# Patient Record
Sex: MALE | URBAN - METROPOLITAN AREA
[De-identification: names, ages, dates, MRNs, and addresses within clinical notes are randomized per-mention and may not be internally consistent; named-entity substitution may affect disease eponyms.]

---

## 2022-02-28 ENCOUNTER — EMERGENCY (EMERGENCY)
Facility: HOSPITAL | Age: 42
LOS: 1 days | Discharge: HOME | End: 2022-02-28
Attending: EMERGENCY MEDICINE
Payer: SELF-PAY

## 2022-02-28 VITALS
SYSTOLIC BLOOD PRESSURE: 142 MMHG | HEIGHT: 63 IN | WEIGHT: 160.06 LBS | OXYGEN SATURATION: 95 % | TEMPERATURE: 98 F | HEART RATE: 94 BPM | DIASTOLIC BLOOD PRESSURE: 93 MMHG | RESPIRATION RATE: 18 BRPM

## 2022-02-28 LAB
ANION GAP SERPL CALC-SCNC: 18 MMOL/L — HIGH (ref 5–17)
APAP SERPL-MCNC: <3 UG/ML — LOW (ref 10–26)
APPEARANCE UR: CLEAR — SIGNIFICANT CHANGE UP
BACTERIA # UR AUTO: ABNORMAL
BASOPHILS # BLD AUTO: 0.07 K/UL — SIGNIFICANT CHANGE UP (ref 0–0.2)
BASOPHILS NFR BLD AUTO: 1.6 % — SIGNIFICANT CHANGE UP (ref 0–2)
BILIRUB UR-MCNC: NEGATIVE — SIGNIFICANT CHANGE UP
BUN SERPL-MCNC: 7.1 MG/DL — LOW (ref 8–20)
CALCIUM SERPL-MCNC: 9 MG/DL — SIGNIFICANT CHANGE UP (ref 8.6–10.2)
CHLORIDE SERPL-SCNC: 101 MMOL/L — SIGNIFICANT CHANGE UP (ref 98–107)
CO2 SERPL-SCNC: 26 MMOL/L — SIGNIFICANT CHANGE UP (ref 22–29)
COLOR SPEC: YELLOW — SIGNIFICANT CHANGE UP
CREAT SERPL-MCNC: 0.86 MG/DL — SIGNIFICANT CHANGE UP (ref 0.5–1.3)
DIFF PNL FLD: ABNORMAL
EGFR: 111 ML/MIN/1.73M2 — SIGNIFICANT CHANGE UP
EOSINOPHIL # BLD AUTO: 0.03 K/UL — SIGNIFICANT CHANGE UP (ref 0–0.5)
EOSINOPHIL NFR BLD AUTO: 0.7 % — SIGNIFICANT CHANGE UP (ref 0–6)
EPI CELLS # UR: SIGNIFICANT CHANGE UP
ETHANOL SERPL-MCNC: 371 MG/DL — HIGH (ref 0–9)
FLUAV AG NPH QL: SIGNIFICANT CHANGE UP
FLUBV AG NPH QL: SIGNIFICANT CHANGE UP
GLUCOSE SERPL-MCNC: 94 MG/DL — SIGNIFICANT CHANGE UP (ref 70–99)
GLUCOSE UR QL: NEGATIVE MG/DL — SIGNIFICANT CHANGE UP
HCT VFR BLD CALC: 44.4 % — SIGNIFICANT CHANGE UP (ref 39–50)
HGB BLD-MCNC: 15.3 G/DL — SIGNIFICANT CHANGE UP (ref 13–17)
IMM GRANULOCYTES NFR BLD AUTO: 0.2 % — SIGNIFICANT CHANGE UP (ref 0–1.5)
KETONES UR-MCNC: NEGATIVE — SIGNIFICANT CHANGE UP
LEUKOCYTE ESTERASE UR-ACNC: NEGATIVE — SIGNIFICANT CHANGE UP
LYMPHOCYTES # BLD AUTO: 1.82 K/UL — SIGNIFICANT CHANGE UP (ref 1–3.3)
LYMPHOCYTES # BLD AUTO: 42.4 % — SIGNIFICANT CHANGE UP (ref 13–44)
MCHC RBC-ENTMCNC: 32.3 PG — SIGNIFICANT CHANGE UP (ref 27–34)
MCHC RBC-ENTMCNC: 34.5 GM/DL — SIGNIFICANT CHANGE UP (ref 32–36)
MCV RBC AUTO: 93.7 FL — SIGNIFICANT CHANGE UP (ref 80–100)
MONOCYTES # BLD AUTO: 0.47 K/UL — SIGNIFICANT CHANGE UP (ref 0–0.9)
MONOCYTES NFR BLD AUTO: 11 % — SIGNIFICANT CHANGE UP (ref 2–14)
NEUTROPHILS # BLD AUTO: 1.89 K/UL — SIGNIFICANT CHANGE UP (ref 1.8–7.4)
NEUTROPHILS NFR BLD AUTO: 44.1 % — SIGNIFICANT CHANGE UP (ref 43–77)
NITRITE UR-MCNC: NEGATIVE — SIGNIFICANT CHANGE UP
PH UR: 6 — SIGNIFICANT CHANGE UP (ref 5–8)
PLATELET # BLD AUTO: 221 K/UL — SIGNIFICANT CHANGE UP (ref 150–400)
POTASSIUM SERPL-MCNC: 4 MMOL/L — SIGNIFICANT CHANGE UP (ref 3.5–5.3)
POTASSIUM SERPL-SCNC: 4 MMOL/L — SIGNIFICANT CHANGE UP (ref 3.5–5.3)
PROT UR-MCNC: 15
RBC # BLD: 4.74 M/UL — SIGNIFICANT CHANGE UP (ref 4.2–5.8)
RBC # FLD: 12.7 % — SIGNIFICANT CHANGE UP (ref 10.3–14.5)
RBC CASTS # UR COMP ASSIST: SIGNIFICANT CHANGE UP /HPF (ref 0–4)
RSV RNA NPH QL NAA+NON-PROBE: SIGNIFICANT CHANGE UP
SALICYLATES SERPL-MCNC: <0.6 MG/DL — LOW (ref 10–20)
SARS-COV-2 RNA SPEC QL NAA+PROBE: DETECTED
SODIUM SERPL-SCNC: 145 MMOL/L — SIGNIFICANT CHANGE UP (ref 135–145)
SP GR SPEC: 1.01 — SIGNIFICANT CHANGE UP (ref 1.01–1.02)
UROBILINOGEN FLD QL: 1 MG/DL
WBC # BLD: 4.29 K/UL — SIGNIFICANT CHANGE UP (ref 3.8–10.5)
WBC # FLD AUTO: 4.29 K/UL — SIGNIFICANT CHANGE UP (ref 3.8–10.5)
WBC UR QL: SIGNIFICANT CHANGE UP /HPF (ref 0–5)

## 2022-02-28 PROCEDURE — 80048 BASIC METABOLIC PNL TOTAL CA: CPT

## 2022-02-28 PROCEDURE — 81001 URINALYSIS AUTO W/SCOPE: CPT

## 2022-02-28 PROCEDURE — 93010 ELECTROCARDIOGRAM REPORT: CPT

## 2022-02-28 PROCEDURE — 93005 ELECTROCARDIOGRAM TRACING: CPT

## 2022-02-28 PROCEDURE — 36415 COLL VENOUS BLD VENIPUNCTURE: CPT

## 2022-02-28 PROCEDURE — 80307 DRUG TEST PRSMV CHEM ANLYZR: CPT

## 2022-02-28 PROCEDURE — 87086 URINE CULTURE/COLONY COUNT: CPT

## 2022-02-28 PROCEDURE — 85025 COMPLETE CBC W/AUTO DIFF WBC: CPT

## 2022-02-28 PROCEDURE — 99285 EMERGENCY DEPT VISIT HI MDM: CPT

## 2022-02-28 PROCEDURE — 87637 SARSCOV2&INF A&B&RSV AMP PRB: CPT

## 2022-02-28 PROCEDURE — 99284 EMERGENCY DEPT VISIT MOD MDM: CPT

## 2022-02-28 NOTE — ED PROVIDER NOTE - ATTENDING CONTRIBUTION TO CARE
I personally saw the patient with the resident, and completed the key components of the history and physical exam. I then discussed the management plan with the resident.   gen in nad resp clear cardiac no murmur abd soft neuro itnact

## 2022-02-28 NOTE — ED ADULT TRIAGE NOTE - CHIEF COMPLAINT QUOTE
patient brought in by friend states that he has Kidney issue, urinating blood, and requesting DETOX from alcohol last drink PTA

## 2022-02-28 NOTE — ED PROVIDER NOTE - PATIENT PORTAL LINK FT
You can access the FollowMyHealth Patient Portal offered by [facility] by registering at the following website: [email or web address]. By joining [employer]’s FollowMyHealth portal, you will also be able to view your health information using other applications (apps) compatible with our system.

## 2022-02-28 NOTE — ED PROVIDER NOTE - CLINICAL SUMMARY MEDICAL DECISION MAKING FREE TEXT BOX
41 yo male presents requesting detox. Will obtain appropriate labs. SBIRT consult. Monitor for signs of withdrawal.

## 2022-02-28 NOTE — ED PROVIDER NOTE - OBJECTIVE STATEMENT
43 yo male history of daily alcohol abuse presents requesting detox. Patient states that he drinks roughly one pint of vodka per evening. Patient reports that he last drank 5 hours PTA. He does not feel as if he is withdrawing. Patient states that he has an issue with drinking and would like to go to rehabilitation to help stem his drinking problem. He denies recent falls/head trauma. Denies other recent illness.

## 2022-02-28 NOTE — ED PROVIDER NOTE - PHYSICAL EXAMINATION
Gen: NAD  Head: NC/AT  Neck: trachea midline  Resp:  No distress, lungs cta b/l  Cardiac: Heart rrr  Ext: no deformities  Neuro:  A&O appears non focal  Skin:  Warm and dry as visualized

## 2022-02-28 NOTE — ED PROVIDER NOTE - PROGRESS NOTE DETAILS
Aye: Patient left, pulled out IV. Eloped. Aye: Patient covid positive. States that he no longer wants to go to rehab. Plan to dc the patient.

## 2022-02-28 NOTE — ED ADULT NURSE NOTE - OBJECTIVE STATEMENT
Pt A&O x 4 reports wanting rehab for ETOH abuse. Pt reports drinking a pint of vodka daily. Reports drinking alcohol in the morning today. Denies recent drug use but reports hx of cocaine abuse. IV access obtained; specimens sent to lab. Will continue to monitor.

## 2022-03-01 VITALS
DIASTOLIC BLOOD PRESSURE: 78 MMHG | RESPIRATION RATE: 16 BRPM | SYSTOLIC BLOOD PRESSURE: 124 MMHG | TEMPERATURE: 98 F | HEART RATE: 80 BPM | OXYGEN SATURATION: 97 %

## 2022-03-01 LAB
CULTURE RESULTS: SIGNIFICANT CHANGE UP
SPECIMEN SOURCE: SIGNIFICANT CHANGE UP

## 2022-03-17 ENCOUNTER — EMERGENCY (EMERGENCY)
Facility: HOSPITAL | Age: 42
LOS: 1 days | Discharge: HOME | End: 2022-03-17
Attending: EMERGENCY MEDICINE
Payer: SELF-PAY

## 2022-03-17 VITALS
OXYGEN SATURATION: 96 % | SYSTOLIC BLOOD PRESSURE: 165 MMHG | DIASTOLIC BLOOD PRESSURE: 89 MMHG | RESPIRATION RATE: 20 BRPM | TEMPERATURE: 98 F | HEART RATE: 89 BPM

## 2022-03-17 PROCEDURE — 99284 EMERGENCY DEPT VISIT MOD MDM: CPT

## 2022-03-17 NOTE — ED PROVIDER NOTE - NSDCPRINTRESULTS_ED_ALL_ED
20-Sep-2021 21:20
Patient requests all Lab, Cardiology, and Radiology Results on their Discharge Instructions

## 2022-03-17 NOTE — ED PROVIDER NOTE - PATIENT PORTAL LINK FT
You can access the FollowMyHealth Patient Portal offered by [facility] by registering at the following website: [email or web address]. By joining [employer]’s FollowMyHealth portal, you will also be able to view your health information using other applications (apps) compatible with our system.

## 2022-03-17 NOTE — ED PROVIDER NOTE - PHYSICAL EXAMINATION
General:   intoxicated, disheveled  Head:     NC/AT, EOMI,   Neck:     trachea midline  Lungs:     CTA b/l, no w/r/r  CVS:     S1S2, RRR, no m/g/r  Abd:     +BS, s/nt/nd, no organomegaly  Ext:    2+ radial and pedal pulses, no c/c/e  Neuro: AAOx2, no sensory/motor deficits
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
## 2022-03-18 VITALS
OXYGEN SATURATION: 98 % | DIASTOLIC BLOOD PRESSURE: 76 MMHG | TEMPERATURE: 98 F | HEART RATE: 78 BPM | RESPIRATION RATE: 18 BRPM | SYSTOLIC BLOOD PRESSURE: 124 MMHG

## 2022-05-23 ENCOUNTER — TRANSCRIPTION ENCOUNTER (OUTPATIENT)
Age: 42
End: 2022-05-23

## 2022-05-23 ENCOUNTER — INPATIENT (INPATIENT)
Facility: HOSPITAL | Age: 42
LOS: 129 days | Discharge: NURSING, REHAB OR LONG-TERM CARE | DRG: 3 | End: 2022-09-30
Attending: INTERNAL MEDICINE | Admitting: SURGERY
Payer: COMMERCIAL

## 2022-05-23 VITALS
RESPIRATION RATE: 16 BRPM | TEMPERATURE: 98 F | SYSTOLIC BLOOD PRESSURE: 142 MMHG | HEART RATE: 86 BPM | DIASTOLIC BLOOD PRESSURE: 93 MMHG | OXYGEN SATURATION: 86 %

## 2022-05-23 PROCEDURE — 43753 TX GASTRO INTUB W/ASP: CPT

## 2022-05-23 PROCEDURE — 31500 INSERT EMERGENCY AIRWAY: CPT | Mod: 59

## 2022-05-23 PROCEDURE — 99285 EMERGENCY DEPT VISIT HI MDM: CPT | Mod: 25

## 2022-05-23 RX ORDER — TETANUS TOXOID, REDUCED DIPHTHERIA TOXOID AND ACELLULAR PERTUSSIS VACCINE, ADSORBED 5; 2.5; 8; 8; 2.5 [IU]/.5ML; [IU]/.5ML; UG/.5ML; UG/.5ML; UG/.5ML
0.5 SUSPENSION INTRAMUSCULAR ONCE
Refills: 0 | Status: COMPLETED | OUTPATIENT
Start: 2022-05-23 | End: 2022-05-23

## 2022-05-23 RX ORDER — CEFAZOLIN SODIUM 1 G
2000 VIAL (EA) INJECTION ONCE
Refills: 0 | Status: COMPLETED | OUTPATIENT
Start: 2022-05-23 | End: 2022-05-23
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
## 2022-05-23 NOTE — ED PROVIDER NOTE - CARE PLAN
1 Principal Discharge DX:	Change in mental status   Principal Discharge DX:	Subdural hemorrhage-deep coma  Secondary Diagnosis:	Fracture, facial bones
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
## 2022-05-23 NOTE — ED PROVIDER NOTE - ENMT, MLM
trachea midline, + hematoma to occipital region + laceration to forehead trachea midline, + hematoma to back of head, + laceration to forehead

## 2022-05-24 ENCOUNTER — APPOINTMENT (OUTPATIENT)
Dept: NEUROSURGERY | Facility: HOSPITAL | Age: 42
End: 2022-05-24

## 2022-05-24 DIAGNOSIS — R41.82 ALTERED MENTAL STATUS, UNSPECIFIED: ICD-10-CM

## 2022-05-24 DIAGNOSIS — S06.5X9A TRAUMATIC SUBDURAL HEMORRHAGE WITH LOSS OF CONSCIOUSNESS OF UNSPECIFIED DURATION, INITIAL ENCOUNTER: ICD-10-CM

## 2022-05-24 PROBLEM — Z00.00 ENCOUNTER FOR PREVENTIVE HEALTH EXAMINATION: Noted: 2022-05-24

## 2022-05-24 LAB
ABO RH CONFIRMATION: SIGNIFICANT CHANGE UP
ALBUMIN SERPL ELPH-MCNC: 3.3 G/DL — SIGNIFICANT CHANGE UP (ref 3.3–5.2)
ALBUMIN SERPL ELPH-MCNC: 3.5 G/DL — SIGNIFICANT CHANGE UP (ref 3.3–5.2)
ALBUMIN SERPL ELPH-MCNC: 4.7 G/DL — SIGNIFICANT CHANGE UP (ref 3.3–5.2)
ALP SERPL-CCNC: 54 U/L — SIGNIFICANT CHANGE UP (ref 40–120)
ALP SERPL-CCNC: 57 U/L — SIGNIFICANT CHANGE UP (ref 40–120)
ALP SERPL-CCNC: 89 U/L — SIGNIFICANT CHANGE UP (ref 40–120)
ALT FLD-CCNC: 171 U/L — HIGH
ALT FLD-CCNC: 211 U/L — HIGH
ALT FLD-CCNC: 346 U/L — HIGH
AMPHET UR-MCNC: NEGATIVE — SIGNIFICANT CHANGE UP
ANION GAP SERPL CALC-SCNC: 16 MMOL/L — SIGNIFICANT CHANGE UP (ref 5–17)
ANION GAP SERPL CALC-SCNC: 20 MMOL/L — HIGH (ref 5–17)
ANION GAP SERPL CALC-SCNC: 22 MMOL/L — HIGH (ref 5–17)
ANION GAP SERPL CALC-SCNC: 23 MMOL/L — HIGH (ref 5–17)
APTT BLD: 23.5 SEC — LOW (ref 27.5–35.5)
APTT BLD: 25.6 SEC — LOW (ref 27.5–35.5)
APTT BLD: 25.9 SEC — LOW (ref 27.5–35.5)
APTT BLD: 26.1 SEC — LOW (ref 27.5–35.5)
AST SERPL-CCNC: 363 U/L — HIGH
AST SERPL-CCNC: 528 U/L — HIGH
AST SERPL-CCNC: 676 U/L — HIGH
BARBITURATES UR SCN-MCNC: NEGATIVE — SIGNIFICANT CHANGE UP
BASOPHILS # BLD AUTO: 0 K/UL — SIGNIFICANT CHANGE UP (ref 0–0.2)
BASOPHILS # BLD AUTO: 0.02 K/UL — SIGNIFICANT CHANGE UP (ref 0–0.2)
BASOPHILS # BLD AUTO: 0.09 K/UL — SIGNIFICANT CHANGE UP (ref 0–0.2)
BASOPHILS NFR BLD AUTO: 0 % — SIGNIFICANT CHANGE UP (ref 0–2)
BASOPHILS NFR BLD AUTO: 0.2 % — SIGNIFICANT CHANGE UP (ref 0–2)
BASOPHILS NFR BLD AUTO: 0.5 % — SIGNIFICANT CHANGE UP (ref 0–2)
BENZODIAZ UR-MCNC: NEGATIVE — SIGNIFICANT CHANGE UP
BILIRUB DIRECT SERPL-MCNC: 0.2 MG/DL — SIGNIFICANT CHANGE UP (ref 0–0.3)
BILIRUB DIRECT SERPL-MCNC: 0.2 MG/DL — SIGNIFICANT CHANGE UP (ref 0–0.3)
BILIRUB INDIRECT FLD-MCNC: 0.4 MG/DL — SIGNIFICANT CHANGE UP (ref 0.2–1)
BILIRUB INDIRECT FLD-MCNC: 0.4 MG/DL — SIGNIFICANT CHANGE UP (ref 0.2–1)
BILIRUB SERPL-MCNC: 0.6 MG/DL — SIGNIFICANT CHANGE UP (ref 0.4–2)
BLD GP AB SCN SERPL QL: SIGNIFICANT CHANGE UP
BUN SERPL-MCNC: 10.1 MG/DL — SIGNIFICANT CHANGE UP (ref 8–20)
BUN SERPL-MCNC: 10.9 MG/DL — SIGNIFICANT CHANGE UP (ref 8–20)
BUN SERPL-MCNC: 11.3 MG/DL — SIGNIFICANT CHANGE UP (ref 8–20)
BUN SERPL-MCNC: 11.6 MG/DL — SIGNIFICANT CHANGE UP (ref 8–20)
CALCIUM SERPL-MCNC: 6.4 MG/DL — CRITICAL LOW (ref 8.6–10.2)
CALCIUM SERPL-MCNC: 7.3 MG/DL — LOW (ref 8.6–10.2)
CALCIUM SERPL-MCNC: 7.5 MG/DL — LOW (ref 8.6–10.2)
CALCIUM SERPL-MCNC: 8.4 MG/DL — LOW (ref 8.6–10.2)
CHLORIDE SERPL-SCNC: 102 MMOL/L — SIGNIFICANT CHANGE UP (ref 98–107)
CHLORIDE SERPL-SCNC: 103 MMOL/L — SIGNIFICANT CHANGE UP (ref 98–107)
CHLORIDE SERPL-SCNC: 106 MMOL/L — SIGNIFICANT CHANGE UP (ref 98–107)
CHLORIDE SERPL-SCNC: 94 MMOL/L — LOW (ref 98–107)
CK MB CFR SERPL CALC: 14.4 NG/ML — HIGH (ref 0–6.7)
CK SERPL-CCNC: 818 U/L — HIGH (ref 30–200)
CO2 SERPL-SCNC: 15 MMOL/L — LOW (ref 22–29)
CO2 SERPL-SCNC: 16 MMOL/L — LOW (ref 22–29)
CO2 SERPL-SCNC: 19 MMOL/L — LOW (ref 22–29)
CO2 SERPL-SCNC: 22 MMOL/L — SIGNIFICANT CHANGE UP (ref 22–29)
COCAINE METAB.OTHER UR-MCNC: POSITIVE
CREAT SERPL-MCNC: 1.09 MG/DL — SIGNIFICANT CHANGE UP (ref 0.5–1.3)
CREAT SERPL-MCNC: 1.11 MG/DL — SIGNIFICANT CHANGE UP (ref 0.5–1.3)
CREAT SERPL-MCNC: 1.21 MG/DL — SIGNIFICANT CHANGE UP (ref 0.5–1.3)
CREAT SERPL-MCNC: 1.23 MG/DL — SIGNIFICANT CHANGE UP (ref 0.5–1.3)
EGFR: 84 ML/MIN/1.73M2 — SIGNIFICANT CHANGE UP
EGFR: 85 ML/MIN/1.73M2 — SIGNIFICANT CHANGE UP
EGFR: 95 ML/MIN/1.73M2 — SIGNIFICANT CHANGE UP
EGFR: 97 ML/MIN/1.73M2 — SIGNIFICANT CHANGE UP
EOSINOPHIL # BLD AUTO: 0 K/UL — SIGNIFICANT CHANGE UP (ref 0–0.5)
EOSINOPHIL # BLD AUTO: 0 K/UL — SIGNIFICANT CHANGE UP (ref 0–0.5)
EOSINOPHIL # BLD AUTO: 0.05 K/UL — SIGNIFICANT CHANGE UP (ref 0–0.5)
EOSINOPHIL NFR BLD AUTO: 0 % — SIGNIFICANT CHANGE UP (ref 0–6)
EOSINOPHIL NFR BLD AUTO: 0 % — SIGNIFICANT CHANGE UP (ref 0–6)
EOSINOPHIL NFR BLD AUTO: 0.3 % — SIGNIFICANT CHANGE UP (ref 0–6)
ETHANOL SERPL-MCNC: 356 MG/DL — HIGH (ref 0–9)
FIBRINOGEN PPP-MCNC: 321 MG/DL — LOW (ref 330–520)
GAS PNL BLDA: SIGNIFICANT CHANGE UP
GAS PNL BLDA: SIGNIFICANT CHANGE UP
GLUCOSE SERPL-MCNC: 113 MG/DL — HIGH (ref 70–99)
GLUCOSE SERPL-MCNC: 122 MG/DL — HIGH (ref 70–99)
GLUCOSE SERPL-MCNC: 128 MG/DL — HIGH (ref 70–99)
GLUCOSE SERPL-MCNC: 160 MG/DL — HIGH (ref 70–99)
HAV IGM SER-ACNC: SIGNIFICANT CHANGE UP
HBV CORE IGM SER-ACNC: SIGNIFICANT CHANGE UP
HBV SURFACE AG SER-ACNC: SIGNIFICANT CHANGE UP
HCT VFR BLD CALC: 22.4 % — LOW (ref 39–50)
HCT VFR BLD CALC: 23.8 % — LOW (ref 39–50)
HCT VFR BLD CALC: 24.8 % — LOW (ref 39–50)
HCT VFR BLD CALC: 28.7 % — LOW (ref 39–50)
HCT VFR BLD CALC: 42.4 % — SIGNIFICANT CHANGE UP (ref 39–50)
HCV AB S/CO SERPL IA: 0.13 S/CO — SIGNIFICANT CHANGE UP (ref 0–0.99)
HCV AB SERPL-IMP: SIGNIFICANT CHANGE UP
HGB BLD-MCNC: 14.6 G/DL — SIGNIFICANT CHANGE UP (ref 13–17)
HGB BLD-MCNC: 7.6 G/DL — LOW (ref 13–17)
HGB BLD-MCNC: 8 G/DL — LOW (ref 13–17)
HGB BLD-MCNC: 8.1 G/DL — LOW (ref 13–17)
HGB BLD-MCNC: 9.8 G/DL — LOW (ref 13–17)
HIV 1 & 2 AB SERPL IA.RAPID: SIGNIFICANT CHANGE UP
IMM GRANULOCYTES NFR BLD AUTO: 0.3 % — SIGNIFICANT CHANGE UP (ref 0–1.5)
IMM GRANULOCYTES NFR BLD AUTO: 0.7 % — SIGNIFICANT CHANGE UP (ref 0–1.5)
INR BLD: 1.01 RATIO — SIGNIFICANT CHANGE UP (ref 0.88–1.16)
INR BLD: 1.07 RATIO — SIGNIFICANT CHANGE UP (ref 0.88–1.16)
INR BLD: 1.1 RATIO — SIGNIFICANT CHANGE UP (ref 0.88–1.16)
INR BLD: 1.17 RATIO — HIGH (ref 0.88–1.16)
LACTATE SERPL-SCNC: 2.6 MMOL/L — HIGH (ref 0.5–2)
LYMPHOCYTES # BLD AUTO: 0.95 K/UL — LOW (ref 1–3.3)
LYMPHOCYTES # BLD AUTO: 1.14 K/UL — SIGNIFICANT CHANGE UP (ref 1–3.3)
LYMPHOCYTES # BLD AUTO: 10.7 % — LOW (ref 13–44)
LYMPHOCYTES # BLD AUTO: 13.6 % — SIGNIFICANT CHANGE UP (ref 13–44)
LYMPHOCYTES # BLD AUTO: 2.67 K/UL — SIGNIFICANT CHANGE UP (ref 1–3.3)
LYMPHOCYTES # BLD AUTO: 7 % — LOW (ref 13–44)
MAGNESIUM SERPL-MCNC: 1.5 MG/DL — LOW (ref 1.6–2.6)
MAGNESIUM SERPL-MCNC: 2.9 MG/DL — HIGH (ref 1.6–2.6)
MANUAL SMEAR VERIFICATION: SIGNIFICANT CHANGE UP
MCHC RBC-ENTMCNC: 30.5 PG — SIGNIFICANT CHANGE UP (ref 27–34)
MCHC RBC-ENTMCNC: 31.9 PG — SIGNIFICANT CHANGE UP (ref 27–34)
MCHC RBC-ENTMCNC: 32.1 PG — SIGNIFICANT CHANGE UP (ref 27–34)
MCHC RBC-ENTMCNC: 32.5 PG — SIGNIFICANT CHANGE UP (ref 27–34)
MCHC RBC-ENTMCNC: 32.6 PG — SIGNIFICANT CHANGE UP (ref 27–34)
MCHC RBC-ENTMCNC: 32.7 GM/DL — SIGNIFICANT CHANGE UP (ref 32–36)
MCHC RBC-ENTMCNC: 33.6 GM/DL — SIGNIFICANT CHANGE UP (ref 32–36)
MCHC RBC-ENTMCNC: 33.9 GM/DL — SIGNIFICANT CHANGE UP (ref 32–36)
MCHC RBC-ENTMCNC: 34.1 GM/DL — SIGNIFICANT CHANGE UP (ref 32–36)
MCHC RBC-ENTMCNC: 34.4 GM/DL — SIGNIFICANT CHANGE UP (ref 32–36)
MCV RBC AUTO: 92.8 FL — SIGNIFICANT CHANGE UP (ref 80–100)
MCV RBC AUTO: 93.2 FL — SIGNIFICANT CHANGE UP (ref 80–100)
MCV RBC AUTO: 94.1 FL — SIGNIFICANT CHANGE UP (ref 80–100)
MCV RBC AUTO: 96.1 FL — SIGNIFICANT CHANGE UP (ref 80–100)
MCV RBC AUTO: 96.7 FL — SIGNIFICANT CHANGE UP (ref 80–100)
METHADONE UR-MCNC: NEGATIVE — SIGNIFICANT CHANGE UP
MONOCYTES # BLD AUTO: 0.29 K/UL — SIGNIFICANT CHANGE UP (ref 0–0.9)
MONOCYTES # BLD AUTO: 0.4 K/UL — SIGNIFICANT CHANGE UP (ref 0–0.9)
MONOCYTES # BLD AUTO: 0.55 K/UL — SIGNIFICANT CHANGE UP (ref 0–0.9)
MONOCYTES NFR BLD AUTO: 1.8 % — LOW (ref 2–14)
MONOCYTES NFR BLD AUTO: 2 % — SIGNIFICANT CHANGE UP (ref 2–14)
MONOCYTES NFR BLD AUTO: 6.2 % — SIGNIFICANT CHANGE UP (ref 2–14)
MRSA PCR RESULT.: SIGNIFICANT CHANGE UP
NEUTROPHILS # BLD AUTO: 14.83 K/UL — HIGH (ref 1.8–7.4)
NEUTROPHILS # BLD AUTO: 16.36 K/UL — HIGH (ref 1.8–7.4)
NEUTROPHILS # BLD AUTO: 7.36 K/UL — SIGNIFICANT CHANGE UP (ref 1.8–7.4)
NEUTROPHILS NFR BLD AUTO: 82.6 % — HIGH (ref 43–77)
NEUTROPHILS NFR BLD AUTO: 82.9 % — HIGH (ref 43–77)
NEUTROPHILS NFR BLD AUTO: 90.3 % — HIGH (ref 43–77)
NEUTS BAND # BLD: 0.9 % — SIGNIFICANT CHANGE UP (ref 0–8)
OPIATES UR-MCNC: NEGATIVE — SIGNIFICANT CHANGE UP
PCP SPEC-MCNC: SIGNIFICANT CHANGE UP
PCP UR-MCNC: NEGATIVE — SIGNIFICANT CHANGE UP
PHOSPHATE SERPL-MCNC: 4.5 MG/DL — SIGNIFICANT CHANGE UP (ref 2.4–4.7)
PHOSPHATE SERPL-MCNC: 5.3 MG/DL — HIGH (ref 2.4–4.7)
PLAT MORPH BLD: NORMAL — SIGNIFICANT CHANGE UP
PLATELET # BLD AUTO: 111 K/UL — LOW (ref 150–400)
PLATELET # BLD AUTO: 145 K/UL — LOW (ref 150–400)
PLATELET # BLD AUTO: 65 K/UL — LOW (ref 150–400)
PLATELET # BLD AUTO: 72 K/UL — LOW (ref 150–400)
PLATELET # BLD AUTO: 85 K/UL — LOW (ref 150–400)
POTASSIUM SERPL-MCNC: 3.9 MMOL/L — SIGNIFICANT CHANGE UP (ref 3.5–5.3)
POTASSIUM SERPL-MCNC: 4.6 MMOL/L — SIGNIFICANT CHANGE UP (ref 3.5–5.3)
POTASSIUM SERPL-MCNC: 4.8 MMOL/L — SIGNIFICANT CHANGE UP (ref 3.5–5.3)
POTASSIUM SERPL-MCNC: 4.8 MMOL/L — SIGNIFICANT CHANGE UP (ref 3.5–5.3)
POTASSIUM SERPL-SCNC: 3.9 MMOL/L — SIGNIFICANT CHANGE UP (ref 3.5–5.3)
POTASSIUM SERPL-SCNC: 4.6 MMOL/L — SIGNIFICANT CHANGE UP (ref 3.5–5.3)
POTASSIUM SERPL-SCNC: 4.8 MMOL/L — SIGNIFICANT CHANGE UP (ref 3.5–5.3)
POTASSIUM SERPL-SCNC: 4.8 MMOL/L — SIGNIFICANT CHANGE UP (ref 3.5–5.3)
PROT SERPL-MCNC: 5.5 G/DL — LOW (ref 6.6–8.7)
PROT SERPL-MCNC: 5.6 G/DL — LOW (ref 6.6–8.7)
PROT SERPL-MCNC: 7.9 G/DL — SIGNIFICANT CHANGE UP (ref 6.6–8.7)
PROTHROM AB SERPL-ACNC: 11.7 SEC — SIGNIFICANT CHANGE UP (ref 10.5–13.4)
PROTHROM AB SERPL-ACNC: 12.4 SEC — SIGNIFICANT CHANGE UP (ref 10.5–13.4)
PROTHROM AB SERPL-ACNC: 12.8 SEC — SIGNIFICANT CHANGE UP (ref 10.5–13.4)
PROTHROM AB SERPL-ACNC: 13.6 SEC — HIGH (ref 10.5–13.4)
RBC # BLD: 2.33 M/UL — LOW (ref 4.2–5.8)
RBC # BLD: 2.46 M/UL — LOW (ref 4.2–5.8)
RBC # BLD: 2.66 M/UL — LOW (ref 4.2–5.8)
RBC # BLD: 3.05 M/UL — LOW (ref 4.2–5.8)
RBC # BLD: 4.57 M/UL — SIGNIFICANT CHANGE UP (ref 4.2–5.8)
RBC # FLD: 11 % — SIGNIFICANT CHANGE UP (ref 10.3–14.5)
RBC # FLD: 11.3 % — SIGNIFICANT CHANGE UP (ref 10.3–14.5)
RBC # FLD: 11.3 % — SIGNIFICANT CHANGE UP (ref 10.3–14.5)
RBC # FLD: 11.4 % — SIGNIFICANT CHANGE UP (ref 10.3–14.5)
RBC # FLD: 13.2 % — SIGNIFICANT CHANGE UP (ref 10.3–14.5)
RBC BLD AUTO: NORMAL — SIGNIFICANT CHANGE UP
S AUREUS DNA NOSE QL NAA+PROBE: DETECTED
SARS-COV-2 RNA SPEC QL NAA+PROBE: SIGNIFICANT CHANGE UP
SMUDGE CELLS # BLD: PRESENT — SIGNIFICANT CHANGE UP
SODIUM SERPL-SCNC: 138 MMOL/L — SIGNIFICANT CHANGE UP (ref 135–145)
SODIUM SERPL-SCNC: 139 MMOL/L — SIGNIFICANT CHANGE UP (ref 135–145)
SODIUM SERPL-SCNC: 140 MMOL/L — SIGNIFICANT CHANGE UP (ref 135–145)
SODIUM SERPL-SCNC: 140 MMOL/L — SIGNIFICANT CHANGE UP (ref 135–145)
THC UR QL: NEGATIVE — SIGNIFICANT CHANGE UP
WBC # BLD: 16.26 K/UL — HIGH (ref 3.8–10.5)
WBC # BLD: 19.7 K/UL — HIGH (ref 3.8–10.5)
WBC # BLD: 6.99 K/UL — SIGNIFICANT CHANGE UP (ref 3.8–10.5)
WBC # BLD: 7.63 K/UL — SIGNIFICANT CHANGE UP (ref 3.8–10.5)
WBC # BLD: 8.91 K/UL — SIGNIFICANT CHANGE UP (ref 3.8–10.5)
WBC # FLD AUTO: 16.26 K/UL — HIGH (ref 3.8–10.5)
WBC # FLD AUTO: 19.7 K/UL — HIGH (ref 3.8–10.5)
WBC # FLD AUTO: 6.99 K/UL — SIGNIFICANT CHANGE UP (ref 3.8–10.5)
WBC # FLD AUTO: 7.63 K/UL — SIGNIFICANT CHANGE UP (ref 3.8–10.5)
WBC # FLD AUTO: 8.91 K/UL — SIGNIFICANT CHANGE UP (ref 3.8–10.5)

## 2022-05-24 PROCEDURE — 70450 CT HEAD/BRAIN W/O DYE: CPT | Mod: 26,77

## 2022-05-24 PROCEDURE — 70486 CT MAXILLOFACIAL W/O DYE: CPT | Mod: 26

## 2022-05-24 PROCEDURE — 71260 CT THORAX DX C+: CPT | Mod: 26,77

## 2022-05-24 PROCEDURE — 36556 INSERT NON-TUNNEL CV CATH: CPT | Mod: 1L,LT

## 2022-05-24 PROCEDURE — 72125 CT NECK SPINE W/O DYE: CPT | Mod: 26

## 2022-05-24 PROCEDURE — 99223 1ST HOSP IP/OBS HIGH 75: CPT | Mod: 57

## 2022-05-24 PROCEDURE — 73552 X-RAY EXAM OF FEMUR 2/>: CPT | Mod: 26,RT

## 2022-05-24 PROCEDURE — 70450 CT HEAD/BRAIN W/O DYE: CPT | Mod: 26,76

## 2022-05-24 PROCEDURE — 99291 CRITICAL CARE FIRST HOUR: CPT

## 2022-05-24 PROCEDURE — 61312 CRNEC/CRNOT STTL XDRL/SDRL: CPT

## 2022-05-24 PROCEDURE — 74177 CT ABD & PELVIS W/CONTRAST: CPT | Mod: 26,77

## 2022-05-24 PROCEDURE — 72125 CT NECK SPINE W/O DYE: CPT | Mod: 26,77

## 2022-05-24 PROCEDURE — 74177 CT ABD & PELVIS W/CONTRAST: CPT | Mod: 26

## 2022-05-24 PROCEDURE — 36556 INSERT NON-TUNNEL CV CATH: CPT

## 2022-05-24 PROCEDURE — 71045 X-RAY EXAM CHEST 1 VIEW: CPT | Mod: 26

## 2022-05-24 PROCEDURE — 71260 CT THORAX DX C+: CPT | Mod: 26

## 2022-05-24 RX ORDER — CEFAZOLIN SODIUM 1 G
2000 VIAL (EA) INJECTION EVERY 8 HOURS
Refills: 0 | Status: DISCONTINUED | OUTPATIENT
Start: 2022-05-24 | End: 2022-05-27

## 2022-05-24 RX ORDER — FENTANYL CITRATE 50 UG/ML
50 INJECTION INTRAVENOUS ONCE
Refills: 0 | Status: DISCONTINUED | OUTPATIENT
Start: 2022-05-24 | End: 2022-05-24

## 2022-05-24 RX ORDER — MAGNESIUM SULFATE 500 MG/ML
2 VIAL (ML) INJECTION
Refills: 0 | Status: COMPLETED | OUTPATIENT
Start: 2022-05-24 | End: 2022-05-24

## 2022-05-24 RX ORDER — CHLORHEXIDINE GLUCONATE 213 G/1000ML
1 SOLUTION TOPICAL DAILY
Refills: 0 | Status: DISCONTINUED | OUTPATIENT
Start: 2022-05-24 | End: 2022-06-06

## 2022-05-24 RX ORDER — MANNITOL
50 POWDER (GRAM) MISCELLANEOUS ONCE
Refills: 0 | Status: DISCONTINUED | OUTPATIENT
Start: 2022-05-24 | End: 2022-05-24

## 2022-05-24 RX ORDER — CHLORHEXIDINE GLUCONATE 213 G/1000ML
15 SOLUTION TOPICAL EVERY 12 HOURS
Refills: 0 | Status: DISCONTINUED | OUTPATIENT
Start: 2022-05-24 | End: 2022-06-06

## 2022-05-24 RX ORDER — ACETAMINOPHEN 500 MG
975 TABLET ORAL EVERY 6 HOURS
Refills: 0 | Status: DISCONTINUED | OUTPATIENT
Start: 2022-05-24 | End: 2022-05-26

## 2022-05-24 RX ORDER — LEVETIRACETAM 250 MG/1
1000 TABLET, FILM COATED ORAL EVERY 12 HOURS
Refills: 0 | Status: DISCONTINUED | OUTPATIENT
Start: 2022-05-24 | End: 2022-05-24

## 2022-05-24 RX ORDER — FENTANYL CITRATE 50 UG/ML
0.5 INJECTION INTRAVENOUS
Qty: 2500 | Refills: 0 | Status: DISCONTINUED | OUTPATIENT
Start: 2022-05-24 | End: 2022-05-31

## 2022-05-24 RX ORDER — SODIUM CHLORIDE 9 MG/ML
10 INJECTION INTRAMUSCULAR; INTRAVENOUS; SUBCUTANEOUS
Refills: 0 | Status: DISCONTINUED | OUTPATIENT
Start: 2022-05-24 | End: 2022-06-29

## 2022-05-24 RX ORDER — CHLORHEXIDINE GLUCONATE 213 G/1000ML
1 SOLUTION TOPICAL
Refills: 0 | Status: DISCONTINUED | OUTPATIENT
Start: 2022-05-24 | End: 2022-05-24

## 2022-05-24 RX ORDER — PROPOFOL 10 MG/ML
10 INJECTION, EMULSION INTRAVENOUS
Qty: 1000 | Refills: 0 | Status: DISCONTINUED | OUTPATIENT
Start: 2022-05-24 | End: 2022-05-25

## 2022-05-24 RX ORDER — SODIUM CHLORIDE 9 MG/ML
1000 INJECTION INTRAMUSCULAR; INTRAVENOUS; SUBCUTANEOUS ONCE
Refills: 0 | Status: COMPLETED | OUTPATIENT
Start: 2022-05-24 | End: 2022-05-24

## 2022-05-24 RX ORDER — CALCIUM GLUCONATE 100 MG/ML
2 VIAL (ML) INTRAVENOUS
Refills: 0 | Status: COMPLETED | OUTPATIENT
Start: 2022-05-24 | End: 2022-05-24

## 2022-05-24 RX ORDER — MANNITOL
30 POWDER (GRAM) MISCELLANEOUS ONCE
Refills: 0 | Status: DISCONTINUED | OUTPATIENT
Start: 2022-05-24 | End: 2022-05-24

## 2022-05-24 RX ORDER — LEVETIRACETAM 250 MG/1
1500 TABLET, FILM COATED ORAL EVERY 12 HOURS
Refills: 0 | Status: DISCONTINUED | OUTPATIENT
Start: 2022-05-24 | End: 2022-05-28

## 2022-05-24 RX ORDER — SODIUM CHLORIDE 9 MG/ML
1000 INJECTION INTRAMUSCULAR; INTRAVENOUS; SUBCUTANEOUS
Refills: 0 | Status: DISCONTINUED | OUTPATIENT
Start: 2022-05-24 | End: 2022-05-25

## 2022-05-24 RX ORDER — ACETAMINOPHEN 500 MG
1000 TABLET ORAL ONCE
Refills: 0 | Status: COMPLETED | OUTPATIENT
Start: 2022-05-24 | End: 2022-05-24

## 2022-05-24 RX ORDER — ROCURONIUM BROMIDE 10 MG/ML
50 VIAL (ML) INTRAVENOUS ONCE
Refills: 0 | Status: COMPLETED | OUTPATIENT
Start: 2022-05-24 | End: 2022-05-24

## 2022-05-24 RX ORDER — LEVETIRACETAM 250 MG/1
500 TABLET, FILM COATED ORAL EVERY 12 HOURS
Refills: 0 | Status: DISCONTINUED | OUTPATIENT
Start: 2022-05-24 | End: 2022-05-24

## 2022-05-24 RX ORDER — CALCIUM GLUCONATE 100 MG/ML
2 VIAL (ML) INTRAVENOUS ONCE
Refills: 0 | Status: COMPLETED | OUTPATIENT
Start: 2022-05-24 | End: 2022-05-24

## 2022-05-24 RX ADMIN — LEVETIRACETAM 400 MILLIGRAM(S): 250 TABLET, FILM COATED ORAL at 13:32

## 2022-05-24 RX ADMIN — SODIUM CHLORIDE 120 MILLILITER(S): 9 INJECTION INTRAMUSCULAR; INTRAVENOUS; SUBCUTANEOUS at 05:07

## 2022-05-24 RX ADMIN — PROPOFOL 4.2 MICROGRAM(S)/KG/MIN: 10 INJECTION, EMULSION INTRAVENOUS at 05:04

## 2022-05-24 RX ADMIN — FENTANYL CITRATE 50 MICROGRAM(S): 50 INJECTION INTRAVENOUS at 00:00

## 2022-05-24 RX ADMIN — Medication 100 MILLIGRAM(S): at 05:08

## 2022-05-24 RX ADMIN — PROPOFOL 4.2 MICROGRAM(S)/KG/MIN: 10 INJECTION, EMULSION INTRAVENOUS at 08:42

## 2022-05-24 RX ADMIN — Medication 200 GRAM(S): at 05:49

## 2022-05-24 RX ADMIN — CHLORHEXIDINE GLUCONATE 15 MILLILITER(S): 213 SOLUTION TOPICAL at 16:09

## 2022-05-24 RX ADMIN — Medication 100 MILLIGRAM(S): at 01:05

## 2022-05-24 RX ADMIN — Medication 200 GRAM(S): at 05:01

## 2022-05-24 RX ADMIN — SODIUM CHLORIDE 1000 MILLILITER(S): 9 INJECTION INTRAMUSCULAR; INTRAVENOUS; SUBCUTANEOUS at 13:22

## 2022-05-24 RX ADMIN — SODIUM CHLORIDE 120 MILLILITER(S): 9 INJECTION INTRAMUSCULAR; INTRAVENOUS; SUBCUTANEOUS at 08:49

## 2022-05-24 RX ADMIN — Medication 25 GRAM(S): at 10:25

## 2022-05-24 RX ADMIN — LEVETIRACETAM 400 MILLIGRAM(S): 250 TABLET, FILM COATED ORAL at 01:06

## 2022-05-24 RX ADMIN — SODIUM CHLORIDE 1000 MILLILITER(S): 9 INJECTION INTRAMUSCULAR; INTRAVENOUS; SUBCUTANEOUS at 05:04

## 2022-05-24 RX ADMIN — Medication 50 MILLIGRAM(S): at 00:10

## 2022-05-24 RX ADMIN — SODIUM CHLORIDE 1000 MILLILITER(S): 9 INJECTION INTRAMUSCULAR; INTRAVENOUS; SUBCUTANEOUS at 05:49

## 2022-05-24 RX ADMIN — Medication 975 MILLIGRAM(S): at 13:02

## 2022-05-24 RX ADMIN — PROPOFOL 4.2 MICROGRAM(S)/KG/MIN: 10 INJECTION, EMULSION INTRAVENOUS at 01:54

## 2022-05-24 RX ADMIN — CHLORHEXIDINE GLUCONATE 1 APPLICATION(S): 213 SOLUTION TOPICAL at 08:54

## 2022-05-24 RX ADMIN — Medication 100 MILLIGRAM(S): at 13:00

## 2022-05-24 RX ADMIN — FENTANYL CITRATE 50 MICROGRAM(S): 50 INJECTION INTRAVENOUS at 00:17

## 2022-05-24 RX ADMIN — Medication 1000 MILLIGRAM(S): at 10:30

## 2022-05-24 RX ADMIN — Medication 975 MILLIGRAM(S): at 13:01

## 2022-05-24 RX ADMIN — Medication 100 MILLIGRAM(S): at 22:37

## 2022-05-24 RX ADMIN — Medication 2 MILLIGRAM(S): at 13:22

## 2022-05-24 RX ADMIN — FENTANYL CITRATE 3.5 MICROGRAM(S)/KG/HR: 50 INJECTION INTRAVENOUS at 05:01

## 2022-05-24 RX ADMIN — SODIUM CHLORIDE 1000 MILLILITER(S): 9 INJECTION INTRAMUSCULAR; INTRAVENOUS; SUBCUTANEOUS at 08:51

## 2022-05-24 RX ADMIN — PROPOFOL 4.2 MICROGRAM(S)/KG/MIN: 10 INJECTION, EMULSION INTRAVENOUS at 16:19

## 2022-05-24 RX ADMIN — CHLORHEXIDINE GLUCONATE 1 APPLICATION(S): 213 SOLUTION TOPICAL at 08:50

## 2022-05-24 RX ADMIN — CHLORHEXIDINE GLUCONATE 15 MILLILITER(S): 213 SOLUTION TOPICAL at 05:08

## 2022-05-24 RX ADMIN — Medication 25 GRAM(S): at 08:47

## 2022-05-24 RX ADMIN — Medication 400 MILLIGRAM(S): at 10:24

## 2022-05-24 NOTE — CHART NOTE - NSCHARTNOTEFT_GEN_A_CORE
Patient seen and examined on ICU rounds. repeat head CT pending. sedated currently. Critically ill in ICU.
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
## 2022-05-24 NOTE — PATIENT PROFILE ADULT - FALL HARM RISK - HARM RISK INTERVENTIONS
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
## 2022-05-24 NOTE — PROGRESS NOTE ADULT - NS ATTEND AMEND GEN_ALL_CORE FT
Patient seen and examined. critically ill, did move on SAT. appreciate NSG recs. cont full vent support, sedation to be held once daily for SATs. need to find family for identification. Will need thorough tertiary. cont sylvester, start trickle feeds.
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
## 2022-05-24 NOTE — PATIENT PROFILE ADULT - PRO INTERPRETER NEED 2
Patient Information     Patient Name MRAna Knight [id number] Female 1990      Telephone Encounter by Chirstina Martel at 2017 11:50 AM     Author:  Christina Martel Service:  (none) Author Type:  (none)     Filed:  2017 11:50 AM Encounter Date:  2017 Status:  Signed     :  Christina Martel            HPV series has not been started.  Christina Martel LPN............................... 2017 11:50 AM           English
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
## 2022-05-24 NOTE — ED PROCEDURE NOTE - NS ED PROCEDURE ASSISTED BY
Please follow these instructions for your upcoming shoulder surgery.   Last warfarin dose: January 30th Tuesday, Jan 31st, NO warfarin  Wednesday, Feb 1st, NO warfarin  Thursday, Feb 2nd, NO warfarin, begin enoxaparin injections into the abdomen every 12 hours (AM and PM)  Friday, Feb 3rd, NO warfarin, enoxaparin injection into the abdomen every 12 hours (AM and PM)  Saturday, Feb 4th, NO warfarin, enoxaparin injection into the abdomen every 12 hours (AM and PM)  Sunday, Feb 5th, NO warfarin, enoxaparin injection into the abdomen every 12 hours (AM and PM)  Monday, Feb 6th, NO warfarin, enoxaparin injection into the abdomen AM only (no enoxaparin 24 hours prior to surgery)  Tuesday, Feb 7th, DAY OF PROCEDURE, NO enoxaparin. Restart warfarin 17.5mg (3.5 tabs) in the evening, unless instructed otherwise by the physician   Wednesday, Feb 8th, Restart enoxaparin injections into the abdomen every 12 hours (AM and PM), unless instructed otherwise by the physician, and 15mg (3 tabs) warfarin in the evening.   Thursday, Feb 9th, Enoxaparin injection into the abdomen every 12 hours (AM and PM) and 15mg (3 tabs) warfarin in the evening.  Friday, Feb 10th, Enoxaparin injection in the AM. Please check your INR in the lab. You will need to be on the Lovenox through the weekend. The INR for today is to help with dosing your warfarin to make sure you can stop the Lovenox on Monday.   If you have any questions please call the Anticoagulation Clinic at [phone].  To schedule your appointment please call [phone].      
Supervision was available
Supervision was available

## 2022-05-24 NOTE — CHART NOTE - NSCHARTNOTEFT_GEN_A_CORE
CT Chest abd and pelvis with IV Contrast is medically necessary and Dr Alanis and Dr Thomas feel benefits outweigh risk in absence of ability of pt to consent or having known emergency contacts.

## 2022-05-24 NOTE — PATIENT PROFILE ADULT - VISION (WITH CORRECTIVE LENSES IF THE PATIENT USUALLY WEARS THEM):
ZANE- patient unresponsive/Normal vision: sees adequately in most situations; can see medication labels, newsprint
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
## 2022-05-24 NOTE — H&P ADULT - NSHPPHYSICALEXAM_GEN_ALL_CORE
PHYSICAL EXAM:   General: unresponsive   Neuro: GCS 3, unresponsive to stimuli  HEENT: Scalp hematoma, 4cm chin laceration, pupils 3 mm equal and slugish  Neck: trachea midline, ET tube in place  Cardio: RRR, tachycardic on monitor  Resp: bilateral breath sound on vent  Thorax: No chest wall deformities or crepitus  GI/Abd: Soft, non distended, no evidence of traumatic injuries  Vascular: All 4 extremities warm.  Skin: chin laceration  Pelvis: stable  Musculoskeletal: No spinal stepoffs, no deformities on extremitites
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
## 2022-05-24 NOTE — PROGRESS NOTE ADULT - SUBJECTIVE AND OBJECTIVE BOX
INTERVAL HPI/OVERNIGHT EVENTS/SUBJECTIVE: Pt had good evacuation of SDH with some new hemorrhage noted on post op CT.  Highly variable Art line. Witnessed potential facial twitiching by NSX. Unable to obtain ROS Due to AMS.    ICU Vital Signs Last 24 Hrs  T(C): 38.4 (24 May 2022 18:00), Max: 38.7 (24 May 2022 13:00)  T(F): 101.1 (24 May 2022 18:00), Max: 101.7 (24 May 2022 13:00)  HR: 117 (24 May 2022 18:00) (86 - 129)  BP: 123/80 (24 May 2022 18:00) (97/60 - 169/93)  BP(mean): 92 (24 May 2022 18:00) (71 - 93)  ABP: 134/69 (24 May 2022 18:00) (98/49 - 147/61)  ABP(mean): 85 (24 May 2022 18:00) (59 - 85)  RR: 19 (24 May 2022 18:00) (12 - 20)  SpO2: 97% (24 May 2022 18:00) (86% - 100%)      I&O's Detail    23 May 2022 07:01  -  24 May 2022 07:00  --------------------------------------------------------  IN:    FentaNYL: 10.5 mL    IV PiggyBack: 200 mL    IV PiggyBack: 50 mL    IV PiggyBack: 100 mL    Propofol: 42 mL    sodium chloride 0.9%: 240 mL    Sodium Chloride 0.9% Bolus: 2000 mL  Total IN: 2642.5 mL    OUT:    Drain (mL): 30 mL    Indwelling Catheter - Urethral (mL): 425 mL  Total OUT: 455 mL    Total NET: 2187.5 mL      24 May 2022 07:01  -  24 May 2022 18:23  --------------------------------------------------------  IN:    FentaNYL: 38.5 mL    IV PiggyBack: 200 mL    IV PiggyBack: 50 mL    IV PiggyBack: 100 mL    Platelets - Single Donor: 235 mL    PRBCs (Packed Red Blood Cells): 309 mL    Propofol: 115.5 mL    sodium chloride 0.9%: 1320 mL    Sodium Chloride 0.9% Bolus: 2000 mL  Total IN: 4368 mL    OUT:    Drain (mL): 100 mL    Indwelling Catheter - Urethral (mL): 1135 mL  Total OUT: 1235 mL    Total NET: 3133 mL          Mode: AC/ CMV (Assist Control/ Continuous Mandatory Ventilation)  RR (machine): 12  TV (machine): 400  FiO2: 40  PEEP: 5  MAP: 8  PIP: 15    ABG - ( 24 May 2022 04:46 )  pH, Arterial: 7.310 pH, Blood: x     /  pCO2: 39    /  pO2: 365   / HCO3: 20    / Base Excess: -6.7  /  SaO2: 100.0               MEDICATIONS  (STANDING):  acetaminophen     Tablet .. 975 milliGRAM(s) Oral every 6 hours  ceFAZolin   IVPB 2000 milliGRAM(s) IV Intermittent every 8 hours  chlorhexidine 0.12% Liquid 15 milliLiter(s) Oral Mucosa every 12 hours  chlorhexidine 2% Cloths 1 Application(s) Topical daily  chlorhexidine 4% Liquid 1 Application(s) Topical <User Schedule>  diphtheria/tetanus/pertussis (acellular) Vaccine (ADAcel) 0.5 milliLiter(s) IntraMuscular once  fentaNYL   Infusion 0.5 MICROgram(s)/kG/Hr (3.5 mL/Hr) IV Continuous <Continuous>  levETIRAcetam  IVPB 1500 milliGRAM(s) IV Intermittent every 12 hours  propofol Infusion 10 MICROgram(s)/kG/Min (4.2 mL/Hr) IV Continuous <Continuous>  sodium chloride 0.9%. 1000 milliLiter(s) (120 mL/Hr) IV Continuous <Continuous>    MEDICATIONS  (PRN):  sodium chloride 0.9% lock flush 10 milliLiter(s) IV Push every 1 hour PRN Pre/post blood products, medications, blood draw, and to maintain line patency      NUTRITION/IVF:     CENTRAL LINE:  LOCATION:   DATE INSERTED:  CVP:  SCVO2:    SPANN:   DATE INSERTED:    A-LINE:    LOCATION:   DATE INSERTED:   SVV:  CO/CI:     CHEST TUBE:  LOCATION:  DATE INSERTED: OUTPUT/24 HRS:  SUCTION/WATER SEAL:     NG/OG TUBE:  DATE INSERTED:  OUTPUT/24 HRS:    MISC:     PHYSICAL EXAM:     Gen:NAD, Well appearing, No cyanosis, Pallor.    Eyes: PERRL ~ 3mm, EOMI,     Neurological: A&Ox3, GCS 15, No focal defficit.     ENMT: Clear canals, clear throat.      Neck: Supple. NT AT, FROM no pain.  No JVD. No meningeal signs    Pulmonary: NAD, CTA, = BL .      Cardiovascular: RRR, S1, S2, No Murmurs, rubs or gallops noted.    Gastrointestinal:ND, Soft, NT.    Genitourinary:     Back:     Extremities: NT, AT, no edema, erythema or palpable cord noted.  FROM, = 2+ pulses throughout.    Skin:     Musculoskeletal:          LABS:  CBC Full  -  ( 24 May 2022 17:22 )  WBC Count : 6.99 K/uL  RBC Count : 2.66 M/uL  Hemoglobin : 8.1 g/dL  Hematocrit : 24.8 %  Platelet Count - Automated : 85 K/uL  Mean Cell Volume : 93.2 fl  Mean Cell Hemoglobin : 30.5 pg  Mean Cell Hemoglobin Concentration : 32.7 gm/dL  Auto Neutrophil # : x  Auto Lymphocyte # : x  Auto Monocyte # : x  Auto Eosinophil # : x  Auto Basophil # : x  Auto Neutrophil % : x  Auto Lymphocyte % : x  Auto Monocyte % : x  Auto Eosinophil % : x  Auto Basophil % : x    05-24    140  |  106  |  10.9  ----------------------------<  122<H>  4.8   |  19.0<L>  |  1.09    Ca    7.3<L>      24 May 2022 17:22  Phos  5.3     05-24  Mg     2.9     05-24    TPro  5.5<L>  /  Alb  3.3  /  TBili  0.6  /  DBili  0.2  /  AST  363<H>  /  ALT  171<H>  /  AlkPhos  54  05-24    PT/INR - ( 24 May 2022 17:22 )   PT: 12.4 sec;   INR: 1.07 ratio         PTT - ( 24 May 2022 17:22 )  PTT:25.9 sec    RECENT CULTURES:      LIVER FUNCTIONS - ( 24 May 2022 12:30 )  Alb: 3.3 g/dL / Pro: 5.5 g/dL / ALK PHOS: 54 U/L / ALT: 171 U/L / AST: 363 U/L / GGT: x           CARDIAC MARKERS ( 24 May 2022 05:34 )  x     / x     / 818 U/L / x     / 14.4 ng/mL      CAPILLARY BLOOD GLUCOSE      RADIOLOGY & ADDITIONAL STUDIES:    ASSESSMENT/PLAN:  25yMale presenting with:    Neurological:    ENMT:    Neck:    Pulmonary:    Cardiovascular:    Gastrointestinal:    Genitourinary:    Heme:    ID:    Skin:    Lines/ Tubes:    Dispo:            CRITICAL CARE TIME SPENT:   INTERVAL HPI/OVERNIGHT EVENTS/SUBJECTIVE: Pt had good evacuation of SDH with some new hemorrhage noted on post op CT.  Highly variable Art line. Witnessed potential facial twitiching by NSX. Unable to obtain ROS Due to AMS.    ICU Vital Signs Last 24 Hrs  T(C): 38.4 (24 May 2022 18:00), Max: 38.7 (24 May 2022 13:00)  T(F): 101.1 (24 May 2022 18:00), Max: 101.7 (24 May 2022 13:00)  HR: 117 (24 May 2022 18:00) (86 - 129)  BP: 123/80 (24 May 2022 18:00) (97/60 - 169/93)  BP(mean): 92 (24 May 2022 18:00) (71 - 93)  ABP: 134/69 (24 May 2022 18:00) (98/49 - 147/61)  ABP(mean): 85 (24 May 2022 18:00) (59 - 85)  RR: 19 (24 May 2022 18:00) (12 - 20)  SpO2: 97% (24 May 2022 18:00) (86% - 100%)      I&O's Detail    23 May 2022 07:01  -  24 May 2022 07:00  --------------------------------------------------------  IN:    FentaNYL: 10.5 mL    IV PiggyBack: 200 mL    IV PiggyBack: 50 mL    IV PiggyBack: 100 mL    Propofol: 42 mL    sodium chloride 0.9%: 240 mL    Sodium Chloride 0.9% Bolus: 2000 mL  Total IN: 2642.5 mL    OUT:    Drain (mL): 30 mL    Indwelling Catheter - Urethral (mL): 425 mL  Total OUT: 455 mL    Total NET: 2187.5 mL      24 May 2022 07:01  -  24 May 2022 18:23  --------------------------------------------------------  IN:    FentaNYL: 38.5 mL    IV PiggyBack: 200 mL    IV PiggyBack: 50 mL    IV PiggyBack: 100 mL    Platelets - Single Donor: 235 mL    PRBCs (Packed Red Blood Cells): 309 mL    Propofol: 115.5 mL    sodium chloride 0.9%: 1320 mL    Sodium Chloride 0.9% Bolus: 2000 mL  Total IN: 4368 mL    OUT:    Drain (mL): 100 mL    Indwelling Catheter - Urethral (mL): 1135 mL  Total OUT: 1235 mL    Total NET: 3133 mL          Mode: AC/ CMV (Assist Control/ Continuous Mandatory Ventilation)  RR (machine): 12  TV (machine): 400  FiO2: 40  PEEP: 5  MAP: 8  PIP: 15    ABG - ( 24 May 2022 04:46 )  pH, Arterial: 7.310 pH, Blood: x     /  pCO2: 39    /  pO2: 365   / HCO3: 20    / Base Excess: -6.7  /  SaO2: 100.0               MEDICATIONS  (STANDING):  acetaminophen     Tablet .. 975 milliGRAM(s) Oral every 6 hours  ceFAZolin   IVPB 2000 milliGRAM(s) IV Intermittent every 8 hours  chlorhexidine 0.12% Liquid 15 milliLiter(s) Oral Mucosa every 12 hours  chlorhexidine 2% Cloths 1 Application(s) Topical daily  chlorhexidine 4% Liquid 1 Application(s) Topical <User Schedule>  diphtheria/tetanus/pertussis (acellular) Vaccine (ADAcel) 0.5 milliLiter(s) IntraMuscular once  fentaNYL   Infusion 0.5 MICROgram(s)/kG/Hr (3.5 mL/Hr) IV Continuous <Continuous>  levETIRAcetam  IVPB 1500 milliGRAM(s) IV Intermittent every 12 hours  propofol Infusion 10 MICROgram(s)/kG/Min (4.2 mL/Hr) IV Continuous <Continuous>  sodium chloride 0.9%. 1000 milliLiter(s) (120 mL/Hr) IV Continuous <Continuous>    MEDICATIONS  (PRN):  sodium chloride 0.9% lock flush 10 milliLiter(s) IV Push every 1 hour PRN Pre/post blood products, medications, blood draw, and to maintain line patency        PHYSICAL EXAM:     Gen: Sedated. On vent. No cyanosis, Pallor.    Eyes: PERRL ~ 3mm    Neurological: Grimaces to deep stimuli.  + Corneal reflex BL, + Gag.  Soft Flap.     Neck: Supple. Collar in place.  ROM Not evaluated. No JVD.     Pulmonary: NAD, CTA, = BL .      Cardiovascular: RRR, S1, S2, No Murmurs, rubs or gallops noted.    Gastrointestinal: ND, Soft, NT.    Extremities: NT, AT, no edema, erythema or palpable cord noted.  FROM, = 2+ pulses throughout.    LABS:  CBC Full  -  ( 24 May 2022 17:22 )  WBC Count : 6.99 K/uL  RBC Count : 2.66 M/uL  Hemoglobin : 8.1 g/dL  Hematocrit : 24.8 %  Platelet Count - Automated : 85 K/uL  Mean Cell Volume : 93.2 fl  Mean Cell Hemoglobin : 30.5 pg  Mean Cell Hemoglobin Concentration : 32.7 gm/dL  Auto Neutrophil # : x  Auto Lymphocyte # : x  Auto Monocyte # : x  Auto Eosinophil # : x  Auto Basophil # : x  Auto Neutrophil % : x  Auto Lymphocyte % : x  Auto Monocyte % : x  Auto Eosinophil % : x  Auto Basophil % : x    05-24    140  |  106  |  10.9  ----------------------------<  122<H>  4.8   |  19.0<L>  |  1.09    Ca    7.3<L>      24 May 2022 17:22  Phos  5.3     05-24  Mg     2.9     05-24    TPro  5.5<L>  /  Alb  3.3  /  TBili  0.6  /  DBili  0.2  /  AST  363<H>  /  ALT  171<H>  /  AlkPhos  54  05-24    PT/INR - ( 24 May 2022 17:22 )   PT: 12.4 sec;   INR: 1.07 ratio         PTT - ( 24 May 2022 17:22 )  PTT:25.9 sec    RECENT CULTURES:      LIVER FUNCTIONS - ( 24 May 2022 12:30 )  Alb: 3.3 g/dL / Pro: 5.5 g/dL / ALK PHOS: 54 U/L / ALT: 171 U/L / AST: 363 U/L / GGT: x           CARDIAC MARKERS ( 24 May 2022 05:34 )  x     / x     / 818 U/L / x     / 14.4 ng/mL      CAPILLARY BLOOD GLUCOSE      RADIOLOGY & ADDITIONAL STUDIES:    ASSESSMENT/PLAN:  25yMale presenting with: BL SDH, Intracranial contusions, Trace cervical epidural hematoma C5-C6, R Zygomaticomaxillary frx, Left pulm contusion, Anemia. Acute hypoxic resp failure    Neurological: Q 1 Neuro checks.  Given Drop in Hgb, MRI of Brain and C-spine delayed for tonight.  Potential seizure activity I increased Keppra, Gave Ativan, ordered EEG, contacted NSX. I will repeat Head and C-spine CT in meantime.  Flap checks    ENT: Plastics to give final recs.  No emergent procedures    Neck: Keep Colar.  MRI C-spine when stable.     Pulmonary: Keep sedated and full vent AC.  No SBT Today.    Cardiovascular: Significant Hgb drop to 8 from 15.  FAST repeat is negative. I have given 1 PRBC with no response in Hgb. I will repeat CAP CT Now. Repeat labs later    Gastrointestinal: TF To be started after imaging if stable.    Genitourinary: Good urine out put    Heme: Maintain Platelets > 100 for ICH.  I have ordered 2 Platelets.     ID: Ancef as per NSX    Lines/ Tubes: Pt requires all lines and tubes    Dispo: Critically ill. I am supporting multiple body systems and manipulating to prevent further organ failure, disabilty and death.            CRITICAL CARE TIME SPENT: Addititional 68 minutes critical care.

## 2022-05-25 LAB
ANION GAP SERPL CALC-SCNC: 10 MMOL/L — SIGNIFICANT CHANGE UP (ref 5–17)
BASOPHILS # BLD AUTO: 0 K/UL — SIGNIFICANT CHANGE UP (ref 0–0.2)
BASOPHILS # BLD AUTO: 0 K/UL — SIGNIFICANT CHANGE UP (ref 0–0.2)
BASOPHILS # BLD AUTO: 0.02 K/UL — SIGNIFICANT CHANGE UP (ref 0–0.2)
BASOPHILS NFR BLD AUTO: 0 % — SIGNIFICANT CHANGE UP (ref 0–2)
BASOPHILS NFR BLD AUTO: 0 % — SIGNIFICANT CHANGE UP (ref 0–2)
BASOPHILS NFR BLD AUTO: 0.3 % — SIGNIFICANT CHANGE UP (ref 0–2)
BUN SERPL-MCNC: 10.5 MG/DL — SIGNIFICANT CHANGE UP (ref 8–20)
CALCIUM SERPL-MCNC: 7.9 MG/DL — LOW (ref 8.6–10.2)
CHLORIDE SERPL-SCNC: 106 MMOL/L — SIGNIFICANT CHANGE UP (ref 98–107)
CK MB CFR SERPL CALC: 4 NG/ML — SIGNIFICANT CHANGE UP (ref 0–6.7)
CK MB CFR SERPL CALC: 6.5 NG/ML — SIGNIFICANT CHANGE UP (ref 0–6.7)
CK SERPL-CCNC: 1152 U/L — HIGH (ref 30–200)
CK SERPL-CCNC: 958 U/L — HIGH (ref 30–200)
CO2 SERPL-SCNC: 24 MMOL/L — SIGNIFICANT CHANGE UP (ref 22–29)
CREAT SERPL-MCNC: 0.84 MG/DL — SIGNIFICANT CHANGE UP (ref 0.5–1.3)
EGFR: 124 ML/MIN/1.73M2 — SIGNIFICANT CHANGE UP
EOSINOPHIL # BLD AUTO: 0 K/UL — SIGNIFICANT CHANGE UP (ref 0–0.5)
EOSINOPHIL NFR BLD AUTO: 0 % — SIGNIFICANT CHANGE UP (ref 0–6)
GAS PNL BLDA: SIGNIFICANT CHANGE UP
GAS PNL BLDA: SIGNIFICANT CHANGE UP
GIANT PLATELETS BLD QL SMEAR: PRESENT — SIGNIFICANT CHANGE UP
GLUCOSE BLDC GLUCOMTR-MCNC: 166 MG/DL — HIGH (ref 70–99)
GLUCOSE BLDC GLUCOMTR-MCNC: 177 MG/DL — HIGH (ref 70–99)
GLUCOSE SERPL-MCNC: 155 MG/DL — HIGH (ref 70–99)
HCT VFR BLD CALC: 19.1 % — CRITICAL LOW (ref 39–50)
HCT VFR BLD CALC: 21.2 % — LOW (ref 39–50)
HCT VFR BLD CALC: 22.6 % — LOW (ref 39–50)
HGB BLD-MCNC: 6.4 G/DL — CRITICAL LOW (ref 13–17)
HGB BLD-MCNC: 7.2 G/DL — LOW (ref 13–17)
HGB BLD-MCNC: 7.6 G/DL — LOW (ref 13–17)
IMM GRANULOCYTES NFR BLD AUTO: 0.5 % — SIGNIFICANT CHANGE UP (ref 0–1.5)
LYMPHOCYTES # BLD AUTO: 0.36 K/UL — LOW (ref 1–3.3)
LYMPHOCYTES # BLD AUTO: 0.39 K/UL — LOW (ref 1–3.3)
LYMPHOCYTES # BLD AUTO: 0.61 K/UL — LOW (ref 1–3.3)
LYMPHOCYTES # BLD AUTO: 5.3 % — LOW (ref 13–44)
LYMPHOCYTES # BLD AUTO: 6.1 % — LOW (ref 13–44)
LYMPHOCYTES # BLD AUTO: 8.3 % — LOW (ref 13–44)
MAGNESIUM SERPL-MCNC: 2.4 MG/DL — SIGNIFICANT CHANGE UP (ref 1.6–2.6)
MANUAL SMEAR VERIFICATION: SIGNIFICANT CHANGE UP
MANUAL SMEAR VERIFICATION: SIGNIFICANT CHANGE UP
MCHC RBC-ENTMCNC: 31.1 PG — SIGNIFICANT CHANGE UP (ref 27–34)
MCHC RBC-ENTMCNC: 31.1 PG — SIGNIFICANT CHANGE UP (ref 27–34)
MCHC RBC-ENTMCNC: 31.4 PG — SIGNIFICANT CHANGE UP (ref 27–34)
MCHC RBC-ENTMCNC: 33.5 GM/DL — SIGNIFICANT CHANGE UP (ref 32–36)
MCHC RBC-ENTMCNC: 33.6 GM/DL — SIGNIFICANT CHANGE UP (ref 32–36)
MCHC RBC-ENTMCNC: 34 GM/DL — SIGNIFICANT CHANGE UP (ref 32–36)
MCV RBC AUTO: 92.6 FL — SIGNIFICANT CHANGE UP (ref 80–100)
MCV RBC AUTO: 92.6 FL — SIGNIFICANT CHANGE UP (ref 80–100)
MCV RBC AUTO: 92.7 FL — SIGNIFICANT CHANGE UP (ref 80–100)
MONOCYTES # BLD AUTO: 0.11 K/UL — SIGNIFICANT CHANGE UP (ref 0–0.9)
MONOCYTES # BLD AUTO: 0.18 K/UL — SIGNIFICANT CHANGE UP (ref 0–0.9)
MONOCYTES # BLD AUTO: 0.39 K/UL — SIGNIFICANT CHANGE UP (ref 0–0.9)
MONOCYTES NFR BLD AUTO: 1.7 % — LOW (ref 2–14)
MONOCYTES NFR BLD AUTO: 2.7 % — SIGNIFICANT CHANGE UP (ref 2–14)
MONOCYTES NFR BLD AUTO: 5.3 % — SIGNIFICANT CHANGE UP (ref 2–14)
NEUTROPHILS # BLD AUTO: 5.97 K/UL — SIGNIFICANT CHANGE UP (ref 1.8–7.4)
NEUTROPHILS # BLD AUTO: 6.24 K/UL — SIGNIFICANT CHANGE UP (ref 1.8–7.4)
NEUTROPHILS # BLD AUTO: 6.32 K/UL — SIGNIFICANT CHANGE UP (ref 1.8–7.4)
NEUTROPHILS NFR BLD AUTO: 85.6 % — HIGH (ref 43–77)
NEUTROPHILS NFR BLD AUTO: 91.1 % — HIGH (ref 43–77)
NEUTROPHILS NFR BLD AUTO: 91.3 % — HIGH (ref 43–77)
NEUTS BAND # BLD: 0.9 % — SIGNIFICANT CHANGE UP (ref 0–8)
PHOSPHATE SERPL-MCNC: 2.8 MG/DL — SIGNIFICANT CHANGE UP (ref 2.4–4.7)
PLAT MORPH BLD: NORMAL — SIGNIFICANT CHANGE UP
PLAT MORPH BLD: NORMAL — SIGNIFICANT CHANGE UP
PLATELET # BLD AUTO: 111 K/UL — LOW (ref 150–400)
PLATELET # BLD AUTO: 75 K/UL — LOW (ref 150–400)
PLATELET # BLD AUTO: 97 K/UL — LOW (ref 150–400)
POLYCHROMASIA BLD QL SMEAR: SLIGHT — SIGNIFICANT CHANGE UP
POTASSIUM SERPL-MCNC: 4.2 MMOL/L — SIGNIFICANT CHANGE UP (ref 3.5–5.3)
POTASSIUM SERPL-SCNC: 4.2 MMOL/L — SIGNIFICANT CHANGE UP (ref 3.5–5.3)
RBC # BLD: 2.06 M/UL — LOW (ref 4.2–5.8)
RBC # BLD: 2.29 M/UL — LOW (ref 4.2–5.8)
RBC # BLD: 2.44 M/UL — LOW (ref 4.2–5.8)
RBC # FLD: 13.5 % — SIGNIFICANT CHANGE UP (ref 10.3–14.5)
RBC # FLD: 13.7 % — SIGNIFICANT CHANGE UP (ref 10.3–14.5)
RBC # FLD: 13.8 % — SIGNIFICANT CHANGE UP (ref 10.3–14.5)
RBC BLD AUTO: NORMAL — SIGNIFICANT CHANGE UP
RBC BLD AUTO: NORMAL — SIGNIFICANT CHANGE UP
SODIUM SERPL-SCNC: 140 MMOL/L — SIGNIFICANT CHANGE UP (ref 135–145)
VARIANT LYMPHS # BLD: 0.9 % — SIGNIFICANT CHANGE UP (ref 0–6)
WBC # BLD: 6.47 K/UL — SIGNIFICANT CHANGE UP (ref 3.8–10.5)
WBC # BLD: 6.85 K/UL — SIGNIFICANT CHANGE UP (ref 3.8–10.5)
WBC # BLD: 7.38 K/UL — SIGNIFICANT CHANGE UP (ref 3.8–10.5)
WBC # FLD AUTO: 6.47 K/UL — SIGNIFICANT CHANGE UP (ref 3.8–10.5)
WBC # FLD AUTO: 6.85 K/UL — SIGNIFICANT CHANGE UP (ref 3.8–10.5)
WBC # FLD AUTO: 7.38 K/UL — SIGNIFICANT CHANGE UP (ref 3.8–10.5)

## 2022-05-25 PROCEDURE — 95720 EEG PHY/QHP EA INCR W/VEEG: CPT

## 2022-05-25 PROCEDURE — 99233 SBSQ HOSP IP/OBS HIGH 50: CPT | Mod: 57

## 2022-05-25 PROCEDURE — 99291 CRITICAL CARE FIRST HOUR: CPT

## 2022-05-25 RX ORDER — PROPOFOL 10 MG/ML
10 INJECTION, EMULSION INTRAVENOUS
Qty: 1000 | Refills: 0 | Status: DISCONTINUED | OUTPATIENT
Start: 2022-05-25 | End: 2022-05-28

## 2022-05-25 RX ORDER — SODIUM CHLORIDE 9 MG/ML
1000 INJECTION INTRAMUSCULAR; INTRAVENOUS; SUBCUTANEOUS
Refills: 0 | Status: DISCONTINUED | OUTPATIENT
Start: 2022-05-25 | End: 2022-05-26

## 2022-05-25 RX ADMIN — Medication 975 MILLIGRAM(S): at 06:01

## 2022-05-25 RX ADMIN — SODIUM CHLORIDE 200 MILLILITER(S): 9 INJECTION INTRAMUSCULAR; INTRAVENOUS; SUBCUTANEOUS at 15:13

## 2022-05-25 RX ADMIN — FENTANYL CITRATE 3.5 MICROGRAM(S)/KG/HR: 50 INJECTION INTRAVENOUS at 22:52

## 2022-05-25 RX ADMIN — LEVETIRACETAM 400 MILLIGRAM(S): 250 TABLET, FILM COATED ORAL at 06:00

## 2022-05-25 RX ADMIN — PROPOFOL 4.2 MICROGRAM(S)/KG/MIN: 10 INJECTION, EMULSION INTRAVENOUS at 15:57

## 2022-05-25 RX ADMIN — Medication 975 MILLIGRAM(S): at 00:45

## 2022-05-25 RX ADMIN — Medication 975 MILLIGRAM(S): at 12:00

## 2022-05-25 RX ADMIN — CHLORHEXIDINE GLUCONATE 15 MILLILITER(S): 213 SOLUTION TOPICAL at 17:22

## 2022-05-25 RX ADMIN — PROPOFOL 4.2 MICROGRAM(S)/KG/MIN: 10 INJECTION, EMULSION INTRAVENOUS at 17:21

## 2022-05-25 RX ADMIN — Medication 100 MILLIGRAM(S): at 21:36

## 2022-05-25 RX ADMIN — LEVETIRACETAM 400 MILLIGRAM(S): 250 TABLET, FILM COATED ORAL at 17:22

## 2022-05-25 RX ADMIN — Medication 100 MILLIGRAM(S): at 14:31

## 2022-05-25 RX ADMIN — PROPOFOL 4.2 MICROGRAM(S)/KG/MIN: 10 INJECTION, EMULSION INTRAVENOUS at 06:00

## 2022-05-25 RX ADMIN — CHLORHEXIDINE GLUCONATE 15 MILLILITER(S): 213 SOLUTION TOPICAL at 06:01

## 2022-05-25 RX ADMIN — Medication 975 MILLIGRAM(S): at 01:30

## 2022-05-25 RX ADMIN — CHLORHEXIDINE GLUCONATE 1 APPLICATION(S): 213 SOLUTION TOPICAL at 11:43

## 2022-05-25 RX ADMIN — Medication 975 MILLIGRAM(S): at 18:00

## 2022-05-25 RX ADMIN — Medication 100 MILLIGRAM(S): at 06:00

## 2022-05-25 RX ADMIN — Medication 975 MILLIGRAM(S): at 07:00

## 2022-05-25 RX ADMIN — Medication 975 MILLIGRAM(S): at 11:43

## 2022-05-25 RX ADMIN — Medication 975 MILLIGRAM(S): at 17:22

## 2022-05-25 NOTE — PROGRESS NOTE ADULT - SUBJECTIVE AND OBJECTIVE BOX
INTERVAL HPI/OVERNIGHT EVENTS/SUBJECTIVE:  POD 1 Craniectomy for evacuation of SDH.  Pt had drift in H/H as well as Plt count yesterday, received 1U PRBC and 2U Plt with good response.  Potential seizure witnessed by Neursx PA with facial twitching.  EEG ordered and Propofol increased with cessation of twitching.  Keppra dose increased.  Brain and CSpine MRI ordered.  Overnight, pt went for repeat imaging of Head, CAP for drift in H/H.  No obvious acute bleeding.  Noted to have questionable new perinephric stranding and mesenteric edema.  Sedation increased after CT. Otherwise pt remains hemodynamically stable and Neurologically stable with GCS 6T      ICU Vital Signs Last 24 Hrs  T(C): 37.5 (25 May 2022 05:00), Max: 38.7 (24 May 2022 13:00)  T(F): 99.5 (25 May 2022 05:00), Max: 101.7 (24 May 2022 13:00)  HR: 91 (25 May 2022 05:00) (87 - 129)  BP: 119/74 (24 May 2022 20:00) (106/67 - 123/80)  BP(mean): 89 (24 May 2022 20:00) (79 - 93)  ABP: 135/57 (25 May 2022 05:00) (104/57 - 196/97)  ABP(mean): 76 (25 May 2022 05:00) (65 - 126)  RR: 14 (25 May 2022 05:00) (13 - 20)  SpO2: 100% (25 May 2022 05:00) (97% - 100%)        I&O's Detail    I&O's Detail    23 May 2022 07:01  -  24 May 2022 07:00  --------------------------------------------------------  IN:    FentaNYL: 10.5 mL    IV PiggyBack: 200 mL    IV PiggyBack: 50 mL    IV PiggyBack: 100 mL    Propofol: 42 mL    sodium chloride 0.9%: 240 mL    Sodium Chloride 0.9% Bolus: 2000 mL  Total IN: 2642.5 mL    OUT:    Drain (mL): 30 mL    Indwelling Catheter - Urethral (mL): 425 mL  Total OUT: 455 mL    Total NET: 2187.5 mL      24 May 2022 07:01  -  25 May 2022 06:07  --------------------------------------------------------  IN:    FentaNYL: 101.5 mL    IV PiggyBack: 200 mL    IV PiggyBack: 200 mL    IV PiggyBack: 150 mL    Platelets - Single Donor: 476 mL    PRBCs (Packed Red Blood Cells): 309 mL    Propofol: 268.8 mL    sodium chloride 0.9%: 2640 mL    Sodium Chloride 0.9% Bolus: 2000 mL  Total IN: 6345.3 mL    OUT:    Drain (mL): 100 mL    Indwelling Catheter - Urethral (mL): 2050 mL    Nasogastric/Oral tube (mL): 700 mL  Total OUT: 2850 mL    Total NET: 3495.3 mL              Mode: AC/ CMV (Assist Control/ Continuous Mandatory Ventilation)  Mode: AC/ CMV (Assist Control/ Continuous Mandatory Ventilation)  RR (machine): 12  TV (machine): 400  FiO2: 40  PEEP: 5  MAP: 8  PIP: 15      ABG - ( 25 May 2022 04:24 )  pH, Arterial: 7.410 pH, Blood: x     /  pCO2: 43    /  pO2: 186   / HCO3: 27    / Base Excess: 2.7   /  SaO2: 100.0               MEDICATIONS  (STANDING):  acetaminophen     Tablet .. 975 milliGRAM(s) Oral every 6 hours  ceFAZolin   IVPB 2000 milliGRAM(s) IV Intermittent every 8 hours  chlorhexidine 0.12% Liquid 15 milliLiter(s) Oral Mucosa every 12 hours  chlorhexidine 2% Cloths 1 Application(s) Topical daily  diphtheria/tetanus/pertussis (acellular) Vaccine (ADAcel) 0.5 milliLiter(s) IntraMuscular once  fentaNYL   Infusion 0.5 MICROgram(s)/kG/Hr (3.5 mL/Hr) IV Continuous <Continuous>  levETIRAcetam  IVPB 1500 milliGRAM(s) IV Intermittent every 12 hours  propofol Infusion 10 MICROgram(s)/kG/Min (4.2 mL/Hr) IV Continuous <Continuous>  sodium chloride 0.9%. 1000 milliLiter(s) (120 mL/Hr) IV Continuous <Continuous>    MEDICATIONS  (PRN):  sodium chloride 0.9% lock flush 10 milliLiter(s) IV Push every 1 hour PRN Pre/post blood products, medications, blood draw, and to maintain line patency          PHYSICAL EXAM:     Gen: Sedated. On vent. No cyanosis, Pallor.    Eyes: PERRL ~ 3mm    Neurological: Grimaces to deep stimuli.  + Corneal reflex BL, + Gag.  Soft Flap.   GCS 6T    Neck: Supple. Collar in place.  ROM Not evaluated. No JVD.     Pulmonary: NAD, CTA, = BL .      Cardiovascular: NSR    Gastrointestinal: ND, Soft, NT.    Extremities: NT, AT, no edema, erythema or palpable cord noted.  FROM, = 2+ pulses throughout.    LABS:                        7.2    7.38  )-----------( 97       ( 25 May 2022 05:52 )             21.2           05-24    140  |  106  |  10.9  ----------------------------<  122<H>  4.8   |  19.0<L>  |  1.09    Ca    7.3<L>      24 May 2022 17:22  Phos  5.3     05-24  Mg     2.9     05-24    TPro  5.5<L>  /  Alb  3.3  /  TBili  0.6  /  DBili  0.2  /  AST  363<H>  /  ALT  171<H>  /  AlkPhos  54  05-24        PT/INR - ( 24 May 2022 17:22 )   PT: 12.4 sec;   INR: 1.07 ratio         PTT - ( 24 May 2022 17:22 )  PTT:25.9 sec    RECENT CULTURES:            CAPILLARY BLOOD GLUCOSE      RADIOLOGY & ADDITIONAL STUDIES:    ASSESSMENT/PLAN:  25yMale presenting with: BL SDH, Intracranial contusions, Trace cervical epidural hematoma C5-C6, R Zygomaticomaxillary frx, Left pulm contusion, Anemia. Acute hypoxic resp failure    Neurological: Q 1 Neuro checks.  Repeat Head CT stable.  MRI of Brain and C-spine still pending.  Potential seizure activity I increased Keppra, Gave Ativan, ordered EEG, contacted NSX. Flap checks    ENT: Plastics to give final recs.  No emergent procedures    Neck: Keep Collar.  MRI C-spine when stable.     Pulmonary: Keep sedated and full vent AC.  No SBT Today.    Cardiovascular: Hemodynamically normal, maintain euvolemia    Gastrointestinal: TF To be started after imaging if stable, NGT noted to be post pyloric    Genitourinary: Good urine out put, monitor for DI, monitor CPK    Heme: Maintain Platelets > 100 for ICH, maintained after 2U plt    ID: Ancef as per NSX for drain    Lines/ Tubes: Rt radial isai, Lt SC TLC, subgaleal drain, ETT, NGT    Dispo: Critically ill. I am supporting multiple body systems and manipulating to prevent further organ failure, disability and death.            CRITICAL CARE TIME SPENT:

## 2022-05-25 NOTE — DIETITIAN INITIAL EVALUATION ADULT - [id number]_GEN_A_CORE
05-24-22 @ 07:01  -  05-25-22 @ 07:00  --------------------------------------------------------  OUT:    Nasogastric/Oral tube (mL): 700 mL  Total OUT: 700 mL    Total NET: -530 mL      05-25-22 @ 07:01  -  05-25-22 @ 09:02  --------------------------------------------------------  OUT:  Total OUT: 0 mL    Total NET: 60 mL

## 2022-05-25 NOTE — PROGRESS NOTE ADULT - SUBJECTIVE AND OBJECTIVE BOX
INTERVAL HPI/ACUTE EVENTS:  25M found down with R SDH, s/p R hemicraniectomy POD#1. Patient seen and examined w/ neurosurgical team. Patient on less sedation, moving slightly more spontaneously. Still not following commands. Flap full but soft.     Vital Signs Last 24 Hrs  T(C): 37.6 (25 May 2022 18:00), Max: 38.5 (24 May 2022 19:00)  T(F): 99.7 (25 May 2022 18:00), Max: 101.3 (24 May 2022 19:00)  HR: 85 (25 May 2022 18:00) (77 - 122)  BP: 119/74 (24 May 2022 20:00) (119/74 - 120/78)  BP(mean): 89 (24 May 2022 20:00) (89 - 90)  RR: 12 (25 May 2022 18:00) (7 - 20)  SpO2: 100% (25 May 2022 18:00) (99% - 100%)    PHYSICAL EXAM:  GENERAL: Sedated, intubated   HEAD:  S/p R crani. Flap full. Dressing clean, dry, intact. Dried blood noted, not fully saturated.  DRAINS: Subgaleal/epidural MARCEL x1. bloody drainage noted in bulb   GEORGES COMA SCORE: E-1 V-1T M-4T = 6T       E: 4= opens eyes spontaneously 3= to voice 2= to noxious 1= no opening       V: 5= oriented 4= confused 3= inappropriate words 2= incomprehensible sounds 1= nonverbal 1T= intubated       M: 6= follows commands 5= localizes 4= withdraws 3= flexor posturing 2= extensor posturing 1= no movement  MENTAL STATUS: AAO x 0; no eye opening to noxious stimuli; Nonverbal; not following commands  MOTOR/SENSATION: LUE minimal movement to noxious, RUE triple flex. b/l LE WD, RLE bending at knee to noxious.  SKIN: Warm, dry      LABS:             7.2    7.38  )-----------( 97       ( 25 May 2022 05:52 )             21.2     05-25    140  |  106  |  10.5  ----------------------------<  155<H>  4.2   |  24.0  |  0.84    Ca    7.9<L>      25 May 2022 05:52  Phos  2.8     05-25  Mg     2.4     05-25    TPro  5.5<L>  /  Alb  3.3  /  TBili  0.6  /  DBili  0.2  /  AST  363<H>  /  ALT  171<H>  /  AlkPhos  54  05-24    PT/INR - ( 24 May 2022 17:22 )   PT: 12.4 sec;   INR: 1.07 ratio      PTT - ( 24 May 2022 17:22 )  PTT:25.9 sec    05-24 @ 07:01  -  05-25 @ 07:00  --------------------------------------------------------  IN: 6537 mL / OUT: 2850 mL / NET: 3687 mL    05-25 @ 07:01  -  05-25 @ 18:59  --------------------------------------------------------  IN: 2891.2 mL / OUT: 945 mL / NET: 1946.2 mL        RADIOLOGY/OTHER:  CT Head No Cont (05.24.22 @ 08:51)   IMPRESSION:  Status post right-sided hemicraniectomy and evacuation of a right-sided   subdural hemorrhage. Multiple hemorrhagic contusions, slightly larger in   size. Improved right to left midline shift. New left-sided subdural   hemorrhage. New/worsening parafalcine subdural hemorrhage tracking along   the right tentorial leaflet. New subdural hemorrhage in the posterior   fossa as well as along the clivus. Trace subarachnoid hemorrhage.    CT Head No Cont (05.24.22 @ 20:59)   IMPRESSION:  CT head: Increased right scalp soft tissue swelling. Stable intracranial   hemorrhages and subdural hemorrhages. Stable subarachnoid hemorrhage.  CT C-spine: Small amount of blood products circumferentially around the   thecal sac at the C1 and C2 levels. No high-grade spinal canal stenosis   or obvious cord compression is visualized by CT technique. MRI may be   obtained for further evaluation.     INTERVAL HPI/ACUTE EVENTS:  25M found down with R SDH, s/p R hemicraniectomy POD#1. Patient seen and examined w/ neurosurgical team. Patient on less sedation, moving slightly more spontaneously. Still not following commands. Flap full but soft.     Vital Signs Last 24 Hrs  T(C): 37.6 (25 May 2022 18:00), Max: 38.5 (24 May 2022 19:00)  T(F): 99.7 (25 May 2022 18:00), Max: 101.3 (24 May 2022 19:00)  HR: 85 (25 May 2022 18:00) (77 - 122)  BP: 119/74 (24 May 2022 20:00) (119/74 - 120/78)  BP(mean): 89 (24 May 2022 20:00) (89 - 90)  RR: 12 (25 May 2022 18:00) (7 - 20)  SpO2: 100% (25 May 2022 18:00) (99% - 100%)    PHYSICAL EXAM:  GENERAL: Sedated, intubated   HEAD:  S/p R crani. Flap full. Dressing clean, dry, intact. Dried blood noted, not fully saturated.  DRAINS: Subgaleal/epidural MARCEL x1. bloody drainage noted in bulb   GEORGES COMA SCORE: E-1 V-1T M-4T = 6T       E: 4= opens eyes spontaneously 3= to voice 2= to noxious 1= no opening       V: 5= oriented 4= confused 3= inappropriate words 2= incomprehensible sounds 1= nonverbal 1T= intubated       M: 6= follows commands 5= localizes 4= withdraws 3= flexor posturing 2= extensor posturing 1= no movement  MENTAL STATUS: AAO x 0; no eye opening to noxious stimuli; Nonverbal; not following commands  MOTOR/SENSATION: LUE minimal movement to noxious, RUE EXTENDING. b/l LE WD, RLE bending at knee to noxious.  SKIN: Warm, dry      LABS:             7.2    7.38  )-----------( 97       ( 25 May 2022 05:52 )             21.2     05-25    140  |  106  |  10.5  ----------------------------<  155<H>  4.2   |  24.0  |  0.84    Ca    7.9<L>      25 May 2022 05:52  Phos  2.8     05-25  Mg     2.4     05-25    TPro  5.5<L>  /  Alb  3.3  /  TBili  0.6  /  DBili  0.2  /  AST  363<H>  /  ALT  171<H>  /  AlkPhos  54  05-24    PT/INR - ( 24 May 2022 17:22 )   PT: 12.4 sec;   INR: 1.07 ratio      PTT - ( 24 May 2022 17:22 )  PTT:25.9 sec    05-24 @ 07:01  -  05-25 @ 07:00  --------------------------------------------------------  IN: 6537 mL / OUT: 2850 mL / NET: 3687 mL    05-25 @ 07:01  -  05-25 @ 18:59  --------------------------------------------------------  IN: 2891.2 mL / OUT: 945 mL / NET: 1946.2 mL        RADIOLOGY/OTHER:  CT Head No Cont (05.24.22 @ 08:51)   IMPRESSION:  Status post right-sided hemicraniectomy and evacuation of a right-sided   subdural hemorrhage. Multiple hemorrhagic contusions, slightly larger in   size. Improved right to left midline shift. New left-sided subdural   hemorrhage. New/worsening parafalcine subdural hemorrhage tracking along   the right tentorial leaflet. New subdural hemorrhage in the posterior   fossa as well as along the clivus. Trace subarachnoid hemorrhage.    CT Head No Cont (05.24.22 @ 20:59)   IMPRESSION:  CT head: Increased right scalp soft tissue swelling. Stable intracranial   hemorrhages and subdural hemorrhages. Stable subarachnoid hemorrhage.  CT C-spine: Small amount of blood products circumferentially around the   thecal sac at the C1 and C2 levels. No high-grade spinal canal stenosis   or obvious cord compression is visualized by CT technique. MRI may be   obtained for further evaluation.

## 2022-05-25 NOTE — PROGRESS NOTE ADULT - NS ATTEND AMEND GEN_ALL_CORE FT
Patient seen and examined. ? seizure, EEG ordered, on propofol. No identification. On TF. Full vent support. Appreciate NSG recs. Critically ill in ICU.

## 2022-05-25 NOTE — PROGRESS NOTE ADULT - ASSESSMENT
Attending statement:  I have personally seen this patient, and formed a face to face diagnostic evaluation on this patient on this date.  I have reviewed the PA, NP and or Medical/PA student and/or Resident documentation and agree with the history, physical exam and plan of care except if noted otherwise.      Patient seen and examined maintained in the ICU intubated sedated MRI currently pending.  With regard to intubated state status postcraniotomy continue conservative care with regard to the cervical spine.

## 2022-05-25 NOTE — PROGRESS NOTE ADULT - SUBJECTIVE AND OBJECTIVE BOX
Pt Name: FRANKIE ZAVALA    MRN: [id number]      Patient is a being followed for C5-C6 central disc protrusion. s/p craniectomy  for SDH. Patient seen at bedside. he is intubated and sedated, unresponsive at this time. Pending MRI c-spine.       PAST MEDICAL & SURGICAL HISTORY: unknown        Allergies: Allergy Status Unknown      Medications: acetaminophen     Tablet .. 975 milliGRAM(s) Oral every 6 hours  ceFAZolin   IVPB 2000 milliGRAM(s) IV Intermittent every 8 hours  chlorhexidine 0.12% Liquid 15 milliLiter(s) Oral Mucosa every 12 hours  chlorhexidine 2% Cloths 1 Application(s) Topical daily  diphtheria/tetanus/pertussis (acellular) Vaccine (ADAcel) 0.5 milliLiter(s) IntraMuscular once  fentaNYL   Infusion 0.5 MICROgram(s)/kG/Hr IV Continuous <Continuous>  levETIRAcetam  IVPB 1500 milliGRAM(s) IV Intermittent every 12 hours  propofol Infusion 10 MICROgram(s)/kG/Min IV Continuous <Continuous>  sodium chloride 0.9% lock flush 10 milliLiter(s) IV Push every 1 hour PRN  sodium chloride 0.9%. 1000 milliLiter(s) IV Continuous <Continuous>                            7.2    7.38  )-----------( 97       ( 25 May 2022 05:52 )             21.2         140  |  106  |  10.5  ----------------------------<  155<H>  4.2   |  24.0  |  0.84    Ca    7.9<L>      25 May 2022 05:52  Phos  2.8       Mg     2.4         TPro  5.5<L>  /  Alb  3.3  /  TBili  0.6  /  DBili  0.2  /  AST  363<H>  /  ALT  171<H>  /  AlkPhos  54  0524      PHYSICAL EXAM:    Vital Signs Last 24 Hrs  T(C): 37.2 (25 May 2022 09:00), Max: 38.7 (24 May 2022 13:00)  T(F): 99 (25 May 2022 09:00), Max: 101.7 (24 May 2022 13:00)  HR: 77 (25 May 2022 09:00) (77 - 129)  BP: 119/74 (24 May 2022 20:00) (106/67 - 123/80)  BP(mean): 89 (24 May 2022 20:00) (79 - 93)  RR: 12 (25 May 2022 09:00) (12 - 20)  SpO2: 100% (25 May 2022 09:00) (97% - 100%)  Daily Height in cm: 170.18 (24 May 2022 13:00)    Daily Weight in k.6 (25 May 2022 02:00)    Appearance: Alert, responsive, in no acute distress.    Neurological: patient intubated and sedated, unresponsive    Vascular: 2+ distal pulses. Cap refill < 2 sec. No signs of venous   insufficiency   or stasis. No extremity ulcerations. No cyanosis.    PLAN:   patient is unresponsive at this time. will follow-up with MRI c-spine when completed and continue to follow the patient.  Pt Name: FRANKIE ZAVALA    MRN: [id number]      Patient is a being followed for C5-C6 central disc protrusion. s/p craniectomy  for SDH. Patient seen at bedside. he is intubated and sedated, unresponsive at this time. Pending MRI c-spine.       PAST MEDICAL & SURGICAL HISTORY: unknown        Allergies: Allergy Status Unknown      Medications: acetaminophen     Tablet .. 975 milliGRAM(s) Oral every 6 hours  ceFAZolin   IVPB 2000 milliGRAM(s) IV Intermittent every 8 hours  chlorhexidine 0.12% Liquid 15 milliLiter(s) Oral Mucosa every 12 hours  chlorhexidine 2% Cloths 1 Application(s) Topical daily  diphtheria/tetanus/pertussis (acellular) Vaccine (ADAcel) 0.5 milliLiter(s) IntraMuscular once  fentaNYL   Infusion 0.5 MICROgram(s)/kG/Hr IV Continuous <Continuous>  levETIRAcetam  IVPB 1500 milliGRAM(s) IV Intermittent every 12 hours  propofol Infusion 10 MICROgram(s)/kG/Min IV Continuous <Continuous>  sodium chloride 0.9% lock flush 10 milliLiter(s) IV Push every 1 hour PRN  sodium chloride 0.9%. 1000 milliLiter(s) IV Continuous <Continuous>                            7.2    7.38  )-----------( 97       ( 25 May 2022 05:52 )             21.2         140  |  106  |  10.5  ----------------------------<  155<H>  4.2   |  24.0  |  0.84    Ca    7.9<L>      25 May 2022 05:52  Phos  2.8       Mg     2.4         TPro  5.5<L>  /  Alb  3.3  /  TBili  0.6  /  DBili  0.2  /  AST  363<H>  /  ALT  171<H>  /  AlkPhos  54  0524      PHYSICAL EXAM:    Vital Signs Last 24 Hrs  T(C): 37.2 (25 May 2022 09:00), Max: 38.7 (24 May 2022 13:00)  T(F): 99 (25 May 2022 09:00), Max: 101.7 (24 May 2022 13:00)  HR: 77 (25 May 2022 09:00) (77 - 129)  BP: 119/74 (24 May 2022 20:00) (106/67 - 123/80)  BP(mean): 89 (24 May 2022 20:00) (79 - 93)  RR: 12 (25 May 2022 09:00) (12 - 20)  SpO2: 100% (25 May 2022 09:00) (97% - 100%)  Daily Height in cm: 170.18 (24 May 2022 13:00)    Daily Weight in k.6 (25 May 2022 02:00)        Neurological: patient intubated and sedated, unresponsive    Vascular: 2+ distal pulses. Cap refill < 2 sec. No signs of venous   insufficiency   or stasis. No extremity ulcerations. No cyanosis.     PLAN:   patient is unresponsive at this time. will follow-up with MRI c-spine when completed and continue to follow the patient.

## 2022-05-25 NOTE — PROGRESS NOTE ADULT - ASSESSMENT
25M found down with large R SDH; s/p R hemicraniectomy POD#1      PLAN  - Q1 neuro checks  - Pain control PRN; avoid oversedation.   - Normotensive   - Subgaleal/Epidural MARCEL x 1; record output  - Skull precautions  - Monitor flap pressure  - NSICU consulted for facial twitching  - EEG to be started prior to MR d/t inability to obtain MRI  - Recommended MRI Brain prior to EEG to r/o TEOFILO  - Discussed plan w/ primary team  - Medical management/supportive care per SICU  - D/w Dr. Millan

## 2022-05-25 NOTE — DIETITIAN INITIAL EVALUATION ADULT - OTHER INFO
25 year old male presenting with b/l SDH, intracranial contusions, trace cervical epidural hematoma C5-C6, R zygomaticomaxillary frx, Left pulm contusion, anemia and acute hypoxic respiratory failure.     Pt intubated/sedated at this time, unable to obtain nutrition hx. Tube feeds currently running at 40 ml/hr with a goal rate ordered for 50 ml/hr (x20 hrs) which will provide 1000 ml, 1500 kcal, 94g protein, 759 ml free water, and 100% of RDIs for vitamins/minerals. Also receiving LPS 30 ml BID to provide an additional 200 kcal and 30g protein daily. Per flow sheet, pt received 283.5 ml propofol over the last 24 hrs (provides ~312 kcal); currently infusing at 14.7 ml/hr. RD to follow up.

## 2022-05-25 NOTE — DIETITIAN INITIAL EVALUATION ADULT - PERTINENT MEDS FT
MEDICATIONS  (STANDING):  acetaminophen     Tablet .. 975 milliGRAM(s) Oral every 6 hours  ceFAZolin   IVPB 2000 milliGRAM(s) IV Intermittent every 8 hours  chlorhexidine 0.12% Liquid 15 milliLiter(s) Oral Mucosa every 12 hours  chlorhexidine 2% Cloths 1 Application(s) Topical daily  diphtheria/tetanus/pertussis (acellular) Vaccine (ADAcel) 0.5 milliLiter(s) IntraMuscular once  fentaNYL   Infusion 0.5 MICROgram(s)/kG/Hr (3.5 mL/Hr) IV Continuous <Continuous>  levETIRAcetam  IVPB 1500 milliGRAM(s) IV Intermittent every 12 hours  propofol Infusion 10 MICROgram(s)/kG/Min (4.2 mL/Hr) IV Continuous <Continuous>  sodium chloride 0.9%. 1000 milliLiter(s) (120 mL/Hr) IV Continuous <Continuous>    MEDICATIONS  (PRN):  sodium chloride 0.9% lock flush 10 milliLiter(s) IV Push every 1 hour PRN Pre/post blood products, medications, blood draw, and to maintain line patency

## 2022-05-25 NOTE — DIETITIAN INITIAL EVALUATION ADULT - ADD RECOMMEND
Rx: MVI and vitamin C 500mg daily. Monitor tube feed tolerance. Obtain daily weights to monitor trends.

## 2022-05-25 NOTE — DIETITIAN INITIAL EVALUATION ADULT - PERTINENT LABORATORY DATA
05-25    140  |  106  |  10.5  ----------------------------<  155<H>  4.2   |  24.0  |  0.84    Ca    7.9<L>      25 May 2022 05:52  Phos  2.8     05-25  Mg     2.4     05-25    TPro  5.5<L>  /  Alb  3.3  /  TBili  0.6  /  DBili  0.2  /  AST  363<H>  /  ALT  171<H>  /  AlkPhos  54  05-24

## 2022-05-25 NOTE — PROGRESS NOTE ADULT - NS ATTEND AMEND GEN_ALL_CORE FT
NSGY Attg:    see above    patient seen and examined    agree with exam as above    flap full, soft    agree with above

## 2022-05-26 LAB
ANION GAP SERPL CALC-SCNC: 7 MMOL/L — SIGNIFICANT CHANGE UP (ref 5–17)
ANION GAP SERPL CALC-SCNC: 8 MMOL/L — SIGNIFICANT CHANGE UP (ref 5–17)
BASOPHILS # BLD AUTO: 0 K/UL — SIGNIFICANT CHANGE UP (ref 0–0.2)
BASOPHILS # BLD AUTO: 0.02 K/UL — SIGNIFICANT CHANGE UP (ref 0–0.2)
BASOPHILS NFR BLD AUTO: 0 % — SIGNIFICANT CHANGE UP (ref 0–2)
BASOPHILS NFR BLD AUTO: 0.3 % — SIGNIFICANT CHANGE UP (ref 0–2)
BUN SERPL-MCNC: 8.3 MG/DL — SIGNIFICANT CHANGE UP (ref 8–20)
BUN SERPL-MCNC: 8.6 MG/DL — SIGNIFICANT CHANGE UP (ref 8–20)
CALCIUM SERPL-MCNC: 7.8 MG/DL — LOW (ref 8.6–10.2)
CALCIUM SERPL-MCNC: 7.8 MG/DL — LOW (ref 8.6–10.2)
CHLORIDE SERPL-SCNC: 106 MMOL/L — SIGNIFICANT CHANGE UP (ref 98–107)
CHLORIDE SERPL-SCNC: 108 MMOL/L — HIGH (ref 98–107)
CK MB CFR SERPL CALC: 2.3 NG/ML — SIGNIFICANT CHANGE UP (ref 0–6.7)
CK SERPL-CCNC: 732 U/L — HIGH (ref 30–200)
CO2 SERPL-SCNC: 26 MMOL/L — SIGNIFICANT CHANGE UP (ref 22–29)
CO2 SERPL-SCNC: 28 MMOL/L — SIGNIFICANT CHANGE UP (ref 22–29)
CREAT SERPL-MCNC: 0.61 MG/DL — SIGNIFICANT CHANGE UP (ref 0.5–1.3)
CREAT SERPL-MCNC: 0.63 MG/DL — SIGNIFICANT CHANGE UP (ref 0.5–1.3)
EGFR: 135 ML/MIN/1.73M2 — SIGNIFICANT CHANGE UP
EGFR: 137 ML/MIN/1.73M2 — SIGNIFICANT CHANGE UP
EOSINOPHIL # BLD AUTO: 0.12 K/UL — SIGNIFICANT CHANGE UP (ref 0–0.5)
EOSINOPHIL # BLD AUTO: 0.16 K/UL — SIGNIFICANT CHANGE UP (ref 0–0.5)
EOSINOPHIL NFR BLD AUTO: 1.8 % — SIGNIFICANT CHANGE UP (ref 0–6)
EOSINOPHIL NFR BLD AUTO: 2.6 % — SIGNIFICANT CHANGE UP (ref 0–6)
GAS PNL BLDA: SIGNIFICANT CHANGE UP
GLUCOSE BLDC GLUCOMTR-MCNC: 120 MG/DL — HIGH (ref 70–99)
GLUCOSE BLDC GLUCOMTR-MCNC: 163 MG/DL — HIGH (ref 70–99)
GLUCOSE BLDC GLUCOMTR-MCNC: 167 MG/DL — HIGH (ref 70–99)
GLUCOSE BLDC GLUCOMTR-MCNC: 190 MG/DL — HIGH (ref 70–99)
GLUCOSE SERPL-MCNC: 127 MG/DL — HIGH (ref 70–99)
GLUCOSE SERPL-MCNC: 171 MG/DL — HIGH (ref 70–99)
HCT VFR BLD CALC: 20.2 % — CRITICAL LOW (ref 39–50)
HCT VFR BLD CALC: 21.5 % — LOW (ref 39–50)
HCT VFR BLD CALC: 21.5 % — LOW (ref 39–50)
HGB BLD-MCNC: 6.9 G/DL — CRITICAL LOW (ref 13–17)
HGB BLD-MCNC: 7.2 G/DL — LOW (ref 13–17)
HGB BLD-MCNC: 7.2 G/DL — LOW (ref 13–17)
IMM GRANULOCYTES NFR BLD AUTO: 0.5 % — SIGNIFICANT CHANGE UP (ref 0–1.5)
IMM GRANULOCYTES NFR BLD AUTO: 0.6 % — SIGNIFICANT CHANGE UP (ref 0–1.5)
INR BLD: 1.01 RATIO — SIGNIFICANT CHANGE UP (ref 0.88–1.16)
LYMPHOCYTES # BLD AUTO: 0.5 K/UL — LOW (ref 1–3.3)
LYMPHOCYTES # BLD AUTO: 0.87 K/UL — LOW (ref 1–3.3)
LYMPHOCYTES # BLD AUTO: 14.1 % — SIGNIFICANT CHANGE UP (ref 13–44)
LYMPHOCYTES # BLD AUTO: 7.7 % — LOW (ref 13–44)
MAGNESIUM SERPL-MCNC: 2.1 MG/DL — SIGNIFICANT CHANGE UP (ref 1.6–2.6)
MCHC RBC-ENTMCNC: 30.8 PG — SIGNIFICANT CHANGE UP (ref 27–34)
MCHC RBC-ENTMCNC: 31.2 PG — SIGNIFICANT CHANGE UP (ref 27–34)
MCHC RBC-ENTMCNC: 33.5 GM/DL — SIGNIFICANT CHANGE UP (ref 32–36)
MCHC RBC-ENTMCNC: 34.2 GM/DL — SIGNIFICANT CHANGE UP (ref 32–36)
MCV RBC AUTO: 91.4 FL — SIGNIFICANT CHANGE UP (ref 80–100)
MCV RBC AUTO: 91.9 FL — SIGNIFICANT CHANGE UP (ref 80–100)
MONOCYTES # BLD AUTO: 0.33 K/UL — SIGNIFICANT CHANGE UP (ref 0–0.9)
MONOCYTES # BLD AUTO: 0.39 K/UL — SIGNIFICANT CHANGE UP (ref 0–0.9)
MONOCYTES NFR BLD AUTO: 5.1 % — SIGNIFICANT CHANGE UP (ref 2–14)
MONOCYTES NFR BLD AUTO: 6.3 % — SIGNIFICANT CHANGE UP (ref 2–14)
NEUTROPHILS # BLD AUTO: 4.7 K/UL — SIGNIFICANT CHANGE UP (ref 1.8–7.4)
NEUTROPHILS # BLD AUTO: 5.5 K/UL — SIGNIFICANT CHANGE UP (ref 1.8–7.4)
NEUTROPHILS NFR BLD AUTO: 76.2 % — SIGNIFICANT CHANGE UP (ref 43–77)
NEUTROPHILS NFR BLD AUTO: 84.8 % — HIGH (ref 43–77)
PHOSPHATE SERPL-MCNC: 1.6 MG/DL — LOW (ref 2.4–4.7)
PHOSPHATE SERPL-MCNC: 2.8 MG/DL — SIGNIFICANT CHANGE UP (ref 2.4–4.7)
PLATELET # BLD AUTO: 78 K/UL — LOW (ref 150–400)
PLATELET # BLD AUTO: 91 K/UL — LOW (ref 150–400)
POTASSIUM SERPL-MCNC: 3.6 MMOL/L — SIGNIFICANT CHANGE UP (ref 3.5–5.3)
POTASSIUM SERPL-MCNC: 4.1 MMOL/L — SIGNIFICANT CHANGE UP (ref 3.5–5.3)
POTASSIUM SERPL-SCNC: 3.6 MMOL/L — SIGNIFICANT CHANGE UP (ref 3.5–5.3)
POTASSIUM SERPL-SCNC: 4.1 MMOL/L — SIGNIFICANT CHANGE UP (ref 3.5–5.3)
PROTHROM AB SERPL-ACNC: 11.7 SEC — SIGNIFICANT CHANGE UP (ref 10.5–13.4)
RBC # BLD: 2.21 M/UL — LOW (ref 4.2–5.8)
RBC # BLD: 2.34 M/UL — LOW (ref 4.2–5.8)
RBC # FLD: 14.2 % — SIGNIFICANT CHANGE UP (ref 10.3–14.5)
RBC # FLD: 14.4 % — SIGNIFICANT CHANGE UP (ref 10.3–14.5)
SODIUM SERPL-SCNC: 141 MMOL/L — SIGNIFICANT CHANGE UP (ref 135–145)
SODIUM SERPL-SCNC: 142 MMOL/L — SIGNIFICANT CHANGE UP (ref 135–145)
WBC # BLD: 6.17 K/UL — SIGNIFICANT CHANGE UP (ref 3.8–10.5)
WBC # BLD: 6.49 K/UL — SIGNIFICANT CHANGE UP (ref 3.8–10.5)
WBC # FLD AUTO: 6.17 K/UL — SIGNIFICANT CHANGE UP (ref 3.8–10.5)
WBC # FLD AUTO: 6.49 K/UL — SIGNIFICANT CHANGE UP (ref 3.8–10.5)

## 2022-05-26 PROCEDURE — 99291 CRITICAL CARE FIRST HOUR: CPT

## 2022-05-26 PROCEDURE — 99233 SBSQ HOSP IP/OBS HIGH 50: CPT

## 2022-05-26 PROCEDURE — 70551 MRI BRAIN STEM W/O DYE: CPT | Mod: 26

## 2022-05-26 PROCEDURE — 72141 MRI NECK SPINE W/O DYE: CPT | Mod: 26

## 2022-05-26 RX ORDER — SODIUM CHLORIDE 9 MG/ML
1000 INJECTION, SOLUTION INTRAVENOUS
Refills: 0 | Status: DISCONTINUED | OUTPATIENT
Start: 2022-05-26 | End: 2022-05-27

## 2022-05-26 RX ORDER — INSULIN LISPRO 100/ML
VIAL (ML) SUBCUTANEOUS EVERY 6 HOURS
Refills: 0 | Status: DISCONTINUED | OUTPATIENT
Start: 2022-05-26 | End: 2022-06-14

## 2022-05-26 RX ORDER — GLUCAGON INJECTION, SOLUTION 0.5 MG/.1ML
1 INJECTION, SOLUTION SUBCUTANEOUS ONCE
Refills: 0 | Status: DISCONTINUED | OUTPATIENT
Start: 2022-05-26 | End: 2022-05-27

## 2022-05-26 RX ORDER — DEXTROSE 50 % IN WATER 50 %
25 SYRINGE (ML) INTRAVENOUS ONCE
Refills: 0 | Status: DISCONTINUED | OUTPATIENT
Start: 2022-05-26 | End: 2022-05-27

## 2022-05-26 RX ORDER — ACETAMINOPHEN 500 MG
975 TABLET ORAL EVERY 6 HOURS
Refills: 0 | Status: DISCONTINUED | OUTPATIENT
Start: 2022-05-26 | End: 2022-05-29

## 2022-05-26 RX ORDER — DEXTROSE 50 % IN WATER 50 %
15 SYRINGE (ML) INTRAVENOUS ONCE
Refills: 0 | Status: DISCONTINUED | OUTPATIENT
Start: 2022-05-26 | End: 2022-05-27

## 2022-05-26 RX ORDER — DEXTROSE 50 % IN WATER 50 %
12.5 SYRINGE (ML) INTRAVENOUS ONCE
Refills: 0 | Status: DISCONTINUED | OUTPATIENT
Start: 2022-05-26 | End: 2022-05-27

## 2022-05-26 RX ADMIN — Medication 1: at 06:12

## 2022-05-26 RX ADMIN — Medication 100 MILLIGRAM(S): at 06:12

## 2022-05-26 RX ADMIN — PROPOFOL 4.2 MICROGRAM(S)/KG/MIN: 10 INJECTION, EMULSION INTRAVENOUS at 23:38

## 2022-05-26 RX ADMIN — Medication 975 MILLIGRAM(S): at 06:35

## 2022-05-26 RX ADMIN — Medication 100 MILLIGRAM(S): at 21:32

## 2022-05-26 RX ADMIN — Medication 975 MILLIGRAM(S): at 12:38

## 2022-05-26 RX ADMIN — Medication 975 MILLIGRAM(S): at 11:38

## 2022-05-26 RX ADMIN — Medication 975 MILLIGRAM(S): at 00:20

## 2022-05-26 RX ADMIN — Medication 85 MILLIMOLE(S): at 09:41

## 2022-05-26 RX ADMIN — Medication 975 MILLIGRAM(S): at 06:11

## 2022-05-26 RX ADMIN — SODIUM CHLORIDE 100 MILLILITER(S): 9 INJECTION INTRAMUSCULAR; INTRAVENOUS; SUBCUTANEOUS at 06:11

## 2022-05-26 RX ADMIN — Medication 975 MILLIGRAM(S): at 00:45

## 2022-05-26 RX ADMIN — Medication 1: at 23:38

## 2022-05-26 RX ADMIN — Medication 1: at 11:36

## 2022-05-26 RX ADMIN — LEVETIRACETAM 400 MILLIGRAM(S): 250 TABLET, FILM COATED ORAL at 18:14

## 2022-05-26 RX ADMIN — CHLORHEXIDINE GLUCONATE 1 APPLICATION(S): 213 SOLUTION TOPICAL at 11:38

## 2022-05-26 RX ADMIN — LEVETIRACETAM 400 MILLIGRAM(S): 250 TABLET, FILM COATED ORAL at 06:12

## 2022-05-26 RX ADMIN — CHLORHEXIDINE GLUCONATE 15 MILLILITER(S): 213 SOLUTION TOPICAL at 18:44

## 2022-05-26 RX ADMIN — CHLORHEXIDINE GLUCONATE 15 MILLILITER(S): 213 SOLUTION TOPICAL at 06:13

## 2022-05-26 RX ADMIN — Medication 100 MILLIGRAM(S): at 13:52

## 2022-05-26 NOTE — PROGRESS NOTE ADULT - SUBJECTIVE AND OBJECTIVE BOX
24h Events:  Tolerated PSV for a few hours during the day yesterday but was unable to maintain minute ventilation and was returned to . EEG in place to monitor for seizures      ICU Vital Signs Last 24 Hrs  T(C): 37.7 (26 May 2022 00:00), Max: 38 (25 May 2022 22:00)  T(F): 99.9 (26 May 2022 00:00), Max: 100.4 (25 May 2022 22:00)  HR: 94 (26 May 2022 00:37) (77 - 98)  BP: --  BP(mean): --  ABP: 141/65 (26 May 2022 00:00) (131/56 - 173/83)  ABP(mean): 84 (26 May 2022 00:00) (74 - 106)  RR: 12 (26 May 2022 00:00) (7 - 14)  SpO2: 99% (26 May 2022 00:37) (99% - 100%)      ABG - ( 25 May 2022 21:09 )  pH, Arterial: 7.430 pH, Blood: x     /  pCO2: 44    /  pO2: 166   / HCO3: 29    / Base Excess: 4.9   /  SaO2: 99.9                MEDICATIONS  (STANDING):  acetaminophen     Tablet .. 975 milliGRAM(s) Oral every 6 hours  ceFAZolin   IVPB 2000 milliGRAM(s) IV Intermittent every 8 hours  chlorhexidine 0.12% Liquid 15 milliLiter(s) Oral Mucosa every 12 hours  chlorhexidine 2% Cloths 1 Application(s) Topical daily  diphtheria/tetanus/pertussis (acellular) Vaccine (ADAcel) 0.5 milliLiter(s) IntraMuscular once  fentaNYL   Infusion 0.5 MICROgram(s)/kG/Hr (3.5 mL/Hr) IV Continuous <Continuous>  levETIRAcetam  IVPB 1500 milliGRAM(s) IV Intermittent every 12 hours  propofol Infusion 10 MICROgram(s)/kG/Min (4.2 mL/Hr) IV Continuous <Continuous>  sodium chloride 0.9%. 1000 milliLiter(s) (100 mL/Hr) IV Continuous <Continuous>    MEDICATIONS  (PRN):  sodium chloride 0.9% lock flush 10 milliLiter(s) IV Push every 1 hour PRN Pre/post blood products, medications, blood draw, and to maintain line patency          Physical Exam:    Gen: Resting comfortably in bed, intubated    HEENT: Pupils 2mm b/l     Neurological: Not opening eyes, withdraws to noxious stimuli in LE, flap soft, MARCEL with SS output    Neck: Trachea midline, no evidence of JVD, FROM without pain, neck symmetric    Pulmonary: CTAB with decreased breath sounds at the bases, mechanically ventilated on AC    Cardiovascular: S1S2    Gastrointestinal: Soft, non-distended    : Howard in place draining yellow urine    Extremities: Without c/c/e    Musculoskeletal:No deformity or areas of tenderness    LABS:  Pending      ASSESSMENT/PLAN:  25y Male with severe TBI, non-op facial fractures, possible CSpine epidural hematoma, pulmonary contusions. h/o EtOH and cocaine use    Neuro: Continue frequent neuro checks. Continue fentanyl drip. EEG x24 then remove if no seizure activity noted. Keppra to continue for 7d unless seizure activity is seen, then the course would be extended. MRI of head to r/o TEOFILO and CSpine to eval for epidural hematoma. Drain management per surgical team. Monitor for evidence of withdrawal    CV: Hemodynamic monitoring via a-line. Maintain euvolemia and normotension.     Pulm: PSV as tolerated, not a candidate for extubation due to mental status    GI/Nutrition: Tube feedings at goal    /Renal: Howard for strict I&O, f/u am labs    ID: Ancef for the duration of the drain at the request of the surgical team    Endo: Maintain euglycemia, ISS    Skin: Repositioning for DTI prevention while in bed    Heme/DVT Prophylaxis: SCDs, chemical DVT ppx held for now, will discuss when to start with attending    Lines/Tubes: Continue invasive lines    Dispo: Continue in SICU

## 2022-05-26 NOTE — PROGRESS NOTE ADULT - SUBJECTIVE AND OBJECTIVE BOX
Pt Name: [facility]  MRN: [id number]      Patient is a being followed for C5-C6 central disc protrusion. s/p craniectomy 5/25 for SDH. Patient seen at bedside. currently under going EEG. he is intubated and sedated, minimally responsive. Spontaneously moving right upper extremity. Pending MRI c-spine.       PAST MEDICAL & SURGICAL HISTORY: unknown      Allergies: Allergy Status Unknown             Vital Signs Last 24 Hrs  T(C): 37.5 (26 May 2022 09:00), Max: 38 (25 May 2022 22:00)  T(F): 99.5 (26 May 2022 09:00), Max: 100.4 (25 May 2022 22:00)  HR: 94 (26 May 2022 09:00) (77 - 98)  BP: 131/91 (26 May 2022 06:00) (131/91 - 131/91)  BP(mean): 103 (26 May 2022 06:00) (103 - 103)  RR: 19 (26 May 2022 09:00) (7 - 19)  SpO2: 99% (26 May 2022 09:00) (95% - 100%)                          7.2    6.49  )-----------( 78       ( 26 May 2022 03:34 )             21.5     05-26    141  |  108<H>  |  8.6  ----------------------------<  171<H>  4.1   |  26.0  |  0.63    Ca    7.8<L>      26 May 2022 03:34  Phos  1.6     05-26  Mg     2.1     05-26    TPro  5.5<L>  /  Alb  3.3  /  TBili  0.6  /  DBili  0.2  /  AST  363<H>  /  ALT  171<H>  /  AlkPhos  54  05-24      PHYSICAL EXAM:  ICU Vital Signs Last 24 Hrs  T(C): 37.5 (26 May 2022 09:00), Max: 38 (25 May 2022 22:00)  T(F): 99.5 (26 May 2022 09:00), Max: 100.4 (25 May 2022 22:00)  HR: 94 (26 May 2022 09:00) (77 - 98)  BP: 131/91 (26 May 2022 06:00) (131/91 - 131/91)  BP(mean): 103 (26 May 2022 06:00) (103 - 103)  ABP: 183/86 (26 May 2022 09:00) (101/91 - 183/86)  ABP(mean): 111 (26 May 2022 09:00) (77 - 158)  RR: 19 (26 May 2022 09:00) (7 - 19)  SpO2: 99% (26 May 2022 09:00) (95% - 100%)    Neurological: patient intubated and sedated, unresponsive  Vascular: 2+ distal pulses. Cap refill < 2 sec. No signs of venous   insufficiency   or stasis. No extremity ulcerations. No cyanosis.     PLAN:   will follow-up with MRI c-spine when completed and continue to follow the patient.   plan as per Dr. WHITEHEAD  continue care as per ICU team

## 2022-05-26 NOTE — PROGRESS NOTE ADULT - SUBJECTIVE AND OBJECTIVE BOX
NEUROSURGERY PROGRESS NOTE:    Pt s/p  POD#        pt seen and states is at baseline, denied any new or worsening sensorimotor changes    pt c/o + -headache  /10 on the pain scale   + - Nausea /+ - Vomiting  admits denies weakness  admits denies numbness/ tingling  admist denies visual changes  admist denies C/T/LS  Spine pain  + - void  + - BM  + OOB  + bed rest   + - diet  + sylvester cath to gravity   + - venodynes b/l when in bed   + - MARCEL HMV EVD drain  + - a-line/ TLC  + - tube feeds    MEDICATIONS  (STANDING):  acetaminophen     Tablet .. 975 milliGRAM(s) Oral every 6 hours  ceFAZolin   IVPB 2000 milliGRAM(s) IV Intermittent every 8 hours  chlorhexidine 0.12% Liquid 15 milliLiter(s) Oral Mucosa every 12 hours  chlorhexidine 2% Cloths 1 Application(s) Topical daily  dextrose 5%. 1000 milliLiter(s) (50 mL/Hr) IV Continuous <Continuous>  dextrose 5%. 1000 milliLiter(s) (100 mL/Hr) IV Continuous <Continuous>  dextrose 50% Injectable 25 Gram(s) IV Push once  dextrose 50% Injectable 12.5 Gram(s) IV Push once  dextrose 50% Injectable 25 Gram(s) IV Push once  diphtheria/tetanus/pertussis (acellular) Vaccine (ADAcel) 0.5 milliLiter(s) IntraMuscular once  fentaNYL   Infusion 0.5 MICROgram(s)/kG/Hr (3.5 mL/Hr) IV Continuous <Continuous>  glucagon  Injectable 1 milliGRAM(s) IntraMuscular once  insulin lispro (ADMELOG) corrective regimen sliding scale   SubCutaneous every 6 hours  levETIRAcetam  IVPB 1500 milliGRAM(s) IV Intermittent every 12 hours  propofol Infusion 10 MICROgram(s)/kG/Min (4.2 mL/Hr) IV Continuous <Continuous>    MEDICATIONS  (PRN):  dextrose Oral Gel 15 Gram(s) Oral once PRN Blood Glucose LESS THAN 70 milliGRAM(s)/deciliter  sodium chloride 0.9% lock flush 10 milliLiter(s) IV Push every 1 hour PRN Pre/post blood products, medications, blood draw, and to maintain line patency    Allergies    Allergy Status Unknown    Intolerances      Vital Signs Last 24 Hrs  T(C): 37.4 (26 May 2022 13:00), Max: 38 (25 May 2022 22:00)  T(F): 99.3 (26 May 2022 13:00), Max: 100.4 (25 May 2022 22:00)  HR: 74 (26 May 2022 13:00) (74 - 98)   BP: 122/76 (26 May 2022 12:00) (122/76 - 131/91)  BP(mean): 88 (26 May 2022 12:00) (88 - 103)  RR: 11 (26 May 2022 13:00) (9 - 19)  SpO2: 97% (26 May 2022 13:00) (95% - 100%)      GCS: 7T  Eye response (E)1  Verbal response (V)1T  Motor response (M)5      PHYSICAL EXAM:  GENERAL: Sedated, intubated   HEAD:  S/p R crani. Flap full/soft. Dressing clean, dry, intact w staples, no active oozing/ bleeding appreciated. right craniectomy flap is soft.  DRAINS: Subgaleal/epidural MARCEL x1. bloody drainage noted in bulb ~10cc since last measured output measurement,   drain removed in aseptic/sterile fashion, prepped w chlorhexidine and air-dried, drain removed in it's entirety w/o complications and drain exit side secured w drain/mercy stitch/ no drainage/ leaking appreciated; covered w a sterile digressing & RN updated. pt tolerated procedure well.    MENTAL STATUS: AAO x 0; no eye opening to noxious stimuli; Nonverbal; not following commands  MOTOR/SENSATION: localizing to sternal rub w L>R UEs and withdrawing b/l LEs to peripheral noxious   SKIN: Warm, dry otherwise         LABS:                        7.2    6.49  )-----------( 78       ( 26 May 2022 03:34 )             21.5     05-26    141  |  108<H>  |  8.6  ----------------------------<  171<H>  4.1   |  26.0  |  0.63    Ca    7.8<L>      26 May 2022 03:34  Phos  1.6     05-26  Mg     2.1     05-26      PT/INR - ( 24 May 2022 17:22 )   PT: 12.4 sec;   INR: 1.07 ratio    PTT - ( 24 May 2022 17:22 )  PTT:25.9 sec        RADIOLOGY & ADDITIONAL TESTS:  no new NSx images available for review       < from: CT Head No Cont (05.24.22 @ 20:59) >  CT head: Increased right scalp soft tissue swelling. Stable intracranial hemorrhages and subdural hemorrhages. Stable subarachnoid hemorrhage.  CT C-spine: Small amount of blood products circumferentially around the thecal sac at the C1 and C2 levels. No high-grade spinal canal stenosis or obvious cord compression is visualized by CT technique. MRI may be obtained for further evaluation.  < end of copied text >      I spent a total time of 25 mins with the patient at bedside of which more than 50% of time was spent on counseling/coordination of care

## 2022-05-26 NOTE — PROGRESS NOTE ADULT - ASSESSMENT
25M found down with large R SDH; s/p R hemicraniectomy POD#2    PLAN  - cont w Q1 neuro checks  - Pain control PRN; avoid oversedation.   - Normotensive   - Subgaleal/Epidural MARCEL removed/ secured exit site w a nylon stitch/dressing   - Skull precautions on the right   - Monitor flap pressure/ if becomes tense or exam declines - pls obtain stat CT brain   - NSICU consulted for facial twitching - EEG completed:   1. Severe nonspecific diffuse or multifocal cerebral dysfunction.   2. No epileptiform pattern or seizure seen.  - Recommended MRI Brain to r/o TEOFILO  - ortho spine following form C-Spine perspective - MRI CS pending   - Medical management/supportive care per SICU/ fever management and work-up  - plan above as per Dr. Millan

## 2022-05-26 NOTE — PROGRESS NOTE ADULT - SUBJECTIVE AND OBJECTIVE BOX
Pt Name: FRANKIE ZAVALA    MRN: [id number]      Patient is a being followed for C5-C6 central disc protrusion. s/p craniectomy  for SDH. Patient seen at bedside. he is intubated and sedated, unresponsive at this time. Pending MRI c-spine.       PAST MEDICAL & SURGICAL HISTORY: unknown        Allergies: Allergy Status Unknown      Medications: acetaminophen     Tablet .. 975 milliGRAM(s) Oral every 6 hours  ceFAZolin   IVPB 2000 milliGRAM(s) IV Intermittent every 8 hours  chlorhexidine 0.12% Liquid 15 milliLiter(s) Oral Mucosa every 12 hours  chlorhexidine 2% Cloths 1 Application(s) Topical daily  diphtheria/tetanus/pertussis (acellular) Vaccine (ADAcel) 0.5 milliLiter(s) IntraMuscular once  fentaNYL   Infusion 0.5 MICROgram(s)/kG/Hr IV Continuous <Continuous>  levETIRAcetam  IVPB 1500 milliGRAM(s) IV Intermittent every 12 hours  propofol Infusion 10 MICROgram(s)/kG/Min IV Continuous <Continuous>  sodium chloride 0.9% lock flush 10 milliLiter(s) IV Push every 1 hour PRN  sodium chloride 0.9%. 1000 milliLiter(s) IV Continuous <Continuous>                            7.2    7.38  )-----------( 97       ( 25 May 2022 05:52 )             21.2         140  |  106  |  10.5  ----------------------------<  155<H>  4.2   |  24.0  |  0.84    Ca    7.9<L>      25 May 2022 05:52  Phos  2.8       Mg     2.4         TPro  5.5<L>  /  Alb  3.3  /  TBili  0.6  /  DBili  0.2  /  AST  363<H>  /  ALT  171<H>  /  AlkPhos  54  0524      PHYSICAL EXAM:    Vital Signs Last 24 Hrs  T(C): 37.2 (25 May 2022 09:00), Max: 38.7 (24 May 2022 13:00)  T(F): 99 (25 May 2022 09:00), Max: 101.7 (24 May 2022 13:00)  HR: 77 (25 May 2022 09:00) (77 - 129)  BP: 119/74 (24 May 2022 20:00) (106/67 - 123/80)  BP(mean): 89 (24 May 2022 20:00) (79 - 93)  RR: 12 (25 May 2022 09:00) (12 - 20)  SpO2: 100% (25 May 2022 09:00) (97% - 100%)  Daily Height in cm: 170.18 (24 May 2022 13:00)    Daily Weight in k.6 (25 May 2022 02:00)        Neurological: patient intubated and sedated, unresponsive      Vascular: 2+ distal pulses. Cap refill < 2 sec. No signs of venous   insufficiency   or stasis. No extremity ulcerations. No cyanosis.     PLAN:   patient is unresponsive at this time. will follow-up with MRI c-spine when completed and continue to follow the patient.   Cont.  current ICU care would not recommend MRI and/or C-spine surgical planning at this point in time.

## 2022-05-26 NOTE — PROGRESS NOTE ADULT - ASSESSMENT
Ortho PA note addendum:     Dr Lyons reviewed C spine MRI - No significant stenosis noted   Ortho Spine will sign off  Please reconsult if needed

## 2022-05-26 NOTE — EEG REPORT - NS EEG TEXT BOX
[facility]   COMPREHENSIVE EPILEPSY CENTER   REPORT OF LONG-TERM VIDEO EEG     [facility]: [address], [place] [address], Ph#: [phone]  [facility]: [id number], [place] [address], Ph#: [phone]  [facility]: [address] [place] [address], Ph#: [phone]    Patient Name: SYRACUSE CRITICAL  Age and : 25y ([birth year])  MRN #: [id number]  Location: [place] [address]  Referring Physician: Pj Alanis    Start Time/Date: 12:17 on 22  End Time/Date: 08:00 on 22  Duration: 18h 48m    _____________________________________________________________  STUDY INFORMATION    EEG Recording Technique:  The patient underwent continuous Video-EEG monitoring, using Telemetry System hardware on the XLTek Digital System. EEG and video data were stored on a computer hard drive with important events saved in digital archive files. The material was reviewed by a physician (electroencephalographer / epileptologist) on a daily basis. Nigle and seizure detection algorithms were utilized and reviewed. An EEG Technician attended to the patient, and was available throughout daytime work hours.  The epilepsy center neurologist was available in person or on call 24-hours per day.    EEG Placement and Labeling of Electrodes:  The EEG was performed utilizing 20 channel referential EEG connections (coronal over temporal over parasagittal montage) using all standard 10-20 electrode placements with EKG, with additional electrodes placed in the inferior temporal region using the modified 10-10 montage electrode placements for elective admissions, or if deemed necessary. Recording was at a sampling rate of 256 samples per second per channel. Time synchronized digital video recording was done simultaneously with EEG recording. A low light infrared camera was used for low light recording.     _____________________________________________________________  HISTORY    Patient is a 25y old  Male who presents with a chief complaint of Trauma/ Subdural/ Intubated (26 May 2022 09:52)      PERTINENT MEDICATION:  levETIRAcetam  IVPB 1500 milliGRAM(s) IV Intermittent every 12 hours  propofol Infusion 10 MICROgram(s)/kG/Min (4.2 mL/Hr) IV Continuous <Continuous>    _____________________________________________________________  STUDY INTERPRETATION    Findings: The background was continuous and minimally reactive. No posterior dominant rhythm seen.    Background Slowing:  Diffuse theta and polymorphic delta slowing.    Focal Slowing:   None were present.    Sleep Background:  Stage II sleep transients were not recorded.  Absence of state change.    Other Non-Epileptiform Findings:  None were present.    Interictal Epileptiform Activity:   None were present.    Events:  No event or seizure recorded.    Activation Procedures:   Hyperventilation was not performed.    Photic stimulation was not performed.     Artifacts:  Intermittent myogenic and movement artifacts were noted.    ECG:  The heart rate on single channel ECG was predominantly between 80-90 BPM.    _____________________________________________________________  EEG SUMMARY/CLASSIFICATION    Abnormal EEG in a sedated patient.  - Moderate to severe generalized slowing.    _____________________________________________________________  EEG IMPRESSION/CLINICAL CORRELATE    Abnormal EEG study.  1. Severe nonspecific diffuse or multifocal cerebral dysfunction.   2. No epileptiform pattern or seizure seen.    _____________________________________________________________    Shoshana Youssef MD  Director, Epilepsy/EMU - [facility]

## 2022-05-26 NOTE — PROGRESS NOTE ADULT - NS ATTEND AMEND GEN_ALL_CORE FT
NSGY Attg:    see above    patient seen and examined    exam somewhat improved  localizes bilaterally  no eye-opening  flap full, soft    MRI brain and c spine reviewed    agree with plan as above

## 2022-05-26 NOTE — PROGRESS NOTE ADULT - ASSESSMENT
Attending statement:  I have personally seen this patient, and formed a face to face diagnostic evaluation on this patient on this date.  I have reviewed the PA, NP and or Medical/PA student and/or Resident documentation and agree with the history, physical exam and plan of care except if noted otherwise.

## 2022-05-26 NOTE — PROGRESS NOTE ADULT - NS ATTEND AMEND GEN_ALL_CORE FT
Patient seen and examined on rounds. EEG going, pt not moving LUE on SAT. Needs MRI B and C spine. CPAP as tolerated, SAT Q4. Critical ill in ICU.

## 2022-05-27 ENCOUNTER — TRANSCRIPTION ENCOUNTER (OUTPATIENT)
Age: 42
End: 2022-05-27

## 2022-05-27 LAB
A1C WITH ESTIMATED AVERAGE GLUCOSE RESULT: 5.6 % — SIGNIFICANT CHANGE UP (ref 4–5.6)
ALBUMIN SERPL ELPH-MCNC: 2.8 G/DL — LOW (ref 3.3–5.2)
ALP SERPL-CCNC: 57 U/L — SIGNIFICANT CHANGE UP (ref 40–120)
ALT FLD-CCNC: 41 U/L — HIGH
ANION GAP SERPL CALC-SCNC: 6 MMOL/L — SIGNIFICANT CHANGE UP (ref 5–17)
ANION GAP SERPL CALC-SCNC: 8 MMOL/L — SIGNIFICANT CHANGE UP (ref 5–17)
AST SERPL-CCNC: 59 U/L — HIGH
BASOPHILS # BLD AUTO: 0.02 K/UL — SIGNIFICANT CHANGE UP (ref 0–0.2)
BASOPHILS NFR BLD AUTO: 0.4 % — SIGNIFICANT CHANGE UP (ref 0–2)
BILIRUB SERPL-MCNC: 0.4 MG/DL — SIGNIFICANT CHANGE UP (ref 0.4–2)
BLD GP AB SCN SERPL QL: SIGNIFICANT CHANGE UP
BUN SERPL-MCNC: 8.2 MG/DL — SIGNIFICANT CHANGE UP (ref 8–20)
BUN SERPL-MCNC: 9.1 MG/DL — SIGNIFICANT CHANGE UP (ref 8–20)
CALCIUM SERPL-MCNC: 7.6 MG/DL — LOW (ref 8.6–10.2)
CALCIUM SERPL-MCNC: 7.8 MG/DL — LOW (ref 8.6–10.2)
CHLORIDE SERPL-SCNC: 107 MMOL/L — SIGNIFICANT CHANGE UP (ref 98–107)
CHLORIDE SERPL-SCNC: 108 MMOL/L — HIGH (ref 98–107)
CO2 SERPL-SCNC: 29 MMOL/L — SIGNIFICANT CHANGE UP (ref 22–29)
CO2 SERPL-SCNC: 30 MMOL/L — HIGH (ref 22–29)
CREAT SERPL-MCNC: 0.62 MG/DL — SIGNIFICANT CHANGE UP (ref 0.5–1.3)
CREAT SERPL-MCNC: 0.65 MG/DL — SIGNIFICANT CHANGE UP (ref 0.5–1.3)
DIR ANTIGLOB POLYSPECIFIC INTERPRETATION: SIGNIFICANT CHANGE UP
EGFR: 134 ML/MIN/1.73M2 — SIGNIFICANT CHANGE UP
EGFR: 136 ML/MIN/1.73M2 — SIGNIFICANT CHANGE UP
EOSINOPHIL # BLD AUTO: 0.2 K/UL — SIGNIFICANT CHANGE UP (ref 0–0.5)
EOSINOPHIL NFR BLD AUTO: 3.5 % — SIGNIFICANT CHANGE UP (ref 0–6)
ESTIMATED AVERAGE GLUCOSE: 114 MG/DL — SIGNIFICANT CHANGE UP (ref 68–114)
FIBRINOGEN PPP-MCNC: 882 MG/DL — CRITICAL HIGH (ref 330–520)
GAS PNL BLDA: SIGNIFICANT CHANGE UP
GLUCOSE BLDC GLUCOMTR-MCNC: 146 MG/DL — HIGH (ref 70–99)
GLUCOSE BLDC GLUCOMTR-MCNC: 169 MG/DL — HIGH (ref 70–99)
GLUCOSE BLDC GLUCOMTR-MCNC: 170 MG/DL — HIGH (ref 70–99)
GLUCOSE BLDC GLUCOMTR-MCNC: 171 MG/DL — HIGH (ref 70–99)
GLUCOSE SERPL-MCNC: 132 MG/DL — HIGH (ref 70–99)
GLUCOSE SERPL-MCNC: 152 MG/DL — HIGH (ref 70–99)
HAPTOGLOB SERPL-MCNC: 150 MG/DL — SIGNIFICANT CHANGE UP (ref 34–200)
HCT VFR BLD CALC: 23.7 % — LOW (ref 39–50)
HGB BLD-MCNC: 7.8 G/DL — LOW (ref 13–17)
IMM GRANULOCYTES NFR BLD AUTO: 0.4 % — SIGNIFICANT CHANGE UP (ref 0–1.5)
LDH SERPL L TO P-CCNC: 252 U/L — HIGH (ref 98–192)
LYMPHOCYTES # BLD AUTO: 0.67 K/UL — LOW (ref 1–3.3)
LYMPHOCYTES # BLD AUTO: 11.8 % — LOW (ref 13–44)
MAGNESIUM SERPL-MCNC: 2 MG/DL — SIGNIFICANT CHANGE UP (ref 1.8–2.6)
MAGNESIUM SERPL-MCNC: 2.5 MG/DL — SIGNIFICANT CHANGE UP (ref 1.6–2.6)
MCHC RBC-ENTMCNC: 30.4 PG — SIGNIFICANT CHANGE UP (ref 27–34)
MCHC RBC-ENTMCNC: 32.9 GM/DL — SIGNIFICANT CHANGE UP (ref 32–36)
MCV RBC AUTO: 92.2 FL — SIGNIFICANT CHANGE UP (ref 80–100)
MONOCYTES # BLD AUTO: 0.38 K/UL — SIGNIFICANT CHANGE UP (ref 0–0.9)
MONOCYTES NFR BLD AUTO: 6.7 % — SIGNIFICANT CHANGE UP (ref 2–14)
NEUTROPHILS # BLD AUTO: 4.39 K/UL — SIGNIFICANT CHANGE UP (ref 1.8–7.4)
NEUTROPHILS NFR BLD AUTO: 77.2 % — HIGH (ref 43–77)
PHOSPHATE SERPL-MCNC: 2.6 MG/DL — SIGNIFICANT CHANGE UP (ref 2.4–4.7)
PHOSPHATE SERPL-MCNC: 3.6 MG/DL — SIGNIFICANT CHANGE UP (ref 2.4–4.7)
PLATELET # BLD AUTO: 106 K/UL — LOW (ref 150–400)
POTASSIUM SERPL-MCNC: 3.3 MMOL/L — LOW (ref 3.5–5.3)
POTASSIUM SERPL-MCNC: 3.9 MMOL/L — SIGNIFICANT CHANGE UP (ref 3.5–5.3)
POTASSIUM SERPL-SCNC: 3.3 MMOL/L — LOW (ref 3.5–5.3)
POTASSIUM SERPL-SCNC: 3.9 MMOL/L — SIGNIFICANT CHANGE UP (ref 3.5–5.3)
PROT SERPL-MCNC: 5.5 G/DL — LOW (ref 6.6–8.7)
RBC # BLD: 2.57 M/UL — LOW (ref 4.2–5.8)
RBC # FLD: 14.2 % — SIGNIFICANT CHANGE UP (ref 10.3–14.5)
SODIUM SERPL-SCNC: 143 MMOL/L — SIGNIFICANT CHANGE UP (ref 135–145)
SODIUM SERPL-SCNC: 145 MMOL/L — SIGNIFICANT CHANGE UP (ref 135–145)
WBC # BLD: 5.68 K/UL — SIGNIFICANT CHANGE UP (ref 3.8–10.5)
WBC # FLD AUTO: 5.68 K/UL — SIGNIFICANT CHANGE UP (ref 3.8–10.5)

## 2022-05-27 PROCEDURE — 71045 X-RAY EXAM CHEST 1 VIEW: CPT | Mod: 26

## 2022-05-27 PROCEDURE — 99223 1ST HOSP IP/OBS HIGH 75: CPT

## 2022-05-27 PROCEDURE — 99291 CRITICAL CARE FIRST HOUR: CPT

## 2022-05-27 PROCEDURE — 70450 CT HEAD/BRAIN W/O DYE: CPT | Mod: 26

## 2022-05-27 RX ORDER — ENOXAPARIN SODIUM 100 MG/ML
40 INJECTION SUBCUTANEOUS EVERY 24 HOURS
Refills: 0 | Status: DISCONTINUED | OUTPATIENT
Start: 2022-05-27 | End: 2022-06-07

## 2022-05-27 RX ORDER — MAGNESIUM SULFATE 500 MG/ML
2 VIAL (ML) INJECTION ONCE
Refills: 0 | Status: COMPLETED | OUTPATIENT
Start: 2022-05-27 | End: 2022-05-27

## 2022-05-27 RX ORDER — POTASSIUM PHOSPHATE, MONOBASIC POTASSIUM PHOSPHATE, DIBASIC 236; 224 MG/ML; MG/ML
15 INJECTION, SOLUTION INTRAVENOUS ONCE
Refills: 0 | Status: COMPLETED | OUTPATIENT
Start: 2022-05-27 | End: 2022-05-27

## 2022-05-27 RX ORDER — FOLIC ACID 0.8 MG
1 TABLET ORAL DAILY
Refills: 0 | Status: DISCONTINUED | OUTPATIENT
Start: 2022-05-27 | End: 2022-05-29

## 2022-05-27 RX ORDER — THIAMINE MONONITRATE (VIT B1) 100 MG
500 TABLET ORAL EVERY 8 HOURS
Refills: 0 | Status: COMPLETED | OUTPATIENT
Start: 2022-05-27 | End: 2022-05-30

## 2022-05-27 RX ORDER — FENTANYL CITRATE 50 UG/ML
100 INJECTION INTRAVENOUS ONCE
Refills: 0 | Status: DISCONTINUED | OUTPATIENT
Start: 2022-05-27 | End: 2022-05-27

## 2022-05-27 RX ORDER — POTASSIUM CHLORIDE 20 MEQ
40 PACKET (EA) ORAL ONCE
Refills: 0 | Status: COMPLETED | OUTPATIENT
Start: 2022-05-27 | End: 2022-05-27

## 2022-05-27 RX ADMIN — Medication 975 MILLIGRAM(S): at 21:09

## 2022-05-27 RX ADMIN — Medication 105 MILLIGRAM(S): at 22:12

## 2022-05-27 RX ADMIN — Medication 1: at 05:36

## 2022-05-27 RX ADMIN — CHLORHEXIDINE GLUCONATE 1 APPLICATION(S): 213 SOLUTION TOPICAL at 11:19

## 2022-05-27 RX ADMIN — Medication 975 MILLIGRAM(S): at 11:18

## 2022-05-27 RX ADMIN — Medication 975 MILLIGRAM(S): at 19:56

## 2022-05-27 RX ADMIN — LEVETIRACETAM 400 MILLIGRAM(S): 250 TABLET, FILM COATED ORAL at 17:34

## 2022-05-27 RX ADMIN — POTASSIUM PHOSPHATE, MONOBASIC POTASSIUM PHOSPHATE, DIBASIC 62.5 MILLIMOLE(S): 236; 224 INJECTION, SOLUTION INTRAVENOUS at 01:33

## 2022-05-27 RX ADMIN — Medication 975 MILLIGRAM(S): at 00:32

## 2022-05-27 RX ADMIN — Medication 100 MILLIGRAM(S): at 05:28

## 2022-05-27 RX ADMIN — FENTANYL CITRATE 100 MICROGRAM(S): 50 INJECTION INTRAVENOUS at 01:20

## 2022-05-27 RX ADMIN — LEVETIRACETAM 400 MILLIGRAM(S): 250 TABLET, FILM COATED ORAL at 05:27

## 2022-05-27 RX ADMIN — Medication 1 MILLIGRAM(S): at 12:36

## 2022-05-27 RX ADMIN — ENOXAPARIN SODIUM 40 MILLIGRAM(S): 100 INJECTION SUBCUTANEOUS at 12:37

## 2022-05-27 RX ADMIN — Medication 1 TABLET(S): at 12:36

## 2022-05-27 RX ADMIN — Medication 1: at 11:16

## 2022-05-27 RX ADMIN — Medication 105 MILLIGRAM(S): at 12:38

## 2022-05-27 RX ADMIN — FENTANYL CITRATE 100 MICROGRAM(S): 50 INJECTION INTRAVENOUS at 00:54

## 2022-05-27 RX ADMIN — FENTANYL CITRATE 3.5 MICROGRAM(S)/KG/HR: 50 INJECTION INTRAVENOUS at 12:35

## 2022-05-27 RX ADMIN — CHLORHEXIDINE GLUCONATE 15 MILLILITER(S): 213 SOLUTION TOPICAL at 17:35

## 2022-05-27 RX ADMIN — Medication 1: at 23:29

## 2022-05-27 RX ADMIN — Medication 975 MILLIGRAM(S): at 12:00

## 2022-05-27 RX ADMIN — Medication 40 MILLIEQUIVALENT(S): at 01:33

## 2022-05-27 RX ADMIN — Medication 25 GRAM(S): at 01:34

## 2022-05-27 RX ADMIN — CHLORHEXIDINE GLUCONATE 15 MILLILITER(S): 213 SOLUTION TOPICAL at 05:28

## 2022-05-27 RX ADMIN — PROPOFOL 4.2 MICROGRAM(S)/KG/MIN: 10 INJECTION, EMULSION INTRAVENOUS at 12:36

## 2022-05-27 NOTE — DISCHARGE NOTE PROVIDER - DETAILS OF MALNUTRITION DIAGNOSIS/DIAGNOSES
This patient has been assessed with a concern for Malnutrition and was treated during this hospitalization for the following Nutrition diagnosis/diagnoses:     -  09/23/2022: Severe protein-calorie malnutrition   -  08/12/2022: Severe protein-calorie malnutrition

## 2022-05-27 NOTE — DISCHARGE NOTE PROVIDER - NSDCFUADDINST_GEN_ALL_CORE_FT
ok to shower while avoiding direct water to the incision, ok to have water run over the incision, use mild soap/ baby shampoo to wash while showering/ pat dry afterwards  cover incision as needed w a clean/cotton hat or cap to avoid irritation/ rubbing/friction.   no hot tubs /jacuzzi until cleared by Dr Millan/ office NP after outpatient visit.  call office if have wound discharge/swelling/redness/ worsening pain or fever vs return to the hospital ED

## 2022-05-27 NOTE — PROGRESS NOTE ADULT - SUBJECTIVE AND OBJECTIVE BOX
INTERVAL HPI/OVERNIGHT EVENTS/SUBJECTIVE: Family arrived at bedside and updated on status with .  MRI Done showing TEOFILO on brain but no cervical spine injury noted. Collar removed. Able to localize with left side of body.     ICU Vital Signs Last 24 Hrs  T(C): 37.8 (27 May 2022 01:30), Max: 38 (26 May 2022 23:00)  T(F): 100 (27 May 2022 01:30), Max: 100.4 (26 May 2022 23:00)  HR: 80 (27 May 2022 01:30) (69 - 94)  BP: 116/67 (27 May 2022 01:30) (116/67 - 141/85)  BP(mean): 82 (27 May 2022 01:30) (82 - 104)  ABP: 142/66 (26 May 2022 14:00) (101/91 - 183/86)  ABP(mean): 82 (26 May 2022 14:00) (77 - 158)  RR: 12 (27 May 2022 01:30) (9 - 19)  SpO2: 99% (27 May 2022 01:30) (95% - 100%)      I&O's Detail    25 May 2022 07:01  -  26 May 2022 07:00  --------------------------------------------------------  IN:    Enteral Tube Flush: 240 mL    FentaNYL: 140 mL    IV PiggyBack: 150 mL    IV PiggyBack: 100 mL    Pivot 1.5: 1150 mL    Propofol: 46.2 mL    Propofol: 67.2 mL    sodium chloride 0.9%: 2040 mL    sodium chloride 0.9%: 1300 mL  Total IN: 5233.4 mL    OUT:    Drain (mL): 25 mL    Indwelling Catheter - Urethral (mL): 1905 mL  Total OUT: 1930 mL    Total NET: 3303.4 mL      26 May 2022 07:01  -  27 May 2022 01:54  --------------------------------------------------------  IN:    FentaNYL: 119 mL    IV PiggyBack: 100 mL    IV PiggyBack: 499.8 mL    IV PiggyBack: 150 mL    Pivot 1.5: 800 mL    PRBCs (Packed Red Blood Cells): 315 mL    Propofol: 237.3 mL  Total IN: 2221.1 mL    OUT:    Indwelling Catheter - Urethral (mL): 430 mL    Intermittent Catheterization - Urethral (mL): 1002 mL    sodium chloride 0.9%: 0 mL  Total OUT: 1432 mL    Total NET: 789.1 mL          Mode: AC/ CMV (Assist Control/ Continuous Mandatory Ventilation)  RR (machine): 12  TV (machine): 400  FiO2: 40  PEEP: 5  MAP: 8  PIP: 22    ABG - ( 26 May 2022 04:27 )  pH, Arterial: 7.410 pH, Blood: x     /  pCO2: 45    /  pO2: 169   / HCO3: 28    / Base Excess: 3.9   /  SaO2: 100.0               MEDICATIONS  (STANDING):  ceFAZolin   IVPB 2000 milliGRAM(s) IV Intermittent every 8 hours  chlorhexidine 0.12% Liquid 15 milliLiter(s) Oral Mucosa every 12 hours  chlorhexidine 2% Cloths 1 Application(s) Topical daily  dextrose 5%. 1000 milliLiter(s) (50 mL/Hr) IV Continuous <Continuous>  dextrose 5%. 1000 milliLiter(s) (100 mL/Hr) IV Continuous <Continuous>  dextrose 50% Injectable 25 Gram(s) IV Push once  dextrose 50% Injectable 12.5 Gram(s) IV Push once  dextrose 50% Injectable 25 Gram(s) IV Push once  diphtheria/tetanus/pertussis (acellular) Vaccine (ADAcel) 0.5 milliLiter(s) IntraMuscular once  fentaNYL   Infusion 0.5 MICROgram(s)/kG/Hr (3.5 mL/Hr) IV Continuous <Continuous>  glucagon  Injectable 1 milliGRAM(s) IntraMuscular once  insulin lispro (ADMELOG) corrective regimen sliding scale   SubCutaneous every 6 hours  levETIRAcetam  IVPB 1500 milliGRAM(s) IV Intermittent every 12 hours  propofol Infusion 10 MICROgram(s)/kG/Min (4.2 mL/Hr) IV Continuous <Continuous>    MEDICATIONS  (PRN):  acetaminophen     Tablet .. 975 milliGRAM(s) Oral every 6 hours PRN Temp greater or equal to 38C (100.4F)  dextrose Oral Gel 15 Gram(s) Oral once PRN Blood Glucose LESS THAN 70 milliGRAM(s)/deciliter  sodium chloride 0.9% lock flush 10 milliLiter(s) IV Push every 1 hour PRN Pre/post blood products, medications, blood draw, and to maintain line patency      NUTRITION/IVF:     CENTRAL LINE:  LOCATION:   DATE INSERTED:  CVP:  SCVO2:    SPANN:   DATE INSERTED:    A-LINE:    LOCATION:   DATE INSERTED:   SVV:  CO/CI:     CHEST TUBE:  LOCATION:  DATE INSERTED: OUTPUT/24 HRS:  SUCTION/WATER SEAL:     NG/OG TUBE:  DATE INSERTED:  OUTPUT/24 HRS:    MISC:     PHYSICAL EXAM:     Gen:NAD, Well appearing, No cyanosis, Pallor.    Eyes: PERRL ~ 3mm, EOMI,     Neurological: A&Ox3, GCS 15, No focal defficit.     ENMT: Clear canals, clear throat.      Neck: Supple. NT AT, FROM no pain.  No JVD. No meningeal signs    Pulmonary: NAD, CTA, = BL .      Cardiovascular: RRR, S1, S2, No Murmurs, rubs or gallops noted.    Gastrointestinal:ND, Soft, NT.    Extremities: NT, AT, no edema, erythema or palpable cord noted.  FROM, = 2+ pulses throughout.    Skin:     Musculoskeletal:          LABS:  CBC Full  -  ( 26 May 2022 23:39 )  WBC Count : 6.17 K/uL  RBC Count : 2.21 M/uL  Hemoglobin : 6.9 g/dL  Hematocrit : 20.2 %  Platelet Count - Automated : 91 K/uL  Mean Cell Volume : 91.4 fl  Mean Cell Hemoglobin : 31.2 pg  Mean Cell Hemoglobin Concentration : 34.2 gm/dL  Auto Neutrophil # : 4.70 K/uL  Auto Lymphocyte # : 0.87 K/uL  Auto Monocyte # : 0.39 K/uL  Auto Eosinophil # : 0.16 K/uL  Auto Basophil # : 0.02 K/uL  Auto Neutrophil % : 76.2 %  Auto Lymphocyte % : 14.1 %  Auto Monocyte % : 6.3 %  Auto Eosinophil % : 2.6 %  Auto Basophil % : 0.3 %    05-26    143  |  107  |  8.2  ----------------------------<  152<H>  3.3<L>   |  30.0<H>  |  0.65    Ca    7.8<L>      26 May 2022 23:39  Phos  2.6     05-26  Mg     2.0     05-26      PT/INR - ( 26 May 2022 23:39 )   PT: 11.7 sec;   INR: 1.01 ratio             RECENT CULTURES:        CARDIAC MARKERS ( 26 May 2022 03:34 )  x     / x     / 732 U/L / x     / 2.3 ng/mL  CARDIAC MARKERS ( 25 May 2022 16:26 )  x     / x     / 958 U/L / x     / 4.0 ng/mL  CARDIAC MARKERS ( 25 May 2022 05:52 )  x     / x     / 1152 U/L / x     / 6.5 ng/mL      CAPILLARY BLOOD GLUCOSE      RADIOLOGY & ADDITIONAL STUDIES:    ASSESSMENT/PLAN:  25yMale presenting with:    Neurological:    Neck:    Pulmonary:    Cardiovascular:     Gastrointestinal:    Genitourinary:    Heme:    ID:    Skin:    Lines/ Tubes:    Dispo:    CRITICAL CARE TIME SPENT:   INTERVAL HPI/OVERNIGHT EVENTS/SUBJECTIVE: Family arrived at bedside and updated on status with .  MRI Done showing TEOFILO on brain but no cervical spine injury noted. Collar removed. Able to localize with left side of body. Unable to obtain ROS due to AMS.     ICU Vital Signs Last 24 Hrs  T(C): 37.8 (27 May 2022 01:30), Max: 38 (26 May 2022 23:00)  T(F): 100 (27 May 2022 01:30), Max: 100.4 (26 May 2022 23:00)  HR: 80 (27 May 2022 01:30) (69 - 94)  BP: 116/67 (27 May 2022 01:30) (116/67 - 141/85)  BP(mean): 82 (27 May 2022 01:30) (82 - 104)  ABP: 142/66 (26 May 2022 14:00) (101/91 - 183/86)  ABP(mean): 82 (26 May 2022 14:00) (77 - 158)  RR: 12 (27 May 2022 01:30) (9 - 19)  SpO2: 99% (27 May 2022 01:30) (95% - 100%)      I&O's Detail    25 May 2022 07:01  -  26 May 2022 07:00  --------------------------------------------------------  IN:    Enteral Tube Flush: 240 mL    FentaNYL: 140 mL    IV PiggyBack: 150 mL    IV PiggyBack: 100 mL    Pivot 1.5: 1150 mL    Propofol: 46.2 mL    Propofol: 67.2 mL    sodium chloride 0.9%: 2040 mL    sodium chloride 0.9%: 1300 mL  Total IN: 5233.4 mL    OUT:    Drain (mL): 25 mL    Indwelling Catheter - Urethral (mL): 1905 mL  Total OUT: 1930 mL    Total NET: 3303.4 mL      26 May 2022 07:01  -  27 May 2022 01:54  --------------------------------------------------------  IN:    FentaNYL: 119 mL    IV PiggyBack: 100 mL    IV PiggyBack: 499.8 mL    IV PiggyBack: 150 mL    Pivot 1.5: 800 mL    PRBCs (Packed Red Blood Cells): 315 mL    Propofol: 237.3 mL  Total IN: 2221.1 mL    OUT:    Indwelling Catheter - Urethral (mL): 430 mL    Intermittent Catheterization - Urethral (mL): 1002 mL    sodium chloride 0.9%: 0 mL  Total OUT: 1432 mL    Total NET: 789.1 mL          Mode: AC/ CMV (Assist Control/ Continuous Mandatory Ventilation)  RR (machine): 12  TV (machine): 400  FiO2: 40  PEEP: 5  MAP: 8  PIP: 22    ABG - ( 26 May 2022 04:27 )  pH, Arterial: 7.410 pH, Blood: x     /  pCO2: 45    /  pO2: 169   / HCO3: 28    / Base Excess: 3.9   /  SaO2: 100.0               MEDICATIONS  (STANDING):  ceFAZolin   IVPB 2000 milliGRAM(s) IV Intermittent every 8 hours  chlorhexidine 0.12% Liquid 15 milliLiter(s) Oral Mucosa every 12 hours  chlorhexidine 2% Cloths 1 Application(s) Topical daily  dextrose 5%. 1000 milliLiter(s) (50 mL/Hr) IV Continuous <Continuous>  dextrose 5%. 1000 milliLiter(s) (100 mL/Hr) IV Continuous <Continuous>  dextrose 50% Injectable 25 Gram(s) IV Push once  dextrose 50% Injectable 12.5 Gram(s) IV Push once  dextrose 50% Injectable 25 Gram(s) IV Push once  diphtheria/tetanus/pertussis (acellular) Vaccine (ADAcel) 0.5 milliLiter(s) IntraMuscular once  fentaNYL   Infusion 0.5 MICROgram(s)/kG/Hr (3.5 mL/Hr) IV Continuous <Continuous>  glucagon  Injectable 1 milliGRAM(s) IntraMuscular once  insulin lispro (ADMELOG) corrective regimen sliding scale   SubCutaneous every 6 hours  levETIRAcetam  IVPB 1500 milliGRAM(s) IV Intermittent every 12 hours  propofol Infusion 10 MICROgram(s)/kG/Min (4.2 mL/Hr) IV Continuous <Continuous>    MEDICATIONS  (PRN):  acetaminophen     Tablet .. 975 milliGRAM(s) Oral every 6 hours PRN Temp greater or equal to 38C (100.4F)  dextrose Oral Gel 15 Gram(s) Oral once PRN Blood Glucose LESS THAN 70 milliGRAM(s)/deciliter  sodium chloride 0.9% lock flush 10 milliLiter(s) IV Push every 1 hour PRN Pre/post blood products, medications, blood draw, and to maintain line patency      NUTRITION/IVF:     CENTRAL LINE:  LOCATION:   DATE INSERTED:  CVP:  SCVO2:    SPANN:   DATE INSERTED:    A-LINE:    LOCATION:   DATE INSERTED:   SVV:  CO/CI:     CHEST TUBE:  LOCATION:  DATE INSERTED: OUTPUT/24 HRS:  SUCTION/WATER SEAL:     NG/OG TUBE:  DATE INSERTED:  OUTPUT/24 HRS:    MISC:     PHYSICAL EXAM:     Gen: NAD, Well appearing, No cyanosis, Pallor.    Eyes: PERRL ~ 3mm, EOMI,     Neurological:  GCS 1/1T/5.  Localizes Right > left.      ENMT: Clear canals, clear throat.      Neck: Supple. NT AT, FROM no pain.  No JVD. No meningeal signs    Pulmonary: NAD, CTA, = BL .      Cardiovascular: RRR, S1, S2, No Murmurs, rubs or gallops noted.    Gastrointestinal:ND, Soft, NT.    Extremities: NT, AT, no edema, erythema or palpable cord noted.  FROM, = 2+ pulses throughout.          LABS:  CBC Full  -  ( 26 May 2022 23:39 )  WBC Count : 6.17 K/uL  RBC Count : 2.21 M/uL  Hemoglobin : 6.9 g/dL  Hematocrit : 20.2 %  Platelet Count - Automated : 91 K/uL  Mean Cell Volume : 91.4 fl  Mean Cell Hemoglobin : 31.2 pg  Mean Cell Hemoglobin Concentration : 34.2 gm/dL  Auto Neutrophil # : 4.70 K/uL  Auto Lymphocyte # : 0.87 K/uL  Auto Monocyte # : 0.39 K/uL  Auto Eosinophil # : 0.16 K/uL  Auto Basophil # : 0.02 K/uL  Auto Neutrophil % : 76.2 %  Auto Lymphocyte % : 14.1 %  Auto Monocyte % : 6.3 %  Auto Eosinophil % : 2.6 %  Auto Basophil % : 0.3 %    05-26    143  |  107  |  8.2  ----------------------------<  152<H>  3.3<L>   |  30.0<H>  |  0.65    Ca    7.8<L>      26 May 2022 23:39  Phos  2.6     05-26  Mg     2.0     05-26      PT/INR - ( 26 May 2022 23:39 )   PT: 11.7 sec;   INR: 1.01 ratio             RECENT CULTURES:        CARDIAC MARKERS ( 26 May 2022 03:34 )  x     / x     / 732 U/L / x     / 2.3 ng/mL  CARDIAC MARKERS ( 25 May 2022 16:26 )  x     / x     / 958 U/L / x     / 4.0 ng/mL  CARDIAC MARKERS ( 25 May 2022 05:52 )  x     / x     / 1152 U/L / x     / 6.5 ng/mL      CAPILLARY BLOOD GLUCOSE      RADIOLOGY & ADDITIONAL STUDIES:    ASSESSMENT/PLAN:  25yMale presenting with: Right Traumatic Subdural hemorrhage SP Craniectomy with BL Intraparenchymal hematomas, Left SDH, R zygomaticomaxillary complex fracture, Left pulmonary contusion, ETOH Abuse, Cocaine abuse, TEOFILO.     Neurological: Q 1 neuro checks.  Falp checks.  STAT Head CT if declines AMS or flap more tense.    Neck: Collar cleared by yesterday's team.    Pulmonary: CPAP trials today.  Highly likely will require Trach.    Cardiovascular:  AM Team may start Proporanolol today.     Gastrointestinal: CW TF At goal.     Genitourinary: Failed multiple straight cath.  Will replace Spann.    Heme: SCD only.  AM team to discuss Lovenox start.  I have sent off labs for hemolytic MULLINS.     ID: Ancef while drain in place.    Lines/ Tubes: Obtain PIV to remove TLC.  No indication for Art line.     Dispo: Critical care as above.     CRITICAL CARE TIME SPENT: 49 minutes.

## 2022-05-27 NOTE — DISCHARGE NOTE PROVIDER - HOSPITAL COURSE
Patient is a 42 year old male initially admitted on 5/24/22 (BIBEMS after found unresponsive on street) with a GCS of 3, found to have b/l intraparenchymal bleeds, b/l SDH. Has had a complex prolonged hospital course including but not limited to right decompressive craniectomy on 5/24. Trach / PEG., cranioplasty on 6/29 with cognitive decline after initial improvement prompting imaging which noted subacute collection with air underneath the cranioplasty sight. MRI with increase in fluid collection underneath the flap leading to a repeat right craniectomy with wound exploration & evacuation of fluid collection on 7/17 with OR cultures isolating MSSA. Antimicrobial regimen adjusted (ID), (suspected) central SIADH now improved (renal signed off). Has had interval trach decannulation and dietary advancement to pureed. Now s/p completion of appropriate antimicrobial course with f/u imaging (9/3) revealing no abnormal enhancement (6 mm midline shift to left). After being optimized and cleared for surgery had a fever (9/9/22). Infectious w/u negative. Now has been afebrile. Patient then underwent a cranioplasty with replacement bone flap on 9/19. Post-op course complicated by left facial droop, left sided weakness and right hand twitching. Patient was placed on VEEG and loaded with keppra. Epilepsy was consulted and patient is now medically stable for downgrade to the hospitalist team. Given changes in neurologic status, patient was placed back on a tube feed diet. VEEG was negative for seizure activity and MRI brain was ordered was negative for acute changes. Patient to continue PT/OT/ST. Discharge disposition to [facility].

## 2022-05-27 NOTE — PROGRESS NOTE ADULT - NS ATTEND AMEND GEN_ALL_CORE FT
patient seen and examined on rounds. no epidural on CT neck, TEOFILO noted. Will respond to pain RUE, B LE, no real response LUE. Wean vent as tolerated. Appreciate NSG recs. cont TF. Critical but stable in ICU

## 2022-05-27 NOTE — DISCHARGE NOTE PROVIDER - NSDCCPCAREPLAN_GEN_ALL_CORE_FT
PRINCIPAL DISCHARGE DIAGNOSIS  Diagnosis: SDH (subdural hematoma)  Assessment and Plan of Treatment: -CT head with bilateral IPH and bilateral SDH s/p decompressive craniotomy on 5/24  -s/p cranioplasty on 6/29, repeat craniotomy on 7/17 and final cranioplasty with replacement bone flap on 9/19   - patient is now unable to follow commands with left hemiparesis; placed back on a TF diet   - completed VEEG without seizure activity  MRI brain repeated status post cranioplasty, encephalomalacia; Small subdural hygroma in the right parafalcine convexity; mild white matter ischemia with old lacunar infarcts in the bilateral basal ganglia    - Repeat CT head on 09/27 negative   - Continue Memantine 5 mg BID   - Continue Melatonin at bedtime  - Keppra dose was adjusted   - Seizure/Aspiration/Fall Precautions      SECONDARY DISCHARGE DIAGNOSES  Diagnosis: SIADH (syndrome of inappropriate ADH production)  Assessment and Plan of Treatment: Central SIADH  NaCL 1gm po Q12 hours    Diagnosis: 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

## 2022-05-27 NOTE — CHART NOTE - NSCHARTNOTEFT_GEN_A_CORE
L SC TLC removed. 2 sutures removed from the level of the skin. catheter removed w/ tip intact. pressure held over insertion site. hemostasis achieved. pressure dressing applied.
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
## 2022-05-27 NOTE — DISCHARGE NOTE PROVIDER - NSDCFUADDAPPT_GEN_ALL_CORE_FT
Please follow up with Dr Lyons regarding cervical spine as outpatient  Please follow up with Dr Lyons regarding cervical spine as outpatient     pls f/u w Dr Millan in 4 weeks if possible for post-op assessment or once out of rehab
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
## 2022-05-27 NOTE — CHART NOTE - NSCHARTNOTEFT_GEN_A_CORE
Noted to have change in motor exam on rexamination-- extension like movements to deep central noxious  b/l LE continue to briskly withdraw    to obtain urgent repeat CTH  d/w SICU PA  d/w RN

## 2022-05-27 NOTE — DISCHARGE NOTE PROVIDER - CARE PROVIDERS DIRECT ADDRESSES
,[email or web address]

## 2022-05-27 NOTE — DISCHARGE NOTE PROVIDER - PROVIDER TOKENS
PROVIDER:[TOKEN:[[id number]]] PROVIDER:[TOKEN:[[id number]]],PROVIDER:[TOKEN:[[id number]],FOLLOWUP:[2 weeks]]

## 2022-05-27 NOTE — DISCHARGE NOTE PROVIDER - CARE PROVIDER_API CALL
Miki Lyons (DO)  Orthopaedic Surgery  [address] [place] [address], [address]  [place] [address]  Phone: [phone]  Fax: [phone]  Follow Up Time:    Miki Lyons ()  Orthopaedic Surgery  [address], [address]  [place] [address]  Phone: [phone]  Fax: [phone]  Follow Up Time:     Zachery Millan)  Neurosurgery  [address], [address]  [place] [address]  Phone: [phone]  Fax: [phone]  Follow Up Time: 2 weeks
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
## 2022-05-28 LAB
ANION GAP SERPL CALC-SCNC: 9 MMOL/L — SIGNIFICANT CHANGE UP (ref 5–17)
BASOPHILS # BLD AUTO: 0.02 K/UL — SIGNIFICANT CHANGE UP (ref 0–0.2)
BASOPHILS NFR BLD AUTO: 0.3 % — SIGNIFICANT CHANGE UP (ref 0–2)
BUN SERPL-MCNC: 11.8 MG/DL — SIGNIFICANT CHANGE UP (ref 8–20)
CALCIUM SERPL-MCNC: 8.1 MG/DL — LOW (ref 8.6–10.2)
CHLORIDE SERPL-SCNC: 104 MMOL/L — SIGNIFICANT CHANGE UP (ref 98–107)
CO2 SERPL-SCNC: 27 MMOL/L — SIGNIFICANT CHANGE UP (ref 22–29)
CREAT SERPL-MCNC: 0.65 MG/DL — SIGNIFICANT CHANGE UP (ref 0.5–1.3)
EGFR: 134 ML/MIN/1.73M2 — SIGNIFICANT CHANGE UP
EOSINOPHIL # BLD AUTO: 0.14 K/UL — SIGNIFICANT CHANGE UP (ref 0–0.5)
EOSINOPHIL NFR BLD AUTO: 2.2 % — SIGNIFICANT CHANGE UP (ref 0–6)
GAS PNL BLDA: SIGNIFICANT CHANGE UP
GLUCOSE BLDC GLUCOMTR-MCNC: 121 MG/DL — HIGH (ref 70–99)
GLUCOSE BLDC GLUCOMTR-MCNC: 129 MG/DL — HIGH (ref 70–99)
GLUCOSE BLDC GLUCOMTR-MCNC: 171 MG/DL — HIGH (ref 70–99)
GLUCOSE SERPL-MCNC: 158 MG/DL — HIGH (ref 70–99)
HCT VFR BLD CALC: 28 % — LOW (ref 39–50)
HGB BLD-MCNC: 9.1 G/DL — LOW (ref 13–17)
IMM GRANULOCYTES NFR BLD AUTO: 0.5 % — SIGNIFICANT CHANGE UP (ref 0–1.5)
LYMPHOCYTES # BLD AUTO: 0.71 K/UL — LOW (ref 1–3.3)
LYMPHOCYTES # BLD AUTO: 11.2 % — LOW (ref 13–44)
MAGNESIUM SERPL-MCNC: 2.6 MG/DL — SIGNIFICANT CHANGE UP (ref 1.6–2.6)
MCHC RBC-ENTMCNC: 30.1 PG — SIGNIFICANT CHANGE UP (ref 27–34)
MCHC RBC-ENTMCNC: 32.5 GM/DL — SIGNIFICANT CHANGE UP (ref 32–36)
MCV RBC AUTO: 92.7 FL — SIGNIFICANT CHANGE UP (ref 80–100)
MONOCYTES # BLD AUTO: 0.69 K/UL — SIGNIFICANT CHANGE UP (ref 0–0.9)
MONOCYTES NFR BLD AUTO: 10.9 % — SIGNIFICANT CHANGE UP (ref 2–14)
NEUTROPHILS # BLD AUTO: 4.74 K/UL — SIGNIFICANT CHANGE UP (ref 1.8–7.4)
NEUTROPHILS NFR BLD AUTO: 74.9 % — SIGNIFICANT CHANGE UP (ref 43–77)
PHOSPHATE SERPL-MCNC: 2.3 MG/DL — LOW (ref 2.4–4.7)
PLATELET # BLD AUTO: 153 K/UL — SIGNIFICANT CHANGE UP (ref 150–400)
POTASSIUM SERPL-MCNC: 3.6 MMOL/L — SIGNIFICANT CHANGE UP (ref 3.5–5.3)
POTASSIUM SERPL-SCNC: 3.6 MMOL/L — SIGNIFICANT CHANGE UP (ref 3.5–5.3)
RBC # BLD: 3.02 M/UL — LOW (ref 4.2–5.8)
RBC # FLD: 13.9 % — SIGNIFICANT CHANGE UP (ref 10.3–14.5)
SODIUM SERPL-SCNC: 140 MMOL/L — SIGNIFICANT CHANGE UP (ref 135–145)
TRIGL SERPL-MCNC: 143 MG/DL — SIGNIFICANT CHANGE UP
WBC # BLD: 6.33 K/UL — SIGNIFICANT CHANGE UP (ref 3.8–10.5)
WBC # FLD AUTO: 6.33 K/UL — SIGNIFICANT CHANGE UP (ref 3.8–10.5)

## 2022-05-28 PROCEDURE — 99291 CRITICAL CARE FIRST HOUR: CPT

## 2022-05-28 PROCEDURE — 71045 X-RAY EXAM CHEST 1 VIEW: CPT | Mod: 26,76

## 2022-05-28 RX ORDER — MAGNESIUM SULFATE 500 MG/ML
2 VIAL (ML) INJECTION ONCE
Refills: 0 | Status: COMPLETED | OUTPATIENT
Start: 2022-05-28 | End: 2022-05-28

## 2022-05-28 RX ORDER — SODIUM,POTASSIUM PHOSPHATES 278-250MG
1 POWDER IN PACKET (EA) ORAL ONCE
Refills: 0 | Status: COMPLETED | OUTPATIENT
Start: 2022-05-28 | End: 2022-05-28

## 2022-05-28 RX ORDER — PROPOFOL 10 MG/ML
20 INJECTION, EMULSION INTRAVENOUS
Qty: 1000 | Refills: 0 | Status: DISCONTINUED | OUTPATIENT
Start: 2022-05-28 | End: 2022-05-31

## 2022-05-28 RX ORDER — HYDROMORPHONE HYDROCHLORIDE 2 MG/ML
0.5 INJECTION INTRAMUSCULAR; INTRAVENOUS; SUBCUTANEOUS ONCE
Refills: 0 | Status: DISCONTINUED | OUTPATIENT
Start: 2022-05-28 | End: 2022-05-28

## 2022-05-28 RX ORDER — SENNA PLUS 8.6 MG/1
10 TABLET ORAL AT BEDTIME
Refills: 0 | Status: DISCONTINUED | OUTPATIENT
Start: 2022-05-28 | End: 2022-06-01

## 2022-05-28 RX ORDER — HYDROMORPHONE HYDROCHLORIDE 2 MG/ML
0.5 INJECTION INTRAMUSCULAR; INTRAVENOUS; SUBCUTANEOUS
Refills: 0 | Status: DISCONTINUED | OUTPATIENT
Start: 2022-05-28 | End: 2022-06-04

## 2022-05-28 RX ORDER — IBUPROFEN 200 MG
600 TABLET ORAL ONCE
Refills: 0 | Status: COMPLETED | OUTPATIENT
Start: 2022-05-28 | End: 2022-05-28

## 2022-05-28 RX ORDER — MEPERIDINE HYDROCHLORIDE 50 MG/ML
25 INJECTION INTRAMUSCULAR; INTRAVENOUS; SUBCUTANEOUS ONCE
Refills: 0 | Status: DISCONTINUED | OUTPATIENT
Start: 2022-05-28 | End: 2022-05-28

## 2022-05-28 RX ORDER — LEVETIRACETAM 250 MG/1
1000 TABLET, FILM COATED ORAL EVERY 12 HOURS
Refills: 0 | Status: DISCONTINUED | OUTPATIENT
Start: 2022-05-28 | End: 2022-06-03

## 2022-05-28 RX ORDER — HYDRALAZINE HCL 50 MG
10 TABLET ORAL ONCE
Refills: 0 | Status: COMPLETED | OUTPATIENT
Start: 2022-05-28 | End: 2022-05-28

## 2022-05-28 RX ADMIN — HYDROMORPHONE HYDROCHLORIDE 0.5 MILLIGRAM(S): 2 INJECTION INTRAMUSCULAR; INTRAVENOUS; SUBCUTANEOUS at 10:20

## 2022-05-28 RX ADMIN — HYDROMORPHONE HYDROCHLORIDE 0.5 MILLIGRAM(S): 2 INJECTION INTRAMUSCULAR; INTRAVENOUS; SUBCUTANEOUS at 13:04

## 2022-05-28 RX ADMIN — Medication 2 MILLIGRAM(S): at 14:20

## 2022-05-28 RX ADMIN — CHLORHEXIDINE GLUCONATE 15 MILLILITER(S): 213 SOLUTION TOPICAL at 17:16

## 2022-05-28 RX ADMIN — Medication 105 MILLIGRAM(S): at 13:45

## 2022-05-28 RX ADMIN — Medication 10 MILLIGRAM(S): at 16:25

## 2022-05-28 RX ADMIN — HYDROMORPHONE HYDROCHLORIDE 0.5 MILLIGRAM(S): 2 INJECTION INTRAMUSCULAR; INTRAVENOUS; SUBCUTANEOUS at 10:35

## 2022-05-28 RX ADMIN — LEVETIRACETAM 400 MILLIGRAM(S): 250 TABLET, FILM COATED ORAL at 05:05

## 2022-05-28 RX ADMIN — Medication 1 PACKET(S): at 05:05

## 2022-05-28 RX ADMIN — Medication 1 MILLIGRAM(S): at 12:18

## 2022-05-28 RX ADMIN — Medication 1 TABLET(S): at 12:19

## 2022-05-28 RX ADMIN — Medication 975 MILLIGRAM(S): at 10:40

## 2022-05-28 RX ADMIN — SENNA PLUS 10 MILLILITER(S): 8.6 TABLET ORAL at 21:01

## 2022-05-28 RX ADMIN — CHLORHEXIDINE GLUCONATE 15 MILLILITER(S): 213 SOLUTION TOPICAL at 05:05

## 2022-05-28 RX ADMIN — Medication 600 MILLIGRAM(S): at 02:56

## 2022-05-28 RX ADMIN — PROPOFOL 4.2 MICROGRAM(S)/KG/MIN: 10 INJECTION, EMULSION INTRAVENOUS at 16:59

## 2022-05-28 RX ADMIN — Medication 2 MILLIGRAM(S): at 15:14

## 2022-05-28 RX ADMIN — Medication 975 MILLIGRAM(S): at 16:28

## 2022-05-28 RX ADMIN — Medication 975 MILLIGRAM(S): at 16:58

## 2022-05-28 RX ADMIN — Medication 25 GRAM(S): at 00:36

## 2022-05-28 RX ADMIN — PROPOFOL 4.2 MICROGRAM(S)/KG/MIN: 10 INJECTION, EMULSION INTRAVENOUS at 03:09

## 2022-05-28 RX ADMIN — Medication 1: at 11:39

## 2022-05-28 RX ADMIN — Medication 105 MILLIGRAM(S): at 05:17

## 2022-05-28 RX ADMIN — Medication 1: at 05:05

## 2022-05-28 RX ADMIN — LEVETIRACETAM 400 MILLIGRAM(S): 250 TABLET, FILM COATED ORAL at 17:16

## 2022-05-28 RX ADMIN — Medication 105 MILLIGRAM(S): at 21:01

## 2022-05-28 RX ADMIN — MEPERIDINE HYDROCHLORIDE 25 MILLIGRAM(S): 50 INJECTION INTRAMUSCULAR; INTRAVENOUS; SUBCUTANEOUS at 23:51

## 2022-05-28 RX ADMIN — Medication 975 MILLIGRAM(S): at 23:03

## 2022-05-28 RX ADMIN — CHLORHEXIDINE GLUCONATE 1 APPLICATION(S): 213 SOLUTION TOPICAL at 13:45

## 2022-05-28 RX ADMIN — ENOXAPARIN SODIUM 40 MILLIGRAM(S): 100 INJECTION SUBCUTANEOUS at 12:19

## 2022-05-28 RX ADMIN — Medication 25 GRAM(S): at 20:48

## 2022-05-28 RX ADMIN — Medication 600 MILLIGRAM(S): at 02:06

## 2022-05-28 RX ADMIN — HYDROMORPHONE HYDROCHLORIDE 0.5 MILLIGRAM(S): 2 INJECTION INTRAMUSCULAR; INTRAVENOUS; SUBCUTANEOUS at 13:16

## 2022-05-28 RX ADMIN — Medication 975 MILLIGRAM(S): at 10:10
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
## 2022-05-28 NOTE — PROGRESS NOTE ADULT - ASSESSMENT
ASSESSMENT/PLAN:  25yMale presenting with: Right Traumatic Subdural hemorrhage SP Craniectomy with BL Intraparenchymal hematomas, Left SDH, R zygomaticomaxillary complex fracture, Left pulmonary contusion, ETOH Abuse, Cocaine abuse, TEOFILO.     Neurological: Severe TBI- continue to avoid secondary brain injury, medically manage for paraoxysmal sympathetic hyperactivity. Continue keppra.     Pulmonary: CPAP as tolerates. Continue hypoxia. Patient will likely need tracheostomy     Cardiovascular:  Continue propranolol, increase as patient tolerates.     Gastrointestinal: CW TF At goal. Bowel regimen. Will need peg.     Genitourinary: Failed multiple straight cath.  Howard back in. Would hold off on TOV again until more awake.     Heme: SCD, lovenox.  Hemolytic work up.     ID: No issues.     Lines/ Tubes: PIV    Dispo: SICU
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
## 2022-05-28 NOTE — PROGRESS NOTE ADULT - SUBJECTIVE AND OBJECTIVE BOX
24 HOUR EVENTS: Overnight a few episodes of hypertension and febrile. Increased propranolol, given magnesium for shivering. Yesterday decreased neurologic exam, ct head results as below. Tolerating CPAP without issues. No hypoxia or hypotension. Tolerating tube feeds. UOP adequate. H/h stable.     SUBJECTIVE/ROS:  [ ] A ten-point review of systems was otherwise negative except as noted.  [x ] Due to altered mental status/intubation, subjective information were not able to be obtained from the patient. History was obtained, to the extent possible, from review of the chart and collateral sources of information.      NEURO  RASS: -2    GCS:  7T   CAM ICU:  Exam: GCS 1/1T/5.  Localizes and withdraws Right > left.   Meds: acetaminophen     Tablet .. 975 milliGRAM(s) Oral every 6 hours PRN Temp greater or equal to 38C (100.4F)  fentaNYL   Infusion 0.5 MICROgram(s)/kG/Hr IV Continuous <Continuous>  levETIRAcetam  IVPB 1500 milliGRAM(s) IV Intermittent every 12 hours  propofol Infusion 10 MICROgram(s)/kG/Min IV Continuous <Continuous>    [x] Adequacy of sedation and pain control has been assessed and adjusted      RESPIRATORY  RR: 13 (05-28-22 @ 02:00) (10 - 16)  SpO2: 98% (05-28-22 @ 02:00) (93% - 100%)  Wt(kg): --  Exam: unlabored, clear to auscultation bilaterally  Mechanical Ventilation: Mode: AC/ CMV (Assist Control/ Continuous Mandatory Ventilation), RR (machine): 12, RR (patient): 15, TV (machine): 400, FiO2: 40, PEEP: 5, MAP: 9, PIP: 28  ABG - ( 27 May 2022 03:34 )  pH: 7.450 /  pCO2: 44    /  pO2: 141   / HCO3: 31    / Base Excess: 6.6   /  SaO2: 100.0   Lactate: x                [ ] Extubation Readiness Assessed  Meds:       CARDIOVASCULAR  HR: 92 (05-28-22 @ 02:00) (69 - 117)  BP: 155/104 (05-28-22 @ 02:00) (115/66 - 166/106)  BP(mean): 120 (05-28-22 @ 02:00) (81 - 141)  ABP: --  ABP(mean): --  Wt(kg): --  CVP(cm H2O): --      Exam: nsr  Cardiac Rhythm: nsr  Perfusion     [x ]Adequate   [ ]Inadequate  Mentation   [x ]Normal       [ ]Reduced  Extremities  [x ]Warm         [ ]Cool  Volume Status [ ]Hypervolemic [x ]Euvolemic [ ]Hypovolemic  Meds: propranolol 20 milliGRAM(s) Oral every 6 hours        GI/NUTRITION  Exam: soft, nttp, nd  Diet: NPO   Meds:     GENITOURINARY  I&O's Detail    05-26 @ 07:01 - 05-27 @ 07:00  --------------------------------------------------------  IN:    FentaNYL: 147 mL    IV PiggyBack: 312.5 mL    IV PiggyBack: 200 mL    IV PiggyBack: 100 mL    IV PiggyBack: 499.8 mL    IV PiggyBack: 100 mL    Pivot 1.5: 1050 mL    PRBCs (Packed Red Blood Cells): 315 mL    Propofol: 296.1 mL  Total IN: 3020.4 mL    OUT:    Indwelling Catheter - Urethral (mL): 430 mL    Intermittent Catheterization - Urethral (mL): 1275 mL    sodium chloride 0.9%: 0 mL  Total OUT: 1705 mL    Total NET: 1315.4 mL      05-27 @ 07:01 - 05-28 @ 02:51  --------------------------------------------------------  IN:    Enteral Tube Flush: 380 mL    FentaNYL: 133 mL    IV PiggyBack: 200 mL    IV PiggyBack: 50 mL    IV PiggyBack: 100 mL    Pivot 1.5: 750 mL    Propofol: 133.1 mL  Total IN: 1746.1 mL    OUT:    Indwelling Catheter - Urethral (mL): 2050 mL  Total OUT: 2050 mL    Total NET: -303.9 mL          05-27    145  |  108<H>  |  9.1  ----------------------------<  132<H>  3.9   |  29.0  |  0.62    Ca    7.6<L>      27 May 2022 05:18  Phos  3.6     05-27  Mg     2.5     05-27    TPro  5.5<L>  /  Alb  2.8<L>  /  TBili  0.4  /  DBili  x   /  AST  59<H>  /  ALT  41<H>  /  AlkPhos  57  05-27    [ x] Howard catheter, indication: retention   Meds: folic acid 1 milliGRAM(s) Oral daily  multivitamin 1 Tablet(s) Oral daily  sodium chloride 0.9% lock flush 10 milliLiter(s) IV Push every 1 hour PRN Pre/post blood products, medications, blood draw, and to maintain line patency  thiamine IVPB 500 milliGRAM(s) IV Intermittent every 8 hours        HEMATOLOGIC  Meds: enoxaparin Injectable 40 milliGRAM(s) SubCutaneous every 24 hours    [x] VTE Prophylaxis                        7.8    5.68  )-----------( 106      ( 27 May 2022 05:18 )             23.7     PT/INR - ( 26 May 2022 23:39 )   PT: 11.7 sec;   INR: 1.01 ratio           Transfusion     [ ] PRBC   [ ] Platelets   [ ] FFP   [ ] Cryoprecipitate      INFECTIOUS DISEASES  T(C): 38.6 (05-28-22 @ 02:00), Max: 38.7 (05-27-22 @ 20:00)  Wt(kg): --  WBC Count: 5.68 K/uL (05-27 @ 05:18)    Recent Cultures:    Meds: diphtheria/tetanus/pertussis (acellular) Vaccine (ADAcel) 0.5 milliLiter(s) IntraMuscular once        ENDOCRINE  Capillary Blood Glucose    Meds: insulin lispro (ADMELOG) corrective regimen sliding scale   SubCutaneous every 6 hours      Extremities: NT, AT, no edema, erythema or palpable cord noted.  FROM, = 2+ pulses throughout    ACCESS DEVICES:  [ ] Peripheral IV  [ ] Central Venous Line	[ ] R	[ ] L	[ ] IJ	[ ] Fem	[ ] SC	Placed:   [ ] Arterial Line		[ ] R	[ ] L	[ ] Fem	[ ] Rad	[ ] Ax	Placed:   [ ] PICC:					[ ] Mediport  [ ] Urinary Catheter, Date Placed:   [ ] Necessity of urinary, arterial, and venous catheters discussed    OTHER MEDICATIONS:  chlorhexidine 0.12% Liquid 15 milliLiter(s) Oral Mucosa every 12 hours  chlorhexidine 2% Cloths 1 Application(s) Topical daily      CODE STATUS:     IMAGING:

## 2022-05-29 LAB
ANION GAP SERPL CALC-SCNC: 11 MMOL/L — SIGNIFICANT CHANGE UP (ref 5–17)
BASOPHILS # BLD AUTO: 0.02 K/UL — SIGNIFICANT CHANGE UP (ref 0–0.2)
BASOPHILS NFR BLD AUTO: 0.3 % — SIGNIFICANT CHANGE UP (ref 0–2)
BUN SERPL-MCNC: 19.2 MG/DL — SIGNIFICANT CHANGE UP (ref 8–20)
CALCIUM SERPL-MCNC: 8.1 MG/DL — LOW (ref 8.6–10.2)
CHLORIDE SERPL-SCNC: 98 MMOL/L — SIGNIFICANT CHANGE UP (ref 98–107)
CO2 SERPL-SCNC: 27 MMOL/L — SIGNIFICANT CHANGE UP (ref 22–29)
CREAT SERPL-MCNC: 0.59 MG/DL — SIGNIFICANT CHANGE UP (ref 0.5–1.3)
EGFR: 138 ML/MIN/1.73M2 — SIGNIFICANT CHANGE UP
EOSINOPHIL # BLD AUTO: 0.31 K/UL — SIGNIFICANT CHANGE UP (ref 0–0.5)
EOSINOPHIL NFR BLD AUTO: 5.1 % — SIGNIFICANT CHANGE UP (ref 0–6)
GAS PNL BLDA: SIGNIFICANT CHANGE UP
GLUCOSE BLDC GLUCOMTR-MCNC: 107 MG/DL — HIGH (ref 70–99)
GLUCOSE BLDC GLUCOMTR-MCNC: 120 MG/DL — HIGH (ref 70–99)
GLUCOSE BLDC GLUCOMTR-MCNC: 178 MG/DL — HIGH (ref 70–99)
GLUCOSE SERPL-MCNC: 158 MG/DL — HIGH (ref 70–99)
HCT VFR BLD CALC: 27 % — LOW (ref 39–50)
HGB BLD-MCNC: 9.1 G/DL — LOW (ref 13–17)
IMM GRANULOCYTES NFR BLD AUTO: 0.8 % — SIGNIFICANT CHANGE UP (ref 0–1.5)
LYMPHOCYTES # BLD AUTO: 0.82 K/UL — LOW (ref 1–3.3)
LYMPHOCYTES # BLD AUTO: 13.5 % — SIGNIFICANT CHANGE UP (ref 13–44)
MAGNESIUM SERPL-MCNC: 2.4 MG/DL — SIGNIFICANT CHANGE UP (ref 1.8–2.6)
MCHC RBC-ENTMCNC: 31.2 PG — SIGNIFICANT CHANGE UP (ref 27–34)
MCHC RBC-ENTMCNC: 33.7 GM/DL — SIGNIFICANT CHANGE UP (ref 32–36)
MCV RBC AUTO: 92.5 FL — SIGNIFICANT CHANGE UP (ref 80–100)
MONOCYTES # BLD AUTO: 1.02 K/UL — HIGH (ref 0–0.9)
MONOCYTES NFR BLD AUTO: 16.7 % — HIGH (ref 2–14)
NEUTROPHILS # BLD AUTO: 3.87 K/UL — SIGNIFICANT CHANGE UP (ref 1.8–7.4)
NEUTROPHILS NFR BLD AUTO: 63.6 % — SIGNIFICANT CHANGE UP (ref 43–77)
PHOSPHATE SERPL-MCNC: 3.4 MG/DL — SIGNIFICANT CHANGE UP (ref 2.4–4.7)
PLATELET # BLD AUTO: 206 K/UL — SIGNIFICANT CHANGE UP (ref 150–400)
POTASSIUM SERPL-MCNC: 3.3 MMOL/L — LOW (ref 3.5–5.3)
POTASSIUM SERPL-SCNC: 3.3 MMOL/L — LOW (ref 3.5–5.3)
RBC # BLD: 2.92 M/UL — LOW (ref 4.2–5.8)
RBC # FLD: 13.2 % — SIGNIFICANT CHANGE UP (ref 10.3–14.5)
SODIUM SERPL-SCNC: 135 MMOL/L — SIGNIFICANT CHANGE UP (ref 135–145)
WBC # BLD: 6.09 K/UL — SIGNIFICANT CHANGE UP (ref 3.8–10.5)
WBC # FLD AUTO: 6.09 K/UL — SIGNIFICANT CHANGE UP (ref 3.8–10.5)

## 2022-05-29 PROCEDURE — 70450 CT HEAD/BRAIN W/O DYE: CPT | Mod: 26

## 2022-05-29 PROCEDURE — 99291 CRITICAL CARE FIRST HOUR: CPT

## 2022-05-29 RX ORDER — LANOLIN ALCOHOL/MO/W.PET/CERES
3 CREAM (GRAM) TOPICAL AT BEDTIME
Refills: 0 | Status: DISCONTINUED | OUTPATIENT
Start: 2022-05-29 | End: 2022-06-01

## 2022-05-29 RX ORDER — POLYETHYLENE GLYCOL 3350 17 G/17G
17 POWDER, FOR SOLUTION ORAL DAILY
Refills: 0 | Status: DISCONTINUED | OUTPATIENT
Start: 2022-05-29 | End: 2022-05-29

## 2022-05-29 RX ORDER — LANOLIN ALCOHOL/MO/W.PET/CERES
3 CREAM (GRAM) TOPICAL AT BEDTIME
Refills: 0 | Status: DISCONTINUED | OUTPATIENT
Start: 2022-05-29 | End: 2022-05-29

## 2022-05-29 RX ORDER — ACETAMINOPHEN 500 MG
975 TABLET ORAL EVERY 6 HOURS
Refills: 0 | Status: DISCONTINUED | OUTPATIENT
Start: 2022-05-29 | End: 2022-06-01

## 2022-05-29 RX ORDER — FOLIC ACID 0.8 MG
1 TABLET ORAL DAILY
Refills: 0 | Status: DISCONTINUED | OUTPATIENT
Start: 2022-05-29 | End: 2022-06-01

## 2022-05-29 RX ORDER — CALCIUM GLUCONATE 100 MG/ML
2 VIAL (ML) INTRAVENOUS ONCE
Refills: 0 | Status: COMPLETED | OUTPATIENT
Start: 2022-05-29 | End: 2022-05-29

## 2022-05-29 RX ORDER — POLYETHYLENE GLYCOL 3350 17 G/17G
17 POWDER, FOR SOLUTION ORAL DAILY
Refills: 0 | Status: DISCONTINUED | OUTPATIENT
Start: 2022-05-29 | End: 2022-06-01

## 2022-05-29 RX ORDER — POTASSIUM CHLORIDE 20 MEQ
40 PACKET (EA) ORAL EVERY 4 HOURS
Refills: 0 | Status: COMPLETED | OUTPATIENT
Start: 2022-05-29 | End: 2022-05-29

## 2022-05-29 RX ADMIN — Medication 975 MILLIGRAM(S): at 05:20

## 2022-05-29 RX ADMIN — CHLORHEXIDINE GLUCONATE 1 APPLICATION(S): 213 SOLUTION TOPICAL at 11:15

## 2022-05-29 RX ADMIN — Medication 40 MILLIEQUIVALENT(S): at 11:14

## 2022-05-29 RX ADMIN — Medication 1 MILLIGRAM(S): at 11:14

## 2022-05-29 RX ADMIN — Medication 975 MILLIGRAM(S): at 00:09

## 2022-05-29 RX ADMIN — Medication 975 MILLIGRAM(S): at 12:18

## 2022-05-29 RX ADMIN — Medication 200 GRAM(S): at 07:22

## 2022-05-29 RX ADMIN — HYDROMORPHONE HYDROCHLORIDE 0.5 MILLIGRAM(S): 2 INJECTION INTRAMUSCULAR; INTRAVENOUS; SUBCUTANEOUS at 18:15

## 2022-05-29 RX ADMIN — Medication 4 MILLIGRAM(S): at 20:17

## 2022-05-29 RX ADMIN — MEPERIDINE HYDROCHLORIDE 25 MILLIGRAM(S): 50 INJECTION INTRAMUSCULAR; INTRAVENOUS; SUBCUTANEOUS at 00:09

## 2022-05-29 RX ADMIN — FENTANYL CITRATE 3.5 MICROGRAM(S)/KG/HR: 50 INJECTION INTRAVENOUS at 08:11

## 2022-05-29 RX ADMIN — LEVETIRACETAM 400 MILLIGRAM(S): 250 TABLET, FILM COATED ORAL at 17:06

## 2022-05-29 RX ADMIN — CHLORHEXIDINE GLUCONATE 15 MILLILITER(S): 213 SOLUTION TOPICAL at 05:19

## 2022-05-29 RX ADMIN — Medication 105 MILLIGRAM(S): at 06:11

## 2022-05-29 RX ADMIN — POLYETHYLENE GLYCOL 3350 17 GRAM(S): 17 POWDER, FOR SOLUTION ORAL at 13:49

## 2022-05-29 RX ADMIN — Medication 105 MILLIGRAM(S): at 21:29

## 2022-05-29 RX ADMIN — LEVETIRACETAM 400 MILLIGRAM(S): 250 TABLET, FILM COATED ORAL at 05:19

## 2022-05-29 RX ADMIN — Medication 25 MILLIGRAM(S): at 13:49

## 2022-05-29 RX ADMIN — Medication 40 MILLIEQUIVALENT(S): at 06:36

## 2022-05-29 RX ADMIN — Medication 975 MILLIGRAM(S): at 11:48

## 2022-05-29 RX ADMIN — HYDROMORPHONE HYDROCHLORIDE 0.5 MILLIGRAM(S): 2 INJECTION INTRAMUSCULAR; INTRAVENOUS; SUBCUTANEOUS at 08:26

## 2022-05-29 RX ADMIN — Medication 25 MILLIGRAM(S): at 21:37

## 2022-05-29 RX ADMIN — FENTANYL CITRATE 3.5 MICROGRAM(S)/KG/HR: 50 INJECTION INTRAVENOUS at 02:42

## 2022-05-29 RX ADMIN — PROPOFOL 8.4 MICROGRAM(S)/KG/MIN: 10 INJECTION, EMULSION INTRAVENOUS at 08:11

## 2022-05-29 RX ADMIN — Medication 1: at 05:23

## 2022-05-29 RX ADMIN — Medication 1: at 11:15

## 2022-05-29 RX ADMIN — Medication 3 MILLIGRAM(S): at 21:27

## 2022-05-29 RX ADMIN — CHLORHEXIDINE GLUCONATE 15 MILLILITER(S): 213 SOLUTION TOPICAL at 17:05

## 2022-05-29 RX ADMIN — Medication 975 MILLIGRAM(S): at 22:40

## 2022-05-29 RX ADMIN — ENOXAPARIN SODIUM 40 MILLIGRAM(S): 100 INJECTION SUBCUTANEOUS at 12:20

## 2022-05-29 RX ADMIN — PROPOFOL 8.4 MICROGRAM(S)/KG/MIN: 10 INJECTION, EMULSION INTRAVENOUS at 22:08

## 2022-05-29 RX ADMIN — Medication 105 MILLIGRAM(S): at 13:50

## 2022-05-29 RX ADMIN — SENNA PLUS 10 MILLILITER(S): 8.6 TABLET ORAL at 21:28

## 2022-05-29 RX ADMIN — HYDROMORPHONE HYDROCHLORIDE 0.5 MILLIGRAM(S): 2 INJECTION INTRAMUSCULAR; INTRAVENOUS; SUBCUTANEOUS at 08:11

## 2022-05-29 RX ADMIN — Medication 975 MILLIGRAM(S): at 22:08

## 2022-05-29 RX ADMIN — HYDROMORPHONE HYDROCHLORIDE 0.5 MILLIGRAM(S): 2 INJECTION INTRAMUSCULAR; INTRAVENOUS; SUBCUTANEOUS at 18:36

## 2022-05-29 RX ADMIN — Medication 1 TABLET(S): at 11:14

## 2022-05-29 RX ADMIN — Medication 975 MILLIGRAM(S): at 06:14
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
## 2022-05-30 LAB
ANION GAP SERPL CALC-SCNC: 10 MMOL/L — SIGNIFICANT CHANGE UP (ref 5–17)
ANION GAP SERPL CALC-SCNC: 11 MMOL/L — SIGNIFICANT CHANGE UP (ref 5–17)
ANION GAP SERPL CALC-SCNC: 12 MMOL/L — SIGNIFICANT CHANGE UP (ref 5–17)
ANISOCYTOSIS BLD QL: SLIGHT — SIGNIFICANT CHANGE UP
BASOPHILS # BLD AUTO: 0 K/UL — SIGNIFICANT CHANGE UP (ref 0–0.2)
BASOPHILS NFR BLD AUTO: 0 % — SIGNIFICANT CHANGE UP (ref 0–2)
BLD GP AB SCN SERPL QL: SIGNIFICANT CHANGE UP
BUN SERPL-MCNC: 20.5 MG/DL — HIGH (ref 8–20)
BUN SERPL-MCNC: 20.8 MG/DL — HIGH (ref 8–20)
BUN SERPL-MCNC: 21.1 MG/DL — HIGH (ref 8–20)
CALCIUM SERPL-MCNC: 8.1 MG/DL — LOW (ref 8.6–10.2)
CALCIUM SERPL-MCNC: 8.3 MG/DL — LOW (ref 8.6–10.2)
CALCIUM SERPL-MCNC: 8.6 MG/DL — SIGNIFICANT CHANGE UP (ref 8.6–10.2)
CHLORIDE SERPL-SCNC: 95 MMOL/L — LOW (ref 98–107)
CHLORIDE SERPL-SCNC: 96 MMOL/L — LOW (ref 98–107)
CHLORIDE SERPL-SCNC: 98 MMOL/L — SIGNIFICANT CHANGE UP (ref 98–107)
CO2 SERPL-SCNC: 24 MMOL/L — SIGNIFICANT CHANGE UP (ref 22–29)
CO2 SERPL-SCNC: 24 MMOL/L — SIGNIFICANT CHANGE UP (ref 22–29)
CO2 SERPL-SCNC: 25 MMOL/L — SIGNIFICANT CHANGE UP (ref 22–29)
CREAT SERPL-MCNC: 0.48 MG/DL — LOW (ref 0.5–1.3)
CREAT SERPL-MCNC: 0.53 MG/DL — SIGNIFICANT CHANGE UP (ref 0.5–1.3)
CREAT SERPL-MCNC: 0.54 MG/DL — SIGNIFICANT CHANGE UP (ref 0.5–1.3)
EGFR: 142 ML/MIN/1.73M2 — SIGNIFICANT CHANGE UP
EGFR: 143 ML/MIN/1.73M2 — SIGNIFICANT CHANGE UP
EGFR: 147 ML/MIN/1.73M2 — SIGNIFICANT CHANGE UP
EOSINOPHIL # BLD AUTO: 0.24 K/UL — SIGNIFICANT CHANGE UP (ref 0–0.5)
EOSINOPHIL NFR BLD AUTO: 3.5 % — SIGNIFICANT CHANGE UP (ref 0–6)
GAS PNL BLDA: SIGNIFICANT CHANGE UP
GIANT PLATELETS BLD QL SMEAR: PRESENT — SIGNIFICANT CHANGE UP
GLUCOSE BLDC GLUCOMTR-MCNC: 145 MG/DL — HIGH (ref 70–99)
GLUCOSE BLDC GLUCOMTR-MCNC: 150 MG/DL — HIGH (ref 70–99)
GLUCOSE BLDC GLUCOMTR-MCNC: 151 MG/DL — HIGH (ref 70–99)
GLUCOSE BLDC GLUCOMTR-MCNC: 87 MG/DL — SIGNIFICANT CHANGE UP (ref 70–99)
GLUCOSE FLD-MCNC: <2 MG/DL — SIGNIFICANT CHANGE UP
GLUCOSE SERPL-MCNC: 146 MG/DL — HIGH (ref 70–99)
GLUCOSE SERPL-MCNC: 150 MG/DL — HIGH (ref 70–99)
GLUCOSE SERPL-MCNC: 158 MG/DL — HIGH (ref 70–99)
HCT VFR BLD CALC: 25.5 % — LOW (ref 39–50)
HGB BLD-MCNC: 8.6 G/DL — LOW (ref 13–17)
LACTATE SERPL-SCNC: 0.6 MMOL/L — SIGNIFICANT CHANGE UP (ref 0.5–2)
LYMPHOCYTES # BLD AUTO: 0.84 K/UL — LOW (ref 1–3.3)
LYMPHOCYTES # BLD AUTO: 12.2 % — LOW (ref 13–44)
MACROCYTES BLD QL: SLIGHT — SIGNIFICANT CHANGE UP
MAGNESIUM SERPL-MCNC: 2.1 MG/DL — SIGNIFICANT CHANGE UP (ref 1.8–2.6)
MANUAL SMEAR VERIFICATION: SIGNIFICANT CHANGE UP
MCHC RBC-ENTMCNC: 30.4 PG — SIGNIFICANT CHANGE UP (ref 27–34)
MCHC RBC-ENTMCNC: 33.7 GM/DL — SIGNIFICANT CHANGE UP (ref 32–36)
MCV RBC AUTO: 90.1 FL — SIGNIFICANT CHANGE UP (ref 80–100)
MICROCYTES BLD QL: SLIGHT — SIGNIFICANT CHANGE UP
MONOCYTES # BLD AUTO: 1.38 K/UL — HIGH (ref 0–0.9)
MONOCYTES NFR BLD AUTO: 20 % — HIGH (ref 2–14)
NEUTROPHILS # BLD AUTO: 4.44 K/UL — SIGNIFICANT CHANGE UP (ref 1.8–7.4)
NEUTROPHILS NFR BLD AUTO: 64.3 % — SIGNIFICANT CHANGE UP (ref 43–77)
PHOSPHATE SERPL-MCNC: 3.8 MG/DL — SIGNIFICANT CHANGE UP (ref 2.4–4.7)
PLAT MORPH BLD: NORMAL — SIGNIFICANT CHANGE UP
PLATELET # BLD AUTO: 235 K/UL — SIGNIFICANT CHANGE UP (ref 150–400)
POLYCHROMASIA BLD QL SMEAR: SLIGHT — SIGNIFICANT CHANGE UP
POTASSIUM SERPL-MCNC: 4.2 MMOL/L — SIGNIFICANT CHANGE UP (ref 3.5–5.3)
POTASSIUM SERPL-MCNC: 4.2 MMOL/L — SIGNIFICANT CHANGE UP (ref 3.5–5.3)
POTASSIUM SERPL-MCNC: 4.5 MMOL/L — SIGNIFICANT CHANGE UP (ref 3.5–5.3)
POTASSIUM SERPL-SCNC: 4.2 MMOL/L — SIGNIFICANT CHANGE UP (ref 3.5–5.3)
POTASSIUM SERPL-SCNC: 4.2 MMOL/L — SIGNIFICANT CHANGE UP (ref 3.5–5.3)
POTASSIUM SERPL-SCNC: 4.5 MMOL/L — SIGNIFICANT CHANGE UP (ref 3.5–5.3)
PROCALCITONIN SERPL-MCNC: 0.41 NG/ML — HIGH (ref 0.02–0.1)
RBC # BLD: 2.83 M/UL — LOW (ref 4.2–5.8)
RBC # FLD: 13 % — SIGNIFICANT CHANGE UP (ref 10.3–14.5)
RBC BLD AUTO: ABNORMAL
SODIUM SERPL-SCNC: 130 MMOL/L — LOW (ref 135–145)
SODIUM SERPL-SCNC: 131 MMOL/L — LOW (ref 135–145)
SODIUM SERPL-SCNC: 134 MMOL/L — LOW (ref 135–145)
WBC # BLD: 6.91 K/UL — SIGNIFICANT CHANGE UP (ref 3.8–10.5)
WBC # FLD AUTO: 6.91 K/UL — SIGNIFICANT CHANGE UP (ref 3.8–10.5)

## 2022-05-30 PROCEDURE — 74018 RADEX ABDOMEN 1 VIEW: CPT | Mod: 26

## 2022-05-30 PROCEDURE — 99291 CRITICAL CARE FIRST HOUR: CPT

## 2022-05-30 PROCEDURE — 70450 CT HEAD/BRAIN W/O DYE: CPT | Mod: 26

## 2022-05-30 RX ORDER — PANTOPRAZOLE SODIUM 20 MG/1
40 TABLET, DELAYED RELEASE ORAL EVERY 12 HOURS
Refills: 0 | Status: DISCONTINUED | OUTPATIENT
Start: 2022-05-30 | End: 2022-06-01

## 2022-05-30 RX ORDER — ACETAMINOPHEN 500 MG
1000 TABLET ORAL ONCE
Refills: 0 | Status: COMPLETED | OUTPATIENT
Start: 2022-05-30 | End: 2022-05-30

## 2022-05-30 RX ORDER — LABETALOL HCL 100 MG
5 TABLET ORAL ONCE
Refills: 0 | Status: COMPLETED | OUTPATIENT
Start: 2022-05-30 | End: 2022-05-30

## 2022-05-30 RX ORDER — PIPERACILLIN AND TAZOBACTAM 4; .5 G/20ML; G/20ML
3.38 INJECTION, POWDER, LYOPHILIZED, FOR SOLUTION INTRAVENOUS ONCE
Refills: 0 | Status: COMPLETED | OUTPATIENT
Start: 2022-05-30 | End: 2022-05-30

## 2022-05-30 RX ORDER — SODIUM CHLORIDE 5 G/100ML
1000 INJECTION, SOLUTION INTRAVENOUS
Refills: 0 | Status: DISCONTINUED | OUTPATIENT
Start: 2022-05-30 | End: 2022-06-01

## 2022-05-30 RX ORDER — PIPERACILLIN AND TAZOBACTAM 4; .5 G/20ML; G/20ML
3.38 INJECTION, POWDER, LYOPHILIZED, FOR SOLUTION INTRAVENOUS EVERY 8 HOURS
Refills: 0 | Status: COMPLETED | OUTPATIENT
Start: 2022-05-30 | End: 2022-06-05

## 2022-05-30 RX ORDER — SODIUM CHLORIDE 0.65 %
1 AEROSOL, SPRAY (ML) NASAL
Refills: 0 | Status: DISCONTINUED | OUTPATIENT
Start: 2022-05-30 | End: 2022-06-21

## 2022-05-30 RX ORDER — MAGNESIUM SULFATE 500 MG/ML
4 VIAL (ML) INJECTION ONCE
Refills: 0 | Status: COMPLETED | OUTPATIENT
Start: 2022-05-30 | End: 2022-05-30

## 2022-05-30 RX ADMIN — CHLORHEXIDINE GLUCONATE 15 MILLILITER(S): 213 SOLUTION TOPICAL at 17:21

## 2022-05-30 RX ADMIN — Medication 1 SPRAY(S): at 11:31

## 2022-05-30 RX ADMIN — LEVETIRACETAM 400 MILLIGRAM(S): 250 TABLET, FILM COATED ORAL at 17:21

## 2022-05-30 RX ADMIN — Medication 25 GRAM(S): at 17:57

## 2022-05-30 RX ADMIN — ENOXAPARIN SODIUM 40 MILLIGRAM(S): 100 INJECTION SUBCUTANEOUS at 12:13

## 2022-05-30 RX ADMIN — Medication 1 TABLET(S): at 11:31

## 2022-05-30 RX ADMIN — Medication 400 MILLIGRAM(S): at 15:53

## 2022-05-30 RX ADMIN — LEVETIRACETAM 400 MILLIGRAM(S): 250 TABLET, FILM COATED ORAL at 05:43

## 2022-05-30 RX ADMIN — Medication 5 MILLIGRAM(S): at 10:22

## 2022-05-30 RX ADMIN — SENNA PLUS 10 MILLILITER(S): 8.6 TABLET ORAL at 21:22

## 2022-05-30 RX ADMIN — Medication 975 MILLIGRAM(S): at 20:42

## 2022-05-30 RX ADMIN — Medication 25 MILLIGRAM(S): at 21:16

## 2022-05-30 RX ADMIN — Medication 1 SPRAY(S): at 17:22

## 2022-05-30 RX ADMIN — PANTOPRAZOLE SODIUM 40 MILLIGRAM(S): 20 TABLET, DELAYED RELEASE ORAL at 18:11

## 2022-05-30 RX ADMIN — Medication 105 MILLIGRAM(S): at 06:49

## 2022-05-30 RX ADMIN — Medication 25 MILLIGRAM(S): at 05:40

## 2022-05-30 RX ADMIN — PIPERACILLIN AND TAZOBACTAM 25 GRAM(S): 4; .5 INJECTION, POWDER, LYOPHILIZED, FOR SOLUTION INTRAVENOUS at 22:12

## 2022-05-30 RX ADMIN — Medication 25 MILLIGRAM(S): at 14:05

## 2022-05-30 RX ADMIN — POLYETHYLENE GLYCOL 3350 17 GRAM(S): 17 POWDER, FOR SOLUTION ORAL at 11:31

## 2022-05-30 RX ADMIN — HYDROMORPHONE HYDROCHLORIDE 0.5 MILLIGRAM(S): 2 INJECTION INTRAMUSCULAR; INTRAVENOUS; SUBCUTANEOUS at 01:04

## 2022-05-30 RX ADMIN — PIPERACILLIN AND TAZOBACTAM 200 GRAM(S): 4; .5 INJECTION, POWDER, LYOPHILIZED, FOR SOLUTION INTRAVENOUS at 15:53

## 2022-05-30 RX ADMIN — SODIUM CHLORIDE 50 MILLILITER(S): 5 INJECTION, SOLUTION INTRAVENOUS at 18:07

## 2022-05-30 RX ADMIN — HYDROMORPHONE HYDROCHLORIDE 0.5 MILLIGRAM(S): 2 INJECTION INTRAMUSCULAR; INTRAVENOUS; SUBCUTANEOUS at 01:30

## 2022-05-30 RX ADMIN — Medication 975 MILLIGRAM(S): at 06:27

## 2022-05-30 RX ADMIN — CHLORHEXIDINE GLUCONATE 1 APPLICATION(S): 213 SOLUTION TOPICAL at 12:23

## 2022-05-30 RX ADMIN — CHLORHEXIDINE GLUCONATE 15 MILLILITER(S): 213 SOLUTION TOPICAL at 05:39

## 2022-05-30 RX ADMIN — Medication 1 MILLIGRAM(S): at 11:31

## 2022-05-30 RX ADMIN — Medication 1: at 23:04

## 2022-05-30 RX ADMIN — Medication 975 MILLIGRAM(S): at 07:00

## 2022-05-30 RX ADMIN — Medication 975 MILLIGRAM(S): at 21:04

## 2022-05-30 RX ADMIN — Medication 10 MILLIGRAM(S): at 17:57

## 2022-05-30 NOTE — PROGRESS NOTE ADULT - SUBJECTIVE AND OBJECTIVE BOX
INTERVAL HPI/OVERNIGHT EVENTS:    Repeat Head Stable.  Pt passed SBT off Prop and Fentanyl, moving Bilateral LE freely, withdraws RUE>LUE.  Pt’s sedation restarted for HTN with resolve.  Librium added for h/o withdrawal.  Episode of anterior epistaxis, self-resolved.  Pt with Pt remains tremulous with hypertension, concerning for ETOH withdrawal based on history of heavy ETOH misuse and prior withdrawal.  Ativan IVP given with improved agitation and BP.  Propofol titrated up and pt with improved sedation.  Librium taper in progress.      MEDICATIONS  (STANDING):  chlordiazePOXIDE 25 milliGRAM(s) Oral every 8 hours  chlordiazePOXIDE 25 milliGRAM(s) Oral every 12 hours  chlorhexidine 0.12% Liquid 15 milliLiter(s) Oral Mucosa every 12 hours  chlorhexidine 2% Cloths 1 Application(s) Topical daily  diphtheria/tetanus/pertussis (acellular) Vaccine (ADAcel) 0.5 milliLiter(s) IntraMuscular once  enoxaparin Injectable 40 milliGRAM(s) SubCutaneous every 24 hours  fentaNYL   Infusion 0.5 MICROgram(s)/kG/Hr (3.5 mL/Hr) IV Continuous <Continuous>  folic acid 1 milliGRAM(s) Oral daily  insulin lispro (ADMELOG) corrective regimen sliding scale   SubCutaneous every 6 hours  levETIRAcetam  IVPB 1000 milliGRAM(s) IV Intermittent every 12 hours  melatonin 3 milliGRAM(s) Oral at bedtime  multivitamin 1 Tablet(s) Oral daily  polyethylene glycol 3350 17 Gram(s) Oral daily  propofol Infusion 20 MICROgram(s)/kG/Min (8.4 mL/Hr) IV Continuous <Continuous>  propranolol 20 milliGRAM(s) Oral every 6 hours  senna Syrup 10 milliLiter(s) Oral at bedtime  thiamine IVPB 500 milliGRAM(s) IV Intermittent every 8 hours    MEDICATIONS  (PRN):  acetaminophen     Tablet .. 975 milliGRAM(s) Oral every 6 hours PRN Temp greater or equal to 38C (100.4F)  HYDROmorphone  Injectable 0.5 milliGRAM(s) IV Push every 3 hours PRN breakthrough aggitation  sodium chloride 0.9% lock flush 10 milliLiter(s) IV Push every 1 hour PRN Pre/post blood products, medications, blood draw, and to maintain line patency      Drug Dosing Weight  Height (cm): 170.2 (24 May 2022 13:00)  Weight (kg): 70 (24 May 2022 13:00)  BMI (kg/m2): 24.2 (24 May 2022 13:00)  BSA (m2): 1.81 (24 May 2022 13:00)      PAST MEDICAL & SURGICAL HISTORY:      ICU Vital Signs Last 24 Hrs  T(C): 37 (30 May 2022 01:00), Max: 38.1 (29 May 2022 05:00)  T(F): 98.6 (30 May 2022 01:00), Max: 100.6 (29 May 2022 05:00)  HR: 86 (30 May 2022 01:00) (63 - 102)  BP: 146/103 (30 May 2022 01:00) (118/81 - 194/110)  BP(mean): 114 (30 May 2022 01:00) (90 - 142)  ABP: --  ABP(mean): --  RR: 11 (30 May 2022 01:00) (10 - 16)  SpO2: 100% (30 May 2022 01:00) (99% - 100%)      ABG - ( 29 May 2022 04:14 )  pH, Arterial: 7.480 pH, Blood: x     /  pCO2: 39    /  pO2: 138   / HCO3: 29    / Base Excess: 5.5   /  SaO2: 99.7                I&O's Detail    28 May 2022 07:01  -  29 May 2022 07:00  --------------------------------------------------------  IN:    Enteral Tube Flush: 170 mL    FentaNYL: 147 mL    IV PiggyBack: 100 mL    IV PiggyBack: 50 mL    IV PiggyBack: 200 mL    IV PiggyBack: 200 mL    Pivot 1.5: 1200 mL    Propofol: 8.4 mL    Propofol: 92.4 mL  Total IN: 2167.8 mL    OUT:    Indwelling Catheter - Urethral (mL): 1900 mL  Total OUT: 1900 mL    Total NET: 267.8 mL      29 May 2022 07:01  -  30 May 2022 01:21  --------------------------------------------------------  IN:    Enteral Tube Flush: 460 mL    FentaNYL: 126 mL    IV PiggyBack: 100 mL    IV PiggyBack: 200 mL    Pivot 1.5: 900 mL    Propofol: 241.5 mL  Total IN: 2027.5 mL    OUT:    Indwelling Catheter - Urethral (mL): 1465 mL  Total OUT: 1465 mL    Total NET: 562.5 mL          Mode: AC/ CMV (Assist Control/ Continuous Mandatory Ventilation)  RR (machine): 12  TV (machine): 400  FiO2: 30  PEEP: 5  MAP: 9  PIP: 27      Gen: NAD, Ill appearing on vent. No cyanosis, Pallor.    Eyes: PERRL ~ 3mm, EOMI    Neurological:  GCS 1/1T/5.  Pt is tremulous.  Attempts to localize with LUE. RUE withdraws to painful stimuli.  Bilateral LE with spontaneous movement and withdraws to painful stimuli.  Right temporal Flap soft but full appearing.    ENMT: Clear     Neck: Supple. NT AT, FROM no pain.  No JVD. No meningeal signs    Pulmonary: NAD, CTA, = BL .      Cardiovascular: RRR, S1, S2, No Murmurs, rubs or gallops noted.    Gastrointestinal: ND, Soft, NT.    Extremities: NT, AT, no edema, erythema or palpable cord noted.  FROM, = 2+ pulses throughout.      LABS:  CBC Full  -  ( 29 May 2022 03:50 )  WBC Count : 6.09 K/uL  RBC Count : 2.92 M/uL  Hemoglobin : 9.1 g/dL  Hematocrit : 27.0 %  Platelet Count - Automated : 206 K/uL  Mean Cell Volume : 92.5 fl  Mean Cell Hemoglobin : 31.2 pg  Mean Cell Hemoglobin Concentration : 33.7 gm/dL  Auto Neutrophil # : 3.87 K/uL  Auto Lymphocyte # : 0.82 K/uL  Auto Monocyte # : 1.02 K/uL  Auto Eosinophil # : 0.31 K/uL  Auto Basophil # : 0.02 K/uL  Auto Neutrophil % : 63.6 %  Auto Lymphocyte % : 13.5 %  Auto Monocyte % : 16.7 %  Auto Eosinophil % : 5.1 %  Auto Basophil % : 0.3 %    05-29    135  |  98  |  19.2  ----------------------------<  158<H>  3.3<L>   |  27.0  |  0.59    Ca    8.1<L>      29 May 2022 03:50  Phos  3.4     05-29  Mg     2.4     05-29
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
## 2022-05-31 ENCOUNTER — TRANSCRIPTION ENCOUNTER (OUTPATIENT)
Age: 42
End: 2022-05-31

## 2022-05-31 LAB
ALBUMIN SERPL ELPH-MCNC: 2.8 G/DL — LOW (ref 3.3–5.2)
ALP SERPL-CCNC: 61 U/L — SIGNIFICANT CHANGE UP (ref 40–120)
ALT FLD-CCNC: 27 U/L — SIGNIFICANT CHANGE UP
ANION GAP SERPL CALC-SCNC: 12 MMOL/L — SIGNIFICANT CHANGE UP (ref 5–17)
ANION GAP SERPL CALC-SCNC: 13 MMOL/L — SIGNIFICANT CHANGE UP (ref 5–17)
AST SERPL-CCNC: 20 U/L — SIGNIFICANT CHANGE UP
BASOPHILS # BLD AUTO: 0.02 K/UL — SIGNIFICANT CHANGE UP (ref 0–0.2)
BASOPHILS NFR BLD AUTO: 0.3 % — SIGNIFICANT CHANGE UP (ref 0–2)
BILIRUB SERPL-MCNC: 0.6 MG/DL — SIGNIFICANT CHANGE UP (ref 0.4–2)
BUN SERPL-MCNC: 18.4 MG/DL — SIGNIFICANT CHANGE UP (ref 8–20)
BUN SERPL-MCNC: 19.4 MG/DL — SIGNIFICANT CHANGE UP (ref 8–20)
CALCIUM SERPL-MCNC: 8.1 MG/DL — LOW (ref 8.6–10.2)
CALCIUM SERPL-MCNC: 8.8 MG/DL — SIGNIFICANT CHANGE UP (ref 8.6–10.2)
CHLORIDE SERPL-SCNC: 97 MMOL/L — LOW (ref 98–107)
CHLORIDE SERPL-SCNC: 98 MMOL/L — SIGNIFICANT CHANGE UP (ref 98–107)
CO2 SERPL-SCNC: 22 MMOL/L — SIGNIFICANT CHANGE UP (ref 22–29)
CO2 SERPL-SCNC: 24 MMOL/L — SIGNIFICANT CHANGE UP (ref 22–29)
CREAT SERPL-MCNC: 0.54 MG/DL — SIGNIFICANT CHANGE UP (ref 0.5–1.3)
CREAT SERPL-MCNC: 0.63 MG/DL — SIGNIFICANT CHANGE UP (ref 0.5–1.3)
EGFR: 135 ML/MIN/1.73M2 — SIGNIFICANT CHANGE UP
EGFR: 142 ML/MIN/1.73M2 — SIGNIFICANT CHANGE UP
EOSINOPHIL # BLD AUTO: 0.1 K/UL — SIGNIFICANT CHANGE UP (ref 0–0.5)
EOSINOPHIL NFR BLD AUTO: 1.3 % — SIGNIFICANT CHANGE UP (ref 0–6)
GAS PNL BLDA: SIGNIFICANT CHANGE UP
GLUCOSE BLDC GLUCOMTR-MCNC: 103 MG/DL — HIGH (ref 70–99)
GLUCOSE BLDC GLUCOMTR-MCNC: 107 MG/DL — HIGH (ref 70–99)
GLUCOSE BLDC GLUCOMTR-MCNC: 109 MG/DL — HIGH (ref 70–99)
GLUCOSE BLDC GLUCOMTR-MCNC: 134 MG/DL — HIGH (ref 70–99)
GLUCOSE SERPL-MCNC: 133 MG/DL — HIGH (ref 70–99)
GLUCOSE SERPL-MCNC: 134 MG/DL — HIGH (ref 70–99)
HCT VFR BLD CALC: 22.8 % — LOW (ref 39–50)
HCT VFR BLD CALC: 25.1 % — LOW (ref 39–50)
HGB BLD-MCNC: 7.5 G/DL — LOW (ref 13–17)
HGB BLD-MCNC: 8.5 G/DL — LOW (ref 13–17)
IMM GRANULOCYTES NFR BLD AUTO: 0.6 % — SIGNIFICANT CHANGE UP (ref 0–1.5)
LYMPHOCYTES # BLD AUTO: 1.02 K/UL — SIGNIFICANT CHANGE UP (ref 1–3.3)
LYMPHOCYTES # BLD AUTO: 13.1 % — SIGNIFICANT CHANGE UP (ref 13–44)
MAGNESIUM SERPL-MCNC: 2.5 MG/DL — SIGNIFICANT CHANGE UP (ref 1.8–2.6)
MCHC RBC-ENTMCNC: 29.4 PG — SIGNIFICANT CHANGE UP (ref 27–34)
MCHC RBC-ENTMCNC: 32.9 GM/DL — SIGNIFICANT CHANGE UP (ref 32–36)
MCV RBC AUTO: 89.4 FL — SIGNIFICANT CHANGE UP (ref 80–100)
MONOCYTES # BLD AUTO: 2.35 K/UL — HIGH (ref 0–0.9)
MONOCYTES NFR BLD AUTO: 30.1 % — HIGH (ref 2–14)
NEUTROPHILS # BLD AUTO: 4.26 K/UL — SIGNIFICANT CHANGE UP (ref 1.8–7.4)
NEUTROPHILS NFR BLD AUTO: 54.6 % — SIGNIFICANT CHANGE UP (ref 43–77)
OSMOLALITY SERPL: 288 MOSMOL/KG — SIGNIFICANT CHANGE UP (ref 275–300)
PHOSPHATE SERPL-MCNC: 3.7 MG/DL — SIGNIFICANT CHANGE UP (ref 2.4–4.7)
PLATELET # BLD AUTO: 293 K/UL — SIGNIFICANT CHANGE UP (ref 150–400)
POTASSIUM SERPL-MCNC: 4.3 MMOL/L — SIGNIFICANT CHANGE UP (ref 3.5–5.3)
POTASSIUM SERPL-MCNC: 4.4 MMOL/L — SIGNIFICANT CHANGE UP (ref 3.5–5.3)
POTASSIUM SERPL-SCNC: 4.3 MMOL/L — SIGNIFICANT CHANGE UP (ref 3.5–5.3)
POTASSIUM SERPL-SCNC: 4.4 MMOL/L — SIGNIFICANT CHANGE UP (ref 3.5–5.3)
PROT SERPL-MCNC: 6.3 G/DL — LOW (ref 6.6–8.7)
RBC # BLD: 2.55 M/UL — LOW (ref 4.2–5.8)
RBC # FLD: 13.2 % — SIGNIFICANT CHANGE UP (ref 10.3–14.5)
SARS-COV-2 RNA SPEC QL NAA+PROBE: SIGNIFICANT CHANGE UP
SODIUM SERPL-SCNC: 133 MMOL/L — LOW (ref 135–145)
SODIUM SERPL-SCNC: 133 MMOL/L — LOW (ref 135–145)
WBC # BLD: 7.8 K/UL — SIGNIFICANT CHANGE UP (ref 3.8–10.5)
WBC # FLD AUTO: 7.8 K/UL — SIGNIFICANT CHANGE UP (ref 3.8–10.5)

## 2022-05-31 PROCEDURE — 99233 SBSQ HOSP IP/OBS HIGH 50: CPT

## 2022-05-31 PROCEDURE — 99291 CRITICAL CARE FIRST HOUR: CPT

## 2022-05-31 RX ORDER — MEPERIDINE HYDROCHLORIDE 50 MG/ML
50 INJECTION INTRAMUSCULAR; INTRAVENOUS; SUBCUTANEOUS ONCE
Refills: 0 | Status: DISCONTINUED | OUTPATIENT
Start: 2022-05-31 | End: 2022-05-31

## 2022-05-31 RX ORDER — PROPOFOL 10 MG/ML
10 INJECTION, EMULSION INTRAVENOUS
Qty: 1000 | Refills: 0 | Status: DISCONTINUED | OUTPATIENT
Start: 2022-05-31 | End: 2022-06-02

## 2022-05-31 RX ORDER — SODIUM CHLORIDE 9 MG/ML
1 INJECTION INTRAMUSCULAR; INTRAVENOUS; SUBCUTANEOUS EVERY 8 HOURS
Refills: 0 | Status: DISCONTINUED | OUTPATIENT
Start: 2022-05-31 | End: 2022-06-01

## 2022-05-31 RX ORDER — THIAMINE MONONITRATE (VIT B1) 100 MG
100 TABLET ORAL DAILY
Refills: 0 | Status: DISCONTINUED | OUTPATIENT
Start: 2022-05-31 | End: 2022-06-01

## 2022-05-31 RX ORDER — HYDRALAZINE HCL 50 MG
10 TABLET ORAL EVERY 6 HOURS
Refills: 0 | Status: DISCONTINUED | OUTPATIENT
Start: 2022-05-31 | End: 2022-05-31

## 2022-05-31 RX ORDER — DOXAZOSIN MESYLATE 4 MG
2 TABLET ORAL AT BEDTIME
Refills: 0 | Status: DISCONTINUED | OUTPATIENT
Start: 2022-05-31 | End: 2022-06-01

## 2022-05-31 RX ORDER — HYDRALAZINE HCL 50 MG
20 TABLET ORAL EVERY 6 HOURS
Refills: 0 | Status: DISCONTINUED | OUTPATIENT
Start: 2022-05-31 | End: 2022-06-22

## 2022-05-31 RX ORDER — HYDRALAZINE HCL 50 MG
10 TABLET ORAL ONCE
Refills: 0 | Status: COMPLETED | OUTPATIENT
Start: 2022-05-31 | End: 2022-05-31

## 2022-05-31 RX ORDER — AMANTADINE HCL 100 MG
100 CAPSULE ORAL
Refills: 0 | Status: DISCONTINUED | OUTPATIENT
Start: 2022-05-31 | End: 2022-06-01

## 2022-05-31 RX ORDER — LANOLIN ALCOHOL/MO/W.PET/CERES
5 CREAM (GRAM) TOPICAL AT BEDTIME
Refills: 0 | Status: DISCONTINUED | OUTPATIENT
Start: 2022-05-31 | End: 2022-06-01

## 2022-05-31 RX ORDER — AMANTADINE HCL 100 MG
100 CAPSULE ORAL
Refills: 0 | Status: DISCONTINUED | OUTPATIENT
Start: 2022-05-31 | End: 2022-05-31

## 2022-05-31 RX ADMIN — MEPERIDINE HYDROCHLORIDE 50 MILLIGRAM(S): 50 INJECTION INTRAMUSCULAR; INTRAVENOUS; SUBCUTANEOUS at 23:04

## 2022-05-31 RX ADMIN — CHLORHEXIDINE GLUCONATE 15 MILLILITER(S): 213 SOLUTION TOPICAL at 18:02

## 2022-05-31 RX ADMIN — Medication 100 MILLIGRAM(S): at 13:49

## 2022-05-31 RX ADMIN — ENOXAPARIN SODIUM 40 MILLIGRAM(S): 100 INJECTION SUBCUTANEOUS at 11:39

## 2022-05-31 RX ADMIN — LEVETIRACETAM 400 MILLIGRAM(S): 250 TABLET, FILM COATED ORAL at 18:02

## 2022-05-31 RX ADMIN — PANTOPRAZOLE SODIUM 40 MILLIGRAM(S): 20 TABLET, DELAYED RELEASE ORAL at 18:02

## 2022-05-31 RX ADMIN — Medication 50 MILLIGRAM(S): at 23:09

## 2022-05-31 RX ADMIN — CHLORHEXIDINE GLUCONATE 1 APPLICATION(S): 213 SOLUTION TOPICAL at 11:40

## 2022-05-31 RX ADMIN — Medication 1 DROP(S): at 14:30

## 2022-05-31 RX ADMIN — Medication 3 MILLIGRAM(S): at 23:09

## 2022-05-31 RX ADMIN — SODIUM CHLORIDE 1 GRAM(S): 9 INJECTION INTRAMUSCULAR; INTRAVENOUS; SUBCUTANEOUS at 16:14

## 2022-05-31 RX ADMIN — Medication 25 MILLIGRAM(S): at 05:28

## 2022-05-31 RX ADMIN — PIPERACILLIN AND TAZOBACTAM 25 GRAM(S): 4; .5 INJECTION, POWDER, LYOPHILIZED, FOR SOLUTION INTRAVENOUS at 13:50

## 2022-05-31 RX ADMIN — POLYETHYLENE GLYCOL 3350 17 GRAM(S): 17 POWDER, FOR SOLUTION ORAL at 11:39

## 2022-05-31 RX ADMIN — PROPOFOL 8.4 MICROGRAM(S)/KG/MIN: 10 INJECTION, EMULSION INTRAVENOUS at 21:39

## 2022-05-31 RX ADMIN — Medication 975 MILLIGRAM(S): at 05:28

## 2022-05-31 RX ADMIN — Medication 20 MILLIGRAM(S): at 23:01

## 2022-05-31 RX ADMIN — PANTOPRAZOLE SODIUM 40 MILLIGRAM(S): 20 TABLET, DELAYED RELEASE ORAL at 05:56

## 2022-05-31 RX ADMIN — Medication 1 MILLIGRAM(S): at 11:39

## 2022-05-31 RX ADMIN — Medication 50 MILLIGRAM(S): at 13:49

## 2022-05-31 RX ADMIN — PIPERACILLIN AND TAZOBACTAM 25 GRAM(S): 4; .5 INJECTION, POWDER, LYOPHILIZED, FOR SOLUTION INTRAVENOUS at 05:28

## 2022-05-31 RX ADMIN — Medication 2 MILLIGRAM(S): at 23:09

## 2022-05-31 RX ADMIN — Medication 1 DROP(S): at 18:23

## 2022-05-31 RX ADMIN — Medication 1 SPRAY(S): at 18:02

## 2022-05-31 RX ADMIN — PIPERACILLIN AND TAZOBACTAM 25 GRAM(S): 4; .5 INJECTION, POWDER, LYOPHILIZED, FOR SOLUTION INTRAVENOUS at 23:08

## 2022-05-31 RX ADMIN — Medication 1 TABLET(S): at 11:39

## 2022-05-31 RX ADMIN — Medication 0: at 05:55

## 2022-05-31 RX ADMIN — SENNA PLUS 10 MILLILITER(S): 8.6 TABLET ORAL at 23:13

## 2022-05-31 RX ADMIN — Medication 10 MILLIGRAM(S): at 11:39

## 2022-05-31 RX ADMIN — CHLORHEXIDINE GLUCONATE 15 MILLILITER(S): 213 SOLUTION TOPICAL at 05:28

## 2022-05-31 RX ADMIN — SODIUM CHLORIDE 1 GRAM(S): 9 INJECTION INTRAMUSCULAR; INTRAVENOUS; SUBCUTANEOUS at 23:14

## 2022-05-31 RX ADMIN — LEVETIRACETAM 400 MILLIGRAM(S): 250 TABLET, FILM COATED ORAL at 05:29

## 2022-05-31 RX ADMIN — Medication 10 MILLIGRAM(S): at 20:52

## 2022-05-31 RX ADMIN — Medication 1 DROP(S): at 23:38

## 2022-05-31 RX ADMIN — Medication 1 SPRAY(S): at 05:30

## 2022-05-31 RX ADMIN — Medication 10 MILLIGRAM(S): at 21:39
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
## 2022-05-31 NOTE — PROGRESS NOTE ADULT - NS ATTEND AMEND GEN_ALL_CORE FT
NSGY Attg:    see above    patient seen and examined    agree with exam as above  flap full, soft  wound C/D/I    agree with plan as above
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
## 2022-06-01 ENCOUNTER — TRANSCRIPTION ENCOUNTER (OUTPATIENT)
Age: 42
End: 2022-06-01

## 2022-06-01 LAB
-  STREPTOCOCCUS SP. (NOT GRP A, B OR S PNEUMONIAE): SIGNIFICANT CHANGE UP
ANION GAP SERPL CALC-SCNC: 13 MMOL/L — SIGNIFICANT CHANGE UP (ref 5–17)
APTT BLD: 28.5 SEC — SIGNIFICANT CHANGE UP (ref 27.5–35.5)
BASOPHILS # BLD AUTO: 0 K/UL — SIGNIFICANT CHANGE UP (ref 0–0.2)
BASOPHILS NFR BLD AUTO: 0 % — SIGNIFICANT CHANGE UP (ref 0–2)
BLD GP AB SCN SERPL QL: SIGNIFICANT CHANGE UP
BUN SERPL-MCNC: 27.2 MG/DL — HIGH (ref 8–20)
CALCIUM SERPL-MCNC: 8.7 MG/DL — SIGNIFICANT CHANGE UP (ref 8.6–10.2)
CHLORIDE SERPL-SCNC: 99 MMOL/L — SIGNIFICANT CHANGE UP (ref 98–107)
CO2 SERPL-SCNC: 22 MMOL/L — SIGNIFICANT CHANGE UP (ref 22–29)
CREAT SERPL-MCNC: 0.61 MG/DL — SIGNIFICANT CHANGE UP (ref 0.5–1.3)
EGFR: 137 ML/MIN/1.73M2 — SIGNIFICANT CHANGE UP
EOSINOPHIL # BLD AUTO: 0.14 K/UL — SIGNIFICANT CHANGE UP (ref 0–0.5)
EOSINOPHIL NFR BLD AUTO: 1.7 % — SIGNIFICANT CHANGE UP (ref 0–6)
GAS PNL BLDA: SIGNIFICANT CHANGE UP
GIANT PLATELETS BLD QL SMEAR: PRESENT — SIGNIFICANT CHANGE UP
GLUCOSE BLDC GLUCOMTR-MCNC: 124 MG/DL — HIGH (ref 70–99)
GLUCOSE BLDC GLUCOMTR-MCNC: 139 MG/DL — HIGH (ref 70–99)
GLUCOSE BLDC GLUCOMTR-MCNC: 142 MG/DL — HIGH (ref 70–99)
GLUCOSE SERPL-MCNC: 146 MG/DL — HIGH (ref 70–99)
GRAM STN FLD: SIGNIFICANT CHANGE UP
HCT VFR BLD CALC: 22.8 % — LOW (ref 39–50)
HGB BLD-MCNC: 7.5 G/DL — LOW (ref 13–17)
INR BLD: 1.16 RATIO — SIGNIFICANT CHANGE UP (ref 0.88–1.16)
LYMPHOCYTES # BLD AUTO: 0.64 K/UL — LOW (ref 1–3.3)
LYMPHOCYTES # BLD AUTO: 7.8 % — LOW (ref 13–44)
MAGNESIUM SERPL-MCNC: 2.2 MG/DL — SIGNIFICANT CHANGE UP (ref 1.6–2.6)
MANUAL SMEAR VERIFICATION: SIGNIFICANT CHANGE UP
MCHC RBC-ENTMCNC: 29.9 PG — SIGNIFICANT CHANGE UP (ref 27–34)
MCHC RBC-ENTMCNC: 32.9 GM/DL — SIGNIFICANT CHANGE UP (ref 32–36)
MCV RBC AUTO: 90.8 FL — SIGNIFICANT CHANGE UP (ref 80–100)
METHOD TYPE: SIGNIFICANT CHANGE UP
MONOCYTES # BLD AUTO: 0.79 K/UL — SIGNIFICANT CHANGE UP (ref 0–0.9)
MONOCYTES NFR BLD AUTO: 9.6 % — SIGNIFICANT CHANGE UP (ref 2–14)
NEUTROPHILS # BLD AUTO: 6.57 K/UL — SIGNIFICANT CHANGE UP (ref 1.8–7.4)
NEUTROPHILS NFR BLD AUTO: 80 % — HIGH (ref 43–77)
ORGANISM # SPEC MICROSCOPIC CNT: SIGNIFICANT CHANGE UP
PHOSPHATE SERPL-MCNC: 4.7 MG/DL — SIGNIFICANT CHANGE UP (ref 2.4–4.7)
PLAT MORPH BLD: ABNORMAL
PLATELET # BLD AUTO: 332 K/UL — SIGNIFICANT CHANGE UP (ref 150–400)
POLYCHROMASIA BLD QL SMEAR: SLIGHT — SIGNIFICANT CHANGE UP
POTASSIUM SERPL-MCNC: 4.1 MMOL/L — SIGNIFICANT CHANGE UP (ref 3.5–5.3)
POTASSIUM SERPL-SCNC: 4.1 MMOL/L — SIGNIFICANT CHANGE UP (ref 3.5–5.3)
PROTHROM AB SERPL-ACNC: 13.5 SEC — HIGH (ref 10.5–13.4)
RBC # BLD: 2.51 M/UL — LOW (ref 4.2–5.8)
RBC # FLD: 13.2 % — SIGNIFICANT CHANGE UP (ref 10.3–14.5)
RBC BLD AUTO: ABNORMAL
SODIUM SERPL-SCNC: 134 MMOL/L — LOW (ref 135–145)
SPECIMEN SOURCE: SIGNIFICANT CHANGE UP
VARIANT LYMPHS # BLD: 0.9 % — SIGNIFICANT CHANGE UP (ref 0–6)
WBC # BLD: 8.21 K/UL — SIGNIFICANT CHANGE UP (ref 3.8–10.5)
WBC # FLD AUTO: 8.21 K/UL — SIGNIFICANT CHANGE UP (ref 3.8–10.5)

## 2022-06-01 PROCEDURE — 31600 PLANNED TRACHEOSTOMY: CPT

## 2022-06-01 PROCEDURE — 99232 SBSQ HOSP IP/OBS MODERATE 35: CPT

## 2022-06-01 PROCEDURE — 71045 X-RAY EXAM CHEST 1 VIEW: CPT | Mod: 26

## 2022-06-01 PROCEDURE — 99233 SBSQ HOSP IP/OBS HIGH 50: CPT

## 2022-06-01 PROCEDURE — 43246 EGD PLACE GASTROSTOMY TUBE: CPT

## 2022-06-01 RX ORDER — LANOLIN ALCOHOL/MO/W.PET/CERES
5 CREAM (GRAM) TOPICAL AT BEDTIME
Refills: 0 | Status: DISCONTINUED | OUTPATIENT
Start: 2022-06-01 | End: 2022-06-11

## 2022-06-01 RX ORDER — AMANTADINE HCL 100 MG
100 CAPSULE ORAL
Refills: 0 | Status: DISCONTINUED | OUTPATIENT
Start: 2022-06-01 | End: 2022-06-03

## 2022-06-01 RX ORDER — ACETAMINOPHEN 500 MG
975 TABLET ORAL EVERY 6 HOURS
Refills: 0 | Status: DISCONTINUED | OUTPATIENT
Start: 2022-06-01 | End: 2022-06-03

## 2022-06-01 RX ORDER — SODIUM CHLORIDE 9 MG/ML
1 INJECTION INTRAMUSCULAR; INTRAVENOUS; SUBCUTANEOUS EVERY 8 HOURS
Refills: 0 | Status: DISCONTINUED | OUTPATIENT
Start: 2022-06-01 | End: 2022-06-02

## 2022-06-01 RX ORDER — POLYETHYLENE GLYCOL 3350 17 G/17G
17 POWDER, FOR SOLUTION ORAL DAILY
Refills: 0 | Status: DISCONTINUED | OUTPATIENT
Start: 2022-06-01 | End: 2022-06-21

## 2022-06-01 RX ORDER — LANOLIN ALCOHOL/MO/W.PET/CERES
3 CREAM (GRAM) TOPICAL AT BEDTIME
Refills: 0 | Status: DISCONTINUED | OUTPATIENT
Start: 2022-06-01 | End: 2022-06-03

## 2022-06-01 RX ORDER — SENNA PLUS 8.6 MG/1
10 TABLET ORAL AT BEDTIME
Refills: 0 | Status: DISCONTINUED | OUTPATIENT
Start: 2022-06-01 | End: 2022-06-21

## 2022-06-01 RX ORDER — DOXAZOSIN MESYLATE 4 MG
2 TABLET ORAL AT BEDTIME
Refills: 0 | Status: DISCONTINUED | OUTPATIENT
Start: 2022-06-01 | End: 2022-06-29

## 2022-06-01 RX ORDER — THIAMINE MONONITRATE (VIT B1) 100 MG
100 TABLET ORAL DAILY
Refills: 0 | Status: DISCONTINUED | OUTPATIENT
Start: 2022-06-01 | End: 2022-06-29

## 2022-06-01 RX ORDER — FOLIC ACID 0.8 MG
1 TABLET ORAL DAILY
Refills: 0 | Status: DISCONTINUED | OUTPATIENT
Start: 2022-06-01 | End: 2022-06-29

## 2022-06-01 RX ORDER — VECURONIUM BROMIDE 20 MG/1
10 INJECTION, POWDER, FOR SOLUTION INTRAVENOUS ONCE
Refills: 0 | Status: COMPLETED | OUTPATIENT
Start: 2022-06-01 | End: 2022-06-01

## 2022-06-01 RX ORDER — FENTANYL CITRATE 50 UG/ML
100 INJECTION INTRAVENOUS ONCE
Refills: 0 | Status: DISCONTINUED | OUTPATIENT
Start: 2022-06-01 | End: 2022-06-01

## 2022-06-01 RX ADMIN — Medication 10 MILLIGRAM(S): at 11:50

## 2022-06-01 RX ADMIN — Medication 1 MILLIGRAM(S): at 11:48

## 2022-06-01 RX ADMIN — PANTOPRAZOLE SODIUM 40 MILLIGRAM(S): 20 TABLET, DELAYED RELEASE ORAL at 17:42

## 2022-06-01 RX ADMIN — SODIUM CHLORIDE 1 GRAM(S): 9 INJECTION INTRAMUSCULAR; INTRAVENOUS; SUBCUTANEOUS at 06:07

## 2022-06-01 RX ADMIN — Medication 50 MILLIGRAM(S): at 06:06

## 2022-06-01 RX ADMIN — POLYETHYLENE GLYCOL 3350 17 GRAM(S): 17 POWDER, FOR SOLUTION ORAL at 11:47

## 2022-06-01 RX ADMIN — Medication 1 DROP(S): at 17:47

## 2022-06-01 RX ADMIN — Medication 100 MILLIGRAM(S): at 11:47

## 2022-06-01 RX ADMIN — SODIUM CHLORIDE 1 GRAM(S): 9 INJECTION INTRAMUSCULAR; INTRAVENOUS; SUBCUTANEOUS at 13:10

## 2022-06-01 RX ADMIN — VECURONIUM BROMIDE 10 MILLIGRAM(S): 20 INJECTION, POWDER, FOR SOLUTION INTRAVENOUS at 14:58

## 2022-06-01 RX ADMIN — CHLORHEXIDINE GLUCONATE 15 MILLILITER(S): 213 SOLUTION TOPICAL at 06:06

## 2022-06-01 RX ADMIN — PIPERACILLIN AND TAZOBACTAM 25 GRAM(S): 4; .5 INJECTION, POWDER, LYOPHILIZED, FOR SOLUTION INTRAVENOUS at 13:11

## 2022-06-01 RX ADMIN — PROPOFOL 4.2 MICROGRAM(S)/KG/MIN: 10 INJECTION, EMULSION INTRAVENOUS at 04:07

## 2022-06-01 RX ADMIN — Medication 1 SPRAY(S): at 17:42

## 2022-06-01 RX ADMIN — FENTANYL CITRATE 100 MICROGRAM(S): 50 INJECTION INTRAVENOUS at 14:59

## 2022-06-01 RX ADMIN — CHLORHEXIDINE GLUCONATE 15 MILLILITER(S): 213 SOLUTION TOPICAL at 17:42

## 2022-06-01 RX ADMIN — LEVETIRACETAM 400 MILLIGRAM(S): 250 TABLET, FILM COATED ORAL at 06:01

## 2022-06-01 RX ADMIN — Medication 5 MILLIGRAM(S): at 00:26

## 2022-06-01 RX ADMIN — Medication 1 TABLET(S): at 11:48

## 2022-06-01 RX ADMIN — ENOXAPARIN SODIUM 40 MILLIGRAM(S): 100 INJECTION SUBCUTANEOUS at 11:48

## 2022-06-01 RX ADMIN — SODIUM CHLORIDE 50 MILLILITER(S): 5 INJECTION, SOLUTION INTRAVENOUS at 02:10

## 2022-06-01 RX ADMIN — Medication 100 MILLIGRAM(S): at 06:05

## 2022-06-01 RX ADMIN — PANTOPRAZOLE SODIUM 40 MILLIGRAM(S): 20 TABLET, DELAYED RELEASE ORAL at 06:06

## 2022-06-01 RX ADMIN — Medication 1 SPRAY(S): at 06:22

## 2022-06-01 RX ADMIN — Medication 1 DROP(S): at 11:49

## 2022-06-01 RX ADMIN — LEVETIRACETAM 400 MILLIGRAM(S): 250 TABLET, FILM COATED ORAL at 17:47

## 2022-06-01 RX ADMIN — MEPERIDINE HYDROCHLORIDE 50 MILLIGRAM(S): 50 INJECTION INTRAMUSCULAR; INTRAVENOUS; SUBCUTANEOUS at 00:24

## 2022-06-01 RX ADMIN — CHLORHEXIDINE GLUCONATE 1 APPLICATION(S): 213 SOLUTION TOPICAL at 11:48

## 2022-06-01 RX ADMIN — PIPERACILLIN AND TAZOBACTAM 25 GRAM(S): 4; .5 INJECTION, POWDER, LYOPHILIZED, FOR SOLUTION INTRAVENOUS at 06:22

## 2022-06-01 RX ADMIN — Medication 50 MILLIGRAM(S): at 13:09

## 2022-06-01 RX ADMIN — Medication 1 DROP(S): at 06:06

## 2022-06-01 RX ADMIN — FENTANYL CITRATE 100 MICROGRAM(S): 50 INJECTION INTRAVENOUS at 14:58

## 2022-06-01 NOTE — PROGRESS NOTE ADULT - NS ATTEND AMEND GEN_ALL_CORE FT
NSGY Attg:    see above    patient seen and examined    agree with exam as above  flap soft  wound C/D/I    agree with plan as above

## 2022-06-01 NOTE — CHART NOTE - NSCHARTNOTEFT_GEN_A_CORE
Pre-Bronchoscopy Tuberculosis Risk Screening Tool  To reduce the risk for airborne transmission of Mycobacterium tuberculosis, this assessment form must be completed prior to bronchoscopy procedures being performed outside of a negative pressure environment.    Procedure Date: 06-01-22 @ 21:27  Health Care Provider Name:  Fermin Lara PA-C  Reason for the Bronchoscopy: Visualization for perc-trach    Planned Location for the Procedure:  [ ] Emergency Department     [x ] Intensive Care Unit     [ ] Operating Room     Other: ___________________    Risk Assessment  I. I have personally evaluated this patient for Mycobacterium tuberculosis and I determined the following risk:  [ x ] Low risk for TB     [ ] Significant risk for TB    II. Additional Findings: None    III. Based on the Determined Risk for TB the following Action(s) are Suggested:  If there is a significant risk for tuberculosis infection, the following recommendations should be followed:            a. Perform the procedure in a negative pressure room, with N95 respirator.            b. If not feasible to move the patient or defer the procedure:                    i. Use a single-bedded room low traffic area to perform the bronchoscopy procedure.                   ii. Place a portable high-efficiency particulate air (HEPA) filter in the space prior to starting the procedure and keep closed.                       Refer to the INF.1132 titled “Tuberculosis Control Strategy Plan” for additional information.                  iii. All Health Care Providers within the procedure room shall wear an N95 respirator.            c. Documentation of the tuberculosis risk assessment should be included within the patient’s medical record.      Bronchoscopy Procedure Note:    Indication / History /Xray/CT Findings : respiratory failure after tbi, guidance for perc trach    Pre-op Dx:  respiratory failure    Operators: PAULINO Ballard MD    Findings:  Bronchoscope inserted through ETT. ETT noted to be in good position. Airway evaluation revealed Sharp Og. ETT tube pulled back under bronchscopic guidance and all steps of percutananeous tracheostomy directly visalized. Bronchoscope then advanced through tracheostomy again revealing sharp og.         Post Procedure CXR pending Pre-Bronchoscopy Tuberculosis Risk Screening Tool  To reduce the risk for airborne transmission of Mycobacterium tuberculosis, this assessment form must be completed prior to bronchoscopy procedures being performed outside of a negative pressure environment.    Procedure Date: 06-01-22 @ 15:30  Health Care Provider Name:  Fermin Lara PA-C  Reason for the Bronchoscopy: Visualization for perc-trach    Planned Location for the Procedure:  [ ] Emergency Department     [x ] Intensive Care Unit     [ ] Operating Room     Other: ___________________    Risk Assessment  I. I have personally evaluated this patient for Mycobacterium tuberculosis and I determined the following risk:  [ x ] Low risk for TB     [ ] Significant risk for TB    II. Additional Findings: None    III. Based on the Determined Risk for TB the following Action(s) are Suggested:  If there is a significant risk for tuberculosis infection, the following recommendations should be followed:            a. Perform the procedure in a negative pressure room, with N95 respirator.            b. If not feasible to move the patient or defer the procedure:                    i. Use a single-bedded room low traffic area to perform the bronchoscopy procedure.                   ii. Place a portable high-efficiency particulate air (HEPA) filter in the space prior to starting the procedure and keep closed.                       Refer to the INF.1132 titled “Tuberculosis Control Strategy Plan” for additional information.                  iii. All Health Care Providers within the procedure room shall wear an N95 respirator.            c. Documentation of the tuberculosis risk assessment should be included within the patient’s medical record.      Bronchoscopy Procedure Note:    Indication / History /Xray/CT Findings : respiratory failure after tbi, guidance for perc trach    Pre-op Dx:  respiratory failure    Operators: PAULINO Ballard MD    Findings:  Bronchoscope inserted through ETT. ETT noted to be in good position. Airway evaluation revealed Sharp Og. ETT tube pulled back under bronchscopic guidance and all steps of percutananeous tracheostomy directly visalized. Bronchoscope then advanced through tracheostomy again revealing sharp og.         Post Procedure CXR pending Complex Repair And Tissue Cultured Epidermal Autograft Text: The defect edges were debeveled with a #15 scalpel blade.  The primary defect was closed partially with a complex linear closure.  Given the location of the defect, shape of the defect and the proximity to free margins an tissue cultured epidermal autograft was deemed most appropriate to repair the remaining defect.  The graft was trimmed to fit the size of the remaining defect.  The graft was then placed in the primary defect, oriented appropriately, and sutured into place.
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
## 2022-06-01 NOTE — PROGRESS NOTE ADULT - NS ATTEND AMEND GEN_ALL_CORE FT
I have seen and examined the patient during SICU multidisciplinary rounds from 1767-2294 hrs.   Events noted.    Neuro: GCS E1V1M4 intermittent sedation for hypertension. on librium, amantadine  CV: Hd normal, perfusion is adequate  Pulm: Intubated, secretions no purulent, gas exchange adequate on PSV  GI: TEN  : Urine flow adequate, electrolytes OK, mild hyponatremia resolving    ID: Start on zosyn for sinusitis to compete 5 days  Heme: H/H stable on dvt chemoprophylaxes  Endo: Glycemia at target    Plan:  Severe TBI s/p right craniectomy.  Overall status and care discussed with mother, explained benefits of tracheostomy and PEG, consent obtained.  Plan for Tracheostomy  Peg today  Cont librium for etoh withdrawal.  TEN after PEG  DVT chemoprophylaxes  Trach collars trials after Tracheostomy  Zosyn 5 days for presumed sinusitis  total 21 days of keppra

## 2022-06-02 LAB
ANION GAP SERPL CALC-SCNC: 12 MMOL/L — SIGNIFICANT CHANGE UP (ref 5–17)
BASOPHILS # BLD AUTO: 0.03 K/UL — SIGNIFICANT CHANGE UP (ref 0–0.2)
BASOPHILS NFR BLD AUTO: 0.3 % — SIGNIFICANT CHANGE UP (ref 0–2)
BUN SERPL-MCNC: 19.6 MG/DL — SIGNIFICANT CHANGE UP (ref 8–20)
CALCIUM SERPL-MCNC: 8.8 MG/DL — SIGNIFICANT CHANGE UP (ref 8.6–10.2)
CHLORIDE SERPL-SCNC: 97 MMOL/L — LOW (ref 98–107)
CO2 SERPL-SCNC: 22 MMOL/L — SIGNIFICANT CHANGE UP (ref 22–29)
CREAT SERPL-MCNC: 0.43 MG/DL — LOW (ref 0.5–1.3)
CULTURE RESULTS: SIGNIFICANT CHANGE UP
EGFR: 152 ML/MIN/1.73M2 — SIGNIFICANT CHANGE UP
EOSINOPHIL # BLD AUTO: 0.13 K/UL — SIGNIFICANT CHANGE UP (ref 0–0.5)
EOSINOPHIL NFR BLD AUTO: 1.2 % — SIGNIFICANT CHANGE UP (ref 0–6)
GAS PNL BLDA: SIGNIFICANT CHANGE UP
GLUCOSE BLDC GLUCOMTR-MCNC: 127 MG/DL — HIGH (ref 70–99)
GLUCOSE BLDC GLUCOMTR-MCNC: 142 MG/DL — HIGH (ref 70–99)
GLUCOSE BLDC GLUCOMTR-MCNC: 95 MG/DL — SIGNIFICANT CHANGE UP (ref 70–99)
GLUCOSE SERPL-MCNC: 108 MG/DL — HIGH (ref 70–99)
HCT VFR BLD CALC: 24.6 % — LOW (ref 39–50)
HGB BLD-MCNC: 8.2 G/DL — LOW (ref 13–17)
IMM GRANULOCYTES NFR BLD AUTO: 0.4 % — SIGNIFICANT CHANGE UP (ref 0–1.5)
LYMPHOCYTES # BLD AUTO: 0.79 K/UL — LOW (ref 1–3.3)
LYMPHOCYTES # BLD AUTO: 7.6 % — LOW (ref 13–44)
MAGNESIUM SERPL-MCNC: 2 MG/DL — SIGNIFICANT CHANGE UP (ref 1.6–2.6)
MCHC RBC-ENTMCNC: 30 PG — SIGNIFICANT CHANGE UP (ref 27–34)
MCHC RBC-ENTMCNC: 33.3 GM/DL — SIGNIFICANT CHANGE UP (ref 32–36)
MCV RBC AUTO: 90.1 FL — SIGNIFICANT CHANGE UP (ref 80–100)
MONOCYTES # BLD AUTO: 1.32 K/UL — HIGH (ref 0–0.9)
MONOCYTES NFR BLD AUTO: 12.6 % — SIGNIFICANT CHANGE UP (ref 2–14)
NEUTROPHILS # BLD AUTO: 8.15 K/UL — HIGH (ref 1.8–7.4)
NEUTROPHILS NFR BLD AUTO: 77.9 % — HIGH (ref 43–77)
ORGANISM # SPEC MICROSCOPIC CNT: SIGNIFICANT CHANGE UP
PHOSPHATE SERPL-MCNC: 3.9 MG/DL — SIGNIFICANT CHANGE UP (ref 2.4–4.7)
PLATELET # BLD AUTO: 435 K/UL — HIGH (ref 150–400)
POTASSIUM SERPL-MCNC: 4.1 MMOL/L — SIGNIFICANT CHANGE UP (ref 3.5–5.3)
POTASSIUM SERPL-SCNC: 4.1 MMOL/L — SIGNIFICANT CHANGE UP (ref 3.5–5.3)
RBC # BLD: 2.73 M/UL — LOW (ref 4.2–5.8)
RBC # FLD: 13.1 % — SIGNIFICANT CHANGE UP (ref 10.3–14.5)
SODIUM SERPL-SCNC: 131 MMOL/L — LOW (ref 135–145)
WBC # BLD: 10.46 K/UL — SIGNIFICANT CHANGE UP (ref 3.8–10.5)
WBC # FLD AUTO: 10.46 K/UL — SIGNIFICANT CHANGE UP (ref 3.8–10.5)

## 2022-06-02 PROCEDURE — 99024 POSTOP FOLLOW-UP VISIT: CPT

## 2022-06-02 PROCEDURE — 99233 SBSQ HOSP IP/OBS HIGH 50: CPT | Mod: GC

## 2022-06-02 RX ORDER — SODIUM CHLORIDE 9 MG/ML
2 INJECTION INTRAMUSCULAR; INTRAVENOUS; SUBCUTANEOUS EVERY 8 HOURS
Refills: 0 | Status: COMPLETED | OUTPATIENT
Start: 2022-06-02 | End: 2022-06-08

## 2022-06-02 RX ADMIN — Medication 1 SPRAY(S): at 18:50

## 2022-06-02 RX ADMIN — Medication 25 MILLIGRAM(S): at 18:21

## 2022-06-02 RX ADMIN — Medication 1 DROP(S): at 18:52

## 2022-06-02 RX ADMIN — Medication 100 MILLIGRAM(S): at 12:58

## 2022-06-02 RX ADMIN — CHLORHEXIDINE GLUCONATE 15 MILLILITER(S): 213 SOLUTION TOPICAL at 05:24

## 2022-06-02 RX ADMIN — Medication 1 TABLET(S): at 12:58

## 2022-06-02 RX ADMIN — SODIUM CHLORIDE 2 GRAM(S): 9 INJECTION INTRAMUSCULAR; INTRAVENOUS; SUBCUTANEOUS at 22:59

## 2022-06-02 RX ADMIN — Medication 10 MILLIGRAM(S): at 12:58

## 2022-06-02 RX ADMIN — Medication 1 DROP(S): at 00:49

## 2022-06-02 RX ADMIN — Medication 975 MILLIGRAM(S): at 14:43

## 2022-06-02 RX ADMIN — PIPERACILLIN AND TAZOBACTAM 25 GRAM(S): 4; .5 INJECTION, POWDER, LYOPHILIZED, FOR SOLUTION INTRAVENOUS at 14:54

## 2022-06-02 RX ADMIN — Medication 2 MILLIGRAM(S): at 22:57

## 2022-06-02 RX ADMIN — Medication 975 MILLIGRAM(S): at 13:28

## 2022-06-02 RX ADMIN — SENNA PLUS 10 MILLILITER(S): 8.6 TABLET ORAL at 22:57

## 2022-06-02 RX ADMIN — HYDROMORPHONE HYDROCHLORIDE 0.5 MILLIGRAM(S): 2 INJECTION INTRAMUSCULAR; INTRAVENOUS; SUBCUTANEOUS at 18:24

## 2022-06-02 RX ADMIN — Medication 100 MILLIGRAM(S): at 05:24

## 2022-06-02 RX ADMIN — CHLORHEXIDINE GLUCONATE 15 MILLILITER(S): 213 SOLUTION TOPICAL at 18:24

## 2022-06-02 RX ADMIN — SODIUM CHLORIDE 2 GRAM(S): 9 INJECTION INTRAMUSCULAR; INTRAVENOUS; SUBCUTANEOUS at 15:48

## 2022-06-02 RX ADMIN — Medication 100 MILLIGRAM(S): at 12:57

## 2022-06-02 RX ADMIN — Medication 50 MILLIGRAM(S): at 05:23

## 2022-06-02 RX ADMIN — CHLORHEXIDINE GLUCONATE 1 APPLICATION(S): 213 SOLUTION TOPICAL at 12:59

## 2022-06-02 RX ADMIN — HYDROMORPHONE HYDROCHLORIDE 0.5 MILLIGRAM(S): 2 INJECTION INTRAMUSCULAR; INTRAVENOUS; SUBCUTANEOUS at 18:53

## 2022-06-02 RX ADMIN — Medication 1 DROP(S): at 12:58

## 2022-06-02 RX ADMIN — Medication 1 SPRAY(S): at 05:25

## 2022-06-02 RX ADMIN — Medication 1 MILLIGRAM(S): at 12:58

## 2022-06-02 RX ADMIN — Medication 1 DROP(S): at 05:25

## 2022-06-02 RX ADMIN — PIPERACILLIN AND TAZOBACTAM 25 GRAM(S): 4; .5 INJECTION, POWDER, LYOPHILIZED, FOR SOLUTION INTRAVENOUS at 00:48

## 2022-06-02 RX ADMIN — LEVETIRACETAM 400 MILLIGRAM(S): 250 TABLET, FILM COATED ORAL at 18:22

## 2022-06-02 RX ADMIN — Medication 5 MILLIGRAM(S): at 22:55

## 2022-06-02 RX ADMIN — PIPERACILLIN AND TAZOBACTAM 25 GRAM(S): 4; .5 INJECTION, POWDER, LYOPHILIZED, FOR SOLUTION INTRAVENOUS at 05:24

## 2022-06-02 RX ADMIN — ENOXAPARIN SODIUM 40 MILLIGRAM(S): 100 INJECTION SUBCUTANEOUS at 12:57

## 2022-06-02 RX ADMIN — SODIUM CHLORIDE 2 GRAM(S): 9 INJECTION INTRAMUSCULAR; INTRAVENOUS; SUBCUTANEOUS at 05:24

## 2022-06-02 RX ADMIN — Medication 20 MILLIGRAM(S): at 13:28

## 2022-06-02 RX ADMIN — PIPERACILLIN AND TAZOBACTAM 25 GRAM(S): 4; .5 INJECTION, POWDER, LYOPHILIZED, FOR SOLUTION INTRAVENOUS at 22:56

## 2022-06-02 RX ADMIN — LEVETIRACETAM 400 MILLIGRAM(S): 250 TABLET, FILM COATED ORAL at 05:23
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
## 2022-06-02 NOTE — PROGRESS NOTE ADULT - NS ATTEND AMEND GEN_ALL_CORE FT
I have seen and examined the patient during SICU multidisciplinary rounds from 3205-9375 hrs.   Events noted.    Neuro: E1V1M4  CV: HD normal  Pulm: Gas exchange adequate after trach /peg on PSV, cough duet to trach tubing.  GI: TEN, Peg site C/D/i  : Urine flow adequate, electrolytes ok   ID: Zosyn presumed sinusitis, thick upper and nasal secretions, non purulent trach secretion, strep constellatus on 1/2 bottles.  Heme: h/H stable on dvt chemoprophylaxes  Endo: Glycemia at target    Plan:  sTBI, resp failure presumed sinusitis + blood cultures, awaiting repeat blood cultures to decide end sate  Wean PSV with foal of trach colar.  NO need for sedations at this time, PRN analgesia  TEN  DVT chemoprophylaxes  D/C sylvester
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
## 2022-06-02 NOTE — PROGRESS NOTE ADULT - SUBJECTIVE AND OBJECTIVE BOX
24 HOUR EVENTS: Patient s/p tracheostomy and peg tube placement. Procedure uncomplicated.  Patient's neurologic exam unchanged, remains on propofol. Remains with secretions. Sating well, tolerating PSV.  Intermittent episodes of hypothermia, without leukocytosis. Tolerating tube feeds.     SUBJECTIVE/ROS:  [ ] A ten-point review of systems was otherwise negative except as noted.  [x ] Due to altered mental status/intubation, subjective information were not able to be obtained from the patient. History was obtained, to the extent possible, from review of the chart and collateral sources of information.      NEURO  RASS: 0- -2    GCS:  1/T/4    CAM ICU:  Exam: PERRL 3mm reactive, does not open eyes, withdrawing from pain all 4 extremities, RUE>LUE  Meds: acetaminophen     Tablet .. 975 milliGRAM(s) Oral every 6 hours PRN Temp greater or equal to 38C (100.4F)  amantadine Syrup 100 milliGRAM(s) Oral <User Schedule>  chlordiazePOXIDE 50 milliGRAM(s) Oral every 8 hours  HYDROmorphone  Injectable 0.5 milliGRAM(s) IV Push every 3 hours PRN breakthrough aggitation  levETIRAcetam  IVPB 1000 milliGRAM(s) IV Intermittent every 12 hours  melatonin 3 milliGRAM(s) Oral at bedtime  melatonin Liquid 5 milliGRAM(s) Oral at bedtime  propofol Infusion 10 MICROgram(s)/kG/Min IV Continuous <Continuous>    [x] Adequacy of sedation and pain control has been assessed and adjusted      RESPIRATORY  RR: 17 (06-02-22 @ 00:00) (12 - 36)  SpO2: 100% (06-01-22 @ 23:50) (68% - 100%)  Wt(kg): --  Exam: coarse bs b/l, thin secretions, trach in place   Mechanical Ventilation: Mode: AC/ CMV (Assist Control/ Continuous Mandatory Ventilation), RR (machine): 12, RR (patient): 28, TV (machine): 400, FiO2: 40, PEEP: 5, MAP: 9, PIP: 19  ABG - ( 01 Jun 2022 05:24 )  pH: 7.480 /  pCO2: 33    /  pO2: 184   / HCO3: 25    / Base Excess: 1.1   /  SaO2: 100.0   Lactate: x                [ ] Extubation Readiness Assessed  Meds:       CARDIOVASCULAR  HR: 89 (06-02-22 @ 00:00) (67 - 94)  BP: 155/101 (06-02-22 @ 00:00) (102/65 - 176/112)  BP(mean): 119 (06-02-22 @ 00:00) (73 - 130)  ABP: --  ABP(mean): --  Wt(kg): --  CVP(cm H2O): --      Exam: nsr   Cardiac Rhythm: nsr  Perfusion     [x ]Adequate   [ ]Inadequate  Mentation   [x ]Normal       [ ]Reduced  Extremities  [x ]Warm         [ ]Cool  Volume Status [ ]Hypervolemic [x ]Euvolemic [ ]Hypovolemic  Meds: doxazosin 2 milliGRAM(s) Oral at bedtime  hydrALAZINE Injectable 20 milliGRAM(s) IV Push every 6 hours PRN Diastolic > 100  propranolol 40 milliGRAM(s) Oral every 6 hours        GI/NUTRITION  Exam: soft, nttp, nd, peg tube in place without erythema or discharge   Diet: TF at goal   Meds: bisacodyl Suppository 10 milliGRAM(s) Rectal daily  polyethylene glycol 3350 17 Gram(s) Oral daily  senna Syrup 10 milliLiter(s) Oral at bedtime      GENITOURINARY  I&O's Detail    05-31 @ 07:01  -  06-01 @ 07:00  --------------------------------------------------------  IN:    IV PiggyBack: 50 mL    IV PiggyBack: 175 mL    IV PiggyBack: 100 mL    Pivot 1.5: 600 mL    Propofol: 100.8 mL    sodium chloride 2%: 1000 mL  Total IN: 2025.8 mL    OUT:    Indwelling Catheter - Urethral (mL): 1685 mL    Nasogastric/Oral tube (mL): 150 mL  Total OUT: 1835 mL    Total NET: 190.8 mL      06-01 @ 07:01  -  06-02 @ 00:56  --------------------------------------------------------  IN:    Enteral Tube Flush: 60 mL    IV PiggyBack: 50 mL    IV PiggyBack: 200 mL    Pivot 1.5: 150 mL    Propofol: 115.5 mL  Total IN: 575.5 mL    OUT:    Indwelling Catheter - Urethral (mL): 1340 mL    Nasogastric/Oral tube (mL): 120 mL  Total OUT: 1460 mL    Total NET: -884.5 mL          06-01    134<L>  |  99  |  27.2<H>  ----------------------------<  146<H>  4.1   |  22.0  |  0.61    Ca    8.7      01 Jun 2022 04:14  Phos  4.7     06-01  Mg     2.2     06-01    TPro  6.3<L>  /  Alb  2.8<L>  /  TBili  0.6  /  DBili  x   /  AST  20  /  ALT  27  /  AlkPhos  61  05-31    [x ] Howard catheter, indication: critically ill , strict i/o's   Meds: folic acid 1 milliGRAM(s) Oral daily  multivitamin 1 Tablet(s) Oral daily  sodium chloride 1 Gram(s) Oral every 8 hours  sodium chloride 0.9% lock flush 10 milliLiter(s) IV Push every 1 hour PRN Pre/post blood products, medications, blood draw, and to maintain line patency  thiamine 100 milliGRAM(s) Oral daily    Ext: 2+ peripheral pulses, compartments all soft    Skin: incision site without discharge     HEMATOLOGIC  Meds: enoxaparin Injectable 40 milliGRAM(s) SubCutaneous every 24 hours    [x] VTE Prophylaxis                        7.5    8.21  )-----------( 332      ( 01 Jun 2022 04:14 )             22.8     PT/INR - ( 01 Jun 2022 04:14 )   PT: 13.5 sec;   INR: 1.16 ratio         PTT - ( 01 Jun 2022 04:14 )  PTT:28.5 sec  Transfusion     [ ] PRBC   [ ] Platelets   [ ] FFP   [ ] Cryoprecipitate      INFECTIOUS DISEASES  T(C): 37.4 (06-02-22 @ 00:00), Max: 37.8 (06-01-22 @ 22:00)  Wt(kg): --  WBC Count: 8.21 K/uL (06-01 @ 04:14)    Recent Cultures:  Specimen Source: .Blood Blood-Peripheral, 05-30 @ 13:13; Results   No growth at 48 hours; Gram Stain:   Growth in aerobic bottle: Gram Positive Cocci in Pairs and Chains  .  ***Blood Panel PCR results on this specimen are available  approximately 3 hours after the Gram stain result.***  Gram stain, PCR, and/or culture results may not always  correspond due to difference in methodologies.  ************************************************************  This PCR assay was performed using [employer].  The following targets are tested for: Enterococcus,  vancomycin resistant enterococci, Listeria monocytogenes,  coagulase negative staphylococci, S. aureus,  methicillin resistant S. aureus, Streptococcus agalactiae  (Group B), S. pneumoniae, S. pyogenes (Group A),  Acinetobacter baumannii, Enterobacter cloacae, E. coli,  Klebsiella oxytoca, K. pneumoniae, Proteus sp.,  Serratia marcescens, Haemophilus influenzae,  Neisseria meningitidis, Pseudomonas aeruginosa, Candida  albicans, C. glabrata, C krusei, C parapsilosis,  C. tropicalis and the KPC resistance gene.  .  Gram Stain and BCID performed by:  [facility]  [address] [place] [id number]  .  TYPE: (C=Critical, N=Notification, A=Abnormal) C  TESTS:  _ GS  DATE/TIME CALLED: _ 06/01/2022 08:32:24  CALLED TO: John Wang RN  READ BACK (2 Patient Identifiers)(Y/N): _ Y  READ BACK VALUES (Y/N): _ Y  CALLED BY: _ lionin; Organism: Blood Culture PCR    Meds: piperacillin/tazobactam IVPB.. 3.375 Gram(s) IV Intermittent every 8 hours        ENDOCRINE  Capillary Blood Glucose    Meds: insulin lispro (ADMELOG) corrective regimen sliding scale   SubCutaneous every 6 hours        ACCESS DEVICES:  [ ] Peripheral IV  [ ] Central Venous Line	[ ] R	[ ] L	[ ] IJ	[ ] Fem	[ ] SC	Placed:   [ ] Arterial Line		[ ] R	[ ] L	[ ] Fem	[ ] Rad	[ ] Ax	Placed:   [ ] PICC:					[ ] Mediport  [ ] Urinary Catheter, Date Placed:   [ ] Necessity of urinary, arterial, and venous catheters discussed    OTHER MEDICATIONS:  artificial tears (preservative free) Ophthalmic Solution 1 Drop(s) Both EYES every 6 hours  chlorhexidine 0.12% Liquid 15 milliLiter(s) Oral Mucosa every 12 hours  chlorhexidine 2% Cloths 1 Application(s) Topical daily  sodium chloride 0.65% Nasal 1 Spray(s) Both Nostrils two times a day      CODE STATUS:     IMAGING:

## 2022-06-02 NOTE — PROGRESS NOTE ADULT - SUBJECTIVE AND OBJECTIVE BOX
Patient seen this morning. Eyes are closed, no blink to threat when held open. Not moving his extremities except posturing to painful stimulus.    REVIEW OF SYSTEMS  Constitutional - No fever,  + fatigue  Neurological - + memory loss, + loss of strength    FUNCTIONAL PROGRESS  Total A, plan for COMA STIM    VITALS  T(C): 37.4 (06-02-22 @ 09:00), Max: 37.8 (06-01-22 @ 22:00)  HR: 92 (06-02-22 @ 09:25) (67 - 94)  BP: 128/83 (06-02-22 @ 09:00) (109/71 - 176/112)  RR: 17 (06-02-22 @ 09:00) (12 - 46)  SpO2: 100% (06-02-22 @ 09:25) (68% - 100%)  Wt(kg): --    MEDICATIONS   acetaminophen     Tablet .. 975 milliGRAM(s) every 6 hours PRN  amantadine Syrup 100 milliGRAM(s) <User Schedule>  artificial tears (preservative free) Ophthalmic Solution 1 Drop(s) every 6 hours  bisacodyl Suppository 10 milliGRAM(s) daily  chlordiazePOXIDE 50 milliGRAM(s) every 8 hours  chlorhexidine 0.12% Liquid 15 milliLiter(s) every 12 hours  chlorhexidine 2% Cloths 1 Application(s) daily  doxazosin 2 milliGRAM(s) at bedtime  enoxaparin Injectable 40 milliGRAM(s) every 24 hours  folic acid 1 milliGRAM(s) daily  hydrALAZINE Injectable 20 milliGRAM(s) every 6 hours PRN  HYDROmorphone  Injectable 0.5 milliGRAM(s) every 3 hours PRN  insulin lispro (ADMELOG) corrective regimen sliding scale   every 6 hours  levETIRAcetam  IVPB 1000 milliGRAM(s) every 12 hours  melatonin 3 milliGRAM(s) at bedtime  melatonin Liquid 5 milliGRAM(s) at bedtime  multivitamin 1 Tablet(s) daily  piperacillin/tazobactam IVPB.. 3.375 Gram(s) every 8 hours  polyethylene glycol 3350 17 Gram(s) daily  propofol Infusion 10 MICROgram(s)/kG/Min <Continuous>  propranolol 40 milliGRAM(s) every 6 hours  senna Syrup 10 milliLiter(s) at bedtime  sodium chloride 2 Gram(s) every 8 hours  sodium chloride 0.65% Nasal 1 Spray(s) two times a day  sodium chloride 0.9% lock flush 10 milliLiter(s) every 1 hour PRN  thiamine 100 milliGRAM(s) daily      RECENT LABS/IMAGING                          8.2    10.46 )-----------( 435      ( 02 Jun 2022 03:50 )             24.6     06-02    131<L>  |  97<L>  |  19.6  ----------------------------<  108<H>  4.1   |  22.0  |  0.43<L>    Ca    8.8      02 Jun 2022 03:50  Phos  3.9     06-02  Mg     2.0     06-02      PT/INR - ( 01 Jun 2022 04:14 )   PT: 13.5 sec;   INR: 1.16 ratio         PTT - ( 01 Jun 2022 04:14 )  PTT:28.5 sec      CT BRAIN 5/24 - 1. Early entrapment of the posterior horn left lateral ventricle,  secondary to multicompartment intracranial hemorrhage. This is manifested by high right frontal and medial left temporal parenchymal hematomas, large right holohemispheric subdural hematoma, right parafalcine hemorrhage extending to the right tentorium, and right frontal  subarachnoid hemorrhage. Additional petechial hemorrhage suspected at the gray-white matter junction bilaterally.  2. 1.9 cm right to left subfalcine herniation and additional right uncal herniation with effacement of the ambient cistern.      CT CERVICAL SPINE 5/24 - 1. No acute fracture. 2. Hyperdensity in the anterior epidural space at C5-6 has the appearance   of a central disc protrusion with caudal subligamentous extension, trace epidural hemorrhage is technically difficult to exclude.      CT FACE 5/24 - 1. Extensive, comminuted right zygomaticomaxillary complex fracture,  detailed above. Injury to the right inferior rectus muscle suspected. At the time of this interpretation, this patient is intraoperative with  neurosurgery, message left for the covering PA with the OR   regarding these results.    CT CAP 5/24 - No evidence of active contrast extravasation, hemoperitoneum or retroperitoneal hemorrhage. Bibasilar atelectasis, left greater than right. Additional findings as above.     CXR 5/24 - Tubes remain. Lungs remain clear.    CT head 5/24 - Increased right scalp soft tissue swelling. Stable intracranial  hemorrhages and subdural hemorrhages. Stable subarachnoid hemorrhage.     CT C-spine 5/24 - Small amount of blood products circumferentially around the thecal sac at the C1 and C2 levels. No high-grade spinal canal stenosis or obvious cord compression is visualized by CT technique. MRI may be  obtained for further evaluation.    MRI BRAIN 5/26 - Right frontal craniectomy. Residual bilateral subdural hematomas and interhemispheric subdural hemorrhage. Right frontal, right frontal temporal and left temporal parenchymal hemorrhagic contusions with an foci of diffusion restriction suggestive of shear injury and diffuse axonal injury.     Cervical spine MRI 5/26 - No acute fractures or dislocations    EEG 5/26 - Abnormal EEG study.  1. Severe nonspecific diffuse or multifocal cerebral dysfunction.   2. No epileptiform pattern or seizure seen.    HEAD CT 5/30 - Unchanged hemorrhagic contusions within the RIGHT frontal and LEFT temporal lobes with edema and herniation of RIGHT frontal lobe through the craniectomy defect. Small BILATERAL subdural hematomas are also stable. With the layering over the tentorium.    Mild LEFT nasal septal deviation with osteophyte. The facial and skull base bones and calvarium demonstrate comminuted fractures of the RIGHT lateral orbit, RIGHT zygomatic arch and RIGHT maxillary sinus and RIGHT pterygoid plate unchanged.  ----------------------------------------------------------------------------------------  PHYSICAL EXAM  Constitutional - NAD, Appears Comfortable  HEENT - Right skull craniectomy defect with staples and edema  Chest - +Vent  Extremities - Diffuse swelling  Neurologic Exam -                 Coma Recovery Scale - Revised  AUDITORY FUNCTION SCALE  0 - None  VISUAL FUNCTION SCALE  0 - None  MOTOR FUNCTION SCALE  1 - Abnormal Posturing  OROMOTOR/VERBAL FUNCTION SCALE  0 - None  COMMUNICATION SCALE  0 - None  AROUSAL SCALE  0 - Unarousable  TOTAL SCORE = 1  Psychiatric - Calm   ----------------------------------------------------------------------------------------  ASSESSMENT/PLAN  25yMale with functional deficits after was found down an unknown multiple trauma    TEOFILO, Bilateral IPH, Bilateral SDH s/p craniectomy - Keppra, Propranolol, Amantadine 100mg BID (6/1), Melatonin 5mg (5/31)  Right zygomaticomaxillary complex fracture - Monitoring   EtOH Abuse - Librium, Folate, MVI   ID - Zosyn   HypoNa+ - NaCl  Sedation - Dilaudid, Fentanyl, Thiamine  Pain - Tylenol  Respiratory Failure s/p Trach - on vent  Dysphagia - PEG/TF  DVT PPX - SCDs, Lovenox  Rehab - Recommend starting COMA STIM for disorder of consciousness.     Will continue to follow. Rehab recommendations are dependent on how functional progress changes as well as how patient continues to participate and tolerate therapeutic interventions, which may change. Recommend ongoing mobilization by staff to maintain cardiopulmonary function and prevention of secondary complications related to debility. Discussed with rehab team.              upright (90 degrees)

## 2022-06-02 NOTE — PHYSICAL THERAPY INITIAL EVALUATION ADULT - GENERAL OBSERVATIONS, REHAB EVAL
Pt received on 3EST, pt ok for PT by NICK Tamez. pt's primary language is Ivorian, treating PT spoke Ivorian. pt observed semi-gentile in bed with +trach collar/vent, multiple IV lines, telemonitor with /BP cuff, PEG tube, cooling blanket, bilateral LE's PRAFOs, VCBs, nonverbal/non responsive to stimuli and in NAD. Pt received on 3EST, pt ok for PT/COMA STIM by NICK Tamez. pt's primary language is Sami, treating PT spoke Sami. pt observed semi-gentile in bed with +trach collar/vent, multiple IV lines, telemonitor with /BP cuff, PEG tube, cooling blanket, bilateral LE's PRAFOs, VCBs, nonverbal/non responsive to stimuli and in NAD.

## 2022-06-02 NOTE — PHYSICAL THERAPY INITIAL EVALUATION ADULT - PERTINENT HX OF CURRENT PROBLEM, REHAB EVAL
pt was BIBA as a code trauma A s/p found unresponsive with head injury. pt was found to have large right SDH, right frontal SAH and multiple facial fxs including right zygomatic arch, right maxillary sinus and right orbital wall. hospital course complicated 2/2 requiring mechanical intubation with eventual trach creation and surgical intervention for SDH evacuation. pt remains in ICU level of care.

## 2022-06-02 NOTE — PROGRESS NOTE ADULT - SUBJECTIVE AND OBJECTIVE BOX
HPI: Patient is a 25y old M brought by EMS, found down in the street unresponsive.     Primary Survey:    A - airway compromised, patient intubated in ED, RSI  B - bilateral breath sound after intubation  C - initial BP: 169/93 (05-24-22 @ 00:00)*** , HR: 107 (05-24-22 @ 00:44)*** , palpable pulses in all extremities  D - GCS 3 on arrival    Exposure obtained, no evident injuries    CXR: No evident PTX or Hemothorax, ET tube in place.  (24 May 2022 01:09)      INTERVAL HPI/OVERNIGHT EVENTS:  25y Male seen lying comfortably in bed, s/p trach/PEG placement yesterday. No change in neurologic status.     Vital Signs Last 24 Hrs  T(C): 35.7 (02 Jun 2022 07:26), Max: 37.8 (01 Jun 2022 22:00)  T(F): 96.3 (02 Jun 2022 07:26), Max: 100 (01 Jun 2022 22:00)  HR: 71 (02 Jun 2022 07:00) (67 - 94)  BP: 111/66 (02 Jun 2022 07:00) (109/71 - 176/112)  BP(mean): 81 (02 Jun 2022 07:00) (81 - 140)  RR: 13 (02 Jun 2022 07:00) (12 - 36)  SpO2: 100% (02 Jun 2022 07:00) (68% - 100%)    PHYSICAL EXAM:  GENERAL: NAD, well-groomed  HEAD: s/p R craniectomy, flap full but soft  WOUND: clean dry intact, open to air  GEORGES COMA SCORE: E1 V1T M4 =6T       E: 4= opens eyes spontaneously 3= to voice 2= to noxious 1= no opening       V: 5= oriented 4= confused 3= inappropriate words 2= incomprehensible sounds 1= nonverbal 1T= intubated       M: 6= follows commands 5= localizes 4= withdraws 3= flexor posturing 2= extensor posturing 1= no movement  MENTAL STATUS: Intubated, sedated, no EO, nonverbal, not following commands.   CRANIAL NERVES: PERRL. Face symmetric.   MOTOR: WD b/l upper and lower extremities to noxious stimuli    LABS:                        8.2    10.46 )-----------( 435      ( 02 Jun 2022 03:50 )             24.6     06-02    131<L>  |  97<L>  |  19.6  ----------------------------<  108<H>  4.1   |  22.0  |  0.43<L>    Ca    8.8      02 Jun 2022 03:50  Phos  3.9     06-02  Mg     2.0     06-02      PT/INR - ( 01 Jun 2022 04:14 )   PT: 13.5 sec;   INR: 1.16 ratio         PTT - ( 01 Jun 2022 04:14 )  PTT:28.5 sec      06-01 @ 07:01  -  06-02 @ 07:00  --------------------------------------------------------  IN: 864 mL / OUT: 2145 mL / NET: -1281 mL      RADIOLOGY & ADDITIONAL TESTS:  < from: CT Head No Cont (05.30.22 @ 11:44) >  IMPRESSION:   Unchanged hemorrhagic contusions within the RIGHT frontal   and LEFT temporal lobes with edema and herniation of RIGHT frontal lobe   through the craniectomy defect. Small BILATERAL subdural hematomas are   also stable. With the layering over the tentorium.    Mild LEFT nasal   septal deviation with osteophyte. The facial and skull base bones and   calvarium demonstrate comminuted fractures of the RIGHT lateral orbit,   RIGHT zygomatic arch and RIGHT maxillary sinus and RIGHT pterygoid plate   unchanged.    < end of copied text >    < from: CT Head No Cont (05.29.22 @ 04:25) >  IMPRESSION: Slightly decreased hemorrhage in the interpeduncular compared   with 5/27/2022. No other significant change in subdural and   intraparenchymal hemorrhages.      < end of copied text >    < from: CT Head No Cont (05.27.22 @ 21:47) >    IMPRESSION:  Status post right hemicraniectomy.  Subdural hemorrhages and hemorrhagic   contusions are stable.     There is grossly stable soft tissue swelling and fluid in theright   frontal-parietal-temporal scalp soft tissues.  The previously seen   subgaleal drain has been removed.  There is new linear extracranial   hemorrhage in the right scalp soft tissues, along the prior drainage   catheter tract.    < end of copied text >    < from: MR Head No Cont (05.26.22 @ 16:00) >    IMPRESSION:    MRI BRAIN: Right frontal craniectomy. Residual bilateral subdural   hematomas and interhemispheric subdural hemorrhage. Right frontal, right   frontal temporal and left temporal parenchymal hemorrhagic contusions   with an foci of diffusion restriction suggestive of shear injury and   diffuse axonal injury.    Cervical spine MRI: No acute fractures or dislocations.      < end of copied text >    < from: CT Head No Cont (05.24.22 @ 20:59) >  IMPRESSION:    CT head: Increased right scalp soft tissue swelling. Stable intracranial   hemorrhages and subdural hemorrhages. Stable subarachnoid hemorrhage.    CT C-spine: Small amount of blood products circumferentially around the   thecal sac at the C1 and C2 levels. No high-grade spinal canal stenosis   or obvious cord compression is visualized by CT technique. MRI may be   obtained for further evaluation.    < end of copied text >    CAPRINI SCORE [CLOT]:  Patient has an estimated Caprini score of greater than 5.  However, the patient's unique clinical situation will be addressed in an individual manner to determine appropriate anticoagulation treatment, if any. HPI: Patient is a 25y old M brought by EMS, found down in the street unresponsive.     Primary Survey:    A - airway compromised, patient intubated in ED, RSI  B - bilateral breath sound after intubation  C - initial BP: 169/93 (05-24-22 @ 00:00)*** , HR: 107 (05-24-22 @ 00:44)*** , palpable pulses in all extremities  D - GCS 3 on arrival    Exposure obtained, no evident injuries    CXR: No evident PTX or Hemothorax, ET tube in place.  (24 May 2022 01:09)      INTERVAL HPI/OVERNIGHT EVENTS:  25y Male seen lying comfortably in bed, s/p trach/PEG placement yesterday. No change in neurologic status.     Vital Signs Last 24 Hrs  T(C): 35.7 (02 Jun 2022 07:26), Max: 37.8 (01 Jun 2022 22:00)  T(F): 96.3 (02 Jun 2022 07:26), Max: 100 (01 Jun 2022 22:00)  HR: 71 (02 Jun 2022 07:00) (67 - 94)  BP: 111/66 (02 Jun 2022 07:00) (109/71 - 176/112)  BP(mean): 81 (02 Jun 2022 07:00) (81 - 140)  RR: 13 (02 Jun 2022 07:00) (12 - 36)  SpO2: 100% (02 Jun 2022 07:00) (68% - 100%)    PHYSICAL EXAM:  GENERAL: NAD, well-groomed  HEAD: s/p R craniectomy, flap full but soft  WOUND: clean dry intact, open to air  GEORGES COMA SCORE: E1 V1T M4 =6T       E: 4= opens eyes spontaneously 3= to voice 2= to noxious 1= no opening       V: 5= oriented 4= confused 3= inappropriate words 2= incomprehensible sounds 1= nonverbal 1T= intubated       M: 6= follows commands 5= localizes 4= withdraws 3= flexor posturing 2= extensor posturing 1= no movement  MENTAL STATUS: Intubated, sedated, no EO, nonverbal, not following commands.   CRANIAL NERVES: PERRL. Face symmetric. +cough, gag  MOTOR:    LABS:                        8.2    10.46 )-----------( 435      ( 02 Jun 2022 03:50 )             24.6     06-02    131<L>  |  97<L>  |  19.6  ----------------------------<  108<H>  4.1   |  22.0  |  0.43<L>    Ca    8.8      02 Jun 2022 03:50  Phos  3.9     06-02  Mg     2.0     06-02      PT/INR - ( 01 Jun 2022 04:14 )   PT: 13.5 sec;   INR: 1.16 ratio         PTT - ( 01 Jun 2022 04:14 )  PTT:28.5 sec      06-01 @ 07:01  -  06-02 @ 07:00  --------------------------------------------------------  IN: 864 mL / OUT: 2145 mL / NET: -1281 mL      RADIOLOGY & ADDITIONAL TESTS:  < from: CT Head No Cont (05.30.22 @ 11:44) >  IMPRESSION:   Unchanged hemorrhagic contusions within the RIGHT frontal   and LEFT temporal lobes with edema and herniation of RIGHT frontal lobe   through the craniectomy defect. Small BILATERAL subdural hematomas are   also stable. With the layering over the tentorium.    Mild LEFT nasal   septal deviation with osteophyte. The facial and skull base bones and   calvarium demonstrate comminuted fractures of the RIGHT lateral orbit,   RIGHT zygomatic arch and RIGHT maxillary sinus and RIGHT pterygoid plate   unchanged.    < end of copied text >    < from: CT Head No Cont (05.29.22 @ 04:25) >  IMPRESSION: Slightly decreased hemorrhage in the interpeduncular compared   with 5/27/2022. No other significant change in subdural and   intraparenchymal hemorrhages.      < end of copied text >    < from: CT Head No Cont (05.27.22 @ 21:47) >    IMPRESSION:  Status post right hemicraniectomy.  Subdural hemorrhages and hemorrhagic   contusions are stable.     There is grossly stable soft tissue swelling and fluid in theright   frontal-parietal-temporal scalp soft tissues.  The previously seen   subgaleal drain has been removed.  There is new linear extracranial   hemorrhage in the right scalp soft tissues, along the prior drainage   catheter tract.    < end of copied text >    < from: MR Head No Cont (05.26.22 @ 16:00) >    IMPRESSION:    MRI BRAIN: Right frontal craniectomy. Residual bilateral subdural   hematomas and interhemispheric subdural hemorrhage. Right frontal, right   frontal temporal and left temporal parenchymal hemorrhagic contusions   with an foci of diffusion restriction suggestive of shear injury and   diffuse axonal injury.    Cervical spine MRI: No acute fractures or dislocations.      < end of copied text >    < from: CT Head No Cont (05.24.22 @ 20:59) >  IMPRESSION:    CT head: Increased right scalp soft tissue swelling. Stable intracranial   hemorrhages and subdural hemorrhages. Stable subarachnoid hemorrhage.    CT C-spine: Small amount of blood products circumferentially around the   thecal sac at the C1 and C2 levels. No high-grade spinal canal stenosis   or obvious cord compression is visualized by CT technique. MRI may be   obtained for further evaluation.    < end of copied text >    CAPRINI SCORE [CLOT]:  Patient has an estimated Caprini score of greater than 5.  However, the patient's unique clinical situation will be addressed in an individual manner to determine appropriate anticoagulation treatment, if any. HPI: Patient is a 25y old M brought by EMS, found down in the street unresponsive.     Primary Survey:    A - airway compromised, patient intubated in ED, RSI  B - bilateral breath sound after intubation  C - initial BP: 169/93 (05-24-22 @ 00:00)*** , HR: 107 (05-24-22 @ 00:44)*** , palpable pulses in all extremities  D - GCS 3 on arrival    Exposure obtained, no evident injuries    CXR: No evident PTX or Hemothorax, ET tube in place.  (24 May 2022 01:09)      INTERVAL HPI/OVERNIGHT EVENTS:  25y Male seen lying in bed, shivering & febrile, s/p trach/PEG placement yesterday.    Vital Signs Last 24 Hrs  T(C): 35.7 (02 Jun 2022 07:26), Max: 37.8 (01 Jun 2022 22:00)  T(F): 96.3 (02 Jun 2022 07:26), Max: 100 (01 Jun 2022 22:00)  HR: 71 (02 Jun 2022 07:00) (67 - 94)  BP: 111/66 (02 Jun 2022 07:00) (109/71 - 176/112)  BP(mean): 81 (02 Jun 2022 07:00) (81 - 140)  RR: 13 (02 Jun 2022 07:00) (12 - 36)  SpO2: 100% (02 Jun 2022 07:00) (68% - 100%)    PHYSICAL EXAM:  GENERAL: NAD, well-groomed  HEAD: s/p R craniectomy, flap full but soft  WOUND: clean dry intact, open to air without dehiscence or active drainage  GEORGES COMA SCORE: E1 V1T M4 =6T       E: 4= opens eyes spontaneously 3= to voice 2= to noxious 1= no opening       V: 5= oriented 4= confused 3= inappropriate words 2= incomprehensible sounds 1= nonverbal 1T= intubated       M: 6= follows commands 5= localizes 4= withdraws 3= flexor posturing 2= extensor posturing 1= no movement  MENTAL STATUS: Intubated, off sedation, no EO, nonverbal, not following commands.   CRANIAL NERVES: PERRL. No blink to threat, Face symmetric. +cough, gag  MOTOR: RUE WD noxious stimuli > LUE weak WD. b/l LE WD noxious     LABS:                        8.2    10.46 )-----------( 435      ( 02 Jun 2022 03:50 )             24.6     06-02    131<L>  |  97<L>  |  19.6  ----------------------------<  108<H>  4.1   |  22.0  |  0.43<L>    Ca    8.8      02 Jun 2022 03:50  Phos  3.9     06-02  Mg     2.0     06-02      PT/INR - ( 01 Jun 2022 04:14 )   PT: 13.5 sec;   INR: 1.16 ratio         PTT - ( 01 Jun 2022 04:14 )  PTT:28.5 sec      06-01 @ 07:01  -  06-02 @ 07:00  --------------------------------------------------------  IN: 864 mL / OUT: 2145 mL / NET: -1281 mL      RADIOLOGY & ADDITIONAL TESTS:  < from: CT Head No Cont (05.30.22 @ 11:44) >  IMPRESSION:   Unchanged hemorrhagic contusions within the RIGHT frontal   and LEFT temporal lobes with edema and herniation of RIGHT frontal lobe   through the craniectomy defect. Small BILATERAL subdural hematomas are   also stable. With the layering over the tentorium.    Mild LEFT nasal   septal deviation with osteophyte. The facial and skull base bones and   calvarium demonstrate comminuted fractures of the RIGHT lateral orbit,   RIGHT zygomatic arch and RIGHT maxillary sinus and RIGHT pterygoid plate   unchanged.    < end of copied text >    < from: CT Head No Cont (05.29.22 @ 04:25) >  IMPRESSION: Slightly decreased hemorrhage in the interpeduncular compared   with 5/27/2022. No other significant change in subdural and   intraparenchymal hemorrhages.      < end of copied text >    < from: CT Head No Cont (05.27.22 @ 21:47) >    IMPRESSION:  Status post right hemicraniectomy.  Subdural hemorrhages and hemorrhagic   contusions are stable.     There is grossly stable soft tissue swelling and fluid in theright   frontal-parietal-temporal scalp soft tissues.  The previously seen   subgaleal drain has been removed.  There is new linear extracranial   hemorrhage in the right scalp soft tissues, along the prior drainage   catheter tract.    < end of copied text >    < from: MR Head No Cont (05.26.22 @ 16:00) >    IMPRESSION:    MRI BRAIN: Right frontal craniectomy. Residual bilateral subdural   hematomas and interhemispheric subdural hemorrhage. Right frontal, right   frontal temporal and left temporal parenchymal hemorrhagic contusions   with an foci of diffusion restriction suggestive of shear injury and   diffuse axonal injury.    Cervical spine MRI: No acute fractures or dislocations.      < end of copied text >    < from: CT Head No Cont (05.24.22 @ 20:59) >  IMPRESSION:    CT head: Increased right scalp soft tissue swelling. Stable intracranial   hemorrhages and subdural hemorrhages. Stable subarachnoid hemorrhage.    CT C-spine: Small amount of blood products circumferentially around the   thecal sac at the C1 and C2 levels. No high-grade spinal canal stenosis   or obvious cord compression is visualized by CT technique. MRI may be   obtained for further evaluation.    < end of copied text >    CAPRINI SCORE [CLOT]:  Patient has an estimated Caprini score of greater than 5.  However, the patient's unique clinical situation will be addressed in an individual manner to determine appropriate anticoagulation treatment, if any.

## 2022-06-02 NOTE — PROGRESS NOTE ADULT - NS ATTEND AMEND GEN_ALL_CORE FT
NSGY Attg:    see above    patient seen and examined    agree with above  no eye opening  trace non-specific movement to deep noxious  flap full, soft    agree with plan as above

## 2022-06-03 LAB
ANION GAP SERPL CALC-SCNC: 11 MMOL/L — SIGNIFICANT CHANGE UP (ref 5–17)
BASOPHILS # BLD AUTO: 0.02 K/UL — SIGNIFICANT CHANGE UP (ref 0–0.2)
BASOPHILS NFR BLD AUTO: 0.2 % — SIGNIFICANT CHANGE UP (ref 0–2)
BUN SERPL-MCNC: 19 MG/DL — SIGNIFICANT CHANGE UP (ref 8–20)
CALCIUM SERPL-MCNC: 8.1 MG/DL — LOW (ref 8.6–10.2)
CHLORIDE SERPL-SCNC: 97 MMOL/L — LOW (ref 98–107)
CO2 SERPL-SCNC: 25 MMOL/L — SIGNIFICANT CHANGE UP (ref 22–29)
CREAT SERPL-MCNC: 0.64 MG/DL — SIGNIFICANT CHANGE UP (ref 0.5–1.3)
EGFR: 135 ML/MIN/1.73M2 — SIGNIFICANT CHANGE UP
EOSINOPHIL # BLD AUTO: 0.09 K/UL — SIGNIFICANT CHANGE UP (ref 0–0.5)
EOSINOPHIL NFR BLD AUTO: 1 % — SIGNIFICANT CHANGE UP (ref 0–6)
GLUCOSE BLDC GLUCOMTR-MCNC: 126 MG/DL — HIGH (ref 70–99)
GLUCOSE BLDC GLUCOMTR-MCNC: 159 MG/DL — HIGH (ref 70–99)
GLUCOSE BLDC GLUCOMTR-MCNC: 173 MG/DL — HIGH (ref 70–99)
GLUCOSE SERPL-MCNC: 153 MG/DL — HIGH (ref 70–99)
HCT VFR BLD CALC: 22 % — LOW (ref 39–50)
HGB BLD-MCNC: 7.5 G/DL — LOW (ref 13–17)
IMM GRANULOCYTES NFR BLD AUTO: 0.5 % — SIGNIFICANT CHANGE UP (ref 0–1.5)
LYMPHOCYTES # BLD AUTO: 0.8 K/UL — LOW (ref 1–3.3)
LYMPHOCYTES # BLD AUTO: 9.2 % — LOW (ref 13–44)
MAGNESIUM SERPL-MCNC: 2.1 MG/DL — SIGNIFICANT CHANGE UP (ref 1.6–2.6)
MCHC RBC-ENTMCNC: 30.6 PG — SIGNIFICANT CHANGE UP (ref 27–34)
MCHC RBC-ENTMCNC: 34.1 GM/DL — SIGNIFICANT CHANGE UP (ref 32–36)
MCV RBC AUTO: 89.8 FL — SIGNIFICANT CHANGE UP (ref 80–100)
MONOCYTES # BLD AUTO: 1.14 K/UL — HIGH (ref 0–0.9)
MONOCYTES NFR BLD AUTO: 13.2 % — SIGNIFICANT CHANGE UP (ref 2–14)
NEUTROPHILS # BLD AUTO: 6.57 K/UL — SIGNIFICANT CHANGE UP (ref 1.8–7.4)
NEUTROPHILS NFR BLD AUTO: 75.9 % — SIGNIFICANT CHANGE UP (ref 43–77)
PHOSPHATE SERPL-MCNC: 2.7 MG/DL — SIGNIFICANT CHANGE UP (ref 2.4–4.7)
PLATELET # BLD AUTO: 488 K/UL — HIGH (ref 150–400)
POTASSIUM SERPL-MCNC: 3.7 MMOL/L — SIGNIFICANT CHANGE UP (ref 3.5–5.3)
POTASSIUM SERPL-SCNC: 3.7 MMOL/L — SIGNIFICANT CHANGE UP (ref 3.5–5.3)
RBC # BLD: 2.45 M/UL — LOW (ref 4.2–5.8)
RBC # FLD: 12.8 % — SIGNIFICANT CHANGE UP (ref 10.3–14.5)
SODIUM SERPL-SCNC: 133 MMOL/L — LOW (ref 135–145)
WBC # BLD: 8.66 K/UL — SIGNIFICANT CHANGE UP (ref 3.8–10.5)
WBC # FLD AUTO: 8.66 K/UL — SIGNIFICANT CHANGE UP (ref 3.8–10.5)

## 2022-06-03 PROCEDURE — 99233 SBSQ HOSP IP/OBS HIGH 50: CPT

## 2022-06-03 PROCEDURE — 99232 SBSQ HOSP IP/OBS MODERATE 35: CPT

## 2022-06-03 RX ORDER — LEVETIRACETAM 250 MG/1
1000 TABLET, FILM COATED ORAL
Refills: 0 | Status: COMPLETED | OUTPATIENT
Start: 2022-06-03 | End: 2022-06-14

## 2022-06-03 RX ORDER — AMANTADINE HCL 100 MG
150 CAPSULE ORAL
Refills: 0 | Status: DISCONTINUED | OUTPATIENT
Start: 2022-06-04 | End: 2022-06-06

## 2022-06-03 RX ORDER — ACETAMINOPHEN 500 MG
975 TABLET ORAL EVERY 6 HOURS
Refills: 0 | Status: DISCONTINUED | OUTPATIENT
Start: 2022-06-03 | End: 2022-06-29

## 2022-06-03 RX ADMIN — CHLORHEXIDINE GLUCONATE 15 MILLILITER(S): 213 SOLUTION TOPICAL at 05:39

## 2022-06-03 RX ADMIN — Medication 100 MILLIGRAM(S): at 05:38

## 2022-06-03 RX ADMIN — Medication 85 MILLIMOLE(S): at 05:53

## 2022-06-03 RX ADMIN — Medication 1 MILLIGRAM(S): at 12:12

## 2022-06-03 RX ADMIN — SODIUM CHLORIDE 2 GRAM(S): 9 INJECTION INTRAMUSCULAR; INTRAVENOUS; SUBCUTANEOUS at 13:52

## 2022-06-03 RX ADMIN — CHLORHEXIDINE GLUCONATE 15 MILLILITER(S): 213 SOLUTION TOPICAL at 18:46

## 2022-06-03 RX ADMIN — LEVETIRACETAM 400 MILLIGRAM(S): 250 TABLET, FILM COATED ORAL at 05:39

## 2022-06-03 RX ADMIN — Medication 975 MILLIGRAM(S): at 17:00

## 2022-06-03 RX ADMIN — Medication 1: at 06:53

## 2022-06-03 RX ADMIN — Medication 25 MILLIGRAM(S): at 12:17

## 2022-06-03 RX ADMIN — Medication 5 MILLIGRAM(S): at 22:30

## 2022-06-03 RX ADMIN — Medication 100 MILLIGRAM(S): at 12:12

## 2022-06-03 RX ADMIN — Medication 1 DROP(S): at 18:47

## 2022-06-03 RX ADMIN — ENOXAPARIN SODIUM 40 MILLIGRAM(S): 100 INJECTION SUBCUTANEOUS at 12:12

## 2022-06-03 RX ADMIN — Medication 10 MILLIGRAM(S): at 12:13

## 2022-06-03 RX ADMIN — SODIUM CHLORIDE 2 GRAM(S): 9 INJECTION INTRAMUSCULAR; INTRAVENOUS; SUBCUTANEOUS at 22:25

## 2022-06-03 RX ADMIN — CHLORHEXIDINE GLUCONATE 1 APPLICATION(S): 213 SOLUTION TOPICAL at 12:16

## 2022-06-03 RX ADMIN — Medication 975 MILLIGRAM(S): at 22:24

## 2022-06-03 RX ADMIN — POLYETHYLENE GLYCOL 3350 17 GRAM(S): 17 POWDER, FOR SOLUTION ORAL at 12:12

## 2022-06-03 RX ADMIN — PIPERACILLIN AND TAZOBACTAM 25 GRAM(S): 4; .5 INJECTION, POWDER, LYOPHILIZED, FOR SOLUTION INTRAVENOUS at 22:30

## 2022-06-03 RX ADMIN — LEVETIRACETAM 1000 MILLIGRAM(S): 250 TABLET, FILM COATED ORAL at 18:46

## 2022-06-03 RX ADMIN — Medication 1 DROP(S): at 00:09

## 2022-06-03 RX ADMIN — PIPERACILLIN AND TAZOBACTAM 25 GRAM(S): 4; .5 INJECTION, POWDER, LYOPHILIZED, FOR SOLUTION INTRAVENOUS at 05:40

## 2022-06-03 RX ADMIN — Medication 2 MILLIGRAM(S): at 22:25

## 2022-06-03 RX ADMIN — Medication 1 DROP(S): at 23:10

## 2022-06-03 RX ADMIN — Medication 25 MILLIGRAM(S): at 05:39

## 2022-06-03 RX ADMIN — Medication 1 DROP(S): at 12:13

## 2022-06-03 RX ADMIN — Medication 1 SPRAY(S): at 18:47

## 2022-06-03 RX ADMIN — PIPERACILLIN AND TAZOBACTAM 25 GRAM(S): 4; .5 INJECTION, POWDER, LYOPHILIZED, FOR SOLUTION INTRAVENOUS at 13:54

## 2022-06-03 RX ADMIN — Medication 1 TABLET(S): at 12:13

## 2022-06-03 RX ADMIN — Medication 1: at 13:52

## 2022-06-03 RX ADMIN — Medication 975 MILLIGRAM(S): at 16:15

## 2022-06-03 RX ADMIN — SODIUM CHLORIDE 2 GRAM(S): 9 INJECTION INTRAMUSCULAR; INTRAVENOUS; SUBCUTANEOUS at 05:38

## 2022-06-03 NOTE — PROGRESS NOTE ADULT - ASSESSMENT
26 yo Male, found unresponsive, mechanism of injury unknown found to have Right SDH SP craniectomy with L SDH, and BL Parenchymal hematomas, + TEOFILO, multiple facial fractures, ETOH abuse, now w/ possible ETOH withdrawal    Neurological:  Severe TBI - continue supportive care and Keppra BID for now, however, awaiting neurosurg recs on duration. EEG with no epileptiform pattern.   Librium taper for hx of ETOH withdrawal.   Amantadine as per PM&R recs.      Pulmonary:  Tolerating PSV, plan for trach collar trials.     Cardiovascular: Propranolol improving paroxsymal sympathetic hyperactivity, hydralazine added for hypertension.      Gastrointestinal:  full enteral nutrition, continue tube feeds via peg @ goal 50ml/hr. Continue bowel regimen.     Genitourinary: Hyponatremia -increased salt tabs yesterday. UOP adequate.     Heme: SCDs, lovenox    ID: zosyn for gram + bacteremia x1 bottle, unclear source patient may require further work up for source of bacteremia. Pending repeat.      Skin: frequent turning and repositioning while in bed    Dispo:  SICU

## 2022-06-03 NOTE — SPEECH LANGUAGE PATHOLOGY EVALUATION - COMMENTS
As per MD note: "26 yo Male, found unresponsive, mechanism of injury unknown found to have Right SDH SP craniectomy with L SDH, and BL Parenchymal hematomas, + TEOFILO, multiple facial fractures, ETOH abuse, now w/ possible ETOH withdrawal" No nonverbal communication observed Pt nonverbal at this time Will be assessed as appropriate: pt with severe receptive/expressive language deficits at this time Auditory function based on the Coma Recovery Scale judged to be poor

## 2022-06-03 NOTE — PROGRESS NOTE ADULT - NS ATTEND AMEND GEN_ALL_CORE FT
NSGY Attg:    see above    patient seen and examined  wound C/D/I -- no erythema, no drainage  flap full, soft    agree with exam and plan as above

## 2022-06-03 NOTE — PROGRESS NOTE ADULT - SUBJECTIVE AND OBJECTIVE BOX
24 HOUR EVENTS: Remains off propofol, neuro exam unchanged. No episodes of hypertension, tachypnea or fevers. Tolerating tube feeds via peg and sating well.  Patient did not tolerate trach collar yesterday, will perform trial again. TOV, retained >500, straight cath x1.     SUBJECTIVE/ROS:  [ ] A ten-point review of systems was otherwise negative except as noted.  [x ] Due to altered mental status/intubation, subjective information were not able to be obtained from the patient. History was obtained, to the extent possible, from review of the chart and collateral sources of information.      NEURO  RASS:  -1   GCS: 1/T/4     CAM ICU:  Exam: PERRL 3mm reactive, does not open eyes, withdrawing from pain all 4 extremities, RUE>LUE  Meds: acetaminophen     Tablet .. 975 milliGRAM(s) Oral every 6 hours PRN Temp greater or equal to 38C (100.4F)  amantadine Syrup 100 milliGRAM(s) Oral <User Schedule>  chlordiazePOXIDE 25 milliGRAM(s) Oral every 12 hours  chlordiazePOXIDE 25 milliGRAM(s) Oral daily  HYDROmorphone  Injectable 0.5 milliGRAM(s) IV Push every 3 hours PRN breakthrough aggitation  levETIRAcetam  IVPB 1000 milliGRAM(s) IV Intermittent every 12 hours  melatonin Liquid 5 milliGRAM(s) Oral at bedtime    [x] Adequacy of sedation and pain control has been assessed and adjusted      RESPIRATORY  RR: 19 (06-02-22 @ 23:00) (13 - 46)  SpO2: 100% (06-03-22 @ 00:29) (87% - 100%)  Wt(kg): --  Exam: coarse bs b/l, thin secretions, trach in place   Mechanical Ventilation: Mode: AC/ CMV (Assist Control/ Continuous Mandatory Ventilation), RR (machine): 12, RR (patient): 20, TV (machine): 400, FiO2: 40, PEEP: 5, MAP: 8, PIP: 20  ABG - ( 02 Jun 2022 05:07 )  pH: 7.430 /  pCO2: 37    /  pO2: 173   / HCO3: 25    / Base Excess: 0.3   /  SaO2: 100.0   Lactate: x                [ ] Extubation Readiness Assessed  Meds:       CARDIOVASCULAR  HR: 95 (06-03-22 @ 00:29) (71 - 105)  BP: 124/72 (06-02-22 @ 23:00) (100/56 - 179/149)  BP(mean): 87 (06-02-22 @ 23:00) (69 - 159)  ABP: --  ABP(mean): --  Wt(kg): --  CVP(cm H2O): --      Exam: nsr  Cardiac Rhythm: nsr  Perfusion     [x ]Adequate   [ ]Inadequate  Mentation   [x ]Normal       [ ]Reduced  Extremities  [x ]Warm         [ ]Cool  Volume Status [ ]Hypervolemic [x ]Euvolemic [ ]Hypovolemic  Meds: doxazosin 2 milliGRAM(s) Oral at bedtime  hydrALAZINE Injectable 20 milliGRAM(s) IV Push every 6 hours PRN Diastolic > 100  propranolol 40 milliGRAM(s) Oral every 6 hours        GI/NUTRITION  Exam: soft, nttp, nd, peg tube in place without erythema or discharge   Diet: TF at goal   Meds: bisacodyl Suppository 10 milliGRAM(s) Rectal daily  polyethylene glycol 3350 17 Gram(s) Oral daily  senna Syrup 10 milliLiter(s) Oral at bedtime      GENITOURINARY  I&O's Detail    06-01 @ 07:01  -  06-02 @ 07:00  --------------------------------------------------------  IN:    Enteral Tube Flush: 60 mL    IV PiggyBack: 275 mL    IV PiggyBack: 50 mL    Pivot 1.5: 310 mL    Propofol: 189 mL  Total IN: 884 mL    OUT:    Indwelling Catheter - Urethral (mL): 2025 mL    Nasogastric/Oral tube (mL): 120 mL  Total OUT: 2145 mL    Total NET: -1261 mL      06-02 @ 07:01  -  06-03 @ 01:58  --------------------------------------------------------  IN:    Enteral Tube Flush: 80 mL    IV PiggyBack: 25 mL    Pivot 1.5: 250 mL  Total IN: 355 mL    OUT:    Indwelling Catheter - Urethral (mL): 675 mL    Intermittent Catheterization - Urethral (mL): 800 mL    Propofol: 0 mL  Total OUT: 1475 mL    Total NET: -1120 mL          06-02    131<L>  |  97<L>  |  19.6  ----------------------------<  108<H>  4.1   |  22.0  |  0.43<L>    Ca    8.8      02 Jun 2022 03:50  Phos  3.9     06-02  Mg     2.0     06-02      [ ] Howard catheter, indication: N/A  Meds: folic acid 1 milliGRAM(s) Oral daily  multivitamin 1 Tablet(s) Oral daily  sodium chloride 2 Gram(s) Oral every 8 hours  sodium chloride 0.9% lock flush 10 milliLiter(s) IV Push every 1 hour PRN Pre/post blood products, medications, blood draw, and to maintain line patency  thiamine 100 milliGRAM(s) Oral daily      Ext: 2+ peripheral pulses, compartments all soft    Skin: incision site without discharge       HEMATOLOGIC  Meds: enoxaparin Injectable 40 milliGRAM(s) SubCutaneous every 24 hours    [x] VTE Prophylaxis                        8.2    10.46 )-----------( 435      ( 02 Jun 2022 03:50 )             24.6     PT/INR - ( 01 Jun 2022 04:14 )   PT: 13.5 sec;   INR: 1.16 ratio         PTT - ( 01 Jun 2022 04:14 )  PTT:28.5 sec  Transfusion     [ ] PRBC   [ ] Platelets   [ ] FFP   [ ] Cryoprecipitate      INFECTIOUS DISEASES  T(C): 38.1 (06-02-22 @ 23:00), Max: 38.8 (06-02-22 @ 20:00)  Wt(kg): --  WBC Count: 10.46 K/uL (06-02 @ 03:50)    Recent Cultures:  Specimen Source: .Blood Blood-Peripheral, 05-30 @ 13:13; Results   Growth in aerobic bottle: Streptococcus constellatus  Alpha hemolytic strep  (not Strep. pneumoniae or Enterococcus)  Single set isolate, possible contaminant. Contact  Microbiology if susceptibility testing clinically  indicated.; Gram Stain:   Growth in aerobic bottle: Gram Positive Cocci in Pairs and Chains  .  ***Blood Panel PCR results on this specimen are available  approximately 3 hours after the Gram stain result.***  Gram stain, PCR, and/or culture results may not always  correspond due to difference in methodologies.  ************************************************************  This PCR assay was performed using [facility].  The following targets are tested for: Enterococcus,  vancomycin resistant enterococci, Listeria monocytogenes,  coagulase negative staphylococci, S. aureus,  methicillin resistant S. aureus, Streptococcus agalactiae  (Group B), S. pneumoniae, S. pyogenes (Group A),  Acinetobacter baumannii, Enterobacter cloacae, E. coli,  Klebsiella oxytoca, K. pneumoniae, Proteus sp.,  Serratia marcescens, Haemophilus influenzae,  Neisseria meningitidis, Pseudomonas aeruginosa, Candida  albicans, C. glabrata, C krusei, C parapsilosis,  C. tropicalis and the KPC resistance gene.  .  Gram Stain and BCID performed by:  [facility]  [address] [place] [address]  .  TYPE: (C=Critical, N=Notification, A=Abnormal) C  TESTS:  _ GS  DATE/TIME CALLED: _ 06/01/2022 08:32:24  CALLED TO: John Wang RN  READ BACK (2 Patient Identifiers)(Y/N): _ Y  READ BACK VALUES (Y/N): _ Y  CALLED BY: _ lionin; Organism: Blood Culture PCR    Meds: piperacillin/tazobactam IVPB.. 3.375 Gram(s) IV Intermittent every 8 hours        ENDOCRINE  Capillary Blood Glucose    Meds: insulin lispro (ADMELOG) corrective regimen sliding scale   SubCutaneous every 6 hours        ACCESS DEVICES:  [ ] Peripheral IV  [ ] Central Venous Line	[ ] R	[ ] L	[ ] IJ	[ ] Fem	[ ] SC	Placed:   [ ] Arterial Line		[ ] R	[ ] L	[ ] Fem	[ ] Rad	[ ] Ax	Placed:   [ ] PICC:					[ ] Mediport  [ ] Urinary Catheter, Date Placed:   [ ] Necessity of urinary, arterial, and venous catheters discussed    OTHER MEDICATIONS:  artificial tears (preservative free) Ophthalmic Solution 1 Drop(s) Both EYES every 6 hours  chlorhexidine 0.12% Liquid 15 milliLiter(s) Oral Mucosa every 12 hours  chlorhexidine 2% Cloths 1 Application(s) Topical daily  sodium chloride 0.65% Nasal 1 Spray(s) Both Nostrils two times a day      CODE STATUS:     IMAGING:
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
## 2022-06-04 LAB
ANION GAP SERPL CALC-SCNC: 10 MMOL/L — SIGNIFICANT CHANGE UP (ref 5–17)
BASOPHILS # BLD AUTO: 0.02 K/UL — SIGNIFICANT CHANGE UP (ref 0–0.2)
BASOPHILS NFR BLD AUTO: 0.2 % — SIGNIFICANT CHANGE UP (ref 0–2)
BUN SERPL-MCNC: 16.4 MG/DL — SIGNIFICANT CHANGE UP (ref 8–20)
CALCIUM SERPL-MCNC: 8.4 MG/DL — LOW (ref 8.6–10.2)
CHLORIDE SERPL-SCNC: 100 MMOL/L — SIGNIFICANT CHANGE UP (ref 98–107)
CO2 SERPL-SCNC: 25 MMOL/L — SIGNIFICANT CHANGE UP (ref 22–29)
CREAT SERPL-MCNC: 0.58 MG/DL — SIGNIFICANT CHANGE UP (ref 0.5–1.3)
CULTURE RESULTS: SIGNIFICANT CHANGE UP
EGFR: 139 ML/MIN/1.73M2 — SIGNIFICANT CHANGE UP
EOSINOPHIL # BLD AUTO: 0.2 K/UL — SIGNIFICANT CHANGE UP (ref 0–0.5)
EOSINOPHIL NFR BLD AUTO: 1.9 % — SIGNIFICANT CHANGE UP (ref 0–6)
GLUCOSE BLDC GLUCOMTR-MCNC: 116 MG/DL — HIGH (ref 70–99)
GLUCOSE BLDC GLUCOMTR-MCNC: 150 MG/DL — HIGH (ref 70–99)
GLUCOSE BLDC GLUCOMTR-MCNC: 158 MG/DL — HIGH (ref 70–99)
GLUCOSE SERPL-MCNC: 153 MG/DL — HIGH (ref 70–99)
HCT VFR BLD CALC: 19.8 % — CRITICAL LOW (ref 39–50)
HGB BLD-MCNC: 6.6 G/DL — CRITICAL LOW (ref 13–17)
IMM GRANULOCYTES NFR BLD AUTO: 0.6 % — SIGNIFICANT CHANGE UP (ref 0–1.5)
LYMPHOCYTES # BLD AUTO: 1.05 K/UL — SIGNIFICANT CHANGE UP (ref 1–3.3)
LYMPHOCYTES # BLD AUTO: 9.8 % — LOW (ref 13–44)
MAGNESIUM SERPL-MCNC: 2 MG/DL — SIGNIFICANT CHANGE UP (ref 1.6–2.6)
MCHC RBC-ENTMCNC: 30.3 PG — SIGNIFICANT CHANGE UP (ref 27–34)
MCHC RBC-ENTMCNC: 33.3 GM/DL — SIGNIFICANT CHANGE UP (ref 32–36)
MCV RBC AUTO: 90.8 FL — SIGNIFICANT CHANGE UP (ref 80–100)
MONOCYTES # BLD AUTO: 1.16 K/UL — HIGH (ref 0–0.9)
MONOCYTES NFR BLD AUTO: 10.9 % — SIGNIFICANT CHANGE UP (ref 2–14)
NEUTROPHILS # BLD AUTO: 8.2 K/UL — HIGH (ref 1.8–7.4)
NEUTROPHILS NFR BLD AUTO: 76.6 % — SIGNIFICANT CHANGE UP (ref 43–77)
PHOSPHATE SERPL-MCNC: 3.2 MG/DL — SIGNIFICANT CHANGE UP (ref 2.4–4.7)
PLATELET # BLD AUTO: 464 K/UL — HIGH (ref 150–400)
POTASSIUM SERPL-MCNC: 3.9 MMOL/L — SIGNIFICANT CHANGE UP (ref 3.5–5.3)
POTASSIUM SERPL-SCNC: 3.9 MMOL/L — SIGNIFICANT CHANGE UP (ref 3.5–5.3)
RBC # BLD: 2.18 M/UL — LOW (ref 4.2–5.8)
RBC # FLD: 13.2 % — SIGNIFICANT CHANGE UP (ref 10.3–14.5)
SODIUM SERPL-SCNC: 135 MMOL/L — SIGNIFICANT CHANGE UP (ref 135–145)
SPECIMEN SOURCE: SIGNIFICANT CHANGE UP
WBC # BLD: 10.69 K/UL — HIGH (ref 3.8–10.5)
WBC # FLD AUTO: 10.69 K/UL — HIGH (ref 3.8–10.5)

## 2022-06-04 RX ORDER — HYDROMORPHONE HYDROCHLORIDE 2 MG/ML
0.5 INJECTION INTRAMUSCULAR; INTRAVENOUS; SUBCUTANEOUS ONCE
Refills: 0 | Status: DISCONTINUED | OUTPATIENT
Start: 2022-06-04 | End: 2022-06-04

## 2022-06-04 RX ADMIN — Medication 975 MILLIGRAM(S): at 09:23

## 2022-06-04 RX ADMIN — SODIUM CHLORIDE 2 GRAM(S): 9 INJECTION INTRAMUSCULAR; INTRAVENOUS; SUBCUTANEOUS at 05:09

## 2022-06-04 RX ADMIN — Medication 2 MILLIGRAM(S): at 22:30

## 2022-06-04 RX ADMIN — Medication 150 MILLIGRAM(S): at 05:07

## 2022-06-04 RX ADMIN — SODIUM CHLORIDE 2 GRAM(S): 9 INJECTION INTRAMUSCULAR; INTRAVENOUS; SUBCUTANEOUS at 13:13

## 2022-06-04 RX ADMIN — Medication 150 MILLIGRAM(S): at 13:11

## 2022-06-04 RX ADMIN — CHLORHEXIDINE GLUCONATE 15 MILLILITER(S): 213 SOLUTION TOPICAL at 17:46

## 2022-06-04 RX ADMIN — CHLORHEXIDINE GLUCONATE 1 APPLICATION(S): 213 SOLUTION TOPICAL at 13:13

## 2022-06-04 RX ADMIN — HYDROMORPHONE HYDROCHLORIDE 0.5 MILLIGRAM(S): 2 INJECTION INTRAMUSCULAR; INTRAVENOUS; SUBCUTANEOUS at 18:03

## 2022-06-04 RX ADMIN — Medication 1 SPRAY(S): at 05:10

## 2022-06-04 RX ADMIN — Medication 1 DROP(S): at 17:45

## 2022-06-04 RX ADMIN — HYDROMORPHONE HYDROCHLORIDE 0.5 MILLIGRAM(S): 2 INJECTION INTRAMUSCULAR; INTRAVENOUS; SUBCUTANEOUS at 05:09

## 2022-06-04 RX ADMIN — PIPERACILLIN AND TAZOBACTAM 25 GRAM(S): 4; .5 INJECTION, POWDER, LYOPHILIZED, FOR SOLUTION INTRAVENOUS at 22:30

## 2022-06-04 RX ADMIN — Medication 1 MILLIGRAM(S): at 13:11

## 2022-06-04 RX ADMIN — Medication 5 MILLIGRAM(S): at 23:00

## 2022-06-04 RX ADMIN — LEVETIRACETAM 1000 MILLIGRAM(S): 250 TABLET, FILM COATED ORAL at 17:46

## 2022-06-04 RX ADMIN — Medication 1 DROP(S): at 05:09

## 2022-06-04 RX ADMIN — HYDROMORPHONE HYDROCHLORIDE 0.5 MILLIGRAM(S): 2 INJECTION INTRAMUSCULAR; INTRAVENOUS; SUBCUTANEOUS at 17:48

## 2022-06-04 RX ADMIN — Medication 1 SPRAY(S): at 18:26

## 2022-06-04 RX ADMIN — Medication 100 MILLIGRAM(S): at 13:12

## 2022-06-04 RX ADMIN — LEVETIRACETAM 1000 MILLIGRAM(S): 250 TABLET, FILM COATED ORAL at 05:08

## 2022-06-04 RX ADMIN — Medication 1: at 05:41

## 2022-06-04 RX ADMIN — ENOXAPARIN SODIUM 40 MILLIGRAM(S): 100 INJECTION SUBCUTANEOUS at 13:12

## 2022-06-04 RX ADMIN — Medication 1 DROP(S): at 13:12

## 2022-06-04 RX ADMIN — PIPERACILLIN AND TAZOBACTAM 25 GRAM(S): 4; .5 INJECTION, POWDER, LYOPHILIZED, FOR SOLUTION INTRAVENOUS at 13:36

## 2022-06-04 RX ADMIN — HYDROMORPHONE HYDROCHLORIDE 0.5 MILLIGRAM(S): 2 INJECTION INTRAMUSCULAR; INTRAVENOUS; SUBCUTANEOUS at 16:46

## 2022-06-04 RX ADMIN — CHLORHEXIDINE GLUCONATE 15 MILLILITER(S): 213 SOLUTION TOPICAL at 05:08

## 2022-06-04 RX ADMIN — HYDROMORPHONE HYDROCHLORIDE 0.5 MILLIGRAM(S): 2 INJECTION INTRAMUSCULAR; INTRAVENOUS; SUBCUTANEOUS at 17:01

## 2022-06-04 RX ADMIN — SODIUM CHLORIDE 2 GRAM(S): 9 INJECTION INTRAMUSCULAR; INTRAVENOUS; SUBCUTANEOUS at 23:01

## 2022-06-04 RX ADMIN — Medication 975 MILLIGRAM(S): at 10:00

## 2022-06-04 RX ADMIN — Medication 1 TABLET(S): at 13:12

## 2022-06-04 RX ADMIN — PIPERACILLIN AND TAZOBACTAM 25 GRAM(S): 4; .5 INJECTION, POWDER, LYOPHILIZED, FOR SOLUTION INTRAVENOUS at 05:08

## 2022-06-04 NOTE — PROGRESS NOTE ADULT - ASSESSMENT
26 y/o male with severe TBI s/p craniectomy POD#10   - transfuse PRBC  - will continue to follow   - needs helmet OOB
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
## 2022-06-04 NOTE — PROGRESS NOTE ADULT - SUBJECTIVE AND OBJECTIVE BOX
24 HOUR EVENTS: Patient tolerating trach collar all day yesterday, still requiring frequent suctioning. Tolerating tube feeds. +BM. Incontinent, also requiring intermittent catheterizations to drain urine. Hyponatremia improving. Episode of low grade fevers overnight. Most recent repeat blood cultures negative.     SUBJECTIVE/ROS:  [ ] A ten-point review of systems was otherwise negative except as noted.  [x ] Due to altered mental status/intubation, subjective information were not able to be obtained from the patient. History was obtained, to the extent possible, from review of the chart and collateral sources of information.      NEURO  RASS:  -2   GCS:  1/T/4   CAM ICU:  Exam:  PERRL 3mm reactive, does not open eyes, withdrawing from pain all 4 extremities, RUE>LUE, skin flap full  Meds: acetaminophen    Suspension .. 975 milliGRAM(s) Oral every 6 hours PRN Temp greater or equal to 38C (100.4F)  amantadine Syrup 150 milliGRAM(s) Oral <User Schedule>  HYDROmorphone  Injectable 0.5 milliGRAM(s) IV Push every 3 hours PRN breakthrough aggitation  levETIRAcetam  Solution 1000 milliGRAM(s) Oral two times a day  melatonin Liquid 5 milliGRAM(s) Oral at bedtime    [x] Adequacy of sedation and pain control has been assessed and adjusted      RESPIRATORY  RR: 20 (06-04-22 @ 01:00) (17 - 30)  SpO2: 100% (06-04-22 @ 01:00) (99% - 100%)  Wt(kg): --  Exam: unlabored, clear to auscultation bilaterally, trach collar in place without erythema or discharge, thin secretions   Mechanical Ventilation: Mode: standby  ABG - ( 02 Jun 2022 05:07 )  pH: 7.430 /  pCO2: 37    /  pO2: 173   / HCO3: 25    / Base Excess: 0.3   /  SaO2: 100.0   Lactate: x                [ ] Extubation Readiness Assessed  Meds:       CARDIOVASCULAR  HR: 91 (06-04-22 @ 01:00) (82 - 123)  BP: 118/75 (06-04-22 @ 01:00) (101/56 - 176/92)  BP(mean): 90 (06-04-22 @ 01:00) (69 - 122)  ABP: --  ABP(mean): --  Wt(kg): --  CVP(cm H2O): --      Exam: nsr  Cardiac Rhythm: nsr  Perfusion     [x ]Adequate   [ ]Inadequate  Mentation   [x ]Normal       [ ]Reduced  Extremities  [x ]Warm         [ ]Cool  Volume Status [ ]Hypervolemic [x ]Euvolemic [ ]Hypovolemic  Meds: doxazosin 2 milliGRAM(s) Oral at bedtime  hydrALAZINE Injectable 20 milliGRAM(s) IV Push every 6 hours PRN Diastolic > 100  propranolol 40 milliGRAM(s) Oral every 12 hours        GI/NUTRITION  Exam: soft, nttp, nd, peg tube in place without erythema or discharge   Diet: TF at goal   Meds: bisacodyl Suppository 10 milliGRAM(s) Rectal daily  polyethylene glycol 3350 17 Gram(s) Oral daily  senna Syrup 10 milliLiter(s) Oral at bedtime      GENITOURINARY  I&O's Detail    06-02 @ 07:01  -  06-03 @ 07:00  --------------------------------------------------------  IN:    Enteral Tube Flush: 80 mL    IV PiggyBack: 25 mL    Pivot 1.5: 850 mL  Total IN: 955 mL    OUT:    Indwelling Catheter - Urethral (mL): 675 mL    Intermittent Catheterization - Urethral (mL): 800 mL    Propofol: 0 mL  Total OUT: 1475 mL    Total NET: -520 mL      06-03 @ 07:01  -  06-04 @ 01:09  --------------------------------------------------------  IN:    Enteral Tube Flush: 310 mL    IV PiggyBack: 175 mL    Pivot 1.5: 900 mL  Total IN: 1385 mL    OUT:    Intermittent Catheterization - Urethral (mL): 800 mL  Total OUT: 800 mL    Total NET: 585 mL          06-03    133<L>  |  97<L>  |  19.0  ----------------------------<  153<H>  3.7   |  25.0  |  0.64    Ca    8.1<L>      03 Jun 2022 03:55  Phos  2.7     06-03  Mg     2.1     06-03      [ ] Howard catheter, indication: N/A  Meds: folic acid 1 milliGRAM(s) Oral daily  multivitamin 1 Tablet(s) Oral daily  sodium chloride 2 Gram(s) Oral every 8 hours  sodium chloride 0.9% lock flush 10 milliLiter(s) IV Push every 1 hour PRN Pre/post blood products, medications, blood draw, and to maintain line patency  thiamine 100 milliGRAM(s) Oral daily      Ext: 2+ peripheral pulses    Skin: incision site without discharge       HEMATOLOGIC  Meds: enoxaparin Injectable 40 milliGRAM(s) SubCutaneous every 24 hours    [x] VTE Prophylaxis                        7.5    8.66  )-----------( 488      ( 03 Jun 2022 03:55 )             22.0       Transfusion     [ ] PRBC   [ ] Platelets   [ ] FFP   [ ] Cryoprecipitate      INFECTIOUS DISEASES  T(C): 36.5 (06-04-22 @ 01:00), Max: 38.9 (06-03-22 @ 23:00)  Wt(kg): --  WBC Count: 8.66 K/uL (06-03 @ 03:55)    Recent Cultures:  Specimen Source: .Blood Blood, 06-01 @ 10:54; Results   No growth at 48 hours; Gram Stain: --; Organism: --  Specimen Source: .Blood Blood-Peripheral, 05-30 @ 13:13; Results   Growth in aerobic bottle: Streptococcus constellatus  Alpha hemolytic strep  (not Strep. pneumoniae or Enterococcus)  Single set isolate, possible contaminant. Contact  Microbiology if susceptibility testing clinically  indicated.; Gram Stain:   Growth in aerobic bottle: Gram Positive Cocci in Pairs and Chains  .  ***Blood Panel PCR results on this specimen are available  approximately 3 hours after the Gram stain result.***  Gram stain, PCR, and/or culture results may not always  correspond due to difference in methodologies.  ************************************************************  This PCR assay was performed using [employer].  The following targets are tested for: Enterococcus,  vancomycin resistant enterococci, Listeria monocytogenes,  coagulase negative staphylococci, S. aureus,  methicillin resistant S. aureus, Streptococcus agalactiae  (Group B), S. pneumoniae, S. pyogenes (Group A),  Acinetobacter baumannii, Enterobacter cloacae, E. coli,  Klebsiella oxytoca, K. pneumoniae, Proteus sp.,  Serratia marcescens, Haemophilus influenzae,  Neisseria meningitidis, Pseudomonas aeruginosa, Candida  albicans, C. glabrata, C krusei, C parapsilosis,  C. tropicalis and the KPC resistance gene.  .  Gram Stain and BCID performed by:  [facility]  [address] [place] [address]  .  TYPE: (C=Critical, N=Notification, A=Abnormal) C  TESTS:  _ GS  DATE/TIME CALLED: _ 06/01/2022 08:32:24  CALLED TO: _ AMELIE Wang RN  READ BACK (2 Patient Identifiers)(Y/N): _ Y  READ BACK VALUES (Y/N): _ Y  CALLED BY: _ andrein; Organism: Blood Culture PCR    Meds: piperacillin/tazobactam IVPB.. 3.375 Gram(s) IV Intermittent every 8 hours        ENDOCRINE  Capillary Blood Glucose    Meds: insulin lispro (ADMELOG) corrective regimen sliding scale   SubCutaneous every 6 hours        ACCESS DEVICES:  [ ] Peripheral IV  [ ] Central Venous Line	[ ] R	[ ] L	[ ] IJ	[ ] Fem	[ ] SC	Placed:   [ ] Arterial Line		[ ] R	[ ] L	[ ] Fem	[ ] Rad	[ ] Ax	Placed:   [ ] PICC:					[ ] Mediport  [ ] Urinary Catheter, Date Placed:   [ ] Necessity of urinary, arterial, and venous catheters discussed    OTHER MEDICATIONS:  artificial tears (preservative free) Ophthalmic Solution 1 Drop(s) Both EYES every 6 hours  chlorhexidine 0.12% Liquid 15 milliLiter(s) Oral Mucosa every 12 hours  chlorhexidine 2% Cloths 1 Application(s) Topical daily  sodium chloride 0.65% Nasal 1 Spray(s) Both Nostrils two times a day      CODE STATUS:     IMAGING:
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
## 2022-06-05 LAB
ANION GAP SERPL CALC-SCNC: 8 MMOL/L — SIGNIFICANT CHANGE UP (ref 5–17)
BASOPHILS # BLD AUTO: 0.05 K/UL — SIGNIFICANT CHANGE UP (ref 0–0.2)
BASOPHILS NFR BLD AUTO: 0.5 % — SIGNIFICANT CHANGE UP (ref 0–2)
BUN SERPL-MCNC: 17.3 MG/DL — SIGNIFICANT CHANGE UP (ref 8–20)
CALCIUM SERPL-MCNC: 8.6 MG/DL — SIGNIFICANT CHANGE UP (ref 8.6–10.2)
CHLORIDE SERPL-SCNC: 99 MMOL/L — SIGNIFICANT CHANGE UP (ref 98–107)
CO2 SERPL-SCNC: 28 MMOL/L — SIGNIFICANT CHANGE UP (ref 22–29)
CREAT SERPL-MCNC: 0.63 MG/DL — SIGNIFICANT CHANGE UP (ref 0.5–1.3)
EGFR: 135 ML/MIN/1.73M2 — SIGNIFICANT CHANGE UP
EOSINOPHIL # BLD AUTO: 0.22 K/UL — SIGNIFICANT CHANGE UP (ref 0–0.5)
EOSINOPHIL NFR BLD AUTO: 2.1 % — SIGNIFICANT CHANGE UP (ref 0–6)
GLUCOSE BLDC GLUCOMTR-MCNC: 134 MG/DL — HIGH (ref 70–99)
GLUCOSE BLDC GLUCOMTR-MCNC: 138 MG/DL — HIGH (ref 70–99)
GLUCOSE BLDC GLUCOMTR-MCNC: 143 MG/DL — HIGH (ref 70–99)
GLUCOSE BLDC GLUCOMTR-MCNC: 147 MG/DL — HIGH (ref 70–99)
GLUCOSE BLDC GLUCOMTR-MCNC: 185 MG/DL — HIGH (ref 70–99)
GLUCOSE SERPL-MCNC: 147 MG/DL — HIGH (ref 70–99)
HCT VFR BLD CALC: 24.5 % — LOW (ref 39–50)
HGB BLD-MCNC: 8.3 G/DL — LOW (ref 13–17)
IMM GRANULOCYTES NFR BLD AUTO: 0.7 % — SIGNIFICANT CHANGE UP (ref 0–1.5)
IRON SATN MFR SERPL: 11 % — LOW (ref 16–55)
IRON SATN MFR SERPL: 27 UG/DL — LOW (ref 59–158)
LYMPHOCYTES # BLD AUTO: 0.98 K/UL — LOW (ref 1–3.3)
LYMPHOCYTES # BLD AUTO: 9.5 % — LOW (ref 13–44)
MAGNESIUM SERPL-MCNC: 1.9 MG/DL — SIGNIFICANT CHANGE UP (ref 1.6–2.6)
MCHC RBC-ENTMCNC: 30.6 PG — SIGNIFICANT CHANGE UP (ref 27–34)
MCHC RBC-ENTMCNC: 33.9 GM/DL — SIGNIFICANT CHANGE UP (ref 32–36)
MCV RBC AUTO: 90.4 FL — SIGNIFICANT CHANGE UP (ref 80–100)
MONOCYTES # BLD AUTO: 1.1 K/UL — HIGH (ref 0–0.9)
MONOCYTES NFR BLD AUTO: 10.6 % — SIGNIFICANT CHANGE UP (ref 2–14)
NEUTROPHILS # BLD AUTO: 7.91 K/UL — HIGH (ref 1.8–7.4)
NEUTROPHILS NFR BLD AUTO: 76.6 % — SIGNIFICANT CHANGE UP (ref 43–77)
PHOSPHATE SERPL-MCNC: 3.2 MG/DL — SIGNIFICANT CHANGE UP (ref 2.4–4.7)
PLATELET # BLD AUTO: 564 K/UL — HIGH (ref 150–400)
POTASSIUM SERPL-MCNC: 4.2 MMOL/L — SIGNIFICANT CHANGE UP (ref 3.5–5.3)
POTASSIUM SERPL-SCNC: 4.2 MMOL/L — SIGNIFICANT CHANGE UP (ref 3.5–5.3)
RBC # BLD: 2.71 M/UL — LOW (ref 4.2–5.8)
RBC # FLD: 13.3 % — SIGNIFICANT CHANGE UP (ref 10.3–14.5)
SODIUM SERPL-SCNC: 135 MMOL/L — SIGNIFICANT CHANGE UP (ref 135–145)
TIBC SERPL-MCNC: 246 UG/DL — SIGNIFICANT CHANGE UP (ref 220–430)
TRANSFERRIN SERPL-MCNC: 172 MG/DL — LOW (ref 180–329)
WBC # BLD: 10.33 K/UL — SIGNIFICANT CHANGE UP (ref 3.8–10.5)
WBC # FLD AUTO: 10.33 K/UL — SIGNIFICANT CHANGE UP (ref 3.8–10.5)

## 2022-06-05 PROCEDURE — 99231 SBSQ HOSP IP/OBS SF/LOW 25: CPT

## 2022-06-05 RX ORDER — MAGNESIUM SULFATE 500 MG/ML
1 VIAL (ML) INJECTION ONCE
Refills: 0 | Status: COMPLETED | OUTPATIENT
Start: 2022-06-05 | End: 2022-06-05

## 2022-06-05 RX ORDER — FERROUS SULFATE 325(65) MG
300 TABLET ORAL DAILY
Refills: 0 | Status: DISCONTINUED | OUTPATIENT
Start: 2022-06-05 | End: 2022-06-29

## 2022-06-05 RX ADMIN — PIPERACILLIN AND TAZOBACTAM 25 GRAM(S): 4; .5 INJECTION, POWDER, LYOPHILIZED, FOR SOLUTION INTRAVENOUS at 15:50

## 2022-06-05 RX ADMIN — Medication 5 MILLIGRAM(S): at 22:32

## 2022-06-05 RX ADMIN — LEVETIRACETAM 1000 MILLIGRAM(S): 250 TABLET, FILM COATED ORAL at 17:28

## 2022-06-05 RX ADMIN — Medication 100 MILLIGRAM(S): at 11:38

## 2022-06-05 RX ADMIN — LEVETIRACETAM 1000 MILLIGRAM(S): 250 TABLET, FILM COATED ORAL at 06:02

## 2022-06-05 RX ADMIN — SODIUM CHLORIDE 2 GRAM(S): 9 INJECTION INTRAMUSCULAR; INTRAVENOUS; SUBCUTANEOUS at 06:01

## 2022-06-05 RX ADMIN — Medication 300 MILLIGRAM(S): at 11:38

## 2022-06-05 RX ADMIN — ENOXAPARIN SODIUM 40 MILLIGRAM(S): 100 INJECTION SUBCUTANEOUS at 11:39

## 2022-06-05 RX ADMIN — SENNA PLUS 10 MILLILITER(S): 8.6 TABLET ORAL at 22:32

## 2022-06-05 RX ADMIN — SODIUM CHLORIDE 2 GRAM(S): 9 INJECTION INTRAMUSCULAR; INTRAVENOUS; SUBCUTANEOUS at 15:49

## 2022-06-05 RX ADMIN — Medication 1 DROP(S): at 06:04

## 2022-06-05 RX ADMIN — POLYETHYLENE GLYCOL 3350 17 GRAM(S): 17 POWDER, FOR SOLUTION ORAL at 11:34

## 2022-06-05 RX ADMIN — CHLORHEXIDINE GLUCONATE 15 MILLILITER(S): 213 SOLUTION TOPICAL at 06:01

## 2022-06-05 RX ADMIN — SODIUM CHLORIDE 2 GRAM(S): 9 INJECTION INTRAMUSCULAR; INTRAVENOUS; SUBCUTANEOUS at 22:31

## 2022-06-05 RX ADMIN — Medication 1: at 23:35

## 2022-06-05 RX ADMIN — Medication 1 SPRAY(S): at 17:31

## 2022-06-05 RX ADMIN — Medication 1 DROP(S): at 23:35

## 2022-06-05 RX ADMIN — Medication 975 MILLIGRAM(S): at 23:41

## 2022-06-05 RX ADMIN — Medication 1 DROP(S): at 11:34

## 2022-06-05 RX ADMIN — Medication 975 MILLIGRAM(S): at 09:26

## 2022-06-05 RX ADMIN — CHLORHEXIDINE GLUCONATE 15 MILLILITER(S): 213 SOLUTION TOPICAL at 17:28

## 2022-06-05 RX ADMIN — Medication 1 DROP(S): at 17:29

## 2022-06-05 RX ADMIN — Medication 975 MILLIGRAM(S): at 10:26

## 2022-06-05 RX ADMIN — Medication 100 GRAM(S): at 05:59

## 2022-06-05 RX ADMIN — Medication 1 SPRAY(S): at 06:03

## 2022-06-05 RX ADMIN — Medication 10 MILLIGRAM(S): at 11:38

## 2022-06-05 RX ADMIN — Medication 1 TABLET(S): at 11:35

## 2022-06-05 RX ADMIN — PIPERACILLIN AND TAZOBACTAM 25 GRAM(S): 4; .5 INJECTION, POWDER, LYOPHILIZED, FOR SOLUTION INTRAVENOUS at 06:01

## 2022-06-05 RX ADMIN — Medication 975 MILLIGRAM(S): at 22:30

## 2022-06-05 RX ADMIN — Medication 2 MILLIGRAM(S): at 22:31

## 2022-06-05 RX ADMIN — Medication 150 MILLIGRAM(S): at 11:34

## 2022-06-05 RX ADMIN — Medication 1 MILLIGRAM(S): at 11:35

## 2022-06-05 RX ADMIN — Medication 1 DROP(S): at 00:30

## 2022-06-05 RX ADMIN — Medication 150 MILLIGRAM(S): at 06:01

## 2022-06-05 RX ADMIN — CHLORHEXIDINE GLUCONATE 1 APPLICATION(S): 213 SOLUTION TOPICAL at 11:36

## 2022-06-05 RX ADMIN — PIPERACILLIN AND TAZOBACTAM 25 GRAM(S): 4; .5 INJECTION, POWDER, LYOPHILIZED, FOR SOLUTION INTRAVENOUS at 22:32
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
## 2022-06-05 NOTE — PROGRESS NOTE ADULT - SUBJECTIVE AND OBJECTIVE BOX
24 HOUR EVENTS: Patient tolerating trach collar all day yesterday, Requires intermittent suctioning. Voiding without straight cath. Intermittent fevers.     SUBJECTIVE/ROS:  [ ] A ten-point review of systems was otherwise negative except as noted.  [x ] Due to altered mental status/intubation, subjective information were not able to be obtained from the patient. History was obtained, to the extent possible, from review of the chart and collateral sources of information.    Vital Signs Last 24 Hrs  T(C): 37.9 (05 Jun 2022 01:00), Max: 38.5 (04 Jun 2022 08:00)  T(F): 100.2 (05 Jun 2022 01:00), Max: 101.3 (04 Jun 2022 08:00)  HR: 87 (05 Jun 2022 01:00) (78 - 113)  BP: 114/74 (05 Jun 2022 01:00) (111/72 - 158/141)  BP(mean): 85 (05 Jun 2022 01:00) (82 - 148)  RR: 21 (05 Jun 2022 01:00) (14 - 30)  SpO2: 98% (05 Jun 2022 01:00) (58% - 100%)    Physical Exam:   Constitutional: NAD  	Neuro  	* Mental Status:  GCS 6T: E1, V1T, M4. Does not open eyes, withdraws from pain  	* Cranial Nerves: GRACIELA, dysconjugate gaze, face symmetric   	* Motor: trace withdrawal in all extremities   	* Sensory: Sensation intact to noxious   	* Reflexes: Not assessed  	* Gait: Not assessed    	Cardiovascular:  S1, S2 Regular rate and rhythm.  	Respiratory: + trach to t-piece. Clear to auscultation.  	Gastrointestinal: Soft, nontender, nondistended. PEG 4 at the skin   	Genitourinary: Male  	Musculoskeletal: No muscle wasting noted, (See neuorlogic assessment for full muscle strength assessment) No pretibial edema appreciated, no appreciable calf tenderness.  	Skin:  no skin breakdown  	Musculoskeletal: See detailed muscle strength examination, listed under neurologic examination.  Hematologic / Lymph / Immunologic: No bleeding from IV sites or wounds, No lymphadenopathy, No HIves or allergic type skin lesions                          6.6    10.69 )-----------( 464      ( 04 Jun 2022 04:34 )             19.8   06-04    135  |  100  |  16.4  ----------------------------<  153<H>  3.9   |  25.0  |  0.58    Ca    8.4<L>      04 Jun 2022 04:34  Phos  3.2     06-04  Mg     2.0     06-04    I&O's Summary    03 Jun 2022 07:01  -  04 Jun 2022 07:00  --------------------------------------------------------  IN: 2204 mL / OUT: 1400 mL / NET: 804 mL    04 Jun 2022 07:01  -  05 Jun 2022 01:57  --------------------------------------------------------  IN: 1245 mL / OUT: 650 mL / NET: 595 mL
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
## 2022-06-06 LAB
ANION GAP SERPL CALC-SCNC: 14 MMOL/L — SIGNIFICANT CHANGE UP (ref 5–17)
BUN SERPL-MCNC: 18.3 MG/DL — SIGNIFICANT CHANGE UP (ref 8–20)
CALCIUM SERPL-MCNC: 8.8 MG/DL — SIGNIFICANT CHANGE UP (ref 8.6–10.2)
CHLORIDE SERPL-SCNC: 99 MMOL/L — SIGNIFICANT CHANGE UP (ref 98–107)
CO2 SERPL-SCNC: 23 MMOL/L — SIGNIFICANT CHANGE UP (ref 22–29)
CREAT SERPL-MCNC: 0.7 MG/DL — SIGNIFICANT CHANGE UP (ref 0.5–1.3)
CULTURE RESULTS: SIGNIFICANT CHANGE UP
EGFR: 131 ML/MIN/1.73M2 — SIGNIFICANT CHANGE UP
GLUCOSE BLDC GLUCOMTR-MCNC: 143 MG/DL — HIGH (ref 70–99)
GLUCOSE BLDC GLUCOMTR-MCNC: 161 MG/DL — HIGH (ref 70–99)
GLUCOSE BLDC GLUCOMTR-MCNC: 182 MG/DL — HIGH (ref 70–99)
GLUCOSE SERPL-MCNC: 146 MG/DL — HIGH (ref 70–99)
HCT VFR BLD CALC: 30.2 % — LOW (ref 39–50)
HGB BLD-MCNC: 9.8 G/DL — LOW (ref 13–17)
MAGNESIUM SERPL-MCNC: 2.1 MG/DL — SIGNIFICANT CHANGE UP (ref 1.8–2.6)
MCHC RBC-ENTMCNC: 30.1 PG — SIGNIFICANT CHANGE UP (ref 27–34)
MCHC RBC-ENTMCNC: 32.5 GM/DL — SIGNIFICANT CHANGE UP (ref 32–36)
MCV RBC AUTO: 92.6 FL — SIGNIFICANT CHANGE UP (ref 80–100)
PHOSPHATE SERPL-MCNC: 3.7 MG/DL — SIGNIFICANT CHANGE UP (ref 2.4–4.7)
PLATELET # BLD AUTO: 614 K/UL — HIGH (ref 150–400)
POTASSIUM SERPL-MCNC: 4.4 MMOL/L — SIGNIFICANT CHANGE UP (ref 3.5–5.3)
POTASSIUM SERPL-SCNC: 4.4 MMOL/L — SIGNIFICANT CHANGE UP (ref 3.5–5.3)
RBC # BLD: 3.26 M/UL — LOW (ref 4.2–5.8)
RBC # FLD: 13.3 % — SIGNIFICANT CHANGE UP (ref 10.3–14.5)
SARS-COV-2 RNA SPEC QL NAA+PROBE: SIGNIFICANT CHANGE UP
SODIUM SERPL-SCNC: 136 MMOL/L — SIGNIFICANT CHANGE UP (ref 135–145)
SPECIMEN SOURCE: SIGNIFICANT CHANGE UP
WBC # BLD: 10.42 K/UL — SIGNIFICANT CHANGE UP (ref 3.8–10.5)
WBC # FLD AUTO: 10.42 K/UL — SIGNIFICANT CHANGE UP (ref 3.8–10.5)

## 2022-06-06 PROCEDURE — 99254 IP/OBS CNSLTJ NEW/EST MOD 60: CPT

## 2022-06-06 PROCEDURE — 99233 SBSQ HOSP IP/OBS HIGH 50: CPT | Mod: GC

## 2022-06-06 PROCEDURE — 99233 SBSQ HOSP IP/OBS HIGH 50: CPT

## 2022-06-06 RX ORDER — AMANTADINE HCL 100 MG
200 CAPSULE ORAL
Refills: 0 | Status: DISCONTINUED | OUTPATIENT
Start: 2022-06-06 | End: 2022-06-28

## 2022-06-06 RX ADMIN — SENNA PLUS 10 MILLILITER(S): 8.6 TABLET ORAL at 22:59

## 2022-06-06 RX ADMIN — ENOXAPARIN SODIUM 40 MILLIGRAM(S): 100 INJECTION SUBCUTANEOUS at 13:40

## 2022-06-06 RX ADMIN — Medication 200 MILLIGRAM(S): at 13:47

## 2022-06-06 RX ADMIN — CHLORHEXIDINE GLUCONATE 15 MILLILITER(S): 213 SOLUTION TOPICAL at 05:05

## 2022-06-06 RX ADMIN — LEVETIRACETAM 1000 MILLIGRAM(S): 250 TABLET, FILM COATED ORAL at 18:23

## 2022-06-06 RX ADMIN — SODIUM CHLORIDE 2 GRAM(S): 9 INJECTION INTRAMUSCULAR; INTRAVENOUS; SUBCUTANEOUS at 14:00

## 2022-06-06 RX ADMIN — Medication 2 MILLIGRAM(S): at 22:58

## 2022-06-06 RX ADMIN — Medication 975 MILLIGRAM(S): at 12:24

## 2022-06-06 RX ADMIN — Medication 1 TABLET(S): at 13:41

## 2022-06-06 RX ADMIN — Medication 1 DROP(S): at 05:03

## 2022-06-06 RX ADMIN — Medication 1 MILLIGRAM(S): at 13:40

## 2022-06-06 RX ADMIN — Medication 300 MILLIGRAM(S): at 13:40

## 2022-06-06 RX ADMIN — Medication 975 MILLIGRAM(S): at 11:24

## 2022-06-06 RX ADMIN — Medication 1 SPRAY(S): at 13:39

## 2022-06-06 RX ADMIN — Medication 1: at 05:06

## 2022-06-06 RX ADMIN — Medication 1 DROP(S): at 13:46

## 2022-06-06 RX ADMIN — SODIUM CHLORIDE 2 GRAM(S): 9 INJECTION INTRAMUSCULAR; INTRAVENOUS; SUBCUTANEOUS at 23:00

## 2022-06-06 RX ADMIN — Medication 100 MILLIGRAM(S): at 13:40

## 2022-06-06 RX ADMIN — Medication 975 MILLIGRAM(S): at 05:04

## 2022-06-06 RX ADMIN — Medication 5 MILLIGRAM(S): at 22:59

## 2022-06-06 RX ADMIN — SODIUM CHLORIDE 2 GRAM(S): 9 INJECTION INTRAMUSCULAR; INTRAVENOUS; SUBCUTANEOUS at 05:05

## 2022-06-06 RX ADMIN — Medication 1: at 13:52

## 2022-06-06 RX ADMIN — Medication 975 MILLIGRAM(S): at 06:08

## 2022-06-06 RX ADMIN — CHLORHEXIDINE GLUCONATE 1 APPLICATION(S): 213 SOLUTION TOPICAL at 13:38

## 2022-06-06 RX ADMIN — Medication 150 MILLIGRAM(S): at 05:01

## 2022-06-06 RX ADMIN — Medication 1 DROP(S): at 18:23

## 2022-06-06 RX ADMIN — LEVETIRACETAM 1000 MILLIGRAM(S): 250 TABLET, FILM COATED ORAL at 05:04

## 2022-06-06 NOTE — CHART NOTE - NSCHARTNOTEFT_GEN_A_CORE
SICU TRANSFER NOTE  -----------------------------  ICU Admission Date: 5/24  Transfer Date: 06-06-22 @ 18:00    Admission Diagnosis: Severe TBI - SDH, b/l IPH, TEOFILO, zygomaticomaxillary complex fx, pulmonary contusions     Active Problems/injuries: Severe TBI s/p trach and peg     Procedures: 5/24: supratentorial craniectomy for evacuation of SDH   6/1: Trach and Peg     Consultants:  [ ] Cardiology  [ ] Endocrine  [ ] Infectious Disease  [ ] Medicine  [ ]Neurosurgery  [ ] Ortho       [ ] Weight Bearing Status:  [ ] Palliative       [ ] Advanced Directives:    [ ] Physical Medicine and Rehab       [ ] Disposition :   [ ] Plastics  [ ] Pulmonary    Medications  acetaminophen    Suspension .. 975 milliGRAM(s) Oral every 6 hours PRN  amantadine Syrup 200 milliGRAM(s) Oral <User Schedule>  artificial tears (preservative free) Ophthalmic Solution 1 Drop(s) Both EYES every 6 hours  bisacodyl Suppository 10 milliGRAM(s) Rectal daily  doxazosin 2 milliGRAM(s) Oral at bedtime  enoxaparin Injectable 40 milliGRAM(s) SubCutaneous every 24 hours  ferrous    sulfate Liquid 300 milliGRAM(s) Enteral Tube daily  folic acid 1 milliGRAM(s) Oral daily  hydrALAZINE Injectable 20 milliGRAM(s) IV Push every 6 hours PRN  insulin lispro (ADMELOG) corrective regimen sliding scale   SubCutaneous every 6 hours  levETIRAcetam  Solution 1000 milliGRAM(s) Oral two times a day  melatonin Liquid 5 milliGRAM(s) Oral at bedtime  multivitamin 1 Tablet(s) Oral daily  polyethylene glycol 3350 17 Gram(s) Oral daily  propranolol 10 milliGRAM(s) Oral every 8 hours  senna Syrup 10 milliLiter(s) Oral at bedtime  sodium chloride 2 Gram(s) Oral every 8 hours  sodium chloride 0.65% Nasal 1 Spray(s) Both Nostrils two times a day  sodium chloride 0.9% lock flush 10 milliLiter(s) IV Push every 1 hour PRN  thiamine 100 milliGRAM(s) Oral daily      [ x] I attest I have reviewed and reconciled all medications prior to transfer    IV Fluids  sodium chloride:   2 Gram(s), Oral via PEG tube, every 8 hours, Stop After 7 Days  Provider's Contact #: [phone]  sodium chloride:   1 Gram(s), Oral via PEG tube, every 8 hours, Stop After 7 Days  Provider's Contact #: [phone]  sodium chloride:   1 Gram(s), Oral via OG tube, every 8 hours, Stop After 7 Days  Provider's Contact #: [phone]  sodium chloride 0.9%.: Solution, 1000 milliLiter(s) infuse at 100 mL/Hr  sodium chloride 0.9% Bolus:   1000 milliLiter(s), IV Bolus, once, infuse over 1 Hr, Stop After 1 Doses  Provider's Contact #: [phone]  sodium chloride 0.9% Bolus:   1000 milliLiter(s), IV Bolus, once, infuse over 60 Minute(s), Stop After 1 Doses  Provider's Contact #: [phone]  sodium chloride 0.9% Bolus:   1000 milliLiter(s), IV Bolus, once, infuse over 60 Minute(s), Stop After 1 Doses  Provider's Contact #: [phone]  sodium chloride 0.9% Bolus:   1000 milliLiter(s), IV Bolus, once, infuse over 60 Minute(s), Stop After 1 Doses  Provider's Contact #: [phone]  sodium chloride 0.9%.: Solution, 1000 milliLiter(s) infuse at 200 mL/Hr  Provider's Contact #: [phone]        I have discussed this case with [facility] trauma team upon transfer and all questions regarding ICU course were answered.  The following items are to be followed up:  1. TBI, continue all medications as per PM&R  2. Wean propranolol off tomorrow 6/7  3. Tolerating trach collar, sating well. DISCONTINUE sutures and down size 6/10  4. Tolerating peg tube feeds  5. Lovenox for dvt ppx
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
## 2022-06-06 NOTE — PROGRESS NOTE ADULT - SUBJECTIVE AND OBJECTIVE BOX
I fit the pt with a Large size hard Danmar helmet for OOB use. Padding was added to the brow, crown , and occipital areas to improve the fit. Helmet was labeled and placed on window sill for later use.    [facility]

## 2022-06-06 NOTE — PROGRESS NOTE ADULT - SUBJECTIVE AND OBJECTIVE BOX
INTERVAL HPI/OVERNIGHT EVENTS:    No new events    MEDICATIONS  (STANDING):  amantadine Syrup 200 milliGRAM(s) Oral <User Schedule>  artificial tears (preservative free) Ophthalmic Solution 1 Drop(s) Both EYES every 6 hours  bisacodyl Suppository 10 milliGRAM(s) Rectal daily  doxazosin 2 milliGRAM(s) Oral at bedtime  enoxaparin Injectable 40 milliGRAM(s) SubCutaneous every 24 hours  ferrous    sulfate Liquid 300 milliGRAM(s) Enteral Tube daily  folic acid 1 milliGRAM(s) Oral daily  insulin lispro (ADMELOG) corrective regimen sliding scale   SubCutaneous every 6 hours  levETIRAcetam  Solution 1000 milliGRAM(s) Oral two times a day  melatonin Liquid 5 milliGRAM(s) Oral at bedtime  multivitamin 1 Tablet(s) Oral daily  polyethylene glycol 3350 17 Gram(s) Oral daily  propranolol 10 milliGRAM(s) Oral every 8 hours  senna Syrup 10 milliLiter(s) Oral at bedtime  sodium chloride 2 Gram(s) Oral every 8 hours  sodium chloride 0.65% Nasal 1 Spray(s) Both Nostrils two times a day  thiamine 100 milliGRAM(s) Oral daily    MEDICATIONS  (PRN):  acetaminophen    Suspension .. 975 milliGRAM(s) Oral every 6 hours PRN Temp greater or equal to 38C (100.4F)  hydrALAZINE Injectable 20 milliGRAM(s) IV Push every 6 hours PRN Diastolic > 100  sodium chloride 0.9% lock flush 10 milliLiter(s) IV Push every 1 hour PRN Pre/post blood products, medications, blood draw, and to maintain line patency      Drug Dosing Weight  Height (cm): 170.2 (24 May 2022 13:00)  Weight (kg): 70 (24 May 2022 13:00)  BMI (kg/m2): 24.2 (24 May 2022 13:00)  BSA (m2): 1.81 (24 May 2022 13:00)      PAST MEDICAL & SURGICAL HISTORY:      ICU Vital Signs Last 24 Hrs  T(C): 37.4 (06 Jun 2022 16:01), Max: 38.3 (05 Jun 2022 21:00)  T(F): 99.3 (06 Jun 2022 16:01), Max: 100.9 (05 Jun 2022 21:00)  HR: 92 (06 Jun 2022 12:00) (73 - 98)  BP: 134/92 (06 Jun 2022 12:00) (106/78 - 148/109)  BP(mean): 87 (06 Jun 2022 08:15) (81 - 122)  ABP: --  ABP(mean): --  RR: 23 (06 Jun 2022 12:00) (19 - 27)  SpO2: 99% (06 Jun 2022 12:00) (98% - 100%)          I&O's Detail    05 Jun 2022 07:01  -  06 Jun 2022 07:00  --------------------------------------------------------  IN:    Enteral Tube Flush: 410 mL    IV PiggyBack: 150 mL    Pivot 1.5: 1150 mL  Total IN: 1710 mL    OUT:    Incontinent per Condom Catheter (mL): 1650 mL  Total OUT: 1650 mL    Total NET: 60 mL      06 Jun 2022 07:01  -  06 Jun 2022 17:40  --------------------------------------------------------  IN:    Enteral Tube Flush: 430 mL    Pivot 1.5: 450 mL  Total IN: 880 mL    OUT:    Incontinent per Condom Catheter (mL): 0 mL    Indwelling Catheter - Urethral (mL): 500 mL  Total OUT: 500 mL    Total NET: 380 mL    Physical Exam:    Neurological:  GCS 6T, E1, V1T, M4, withdraws weakly from pain, does not open eyes, disconjugate gaze  Neck: trach collar to T piece to allow for suction of copious thin secretions  ABD:  soft, non tender, non distended, PEG site clean and dry  EXT:  Warm, no edema    LABS:  CBC Full  -  ( 06 Jun 2022 06:48 )  WBC Count : 10.42 K/uL  RBC Count : 3.26 M/uL  Hemoglobin : 9.8 g/dL  Hematocrit : 30.2 %  Platelet Count - Automated : 614 K/uL  Mean Cell Volume : 92.6 fl  Mean Cell Hemoglobin : 30.1 pg  Mean Cell Hemoglobin Concentration : 32.5 gm/dL  Auto Neutrophil # : x  Auto Lymphocyte # : x  Auto Monocyte # : x  Auto Eosinophil # : x  Auto Basophil # : x  Auto Neutrophil % : x  Auto Lymphocyte % : x  Auto Monocyte % : x  Auto Eosinophil % : x  Auto Basophil % : x    06-06    136  |  99  |  18.3  ----------------------------<  146<H>  4.4   |  23.0  |  0.70    Ca    8.8      06 Jun 2022 06:48  Phos  3.7     06-06  Mg     2.1     06-06

## 2022-06-06 NOTE — PROGRESS NOTE ADULT - SUBJECTIVE AND OBJECTIVE BOX
24h Events:    Patient downgraded to step down overnight. Patient continues to tolerate trach collar with frequent suctioning requried. Tolerating tube feeds. +BM, requires intermittent catheterizations to drain urine. Hyponatremia resolved. Low-grade fevers overnight. Most recent repeat blood cultures negative.       ICU Vital Signs Last 24 Hrs  T(C): 38.2 (05 Jun 2022 22:00), Max: 38.3 (05 Jun 2022 04:00)  T(F): 100.8 (05 Jun 2022 22:00), Max: 100.9 (05 Jun 2022 04:00)  HR: 89 (05 Jun 2022 22:00) (78 - 101)  BP: 145/107 (05 Jun 2022 22:00) (103/58 - 148/109)  BP(mean): 118 (05 Jun 2022 22:00) (72 - 122)  ABP: --  ABP(mean): --  RR: 24 (05 Jun 2022 22:00) (15 - 34)  SpO2: 100% (05 Jun 2022 22:00) (93% - 100%)      I&O's Detail    04 Jun 2022 07:01  -  05 Jun 2022 07:00  --------------------------------------------------------  IN:    Enteral Tube Flush: 340 mL    IV PiggyBack: 275 mL    IV PiggyBack: 100 mL    Pivot 1.5: 1100 mL  Total IN: 1815 mL    OUT:    Incontinent per Condom Catheter (mL): 775 mL  Total OUT: 775 mL    Total NET: 1040 mL      05 Jun 2022 07:01  -  06 Jun 2022 00:17  --------------------------------------------------------  IN:    Enteral Tube Flush: 310 mL    IV PiggyBack: 150 mL    Pivot 1.5: 750 mL  Total IN: 1210 mL    OUT:    Incontinent per Condom Catheter (mL): 1650 mL  Total OUT: 1650 mL    Total NET: -440 mL              MEDICATIONS  (STANDING):  amantadine Syrup 150 milliGRAM(s) Oral <User Schedule>  artificial tears (preservative free) Ophthalmic Solution 1 Drop(s) Both EYES every 6 hours  bisacodyl Suppository 10 milliGRAM(s) Rectal daily  chlorhexidine 0.12% Liquid 15 milliLiter(s) Oral Mucosa every 12 hours  chlorhexidine 2% Cloths 1 Application(s) Topical daily  doxazosin 2 milliGRAM(s) Oral at bedtime  enoxaparin Injectable 40 milliGRAM(s) SubCutaneous every 24 hours  ferrous    sulfate Liquid 300 milliGRAM(s) Enteral Tube daily  folic acid 1 milliGRAM(s) Oral daily  insulin lispro (ADMELOG) corrective regimen sliding scale   SubCutaneous every 6 hours  levETIRAcetam  Solution 1000 milliGRAM(s) Oral two times a day  melatonin Liquid 5 milliGRAM(s) Oral at bedtime  multivitamin 1 Tablet(s) Oral daily  polyethylene glycol 3350 17 Gram(s) Oral daily  propranolol 20 milliGRAM(s) Oral every 8 hours  senna Syrup 10 milliLiter(s) Oral at bedtime  sodium chloride 2 Gram(s) Oral every 8 hours  sodium chloride 0.65% Nasal 1 Spray(s) Both Nostrils two times a day  thiamine 100 milliGRAM(s) Oral daily    MEDICATIONS  (PRN):  acetaminophen    Suspension .. 975 milliGRAM(s) Oral every 6 hours PRN Temp greater or equal to 38C (100.4F)  hydrALAZINE Injectable 20 milliGRAM(s) IV Push every 6 hours PRN Diastolic > 100  sodium chloride 0.9% lock flush 10 milliLiter(s) IV Push every 1 hour PRN Pre/post blood products, medications, blood draw, and to maintain line patency        Physical Exam:    Neurological: propofol & fentanyl gtt for sedation / analgesia    Pulmonary: mechanical ventilation;     Cardiovascular: S1, S2, regular rate & rhythm    Gastrointestinal: soft, non-tender, non-distended, no rebound / guarding    : sylvester in place draining yellow urine    Skin: warm, dry, no diaphoresis, no pallor, cyanosis, or jaundice    LABS:  CBC Full  -  ( 05 Jun 2022 04:31 )  WBC Count : 10.33 K/uL  RBC Count : 2.71 M/uL  Hemoglobin : 8.3 g/dL  Hematocrit : 24.5 %  Platelet Count - Automated : 564 K/uL  Mean Cell Volume : 90.4 fl  Mean Cell Hemoglobin : 30.6 pg  Mean Cell Hemoglobin Concentration : 33.9 gm/dL  Auto Neutrophil # : 7.91 K/uL  Auto Lymphocyte # : 0.98 K/uL  Auto Monocyte # : 1.10 K/uL  Auto Eosinophil # : 0.22 K/uL  Auto Basophil # : 0.05 K/uL  Auto Neutrophil % : 76.6 %  Auto Lymphocyte % : 9.5 %  Auto Monocyte % : 10.6 %  Auto Eosinophil % : 2.1 %  Auto Basophil % : 0.5 %    06-05    135  |  99  |  17.3  ----------------------------<  147<H>  4.2   |  28.0  |  0.63    Ca    8.6      05 Jun 2022 04:31  Phos  3.2     06-05  Mg     1.9     06-05          RECENT CULTURES:  06-01 .Blood Blood XXXX XXXX   No growth at 48 hours    05-30 .Blood Blood-Peripheral Blood Culture PCR   Growth in aerobic bottle: Gram Positive Cocci in Pairs and Chains  .  ***Blood Panel PCR results on this specimen are available  approximately 3 hours after the Gram stain result.***  Gram stain, PCR, and/or culture results may not always  correspond due to difference in methodologies.  ************************************************************  This PCR assay was performed using [employer].  The following targets are tested for: Enterococcus,  vancomycin resistant enterococci, Listeria monocytogenes,  coagulase negative staphylococci, S. aureus,  methicillin resistant S. aureus, Streptococcus agalactiae  (Group B), S. pneumoniae, S. pyogenes (Group A),  Acinetobacter baumannii, Enterobacter cloacae, E. coli,  Klebsiella oxytoca, K. pneumoniae, Proteus sp.,  Serratia marcescens, Haemophilus influenzae,  Neisseria meningitidis, Pseudomonas aeruginosa, Candida  albicans, C. glabrata, C krusei, C parapsilosis,  C. tropicalis and the KPC resistance gene.  .  Gram Stain and BCID performed by:  [facility]  [address] [place] [address]  .  TYPE: (C=Critical, N=Notification, A=Abnormal) C  TESTS:  _ GS  DATE/TIME CALLED: _ 06/01/2022 08:32:24  CALLED TO: John Wang RN  READ BACK (2 Patient Identifiers)(Y/N): _ Y  READ BACK VALUES (Y/N): _ Y  CALLED BY: _ dcashin   Growth in aerobic bottle: Streptococcus constellatus  Alpha hemolytic strep  (not Strep. pneumoniae or Enterococcus)  Single set isolate, possible contaminant. Contact  Microbiology if susceptibility testing clinically  indicated.                   24h Events:    Patient downgraded to step down overnight. Patient continues to tolerate trach collar with frequent suctioning requried. Tolerating tube feeds. +BM, requires intermittent catheterizations to drain urine. Hyponatremia resolved. Low-grade fevers overnight. Most recent repeat blood cultures negative.       ICU Vital Signs Last 24 Hrs  T(C): 38.2 (05 Jun 2022 22:00), Max: 38.3 (05 Jun 2022 04:00)  T(F): 100.8 (05 Jun 2022 22:00), Max: 100.9 (05 Jun 2022 04:00)  HR: 89 (05 Jun 2022 22:00) (78 - 101)  BP: 145/107 (05 Jun 2022 22:00) (103/58 - 148/109)  BP(mean): 118 (05 Jun 2022 22:00) (72 - 122)  ABP: --  ABP(mean): --  RR: 24 (05 Jun 2022 22:00) (15 - 34)  SpO2: 100% (05 Jun 2022 22:00) (93% - 100%)      I&O's Detail    04 Jun 2022 07:01  -  05 Jun 2022 07:00  --------------------------------------------------------  IN:    Enteral Tube Flush: 340 mL    IV PiggyBack: 275 mL    IV PiggyBack: 100 mL    Pivot 1.5: 1100 mL  Total IN: 1815 mL    OUT:    Incontinent per Condom Catheter (mL): 775 mL  Total OUT: 775 mL    Total NET: 1040 mL      05 Jun 2022 07:01  -  06 Jun 2022 00:17  --------------------------------------------------------  IN:    Enteral Tube Flush: 310 mL    IV PiggyBack: 150 mL    Pivot 1.5: 750 mL  Total IN: 1210 mL    OUT:    Incontinent per Condom Catheter (mL): 1650 mL  Total OUT: 1650 mL    Total NET: -440 mL              MEDICATIONS  (STANDING):  amantadine Syrup 150 milliGRAM(s) Oral <User Schedule>  artificial tears (preservative free) Ophthalmic Solution 1 Drop(s) Both EYES every 6 hours  bisacodyl Suppository 10 milliGRAM(s) Rectal daily  chlorhexidine 0.12% Liquid 15 milliLiter(s) Oral Mucosa every 12 hours  chlorhexidine 2% Cloths 1 Application(s) Topical daily  doxazosin 2 milliGRAM(s) Oral at bedtime  enoxaparin Injectable 40 milliGRAM(s) SubCutaneous every 24 hours  ferrous    sulfate Liquid 300 milliGRAM(s) Enteral Tube daily  folic acid 1 milliGRAM(s) Oral daily  insulin lispro (ADMELOG) corrective regimen sliding scale   SubCutaneous every 6 hours  levETIRAcetam  Solution 1000 milliGRAM(s) Oral two times a day  melatonin Liquid 5 milliGRAM(s) Oral at bedtime  multivitamin 1 Tablet(s) Oral daily  polyethylene glycol 3350 17 Gram(s) Oral daily  propranolol 20 milliGRAM(s) Oral every 8 hours  senna Syrup 10 milliLiter(s) Oral at bedtime  sodium chloride 2 Gram(s) Oral every 8 hours  sodium chloride 0.65% Nasal 1 Spray(s) Both Nostrils two times a day  thiamine 100 milliGRAM(s) Oral daily    MEDICATIONS  (PRN):  acetaminophen    Suspension .. 975 milliGRAM(s) Oral every 6 hours PRN Temp greater or equal to 38C (100.4F)  hydrALAZINE Injectable 20 milliGRAM(s) IV Push every 6 hours PRN Diastolic > 100  sodium chloride 0.9% lock flush 10 milliLiter(s) IV Push every 1 hour PRN Pre/post blood products, medications, blood draw, and to maintain line patency        Physical Exam:    Neurological: GCS 6T: E1, V1T, M4. Does not open eyes, withdraws from pain. Eyes: PERRL, dysconjugate gaze, face symmetric     Pulmonary: + trach to t-piece. Clear to auscultation.     Cardiovascular: S1, S2, regular rate & rhythm    Gastrointestinal: Soft, nontender, nondistended. PEG 4 at the skin    : sylvester in place draining yellow urine    Skin: warm, dry, no diaphoresis, no pallor, cyanosis, or jaundice. No skin breakdown    LABS:  CBC Full  -  ( 05 Jun 2022 04:31 )  WBC Count : 10.33 K/uL  RBC Count : 2.71 M/uL  Hemoglobin : 8.3 g/dL  Hematocrit : 24.5 %  Platelet Count - Automated : 564 K/uL  Mean Cell Volume : 90.4 fl  Mean Cell Hemoglobin : 30.6 pg  Mean Cell Hemoglobin Concentration : 33.9 gm/dL  Auto Neutrophil # : 7.91 K/uL  Auto Lymphocyte # : 0.98 K/uL  Auto Monocyte # : 1.10 K/uL  Auto Eosinophil # : 0.22 K/uL  Auto Basophil # : 0.05 K/uL  Auto Neutrophil % : 76.6 %  Auto Lymphocyte % : 9.5 %  Auto Monocyte % : 10.6 %  Auto Eosinophil % : 2.1 %  Auto Basophil % : 0.5 %    06-05    135  |  99  |  17.3  ----------------------------<  147<H>  4.2   |  28.0  |  0.63    Ca    8.6      05 Jun 2022 04:31  Phos  3.2     06-05  Mg     1.9     06-05          RECENT CULTURES:  06-01 .Blood Blood XXXX XXXX   No growth at 48 hours    05-30 .Blood Blood-Peripheral Blood Culture PCR   Growth in aerobic bottle: Gram Positive Cocci in Pairs and Chains  .  ***Blood Panel PCR results on this specimen are available  approximately 3 hours after the Gram stain result.***  Gram stain, PCR, and/or culture results may not always  correspond due to difference in methodologies.  ************************************************************  This PCR assay was performed using [employer].  The following targets are tested for: Enterococcus,  vancomycin resistant enterococci, Listeria monocytogenes,  coagulase negative staphylococci, S. aureus,  methicillin resistant S. aureus, Streptococcus agalactiae  (Group B), S. pneumoniae, S. pyogenes (Group A),  Acinetobacter baumannii, Enterobacter cloacae, E. coli,  Klebsiella oxytoca, K. pneumoniae, Proteus sp.,  Serratia marcescens, Haemophilus influenzae,  Neisseria meningitidis, Pseudomonas aeruginosa, Candida  albicans, C. glabrata, C krusei, C parapsilosis,  C. tropicalis and the KPC resistance gene.  .  Gram Stain and BCID performed by:  [facility]  [address] [place] [id number]  .  TYPE: (C=Critical, N=Notification, A=Abnormal) C  TESTS:  _ GS  DATE/TIME CALLED: _ 06/01/2022 08:32:24  CALLED TO: _ AMELIE Wang RN  READ BACK (2 Patient Identifiers)(Y/N): _ Y  READ BACK VALUES (Y/N): _ Y  CALLED BY: _ dcashin   Growth in aerobic bottle: Streptococcus constellatus  Alpha hemolytic strep  (not Strep. pneumoniae or Enterococcus)  Single set isolate, possible contaminant. Contact  Microbiology if susceptibility testing clinically  indicated.

## 2022-06-06 NOTE — PROGRESS NOTE ADULT - ASSESSMENT
26 yo Male, found unresponsive, mechanism of injury unknown found to have Right SDH SP craniectomy with L SDH, and BL Parenchymal hematomas, + TEOFILO, multiple facial fractures, ETOH abuse, now w/ possible ETOH withdrawal.    Neurological:  Severe TBI - continue supportive care and Keppra BID until 6/11. and amantadine per PM&R recs. Continue Librium taper for hx of ETOH withdrawal. No signs of withdrawals.     Pulmonary:  Tolerating trach collar. Still requiring pulmonary toileting.    Cardiovascular: Propranolol for paroxsymal sympathetic hyperactivity, hydralazine prn for hypertension.     Gastrointestinal:  full enteral nutrition, continue tube feeds via peg @ goal 50ml/hr.     Genitourinary: Hyponatremia improved.  UOP adequate. Bladder scan q6hrs if does not void. Straight cath if >500 as per nursing policy.    Heme: SCDs, lovenox    ID: [id number] d/c 6/5. Most recent blood cultures negative.      Skin: frequent turning and repositioning while in bed. Avoid any skin breakdown.     Dispo:  Downgraded to step down.

## 2022-06-06 NOTE — CHART NOTE - NSCHARTNOTEFT_GEN_A_CORE
Source: Patient [ ]  Family [ ]   other [x ]    Current Diet: Diet, NPO with Tube Feed:   Tube Feeding Modality: Nasogastric  Pivot 1.5 Micky (PIVOT1.5RTH)  Total Volume for 24 Hours (mL): 1200  Continuous  Starting Tube Feed Rate {mL per Hour}: 20  Increase Tube Feed Rate by (mL): 10     Every 4 hours  Until Goal Tube Feed Rate (mL per Hour): 50  Tube Feed Duration (in Hours): 24  Tube Feed Start Time: 11:00  No Carb Prosource TF     Qty per Day:  2 (05-31-22 @ 11:14)    Enteral /Parenteral Nutrition: Tube feeds at goal rate of 50 ml/hr (x20 hrs) provides 1000 ml, 1500 kcal, 94g protein, 759 ml free water, and 100% of RDIs for vitamins/minerals. Also receiving LPS 30 ml BID to provide an additional 200 kcal and 30g protein daily.    Current Weight:   (6/5) 160.7 lbs  (6/5) 174.6 lbs  (6/2) 184.5 lbs  (5/28) 194.2 lbs  (5/27) 188.4 lbs  (5/25) 160 lbs  (5/24) 153.4 lbs  ? accuracy of weights, noted with 1+ generalized edema, continue to trend and maintain strict Is&Os     Pertinent Medications: MEDICATIONS  (STANDING):  amantadine Syrup 150 milliGRAM(s) Oral <User Schedule>  artificial tears (preservative free) Ophthalmic Solution 1 Drop(s) Both EYES every 6 hours  bisacodyl Suppository 10 milliGRAM(s) Rectal daily  chlorhexidine 0.12% Liquid 15 milliLiter(s) Oral Mucosa every 12 hours  chlorhexidine 2% Cloths 1 Application(s) Topical daily  doxazosin 2 milliGRAM(s) Oral at bedtime  enoxaparin Injectable 40 milliGRAM(s) SubCutaneous every 24 hours  ferrous    sulfate Liquid 300 milliGRAM(s) Enteral Tube daily  folic acid 1 milliGRAM(s) Oral daily  insulin lispro (ADMELOG) corrective regimen sliding scale   SubCutaneous every 6 hours  levETIRAcetam  Solution 1000 milliGRAM(s) Oral two times a day  melatonin Liquid 5 milliGRAM(s) Oral at bedtime  multivitamin 1 Tablet(s) Oral daily  polyethylene glycol 3350 17 Gram(s) Oral daily  propranolol 10 milliGRAM(s) Oral every 8 hours  senna Syrup 10 milliLiter(s) Oral at bedtime  sodium chloride 2 Gram(s) Oral every 8 hours  sodium chloride 0.65% Nasal 1 Spray(s) Both Nostrils two times a day  thiamine 100 milliGRAM(s) Oral daily    MEDICATIONS  (PRN):  acetaminophen    Suspension .. 975 milliGRAM(s) Oral every 6 hours PRN Temp greater or equal to 38C (100.4F)  hydrALAZINE Injectable 20 milliGRAM(s) IV Push every 6 hours PRN Diastolic > 100  sodium chloride 0.9% lock flush 10 milliLiter(s) IV Push every 1 hour PRN Pre/post blood products, medications, blood draw, and to maintain line patency    Pertinent Labs: CBC Full  -  ( 06 Jun 2022 06:48 )  WBC Count : 10.42 K/uL  RBC Count : 3.26 M/uL  Hemoglobin : 9.8 g/dL  Hematocrit : 30.2 %  Platelet Count - Automated : 614 K/uL  Mean Cell Volume : 92.6 fl  Mean Cell Hemoglobin : 30.1 pg  Mean Cell Hemoglobin Concentration : 32.5 gm/dL  Auto Neutrophil # : x  Auto Lymphocyte # : x  Auto Monocyte # : x  Auto Eosinophil # : x  Auto Basophil # : x  Auto Neutrophil % : x  Auto Lymphocyte % : x  Auto Monocyte % : x  Auto Eosinophil % : x  Auto Basophil % : x    06-06 Na136 mmol/L Glu 146 mg/dL<H> K+ 4.4 mmol/L Cr  0.70 mg/dL BUN 18.3 mg/dL Phos 3.7 mg/dL Alb n/a   PAB n/a       Skin: Surgical incision to Right temporal region per documentation  Jalen: 12    Nutrition focused physical exam not conducted at this time- found signs of malnutrition [ ]absent [ ]present    Subcutaneous fat loss: [ ] Orbital fat pads region, [ ]Buccal fat region, [ ]Triceps region,  [ ]Ribs region    Muscle wasting: [ ]Temples region, [ ]Clavicle region, [ ]Shoulder region, [ ]Scapula region, [ ]Interosseous region,  [ ]thigh region, [ ]Calf region    Estimated Needs:   [ x] no change since previous assessment  [ ] recalculated:     Current Nutrition Diagnosis: Pt remains at nutrition risk secondary to increased nutrient needs related to increased physiological demand for nutrient as evidenced by Right SDH s/p craniectomy with Left SDH, b/l parenchymal hematomas, +TEOFILO, multiple facial fractures, ETOH abuse, now w/ possible ETOH withdrawal. Pt downgraded from ICU, tolerating trach collar. Tube feeds continue, currently running at goal rate of 50 ml/hr. Last BM 6/6. Palliative consulted for [facility] [place]. RD to follow up.       Recommendations:   1) As medically feasible/tolerable, initiate Pivot 1.5 @ 20 ml/hr and advance 10 ml/hr q4 hrs until goal rate of 60 ml/hr (x20 hrs) to provide 1200 ml, 1800 kcal, 113g protein, 911 ml free water, and >100% of RDIs for vitamins/minerals. Additional free water per MD discretion.   2) Continue MVI, thiamine, and folic acid supplementation.   3) Bowel regimen PRN.  4) Obtain daily weights to monitor trends.   5) Continue bowel regimen PRN.     Monitoring and Evaluation:   [ ] PO intake [ x] Tolerance to diet prescription [X] Weights  [X] Follow up per protocol [X] Labs. Source: Patient [ ]  Family [ ]   other [x ]    Current Diet: Diet, NPO with Tube Feed:   Tube Feeding Modality: Nasogastric  Pivot 1.5 Micky (PIVOT1.5RTH)  Total Volume for 24 Hours (mL): 1200  Continuous  Starting Tube Feed Rate {mL per Hour}: 20  Increase Tube Feed Rate by (mL): 10     Every 4 hours  Until Goal Tube Feed Rate (mL per Hour): 50  Tube Feed Duration (in Hours): 24  Tube Feed Start Time: 11:00  No Carb Prosource TF     Qty per Day:  2 (05-31-22 @ 11:14)    Enteral /Parenteral Nutrition: Tube feeds at goal rate of 50 ml/hr (x20 hrs) provides 1000 ml, 1500 kcal, 94g protein, 759 ml free water, and 100% of RDIs for vitamins/minerals. Also receiving LPS 30 ml BID to provide an additional 200 kcal and 30g protein daily.    Current Weight:   (6/5) 160.7 lbs  (6/5) 174.6 lbs  (6/2) 184.5 lbs  (5/28) 194.2 lbs  (5/27) 188.4 lbs  (5/25) 160 lbs  (5/24) 153.4 lbs  ? accuracy of weights, noted with 1+ generalized edema, continue to trend and maintain strict Is&Os     Pertinent Medications: MEDICATIONS  (STANDING):  amantadine Syrup 150 milliGRAM(s) Oral <User Schedule>  artificial tears (preservative free) Ophthalmic Solution 1 Drop(s) Both EYES every 6 hours  bisacodyl Suppository 10 milliGRAM(s) Rectal daily  chlorhexidine 0.12% Liquid 15 milliLiter(s) Oral Mucosa every 12 hours  chlorhexidine 2% Cloths 1 Application(s) Topical daily  doxazosin 2 milliGRAM(s) Oral at bedtime  enoxaparin Injectable 40 milliGRAM(s) SubCutaneous every 24 hours  ferrous    sulfate Liquid 300 milliGRAM(s) Enteral Tube daily  folic acid 1 milliGRAM(s) Oral daily  insulin lispro (ADMELOG) corrective regimen sliding scale   SubCutaneous every 6 hours  levETIRAcetam  Solution 1000 milliGRAM(s) Oral two times a day  melatonin Liquid 5 milliGRAM(s) Oral at bedtime  multivitamin 1 Tablet(s) Oral daily  polyethylene glycol 3350 17 Gram(s) Oral daily  propranolol 10 milliGRAM(s) Oral every 8 hours  senna Syrup 10 milliLiter(s) Oral at bedtime  sodium chloride 2 Gram(s) Oral every 8 hours  sodium chloride 0.65% Nasal 1 Spray(s) Both Nostrils two times a day  thiamine 100 milliGRAM(s) Oral daily    MEDICATIONS  (PRN):  acetaminophen    Suspension .. 975 milliGRAM(s) Oral every 6 hours PRN Temp greater or equal to 38C (100.4F)  hydrALAZINE Injectable 20 milliGRAM(s) IV Push every 6 hours PRN Diastolic > 100  sodium chloride 0.9% lock flush 10 milliLiter(s) IV Push every 1 hour PRN Pre/post blood products, medications, blood draw, and to maintain line patency    Pertinent Labs: CBC Full  -  ( 06 Jun 2022 06:48 )  WBC Count : 10.42 K/uL  RBC Count : 3.26 M/uL  Hemoglobin : 9.8 g/dL  Hematocrit : 30.2 %  Platelet Count - Automated : 614 K/uL  Mean Cell Volume : 92.6 fl  Mean Cell Hemoglobin : 30.1 pg  Mean Cell Hemoglobin Concentration : 32.5 gm/dL  Auto Neutrophil # : x  Auto Lymphocyte # : x  Auto Monocyte # : x  Auto Eosinophil # : x  Auto Basophil # : x  Auto Neutrophil % : x  Auto Lymphocyte % : x  Auto Monocyte % : x  Auto Eosinophil % : x  Auto Basophil % : x    06-06 Na136 mmol/L Glu 146 mg/dL<H> K+ 4.4 mmol/L Cr  0.70 mg/dL BUN 18.3 mg/dL Phos 3.7 mg/dL Alb n/a   PAB n/a       Skin: Surgical incision to Right temporal region per documentation  Jalen: 12    Nutrition focused physical exam not conducted at this time- found signs of malnutrition [ ]absent [ ]present    Subcutaneous fat loss: [ ] Orbital fat pads region, [ ]Buccal fat region, [ ]Triceps region,  [ ]Ribs region    Muscle wasting: [ ]Temples region, [ ]Clavicle region, [ ]Shoulder region, [ ]Scapula region, [ ]Interosseous region,  [ ]thigh region, [ ]Calf region    Estimated Needs:   [ x] no change since previous assessment  [ ] recalculated:     Current Nutrition Diagnosis: Pt remains at nutrition risk secondary to increased nutrient needs related to increased physiological demand for nutrient as evidenced by Right SDH s/p craniectomy with Left SDH, b/l parenchymal hematomas, +TEOFILO, multiple facial fractures, ETOH abuse, now w/ possible ETOH withdrawal. Pt downgraded from ICU, tolerating trach collar. Tube feeds continue, currently running at goal rate of 50 ml/hr. Last BM 6/6. Palliative consulted for [facility]. RD to follow up.       Recommendations:   1) Continue tube feeds as ordered; additional free water per MD discretion.   2) Continue MVI, thiamine, and folic acid supplementation.   3) Bowel regimen PRN.  4) Obtain daily weights to monitor trends.   5) Continue bowel regimen PRN.     Monitoring and Evaluation:   [ ] PO intake [ x] Tolerance to diet prescription [X] Weights  [X] Follow up per protocol [X] Labs.
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
## 2022-06-07 LAB
GLUCOSE BLDC GLUCOMTR-MCNC: 146 MG/DL — HIGH (ref 70–99)
GLUCOSE BLDC GLUCOMTR-MCNC: 155 MG/DL — HIGH (ref 70–99)
GLUCOSE BLDC GLUCOMTR-MCNC: 169 MG/DL — HIGH (ref 70–99)
GLUCOSE BLDC GLUCOMTR-MCNC: 175 MG/DL — HIGH (ref 70–99)
GLUCOSE BLDC GLUCOMTR-MCNC: 204 MG/DL — HIGH (ref 70–99)

## 2022-06-07 PROCEDURE — 99497 ADVNCD CARE PLAN 30 MIN: CPT | Mod: 25

## 2022-06-07 PROCEDURE — 71045 X-RAY EXAM CHEST 1 VIEW: CPT | Mod: 26

## 2022-06-07 PROCEDURE — 99233 SBSQ HOSP IP/OBS HIGH 50: CPT

## 2022-06-07 RX ORDER — ENOXAPARIN SODIUM 100 MG/ML
30 INJECTION SUBCUTANEOUS EVERY 12 HOURS
Refills: 0 | Status: DISCONTINUED | OUTPATIENT
Start: 2022-06-07 | End: 2022-06-28

## 2022-06-07 RX ORDER — SCOPALAMINE 1 MG/3D
1 PATCH, EXTENDED RELEASE TRANSDERMAL
Refills: 0 | Status: DISCONTINUED | OUTPATIENT
Start: 2022-06-07 | End: 2022-06-08

## 2022-06-07 RX ADMIN — SODIUM CHLORIDE 2 GRAM(S): 9 INJECTION INTRAMUSCULAR; INTRAVENOUS; SUBCUTANEOUS at 22:41

## 2022-06-07 RX ADMIN — Medication 1 DROP(S): at 17:04

## 2022-06-07 RX ADMIN — SCOPALAMINE 1 PATCH: 1 PATCH, EXTENDED RELEASE TRANSDERMAL at 19:26

## 2022-06-07 RX ADMIN — Medication 1 DROP(S): at 13:33

## 2022-06-07 RX ADMIN — Medication 1: at 05:34

## 2022-06-07 RX ADMIN — Medication 975 MILLIGRAM(S): at 15:55

## 2022-06-07 RX ADMIN — LEVETIRACETAM 1000 MILLIGRAM(S): 250 TABLET, FILM COATED ORAL at 05:33

## 2022-06-07 RX ADMIN — LEVETIRACETAM 1000 MILLIGRAM(S): 250 TABLET, FILM COATED ORAL at 17:02

## 2022-06-07 RX ADMIN — SODIUM CHLORIDE 2 GRAM(S): 9 INJECTION INTRAMUSCULAR; INTRAVENOUS; SUBCUTANEOUS at 05:33

## 2022-06-07 RX ADMIN — Medication 975 MILLIGRAM(S): at 09:44

## 2022-06-07 RX ADMIN — Medication 975 MILLIGRAM(S): at 10:44

## 2022-06-07 RX ADMIN — SENNA PLUS 10 MILLILITER(S): 8.6 TABLET ORAL at 22:41

## 2022-06-07 RX ADMIN — POLYETHYLENE GLYCOL 3350 17 GRAM(S): 17 POWDER, FOR SOLUTION ORAL at 13:26

## 2022-06-07 RX ADMIN — Medication 1: at 01:13

## 2022-06-07 RX ADMIN — Medication 1 MILLIGRAM(S): at 13:19

## 2022-06-07 RX ADMIN — Medication 5 MILLIGRAM(S): at 22:40

## 2022-06-07 RX ADMIN — Medication 1 TABLET(S): at 13:19

## 2022-06-07 RX ADMIN — ENOXAPARIN SODIUM 30 MILLIGRAM(S): 100 INJECTION SUBCUTANEOUS at 17:04

## 2022-06-07 RX ADMIN — SODIUM CHLORIDE 2 GRAM(S): 9 INJECTION INTRAMUSCULAR; INTRAVENOUS; SUBCUTANEOUS at 13:35

## 2022-06-07 RX ADMIN — Medication 200 MILLIGRAM(S): at 13:21

## 2022-06-07 RX ADMIN — SCOPALAMINE 1 PATCH: 1 PATCH, EXTENDED RELEASE TRANSDERMAL at 13:20

## 2022-06-07 RX ADMIN — Medication 1 SPRAY(S): at 17:05

## 2022-06-07 RX ADMIN — Medication 2 MILLIGRAM(S): at 22:41

## 2022-06-07 RX ADMIN — Medication 200 MILLIGRAM(S): at 05:30

## 2022-06-07 RX ADMIN — Medication 300 MILLIGRAM(S): at 13:19

## 2022-06-07 RX ADMIN — Medication 1 DROP(S): at 05:31

## 2022-06-07 RX ADMIN — Medication 100 MILLIGRAM(S): at 13:19

## 2022-06-07 RX ADMIN — Medication 975 MILLIGRAM(S): at 16:55

## 2022-06-07 RX ADMIN — Medication 1: at 18:28

## 2022-06-07 RX ADMIN — Medication 1 DROP(S): at 01:14

## 2022-06-07 NOTE — PROGRESS NOTE ADULT - ASSESSMENT
26yo M with prior hx of ETOH and cocaine abuse admitted after found down and unresponsive, found to have Rt SDH with mass effect s/p craniectomy with evac, diffuse axonal injury, chronic respiratory failure s/p trach/peg and overall poor neurological prognosis for meaningful recovery.     PLAN    Bilateral SDH, ICH/IPH,   Diffuse Axonal Injury  Facial Fx  - s/p craniectomy with evac by NSX  - overall poor prognosis for meaningful neurological recovery  - close neuro monitoring, pain control, IV keppra for seizure prophylaxis   - TBI team following, input appreciated     Chronic Respiratory Failure  - s/p trach on 6/1, on trach collar, O2 supplement PRN    Dysphagia, S/P PEG  - c/w TF as tolerated    Acute Pain  - comfortable on APAP 650mh PRN    Hx of ETOH and Cocaine Abuse  - Utox positive for cocaine and elevated levels of ETOH  - monitor closely for withdrawal although less likely as pt admitted ~2 weeks ago    Palliative Care Encounter  - full code  - surrogates are both parents however mother has been point of contact per staff as father had to return back to [place]  - Dispo planning for likely LTC, defer to SW/[facility]  - Palliative team will reach out to mother to offer support given devastating brain injury and poor neurological status

## 2022-06-07 NOTE — PROGRESS NOTE ADULT - ASSESSMENT
Imp s/p Rt craniectomy     Plan wound checks until staples out & planning for cranioplasty vs rehab with helmet

## 2022-06-07 NOTE — PROGRESS NOTE ADULT - SUBJECTIVE AND OBJECTIVE BOX
SUBJECTIVE/24 hour events:  Patient is a 25yMale found down, unknown mechanism, poly trauma downgraded from the sicu with no acute events. Patient peg and trached ( on trach collar and doing well) June 10th the sutures come out of trach site and will likely be downsized, is on a propanolol wean that stops today, post-op from Supratentorial craniectomy for evacuation of subdural hematoma. Patient fitted for a helmet by the orthotist for when out of bed      Vital Signs Last 24 Hrs  T(C): 37.7 (06 Jun 2022 20:00), Max: 38 (06 Jun 2022 05:00)  T(F): 99.8 (06 Jun 2022 20:00), Max: 100.4 (06 Jun 2022 05:00)  HR: 82 (06 Jun 2022 20:00) (73 - 98)  BP: 150/92 (06 Jun 2022 20:00) (106/78 - 150/92)  BP(mean): 112 (06 Jun 2022 16:00) (81 - 112)  RR: 20 (06 Jun 2022 20:00) (19 - 24)  SpO2: 98% (07 Jun 2022 01:00) (96% - 100%)  Drug Dosing Weight  Height (cm): 170.2 (24 May 2022 13:00)  Weight (kg): 70 (24 May 2022 13:00)  BMI (kg/m2): 24.2 (24 May 2022 13:00)  BSA (m2): 1.81 (24 May 2022 13:00)  I&O's Detail    05 Jun 2022 07:01  -  06 Jun 2022 07:00  --------------------------------------------------------  IN:    Enteral Tube Flush: 410 mL    IV PiggyBack: 150 mL    Pivot 1.5: 1150 mL  Total IN: 1710 mL    OUT:    Incontinent per Condom Catheter (mL): 1650 mL  Total OUT: 1650 mL    Total NET: 60 mL      06 Jun 2022 07:01  -  07 Jun 2022 00:41  --------------------------------------------------------  IN:    Enteral Tube Flush: 500 mL    Pivot 1.5: 600 mL  Total IN: 1100 mL    OUT:    Incontinent per Condom Catheter (mL): 1400 mL  Total OUT: 1400 mL    Total NET: -300 mL        Allergies    Allergy Status Unknown    Intolerances                              9.8    10.42 )-----------( 614      ( 06 Jun 2022 06:48 )             30.2   06-06    136  |  99  |  18.3  ----------------------------<  146<H>  4.4   |  23.0  |  0.70    Ca    8.8      06 Jun 2022 06:48  Phos  3.7     06-06  Mg     2.1     06-06        ROS:    PHYSICAL EXAM:  Constitutional: in no distress  Respiratory: on trach collar tolerating well with humidifed air, sutures well seated  Gastrointestinal: abdomen softly distended, peg tube well seated  Genitourinary: condom cath in place  Extremities: atraumatic, hematoma/ ecchymosis to lateral right thigh resolving   Neurological: GCS 3, staples to surgical craniectomy site well seated  Skin: warm, dry and no rashes         MEDICATIONS  (STANDING):  amantadine Syrup 200 milliGRAM(s) Oral <User Schedule>  artificial tears (preservative free) Ophthalmic Solution 1 Drop(s) Both EYES every 6 hours  bisacodyl Suppository 10 milliGRAM(s) Rectal daily  doxazosin 2 milliGRAM(s) Oral at bedtime  enoxaparin Injectable 40 milliGRAM(s) SubCutaneous every 24 hours  ferrous    sulfate Liquid 300 milliGRAM(s) Enteral Tube daily  folic acid 1 milliGRAM(s) Oral daily  insulin lispro (ADMELOG) corrective regimen sliding scale   SubCutaneous every 6 hours  levETIRAcetam  Solution 1000 milliGRAM(s) Oral two times a day  melatonin Liquid 5 milliGRAM(s) Oral at bedtime  multivitamin 1 Tablet(s) Oral daily  polyethylene glycol 3350 17 Gram(s) Oral daily  propranolol 10 milliGRAM(s) Oral every 8 hours  senna Syrup 10 milliLiter(s) Oral at bedtime  sodium chloride 2 Gram(s) Oral every 8 hours  sodium chloride 0.65% Nasal 1 Spray(s) Both Nostrils two times a day  thiamine 100 milliGRAM(s) Oral daily    MEDICATIONS  (PRN):  acetaminophen    Suspension .. 975 milliGRAM(s) Oral every 6 hours PRN Temp greater or equal to 38C (100.4F)  hydrALAZINE Injectable 20 milliGRAM(s) IV Push every 6 hours PRN Diastolic > 100  sodium chloride 0.9% lock flush 10 milliLiter(s) IV Push every 1 hour PRN Pre/post blood products, medications, blood draw, and to maintain line patency      RADIOLOGY STUDIES:    CULTURES:

## 2022-06-07 NOTE — PROGRESS NOTE ADULT - NSPROGADDITIONALINFOA_GEN_ALL_CORE
NSGY Attg:    see above    patient seen and examined by PA staff    agree with exam and plan as above
NSGY Attg:    see above    patient seen and examined    agree with exam and plan as above

## 2022-06-07 NOTE — PROGRESS NOTE ADULT - SUBJECTIVE AND OBJECTIVE BOX
HPI:  HPI: Patient is a 25y old M brought by EMS, found down in the street unresponsive.     25M s/p Rt hemicraniectomy for TBI/ multicompartment injury May 24, EEG negative for seizure    MEDICATIONS  (STANDING):  amantadine Syrup 200 milliGRAM(s) Oral <User Schedule>  artificial tears (preservative free) Ophthalmic Solution 1 Drop(s) Both EYES every 6 hours  bisacodyl Suppository 10 milliGRAM(s) Rectal daily  doxazosin 2 milliGRAM(s) Oral at bedtime  enoxaparin Injectable 30 milliGRAM(s) SubCutaneous every 12 hours  ferrous    sulfate Liquid 300 milliGRAM(s) Enteral Tube daily  folic acid 1 milliGRAM(s) Oral daily  insulin lispro (ADMELOG) corrective regimen sliding scale   SubCutaneous every 6 hours  levETIRAcetam  Solution 1000 milliGRAM(s) Oral two times a day  melatonin Liquid 5 milliGRAM(s) Oral at bedtime  multivitamin 1 Tablet(s) Oral daily  polyethylene glycol 3350 17 Gram(s) Oral daily  propranolol 10 milliGRAM(s) Oral every 12 hours  scopolamine 1 mG/72 Hr(s) Patch 1 Patch Transdermal every 72 hours  senna Syrup 10 milliLiter(s) Oral at bedtime  sodium chloride 2 Gram(s) Oral every 8 hours  sodium chloride 0.65% Nasal 1 Spray(s) Both Nostrils two times a day  thiamine 100 milliGRAM(s) Oral daily    MEDICATIONS  (PRN):  acetaminophen    Suspension .. 975 milliGRAM(s) Oral every 6 hours PRN Temp greater or equal to 38C (100.4F)  hydrALAZINE Injectable 20 milliGRAM(s) IV Push every 6 hours PRN Diastolic > 100  sodium chloride 0.9% lock flush 10 milliLiter(s) IV Push every 1 hour PRN Pre/post blood products, medications, blood draw, and to maintain line patency      COVID-19 PCR: NotDetec (06 Jun 2022 04:01)  COVID-19 PCR: NotDetec (31 May 2022 04:05)  COVID-19 PCR: NotDetec (24 May 2022 02:07)                          9.8    10.42 )-----------( 614      ( 06 Jun 2022 06:48 )             30.2     06-06    136  |  99  |  18.3  ----------------------------<  146<H>  4.4   |  23.0  |  0.70    Ca    8.8      06 Jun 2022 06:48  Phos  3.7     06-06  Mg     2.1     06-06      Vital Signs Last 24 Hrs  T(C): 38 (07 Jun 2022 09:40), Max: 38.4 (07 Jun 2022 07:24)  T(F): 100.4 (07 Jun 2022 09:40), Max: 101.1 (07 Jun 2022 07:24)  HR: 91 (07 Jun 2022 07:24) (82 - 101)  BP: 124/81 (07 Jun 2022 07:24) (124/81 - 150/92)  BP(mean): 112 (06 Jun 2022 16:00) (112 - 112)  RR: 20 (07 Jun 2022 07:24) (18 - 22)  SpO2: 98% (07 Jun 2022 07:24) (96% - 98%)    PE  E1V1 (tracheostomy) M4  flap soft  incision cleaned, majority of staples removed, no discharge

## 2022-06-07 NOTE — PROGRESS NOTE ADULT - SUBJECTIVE AND OBJECTIVE BOX
[facility] PALLIATIVE MEDICINE     CC: FOLLOW UP VISIT + GOC    INTERVAL HPI/OVERNIGHT EVENTS:  Source if other than patient:  []Family   [x]Team     No acute events overnight.      PRESENT SYMPTOMS:     Dyspnea: trach collar  Nausea/Vomiting:  No  Anxiety:   No  Depression: unable  Fatigue: Yes    Loss of appetite: S/P PEG  Constipation:  No    Pain: No            Character-            Duration-            Effect-            Factors-            Frequency-            Location-            Severity-    Pain AD Score:  [email or web address] (press ctrl + left click to view)    Review of Systems: Unable to obtain due to poor mentation/encephalopathy     MEDICATIONS  (STANDING):  amantadine Syrup 200 milliGRAM(s) Oral <User Schedule>  artificial tears (preservative free) Ophthalmic Solution 1 Drop(s) Both EYES every 6 hours  bisacodyl Suppository 10 milliGRAM(s) Rectal daily  doxazosin 2 milliGRAM(s) Oral at bedtime  enoxaparin Injectable 40 milliGRAM(s) SubCutaneous every 24 hours  ferrous    sulfate Liquid 300 milliGRAM(s) Enteral Tube daily  folic acid 1 milliGRAM(s) Oral daily  insulin lispro (ADMELOG) corrective regimen sliding scale   SubCutaneous every 6 hours  levETIRAcetam  Solution 1000 milliGRAM(s) Oral two times a day  melatonin Liquid 5 milliGRAM(s) Oral at bedtime  multivitamin 1 Tablet(s) Oral daily  polyethylene glycol 3350 17 Gram(s) Oral daily  propranolol 10 milliGRAM(s) Oral every 8 hours  scopolamine 1 mG/72 Hr(s) Patch 1 Patch Transdermal every 72 hours  senna Syrup 10 milliLiter(s) Oral at bedtime  sodium chloride 2 Gram(s) Oral every 8 hours  sodium chloride 0.65% Nasal 1 Spray(s) Both Nostrils two times a day  thiamine 100 milliGRAM(s) Oral daily    MEDICATIONS  (PRN):  acetaminophen    Suspension .. 975 milliGRAM(s) Oral every 6 hours PRN Temp greater or equal to 38C (100.4F)  hydrALAZINE Injectable 20 milliGRAM(s) IV Push every 6 hours PRN Diastolic > 100  sodium chloride 0.9% lock flush 10 milliLiter(s) IV Push every 1 hour PRN Pre/post blood products, medications, blood draw, and to maintain line patency      PHYSICAL EXAM:    Vital Signs Last 24 Hrs  T(C): 38 (07 Jun 2022 09:40), Max: 38.4 (07 Jun 2022 07:24)  T(F): 100.4 (07 Jun 2022 09:40), Max: 101.1 (07 Jun 2022 07:24)  HR: 91 (07 Jun 2022 07:24) (82 - 101)  BP: 124/81 (07 Jun 2022 07:24) (124/81 - 150/92)  BP(mean): 112 (06 Jun 2022 16:00) (112 - 112)  RR: 20 (07 Jun 2022 07:24) (18 - 23)  SpO2: 98% (07 Jun 2022 07:24) (96% - 99%)       Karnofsky: 10 %    General: resting comfortably and no acute distress.     HEENT: mucous membrane moist  +trach collar with NC    Lungs: comfortable nonlabored      CV: S1/S2. Regular rate and rhythm    GI: +BS abdomen soft, nondistended, nontender    :  sylvester    MSK:  no cyanosis. +generalized edema      Neuro: nonfocal. nonverbal.  lethargic.    Skin: warm and dry.        LABS:                          9.8    10.42 )-----------( 614      ( 06 Jun 2022 06:48 )             30.2     06-06    136  |  99  |  18.3  ----------------------------<  146<H>  4.4   |  23.0  |  0.70    Ca    8.8      06 Jun 2022 06:48  Phos  3.7     06-06  Mg     2.1     06-06    I&O's Summary    06 Jun 2022 07:01  -  07 Jun 2022 07:00  --------------------------------------------------------  IN: 1100 mL / OUT: 2200 mL / NET: -1100 mL        RADIOLOGY & ADDITIONAL STUDIES: Reviewed     ADVANCE DIRECTIVES/TREATMENT PREFERENCES: FULL CODE  DNR YES NO  Completed on:                     MOLST  YES NO   Completed on:  Living Will  YES NO   Completed on:

## 2022-06-07 NOTE — PROGRESS NOTE ADULT - SUBJECTIVE AND OBJECTIVE BOX
43 y/o M BIBEMS s/p found on the side of a street unresponsive.  Platics following for right maxillary sinus fx. Fractures extend to the nasomaxillary junction, periphery of the right lacrimal duct, and inferior orbital wall. Inferior rectus muscle enlargement and right zmc fx.     s/p Rt hemicraniectomy for TBI/ multicompartment injury May 24, EEG negative for seizure                    9.8    10.42 )-----------( 614      ( 06 Jun 2022 06:48 )             30.2     06-06    136  |  99  |  18.3  ----------------------------<  146<H>  4.4   |  23.0  |  0.70    Ca    8.8      06 Jun 2022 06:48  Phos  3.7     06-06  Mg     2.1     06-06      Vital Signs Last 24 Hrs  T(C): 38 (07 Jun 2022 09:40), Max: 38.4 (07 Jun 2022 07:24)  T(F): 100.4 (07 Jun 2022 09:40), Max: 101.1 (07 Jun 2022 07:24)  HR: 91 (07 Jun 2022 07:24) (82 - 101)  BP: 124/81 (07 Jun 2022 07:24) (124/81 - 150/92)  BP(mean): 112 (06 Jun 2022 16:00) (112 - 112)  RR: 20 (07 Jun 2022 07:24) (18 - 22)  SpO2: 98% (07 Jun 2022 07:24) (96% - 98%)    Physical exam  General: in no acute distress  HEENT: + trach, pupils sluggish b/l, cannot assess EOM or facial sensation, palpable depression of right ZMC, no palpable step offs of the right orbital rim, + staples right side of scalp, + edema right side of scalp and face  Skin: warm, dry

## 2022-06-07 NOTE — PROGRESS NOTE ADULT - ASSESSMENT
Patient is a 24 y/o M found down unknown mechanism, poly trauma, post-op from craniectomy, trached and peg, on trach collar  1. TBI, continue all medications as per PM&R  2. Wean propranolol off tomorrow 6/7  3. Tolerating trach collar, sating well. DISCONTINUE sutures and down size 6/10  4. Tolerating peg tube feeds  5. Lovenox for dvt ppx.  6. dispo pending placement for long term care

## 2022-06-07 NOTE — PROGRESS NOTE ADULT - ASSESSMENT
43 y/o M w/ subdural hematoma w/ trace c-spine epidural hemorrhage and right maxillary, ZMC/infraorbit fxs    - HOB elevated  - avoid as much pressure to the right side of face  - we will cont to follow and treat fxs conservatively at this time 2/2 current comorbidities  - please contact for any acute changes [phone]

## 2022-06-08 LAB
ANION GAP SERPL CALC-SCNC: 12 MMOL/L — SIGNIFICANT CHANGE UP (ref 5–17)
BASOPHILS # BLD AUTO: 0.1 K/UL — SIGNIFICANT CHANGE UP (ref 0–0.2)
BASOPHILS NFR BLD AUTO: 0.9 % — SIGNIFICANT CHANGE UP (ref 0–2)
BUN SERPL-MCNC: 21 MG/DL — HIGH (ref 8–20)
CALCIUM SERPL-MCNC: 8.7 MG/DL — SIGNIFICANT CHANGE UP (ref 8.6–10.2)
CHLORIDE SERPL-SCNC: 102 MMOL/L — SIGNIFICANT CHANGE UP (ref 98–107)
CO2 SERPL-SCNC: 25 MMOL/L — SIGNIFICANT CHANGE UP (ref 22–29)
CREAT SERPL-MCNC: 0.71 MG/DL — SIGNIFICANT CHANGE UP (ref 0.5–1.3)
EGFR: 117 ML/MIN/1.73M2 — SIGNIFICANT CHANGE UP
EOSINOPHIL # BLD AUTO: 0.23 K/UL — SIGNIFICANT CHANGE UP (ref 0–0.5)
EOSINOPHIL NFR BLD AUTO: 2.2 % — SIGNIFICANT CHANGE UP (ref 0–6)
GLUCOSE BLDC GLUCOMTR-MCNC: 148 MG/DL — HIGH (ref 70–99)
GLUCOSE BLDC GLUCOMTR-MCNC: 156 MG/DL — HIGH (ref 70–99)
GLUCOSE BLDC GLUCOMTR-MCNC: 164 MG/DL — HIGH (ref 70–99)
GLUCOSE BLDC GLUCOMTR-MCNC: 215 MG/DL — HIGH (ref 70–99)
GLUCOSE SERPL-MCNC: 168 MG/DL — HIGH (ref 70–99)
HCT VFR BLD CALC: 29.7 % — LOW (ref 39–50)
HGB BLD-MCNC: 9.5 G/DL — LOW (ref 13–17)
IMM GRANULOCYTES NFR BLD AUTO: 0.9 % — SIGNIFICANT CHANGE UP (ref 0–1.5)
LYMPHOCYTES # BLD AUTO: 0.97 K/UL — LOW (ref 1–3.3)
LYMPHOCYTES # BLD AUTO: 9.2 % — LOW (ref 13–44)
MAGNESIUM SERPL-MCNC: 2.1 MG/DL — SIGNIFICANT CHANGE UP (ref 1.6–2.6)
MCHC RBC-ENTMCNC: 30 PG — SIGNIFICANT CHANGE UP (ref 27–34)
MCHC RBC-ENTMCNC: 32 GM/DL — SIGNIFICANT CHANGE UP (ref 32–36)
MCV RBC AUTO: 93.7 FL — SIGNIFICANT CHANGE UP (ref 80–100)
MONOCYTES # BLD AUTO: 1.08 K/UL — HIGH (ref 0–0.9)
MONOCYTES NFR BLD AUTO: 10.2 % — SIGNIFICANT CHANGE UP (ref 2–14)
NEUTROPHILS # BLD AUTO: 8.11 K/UL — HIGH (ref 1.8–7.4)
NEUTROPHILS NFR BLD AUTO: 76.6 % — SIGNIFICANT CHANGE UP (ref 43–77)
PHOSPHATE SERPL-MCNC: 3.4 MG/DL — SIGNIFICANT CHANGE UP (ref 2.4–4.7)
PLATELET # BLD AUTO: 664 K/UL — HIGH (ref 150–400)
POTASSIUM SERPL-MCNC: 4.3 MMOL/L — SIGNIFICANT CHANGE UP (ref 3.5–5.3)
POTASSIUM SERPL-SCNC: 4.3 MMOL/L — SIGNIFICANT CHANGE UP (ref 3.5–5.3)
RBC # BLD: 3.17 M/UL — LOW (ref 4.2–5.8)
RBC # FLD: 13.3 % — SIGNIFICANT CHANGE UP (ref 10.3–14.5)
SODIUM SERPL-SCNC: 138 MMOL/L — SIGNIFICANT CHANGE UP (ref 135–145)
WBC # BLD: 10.58 K/UL — HIGH (ref 3.8–10.5)
WBC # FLD AUTO: 10.58 K/UL — HIGH (ref 3.8–10.5)

## 2022-06-08 PROCEDURE — 99233 SBSQ HOSP IP/OBS HIGH 50: CPT

## 2022-06-08 PROCEDURE — 99231 SBSQ HOSP IP/OBS SF/LOW 25: CPT

## 2022-06-08 PROCEDURE — 99497 ADVNCD CARE PLAN 30 MIN: CPT | Mod: 25

## 2022-06-08 RX ORDER — MODAFINIL 200 MG/1
100 TABLET ORAL
Refills: 0 | Status: DISCONTINUED | OUTPATIENT
Start: 2022-06-08 | End: 2022-06-10

## 2022-06-08 RX ADMIN — Medication 1 SPRAY(S): at 05:24

## 2022-06-08 RX ADMIN — Medication 2: at 06:09

## 2022-06-08 RX ADMIN — Medication 200 MILLIGRAM(S): at 05:23

## 2022-06-08 RX ADMIN — Medication 5 MILLIGRAM(S): at 21:20

## 2022-06-08 RX ADMIN — POLYETHYLENE GLYCOL 3350 17 GRAM(S): 17 POWDER, FOR SOLUTION ORAL at 11:55

## 2022-06-08 RX ADMIN — LEVETIRACETAM 1000 MILLIGRAM(S): 250 TABLET, FILM COATED ORAL at 05:21

## 2022-06-08 RX ADMIN — SODIUM CHLORIDE 2 GRAM(S): 9 INJECTION INTRAMUSCULAR; INTRAVENOUS; SUBCUTANEOUS at 14:04

## 2022-06-08 RX ADMIN — Medication 975 MILLIGRAM(S): at 06:45

## 2022-06-08 RX ADMIN — Medication 1: at 00:10

## 2022-06-08 RX ADMIN — Medication 1 DROP(S): at 17:30

## 2022-06-08 RX ADMIN — Medication 1 DROP(S): at 00:12

## 2022-06-08 RX ADMIN — ENOXAPARIN SODIUM 30 MILLIGRAM(S): 100 INJECTION SUBCUTANEOUS at 17:31

## 2022-06-08 RX ADMIN — Medication 200 MILLIGRAM(S): at 11:55

## 2022-06-08 RX ADMIN — Medication 975 MILLIGRAM(S): at 04:43

## 2022-06-08 RX ADMIN — SODIUM CHLORIDE 2 GRAM(S): 9 INJECTION INTRAMUSCULAR; INTRAVENOUS; SUBCUTANEOUS at 21:21

## 2022-06-08 RX ADMIN — ENOXAPARIN SODIUM 30 MILLIGRAM(S): 100 INJECTION SUBCUTANEOUS at 05:23

## 2022-06-08 RX ADMIN — SCOPALAMINE 1 PATCH: 1 PATCH, EXTENDED RELEASE TRANSDERMAL at 07:00

## 2022-06-08 RX ADMIN — Medication 1 DROP(S): at 11:56

## 2022-06-08 RX ADMIN — SENNA PLUS 10 MILLILITER(S): 8.6 TABLET ORAL at 21:21

## 2022-06-08 RX ADMIN — Medication 1: at 17:45

## 2022-06-08 RX ADMIN — Medication 1 SPRAY(S): at 17:25

## 2022-06-08 RX ADMIN — LEVETIRACETAM 1000 MILLIGRAM(S): 250 TABLET, FILM COATED ORAL at 17:32

## 2022-06-08 RX ADMIN — Medication 100 MILLIGRAM(S): at 11:55

## 2022-06-08 RX ADMIN — Medication 300 MILLIGRAM(S): at 11:55

## 2022-06-08 RX ADMIN — Medication 1 TABLET(S): at 11:55

## 2022-06-08 RX ADMIN — Medication 2 MILLIGRAM(S): at 21:21

## 2022-06-08 RX ADMIN — Medication 1 DROP(S): at 05:37

## 2022-06-08 RX ADMIN — SODIUM CHLORIDE 2 GRAM(S): 9 INJECTION INTRAMUSCULAR; INTRAVENOUS; SUBCUTANEOUS at 05:24

## 2022-06-08 RX ADMIN — Medication 1 MILLIGRAM(S): at 11:56

## 2022-06-08 RX ADMIN — SCOPALAMINE 1 PATCH: 1 PATCH, EXTENDED RELEASE TRANSDERMAL at 20:54

## 2022-06-08 NOTE — CHART NOTE - NSCHARTNOTEFT_GEN_A_CORE
Palliative [facility] social work note.    SW and palliative care physician DR Serrano joined meeting with primary physician and case mangaer/unit SW with family. patients mother and sister in law present and son on video chat. Overall prognosis, continues risk factors for decompensation addressed since family questioning likelihood of cardiac arrest in future. Patients clinical information being reviewed as Abby care with [facility] but patient needs directives in place. Meaningful recovery and quality of life addressed. Family expectations reviewed since brother reports wanting him to stay in hospital until he coordinates with attorneys who are trying to expedite legal matters with asylum for patient. Education provided to family that legal matters are lengthy and patient can not remain indefinitely as well  may not maurilio in favor of family. Options for discharge as patient is undocumented is home requiring 24 hr care and multiple medical complexities to be managed or [facility]. Brother reluctant towards DNR since he states they want patient to live. Discussions encouraged further family discussion on decisions for patient care. Mother and sister in law report they are leaving tomorrow to return to [facility].

## 2022-06-08 NOTE — PROGRESS NOTE ADULT - ASSESSMENT
Patient is a 24 y/o M found down unknown mechanism, poly trauma, post-op from craniectomy, trached and peg, on trach collar  1. TBI, continue all medications as per PM&R  2. Wean propranolol completle  3. Tolerating trach collar, sating well. DISCONTINUE sutures and down size 6/10  4. Tolerating peg tube feeds  5. Lovenox for dvt ppx.  7. per plastic keep pressure of right side of face and hob elevated  8. dispo pending placement for long term care

## 2022-06-08 NOTE — PROGRESS NOTE ADULT - SUBJECTIVE AND OBJECTIVE BOX
Patient continues to have eyes closed.   More notable extensor posturing.     REVIEW OF SYSTEMS  Constitutional - +fever,  +fatigue  Neurological - +loss of strength    FUNCTIONAL PROGRESS  6/8 SLP  SLP Treatment: Linguistic  SLP Treatment: Linguistic Treatment Detail: Based upon [place] Coma Recovery Scale, pt scored a 2 out of 23.  Individual scores as follows: Auditory Function Scale: 0 (none), Visual Function Scale: 0 (none), Motor Function Scale: 2 (Flexion Withdrawal), Oromotor/Verbal Function Scale: 0 (none), Communication Scale: 0 (none), Arousal Scale: 0 (unarousable)     6/7 PT  Neuromuscular Re-education  Neuromuscular Re-education Detail: Based upon [place] Coma Recovery Scale, pt scored a 2 out of 23.  Individual scores as follows: Auditory Function Scale: 0 (none), Visual Function Scale: 0 (none), Motor Function Scale: 2 (Flexion Withdrawal), Oromotor/Verbal Function Scale: 0 (none), Communication Scale: 0 (none), Arousal Scale: 0 (unarousable)     VITALS  T(C): 37.4 (06-08-22 @ 08:29), Max: 38.3 (06-07-22 @ 15:30)  HR: 80 (06-08-22 @ 08:29) (80 - 106)  BP: 109/67 (06-08-22 @ 08:29) (109/67 - 141/94)  RR: 16 (06-08-22 @ 08:29) (16 - 18)  SpO2: 98% (06-08-22 @ 11:28) (94% - 99%)  Wt(kg): --    MEDICATIONS   acetaminophen    Suspension .. 975 milliGRAM(s) every 6 hours PRN  amantadine Syrup 200 milliGRAM(s) <User Schedule>  artificial tears (preservative free) Ophthalmic Solution 1 Drop(s) every 6 hours  bisacodyl Suppository 10 milliGRAM(s) daily  doxazosin 2 milliGRAM(s) at bedtime  enoxaparin Injectable 30 milliGRAM(s) every 12 hours  ferrous    sulfate Liquid 300 milliGRAM(s) daily  folic acid 1 milliGRAM(s) daily  hydrALAZINE Injectable 20 milliGRAM(s) every 6 hours PRN  insulin lispro (ADMELOG) corrective regimen sliding scale   every 6 hours  levETIRAcetam  Solution 1000 milliGRAM(s) two times a day  melatonin Liquid 5 milliGRAM(s) at bedtime  multivitamin 1 Tablet(s) daily  polyethylene glycol 3350 17 Gram(s) daily  propranolol 10 milliGRAM(s) every 24 hours  senna Syrup 10 milliLiter(s) at bedtime  sodium chloride 2 Gram(s) every 8 hours  sodium chloride 0.65% Nasal 1 Spray(s) two times a day  sodium chloride 0.9% lock flush 10 milliLiter(s) every 1 hour PRN  thiamine 100 milliGRAM(s) daily      RECENT LABS/IMAGING                          9.5    10.58 )-----------( 664      ( 08 Jun 2022 05:25 )             29.7     06-08    138  |  102  |  21.0<H>  ----------------------------<  168<H>  4.3   |  25.0  |  0.71    Ca    8.7      08 Jun 2022 05:25  Phos  3.4     06-08  Mg     2.1     06-08                      CT BRAIN 5/24 - 1. Early entrapment of the posterior horn left lateral ventricle,  secondary to multicompartment intracranial hemorrhage. This is manifested by high right frontal and medial left temporal parenchymal hematomas, large right holohemispheric subdural hematoma, right parafalcine hemorrhage extending to the right tentorium, and right frontal  subarachnoid hemorrhage. Additional petechial hemorrhage suspected at the gray-white matter junction bilaterally.  2. 1.9 cm right to left subfalcine herniation and additional right uncal herniation with effacement of the ambient cistern.      CT CERVICAL SPINE 5/24 - 1. No acute fracture. 2. Hyperdensity in the anterior epidural space at C5-6 has the appearance   of a central disc protrusion with caudal subligamentous extension, trace epidural hemorrhage is technically difficult to exclude.      CT FACE 5/24 - 1. Extensive, comminuted right zygomaticomaxillary complex fracture,  detailed above. Injury to the right inferior rectus muscle suspected. At the time of this interpretation, this patient is intraoperative with  neurosurgery, message left for the covering PA with the OR   regarding these results.    CT CAP 5/24 - No evidence of active contrast extravasation, hemoperitoneum or retroperitoneal hemorrhage. Bibasilar atelectasis, left greater than right. Additional findings as above.     CXR 5/24 - Tubes remain. Lungs remain clear.    CT head 5/24 - Increased right scalp soft tissue swelling. Stable intracranial  hemorrhages and subdural hemorrhages. Stable subarachnoid hemorrhage.     CT C-spine 5/24 - Small amount of blood products circumferentially around the thecal sac at the C1 and C2 levels. No high-grade spinal canal stenosis or obvious cord compression is visualized by CT technique. MRI may be  obtained for further evaluation.    MRI BRAIN 5/26 - Right frontal craniectomy. Residual bilateral subdural hematomas and interhemispheric subdural hemorrhage. Right frontal, right frontal temporal and left temporal parenchymal hemorrhagic contusions with an foci of diffusion restriction suggestive of shear injury and diffuse axonal injury.     Cervical spine MRI 5/26 - No acute fractures or dislocations    EEG 5/26 - Abnormal EEG study.  1. Severe nonspecific diffuse or multifocal cerebral dysfunction.   2. No epileptiform pattern or seizure seen.    HEAD CT 5/30 - Unchanged hemorrhagic contusions within the RIGHT frontal and LEFT temporal lobes with edema and herniation of RIGHT frontal lobe through the craniectomy defect. Small BILATERAL subdural hematomas are also stable. With the layering over the tentorium.    Mild LEFT nasal septal deviation with osteophyte. The facial and skull base bones and calvarium demonstrate comminuted fractures of the RIGHT lateral orbit, RIGHT zygomatic arch and RIGHT maxillary sinus and RIGHT pterygoid plate unchanged.    Xray Kidney Ureter Bladder 5/30: Nonobstructive bowel gas pattern/constipation    CXR 6/7 - Patchy right lower lobe infiltrate, new  ----------------------------------------------------------------------------------------  PHYSICAL EXAM  Constitutional - NAD, Appears Comfortable  HEENT - Right skull craniectomy defect with staples and edema  Extremities - Diffuse swelling  Neurologic Exam -                 Coma Recovery Scale - Revised  AUDITORY FUNCTION SCALE  0 - None  VISUAL FUNCTION SCALE  0 - None  MOTOR FUNCTION SCALE  1 - Abnormal posturing  OROMOTOR/VERBAL FUNCTION SCALE  1 - Oral Reflexive Movement  COMMUNICATION SCALE  0 - None  AROUSAL SCALE  0 - Unarousable  TOTAL SCORE = 2  Psychiatric - Calm     ----------------------------------------------------------------------------------------  ASSESSMENT/PLAN  25yMale with functional deficits after was found down an unknown multiple trauma    TEOFILO, Bilateral IPH, Bilateral SDH s/p craniectomy - Keppra,  TAPER Propranolol (LAST 6/9)  COMA - Amantadine 200mg BID (increased from 100mg BID 6/6), Melatonin 5mg (5/31), Provigil 100mg Q6AM (6/9)  HypoNa+ - NaCl  EtOH Abuse - MVI   HTN - Hydralazine  HypoNa+ - NaCl  Pain - Tylenol  Respiratory Failure s/p Trach - off vent currently  Dysphagia - PEG/TF  DVT PPX - SCDs, Lovenox, BLE Dopplers Pending   Rehab - Continue COMA STIM. Given ongoing consistency of exam, patient is in a COMA state of DOC. Expected threshold for prognosis is if it lasts >4 weeks, good recovery is unlikely.     Will continue to follow. Rehab recommendations are dependent on how functional progress changes as well as how patient continues to participate and tolerate therapeutic interventions, which may change. Recommend ongoing mobilization by staff to maintain cardiopulmonary function and prevention of secondary complications related to debility. Discussed with rehab team.      Patient continues to have eyes closed.   More notable extensor posturing.     REVIEW OF SYSTEMS  Constitutional - +fever,  +fatigue  Neurological - +loss of strength    FUNCTIONAL PROGRESS  6/8 SLP  SLP Treatment: Linguistic  SLP Treatment: Linguistic Treatment Detail: Based upon JFK Coma Recovery Scale, pt scored a 2 out of 23.  Individual scores as follows: Auditory Function Scale: 0 (none), Visual Function Scale: 0 (none), Motor Function Scale: 2 (Flexion Withdrawal), Oromotor/Verbal Function Scale: 0 (none), Communication Scale: 0 (none), Arousal Scale: 0 (unarousable)     6/7 PT  Neuromuscular Re-education  Neuromuscular Re-education Detail: Based upon JFK Coma Recovery Scale, pt scored a 2 out of 23.  Individual scores as follows: Auditory Function Scale: 0 (none), Visual Function Scale: 0 (none), Motor Function Scale: 2 (Flexion Withdrawal), Oromotor/Verbal Function Scale: 0 (none), Communication Scale: 0 (none), Arousal Scale: 0 (unarousable)     VITALS  T(C): 37.4 (06-08-22 @ 08:29), Max: 38.3 (06-07-22 @ 15:30)  HR: 80 (06-08-22 @ 08:29) (80 - 106)  BP: 109/67 (06-08-22 @ 08:29) (109/67 - 141/94)  RR: 16 (06-08-22 @ 08:29) (16 - 18)  SpO2: 98% (06-08-22 @ 11:28) (94% - 99%)  Wt(kg): --    MEDICATIONS   acetaminophen    Suspension .. 975 milliGRAM(s) every 6 hours PRN  amantadine Syrup 200 milliGRAM(s) <User Schedule>  artificial tears (preservative free) Ophthalmic Solution 1 Drop(s) every 6 hours  bisacodyl Suppository 10 milliGRAM(s) daily  doxazosin 2 milliGRAM(s) at bedtime  enoxaparin Injectable 30 milliGRAM(s) every 12 hours  ferrous    sulfate Liquid 300 milliGRAM(s) daily  folic acid 1 milliGRAM(s) daily  hydrALAZINE Injectable 20 milliGRAM(s) every 6 hours PRN  insulin lispro (ADMELOG) corrective regimen sliding scale   every 6 hours  levETIRAcetam  Solution 1000 milliGRAM(s) two times a day  melatonin Liquid 5 milliGRAM(s) at bedtime  multivitamin 1 Tablet(s) daily  polyethylene glycol 3350 17 Gram(s) daily  propranolol 10 milliGRAM(s) every 24 hours  senna Syrup 10 milliLiter(s) at bedtime  sodium chloride 2 Gram(s) every 8 hours  sodium chloride 0.65% Nasal 1 Spray(s) two times a day  sodium chloride 0.9% lock flush 10 milliLiter(s) every 1 hour PRN  thiamine 100 milliGRAM(s) daily      RECENT LABS/IMAGING                          9.5    10.58 )-----------( 664      ( 08 Jun 2022 05:25 )             29.7     06-08    138  |  102  |  21.0<H>  ----------------------------<  168<H>  4.3   |  25.0  |  0.71    Ca    8.7      08 Jun 2022 05:25  Phos  3.4     06-08  Mg     2.1     06-08                      CT BRAIN 5/24 - 1. Early entrapment of the posterior horn left lateral ventricle,  secondary to multicompartment intracranial hemorrhage. This is manifested by high right frontal and medial left temporal parenchymal hematomas, large right holohemispheric subdural hematoma, right parafalcine hemorrhage extending to the right tentorium, and right frontal  subarachnoid hemorrhage. Additional petechial hemorrhage suspected at the gray-white matter junction bilaterally.  2. 1.9 cm right to left subfalcine herniation and additional right uncal herniation with effacement of the ambient cistern.      CT CERVICAL SPINE 5/24 - 1. No acute fracture. 2. Hyperdensity in the anterior epidural space at C5-6 has the appearance   of a central disc protrusion with caudal subligamentous extension, trace epidural hemorrhage is technically difficult to exclude.      CT FACE 5/24 - 1. Extensive, comminuted right zygomaticomaxillary complex fracture,  detailed above. Injury to the right inferior rectus muscle suspected. At the time of this interpretation, this patient is intraoperative with  neurosurgery, message left for the covering PA with the OR   regarding these results.    CT CAP 5/24 - No evidence of active contrast extravasation, hemoperitoneum or retroperitoneal hemorrhage. Bibasilar atelectasis, left greater than right. Additional findings as above.     CXR 5/24 - Tubes remain. Lungs remain clear.    CT head 5/24 - Increased right scalp soft tissue swelling. Stable intracranial  hemorrhages and subdural hemorrhages. Stable subarachnoid hemorrhage.     CT C-spine 5/24 - Small amount of blood products circumferentially around the thecal sac at the C1 and C2 levels. No high-grade spinal canal stenosis or obvious cord compression is visualized by CT technique. MRI may be  obtained for further evaluation.    MRI BRAIN 5/26 - Right frontal craniectomy. Residual bilateral subdural hematomas and interhemispheric subdural hemorrhage. Right frontal, right frontal temporal and left temporal parenchymal hemorrhagic contusions with an foci of diffusion restriction suggestive of shear injury and diffuse axonal injury.     Cervical spine MRI 5/26 - No acute fractures or dislocations    EEG 5/26 - Abnormal EEG study.  1. Severe nonspecific diffuse or multifocal cerebral dysfunction.   2. No epileptiform pattern or seizure seen.    HEAD CT 5/30 - Unchanged hemorrhagic contusions within the RIGHT frontal and LEFT temporal lobes with edema and herniation of RIGHT frontal lobe through the craniectomy defect. Small BILATERAL subdural hematomas are also stable. With the layering over the tentorium.    Mild LEFT nasal septal deviation with osteophyte. The facial and skull base bones and calvarium demonstrate comminuted fractures of the RIGHT lateral orbit, RIGHT zygomatic arch and RIGHT maxillary sinus and RIGHT pterygoid plate unchanged.    Xray Kidney Ureter Bladder 5/30: Nonobstructive bowel gas pattern/constipation    CXR 6/7 - Patchy right lower lobe infiltrate, new  ----------------------------------------------------------------------------------------  PHYSICAL EXAM  Constitutional - NAD, Appears Comfortable  HEENT - Right skull craniectomy defect with staples and edema  Extremities - Diffuse swelling  Neurologic Exam -                 Coma Recovery Scale - Revised  AUDITORY FUNCTION SCALE  0 - None  VISUAL FUNCTION SCALE  0 - None  MOTOR FUNCTION SCALE  1 - Abnormal posturing  OROMOTOR/VERBAL FUNCTION SCALE  1 - Oral Reflexive Movement  COMMUNICATION SCALE  0 - None  AROUSAL SCALE  0 - Unarousable  TOTAL SCORE = 2  Psychiatric - Calm     ----------------------------------------------------------------------------------------  ASSESSMENT/PLAN  25yMale with functional deficits after was found down an unknown multiple trauma    TEOFILO, Bilateral IPH, Bilateral SDH s/p craniectomy - Keppra,  TAPER Propranolol (LAST 6/9)  COMA - Amantadine 200mg BID (increased from 100mg BID 6/6), Melatonin 5mg (5/31), Provigil 100mg Q6AM (6/9)  HypoNa+ - NaCl  EtOH Abuse - MVI   HTN - Hydralazine  HypoNa+ - NaCl  Pain - Tylenol  Respiratory Failure s/p Trach - off vent currently  Dysphagia - PEG/TF  DVT PPX - SCDs, Lovenox, BLE Dopplers Pending   Rehab - Continue COMA STIM. Given ongoing consistency of exam, patient is in a COMA state of DOC. Expected threshold for prognosis is if it lasts >4 weeks, good recovery is unlikely.     At this time, recommend TANIKA, patient DOES NOT meet acute inpatient rehabilitation criteria. Patient needs a more prolonged stay to achieve transition to community living and would not be able to tolerate a comprehensive/intense rehab program of 3hours/day.     Will continue to follow. Rehab recommendations are dependent on how functional progress changes as well as how patient continues to participate and tolerate therapeutic interventions, which may change. Recommend ongoing mobilization by staff to maintain cardiopulmonary function and prevention of secondary complications related to debility. Discussed with rehab team.

## 2022-06-08 NOTE — PROGRESS NOTE ADULT - NS ATTEND AMEND GEN_ALL_CORE FT
I have seen and examined the patient during rounds form [id number] hrs  events noted    Low grade temps.  secretion on purulent    A/P TBI  Downside trach to 6 uncuffed fenestrated trach.  duplex lower extr  DVt chemoprophylaxes   Appreciate PM&R  TEN

## 2022-06-08 NOTE — PROGRESS NOTE ADULT - SUBJECTIVE AND OBJECTIVE BOX
[facility] PALLIATIVE MEDICINE     CC: FOLLOW UP VISIT and Family meeting    INTERVAL HPI/OVERNIGHT EVENTS:  Source if other than patient:  []Family   [x]Team     No acute events overnight.      PRESENT SYMPTOMS:     Dyspnea: trach collar  Nausea/Vomiting:  No  Anxiety:   No  Depression: unable  Fatigue: Yes    Loss of appetite: S/P PEG  Constipation:  No    Pain: No            Character-            Duration-            Effect-            Factors-            Frequency-            Location-            Severity-    Pain AD Score:  [email or web address] (press ctrl + left click to view)    Review of Systems: Unable to obtain due to poor mentation/encephalopathy     MEDICATIONS  (STANDING):  amantadine Syrup 200 milliGRAM(s) Oral <User Schedule>  artificial tears (preservative free) Ophthalmic Solution 1 Drop(s) Both EYES every 6 hours  bisacodyl Suppository 10 milliGRAM(s) Rectal daily  doxazosin 2 milliGRAM(s) Oral at bedtime  enoxaparin Injectable 30 milliGRAM(s) SubCutaneous every 12 hours  ferrous    sulfate Liquid 300 milliGRAM(s) Enteral Tube daily  folic acid 1 milliGRAM(s) Oral daily  insulin lispro (ADMELOG) corrective regimen sliding scale   SubCutaneous every 6 hours  levETIRAcetam  Solution 1000 milliGRAM(s) Oral two times a day  melatonin Liquid 5 milliGRAM(s) Oral at bedtime  multivitamin 1 Tablet(s) Oral daily  polyethylene glycol 3350 17 Gram(s) Oral daily  propranolol 10 milliGRAM(s) Oral every 24 hours  senna Syrup 10 milliLiter(s) Oral at bedtime  sodium chloride 2 Gram(s) Oral every 8 hours  sodium chloride 0.65% Nasal 1 Spray(s) Both Nostrils two times a day  thiamine 100 milliGRAM(s) Oral daily    MEDICATIONS  (PRN):  acetaminophen    Suspension .. 975 milliGRAM(s) Oral every 6 hours PRN Temp greater or equal to 38C (100.4F)  hydrALAZINE Injectable 20 milliGRAM(s) IV Push every 6 hours PRN Diastolic > 100  sodium chloride 0.9% lock flush 10 milliLiter(s) IV Push every 1 hour PRN Pre/post blood products, medications, blood draw, and to maintain line patency    PHYSICAL EXAM:    Vital Signs Last 24 Hrs  T(C): 37.4 (08 Jun 2022 08:29), Max: 38.3 (07 Jun 2022 15:30)  T(F): 99.3 (08 Jun 2022 08:29), Max: 101 (07 Jun 2022 15:30)  HR: 80 (08 Jun 2022 08:29) (80 - 106)  BP: 109/67 (08 Jun 2022 08:29) (109/67 - 141/94)  BP(mean): --  RR: 16 (08 Jun 2022 08:29) (16 - 18)  SpO2: 98% (08 Jun 2022 11:28) (94% - 99%)    Karnofsky: 10 %    General: resting comfortably and no acute distress.     HEENT: mucous membrane moist  +trach collar     Lungs: comfortable nonlabored      CV: S1/S2. Regular rate and rhythm    GI: +BS abdomen soft, nondistended, nontender    :  sylvester    MSK:  no cyanosis. +generalized edema  +bedbound    Neuro: nonfocal. nonverbal.  lethargic    Skin: warm and dry.        LABS:                          9.5    10.58 )-----------( 664      ( 08 Jun 2022 05:25 )             29.7     06-08    138  |  102  |  21.0<H>  ----------------------------<  168<H>  4.3   |  25.0  |  0.71    Ca    8.7      08 Jun 2022 05:25  Phos  3.4     06-08  Mg     2.1     06-08          RADIOLOGY & ADDITIONAL STUDIES: Reviewed     ADVANCE DIRECTIVES/TREATMENT PREFERENCES: FULL CODE  DNR YES NO  Completed on:                     MOLST  YES NO   Completed on:  Living Will  YES NO   Completed on:

## 2022-06-08 NOTE — PROGRESS NOTE ADULT - ASSESSMENT
24yo M with prior hx of ETOH and cocaine abuse admitted after found down and unresponsive, found to have Rt SDH with mass effect s/p craniectomy with evac, diffuse axonal injury, chronic respiratory failure s/p trach/peg and overall poor neurological prognosis for meaningful recovery.     PLAN    Bilateral SDH, ICH/IPH,   Diffuse Axonal Injury  Facial Fx  - s/p craniectomy with evac by NSX  - overall poor prognosis for meaningful neurological recovery  - pain control, IV keppra for seizure prophylaxis, amantadine     Chronic Respiratory Failure  - s/p trach on 6/1, on trach collar, O2 supplement PRN    Dysphagia, S/P PEG  - c/w TF as tolerated    Acute Pain  - comfortable on APAP 650mh PRN    Hx of ETOH and Cocaine Abuse  - Utox positive for cocaine and elevated levels of ETOH    Palliative Care Encounter  - full code  - surrogates are both parents however mother has been point of contact per staff as father had to return back to [place]    Palliative team (Dr. Serrano and CEDRICK Martinez) were present for family meeting this morning with Surgery team and CM/SW regarding plan of care moving forward. Pt's mother, SHELTON Cardona at bedside and Son Patel on phone. Surgery residents including Dr. Paige who assisted with Sinhala interpretation. Family made aware that pt is medically optimized and dispo planning ongoing  to determine a safe discharge plan. Per SW/, pt is undocumented and with only emergency medicaid. Pt unable to go home as family unable to provide 24/7 care nor LTC due to insurance status. CM broached [facility] as a potential alternative options but that pt would need to have directives in place. Family acknowledged understanding and requesting time to discuss amongst themselves to determine best option. Emotional support provided and questions answered.     Ongoing dispo planning per SW/CM. [facility] application was sent and being reviewed. If pt is accepted and family agrees to complete MOLST, any provider can assist in this process. Pt remains comfortable and hemodynamically stable.    No further recommendations from a palliative standpoint. Will sign off. Please reconsult PRN. Dispo planning per CCC. Ongoing medical mgnt per primary team.
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
## 2022-06-09 LAB
ANION GAP SERPL CALC-SCNC: 12 MMOL/L — SIGNIFICANT CHANGE UP (ref 5–17)
BASOPHILS # BLD AUTO: 0.12 K/UL — SIGNIFICANT CHANGE UP (ref 0–0.2)
BASOPHILS NFR BLD AUTO: 1.2 % — SIGNIFICANT CHANGE UP (ref 0–2)
BUN SERPL-MCNC: 26.5 MG/DL — HIGH (ref 8–20)
CALCIUM SERPL-MCNC: 9 MG/DL — SIGNIFICANT CHANGE UP (ref 8.6–10.2)
CHLORIDE SERPL-SCNC: 104 MMOL/L — SIGNIFICANT CHANGE UP (ref 98–107)
CO2 SERPL-SCNC: 27 MMOL/L — SIGNIFICANT CHANGE UP (ref 22–29)
CREAT SERPL-MCNC: 0.71 MG/DL — SIGNIFICANT CHANGE UP (ref 0.5–1.3)
EGFR: 117 ML/MIN/1.73M2 — SIGNIFICANT CHANGE UP
EOSINOPHIL # BLD AUTO: 0.36 K/UL — SIGNIFICANT CHANGE UP (ref 0–0.5)
EOSINOPHIL NFR BLD AUTO: 3.5 % — SIGNIFICANT CHANGE UP (ref 0–6)
GLUCOSE BLDC GLUCOMTR-MCNC: 141 MG/DL — HIGH (ref 70–99)
GLUCOSE BLDC GLUCOMTR-MCNC: 144 MG/DL — HIGH (ref 70–99)
GLUCOSE BLDC GLUCOMTR-MCNC: 148 MG/DL — HIGH (ref 70–99)
GLUCOSE BLDC GLUCOMTR-MCNC: 164 MG/DL — HIGH (ref 70–99)
GLUCOSE SERPL-MCNC: 146 MG/DL — HIGH (ref 70–99)
HCT VFR BLD CALC: 29.6 % — LOW (ref 39–50)
HGB BLD-MCNC: 9.4 G/DL — LOW (ref 13–17)
IMM GRANULOCYTES NFR BLD AUTO: 1 % — SIGNIFICANT CHANGE UP (ref 0–1.5)
LYMPHOCYTES # BLD AUTO: 1.16 K/UL — SIGNIFICANT CHANGE UP (ref 1–3.3)
LYMPHOCYTES # BLD AUTO: 11.4 % — LOW (ref 13–44)
MAGNESIUM SERPL-MCNC: 2.3 MG/DL — SIGNIFICANT CHANGE UP (ref 1.6–2.6)
MCHC RBC-ENTMCNC: 30.1 PG — SIGNIFICANT CHANGE UP (ref 27–34)
MCHC RBC-ENTMCNC: 31.8 GM/DL — LOW (ref 32–36)
MCV RBC AUTO: 94.9 FL — SIGNIFICANT CHANGE UP (ref 80–100)
MONOCYTES # BLD AUTO: 1.19 K/UL — HIGH (ref 0–0.9)
MONOCYTES NFR BLD AUTO: 11.7 % — SIGNIFICANT CHANGE UP (ref 2–14)
NEUTROPHILS # BLD AUTO: 7.25 K/UL — SIGNIFICANT CHANGE UP (ref 1.8–7.4)
NEUTROPHILS NFR BLD AUTO: 71.2 % — SIGNIFICANT CHANGE UP (ref 43–77)
PHOSPHATE SERPL-MCNC: 4.4 MG/DL — SIGNIFICANT CHANGE UP (ref 2.4–4.7)
PLATELET # BLD AUTO: 598 K/UL — HIGH (ref 150–400)
POTASSIUM SERPL-MCNC: 4.2 MMOL/L — SIGNIFICANT CHANGE UP (ref 3.5–5.3)
POTASSIUM SERPL-SCNC: 4.2 MMOL/L — SIGNIFICANT CHANGE UP (ref 3.5–5.3)
RBC # BLD: 3.12 M/UL — LOW (ref 4.2–5.8)
RBC # FLD: 13.6 % — SIGNIFICANT CHANGE UP (ref 10.3–14.5)
SODIUM SERPL-SCNC: 142 MMOL/L — SIGNIFICANT CHANGE UP (ref 135–145)
WBC # BLD: 10.18 K/UL — SIGNIFICANT CHANGE UP (ref 3.8–10.5)
WBC # FLD AUTO: 10.18 K/UL — SIGNIFICANT CHANGE UP (ref 3.8–10.5)

## 2022-06-09 PROCEDURE — 93970 EXTREMITY STUDY: CPT | Mod: 26

## 2022-06-09 PROCEDURE — 99233 SBSQ HOSP IP/OBS HIGH 50: CPT | Mod: GC

## 2022-06-09 RX ADMIN — Medication 1: at 11:41

## 2022-06-09 RX ADMIN — Medication 200 MILLIGRAM(S): at 05:15

## 2022-06-09 RX ADMIN — ENOXAPARIN SODIUM 30 MILLIGRAM(S): 100 INJECTION SUBCUTANEOUS at 05:15

## 2022-06-09 RX ADMIN — Medication 200 MILLIGRAM(S): at 11:41

## 2022-06-09 RX ADMIN — MODAFINIL 100 MILLIGRAM(S): 200 TABLET ORAL at 05:14

## 2022-06-09 RX ADMIN — Medication 2 MILLIGRAM(S): at 21:38

## 2022-06-09 RX ADMIN — Medication 1 DROP(S): at 17:06

## 2022-06-09 RX ADMIN — SENNA PLUS 10 MILLILITER(S): 8.6 TABLET ORAL at 21:38

## 2022-06-09 RX ADMIN — Medication 1 DROP(S): at 01:02

## 2022-06-09 RX ADMIN — Medication 1 DROP(S): at 05:15

## 2022-06-09 RX ADMIN — SCOPALAMINE 1 PATCH: 1 PATCH, EXTENDED RELEASE TRANSDERMAL at 10:52

## 2022-06-09 RX ADMIN — Medication 100 MILLIGRAM(S): at 11:41

## 2022-06-09 RX ADMIN — SCOPALAMINE 1 PATCH: 1 PATCH, EXTENDED RELEASE TRANSDERMAL at 07:17

## 2022-06-09 RX ADMIN — LEVETIRACETAM 1000 MILLIGRAM(S): 250 TABLET, FILM COATED ORAL at 17:14

## 2022-06-09 RX ADMIN — Medication 1 MILLIGRAM(S): at 11:41

## 2022-06-09 RX ADMIN — LEVETIRACETAM 1000 MILLIGRAM(S): 250 TABLET, FILM COATED ORAL at 05:16

## 2022-06-09 RX ADMIN — Medication 1 DROP(S): at 11:56

## 2022-06-09 RX ADMIN — Medication 1 TABLET(S): at 11:41

## 2022-06-09 RX ADMIN — Medication 5 MILLIGRAM(S): at 21:39

## 2022-06-09 RX ADMIN — Medication 1 SPRAY(S): at 05:15

## 2022-06-09 RX ADMIN — Medication 1 SPRAY(S): at 17:14

## 2022-06-09 RX ADMIN — ENOXAPARIN SODIUM 30 MILLIGRAM(S): 100 INJECTION SUBCUTANEOUS at 17:14

## 2022-06-09 RX ADMIN — Medication 300 MILLIGRAM(S): at 11:40
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
## 2022-06-10 LAB
ANION GAP SERPL CALC-SCNC: 14 MMOL/L — SIGNIFICANT CHANGE UP (ref 5–17)
BASOPHILS # BLD AUTO: 0.12 K/UL — SIGNIFICANT CHANGE UP (ref 0–0.2)
BASOPHILS NFR BLD AUTO: 1.3 % — SIGNIFICANT CHANGE UP (ref 0–2)
BUN SERPL-MCNC: 30.5 MG/DL — HIGH (ref 8–20)
CALCIUM SERPL-MCNC: 9 MG/DL — SIGNIFICANT CHANGE UP (ref 8.6–10.2)
CHLORIDE SERPL-SCNC: 102 MMOL/L — SIGNIFICANT CHANGE UP (ref 98–107)
CO2 SERPL-SCNC: 26 MMOL/L — SIGNIFICANT CHANGE UP (ref 22–29)
CREAT SERPL-MCNC: 0.82 MG/DL — SIGNIFICANT CHANGE UP (ref 0.5–1.3)
EGFR: 112 ML/MIN/1.73M2 — SIGNIFICANT CHANGE UP
EOSINOPHIL # BLD AUTO: 0.5 K/UL — SIGNIFICANT CHANGE UP (ref 0–0.5)
EOSINOPHIL NFR BLD AUTO: 5.3 % — SIGNIFICANT CHANGE UP (ref 0–6)
GLUCOSE BLDC GLUCOMTR-MCNC: 129 MG/DL — HIGH (ref 70–99)
GLUCOSE BLDC GLUCOMTR-MCNC: 141 MG/DL — HIGH (ref 70–99)
GLUCOSE BLDC GLUCOMTR-MCNC: 143 MG/DL — HIGH (ref 70–99)
GLUCOSE BLDC GLUCOMTR-MCNC: 144 MG/DL — HIGH (ref 70–99)
GLUCOSE BLDC GLUCOMTR-MCNC: 152 MG/DL — HIGH (ref 70–99)
GLUCOSE BLDC GLUCOMTR-MCNC: 162 MG/DL — HIGH (ref 70–99)
GLUCOSE BLDC GLUCOMTR-MCNC: 186 MG/DL — HIGH (ref 70–99)
GLUCOSE SERPL-MCNC: 135 MG/DL — HIGH (ref 70–99)
HCT VFR BLD CALC: 30.5 % — LOW (ref 39–50)
HGB BLD-MCNC: 9.6 G/DL — LOW (ref 13–17)
IMM GRANULOCYTES NFR BLD AUTO: 0.6 % — SIGNIFICANT CHANGE UP (ref 0–1.5)
LYMPHOCYTES # BLD AUTO: 1.19 K/UL — SIGNIFICANT CHANGE UP (ref 1–3.3)
LYMPHOCYTES # BLD AUTO: 12.5 % — LOW (ref 13–44)
MAGNESIUM SERPL-MCNC: 2.3 MG/DL — SIGNIFICANT CHANGE UP (ref 1.8–2.6)
MCHC RBC-ENTMCNC: 29.9 PG — SIGNIFICANT CHANGE UP (ref 27–34)
MCHC RBC-ENTMCNC: 31.5 GM/DL — LOW (ref 32–36)
MCV RBC AUTO: 95 FL — SIGNIFICANT CHANGE UP (ref 80–100)
MONOCYTES # BLD AUTO: 0.93 K/UL — HIGH (ref 0–0.9)
MONOCYTES NFR BLD AUTO: 9.8 % — SIGNIFICANT CHANGE UP (ref 2–14)
NEUTROPHILS # BLD AUTO: 6.72 K/UL — SIGNIFICANT CHANGE UP (ref 1.8–7.4)
NEUTROPHILS NFR BLD AUTO: 70.5 % — SIGNIFICANT CHANGE UP (ref 43–77)
PLATELET # BLD AUTO: 549 K/UL — HIGH (ref 150–400)
POTASSIUM SERPL-MCNC: 4.3 MMOL/L — SIGNIFICANT CHANGE UP (ref 3.5–5.3)
POTASSIUM SERPL-SCNC: 4.3 MMOL/L — SIGNIFICANT CHANGE UP (ref 3.5–5.3)
RBC # BLD: 3.21 M/UL — LOW (ref 4.2–5.8)
RBC # FLD: 13.7 % — SIGNIFICANT CHANGE UP (ref 10.3–14.5)
SODIUM SERPL-SCNC: 142 MMOL/L — SIGNIFICANT CHANGE UP (ref 135–145)
WBC # BLD: 9.52 K/UL — SIGNIFICANT CHANGE UP (ref 3.8–10.5)
WBC # FLD AUTO: 9.52 K/UL — SIGNIFICANT CHANGE UP (ref 3.8–10.5)

## 2022-06-10 PROCEDURE — 99233 SBSQ HOSP IP/OBS HIGH 50: CPT | Mod: GC

## 2022-06-10 RX ORDER — MODAFINIL 200 MG/1
150 TABLET ORAL
Refills: 0 | Status: DISCONTINUED | OUTPATIENT
Start: 2022-06-11 | End: 2022-06-17

## 2022-06-10 RX ADMIN — Medication 1 TABLET(S): at 14:07

## 2022-06-10 RX ADMIN — Medication 100 MILLIGRAM(S): at 14:07

## 2022-06-10 RX ADMIN — Medication 5 MILLIGRAM(S): at 22:26

## 2022-06-10 RX ADMIN — Medication 975 MILLIGRAM(S): at 22:26

## 2022-06-10 RX ADMIN — ENOXAPARIN SODIUM 30 MILLIGRAM(S): 100 INJECTION SUBCUTANEOUS at 19:11

## 2022-06-10 RX ADMIN — Medication 975 MILLIGRAM(S): at 23:00

## 2022-06-10 RX ADMIN — SCOPALAMINE 1 PATCH: 1 PATCH, EXTENDED RELEASE TRANSDERMAL at 14:29

## 2022-06-10 RX ADMIN — POLYETHYLENE GLYCOL 3350 17 GRAM(S): 17 POWDER, FOR SOLUTION ORAL at 14:20

## 2022-06-10 RX ADMIN — Medication 1: at 14:24

## 2022-06-10 RX ADMIN — SCOPALAMINE 1 PATCH: 1 PATCH, EXTENDED RELEASE TRANSDERMAL at 05:41

## 2022-06-10 RX ADMIN — LEVETIRACETAM 1000 MILLIGRAM(S): 250 TABLET, FILM COATED ORAL at 05:25

## 2022-06-10 RX ADMIN — Medication 1 SPRAY(S): at 18:59

## 2022-06-10 RX ADMIN — ENOXAPARIN SODIUM 30 MILLIGRAM(S): 100 INJECTION SUBCUTANEOUS at 05:25

## 2022-06-10 RX ADMIN — Medication 1 DROP(S): at 13:21

## 2022-06-10 RX ADMIN — Medication 1 MILLIGRAM(S): at 14:07

## 2022-06-10 RX ADMIN — SENNA PLUS 10 MILLILITER(S): 8.6 TABLET ORAL at 22:26

## 2022-06-10 RX ADMIN — Medication 1: at 19:05

## 2022-06-10 RX ADMIN — Medication 200 MILLIGRAM(S): at 14:13

## 2022-06-10 RX ADMIN — Medication 10 MILLIGRAM(S): at 14:14

## 2022-06-10 RX ADMIN — LEVETIRACETAM 1000 MILLIGRAM(S): 250 TABLET, FILM COATED ORAL at 19:04

## 2022-06-10 RX ADMIN — Medication 1 SPRAY(S): at 05:30

## 2022-06-10 RX ADMIN — Medication 1 DROP(S): at 23:57

## 2022-06-10 RX ADMIN — Medication 1 DROP(S): at 18:58

## 2022-06-10 RX ADMIN — Medication 2 MILLIGRAM(S): at 22:27

## 2022-06-10 RX ADMIN — Medication 300 MILLIGRAM(S): at 14:08

## 2022-06-10 RX ADMIN — Medication 200 MILLIGRAM(S): at 05:24

## 2022-06-10 RX ADMIN — MODAFINIL 100 MILLIGRAM(S): 200 TABLET ORAL at 05:24

## 2022-06-10 RX ADMIN — Medication 1 DROP(S): at 01:10

## 2022-06-10 RX ADMIN — Medication 1 DROP(S): at 05:25

## 2022-06-10 NOTE — PROGRESS NOTE ADULT - SUBJECTIVE AND OBJECTIVE BOX
Trauma/ACS    SUBJECTIVE: Pt seen and examined on rounds with team. No acute events overnight.  Trach downsized yesterday, he is resting comfortably.      OBJECTIVE  PHYSICAL EXAM:  Constitutional: in no distress  Respiratory: on trach collar tolerating well with humidifed air.  Gastrointestinal: abdomen softly distended, peg tube well seated  Genitourinary: urine collecting device  in place  Extremities: atraumatic, hematoma/ ecchymosis to lateral right thigh resolving   Neurological: GCS 3, staples to surgical craniectomy site well seated  Skin: warm, dry and no rashes         VITALS  T(C): 37.2 (06-09-22 @ 16:31), Max: 37.8 (06-09-22 @ 07:30)  HR: 113 (06-09-22 @ 16:31) (112 - 116)  BP: 121/80 (06-09-22 @ 16:31) (119/77 - 121/82)  RR: 18 (06-09-22 @ 16:31) (18 - 22)  SpO2: 98% (06-09-22 @ 16:31) (97% - 100%)  CAPILLARY BLOOD GLUCOSE      POCT Blood Glucose.: 141 mg/dL (10 Shashi 2022 00:37)  POCT Blood Glucose.: 141 mg/dL (09 Jun 2022 17:11)  POCT Blood Glucose.: 164 mg/dL (09 Jun 2022 11:38)  POCT Blood Glucose.: 148 mg/dL (09 Jun 2022 05:13)      Is/Os    06-08 @ 07:01  -  06-09 @ 07:00  --------------------------------------------------------  IN:  Total IN: 0 mL    OUT:    Incontinent per Collection Bag (mL): 400 mL  Total OUT: 400 mL    Total NET: -400 mL      06-09 @ 07:01  -  06-10 @ 02:07  --------------------------------------------------------  IN:  Total IN: 0 mL    OUT:    Intermittent Catheterization - Urethral (mL): 850 mL  Total OUT: 850 mL    Total NET: -850 mL          MEDICATIONS (STANDING): amantadine Syrup 200 milliGRAM(s) Oral <User Schedule>  bisacodyl Suppository 10 milliGRAM(s) Rectal daily  doxazosin 2 milliGRAM(s) Oral at bedtime  enoxaparin Injectable 30 milliGRAM(s) SubCutaneous every 12 hours  ferrous    sulfate Liquid 300 milliGRAM(s) Enteral Tube daily  folic acid 1 milliGRAM(s) Oral daily  insulin lispro (ADMELOG) corrective regimen sliding scale   SubCutaneous every 6 hours  levETIRAcetam  Solution 1000 milliGRAM(s) Oral two times a day  melatonin Liquid 5 milliGRAM(s) Oral at bedtime  modafinil 100 milliGRAM(s) Oral <User Schedule>  multivitamin 1 Tablet(s) Oral daily  polyethylene glycol 3350 17 Gram(s) Oral daily  senna Syrup 10 milliLiter(s) Oral at bedtime  thiamine 100 milliGRAM(s) Oral daily    MEDICATIONS (PRN):acetaminophen    Suspension .. 975 milliGRAM(s) Oral every 6 hours PRN Temp greater or equal to 38C (100.4F)  hydrALAZINE Injectable 20 milliGRAM(s) IV Push every 6 hours PRN Diastolic > 100  sodium chloride 0.9% lock flush 10 milliLiter(s) IV Push every 1 hour PRN Pre/post blood products, medications, blood draw, and to maintain line patency      LABS  CBC (06-09 @ 05:51)                              9.4<L>                         10.18   )----------------(  598<H>     71.2  % Neutrophils, 11.4<L>% Lymphocytes, ANC: 7.25                                29.6<L>    BMP (06-09 @ 05:51)             142     |  104     |  26.5<H>		Ca++ --      Ca 9.0                ---------------------------------( 146<H>		Mg 2.3                4.2     |  27.0    |  0.71  			Ph 4.4
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
## 2022-06-11 LAB
GLUCOSE BLDC GLUCOMTR-MCNC: 137 MG/DL — HIGH (ref 70–99)
GLUCOSE BLDC GLUCOMTR-MCNC: 156 MG/DL — HIGH (ref 70–99)
GLUCOSE BLDC GLUCOMTR-MCNC: 160 MG/DL — HIGH (ref 70–99)
GLUCOSE BLDC GLUCOMTR-MCNC: 162 MG/DL — HIGH (ref 70–99)

## 2022-06-11 PROCEDURE — 99024 POSTOP FOLLOW-UP VISIT: CPT | Mod: GC

## 2022-06-11 RX ORDER — LANOLIN ALCOHOL/MO/W.PET/CERES
5 CREAM (GRAM) TOPICAL AT BEDTIME
Refills: 0 | Status: DISCONTINUED | OUTPATIENT
Start: 2022-06-11 | End: 2022-06-29

## 2022-06-11 RX ADMIN — Medication 1: at 05:28

## 2022-06-11 RX ADMIN — Medication 1: at 12:13

## 2022-06-11 RX ADMIN — Medication 100 MILLIGRAM(S): at 14:00

## 2022-06-11 RX ADMIN — Medication 2 MILLIGRAM(S): at 21:50

## 2022-06-11 RX ADMIN — Medication 1 MILLIGRAM(S): at 14:00

## 2022-06-11 RX ADMIN — SENNA PLUS 10 MILLILITER(S): 8.6 TABLET ORAL at 21:49

## 2022-06-11 RX ADMIN — Medication 1: at 18:06

## 2022-06-11 RX ADMIN — Medication 1 DROP(S): at 18:19

## 2022-06-11 RX ADMIN — MODAFINIL 150 MILLIGRAM(S): 200 TABLET ORAL at 05:28

## 2022-06-11 RX ADMIN — Medication 10 MILLIGRAM(S): at 14:00

## 2022-06-11 RX ADMIN — Medication 200 MILLIGRAM(S): at 13:58

## 2022-06-11 RX ADMIN — ENOXAPARIN SODIUM 30 MILLIGRAM(S): 100 INJECTION SUBCUTANEOUS at 05:28

## 2022-06-11 RX ADMIN — LEVETIRACETAM 1000 MILLIGRAM(S): 250 TABLET, FILM COATED ORAL at 05:29

## 2022-06-11 RX ADMIN — POLYETHYLENE GLYCOL 3350 17 GRAM(S): 17 POWDER, FOR SOLUTION ORAL at 14:12

## 2022-06-11 RX ADMIN — Medication 1 DROP(S): at 12:47

## 2022-06-11 RX ADMIN — ENOXAPARIN SODIUM 30 MILLIGRAM(S): 100 INJECTION SUBCUTANEOUS at 18:19

## 2022-06-11 RX ADMIN — Medication 975 MILLIGRAM(S): at 05:31

## 2022-06-11 RX ADMIN — LEVETIRACETAM 1000 MILLIGRAM(S): 250 TABLET, FILM COATED ORAL at 18:05

## 2022-06-11 RX ADMIN — Medication 300 MILLIGRAM(S): at 13:59

## 2022-06-11 RX ADMIN — Medication 5 MILLIGRAM(S): at 21:50

## 2022-06-11 RX ADMIN — Medication 1 SPRAY(S): at 05:30

## 2022-06-11 RX ADMIN — Medication 1 TABLET(S): at 18:20

## 2022-06-11 RX ADMIN — Medication 975 MILLIGRAM(S): at 06:35

## 2022-06-11 RX ADMIN — Medication 1 DROP(S): at 05:29

## 2022-06-11 RX ADMIN — Medication 1 SPRAY(S): at 18:20

## 2022-06-11 RX ADMIN — Medication 200 MILLIGRAM(S): at 05:29
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
## 2022-06-11 NOTE — PROGRESS NOTE ADULT - SUBJECTIVE AND OBJECTIVE BOX
Trauma/ACS    SUBJECTIVE: Pt seen and examined on rounds with team. No acute events overnight.        OBJECTIVE    PHYSICAL EXAM:  Constitutional: in no distress  Respiratory: on trach collar tolerating well with humidifed air.  Gastrointestinal: abdomen softly distended, peg tube well seated  Genitourinary: urine collecting device  in place  Extremities: atraumatic, hematoma/ ecchymosis to lateral right thigh resolving   Neurological: GCS 3, now in minimally conscious state.  staples to surgical craniectomy site well seated, most removed however some remain.  Skin: warm, dry and no rashes    VITALS  T(C): 38.3 (06-10-22 @ 21:00), Max: 38.3 (06-10-22 @ 21:00)  HR: 101 (06-10-22 @ 21:00) (91 - 111)  BP: 148/94 (06-10-22 @ 21:00) (122/82 - 148/94)  RR: 16 (06-10-22 @ 21:00) (16 - 18)  SpO2: 98% (06-10-22 @ 21:00) (94% - 98%)  CAPILLARY BLOOD GLUCOSE      POCT Blood Glucose.: 143 mg/dL (10 Shashi 2022 23:55)  POCT Blood Glucose.: 144 mg/dL (10 Shashi 2022 22:24)  POCT Blood Glucose.: 162 mg/dL (10 Shashi 2022 19:02)  POCT Blood Glucose.: 152 mg/dL (10 Shashi 2022 17:53)  POCT Blood Glucose.: 186 mg/dL (10 Shashi 2022 14:17)  POCT Blood Glucose.: 129 mg/dL (10 Shashi 2022 05:36)  POCT Blood Glucose.: 141 mg/dL (10 Shashi 2022 00:37)      Is/Os    06-09 @ 07:01  -  06-10 @ 07:00  --------------------------------------------------------  IN:  Total IN: 0 mL    OUT:    Intermittent Catheterization - Urethral (mL): 850 mL  Total OUT: 850 mL    Total NET: -850 mL          MEDICATIONS (STANDING): amantadine Syrup 200 milliGRAM(s) Oral <User Schedule>  bisacodyl Suppository 10 milliGRAM(s) Rectal daily  doxazosin 2 milliGRAM(s) Oral at bedtime  enoxaparin Injectable 30 milliGRAM(s) SubCutaneous every 12 hours  ferrous    sulfate Liquid 300 milliGRAM(s) Enteral Tube daily  folic acid 1 milliGRAM(s) Oral daily  insulin lispro (ADMELOG) corrective regimen sliding scale   SubCutaneous every 6 hours  levETIRAcetam  Solution 1000 milliGRAM(s) Oral two times a day  melatonin Liquid 5 milliGRAM(s) Oral at bedtime  modafinil 150 milliGRAM(s) Oral <User Schedule>  multivitamin 1 Tablet(s) Oral daily  polyethylene glycol 3350 17 Gram(s) Oral daily  senna Syrup 10 milliLiter(s) Oral at bedtime  thiamine 100 milliGRAM(s) Oral daily    MEDICATIONS (PRN):acetaminophen    Suspension .. 975 milliGRAM(s) Oral every 6 hours PRN Temp greater or equal to 38C (100.4F)  hydrALAZINE Injectable 20 milliGRAM(s) IV Push every 6 hours PRN Diastolic > 100  sodium chloride 0.9% lock flush 10 milliLiter(s) IV Push every 1 hour PRN Pre/post blood products, medications, blood draw, and to maintain line patency      LABS  CBC (06-10 @ 06:02)                              9.6<L>                         9.52    )----------------(  549<H>     70.5  % Neutrophils, 12.5<L>% Lymphocytes, ANC: 6.72                                30.5<L>    BMP (06-10 @ 06:02)             142     |  102     |  30.5<H>		Ca++ --      Ca 9.0                ---------------------------------( 135<H>		Mg 2.3                4.3     |  26.0    |  0.82  			Ph --

## 2022-06-12 LAB
GLUCOSE BLDC GLUCOMTR-MCNC: 118 MG/DL — HIGH (ref 70–99)
GLUCOSE BLDC GLUCOMTR-MCNC: 122 MG/DL — HIGH (ref 70–99)
GLUCOSE BLDC GLUCOMTR-MCNC: 133 MG/DL — HIGH (ref 70–99)
GLUCOSE BLDC GLUCOMTR-MCNC: 143 MG/DL — HIGH (ref 70–99)
GLUCOSE BLDC GLUCOMTR-MCNC: 149 MG/DL — HIGH (ref 70–99)
SARS-COV-2 RNA SPEC QL NAA+PROBE: SIGNIFICANT CHANGE UP

## 2022-06-12 PROCEDURE — 99231 SBSQ HOSP IP/OBS SF/LOW 25: CPT | Mod: GC

## 2022-06-12 RX ADMIN — POLYETHYLENE GLYCOL 3350 17 GRAM(S): 17 POWDER, FOR SOLUTION ORAL at 11:37

## 2022-06-12 RX ADMIN — Medication 1 MILLIGRAM(S): at 11:38

## 2022-06-12 RX ADMIN — Medication 200 MILLIGRAM(S): at 05:32

## 2022-06-12 RX ADMIN — Medication 1 TABLET(S): at 11:38

## 2022-06-12 RX ADMIN — ENOXAPARIN SODIUM 30 MILLIGRAM(S): 100 INJECTION SUBCUTANEOUS at 05:32

## 2022-06-12 RX ADMIN — Medication 200 MILLIGRAM(S): at 11:37

## 2022-06-12 RX ADMIN — Medication 10 MILLIGRAM(S): at 11:38

## 2022-06-12 RX ADMIN — Medication 1 DROP(S): at 12:54

## 2022-06-12 RX ADMIN — Medication 300 MILLIGRAM(S): at 11:37

## 2022-06-12 RX ADMIN — LEVETIRACETAM 1000 MILLIGRAM(S): 250 TABLET, FILM COATED ORAL at 05:32

## 2022-06-12 RX ADMIN — Medication 1 SPRAY(S): at 17:10

## 2022-06-12 RX ADMIN — Medication 100 MILLIGRAM(S): at 11:38

## 2022-06-12 RX ADMIN — Medication 1 SPRAY(S): at 05:31

## 2022-06-12 RX ADMIN — ENOXAPARIN SODIUM 30 MILLIGRAM(S): 100 INJECTION SUBCUTANEOUS at 17:11

## 2022-06-12 RX ADMIN — LEVETIRACETAM 1000 MILLIGRAM(S): 250 TABLET, FILM COATED ORAL at 17:12

## 2022-06-12 RX ADMIN — Medication 1 DROP(S): at 00:37

## 2022-06-12 RX ADMIN — Medication 1 DROP(S): at 17:10

## 2022-06-12 RX ADMIN — MODAFINIL 150 MILLIGRAM(S): 200 TABLET ORAL at 05:31

## 2022-06-12 RX ADMIN — Medication 1 DROP(S): at 05:32

## 2022-06-12 NOTE — PROGRESS NOTE ADULT - SUBJECTIVE AND OBJECTIVE BOX
Trauma/ACS    SUBJECTIVE: Pt seen and examined on rounds. No acute events overnight.        OBJECTIVE    PHYSICAL EXAM:  Constitutional: in no distress  Respiratory: on trach collar tolerating well with humidifed air.  Gastrointestinal: abdomen softly distended, peg tube well seated  Genitourinary: urine collecting device  in place  Extremities: atraumatic, hematoma/ ecchymosis to lateral right thigh resolving   Neurological: GCS 3, now in minimally conscious state.  staples to surgical craniectomy site well seated, most removed however some remain.  Skin: warm, dry and no rashes      VITALS  T(C): 37.1 (06-12-22 @ 00:00), Max: 38.2 (06-11-22 @ 05:00)  HR: 87 (06-12-22 @ 00:00) (87 - 113)  BP: 109/70 (06-12-22 @ 00:00) (109/70 - 121/70)  RR: 16 (06-12-22 @ 00:00) (16 - 18)  SpO2: 95% (06-12-22 @ 00:00) (95% - 99%)  CAPILLARY BLOOD GLUCOSE      POCT Blood Glucose.: 118 mg/dL (12 Jun 2022 00:35)  POCT Blood Glucose.: 156 mg/dL (11 Jun 2022 17:50)  POCT Blood Glucose.: 162 mg/dL (11 Jun 2022 12:08)  POCT Blood Glucose.: 137 mg/dL (11 Jun 2022 09:37)  POCT Blood Glucose.: 160 mg/dL (11 Jun 2022 05:26)      Is/Os    06-10 @ 07:01  -  06-11 @ 07:00  --------------------------------------------------------  IN:    Enteral Tube Flush: 400 mL    Pivot 1.5: 600 mL  Total IN: 1000 mL    OUT:    Incontinent per Collection Bag (mL): 2050 mL  Total OUT: 2050 mL    Total NET: -1050 mL      06-11 @ 07:01  -  06-12 @ 01:27  --------------------------------------------------------  IN:  Total IN: 0 mL    OUT:    Incontinent per Collection Bag (mL): 900 mL  Total OUT: 900 mL    Total NET: -900 mL          MEDICATIONS (STANDING): amantadine Syrup 200 milliGRAM(s) Oral <User Schedule>  bisacodyl Suppository 10 milliGRAM(s) Rectal daily  doxazosin 2 milliGRAM(s) Oral at bedtime  enoxaparin Injectable 30 milliGRAM(s) SubCutaneous every 12 hours  ferrous    sulfate Liquid 300 milliGRAM(s) Enteral Tube daily  folic acid 1 milliGRAM(s) Oral daily  insulin lispro (ADMELOG) corrective regimen sliding scale   SubCutaneous every 6 hours  levETIRAcetam  Solution 1000 milliGRAM(s) Oral two times a day  melatonin 5 milliGRAM(s) Oral at bedtime  modafinil 150 milliGRAM(s) Oral <User Schedule>  multivitamin 1 Tablet(s) Oral daily  polyethylene glycol 3350 17 Gram(s) Oral daily  senna Syrup 10 milliLiter(s) Oral at bedtime  thiamine 100 milliGRAM(s) Oral daily    MEDICATIONS (PRN):acetaminophen    Suspension .. 975 milliGRAM(s) Oral every 6 hours PRN Temp greater or equal to 38C (100.4F)  hydrALAZINE Injectable 20 milliGRAM(s) IV Push every 6 hours PRN Diastolic > 100  sodium chloride 0.9% lock flush 10 milliLiter(s) IV Push every 1 hour PRN Pre/post blood products, medications, blood draw, and to maintain line patency
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
## 2022-06-13 LAB
GLUCOSE BLDC GLUCOMTR-MCNC: 135 MG/DL — HIGH (ref 70–99)
GLUCOSE BLDC GLUCOMTR-MCNC: 138 MG/DL — HIGH (ref 70–99)
GLUCOSE BLDC GLUCOMTR-MCNC: 143 MG/DL — HIGH (ref 70–99)

## 2022-06-13 PROCEDURE — 99232 SBSQ HOSP IP/OBS MODERATE 35: CPT | Mod: GC

## 2022-06-13 PROCEDURE — 99233 SBSQ HOSP IP/OBS HIGH 50: CPT

## 2022-06-13 RX ADMIN — ENOXAPARIN SODIUM 30 MILLIGRAM(S): 100 INJECTION SUBCUTANEOUS at 17:29

## 2022-06-13 RX ADMIN — Medication 2 MILLIGRAM(S): at 23:01

## 2022-06-13 RX ADMIN — Medication 100 MILLIGRAM(S): at 11:22

## 2022-06-13 RX ADMIN — Medication 5 MILLIGRAM(S): at 23:01

## 2022-06-13 RX ADMIN — Medication 5 MILLIGRAM(S): at 00:01

## 2022-06-13 RX ADMIN — POLYETHYLENE GLYCOL 3350 17 GRAM(S): 17 POWDER, FOR SOLUTION ORAL at 11:23

## 2022-06-13 RX ADMIN — SENNA PLUS 10 MILLILITER(S): 8.6 TABLET ORAL at 23:00

## 2022-06-13 RX ADMIN — Medication 1 DROP(S): at 00:01

## 2022-06-13 RX ADMIN — Medication 2 MILLIGRAM(S): at 00:03

## 2022-06-13 RX ADMIN — Medication 200 MILLIGRAM(S): at 11:22

## 2022-06-13 RX ADMIN — ENOXAPARIN SODIUM 30 MILLIGRAM(S): 100 INJECTION SUBCUTANEOUS at 06:44

## 2022-06-13 RX ADMIN — Medication 10 MILLIGRAM(S): at 11:26

## 2022-06-13 RX ADMIN — Medication 1 DROP(S): at 06:44

## 2022-06-13 RX ADMIN — Medication 1 SPRAY(S): at 17:28

## 2022-06-13 RX ADMIN — Medication 1 MILLIGRAM(S): at 11:23

## 2022-06-13 RX ADMIN — LEVETIRACETAM 1000 MILLIGRAM(S): 250 TABLET, FILM COATED ORAL at 17:28

## 2022-06-13 RX ADMIN — LEVETIRACETAM 1000 MILLIGRAM(S): 250 TABLET, FILM COATED ORAL at 06:45

## 2022-06-13 RX ADMIN — Medication 1 SPRAY(S): at 06:45

## 2022-06-13 RX ADMIN — Medication 1 TABLET(S): at 11:23

## 2022-06-13 RX ADMIN — Medication 200 MILLIGRAM(S): at 06:44

## 2022-06-13 RX ADMIN — MODAFINIL 150 MILLIGRAM(S): 200 TABLET ORAL at 06:44

## 2022-06-13 RX ADMIN — Medication 1 DROP(S): at 11:21

## 2022-06-13 RX ADMIN — SENNA PLUS 10 MILLILITER(S): 8.6 TABLET ORAL at 00:02

## 2022-06-13 RX ADMIN — Medication 1 DROP(S): at 17:28

## 2022-06-13 RX ADMIN — Medication 300 MILLIGRAM(S): at 11:22

## 2022-06-13 NOTE — PROGRESS NOTE ADULT - SUBJECTIVE AND OBJECTIVE BOX
Trauma/ACS    SUBJECTIVE: Pt seen and examined on rounds.  Pt. becoming more active, pulling at peg tube site, scalp staples, requiring level 1 restraints.  Unaware of what he is doing.  No other acute events overnight.        OBJECTIVE  PHYSICAL EXAM:  Constitutional: in no distress  Respiratory: on trach collar tolerating well with humidifed air.  Gastrointestinal: abdomen softly distended, peg tube well seated  Genitourinary: urine collecting device  in place, condom cath  Extremities: atraumatic, moving b/l UE's   Neurological: GCS 3, now in minimally conscious state.  staples to surgical craniectomy site well seated, most removed however some remain.  Skin: warm, dry and no rashes    VITALS  T(C): 37.2 (06-12-22 @ 20:15), Max: 37.6 (06-12-22 @ 04:00)  HR: 94 (06-12-22 @ 20:15) (89 - 99)  BP: 125/84 (06-12-22 @ 20:15) (110/73 - 125/84)  RR: 17 (06-12-22 @ 20:15) (17 - 18)  SpO2: 97% (06-12-22 @ 20:15) (93% - 99%)  CAPILLARY BLOOD GLUCOSE      POCT Blood Glucose.: 133 mg/dL (12 Jun 2022 23:53)  POCT Blood Glucose.: 149 mg/dL (12 Jun 2022 17:08)  POCT Blood Glucose.: 122 mg/dL (12 Jun 2022 11:34)  POCT Blood Glucose.: 143 mg/dL (12 Jun 2022 05:24)      Is/Os    06-11 @ 07:01  -  06-12 @ 07:00  --------------------------------------------------------  IN:    Enteral Tube Flush: 400 mL    Pivot 1.5: 600 mL  Total IN: 1000 mL    OUT:    Incontinent per Collection Bag (mL): 925 mL  Total OUT: 925 mL    Total NET: 75 mL      06-12 @ 07:01  -  06-13 @ 01:42  --------------------------------------------------------  IN:  Total IN: 0 mL    OUT:    Incontinent per Collection Bag (mL): 350 mL  Total OUT: 350 mL    Total NET: -350 mL          MEDICATIONS (STANDING): amantadine Syrup 200 milliGRAM(s) Oral <User Schedule>  bisacodyl Suppository 10 milliGRAM(s) Rectal daily  doxazosin 2 milliGRAM(s) Oral at bedtime  enoxaparin Injectable 30 milliGRAM(s) SubCutaneous every 12 hours  ferrous    sulfate Liquid 300 milliGRAM(s) Enteral Tube daily  folic acid 1 milliGRAM(s) Oral daily  insulin lispro (ADMELOG) corrective regimen sliding scale   SubCutaneous every 6 hours  levETIRAcetam  Solution 1000 milliGRAM(s) Oral two times a day  melatonin 5 milliGRAM(s) Oral at bedtime  modafinil 150 milliGRAM(s) Oral <User Schedule>  multivitamin 1 Tablet(s) Oral daily  polyethylene glycol 3350 17 Gram(s) Oral daily  senna Syrup 10 milliLiter(s) Oral at bedtime  thiamine 100 milliGRAM(s) Oral daily    MEDICATIONS (PRN):acetaminophen    Suspension .. 975 milliGRAM(s) Oral every 6 hours PRN Temp greater or equal to 38C (100.4F)  hydrALAZINE Injectable 20 milliGRAM(s) IV Push every 6 hours PRN Diastolic > 100  sodium chloride 0.9% lock flush 10 milliLiter(s) IV Push every 1 hour PRN Pre/post blood products, medications, blood draw, and to maintain line patency      LABS
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
## 2022-06-13 NOTE — CHART NOTE - NSCHARTNOTEFT_GEN_A_CORE
Remaining staples removed from crani site. Site prepped w/ chloraprep, dried scabs noted. Patient tolerated procedure well

## 2022-06-14 PROBLEM — Z00.00 ENCOUNTER FOR PREVENTIVE HEALTH EXAMINATION: Status: ACTIVE | Noted: 2022-06-14

## 2022-06-14 LAB
GLUCOSE BLDC GLUCOMTR-MCNC: 115 MG/DL — HIGH (ref 70–99)
GLUCOSE BLDC GLUCOMTR-MCNC: 123 MG/DL — HIGH (ref 70–99)
GLUCOSE BLDC GLUCOMTR-MCNC: 130 MG/DL — HIGH (ref 70–99)
GLUCOSE BLDC GLUCOMTR-MCNC: 130 MG/DL — HIGH (ref 70–99)
GLUCOSE BLDC GLUCOMTR-MCNC: 141 MG/DL — HIGH (ref 70–99)

## 2022-06-14 PROCEDURE — 99232 SBSQ HOSP IP/OBS MODERATE 35: CPT | Mod: GC

## 2022-06-14 PROCEDURE — 99233 SBSQ HOSP IP/OBS HIGH 50: CPT

## 2022-06-14 RX ADMIN — Medication 2 MILLIGRAM(S): at 23:25

## 2022-06-14 RX ADMIN — ENOXAPARIN SODIUM 30 MILLIGRAM(S): 100 INJECTION SUBCUTANEOUS at 07:30

## 2022-06-14 RX ADMIN — Medication 1 SPRAY(S): at 07:31

## 2022-06-14 RX ADMIN — Medication 200 MILLIGRAM(S): at 11:59

## 2022-06-14 RX ADMIN — Medication 1 DROP(S): at 07:30

## 2022-06-14 RX ADMIN — SENNA PLUS 10 MILLILITER(S): 8.6 TABLET ORAL at 23:24

## 2022-06-14 RX ADMIN — Medication 1 MILLIGRAM(S): at 11:59

## 2022-06-14 RX ADMIN — Medication 975 MILLIGRAM(S): at 11:15

## 2022-06-14 RX ADMIN — Medication 5 MILLIGRAM(S): at 23:26

## 2022-06-14 RX ADMIN — Medication 1 SPRAY(S): at 18:44

## 2022-06-14 RX ADMIN — Medication 10 MILLIGRAM(S): at 11:59

## 2022-06-14 RX ADMIN — ENOXAPARIN SODIUM 30 MILLIGRAM(S): 100 INJECTION SUBCUTANEOUS at 18:44

## 2022-06-14 RX ADMIN — Medication 1 DROP(S): at 23:25

## 2022-06-14 RX ADMIN — Medication 1 DROP(S): at 18:45

## 2022-06-14 RX ADMIN — MODAFINIL 150 MILLIGRAM(S): 200 TABLET ORAL at 10:19

## 2022-06-14 RX ADMIN — Medication 300 MILLIGRAM(S): at 11:59

## 2022-06-14 RX ADMIN — Medication 100 MILLIGRAM(S): at 12:00

## 2022-06-14 RX ADMIN — Medication 975 MILLIGRAM(S): at 10:15

## 2022-06-14 RX ADMIN — Medication 1 TABLET(S): at 12:05

## 2022-06-14 RX ADMIN — LEVETIRACETAM 1000 MILLIGRAM(S): 250 TABLET, FILM COATED ORAL at 07:32

## 2022-06-14 RX ADMIN — POLYETHYLENE GLYCOL 3350 17 GRAM(S): 17 POWDER, FOR SOLUTION ORAL at 11:59

## 2022-06-14 RX ADMIN — Medication 200 MILLIGRAM(S): at 07:33
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
## 2022-06-14 NOTE — PROGRESS NOTE ADULT - SUBJECTIVE AND OBJECTIVE BOX
INTERVAL HPI/OVERNIGHT EVENTS:    Patient evaluated at bedside. No acute distress. No acute events overnight.  removed staples    MEDICATIONS  (STANDING):  amantadine Syrup 200 milliGRAM(s) Oral <User Schedule>  artificial tears (preservative free) Ophthalmic Solution 1 Drop(s) Both EYES every 6 hours  bisacodyl Suppository 10 milliGRAM(s) Rectal daily  doxazosin 2 milliGRAM(s) Oral at bedtime  enoxaparin Injectable 30 milliGRAM(s) SubCutaneous every 12 hours  ferrous    sulfate Liquid 300 milliGRAM(s) Enteral Tube daily  folic acid 1 milliGRAM(s) Oral daily  insulin lispro (ADMELOG) corrective regimen sliding scale   SubCutaneous every 6 hours  levETIRAcetam  Solution 1000 milliGRAM(s) Oral two times a day  melatonin 5 milliGRAM(s) Oral at bedtime  modafinil 150 milliGRAM(s) Oral <User Schedule>  multivitamin 1 Tablet(s) Oral daily  polyethylene glycol 3350 17 Gram(s) Oral daily  senna Syrup 10 milliLiter(s) Oral at bedtime  sodium chloride 0.65% Nasal 1 Spray(s) Both Nostrils two times a day  thiamine 100 milliGRAM(s) Oral daily    MEDICATIONS  (PRN):  acetaminophen    Suspension .. 975 milliGRAM(s) Oral every 6 hours PRN Temp greater or equal to 38C (100.4F)  hydrALAZINE Injectable 20 milliGRAM(s) IV Push every 6 hours PRN Diastolic > 100  sodium chloride 0.9% lock flush 10 milliLiter(s) IV Push every 1 hour PRN Pre/post blood products, medications, blood draw, and to maintain line patency      Vital Signs Last 24 Hrs  T(C): 37.3 (14 Jun 2022 00:00), Max: 37.4 (13 Jun 2022 08:00)  T(F): 99.1 (14 Jun 2022 00:00), Max: 99.4 (13 Jun 2022 11:18)  HR: 102 (14 Jun 2022 00:00) (82 - 104)  BP: 134/76 (14 Jun 2022 00:00) (102/68 - 134/76)  BP(mean): --  RR: 18 (14 Jun 2022 00:00) (17 - 18)  SpO2: 96% (14 Jun 2022 00:00) (96% - 100%)    OBJECTIVE  PHYSICAL EXAM:  Constitutional: in no distress  Respiratory: on trach collar tolerating well with humidifed air.  Gastrointestinal: abdomen softly distended, peg tube well seated  Genitourinary: urine collecting device  in place, condom cath  Extremities: atraumatic, moving b/l UE's   Neurological: GCS 3, now in minimally conscious state.  staples to surgical craniectomy site well seated, most removed however some remain.  Skin: warm, dry and no rashes      I&O's Detail    12 Jun 2022 07:01  -  13 Jun 2022 07:00  --------------------------------------------------------  IN:    Enteral Tube Flush: 350 mL    Pivot 1.5: 600 mL  Total IN: 950 mL    OUT:    Incontinent per Collection Bag (mL): 775 mL  Total OUT: 775 mL    Total NET: 175 mL      13 Jun 2022 07:01  -  14 Jun 2022 01:29  --------------------------------------------------------  IN:  Total IN: 0 mL    OUT:    Incontinent per Collection Bag (mL): 700 mL  Total OUT: 700 mL    Total NET: -700 mL          LABS:                RADIOLOGY & ADDITIONAL STUDIES:
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
## 2022-06-15 LAB
GLUCOSE BLDC GLUCOMTR-MCNC: 127 MG/DL — HIGH (ref 70–99)
GLUCOSE BLDC GLUCOMTR-MCNC: 135 MG/DL — HIGH (ref 70–99)
GLUCOSE BLDC GLUCOMTR-MCNC: 140 MG/DL — HIGH (ref 70–99)
GLUCOSE BLDC GLUCOMTR-MCNC: 147 MG/DL — HIGH (ref 70–99)

## 2022-06-15 PROCEDURE — 99231 SBSQ HOSP IP/OBS SF/LOW 25: CPT

## 2022-06-15 PROCEDURE — 99233 SBSQ HOSP IP/OBS HIGH 50: CPT

## 2022-06-15 RX ORDER — DEXTROSE 50 % IN WATER 50 %
15 SYRINGE (ML) INTRAVENOUS ONCE
Refills: 0 | Status: DISCONTINUED | OUTPATIENT
Start: 2022-06-15 | End: 2022-06-22

## 2022-06-15 RX ORDER — INSULIN LISPRO 100/ML
VIAL (ML) SUBCUTANEOUS EVERY 6 HOURS
Refills: 0 | Status: DISCONTINUED | OUTPATIENT
Start: 2022-06-15 | End: 2022-06-22

## 2022-06-15 RX ORDER — DEXTROSE 50 % IN WATER 50 %
12.5 SYRINGE (ML) INTRAVENOUS ONCE
Refills: 0 | Status: DISCONTINUED | OUTPATIENT
Start: 2022-06-15 | End: 2022-06-22

## 2022-06-15 RX ORDER — DEXTROSE 50 % IN WATER 50 %
25 SYRINGE (ML) INTRAVENOUS ONCE
Refills: 0 | Status: DISCONTINUED | OUTPATIENT
Start: 2022-06-15 | End: 2022-06-22

## 2022-06-15 RX ORDER — SODIUM CHLORIDE 9 MG/ML
1000 INJECTION, SOLUTION INTRAVENOUS
Refills: 0 | Status: DISCONTINUED | OUTPATIENT
Start: 2022-06-15 | End: 2022-06-22

## 2022-06-15 RX ORDER — GLUCAGON INJECTION, SOLUTION 0.5 MG/.1ML
1 INJECTION, SOLUTION SUBCUTANEOUS ONCE
Refills: 0 | Status: DISCONTINUED | OUTPATIENT
Start: 2022-06-15 | End: 2022-06-22

## 2022-06-15 RX ADMIN — Medication 5 MILLIGRAM(S): at 23:19

## 2022-06-15 RX ADMIN — Medication 975 MILLIGRAM(S): at 01:47

## 2022-06-15 RX ADMIN — Medication 1 DROP(S): at 11:45

## 2022-06-15 RX ADMIN — Medication 100 MILLIGRAM(S): at 11:46

## 2022-06-15 RX ADMIN — Medication 1 TABLET(S): at 11:46

## 2022-06-15 RX ADMIN — POLYETHYLENE GLYCOL 3350 17 GRAM(S): 17 POWDER, FOR SOLUTION ORAL at 11:47

## 2022-06-15 RX ADMIN — SENNA PLUS 10 MILLILITER(S): 8.6 TABLET ORAL at 23:19

## 2022-06-15 RX ADMIN — Medication 2 MILLIGRAM(S): at 23:20

## 2022-06-15 RX ADMIN — Medication 300 MILLIGRAM(S): at 11:46

## 2022-06-15 RX ADMIN — Medication 1 DROP(S): at 05:28

## 2022-06-15 RX ADMIN — Medication 1 SPRAY(S): at 17:24

## 2022-06-15 RX ADMIN — Medication 1 MILLIGRAM(S): at 11:46

## 2022-06-15 RX ADMIN — Medication 200 MILLIGRAM(S): at 05:30

## 2022-06-15 RX ADMIN — ENOXAPARIN SODIUM 30 MILLIGRAM(S): 100 INJECTION SUBCUTANEOUS at 05:30

## 2022-06-15 RX ADMIN — Medication 1 DROP(S): at 17:24

## 2022-06-15 RX ADMIN — Medication 975 MILLIGRAM(S): at 00:51

## 2022-06-15 RX ADMIN — Medication 1 DROP(S): at 23:19

## 2022-06-15 RX ADMIN — MODAFINIL 150 MILLIGRAM(S): 200 TABLET ORAL at 05:31

## 2022-06-15 RX ADMIN — ENOXAPARIN SODIUM 30 MILLIGRAM(S): 100 INJECTION SUBCUTANEOUS at 17:24

## 2022-06-15 RX ADMIN — Medication 1 SPRAY(S): at 05:28

## 2022-06-15 RX ADMIN — Medication 200 MILLIGRAM(S): at 11:46

## 2022-06-15 NOTE — PROGRESS NOTE ADULT - SUBJECTIVE AND OBJECTIVE BOX
SUBJECTIVE/24 hour events:  Patient with no acute events overnight, episodes of intermittent fevers likely neurogenic.  Patient  becoming more active, pulling at peg tube site, mittens.  Unaware of what he is doing.  PM&R following, coma stim continued and now patient advanced to Minimally Conscious State.    Vital Signs Last 24 Hrs  T(C): 38.1 (14 Jun 2022 22:38), Max: 38.5 (14 Jun 2022 10:12)  T(F): 100.5 (14 Jun 2022 22:38), Max: 101.3 (14 Jun 2022 10:12)  HR: 96 (14 Jun 2022 22:38) (71 - 105)  BP: 122/85 (14 Jun 2022 22:38) (113/75 - 132/85)  BP(mean): --  RR: 17 (14 Jun 2022 22:38) (17 - 18)  SpO2: 96% (14 Jun 2022 22:38) (95% - 99%)  Drug Dosing Weight  Height (cm): 170.2 (24 May 2022 13:00)  Weight (kg): 70 (24 May 2022 13:00)  BMI (kg/m2): 24.2 (24 May 2022 13:00)  BSA (m2): 1.81 (24 May 2022 13:00)  I&O's Detail    13 Jun 2022 07:01  -  14 Jun 2022 07:00  --------------------------------------------------------  IN:  Total IN: 0 mL    OUT:    Incontinent per Collection Bag (mL): 700 mL  Total OUT: 700 mL    Total NET: -700 mL      14 Jun 2022 07:01  -  15 Jun 2022 01:17  --------------------------------------------------------  IN:    Enteral Tube Flush: 250 mL    Pivot 1.5: 450 mL  Total IN: 700 mL    OUT:    Incontinent per Collection Bag (mL): 350 mL  Total OUT: 350 mL    Total NET: 350 mL        Allergies    Allergy Status Unknown    Intolerances                ROS:    Constitutional: in no distress  Respiratory: on trach collar tolerating well with humidifed air.  Gastrointestinal: abdomen softly distended, peg tube well seated  Genitourinary: urine collecting device  in place, condom cath  Extremities: atraumatic, moving b/l UE's   Neurological: GCS 3, now in minimally conscious state.  staples to surgical craniectomy removed and surgical scar healing,   Skin: warm, dry and no rashes        MEDICATIONS  (STANDING):  amantadine Syrup 200 milliGRAM(s) Oral <User Schedule>  artificial tears (preservative free) Ophthalmic Solution 1 Drop(s) Both EYES every 6 hours  bisacodyl Suppository 10 milliGRAM(s) Rectal daily  doxazosin 2 milliGRAM(s) Oral at bedtime  enoxaparin Injectable 30 milliGRAM(s) SubCutaneous every 12 hours  ferrous    sulfate Liquid 300 milliGRAM(s) Enteral Tube daily  folic acid 1 milliGRAM(s) Oral daily  melatonin 5 milliGRAM(s) Oral at bedtime  modafinil 150 milliGRAM(s) Oral <User Schedule>  multivitamin 1 Tablet(s) Oral daily  polyethylene glycol 3350 17 Gram(s) Oral daily  senna Syrup 10 milliLiter(s) Oral at bedtime  sodium chloride 0.65% Nasal 1 Spray(s) Both Nostrils two times a day  thiamine 100 milliGRAM(s) Oral daily    MEDICATIONS  (PRN):  acetaminophen    Suspension .. 975 milliGRAM(s) Oral every 6 hours PRN Temp greater or equal to 38C (100.4F)  hydrALAZINE Injectable 20 milliGRAM(s) IV Push every 6 hours PRN Diastolic > 100  sodium chloride 0.9% lock flush 10 milliLiter(s) IV Push every 1 hour PRN Pre/post blood products, medications, blood draw, and to maintain line patency      RADIOLOGY STUDIES:    CULTURES:
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
## 2022-06-15 NOTE — PROGRESS NOTE ADULT - SUBJECTIVE AND OBJECTIVE BOX
Neuroscience-NSX    Pt seen and exam  On T-piece    InPt Meds:  acetaminophen    Suspension .. 975 milliGRAM(s) Oral every 6 hours PRN  amantadine Syrup 200 milliGRAM(s) Oral <User Schedule>  artificial tears (preservative free) Ophthalmic Solution 1 Drop(s) Both EYES every 6 hours  bisacodyl Suppository 10 milliGRAM(s) Rectal daily  doxazosin 2 milliGRAM(s) Oral at bedtime  enoxaparin Injectable 30 milliGRAM(s) SubCutaneous every 12 hours  ferrous    sulfate Liquid 300 milliGRAM(s) Enteral Tube daily  folic acid 1 milliGRAM(s) Oral daily  glucagon  Injectable 1 milliGRAM(s) IntraMuscular once  hydrALAZINE Injectable 20 milliGRAM(s) IV Push every 6 hours PRN  insulin lispro (ADMELOG) corrective regimen sliding scale   SubCutaneous every 6 hours  melatonin 5 milliGRAM(s) Oral at bedtime  modafinil 150 milliGRAM(s) Oral <User Schedule>  multivitamin 1 Tablet(s) Oral daily  polyethylene glycol 3350 17 Gram(s) Oral daily  senna Syrup 10 milliLiter(s) Oral at bedtime  sodium chloride 0.65% Nasal 1 Spray(s) Both Nostrils two times a day  sodium chloride 0.9% lock flush 10 milliLiter(s) IV Push every 1 hour PRN  thiamine 100 milliGRAM(s) Oral daily    VS:  T(C): 37.2 (06-15-22 @ 12:04), Max: 38.1 (06-14-22 @ 22:38)  HR: 90 (06-15-22 @ 12:04) (71 - 99)  BP: 114/79 (06-15-22 @ 12:04) (113/75 - 127/81)  RR: 18 (06-15-22 @ 12:04) (16 - 18)  SpO2: 100% (06-15-22 @ 12:04) (96% - 100%)    Diet: TF's    Exam:  + Open Crani (No skull), sunken  Awake  Tracks   Doesn't follow commands     A/P: 42M EtOH Abuse / Cocaine Abuse P/W TBI 2nd to Large SDH / SAH/ IPH S/p Rt Decomp Crani 5/24 C/w Hypoxic Resp Failure S/p Trach / Peg and Strep Bacteremia / PNA.    -Plan for Cranioplasty 6/21, will need to be Pre-op, Med Clr, labs sent prior  -Plan for CTH 1-2 days prior for cranioplasty planning       Diss w/ attending

## 2022-06-15 NOTE — PROGRESS NOTE ADULT - NS ATTEND AMEND GEN_ALL_CORE FT
I have seen and examined the patient during rounds form 2414-2002 hrs  events noted    tolerated capping of trach  w/o desaturation during rounds.  TBI  PSV trials, goal decannulation in the near future  TEN  DVT chemoprophylaxes  Dispo planning, appreciate MP&R efforts

## 2022-06-15 NOTE — CHART NOTE - NSCHARTNOTEFT_GEN_A_CORE
placed patient on speaking valve with trach collar at 30%, suctioned patient before, and HR & Sat stable. will continue to monitor RN & MD aware
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
## 2022-06-16 LAB
GLUCOSE BLDC GLUCOMTR-MCNC: 114 MG/DL — HIGH (ref 70–99)
GLUCOSE BLDC GLUCOMTR-MCNC: 124 MG/DL — HIGH (ref 70–99)
GLUCOSE BLDC GLUCOMTR-MCNC: 134 MG/DL — HIGH (ref 70–99)
GLUCOSE BLDC GLUCOMTR-MCNC: 153 MG/DL — HIGH (ref 70–99)

## 2022-06-16 PROCEDURE — 99233 SBSQ HOSP IP/OBS HIGH 50: CPT

## 2022-06-16 RX ADMIN — ENOXAPARIN SODIUM 30 MILLIGRAM(S): 100 INJECTION SUBCUTANEOUS at 17:17

## 2022-06-16 RX ADMIN — Medication 200 MILLIGRAM(S): at 06:06

## 2022-06-16 RX ADMIN — SENNA PLUS 10 MILLILITER(S): 8.6 TABLET ORAL at 22:10

## 2022-06-16 RX ADMIN — Medication 1 DROP(S): at 17:19

## 2022-06-16 RX ADMIN — Medication 100 MILLIGRAM(S): at 12:40

## 2022-06-16 RX ADMIN — Medication 1 DROP(S): at 23:16

## 2022-06-16 RX ADMIN — Medication 5 MILLIGRAM(S): at 22:11

## 2022-06-16 RX ADMIN — Medication 1 DROP(S): at 12:53

## 2022-06-16 RX ADMIN — Medication 1 MILLIGRAM(S): at 12:40

## 2022-06-16 RX ADMIN — Medication 1 SPRAY(S): at 17:16

## 2022-06-16 RX ADMIN — Medication 200 MILLIGRAM(S): at 12:41

## 2022-06-16 RX ADMIN — ENOXAPARIN SODIUM 30 MILLIGRAM(S): 100 INJECTION SUBCUTANEOUS at 06:06

## 2022-06-16 RX ADMIN — Medication 2 MILLIGRAM(S): at 22:11

## 2022-06-16 RX ADMIN — Medication 2: at 06:05

## 2022-06-16 RX ADMIN — Medication 1 TABLET(S): at 12:41

## 2022-06-16 RX ADMIN — Medication 1 DROP(S): at 06:06

## 2022-06-16 RX ADMIN — Medication 1 SPRAY(S): at 06:05

## 2022-06-16 RX ADMIN — Medication 300 MILLIGRAM(S): at 12:41

## 2022-06-16 RX ADMIN — MODAFINIL 150 MILLIGRAM(S): 200 TABLET ORAL at 06:06
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
## 2022-06-16 NOTE — PROGRESS NOTE ADULT - SUBJECTIVE AND OBJECTIVE BOX
SUBJECTIVE/24 hour events:    Patient with no acute events overnight, episodes of intermittent fevers likely neurogenic.  Patient  becoming more active, pulling at peg tube site, mittens. Speaking valve placed yesterday with no issues, was able to say siena after as  PM&R following, coma stim continued and now patient advanced to Minimally Conscious State.        Vital Signs Last 24 Hrs  T(C): 37.9 (15 Shashi 2022 22:29), Max: 37.9 (15 Shashi 2022 20:24)  T(F): 100.2 (15 Shashi 2022 22:29), Max: 100.2 (15 Shashi 2022 20:24)  HR: 89 (15 Shashi 2022 22:29) (71 - 96)  BP: 129/89 (15 Shashi 2022 22:29) (113/76 - 129/89)  BP(mean): --  RR: 18 (15 Shashi 2022 22:29) (16 - 18)  SpO2: 96% (15 Shashi 2022 22:29) (96% - 100%)  Drug Dosing Weight  Height (cm): 170.2 (24 May 2022 13:00)  Weight (kg): 70 (24 May 2022 13:00)  BMI (kg/m2): 24.2 (24 May 2022 13:00)  BSA (m2): 1.81 (24 May 2022 13:00)  I&O's Detail    14 Jun 2022 07:01  -  15 Shashi 2022 07:00  --------------------------------------------------------  IN:    Enteral Tube Flush: 750 mL    Pivot 1.5: 1050 mL  Total IN: 1800 mL    OUT:    Incontinent per Collection Bag (mL): 600 mL  Total OUT: 600 mL    Total NET: 1200 mL      15 Shashi 2022 07:01  -  16 Jun 2022 01:05  --------------------------------------------------------  IN:  Total IN: 0 mL    OUT:    Blood Loss (mL): 1 mL  Total OUT: 1 mL    Total NET: -1 mL        Allergies    Allergy Status Unknown    Intolerances                ROS:    PHYSICAL EXAM:  Constitutional: appears comfortable  Respiratory: in no respiratory distress, no dyspnea, speaking placed 6/15  Gastrointestinal: abdomen softly distended, peg tube well seated  Genitourinary: voiding  Extremities: now starting to  move  bilateral upper and lower extremities   Neurological: now in minimally conscious state  Skin: warm, dry and no rashes           MEDICATIONS  (STANDING):  amantadine Syrup 200 milliGRAM(s) Oral <User Schedule>  artificial tears (preservative free) Ophthalmic Solution 1 Drop(s) Both EYES every 6 hours  bisacodyl Suppository 10 milliGRAM(s) Rectal daily  dextrose 5%. 1000 milliLiter(s) (50 mL/Hr) IV Continuous <Continuous>  dextrose 5%. 1000 milliLiter(s) (100 mL/Hr) IV Continuous <Continuous>  dextrose 50% Injectable 25 Gram(s) IV Push once  dextrose 50% Injectable 12.5 Gram(s) IV Push once  dextrose 50% Injectable 25 Gram(s) IV Push once  doxazosin 2 milliGRAM(s) Oral at bedtime  enoxaparin Injectable 30 milliGRAM(s) SubCutaneous every 12 hours  ferrous    sulfate Liquid 300 milliGRAM(s) Enteral Tube daily  folic acid 1 milliGRAM(s) Oral daily  glucagon  Injectable 1 milliGRAM(s) IntraMuscular once  insulin lispro (ADMELOG) corrective regimen sliding scale   SubCutaneous every 6 hours  melatonin 5 milliGRAM(s) Oral at bedtime  modafinil 150 milliGRAM(s) Oral <User Schedule>  multivitamin 1 Tablet(s) Oral daily  polyethylene glycol 3350 17 Gram(s) Oral daily  senna Syrup 10 milliLiter(s) Oral at bedtime  sodium chloride 0.65% Nasal 1 Spray(s) Both Nostrils two times a day  thiamine 100 milliGRAM(s) Oral daily    MEDICATIONS  (PRN):  acetaminophen    Suspension .. 975 milliGRAM(s) Oral every 6 hours PRN Temp greater or equal to 38C (100.4F)  dextrose Oral Gel 15 Gram(s) Oral once PRN Blood Glucose LESS THAN 70 milliGRAM(s)/deciliter  hydrALAZINE Injectable 20 milliGRAM(s) IV Push every 6 hours PRN Diastolic > 100  sodium chloride 0.9% lock flush 10 milliLiter(s) IV Push every 1 hour PRN Pre/post blood products, medications, blood draw, and to maintain line patency      RADIOLOGY STUDIES:    CULTURES:

## 2022-06-16 NOTE — PROGRESS NOTE ADULT - SUBJECTIVE AND OBJECTIVE BOX
Patient attempting to phonate.   Appears comfortable.   High temps, no fevers in 24hrs.     REVIEW OF SYSTEMS  Constitutional - No fever,  +fatigue  Neurological - +loss of strength    FUNCTIONAL PROGRESS  6/14 PT  Neuromuscular Re-education  Neuromuscular Re-education Detail: Based upon K Coma Recovery Scale, pt scored a 7 out of 23.  Individual scores as follows: Auditory Function Scale: 1 (startle), Visual Function Scale: 2 (fixation), Motor Function Scale: 2 (Flexion Withdrawal), Oromotor/Verbal Function Scale: 1 (oral reflexive movement), Communication Scale: 0 (none), Arousal Scale: 1 (eyes open with stimulation)     6/13 SLP  SLP Treatment: Linguistic  SLP Treatment: Linguistic Treatment Detail: Coma stim performed using K revised coma scale. Results as follows: Auditory (1), Visual (2) pt able to track in all 4 quadrants,  Motor (2) flexion withdrawal at all 4 extremities; pt noted with bilateral UE mvmt to adjust self, +b/l hand mitts in use as pt pulling at trach,  Oromotor (0); pt noted to lick lips once depressor removed from mouth with each trial,no bite observed Communication (0), Arousal (2) eyes open without stimulation, Total score = 7/23.       VITALS  T(C): 37.2 (06-16-22 @ 07:36), Max: 37.9 (06-15-22 @ 20:24)  HR: 97 (06-16-22 @ 07:36) (87 - 97)  BP: 126/88 (06-16-22 @ 07:36) (105/72 - 148/95)  RR: 18 (06-16-22 @ 07:36) (18 - 18)  SpO2: 100% (06-16-22 @ 07:36) (93% - 100%)  Wt(kg): --    MEDICATIONS   acetaminophen    Suspension .. 975 milliGRAM(s) every 6 hours PRN  amantadine Syrup 200 milliGRAM(s) <User Schedule>  artificial tears (preservative free) Ophthalmic Solution 1 Drop(s) every 6 hours  bisacodyl Suppository 10 milliGRAM(s) daily  dextrose 5%. 1000 milliLiter(s) <Continuous>  dextrose 5%. 1000 milliLiter(s) <Continuous>  dextrose 50% Injectable 25 Gram(s) once  dextrose 50% Injectable 12.5 Gram(s) once  dextrose 50% Injectable 25 Gram(s) once  dextrose Oral Gel 15 Gram(s) once PRN  doxazosin 2 milliGRAM(s) at bedtime  enoxaparin Injectable 30 milliGRAM(s) every 12 hours  ferrous    sulfate Liquid 300 milliGRAM(s) daily  folic acid 1 milliGRAM(s) daily  glucagon  Injectable 1 milliGRAM(s) once  hydrALAZINE Injectable 20 milliGRAM(s) every 6 hours PRN  insulin lispro (ADMELOG) corrective regimen sliding scale   every 6 hours  melatonin 5 milliGRAM(s) at bedtime  modafinil 150 milliGRAM(s) <User Schedule>  multivitamin 1 Tablet(s) daily  polyethylene glycol 3350 17 Gram(s) daily  senna Syrup 10 milliLiter(s) at bedtime  sodium chloride 0.65% Nasal 1 Spray(s) two times a day  sodium chloride 0.9% lock flush 10 milliLiter(s) every 1 hour PRN  thiamine 100 milliGRAM(s) daily      RECENT LABS/IMAGING                        ----------------------------------------------------------------------------------------  PHYSICAL EXAM  Constitutional - NAD, Appears Comfortable  HEENT - Right skull craniectomy defect with staples and edema  Extremities - Diffuse swelling  Neurologic Exam -                 Coma Recovery Scale - Revised - BEST EXAM 6/13  AUDITORY FUNCTION SCALE  2 - Localization to Sound  VISUAL FUNCTION SCALE  3 - Visual Pursuit *  MOTOR FUNCTION SCALE  4 - Object Manipulation *  OROMOTOR/VERBAL FUNCTION SCALE  2 - Vocalization/Oral Movement  COMMUNICATION SCALE  1 - Non-Functional: Intentional *  AROUSAL SCALE  1 - Eye Opening with Stimulation  TOTAL SCORE = 13  Psychiatric - Calm     ----------------------------------------------------------------------------------------  ASSESSMENT/PLAN  25yMale with functional deficits after was found down an unknown multiple trauma    TEOFILO, Bilateral IPH, Bilateral SDH s/p craniectomy - Keppra  Minimally Conscious State (COMA [id number], VS x1day, MCS Day 17) - Amantadine 200mg BID (increased from 100mg BID 6/6), Melatonin 5mg (5/31), Provigil 150 Q6AM (increased from 100mg 6/11)  HypoNa+ - NaCl  EtOH Abuse - MVI, Folate, Thiamine  HTN - Hydralazine  HypoNa+ - NaCl  Pain - Tylenol  Respiratory Failure s/p Trach - Trach Collar  Oropharyngeal Dysphagia s/p - NPO  DVT PPX - SCDs, Lovenox  Rehab - Continue COMA STIM. Patient has now advanced to a Minimally Conscious State.     Due to patient having limited resources upon discharge, expected hospital course will be prolonged and goals are to  include timing of cranioplasty, decannulation and attempt for mobilization back to family.     Will continue to follow. Rehab recommendations are dependent on how functional progress changes as well as how patient continues to participate and tolerate therapeutic interventions, which may change. Recommend ongoing mobilization by staff to maintain cardiopulmonary function and prevention of secondary complications related to debility. Discussed with rehab team.

## 2022-06-17 LAB
GLUCOSE BLDC GLUCOMTR-MCNC: 121 MG/DL — HIGH (ref 70–99)
GLUCOSE BLDC GLUCOMTR-MCNC: 128 MG/DL — HIGH (ref 70–99)
GLUCOSE BLDC GLUCOMTR-MCNC: 138 MG/DL — HIGH (ref 70–99)
GLUCOSE BLDC GLUCOMTR-MCNC: 153 MG/DL — HIGH (ref 70–99)

## 2022-06-17 PROCEDURE — 99231 SBSQ HOSP IP/OBS SF/LOW 25: CPT

## 2022-06-17 PROCEDURE — 99233 SBSQ HOSP IP/OBS HIGH 50: CPT

## 2022-06-17 RX ORDER — MODAFINIL 200 MG/1
150 TABLET ORAL
Refills: 0 | Status: DISCONTINUED | OUTPATIENT
Start: 2022-06-17 | End: 2022-06-24

## 2022-06-17 RX ADMIN — Medication 1 SPRAY(S): at 17:14

## 2022-06-17 RX ADMIN — SENNA PLUS 10 MILLILITER(S): 8.6 TABLET ORAL at 22:59

## 2022-06-17 RX ADMIN — Medication 1 TABLET(S): at 13:29

## 2022-06-17 RX ADMIN — Medication 1 DROP(S): at 13:43

## 2022-06-17 RX ADMIN — Medication 2: at 05:04

## 2022-06-17 RX ADMIN — ENOXAPARIN SODIUM 30 MILLIGRAM(S): 100 INJECTION SUBCUTANEOUS at 17:14

## 2022-06-17 RX ADMIN — Medication 1 MILLIGRAM(S): at 13:29

## 2022-06-17 RX ADMIN — Medication 2 MILLIGRAM(S): at 22:59

## 2022-06-17 RX ADMIN — MODAFINIL 150 MILLIGRAM(S): 200 TABLET ORAL at 06:44

## 2022-06-17 RX ADMIN — Medication 100 MILLIGRAM(S): at 13:29

## 2022-06-17 RX ADMIN — Medication 1 DROP(S): at 17:17

## 2022-06-17 RX ADMIN — Medication 5 MILLIGRAM(S): at 22:59

## 2022-06-17 RX ADMIN — Medication 200 MILLIGRAM(S): at 13:29

## 2022-06-17 RX ADMIN — Medication 300 MILLIGRAM(S): at 13:29

## 2022-06-17 RX ADMIN — ENOXAPARIN SODIUM 30 MILLIGRAM(S): 100 INJECTION SUBCUTANEOUS at 05:06

## 2022-06-17 RX ADMIN — Medication 200 MILLIGRAM(S): at 05:04

## 2022-06-17 RX ADMIN — Medication 1 DROP(S): at 05:03

## 2022-06-17 RX ADMIN — Medication 1 SPRAY(S): at 05:03

## 2022-06-17 RX ADMIN — Medication 1 DROP(S): at 23:44

## 2022-06-17 NOTE — PROGRESS NOTE ADULT - ASSESSMENT
ASSESSMENT  25M unknown PMH found down in street unresponsive, found to have multifocal IPH s/p R hemicraniectomy on 5/24, ccb hypoxic respiartory failure requiring trach, PEG placement, PNA, Strep bacteremia, pending cranioplasty tentatively next week.      PLAN  - case d/w team  - no acute neurosurgical intervention indicated at this time, tentative plan for cranioplasty 6/21  - patient will need pre-operative CTH 6/19  - further medical care per primary team  - Lovenox, SCD b/l for DVT ppx, lovenox to be held night before OR as well as pre-op labs  - will continue to follow ASSESSMENT  25M unknown PMH found down in street unresponsive, found to have multifocal IPH s/p R hemicraniectomy on 5/24, ccb hypoxic respiartory failure requiring trach, PEG placement, PNA, Strep bacteremia, pending cranioplasty tentatively next week.      PLAN  - case d/w team  - no acute neurosurgical intervention indicated at this time, tentative plan for cranioplasty 6/21  - patient will need pre-operative CTH 6/19  - further medical care per primary team  - Lovenox, SCD b/l for DVT ppx, lovenox to be held night before OR as well as pre-op labs & medical clearance  - will continue to follow

## 2022-06-17 NOTE — PROGRESS NOTE ADULT - SUBJECTIVE AND OBJECTIVE BOX
Patient keeps eyes closed.   Unclear if behavioral.   Seems to be inconsistent.   Scoring a 12 with PT on CRS-R.    REVIEW OF SYSTEMS  Constitutional - No fever,  +fatigue  Neurological - +loss of strength    FUNCTIONAL PROGRESS  6/16 PT  Neuromuscular Re-education Detail: Based upon [place] Coma Recovery Scale, pt scored a 12 out of 23.  Individual scores as follows: Auditory Function Scale: 2 (localization), Visual Function Scale: 3 (visual pursuit), Motor Function Scale: 4 (Objct manipulation with Left UE), Oromotor/Verbal Function Scale: 2 (vocalization/oral movement), Communication Scale: 0 (none), Arousal Scale: 1 (eyes open with stimulation)     VITALS  T(C): 36.9 (06-17-22 @ 07:45), Max: 37 (06-16-22 @ 16:00)  HR: 89 (06-17-22 @ 07:45) (75 - 94)  BP: 109/76 (06-17-22 @ 07:45) (109/76 - 124/80)  RR: 18 (06-17-22 @ 07:45) (17 - 18)  SpO2: 99% (06-17-22 @ 05:09) (97% - 100%)  Wt(kg): --    MEDICATIONS   acetaminophen    Suspension .. 975 milliGRAM(s) every 6 hours PRN  amantadine Syrup 200 milliGRAM(s) <User Schedule>  artificial tears (preservative free) Ophthalmic Solution 1 Drop(s) every 6 hours  bisacodyl Suppository 10 milliGRAM(s) daily  dextrose 5%. 1000 milliLiter(s) <Continuous>  dextrose 5%. 1000 milliLiter(s) <Continuous>  dextrose 50% Injectable 25 Gram(s) once  dextrose 50% Injectable 12.5 Gram(s) once  dextrose 50% Injectable 25 Gram(s) once  dextrose Oral Gel 15 Gram(s) once PRN  doxazosin 2 milliGRAM(s) at bedtime  enoxaparin Injectable 30 milliGRAM(s) every 12 hours  ferrous    sulfate Liquid 300 milliGRAM(s) daily  folic acid 1 milliGRAM(s) daily  glucagon  Injectable 1 milliGRAM(s) once  hydrALAZINE Injectable 20 milliGRAM(s) every 6 hours PRN  insulin lispro (ADMELOG) corrective regimen sliding scale   every 6 hours  melatonin 5 milliGRAM(s) at bedtime  modafinil 150 milliGRAM(s) <User Schedule>  multivitamin 1 Tablet(s) daily  polyethylene glycol 3350 17 Gram(s) daily  senna Syrup 10 milliLiter(s) at bedtime  sodium chloride 0.65% Nasal 1 Spray(s) two times a day  sodium chloride 0.9% lock flush 10 milliLiter(s) every 1 hour PRN  thiamine 100 milliGRAM(s) daily      RECENT LABS/IMAGING              9.6    9.52  )-----------( 549      ( 10 Shashi 2022 06:02 )             30.5     06-10    142  |  102  |  30.5<H>  ----------------------------<  135<H>  4.3   |  26.0  |  0.82    Ca    9.0      10 Shashi 2022 06:02  Phos  4.4     06-09  Mg     2.3     06-10      CT BRAIN 5/24 - 1. Early entrapment of the posterior horn left lateral ventricle,  secondary to multicompartment intracranial hemorrhage. This is manifested by high right frontal and medial left temporal parenchymal hematomas, large right holohemispheric subdural hematoma, right parafalcine hemorrhage extending to the right tentorium, and right frontal  subarachnoid hemorrhage. Additional petechial hemorrhage suspected at the gray-white matter junction bilaterally.  2. 1.9 cm right to left subfalcine herniation and additional right uncal herniation with effacement of the ambient cistern.      CT CERVICAL SPINE 5/24 - 1. No acute fracture. 2. Hyperdensity in the anterior epidural space at C5-6 has the appearance   of a central disc protrusion with caudal subligamentous extension, trace epidural hemorrhage is technically difficult to exclude.      CT FACE 5/24 - 1. Extensive, comminuted right zygomaticomaxillary complex fracture,  detailed above. Injury to the right inferior rectus muscle suspected. At the time of this interpretation, this patient is intraoperative with  neurosurgery, message left for the covering PA with the OR   regarding these results.    CT CAP 5/24 - No evidence of active contrast extravasation, hemoperitoneum or retroperitoneal hemorrhage. Bibasilar atelectasis, left greater than right. Additional findings as above.     CXR 5/24 - Tubes remain. Lungs remain clear.    CT head 5/24 - Increased right scalp soft tissue swelling. Stable intracranial  hemorrhages and subdural hemorrhages. Stable subarachnoid hemorrhage.     CT C-spine 5/24 - Small amount of blood products circumferentially around the thecal sac at the C1 and C2 levels. No high-grade spinal canal stenosis or obvious cord compression is visualized by CT technique. MRI may be  obtained for further evaluation.    MRI BRAIN 5/26 - Right frontal craniectomy. Residual bilateral subdural hematomas and interhemispheric subdural hemorrhage. Right frontal, right frontal temporal and left temporal parenchymal hemorrhagic contusions with an foci of diffusion restriction suggestive of shear injury and diffuse axonal injury.     Cervical spine MRI 5/26 - No acute fractures or dislocations    EEG 5/26 - Abnormal EEG study.  1. Severe nonspecific diffuse or multifocal cerebral dysfunction.   2. No epileptiform pattern or seizure seen.    HEAD CT 5/30 - Unchanged hemorrhagic contusions within the RIGHT frontal and LEFT temporal lobes with edema and herniation of RIGHT frontal lobe through the craniectomy defect. Small BILATERAL subdural hematomas are also stable. With the layering over the tentorium.    Mild LEFT nasal septal deviation with osteophyte. The facial and skull base bones and calvarium demonstrate comminuted fractures of the RIGHT lateral orbit, RIGHT zygomatic arch and RIGHT maxillary sinus and RIGHT pterygoid plate unchanged.    Xray Kidney Ureter Bladder 5/30: Nonobstructive bowel gas pattern/constipation    CXR 6/7 - Patchy right lower lobe infiltrate, new    US Duplex Venous Lower Ext Complete, Bilateral 6/9: No evidence of deep venous thrombosis in either lower extremity.                            ----------------------------------------------------------------------------------------  PHYSICAL EXAM  Constitutional - NAD, Appears Comfortable  HEENT - Right skull craniectomy defect with staples and edema  Extremities - Diffuse swelling  Neurologic Exam -                 Coma Recovery Scale - Revised - BEST EXAM 6/13  AUDITORY FUNCTION SCALE  2 - Localization to Sound  VISUAL FUNCTION SCALE  3 - Visual Pursuit *  MOTOR FUNCTION SCALE  4 - Object Manipulation *  OROMOTOR/VERBAL FUNCTION SCALE  2 - Vocalization/Oral Movement  COMMUNICATION SCALE  1 - Non-Functional: Intentional *  AROUSAL SCALE  1 - Eye Opening with Stimulation  TOTAL SCORE = 13  Psychiatric - Calm     ----------------------------------------------------------------------------------------  ASSESSMENT/PLAN  25yMale with functional deficits after was found down an unknown multiple trauma    TEOFILO, Bilateral IPH, Bilateral SDH s/p craniectomy - Keppra, PENDING CRANIOPLASTY (?6/21)  Minimally Conscious State (COMA [id number], VS x1day, MCS Day 17) - Amantadine 200mg BID (increased from 100mg BID 6/6), Melatonin 5mg (5/31), Provigil 150 Q6AM (increased from 100mg 6/11)  HypoNa+ - NaCl  EtOH Abuse - MVI, Folate, Thiamine  HTN - Hydralazine  HypoNa+ - NaCl  Pain - Tylenol  Respiratory Failure s/p Trach - Trach Collar, DO not decannulate until s/p cranioplasty  Oropharyngeal Dysphagia s/p - NPO  DVT PPX - SCDs, Lovenox  Rehab - Continue COMA STIM. Patient has now advanced to a Minimally Conscious State ans is expected to continue to improve. Overall outcomes are dependent on ongoing participation and day to day progress.     Due to patient having limited resources upon discharge, expected hospital course will be prolonged and goals are to  include timing of cranioplasty, decannulation and attempt for mobilization back to family.     Will continue to follow. Rehab recommendations are dependent on how functional progress changes as well as how patient continues to participate and tolerate therapeutic interventions, which may change. Recommend ongoing mobilization by staff to maintain cardiopulmonary function and prevention of secondary complications related to debility. Discussed with rehab team.

## 2022-06-17 NOTE — PROGRESS NOTE ADULT - NS ATTEND AMEND GEN_ALL_CORE FT
I have seen and examined the patient during rounds form [id number] hrs  events noted    Intermittent PSM no new issues.  No trauma contraindication for cranioplasty next week.  TEN  PSMV as able, will not decannulate until after cranioplasty.  Appreciate PM&R, neurosurgery input.  DVT chemoprophylaxys  Dispo planning

## 2022-06-17 NOTE — PROGRESS NOTE ADULT - SUBJECTIVE AND OBJECTIVE BOX
INTERVAL HPI/OVERNIGHT EVENTS: sleeping when seen, no events as per bedside RN, remains actively pulling at things despite redirection.  min-mod amount of clear trach secretions    STATUS POST:  Craniectomy 5/24  trach and PEG 6/1  trach exchange 6/9      MEDICATIONS  (STANDING):  amantadine Syrup 200 milliGRAM(s) Oral <User Schedule>  artificial tears (preservative free) Ophthalmic Solution 1 Drop(s) Both EYES every 6 hours  bisacodyl Suppository 10 milliGRAM(s) Rectal daily  dextrose 5%. 1000 milliLiter(s) (50 mL/Hr) IV Continuous <Continuous>  dextrose 5%. 1000 milliLiter(s) (100 mL/Hr) IV Continuous <Continuous>  dextrose 50% Injectable 25 Gram(s) IV Push once  dextrose 50% Injectable 12.5 Gram(s) IV Push once  dextrose 50% Injectable 25 Gram(s) IV Push once  doxazosin 2 milliGRAM(s) Oral at bedtime  enoxaparin Injectable 30 milliGRAM(s) SubCutaneous every 12 hours  ferrous    sulfate Liquid 300 milliGRAM(s) Enteral Tube daily  folic acid 1 milliGRAM(s) Oral daily  glucagon  Injectable 1 milliGRAM(s) IntraMuscular once  insulin lispro (ADMELOG) corrective regimen sliding scale   SubCutaneous every 6 hours  melatonin 5 milliGRAM(s) Oral at bedtime  modafinil 150 milliGRAM(s) Oral <User Schedule>  multivitamin 1 Tablet(s) Oral daily  polyethylene glycol 3350 17 Gram(s) Oral daily  senna Syrup 10 milliLiter(s) Oral at bedtime  sodium chloride 0.65% Nasal 1 Spray(s) Both Nostrils two times a day  thiamine 100 milliGRAM(s) Oral daily    MEDICATIONS  (PRN):  acetaminophen    Suspension .. 975 milliGRAM(s) Oral every 6 hours PRN Temp greater or equal to 38C (100.4F)  dextrose Oral Gel 15 Gram(s) Oral once PRN Blood Glucose LESS THAN 70 milliGRAM(s)/deciliter  hydrALAZINE Injectable 20 milliGRAM(s) IV Push every 6 hours PRN Diastolic > 100  sodium chloride 0.9% lock flush 10 milliLiter(s) IV Push every 1 hour PRN Pre/post blood products, medications, blood draw, and to maintain line patency      Vital Signs Last 24 Hrs  T(C): 36.9 (16 Jun 2022 22:08), Max: 37.6 (16 Jun 2022 04:33)  T(F): 98.5 (16 Jun 2022 22:08), Max: 99.6 (16 Jun 2022 04:33)  HR: 82 (16 Jun 2022 22:08) (75 - 97)  BP: 124/80 (16 Jun 2022 22:08) (120/76 - 148/95)  BP(mean): --  RR: 17 (16 Jun 2022 22:08) (17 - 18)  SpO2: 97% (16 Jun 2022 22:08) (93% - 100%)    PHYSICAL EXAM:      Constitutional: appears comfortable  Respiratory: in no respiratory distress, no dyspnea, speaking placed 6/15  Gastrointestinal: abdomen softly distended, peg tube well seated  Genitourinary: voiding  Extremities: now starting to  move  bilateral upper and lower extremities   Neurological: now in minimally conscious state  Skin: warm, dry and no rashes         I&O's Detail    15 Shashi 2022 07:01  -  16 Jun 2022 07:00  --------------------------------------------------------  IN:    Enteral Tube Flush: 400 mL    Pivot 1.5: 600 mL  Total IN: 1000 mL    OUT:    Blood Loss (mL): 1 mL    Incontinent per Collection Bag (mL): 450 mL  Total OUT: 451 mL    Total NET: 549 mL          LABS:                RADIOLOGY & ADDITIONAL STUDIES:

## 2022-06-17 NOTE — PROGRESS NOTE ADULT - SUBJECTIVE AND OBJECTIVE BOX
HPI: Patient is a 25y old M brought by EMS, found down in the street unresponsive.     Primary Survey:    A - airway compromised, patient intubated in ED, RSI  B - bilateral breath sound after intubation  C - initial BP: 169/93 (05-24-22 @ 00:00)*** , HR: 107 (05-24-22 @ 00:44)*** , palpable pulses in all extremities  D - GCS 3 on arrival    Exposure obtained, no evident injuries    CXR: No evident PTX or Hemothorax, ET tube in place.  (24 May 2022 01:09)      INTERVAL HPI/OVERNIGHT EVENTS:  42y Male seen lying comfortably in bed. No acute overnight events.      Vital Signs Last 24 Hrs  T(C): 36.9 (17 Jun 2022 07:45), Max: 37 (16 Jun 2022 16:00)  T(F): 98.5 (17 Jun 2022 07:45), Max: 98.6 (16 Jun 2022 16:00)  HR: 89 (17 Jun 2022 07:45) (75 - 94)  BP: 109/76 (17 Jun 2022 07:45) (109/76 - 124/80)  BP(mean): --  RR: 18 (17 Jun 2022 07:45) (17 - 18)  SpO2: 99% (17 Jun 2022 05:09) (97% - 100%)      PHYSICAL EXAM:  GENERAL: NAD, well-groomed  HEAD:  s/p R craniectomy, flap soft mildly sunken, staples d/c & healing appropriately   WOUND: Open to air, clean dry intact  GEORGES COMA SCORE: E4 V1T M5 =10T       E: 4= opens eyes spontaneously 3= to voice 2= to noxious 1= no opening       V: 5= oriented 4= confused 3= inappropriate words 2= incomprehensible sounds 1= nonverbal 1T= intubated       M: 6= follows commands 5= localizes 4= withdraws 3= flexor posturing 2= extensor posturing 1= no movement  MENTAL STATUS: Awake. Opens eyes spontaneously. R gaze preference, not following commands, nonverbal s/p trach.  CRANIAL NERVES: PERRL. Face symmetric w/ normal eye closure and smile.  MOTOR:   SENSATION:       RADIOLOGY & ADDITIONAL TESTS:  < from: CT Head No Cont (05.30.22 @ 11:44) >    IMPRESSION:   Unchanged hemorrhagic contusions within the RIGHT frontal   and LEFT temporal lobes with edema and herniation of RIGHT frontal lobe   through the craniectomy defect. Small BILATERAL subdural hematomas are   also stable. With the layering over the tentorium.    Mild LEFT nasal   septal deviation with osteophyte. The facial and skull base bones and   calvarium demonstrate comminuted fractures of the RIGHT lateral orbit,   RIGHT zygomatic arch and RIGHT maxillary sinus and RIGHT pterygoid plate   unchanged.    < end of copied text >      < from: CT Head No Cont (05.29.22 @ 04:25) >  IMPRESSION: Slightly decreased hemorrhage in the interpeduncular compared   with 5/27/2022. No other significant change in subdural and   intraparenchymal hemorrhages.    < end of copied text >    < from: CT Head No Cont (05.27.22 @ 21:47) >  IMPRESSION:  Status post right hemicraniectomy.  Subdural hemorrhages and hemorrhagic   contusions are stable.     There is grossly stable soft tissue swelling and fluid in theright   frontal-parietal-temporal scalp soft tissues.  The previously seen   subgaleal drain has been removed.  There is new linear extracranial   hemorrhage in the right scalp soft tissues, along the prior drainage   catheter tract.    < end of copied text >      CAPRINI SCORE [CLOT]:  Patient has an estimated Caprini score of greater than 5.  However, the patient's unique clinical situation will be addressed in an individual manner to determine appropriate anticoagulation treatment, if any. HPI: Patient is a 25y old M brought by EMS, found down in the street unresponsive.     Primary Survey:    A - airway compromised, patient intubated in ED, RSI  B - bilateral breath sound after intubation  C - initial BP: 169/93 (05-24-22 @ 00:00)*** , HR: 107 (05-24-22 @ 00:44)*** , palpable pulses in all extremities  D - GCS 3 on arrival    Exposure obtained, no evident injuries    CXR: No evident PTX or Hemothorax, ET tube in place.  (24 May 2022 01:09)      INTERVAL HPI/OVERNIGHT EVENTS:  42y Male seen lying comfortably in bed. No acute overnight events.      Vital Signs Last 24 Hrs  T(C): 36.9 (17 Jun 2022 07:45), Max: 37 (16 Jun 2022 16:00)  T(F): 98.5 (17 Jun 2022 07:45), Max: 98.6 (16 Jun 2022 16:00)  HR: 89 (17 Jun 2022 07:45) (75 - 94)  BP: 109/76 (17 Jun 2022 07:45) (109/76 - 124/80)  BP(mean): --  RR: 18 (17 Jun 2022 07:45) (17 - 18)  SpO2: 99% (17 Jun 2022 05:09) (97% - 100%)      PHYSICAL EXAM:  GENERAL: NAD, well-groomed  HEAD:  s/p R craniectomy, flap soft mildly sunken, staples d/c & healing appropriately   WOUND: Open to air, clean dry intact  GEORGES COMA SCORE: E4 V1T M5 =10T       E: 4= opens eyes spontaneously 3= to voice 2= to noxious 1= no opening       V: 5= oriented 4= confused 3= inappropriate words 2= incomprehensible sounds 1= nonverbal 1T= intubated       M: 6= follows commands 5= localizes 4= withdraws 3= flexor posturing 2= extensor posturing 1= no movement  MENTAL STATUS: Awake. Opens eyes spontaneously. Not following commands, nonverbal s/p trach.  CRANIAL NERVES: PERRL. Face symmetric w/ normal eye closure and smile.  MOTOR: moves extremities spontaneously        RADIOLOGY & ADDITIONAL TESTS:  < from: CT Head No Cont (05.30.22 @ 11:44) >    IMPRESSION:   Unchanged hemorrhagic contusions within the RIGHT frontal   and LEFT temporal lobes with edema and herniation of RIGHT frontal lobe   through the craniectomy defect. Small BILATERAL subdural hematomas are   also stable. With the layering over the tentorium.    Mild LEFT nasal   septal deviation with osteophyte. The facial and skull base bones and   calvarium demonstrate comminuted fractures of the RIGHT lateral orbit,   RIGHT zygomatic arch and RIGHT maxillary sinus and RIGHT pterygoid plate   unchanged.    < end of copied text >      < from: CT Head No Cont (05.29.22 @ 04:25) >  IMPRESSION: Slightly decreased hemorrhage in the interpeduncular compared   with 5/27/2022. No other significant change in subdural and   intraparenchymal hemorrhages.    < end of copied text >    < from: CT Head No Cont (05.27.22 @ 21:47) >  IMPRESSION:  Status post right hemicraniectomy.  Subdural hemorrhages and hemorrhagic   contusions are stable.     There is grossly stable soft tissue swelling and fluid in theright   frontal-parietal-temporal scalp soft tissues.  The previously seen   subgaleal drain has been removed.  There is new linear extracranial   hemorrhage in the right scalp soft tissues, along the prior drainage   catheter tract.    < end of copied text >      CAPRINI SCORE [CLOT]:  Patient has an estimated Caprini score of greater than 5.  However, the patient's unique clinical situation will be addressed in an individual manner to determine appropriate anticoagulation treatment, if any.
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
## 2022-06-18 LAB
GLUCOSE BLDC GLUCOMTR-MCNC: 121 MG/DL — HIGH (ref 70–99)
GLUCOSE BLDC GLUCOMTR-MCNC: 124 MG/DL — HIGH (ref 70–99)
GLUCOSE BLDC GLUCOMTR-MCNC: 127 MG/DL — HIGH (ref 70–99)
SARS-COV-2 RNA SPEC QL NAA+PROBE: SIGNIFICANT CHANGE UP

## 2022-06-18 PROCEDURE — 99024 POSTOP FOLLOW-UP VISIT: CPT | Mod: GC

## 2022-06-18 RX ADMIN — Medication 1 DROP(S): at 18:51

## 2022-06-18 RX ADMIN — Medication 1 DROP(S): at 20:57

## 2022-06-18 RX ADMIN — Medication 10 MILLIGRAM(S): at 12:56

## 2022-06-18 RX ADMIN — POLYETHYLENE GLYCOL 3350 17 GRAM(S): 17 POWDER, FOR SOLUTION ORAL at 12:56

## 2022-06-18 RX ADMIN — Medication 1 MILLIGRAM(S): at 12:56

## 2022-06-18 RX ADMIN — Medication 200 MILLIGRAM(S): at 12:57

## 2022-06-18 RX ADMIN — ENOXAPARIN SODIUM 30 MILLIGRAM(S): 100 INJECTION SUBCUTANEOUS at 18:46

## 2022-06-18 RX ADMIN — SENNA PLUS 10 MILLILITER(S): 8.6 TABLET ORAL at 20:58

## 2022-06-18 RX ADMIN — Medication 1 SPRAY(S): at 18:48

## 2022-06-18 RX ADMIN — ENOXAPARIN SODIUM 30 MILLIGRAM(S): 100 INJECTION SUBCUTANEOUS at 05:58

## 2022-06-18 RX ADMIN — Medication 2 MILLIGRAM(S): at 20:57

## 2022-06-18 RX ADMIN — Medication 100 MILLIGRAM(S): at 12:56

## 2022-06-18 RX ADMIN — Medication 1 DROP(S): at 13:15

## 2022-06-18 RX ADMIN — Medication 5 MILLIGRAM(S): at 20:57

## 2022-06-18 RX ADMIN — Medication 1 SPRAY(S): at 06:07

## 2022-06-18 RX ADMIN — Medication 300 MILLIGRAM(S): at 12:57

## 2022-06-18 RX ADMIN — Medication 1 TABLET(S): at 12:57

## 2022-06-18 RX ADMIN — Medication 1 DROP(S): at 05:58

## 2022-06-18 RX ADMIN — Medication 200 MILLIGRAM(S): at 05:58

## 2022-06-18 RX ADMIN — MODAFINIL 150 MILLIGRAM(S): 200 TABLET ORAL at 05:58
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
## 2022-06-19 LAB
GLUCOSE BLDC GLUCOMTR-MCNC: 117 MG/DL — HIGH (ref 70–99)
GLUCOSE BLDC GLUCOMTR-MCNC: 119 MG/DL — HIGH (ref 70–99)
GLUCOSE BLDC GLUCOMTR-MCNC: 140 MG/DL — HIGH (ref 70–99)

## 2022-06-19 PROCEDURE — 93010 ELECTROCARDIOGRAM REPORT: CPT

## 2022-06-19 PROCEDURE — 99024 POSTOP FOLLOW-UP VISIT: CPT | Mod: GC

## 2022-06-19 PROCEDURE — 99222 1ST HOSP IP/OBS MODERATE 55: CPT

## 2022-06-19 RX ADMIN — POLYETHYLENE GLYCOL 3350 17 GRAM(S): 17 POWDER, FOR SOLUTION ORAL at 13:01

## 2022-06-19 RX ADMIN — Medication 5 MILLIGRAM(S): at 22:48

## 2022-06-19 RX ADMIN — ENOXAPARIN SODIUM 30 MILLIGRAM(S): 100 INJECTION SUBCUTANEOUS at 17:54

## 2022-06-19 RX ADMIN — Medication 1 SPRAY(S): at 05:34

## 2022-06-19 RX ADMIN — Medication 2 MILLIGRAM(S): at 22:48

## 2022-06-19 RX ADMIN — Medication 200 MILLIGRAM(S): at 13:02

## 2022-06-19 RX ADMIN — Medication 1 SPRAY(S): at 17:55

## 2022-06-19 RX ADMIN — Medication 100 MILLIGRAM(S): at 13:02

## 2022-06-19 RX ADMIN — Medication 975 MILLIGRAM(S): at 13:02

## 2022-06-19 RX ADMIN — ENOXAPARIN SODIUM 30 MILLIGRAM(S): 100 INJECTION SUBCUTANEOUS at 05:34

## 2022-06-19 RX ADMIN — Medication 1 MILLIGRAM(S): at 13:02

## 2022-06-19 RX ADMIN — Medication 200 MILLIGRAM(S): at 05:34

## 2022-06-19 RX ADMIN — Medication 1 TABLET(S): at 13:02

## 2022-06-19 RX ADMIN — Medication 1 DROP(S): at 13:03

## 2022-06-19 RX ADMIN — Medication 1 DROP(S): at 05:32

## 2022-06-19 RX ADMIN — Medication 10 MILLIGRAM(S): at 12:51

## 2022-06-19 RX ADMIN — Medication 300 MILLIGRAM(S): at 13:03

## 2022-06-19 RX ADMIN — Medication 1 DROP(S): at 17:55

## 2022-06-19 RX ADMIN — MODAFINIL 150 MILLIGRAM(S): 200 TABLET ORAL at 05:32
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
## 2022-06-20 LAB
ALBUMIN SERPL ELPH-MCNC: 3.9 G/DL — SIGNIFICANT CHANGE UP (ref 3.3–5.2)
ALP SERPL-CCNC: 118 U/L — SIGNIFICANT CHANGE UP (ref 40–120)
ALT FLD-CCNC: 19 U/L — SIGNIFICANT CHANGE UP
ANION GAP SERPL CALC-SCNC: 13 MMOL/L — SIGNIFICANT CHANGE UP (ref 5–17)
APTT BLD: 33 SEC — SIGNIFICANT CHANGE UP (ref 27.5–35.5)
AST SERPL-CCNC: 18 U/L — SIGNIFICANT CHANGE UP
BASOPHILS # BLD AUTO: 0.02 K/UL — SIGNIFICANT CHANGE UP (ref 0–0.2)
BASOPHILS NFR BLD AUTO: 0.4 % — SIGNIFICANT CHANGE UP (ref 0–2)
BILIRUB SERPL-MCNC: 0.3 MG/DL — LOW (ref 0.4–2)
BLD GP AB SCN SERPL QL: SIGNIFICANT CHANGE UP
BUN SERPL-MCNC: 25.7 MG/DL — HIGH (ref 8–20)
CALCIUM SERPL-MCNC: 9.4 MG/DL — SIGNIFICANT CHANGE UP (ref 8.6–10.2)
CHLORIDE SERPL-SCNC: 97 MMOL/L — LOW (ref 98–107)
CO2 SERPL-SCNC: 26 MMOL/L — SIGNIFICANT CHANGE UP (ref 22–29)
CREAT SERPL-MCNC: 0.62 MG/DL — SIGNIFICANT CHANGE UP (ref 0.5–1.3)
EGFR: 122 ML/MIN/1.73M2 — SIGNIFICANT CHANGE UP
EOSINOPHIL # BLD AUTO: 0.49 K/UL — SIGNIFICANT CHANGE UP (ref 0–0.5)
EOSINOPHIL NFR BLD AUTO: 10.7 % — HIGH (ref 0–6)
GLUCOSE BLDC GLUCOMTR-MCNC: 113 MG/DL — HIGH (ref 70–99)
GLUCOSE BLDC GLUCOMTR-MCNC: 127 MG/DL — HIGH (ref 70–99)
GLUCOSE BLDC GLUCOMTR-MCNC: 133 MG/DL — HIGH (ref 70–99)
GLUCOSE BLDC GLUCOMTR-MCNC: 137 MG/DL — HIGH (ref 70–99)
GLUCOSE SERPL-MCNC: 105 MG/DL — HIGH (ref 70–99)
HCT VFR BLD CALC: 33.2 % — LOW (ref 39–50)
HGB BLD-MCNC: 10.8 G/DL — LOW (ref 13–17)
IMM GRANULOCYTES NFR BLD AUTO: 0.2 % — SIGNIFICANT CHANGE UP (ref 0–1.5)
INR BLD: 1.29 RATIO — HIGH (ref 0.88–1.16)
LYMPHOCYTES # BLD AUTO: 0.96 K/UL — LOW (ref 1–3.3)
LYMPHOCYTES # BLD AUTO: 21 % — SIGNIFICANT CHANGE UP (ref 13–44)
MAGNESIUM SERPL-MCNC: 2 MG/DL — SIGNIFICANT CHANGE UP (ref 1.6–2.6)
MCHC RBC-ENTMCNC: 30.3 PG — SIGNIFICANT CHANGE UP (ref 27–34)
MCHC RBC-ENTMCNC: 32.5 GM/DL — SIGNIFICANT CHANGE UP (ref 32–36)
MCV RBC AUTO: 93 FL — SIGNIFICANT CHANGE UP (ref 80–100)
MONOCYTES # BLD AUTO: 0.49 K/UL — SIGNIFICANT CHANGE UP (ref 0–0.9)
MONOCYTES NFR BLD AUTO: 10.7 % — SIGNIFICANT CHANGE UP (ref 2–14)
NEUTROPHILS # BLD AUTO: 2.61 K/UL — SIGNIFICANT CHANGE UP (ref 1.8–7.4)
NEUTROPHILS NFR BLD AUTO: 57 % — SIGNIFICANT CHANGE UP (ref 43–77)
PHOSPHATE SERPL-MCNC: 4.2 MG/DL — SIGNIFICANT CHANGE UP (ref 2.4–4.7)
PLATELET # BLD AUTO: 166 K/UL — SIGNIFICANT CHANGE UP (ref 150–400)
POTASSIUM SERPL-MCNC: 3.8 MMOL/L — SIGNIFICANT CHANGE UP (ref 3.5–5.3)
POTASSIUM SERPL-SCNC: 3.8 MMOL/L — SIGNIFICANT CHANGE UP (ref 3.5–5.3)
PROT SERPL-MCNC: 8 G/DL — SIGNIFICANT CHANGE UP (ref 6.6–8.7)
PROTHROM AB SERPL-ACNC: 15 SEC — HIGH (ref 10.5–13.4)
RBC # BLD: 3.57 M/UL — LOW (ref 4.2–5.8)
RBC # FLD: 12.8 % — SIGNIFICANT CHANGE UP (ref 10.3–14.5)
SARS-COV-2 RNA SPEC QL NAA+PROBE: SIGNIFICANT CHANGE UP
SODIUM SERPL-SCNC: 136 MMOL/L — SIGNIFICANT CHANGE UP (ref 135–145)
WBC # BLD: 4.58 K/UL — SIGNIFICANT CHANGE UP (ref 3.8–10.5)
WBC # FLD AUTO: 4.58 K/UL — SIGNIFICANT CHANGE UP (ref 3.8–10.5)

## 2022-06-20 PROCEDURE — 70450 CT HEAD/BRAIN W/O DYE: CPT | Mod: 26

## 2022-06-20 PROCEDURE — 99233 SBSQ HOSP IP/OBS HIGH 50: CPT

## 2022-06-20 PROCEDURE — 99232 SBSQ HOSP IP/OBS MODERATE 35: CPT

## 2022-06-20 RX ADMIN — ENOXAPARIN SODIUM 30 MILLIGRAM(S): 100 INJECTION SUBCUTANEOUS at 17:32

## 2022-06-20 RX ADMIN — Medication 1 DROP(S): at 05:20

## 2022-06-20 RX ADMIN — Medication 200 MILLIGRAM(S): at 12:05

## 2022-06-20 RX ADMIN — Medication 1 SPRAY(S): at 06:24

## 2022-06-20 RX ADMIN — SENNA PLUS 10 MILLILITER(S): 8.6 TABLET ORAL at 21:07

## 2022-06-20 RX ADMIN — MODAFINIL 150 MILLIGRAM(S): 200 TABLET ORAL at 05:32

## 2022-06-20 RX ADMIN — Medication 1 DROP(S): at 00:09

## 2022-06-20 RX ADMIN — Medication 100 MILLIGRAM(S): at 12:04

## 2022-06-20 RX ADMIN — Medication 1 SPRAY(S): at 17:33

## 2022-06-20 RX ADMIN — Medication 200 MILLIGRAM(S): at 05:33

## 2022-06-20 RX ADMIN — Medication 1 DROP(S): at 17:32

## 2022-06-20 RX ADMIN — Medication 1 MILLIGRAM(S): at 12:04

## 2022-06-20 RX ADMIN — Medication 2 MILLIGRAM(S): at 21:07

## 2022-06-20 RX ADMIN — Medication 1 TABLET(S): at 12:04

## 2022-06-20 RX ADMIN — Medication 1 DROP(S): at 12:04

## 2022-06-20 RX ADMIN — Medication 300 MILLIGRAM(S): at 12:05

## 2022-06-20 RX ADMIN — POLYETHYLENE GLYCOL 3350 17 GRAM(S): 17 POWDER, FOR SOLUTION ORAL at 12:04

## 2022-06-20 RX ADMIN — ENOXAPARIN SODIUM 30 MILLIGRAM(S): 100 INJECTION SUBCUTANEOUS at 05:33

## 2022-06-20 RX ADMIN — Medication 10 MILLIGRAM(S): at 12:05

## 2022-06-20 RX ADMIN — Medication 5 MILLIGRAM(S): at 21:07
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
## 2022-06-20 NOTE — PROGRESS NOTE ADULT - NS ATTEND AMEND GEN_ALL_CORE FT
NSGY Attg:    see above    patient seen and examined     exam somewhat improved  eyes open  flap full, soft    CT reviewed    agree with exam and plan as above
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
## 2022-06-20 NOTE — PROGRESS NOTE ADULT - SUBJECTIVE AND OBJECTIVE BOX
KEMAR    [id number]    42y      Male    Patient is a 42y old  Male who presents with a chief complaint of Trauma/ Subdural/ Intubated (20 Jun 2022 09:54)      INTERVAL HPI/OVERNIGHT EVENTS:  patient is answering question by noding his head, as [er him he has no sob, or chest pain   REVIEW OF SYSTEMS:    Limited       Vital Signs Last 24 Hrs  T(C): 36.6 (20 Jun 2022 07:47), Max: 37.1 (20 Jun 2022 01:00)  T(F): 97.8 (20 Jun 2022 07:47), Max: 98.7 (20 Jun 2022 01:00)  HR: 66 (20 Jun 2022 07:47) (65 - 102)  BP: 110/73 (20 Jun 2022 07:47)   RR: 18 (20 Jun 2022 07:47) (18 - 18)  SpO2: 100% (20 Jun 2022 10:21) (98% - 100%)    PHYSICAL EXAM:    GENERAL: Young male looking comfortable    HEENT: Craniotomy   NECK: Trach   CHEST/LUNG: Clear to auscultate bilaterally; No wheezing  HEART: S1S2+, Regular rate and rhythm; No murmurs  ABDOMEN: Soft, Nontender, Nondistended; Bowel sounds present, has peg tube   EXTREMITIES:  2+ Peripheral Pulses, No edema  SKIN: No rashes or lesions  NEURO: not following commands, Aphasia         LABS:                        10.8   4.58  )-----------( 166      ( 20 Jun 2022 07:09 )             33.2     06-20    136  |  97<L>  |  25.7<H>  ----------------------------<  105<H>  3.8   |  26.0  |  0.62    Ca    9.4      20 Jun 2022 07:09  Phos  4.2     06-20  Mg     2.0     06-20    TPro  8.0  /  Alb  3.9  /  TBili  0.3<L>  /  DBili  x   /  AST  18  /  ALT  19  /  AlkPhos  118  06-20    PT/INR - ( 20 Jun 2022 07:09 )   PT: 15.0 sec;   INR: 1.29 ratio         PTT - ( 20 Jun 2022 07:09 )  PTT:33.0 sec        I&O's Summary    19 Jun 2022 07:01  -  20 Jun 2022 07:00  --------------------------------------------------------  IN: 1200 mL / OUT: 800 mL / NET: 400 mL        MEDICATIONS  (STANDING):  amantadine Syrup 200 milliGRAM(s) Oral <User Schedule>  artificial tears (preservative free) Ophthalmic Solution 1 Drop(s) Both EYES every 6 hours  bisacodyl Suppository 10 milliGRAM(s) Rectal daily  dextrose 5%. 1000 milliLiter(s) (50 mL/Hr) IV Continuous <Continuous>  dextrose 5%. 1000 milliLiter(s) (100 mL/Hr) IV Continuous <Continuous>  dextrose 50% Injectable 25 Gram(s) IV Push once  dextrose 50% Injectable 12.5 Gram(s) IV Push once  dextrose 50% Injectable 25 Gram(s) IV Push once  doxazosin 2 milliGRAM(s) Oral at bedtime  enoxaparin Injectable 30 milliGRAM(s) SubCutaneous every 12 hours  ferrous    sulfate Liquid 300 milliGRAM(s) Enteral Tube daily  folic acid 1 milliGRAM(s) Oral daily  glucagon  Injectable 1 milliGRAM(s) IntraMuscular once  insulin lispro (ADMELOG) corrective regimen sliding scale   SubCutaneous every 6 hours  melatonin 5 milliGRAM(s) Oral at bedtime  modafinil 150 milliGRAM(s) Oral <User Schedule>  multivitamin 1 Tablet(s) Oral daily  polyethylene glycol 3350 17 Gram(s) Oral daily  senna Syrup 10 milliLiter(s) Oral at bedtime  sodium chloride 0.65% Nasal 1 Spray(s) Both Nostrils two times a day  thiamine 100 milliGRAM(s) Oral daily    MEDICATIONS  (PRN):  acetaminophen    Suspension .. 975 milliGRAM(s) Oral every 6 hours PRN Temp greater or equal to 38C (100.4F)  dextrose Oral Gel 15 Gram(s) Oral once PRN Blood Glucose LESS THAN 70 milliGRAM(s)/deciliter  hydrALAZINE Injectable 20 milliGRAM(s) IV Push every 6 hours PRN Diastolic > 100  sodium chloride 0.9% lock flush 10 milliLiter(s) IV Push every 1 hour PRN Pre/post blood products, medications, blood draw, and to maintain line patency
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
## 2022-06-21 ENCOUNTER — APPOINTMENT (OUTPATIENT)
Dept: NEUROSURGERY | Facility: HOSPITAL | Age: 42
End: 2022-06-21

## 2022-06-21 LAB
GLUCOSE BLDC GLUCOMTR-MCNC: 107 MG/DL — HIGH (ref 70–99)
GLUCOSE BLDC GLUCOMTR-MCNC: 118 MG/DL — HIGH (ref 70–99)
GLUCOSE BLDC GLUCOMTR-MCNC: 120 MG/DL — HIGH (ref 70–99)
GLUCOSE BLDC GLUCOMTR-MCNC: 121 MG/DL — HIGH (ref 70–99)

## 2022-06-21 PROCEDURE — 99231 SBSQ HOSP IP/OBS SF/LOW 25: CPT | Mod: GC

## 2022-06-21 PROCEDURE — 99233 SBSQ HOSP IP/OBS HIGH 50: CPT

## 2022-06-21 RX ORDER — POLYETHYLENE GLYCOL 3350 17 G/17G
17 POWDER, FOR SOLUTION ORAL DAILY
Refills: 0 | Status: DISCONTINUED | OUTPATIENT
Start: 2022-06-21 | End: 2022-06-29

## 2022-06-21 RX ADMIN — Medication 2 MILLIGRAM(S): at 22:36

## 2022-06-21 RX ADMIN — Medication 100 MILLIGRAM(S): at 11:39

## 2022-06-21 RX ADMIN — Medication 200 MILLIGRAM(S): at 11:39

## 2022-06-21 RX ADMIN — ENOXAPARIN SODIUM 30 MILLIGRAM(S): 100 INJECTION SUBCUTANEOUS at 05:13

## 2022-06-21 RX ADMIN — Medication 5 MILLIGRAM(S): at 22:36

## 2022-06-21 RX ADMIN — ENOXAPARIN SODIUM 30 MILLIGRAM(S): 100 INJECTION SUBCUTANEOUS at 17:27

## 2022-06-21 RX ADMIN — Medication 1 MILLIGRAM(S): at 11:38

## 2022-06-21 RX ADMIN — Medication 1 DROP(S): at 00:03

## 2022-06-21 RX ADMIN — Medication 1 DROP(S): at 05:12

## 2022-06-21 RX ADMIN — Medication 1 SPRAY(S): at 05:13

## 2022-06-21 RX ADMIN — Medication 200 MILLIGRAM(S): at 05:12

## 2022-06-21 RX ADMIN — MODAFINIL 150 MILLIGRAM(S): 200 TABLET ORAL at 05:13

## 2022-06-21 RX ADMIN — Medication 1 TABLET(S): at 11:38

## 2022-06-21 RX ADMIN — Medication 300 MILLIGRAM(S): at 11:38

## 2022-06-21 NOTE — PROGRESS NOTE ADULT - SUBJECTIVE AND OBJECTIVE BOX
INTERVAL HPI/OVERNIGHT EVENTS/SUBJECTIVE:  Pt seen and examined. no overnight events. CT done, flap full no plan for cranioplasty for now    Vital Signs Last 24 Hrs  T(C): 37.1 (21 Jun 2022 00:30), Max: 37.1 (21 Jun 2022 00:30)  T(F): 98.8 (21 Jun 2022 00:30), Max: 98.8 (21 Jun 2022 00:30)  HR: 83 (21 Jun 2022 00:30) (66 - 83)  BP: 101/65 (21 Jun 2022 00:30) (101/65 - 116/16)  BP(mean): --  RR: 18 (21 Jun 2022 00:30) (18 - 18)  SpO2: 97% (21 Jun 2022 00:30) (97% - 100%)    I&O's Detail    19 Jun 2022 07:01  -  20 Jun 2022 07:00  --------------------------------------------------------  IN:    Enteral Tube Flush: 600 mL    Pivot 1.5: 600 mL  Total IN: 1200 mL    OUT:    Incontinent per Collection Bag (mL): 800 mL  Total OUT: 800 mL    Total NET: 400 mL          MEDICATIONS  (STANDING):  amantadine Syrup 200 milliGRAM(s) Oral <User Schedule>  artificial tears (preservative free) Ophthalmic Solution 1 Drop(s) Both EYES every 6 hours  bisacodyl Suppository 10 milliGRAM(s) Rectal daily  dextrose 5%. 1000 milliLiter(s) (50 mL/Hr) IV Continuous <Continuous>  dextrose 5%. 1000 milliLiter(s) (100 mL/Hr) IV Continuous <Continuous>  dextrose 50% Injectable 25 Gram(s) IV Push once  dextrose 50% Injectable 12.5 Gram(s) IV Push once  dextrose 50% Injectable 25 Gram(s) IV Push once  doxazosin 2 milliGRAM(s) Oral at bedtime  enoxaparin Injectable 30 milliGRAM(s) SubCutaneous every 12 hours  ferrous    sulfate Liquid 300 milliGRAM(s) Enteral Tube daily  folic acid 1 milliGRAM(s) Oral daily  glucagon  Injectable 1 milliGRAM(s) IntraMuscular once  insulin lispro (ADMELOG) corrective regimen sliding scale   SubCutaneous every 6 hours  melatonin 5 milliGRAM(s) Oral at bedtime  modafinil 150 milliGRAM(s) Oral <User Schedule>  multivitamin 1 Tablet(s) Oral daily  polyethylene glycol 3350 17 Gram(s) Oral daily  senna Syrup 10 milliLiter(s) Oral at bedtime  sodium chloride 0.65% Nasal 1 Spray(s) Both Nostrils two times a day  thiamine 100 milliGRAM(s) Oral daily    MEDICATIONS  (PRN):  acetaminophen    Suspension .. 975 milliGRAM(s) Oral every 6 hours PRN Temp greater or equal to 38C (100.4F)  dextrose Oral Gel 15 Gram(s) Oral once PRN Blood Glucose LESS THAN 70 milliGRAM(s)/deciliter  hydrALAZINE Injectable 20 milliGRAM(s) IV Push every 6 hours PRN Diastolic > 100  sodium chloride 0.9% lock flush 10 milliLiter(s) IV Push every 1 hour PRN Pre/post blood products, medications, blood draw, and to maintain line patency      MISC:     PHYSICAL EXAM:  Constitutional: appears comfortable  Respiratory: in no respiratory distress, no dyspnea,   Gastrointestinal: abdomen softly distended, peg tube well seated  Genitourinary: voiding  Extremities: now starting to  move  bilateral upper and lower extremities   Neurological: now in minimally conscious state  Skin: warm, dry and no rashes
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
## 2022-06-22 LAB
GLUCOSE BLDC GLUCOMTR-MCNC: 105 MG/DL — HIGH (ref 70–99)
GLUCOSE BLDC GLUCOMTR-MCNC: 105 MG/DL — HIGH (ref 70–99)
GLUCOSE BLDC GLUCOMTR-MCNC: 117 MG/DL — HIGH (ref 70–99)
GLUCOSE BLDC GLUCOMTR-MCNC: 123 MG/DL — HIGH (ref 70–99)
GLUCOSE BLDC GLUCOMTR-MCNC: 149 MG/DL — HIGH (ref 70–99)

## 2022-06-22 PROCEDURE — 99231 SBSQ HOSP IP/OBS SF/LOW 25: CPT

## 2022-06-22 PROCEDURE — 99233 SBSQ HOSP IP/OBS HIGH 50: CPT

## 2022-06-22 RX ADMIN — Medication 200 MILLIGRAM(S): at 06:42

## 2022-06-22 RX ADMIN — Medication 200 MILLIGRAM(S): at 11:25

## 2022-06-22 RX ADMIN — ENOXAPARIN SODIUM 30 MILLIGRAM(S): 100 INJECTION SUBCUTANEOUS at 17:05

## 2022-06-22 RX ADMIN — Medication 1 MILLIGRAM(S): at 11:25

## 2022-06-22 RX ADMIN — MODAFINIL 150 MILLIGRAM(S): 200 TABLET ORAL at 07:40

## 2022-06-22 RX ADMIN — ENOXAPARIN SODIUM 30 MILLIGRAM(S): 100 INJECTION SUBCUTANEOUS at 06:43

## 2022-06-22 RX ADMIN — Medication 1 TABLET(S): at 11:26

## 2022-06-22 RX ADMIN — Medication 2 MILLIGRAM(S): at 23:09

## 2022-06-22 RX ADMIN — Medication 100 MILLIGRAM(S): at 11:26

## 2022-06-22 RX ADMIN — Medication 300 MILLIGRAM(S): at 11:25

## 2022-06-22 RX ADMIN — Medication 5 MILLIGRAM(S): at 23:10

## 2022-06-22 NOTE — SPEECH LANGUAGE PATHOLOGY EVALUATION - SLP SUCCESSFUL AUDITORY STRATEGIES
repetition/elimination of environmental distractions increased time to respond/repetition/elimination of environmental distractions
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
## 2022-06-22 NOTE — SPEECH LANGUAGE PATHOLOGY EVALUATION - SLP PERTINENT HISTORY OF CURRENT PROBLEM
Pt seen this admission for coma stim by PT/OT & ST, now with new MD order for speech eval to begin therapeutic program

## 2022-06-22 NOTE — PROGRESS NOTE ADULT - NS ATTEND AMEND GEN_ALL_CORE FT
I have seen and examined the patient during rounds form [id number] hrs  events noted: patient has decannulated, tea unable to recannulate    On exam NAD, following commands, phonating.  trach site with granulating tissue, no air leak on water test.,   LUNA  Peg in place.    A/P  TBI S/P craniectomy, self decannulated trach, based on exa and degree of granulating tissue this most likely is older than 24 hrs.  Unsuccessful recannulation attempts support closure of trach along with negative leak test (sterile water placed on wound with no air leak)  Occlusive dressing to trach wound.  TEN  DVt chemoprophylaxes  Appreciate neurosurgery and PM&R input.

## 2022-06-22 NOTE — CHART NOTE - NSCHARTNOTEFT_GEN_A_CORE
At approximately 0620 Respiratory therapist and DR. SIMMONS was called at patient bedside due to patient decannulation. As Per Md we tried to replace trach and was unsuccesful. Trach was unable to pass through airway.  Patient remains stable no sign of respiratory distress noted  As per doctors order patient remain stable on Ra spo2 98%, Hr 85 bpm RR 18 bpm will continue to monitor.

## 2022-06-22 NOTE — SPEECH LANGUAGE PATHOLOGY EVALUATION - SLP GENERAL OBSERVATIONS
Pt received & seen seated upright in bed, eyes closed t/o #8 trach, trach collar with deflated cuff, 02 sats: 99%, nonverbal, 0/10 nonverbal pain scale pre/post, medical staff member provided translation into [place]
Pt received & seen seated upright in bed, awake/alert, reduced cognition, b/l hand mitts, IPAD language line ID: [id number] used for Syriac, mother present, 0/10 pre/post

## 2022-06-22 NOTE — CHART NOTE - NSCHARTNOTEFT_GEN_A_CORE
LATE NOTE ENTRY- Informed by RN during morning rounds that patient's tracheostomy tube was noticed to be dislodged. Presented to bedside urgently and found patient in NAD, no respiratory distress, and saturating well on room air. Able to vocalize. Tracheotomy tube size 4, cuffed use to attempt recannulization. This was met with considerable resistance and so held off on further attempts. Given stability, decided to put dressing on and discuss with attending. On re-evaluation with attending, trach site appeared to have closed to a degree which may represent a longer duration of dislodgment than just this AM. No air leak. Decision for continued closure.
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
## 2022-06-22 NOTE — SPEECH LANGUAGE PATHOLOGY EVALUATION - SLP DIAGNOSIS
Severe receptive & expressive language deficits. Based upon Coma Recovery Scale, pt scored a 2 out of 23.  Individual scores as follows: Auditory Function Scale: 0 (none), Visual Function Scale: 0 (none), Motor Function Scale: 2 (Flexion Withdrawal), Oromotor/Verbal Function Scale: 0 (none), Communication Scale: 0 (none), Arousal Scale: 0 (unarousable)
Severe deficits in cognition, thus impacting receptive & expressive language skills, poor speech intelligibility at this time, ? impacted by cognition: assessment to be ongoing
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
## 2022-06-23 LAB — GLUCOSE BLDC GLUCOMTR-MCNC: 102 MG/DL — HIGH (ref 70–99)

## 2022-06-23 PROCEDURE — 99233 SBSQ HOSP IP/OBS HIGH 50: CPT

## 2022-06-23 PROCEDURE — 99231 SBSQ HOSP IP/OBS SF/LOW 25: CPT | Mod: GC

## 2022-06-23 RX ADMIN — ENOXAPARIN SODIUM 30 MILLIGRAM(S): 100 INJECTION SUBCUTANEOUS at 07:21

## 2022-06-23 RX ADMIN — Medication 200 MILLIGRAM(S): at 14:00

## 2022-06-23 RX ADMIN — Medication 1 MILLIGRAM(S): at 11:49

## 2022-06-23 RX ADMIN — Medication 200 MILLIGRAM(S): at 07:22

## 2022-06-23 RX ADMIN — Medication 300 MILLIGRAM(S): at 11:49

## 2022-06-23 RX ADMIN — Medication 2 MILLIGRAM(S): at 22:10

## 2022-06-23 RX ADMIN — Medication 1 TABLET(S): at 11:50

## 2022-06-23 RX ADMIN — ENOXAPARIN SODIUM 30 MILLIGRAM(S): 100 INJECTION SUBCUTANEOUS at 17:00

## 2022-06-23 RX ADMIN — Medication 100 MILLIGRAM(S): at 11:49

## 2022-06-23 RX ADMIN — Medication 5 MILLIGRAM(S): at 22:10

## 2022-06-23 RX ADMIN — MODAFINIL 150 MILLIGRAM(S): 200 TABLET ORAL at 07:21
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
## 2022-06-24 PROCEDURE — 99233 SBSQ HOSP IP/OBS HIGH 50: CPT

## 2022-06-24 PROCEDURE — 99231 SBSQ HOSP IP/OBS SF/LOW 25: CPT

## 2022-06-24 RX ORDER — BUPROPION HYDROCHLORIDE 150 MG/1
75 TABLET, EXTENDED RELEASE ORAL
Refills: 0 | Status: DISCONTINUED | OUTPATIENT
Start: 2022-06-24 | End: 2022-06-29

## 2022-06-24 RX ORDER — BUPROPION HYDROCHLORIDE 150 MG/1
150 TABLET, EXTENDED RELEASE ORAL
Refills: 0 | Status: DISCONTINUED | OUTPATIENT
Start: 2022-06-24 | End: 2022-06-24

## 2022-06-24 RX ADMIN — ENOXAPARIN SODIUM 30 MILLIGRAM(S): 100 INJECTION SUBCUTANEOUS at 05:01

## 2022-06-24 RX ADMIN — Medication 2 MILLIGRAM(S): at 21:14

## 2022-06-24 RX ADMIN — ENOXAPARIN SODIUM 30 MILLIGRAM(S): 100 INJECTION SUBCUTANEOUS at 17:13

## 2022-06-24 RX ADMIN — Medication 1 MILLIGRAM(S): at 11:48

## 2022-06-24 RX ADMIN — Medication 5 MILLIGRAM(S): at 21:13

## 2022-06-24 RX ADMIN — Medication 200 MILLIGRAM(S): at 05:02

## 2022-06-24 RX ADMIN — Medication 1 TABLET(S): at 11:47

## 2022-06-24 RX ADMIN — Medication 300 MILLIGRAM(S): at 11:47

## 2022-06-24 RX ADMIN — Medication 100 MILLIGRAM(S): at 11:47

## 2022-06-24 RX ADMIN — Medication 200 MILLIGRAM(S): at 11:47

## 2022-06-24 RX ADMIN — BUPROPION HYDROCHLORIDE 75 MILLIGRAM(S): 150 TABLET, EXTENDED RELEASE ORAL at 17:13

## 2022-06-24 RX ADMIN — MODAFINIL 150 MILLIGRAM(S): 200 TABLET ORAL at 05:01

## 2022-06-24 NOTE — PROGRESS NOTE ADULT - SUBJECTIVE AND OBJECTIVE BOX
SUBJECTIVE / 24H EVENTS: Patient seen and examined at bedside. Subjective exam is limited by patients mental status - occasionally giving 1 word answers to questions and slow to respond. Per RN, patient had acute episode of desaturation noted by monitor tech that resolved quickly without intervention. I examined pt at bedside after this event - he was asleep but easily arousable, breathing comfortably with no accessory muscle use and O2 sat 100% on RA. Discussed w/ bedside RN - pt to remain on  but no acute intervention necessary at this time. Pt is tolerating TF via PEG tube. Voiding and having bowel fxn.     MEDICATIONS  (STANDING):  amantadine Syrup 200 milliGRAM(s) Oral <User Schedule>  doxazosin 2 milliGRAM(s) Oral at bedtime  enoxaparin Injectable 30 milliGRAM(s) SubCutaneous every 12 hours  ferrous    sulfate Liquid 300 milliGRAM(s) Enteral Tube daily  folic acid 1 milliGRAM(s) Oral daily  melatonin 5 milliGRAM(s) Oral at bedtime  modafinil 150 milliGRAM(s) Oral <User Schedule>  multivitamin 1 Tablet(s) Oral daily  thiamine 100 milliGRAM(s) Oral daily    MEDICATIONS  (PRN):  acetaminophen    Suspension .. 975 milliGRAM(s) Oral every 6 hours PRN Temp greater or equal to 38C (100.4F)  polyethylene glycol 3350 17 Gram(s) Oral daily PRN no BM>1 day  sodium chloride 0.9% lock flush 10 milliLiter(s) IV Push every 1 hour PRN Pre/post blood products, medications, blood draw, and to maintain line patency      Vital Signs Last 24 Hrs  T(C): 37.1 (24 Jun 2022 00:16), Max: 37.2 (23 Jun 2022 16:20)  T(F): 98.7 (24 Jun 2022 00:16), Max: 98.9 (23 Jun 2022 16:20)  HR: 65 (24 Jun 2022 00:16) (65 - 87)  BP: 104/67 (24 Jun 2022 00:16) (104/67 - 111/71)  BP(mean): --  RR: 18 (24 Jun 2022 00:16) (17 - 18)  SpO2: 98% (24 Jun 2022 00:16) (96% - 100%)    Constitutional: patient appears comfortable lying in bed, easily awoken from sleep, in NAD  HEENT: defect @ R crani site, incision clean & dry without drainage  Respiratory: lungs are clear b/l, respirations are unlabored, no accessory muscle use, occlusive dressing intact over old trach site  Cardiovascular: regular rate & rhythm  Gastrointestinal: abdomen soft, non-tender, non-distended, no rebound tenderness / guarding, PEG in place w/ TF running continuously, no erythema or drainage around site   Neurological: oriented to self, not place, time or situation      I&O's Detail    22 Jun 2022 07:01  -  23 Jun 2022 07:00  --------------------------------------------------------  IN:    Enteral Tube Flush: 400 mL    Pivot 1.5: 500 mL  Total IN: 900 mL    OUT:  Total OUT: 0 mL    Total NET: 900 mL      23 Jun 2022 07:01  -  24 Jun 2022 01:06  --------------------------------------------------------  IN:    Free Water: 500 mL    Pivot 1.5: 550 mL  Total IN: 1050 mL    OUT:    Incontinent per Collection Bag (mL): 300 mL  Total OUT: 300 mL    Total NET: 750 mL
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
## 2022-06-24 NOTE — PROGRESS NOTE ADULT - NS ATTEND AMEND GEN_ALL_CORE FT
I have seen and examined the patient during rounds form [id number] hrs  events noted  no new issues  will recalculate TF gaols to free for at least 4-6hrs  Activity vest  DVt chemoprophylaxes  Dispo planning
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
## 2022-06-25 PROCEDURE — 99024 POSTOP FOLLOW-UP VISIT: CPT | Mod: GC

## 2022-06-25 RX ADMIN — BUPROPION HYDROCHLORIDE 75 MILLIGRAM(S): 150 TABLET, EXTENDED RELEASE ORAL at 05:25

## 2022-06-25 RX ADMIN — Medication 5 MILLIGRAM(S): at 22:35

## 2022-06-25 RX ADMIN — BUPROPION HYDROCHLORIDE 75 MILLIGRAM(S): 150 TABLET, EXTENDED RELEASE ORAL at 17:23

## 2022-06-25 RX ADMIN — ENOXAPARIN SODIUM 30 MILLIGRAM(S): 100 INJECTION SUBCUTANEOUS at 17:23

## 2022-06-25 RX ADMIN — Medication 1 MILLIGRAM(S): at 11:37

## 2022-06-25 RX ADMIN — Medication 2 MILLIGRAM(S): at 22:35

## 2022-06-25 RX ADMIN — ENOXAPARIN SODIUM 30 MILLIGRAM(S): 100 INJECTION SUBCUTANEOUS at 05:25

## 2022-06-25 RX ADMIN — Medication 300 MILLIGRAM(S): at 11:37

## 2022-06-25 RX ADMIN — Medication 100 MILLIGRAM(S): at 11:37

## 2022-06-25 RX ADMIN — Medication 1 TABLET(S): at 11:37

## 2022-06-25 RX ADMIN — Medication 200 MILLIGRAM(S): at 11:37

## 2022-06-25 RX ADMIN — Medication 200 MILLIGRAM(S): at 05:26
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
## 2022-06-25 NOTE — PROGRESS NOTE ADULT - THIS PATIENT HAS THE FOLLOWING CONDITION(S)/DIAGNOSES ON THIS ADMISSION:
Encephalopathy Class I (easy) - visualization of the soft palate, fauces, uvula, and both anterior and posterior pillars
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
## 2022-06-26 LAB
ANION GAP SERPL CALC-SCNC: 11 MMOL/L — SIGNIFICANT CHANGE UP (ref 5–17)
BASOPHILS # BLD AUTO: 0.03 K/UL — SIGNIFICANT CHANGE UP (ref 0–0.2)
BASOPHILS NFR BLD AUTO: 0.8 % — SIGNIFICANT CHANGE UP (ref 0–2)
BUN SERPL-MCNC: 22.5 MG/DL — HIGH (ref 8–20)
CALCIUM SERPL-MCNC: 9.1 MG/DL — SIGNIFICANT CHANGE UP (ref 8.6–10.2)
CHLORIDE SERPL-SCNC: 96 MMOL/L — LOW (ref 98–107)
CO2 SERPL-SCNC: 26 MMOL/L — SIGNIFICANT CHANGE UP (ref 22–29)
CREAT SERPL-MCNC: 0.69 MG/DL — SIGNIFICANT CHANGE UP (ref 0.5–1.3)
EGFR: 118 ML/MIN/1.73M2 — SIGNIFICANT CHANGE UP
EOSINOPHIL # BLD AUTO: 0.24 K/UL — SIGNIFICANT CHANGE UP (ref 0–0.5)
EOSINOPHIL NFR BLD AUTO: 6.2 % — HIGH (ref 0–6)
GLUCOSE BLDC GLUCOMTR-MCNC: 123 MG/DL — HIGH (ref 70–99)
GLUCOSE SERPL-MCNC: 89 MG/DL — SIGNIFICANT CHANGE UP (ref 70–99)
HCT VFR BLD CALC: 36 % — LOW (ref 39–50)
HGB BLD-MCNC: 11.8 G/DL — LOW (ref 13–17)
IMM GRANULOCYTES NFR BLD AUTO: 0.3 % — SIGNIFICANT CHANGE UP (ref 0–1.5)
LYMPHOCYTES # BLD AUTO: 1.08 K/UL — SIGNIFICANT CHANGE UP (ref 1–3.3)
LYMPHOCYTES # BLD AUTO: 28 % — SIGNIFICANT CHANGE UP (ref 13–44)
MCHC RBC-ENTMCNC: 29.6 PG — SIGNIFICANT CHANGE UP (ref 27–34)
MCHC RBC-ENTMCNC: 32.8 GM/DL — SIGNIFICANT CHANGE UP (ref 32–36)
MCV RBC AUTO: 90.2 FL — SIGNIFICANT CHANGE UP (ref 80–100)
MONOCYTES # BLD AUTO: 0.48 K/UL — SIGNIFICANT CHANGE UP (ref 0–0.9)
MONOCYTES NFR BLD AUTO: 12.4 % — SIGNIFICANT CHANGE UP (ref 2–14)
NEUTROPHILS # BLD AUTO: 2.02 K/UL — SIGNIFICANT CHANGE UP (ref 1.8–7.4)
NEUTROPHILS NFR BLD AUTO: 52.3 % — SIGNIFICANT CHANGE UP (ref 43–77)
PLATELET # BLD AUTO: 172 K/UL — SIGNIFICANT CHANGE UP (ref 150–400)
POTASSIUM SERPL-MCNC: 4.3 MMOL/L — SIGNIFICANT CHANGE UP (ref 3.5–5.3)
POTASSIUM SERPL-SCNC: 4.3 MMOL/L — SIGNIFICANT CHANGE UP (ref 3.5–5.3)
RBC # BLD: 3.99 M/UL — LOW (ref 4.2–5.8)
RBC # FLD: 12.7 % — SIGNIFICANT CHANGE UP (ref 10.3–14.5)
SARS-COV-2 RNA SPEC QL NAA+PROBE: SIGNIFICANT CHANGE UP
SODIUM SERPL-SCNC: 133 MMOL/L — LOW (ref 135–145)
WBC # BLD: 3.86 K/UL — SIGNIFICANT CHANGE UP (ref 3.8–10.5)
WBC # FLD AUTO: 3.86 K/UL — SIGNIFICANT CHANGE UP (ref 3.8–10.5)

## 2022-06-26 PROCEDURE — 99024 POSTOP FOLLOW-UP VISIT: CPT

## 2022-06-26 RX ORDER — SODIUM CHLORIDE 9 MG/ML
1000 INJECTION INTRAMUSCULAR; INTRAVENOUS; SUBCUTANEOUS
Refills: 0 | Status: DISCONTINUED | OUTPATIENT
Start: 2022-06-26 | End: 2022-06-29

## 2022-06-26 RX ADMIN — Medication 200 MILLIGRAM(S): at 07:07

## 2022-06-26 RX ADMIN — Medication 1 MILLIGRAM(S): at 14:00

## 2022-06-26 RX ADMIN — Medication 100 MILLIGRAM(S): at 14:00

## 2022-06-26 RX ADMIN — Medication 5 MILLIGRAM(S): at 22:01

## 2022-06-26 RX ADMIN — BUPROPION HYDROCHLORIDE 75 MILLIGRAM(S): 150 TABLET, EXTENDED RELEASE ORAL at 17:04

## 2022-06-26 RX ADMIN — ENOXAPARIN SODIUM 30 MILLIGRAM(S): 100 INJECTION SUBCUTANEOUS at 07:08

## 2022-06-26 RX ADMIN — BUPROPION HYDROCHLORIDE 75 MILLIGRAM(S): 150 TABLET, EXTENDED RELEASE ORAL at 07:07

## 2022-06-26 RX ADMIN — SODIUM CHLORIDE 50 MILLILITER(S): 9 INJECTION INTRAMUSCULAR; INTRAVENOUS; SUBCUTANEOUS at 17:04

## 2022-06-26 RX ADMIN — Medication 200 MILLIGRAM(S): at 14:00

## 2022-06-26 RX ADMIN — Medication 1 TABLET(S): at 14:00

## 2022-06-26 RX ADMIN — ENOXAPARIN SODIUM 30 MILLIGRAM(S): 100 INJECTION SUBCUTANEOUS at 17:04

## 2022-06-26 RX ADMIN — Medication 2 MILLIGRAM(S): at 21:59

## 2022-06-26 RX ADMIN — Medication 300 MILLIGRAM(S): at 14:00
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
## 2022-06-27 LAB
ANION GAP SERPL CALC-SCNC: 10 MMOL/L — SIGNIFICANT CHANGE UP (ref 5–17)
BUN SERPL-MCNC: 20.6 MG/DL — HIGH (ref 8–20)
CALCIUM SERPL-MCNC: 8.8 MG/DL — SIGNIFICANT CHANGE UP (ref 8.6–10.2)
CHLORIDE SERPL-SCNC: 97 MMOL/L — LOW (ref 98–107)
CO2 SERPL-SCNC: 25 MMOL/L — SIGNIFICANT CHANGE UP (ref 22–29)
CREAT SERPL-MCNC: 0.6 MG/DL — SIGNIFICANT CHANGE UP (ref 0.5–1.3)
EGFR: 124 ML/MIN/1.73M2 — SIGNIFICANT CHANGE UP
GLUCOSE SERPL-MCNC: 127 MG/DL — HIGH (ref 70–99)
MAGNESIUM SERPL-MCNC: 1.7 MG/DL — SIGNIFICANT CHANGE UP (ref 1.6–2.6)
PHOSPHATE SERPL-MCNC: 3.9 MG/DL — SIGNIFICANT CHANGE UP (ref 2.4–4.7)
POTASSIUM SERPL-MCNC: 3.9 MMOL/L — SIGNIFICANT CHANGE UP (ref 3.5–5.3)
POTASSIUM SERPL-SCNC: 3.9 MMOL/L — SIGNIFICANT CHANGE UP (ref 3.5–5.3)
SODIUM SERPL-SCNC: 132 MMOL/L — LOW (ref 135–145)

## 2022-06-27 PROCEDURE — 76942 ECHO GUIDE FOR BIOPSY: CPT | Mod: 26,59

## 2022-06-27 PROCEDURE — 76937 US GUIDE VASCULAR ACCESS: CPT | Mod: 26

## 2022-06-27 PROCEDURE — 99233 SBSQ HOSP IP/OBS HIGH 50: CPT

## 2022-06-27 PROCEDURE — 99232 SBSQ HOSP IP/OBS MODERATE 35: CPT

## 2022-06-27 PROCEDURE — 36556 INSERT NON-TUNNEL CV CATH: CPT

## 2022-06-27 RX ORDER — MAGNESIUM SULFATE 500 MG/ML
2 VIAL (ML) INJECTION ONCE
Refills: 0 | Status: COMPLETED | OUTPATIENT
Start: 2022-06-27 | End: 2022-06-27

## 2022-06-27 RX ADMIN — Medication 300 MILLIGRAM(S): at 13:30

## 2022-06-27 RX ADMIN — Medication 1 TABLET(S): at 13:31

## 2022-06-27 RX ADMIN — BUPROPION HYDROCHLORIDE 75 MILLIGRAM(S): 150 TABLET, EXTENDED RELEASE ORAL at 05:46

## 2022-06-27 RX ADMIN — Medication 200 MILLIGRAM(S): at 05:46

## 2022-06-27 RX ADMIN — BUPROPION HYDROCHLORIDE 75 MILLIGRAM(S): 150 TABLET, EXTENDED RELEASE ORAL at 17:05

## 2022-06-27 RX ADMIN — Medication 5 MILLIGRAM(S): at 21:51

## 2022-06-27 RX ADMIN — Medication 2 MILLIGRAM(S): at 21:50

## 2022-06-27 RX ADMIN — Medication 1 MILLIGRAM(S): at 13:31

## 2022-06-27 RX ADMIN — ENOXAPARIN SODIUM 30 MILLIGRAM(S): 100 INJECTION SUBCUTANEOUS at 17:05

## 2022-06-27 RX ADMIN — Medication 100 MILLIGRAM(S): at 13:31

## 2022-06-27 RX ADMIN — Medication 200 MILLIGRAM(S): at 13:31

## 2022-06-27 RX ADMIN — ENOXAPARIN SODIUM 30 MILLIGRAM(S): 100 INJECTION SUBCUTANEOUS at 05:46

## 2022-06-27 RX ADMIN — Medication 150 GRAM(S): at 13:31

## 2022-06-27 NOTE — PROGRESS NOTE ADULT - NS ATTEND AMEND GEN_ALL_CORE FT
NSGY Attg:    see above    patient seen and examined    agree with exam and plan as above    CT head pending
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
## 2022-06-27 NOTE — PROGRESS NOTE ADULT - SUBJECTIVE AND OBJECTIVE BOX
INTERVAL HPI/OVERNIGHT EVENTS:  Pt admitted on 5/24   ct of the brain demonstrated right sdh, IPH. Pt had a right decompressive cranio on 5/24.  Pt seen today the right sided of the skull noted to be suken.      MEDICATIONS  (STANDING):  amantadine Syrup 200 milliGRAM(s) Oral <User Schedule>  buPROPion . 75 milliGRAM(s) Oral two times a day  doxazosin 2 milliGRAM(s) Oral at bedtime  enoxaparin Injectable 30 milliGRAM(s) SubCutaneous every 12 hours  ferrous    sulfate Liquid 300 milliGRAM(s) Enteral Tube daily  folic acid 1 milliGRAM(s) Oral daily  melatonin 5 milliGRAM(s) Oral at bedtime  multivitamin 1 Tablet(s) Oral daily  sodium chloride 0.9%. 1000 milliLiter(s) (50 mL/Hr) IV Continuous <Continuous>  thiamine 100 milliGRAM(s) Oral daily    MEDICATIONS  (PRN):  acetaminophen    Suspension .. 975 milliGRAM(s) Oral every 6 hours PRN Temp greater or equal to 38C (100.4F)  polyethylene glycol 3350 17 Gram(s) Oral daily PRN no BM>1 day  sodium chloride 0.9% lock flush 10 milliLiter(s) IV Push every 1 hour PRN Pre/post blood products, medications, blood draw, and to maintain line patency      Allergies  Allergy Status Unknown  Intolerances      Vital Signs Last 24 Hrs  T(C): 36.8 (27 Jun 2022 08:00), Max: 36.9 (27 Jun 2022 00:11)  T(F): 98.2 (27 Jun 2022 08:00), Max: 98.4 (27 Jun 2022 00:11)  HR: 67 (27 Jun 2022 08:00) (62 - 67)  BP: 115/73 (27 Jun 2022 08:00) (102/72 - 120/75)  BP(mean): --  RR: 18 (27 Jun 2022 08:00) (18 - 18)  SpO2: 99% (27 Jun 2022 08:00) (98% - 99%)       PHYSICAL EXAM  GENERAL: NAD,   HEAD:  right side of skull sunken, incision healed  EYES: open with stimuli  ENMT: No tonsillar erythema, exudates, or enlargement; Moist mucous membranes,   NECK: Supple,   NERVOUS SYSTEM:  eyes open; Motor Strength 5/5 B/L upper and lower extremities; DTRs 2+ intact and symmetric  EXTREMITIES:  2+ Peripheral Pulses, No edema    LABS:                          11.8   3.86  )-----------( 172      ( 26 Jun 2022 07:23 )             36.0     06-27    132<L>  |  97<L>  |  20.6<H>  ----------------------------<  127<H>  3.9   |  25.0  |  0.60    Ca    8.8      27 Jun 2022 05:31  Phos  3.9     06-27  Mg     1.7     06-27      I&O's Detail    26 Jun 2022 07:01  -  27 Jun 2022 07:00  --------------------------------------------------------  IN:    Free Water: 200 mL    Pivot 1.5: 975 mL    sodium chloride 0.9%: 300 mL  Total IN: 1475 mL    OUT:  Total OUT: 0 mL    Total NET: 1475 mL        RADIOLOGY & ADDITIONAL TESTS:  < from: CT Head No Cont (06.20.22 @ 08:52) >  ACC: [id number] EXAM:  CT BRAIN                        PROCEDURE DATE:  06/20/2022    IMPRESSION: Right frontal craniectomy. Resolution of hemorrhage in the   right frontal parasagittal region, left medial temporal lobe and in the   left frontal parietal subdural hematoma compared with 5/30/2022.  --- End of Report ---  < end of copied text >
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
## 2022-06-28 ENCOUNTER — TRANSCRIPTION ENCOUNTER (OUTPATIENT)
Age: 42
End: 2022-06-28

## 2022-06-28 LAB
ALBUMIN SERPL ELPH-MCNC: 3.6 G/DL — SIGNIFICANT CHANGE UP (ref 3.3–5.2)
ALP SERPL-CCNC: 140 U/L — HIGH (ref 40–120)
ALT FLD-CCNC: 16 U/L — SIGNIFICANT CHANGE UP
ANION GAP SERPL CALC-SCNC: 11 MMOL/L — SIGNIFICANT CHANGE UP (ref 5–17)
APPEARANCE UR: CLEAR — SIGNIFICANT CHANGE UP
AST SERPL-CCNC: 14 U/L — SIGNIFICANT CHANGE UP
BILIRUB SERPL-MCNC: 0.3 MG/DL — LOW (ref 0.4–2)
BILIRUB UR-MCNC: NEGATIVE — SIGNIFICANT CHANGE UP
BLD GP AB SCN SERPL QL: SIGNIFICANT CHANGE UP
BUN SERPL-MCNC: 20.9 MG/DL — HIGH (ref 8–20)
CALCIUM SERPL-MCNC: 8.9 MG/DL — SIGNIFICANT CHANGE UP (ref 8.6–10.2)
CHLORIDE SERPL-SCNC: 99 MMOL/L — SIGNIFICANT CHANGE UP (ref 98–107)
CO2 SERPL-SCNC: 24 MMOL/L — SIGNIFICANT CHANGE UP (ref 22–29)
COLOR SPEC: YELLOW — SIGNIFICANT CHANGE UP
CREAT SERPL-MCNC: 0.62 MG/DL — SIGNIFICANT CHANGE UP (ref 0.5–1.3)
DIFF PNL FLD: NEGATIVE — SIGNIFICANT CHANGE UP
EGFR: 122 ML/MIN/1.73M2 — SIGNIFICANT CHANGE UP
GLUCOSE SERPL-MCNC: 135 MG/DL — HIGH (ref 70–99)
GLUCOSE UR QL: NEGATIVE MG/DL — SIGNIFICANT CHANGE UP
HCT VFR BLD CALC: 33.1 % — LOW (ref 39–50)
HGB BLD-MCNC: 11.1 G/DL — LOW (ref 13–17)
INR BLD: 1.25 RATIO — HIGH (ref 0.88–1.16)
KETONES UR-MCNC: NEGATIVE — SIGNIFICANT CHANGE UP
LEUKOCYTE ESTERASE UR-ACNC: NEGATIVE — SIGNIFICANT CHANGE UP
MAGNESIUM SERPL-MCNC: 1.8 MG/DL — SIGNIFICANT CHANGE UP (ref 1.8–2.6)
MCHC RBC-ENTMCNC: 29.6 PG — SIGNIFICANT CHANGE UP (ref 27–34)
MCHC RBC-ENTMCNC: 33.5 GM/DL — SIGNIFICANT CHANGE UP (ref 32–36)
MCV RBC AUTO: 88.3 FL — SIGNIFICANT CHANGE UP (ref 80–100)
NITRITE UR-MCNC: NEGATIVE — SIGNIFICANT CHANGE UP
PH UR: 6.5 — SIGNIFICANT CHANGE UP (ref 5–8)
PHOSPHATE SERPL-MCNC: 4.2 MG/DL — SIGNIFICANT CHANGE UP (ref 2.4–4.7)
PLATELET # BLD AUTO: 163 K/UL — SIGNIFICANT CHANGE UP (ref 150–400)
POTASSIUM SERPL-MCNC: 4 MMOL/L — SIGNIFICANT CHANGE UP (ref 3.5–5.3)
POTASSIUM SERPL-SCNC: 4 MMOL/L — SIGNIFICANT CHANGE UP (ref 3.5–5.3)
PROT SERPL-MCNC: 6.7 G/DL — SIGNIFICANT CHANGE UP (ref 6.6–8.7)
PROT UR-MCNC: NEGATIVE — SIGNIFICANT CHANGE UP
PROTHROM AB SERPL-ACNC: 14.5 SEC — HIGH (ref 10.5–13.4)
RBC # BLD: 3.75 M/UL — LOW (ref 4.2–5.8)
RBC # FLD: 12.6 % — SIGNIFICANT CHANGE UP (ref 10.3–14.5)
SARS-COV-2 RNA SPEC QL NAA+PROBE: SIGNIFICANT CHANGE UP
SODIUM SERPL-SCNC: 133 MMOL/L — LOW (ref 135–145)
SP GR SPEC: 1.01 — SIGNIFICANT CHANGE UP (ref 1.01–1.02)
UROBILINOGEN FLD QL: 1 MG/DL
WBC # BLD: 3.29 K/UL — LOW (ref 3.8–10.5)
WBC # FLD AUTO: 3.29 K/UL — LOW (ref 3.8–10.5)

## 2022-06-28 PROCEDURE — 99233 SBSQ HOSP IP/OBS HIGH 50: CPT

## 2022-06-28 PROCEDURE — 70450 CT HEAD/BRAIN W/O DYE: CPT | Mod: 26

## 2022-06-28 PROCEDURE — 99231 SBSQ HOSP IP/OBS SF/LOW 25: CPT

## 2022-06-28 RX ORDER — MAGNESIUM SULFATE 500 MG/ML
2 VIAL (ML) INJECTION ONCE
Refills: 0 | Status: COMPLETED | OUTPATIENT
Start: 2022-06-28 | End: 2022-06-28

## 2022-06-28 RX ADMIN — Medication 5 MILLIGRAM(S): at 21:03

## 2022-06-28 RX ADMIN — Medication 1 MILLIGRAM(S): at 11:11

## 2022-06-28 RX ADMIN — ENOXAPARIN SODIUM 30 MILLIGRAM(S): 100 INJECTION SUBCUTANEOUS at 05:38

## 2022-06-28 RX ADMIN — Medication 2 MILLIGRAM(S): at 21:03

## 2022-06-28 RX ADMIN — BUPROPION HYDROCHLORIDE 75 MILLIGRAM(S): 150 TABLET, EXTENDED RELEASE ORAL at 05:38

## 2022-06-28 RX ADMIN — BUPROPION HYDROCHLORIDE 75 MILLIGRAM(S): 150 TABLET, EXTENDED RELEASE ORAL at 17:54

## 2022-06-28 RX ADMIN — Medication 1 TABLET(S): at 11:14

## 2022-06-28 RX ADMIN — Medication 25 GRAM(S): at 09:31

## 2022-06-28 RX ADMIN — Medication 300 MILLIGRAM(S): at 11:11

## 2022-06-28 RX ADMIN — ENOXAPARIN SODIUM 30 MILLIGRAM(S): 100 INJECTION SUBCUTANEOUS at 17:54

## 2022-06-28 RX ADMIN — Medication 100 MILLIGRAM(S): at 11:11

## 2022-06-28 RX ADMIN — Medication 200 MILLIGRAM(S): at 05:38

## 2022-06-28 RX ADMIN — SODIUM CHLORIDE 50 MILLILITER(S): 9 INJECTION INTRAMUSCULAR; INTRAVENOUS; SUBCUTANEOUS at 05:43
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
## 2022-06-29 ENCOUNTER — TRANSCRIPTION ENCOUNTER (OUTPATIENT)
Age: 42
End: 2022-06-29

## 2022-06-29 ENCOUNTER — APPOINTMENT (OUTPATIENT)
Dept: NEUROSURGERY | Facility: HOSPITAL | Age: 42
End: 2022-06-29

## 2022-06-29 LAB
ANION GAP SERPL CALC-SCNC: 12 MMOL/L — SIGNIFICANT CHANGE UP (ref 5–17)
APTT BLD: 32.2 SEC — SIGNIFICANT CHANGE UP (ref 27.5–35.5)
BASOPHILS # BLD AUTO: 0.03 K/UL — SIGNIFICANT CHANGE UP (ref 0–0.2)
BASOPHILS NFR BLD AUTO: 0.8 % — SIGNIFICANT CHANGE UP (ref 0–2)
BUN SERPL-MCNC: 11.8 MG/DL — SIGNIFICANT CHANGE UP (ref 8–20)
CALCIUM SERPL-MCNC: 8.8 MG/DL — SIGNIFICANT CHANGE UP (ref 8.6–10.2)
CHLORIDE SERPL-SCNC: 95 MMOL/L — LOW (ref 98–107)
CO2 SERPL-SCNC: 24 MMOL/L — SIGNIFICANT CHANGE UP (ref 22–29)
CREAT SERPL-MCNC: 0.63 MG/DL — SIGNIFICANT CHANGE UP (ref 0.5–1.3)
EGFR: 122 ML/MIN/1.73M2 — SIGNIFICANT CHANGE UP
EOSINOPHIL # BLD AUTO: 0.2 K/UL — SIGNIFICANT CHANGE UP (ref 0–0.5)
EOSINOPHIL NFR BLD AUTO: 5.6 % — SIGNIFICANT CHANGE UP (ref 0–6)
GLUCOSE BLDC GLUCOMTR-MCNC: 96 MG/DL — SIGNIFICANT CHANGE UP (ref 70–99)
GLUCOSE SERPL-MCNC: 110 MG/DL — HIGH (ref 70–99)
HCT VFR BLD CALC: 29.9 % — LOW (ref 39–50)
HCT VFR BLD CALC: 34.2 % — LOW (ref 39–50)
HGB BLD-MCNC: 10.1 G/DL — LOW (ref 13–17)
HGB BLD-MCNC: 11.3 G/DL — LOW (ref 13–17)
IMM GRANULOCYTES NFR BLD AUTO: 0.3 % — SIGNIFICANT CHANGE UP (ref 0–1.5)
INR BLD: 1.24 RATIO — HIGH (ref 0.88–1.16)
LYMPHOCYTES # BLD AUTO: 1.21 K/UL — SIGNIFICANT CHANGE UP (ref 1–3.3)
LYMPHOCYTES # BLD AUTO: 34.2 % — SIGNIFICANT CHANGE UP (ref 13–44)
MAGNESIUM SERPL-MCNC: 1.7 MG/DL — LOW (ref 1.8–2.6)
MAGNESIUM SERPL-MCNC: 1.8 MG/DL — SIGNIFICANT CHANGE UP (ref 1.6–2.6)
MCHC RBC-ENTMCNC: 29.4 PG — SIGNIFICANT CHANGE UP (ref 27–34)
MCHC RBC-ENTMCNC: 29.7 PG — SIGNIFICANT CHANGE UP (ref 27–34)
MCHC RBC-ENTMCNC: 33 GM/DL — SIGNIFICANT CHANGE UP (ref 32–36)
MCHC RBC-ENTMCNC: 33.8 GM/DL — SIGNIFICANT CHANGE UP (ref 32–36)
MCV RBC AUTO: 86.9 FL — SIGNIFICANT CHANGE UP (ref 80–100)
MCV RBC AUTO: 89.8 FL — SIGNIFICANT CHANGE UP (ref 80–100)
MONOCYTES # BLD AUTO: 0.43 K/UL — SIGNIFICANT CHANGE UP (ref 0–0.9)
MONOCYTES NFR BLD AUTO: 12.1 % — SIGNIFICANT CHANGE UP (ref 2–14)
NEUTROPHILS # BLD AUTO: 1.66 K/UL — LOW (ref 1.8–7.4)
NEUTROPHILS NFR BLD AUTO: 47 % — SIGNIFICANT CHANGE UP (ref 43–77)
PHOSPHATE SERPL-MCNC: 4.5 MG/DL — SIGNIFICANT CHANGE UP (ref 2.4–4.7)
PHOSPHATE SERPL-MCNC: 4.6 MG/DL — SIGNIFICANT CHANGE UP (ref 2.4–4.7)
PLATELET # BLD AUTO: 156 K/UL — SIGNIFICANT CHANGE UP (ref 150–400)
PLATELET # BLD AUTO: 171 K/UL — SIGNIFICANT CHANGE UP (ref 150–400)
POTASSIUM SERPL-MCNC: 4.3 MMOL/L — SIGNIFICANT CHANGE UP (ref 3.5–5.3)
POTASSIUM SERPL-SCNC: 4.3 MMOL/L — SIGNIFICANT CHANGE UP (ref 3.5–5.3)
PROTHROM AB SERPL-ACNC: 14.4 SEC — HIGH (ref 10.5–13.4)
RBC # BLD: 3.44 M/UL — LOW (ref 4.2–5.8)
RBC # BLD: 3.81 M/UL — LOW (ref 4.2–5.8)
RBC # FLD: 12.6 % — SIGNIFICANT CHANGE UP (ref 10.3–14.5)
RBC # FLD: 12.8 % — SIGNIFICANT CHANGE UP (ref 10.3–14.5)
SARS-COV-2 RNA SPEC QL NAA+PROBE: SIGNIFICANT CHANGE UP
SODIUM SERPL-SCNC: 130 MMOL/L — LOW (ref 135–145)
WBC # BLD: 3.54 K/UL — LOW (ref 3.8–10.5)
WBC # BLD: 6.97 K/UL — SIGNIFICANT CHANGE UP (ref 3.8–10.5)
WBC # FLD AUTO: 3.54 K/UL — LOW (ref 3.8–10.5)
WBC # FLD AUTO: 6.97 K/UL — SIGNIFICANT CHANGE UP (ref 3.8–10.5)

## 2022-06-29 PROCEDURE — ZZZZZ: CPT

## 2022-06-29 PROCEDURE — 99233 SBSQ HOSP IP/OBS HIGH 50: CPT

## 2022-06-29 PROCEDURE — 99231 SBSQ HOSP IP/OBS SF/LOW 25: CPT

## 2022-06-29 PROCEDURE — 62143 RPL B1 FLP/PROSTC PLATE SKL: CPT | Mod: AS,58

## 2022-06-29 DEVICE — SCREW UN3 ST 1.5X4MM: Type: IMPLANTABLE DEVICE | Status: FUNCTIONAL

## 2022-06-29 DEVICE — SEALANT FLOSEAL FAST PREP HEMOSTATIC MATRIX 10ML: Type: IMPLANTABLE DEVICE | Status: FUNCTIONAL

## 2022-06-29 DEVICE — SPONGE HSTAT SURGCEL FIBRILLAR 4X4IN: Type: IMPLANTABLE DEVICE | Status: FUNCTIONAL

## 2022-06-29 DEVICE — MATRIX DURAGEN PLUS DURAL REGENERATION 4X5IN: Type: IMPLANTABLE DEVICE | Status: FUNCTIONAL

## 2022-06-29 DEVICE — DRAIN SYSTEM WITH CATH: Type: IMPLANTABLE DEVICE | Status: FUNCTIONAL

## 2022-06-29 DEVICE — PLATE COVER BURRHOLE UN3 W/ TAB 20MM: Type: IMPLANTABLE DEVICE | Status: FUNCTIONAL

## 2022-06-29 RX ORDER — MAGNESIUM SULFATE 500 MG/ML
2 VIAL (ML) INJECTION ONCE
Refills: 0 | Status: COMPLETED | OUTPATIENT
Start: 2022-06-29 | End: 2022-06-29

## 2022-06-29 RX ORDER — SODIUM CHLORIDE 9 MG/ML
1 INJECTION INTRAMUSCULAR; INTRAVENOUS; SUBCUTANEOUS EVERY 8 HOURS
Refills: 0 | Status: DISCONTINUED | OUTPATIENT
Start: 2022-06-29 | End: 2022-07-01

## 2022-06-29 RX ORDER — FENTANYL CITRATE 50 UG/ML
25 INJECTION INTRAVENOUS
Refills: 0 | Status: DISCONTINUED | OUTPATIENT
Start: 2022-06-29 | End: 2022-06-30

## 2022-06-29 RX ORDER — HYDROMORPHONE HYDROCHLORIDE 2 MG/ML
0.5 INJECTION INTRAMUSCULAR; INTRAVENOUS; SUBCUTANEOUS
Refills: 0 | Status: DISCONTINUED | OUTPATIENT
Start: 2022-06-29 | End: 2022-06-30

## 2022-06-29 RX ORDER — OXYCODONE HYDROCHLORIDE 5 MG/1
10 TABLET ORAL EVERY 4 HOURS
Refills: 0 | Status: DISCONTINUED | OUTPATIENT
Start: 2022-06-29 | End: 2022-07-01

## 2022-06-29 RX ORDER — OXYCODONE HYDROCHLORIDE 5 MG/1
5 TABLET ORAL EVERY 4 HOURS
Refills: 0 | Status: DISCONTINUED | OUTPATIENT
Start: 2022-06-29 | End: 2022-07-01

## 2022-06-29 RX ORDER — MAGNESIUM SULFATE 500 MG/ML
1 VIAL (ML) INJECTION ONCE
Refills: 0 | Status: COMPLETED | OUTPATIENT
Start: 2022-06-29 | End: 2022-06-29

## 2022-06-29 RX ORDER — HYDROMORPHONE HYDROCHLORIDE 2 MG/ML
0.5 INJECTION INTRAMUSCULAR; INTRAVENOUS; SUBCUTANEOUS EVERY 6 HOURS
Refills: 0 | Status: DISCONTINUED | OUTPATIENT
Start: 2022-06-29 | End: 2022-06-30

## 2022-06-29 RX ORDER — ONDANSETRON 8 MG/1
4 TABLET, FILM COATED ORAL ONCE
Refills: 0 | Status: DISCONTINUED | OUTPATIENT
Start: 2022-06-29 | End: 2022-06-30

## 2022-06-29 RX ORDER — OXYCODONE HYDROCHLORIDE 5 MG/1
10 TABLET ORAL EVERY 4 HOURS
Refills: 0 | Status: DISCONTINUED | OUTPATIENT
Start: 2022-06-29 | End: 2022-06-29

## 2022-06-29 RX ORDER — LABETALOL HCL 100 MG
10 TABLET ORAL EVERY 4 HOURS
Refills: 0 | Status: DISCONTINUED | OUTPATIENT
Start: 2022-06-29 | End: 2022-07-08

## 2022-06-29 RX ORDER — CHLORHEXIDINE GLUCONATE 213 G/1000ML
1 SOLUTION TOPICAL ONCE
Refills: 0 | Status: DISCONTINUED | OUTPATIENT
Start: 2022-06-29 | End: 2022-06-29

## 2022-06-29 RX ORDER — HYDRALAZINE HCL 50 MG
10 TABLET ORAL EVERY 4 HOURS
Refills: 0 | Status: DISCONTINUED | OUTPATIENT
Start: 2022-06-29 | End: 2022-07-08

## 2022-06-29 RX ORDER — CHLORHEXIDINE GLUCONATE 213 G/1000ML
1 SOLUTION TOPICAL ONCE
Refills: 0 | Status: COMPLETED | OUTPATIENT
Start: 2022-06-29 | End: 2022-06-29

## 2022-06-29 RX ORDER — OXYCODONE HYDROCHLORIDE 5 MG/1
5 TABLET ORAL EVERY 4 HOURS
Refills: 0 | Status: DISCONTINUED | OUTPATIENT
Start: 2022-06-29 | End: 2022-06-29

## 2022-06-29 RX ORDER — CEFAZOLIN SODIUM 1 G
2000 VIAL (EA) INJECTION EVERY 8 HOURS
Refills: 0 | Status: COMPLETED | OUTPATIENT
Start: 2022-06-29 | End: 2022-06-30

## 2022-06-29 RX ORDER — SODIUM CHLORIDE 9 MG/ML
1000 INJECTION INTRAMUSCULAR; INTRAVENOUS; SUBCUTANEOUS
Refills: 0 | Status: DISCONTINUED | OUTPATIENT
Start: 2022-06-29 | End: 2022-07-01

## 2022-06-29 RX ORDER — ONDANSETRON 8 MG/1
4 TABLET, FILM COATED ORAL EVERY 6 HOURS
Refills: 0 | Status: DISCONTINUED | OUTPATIENT
Start: 2022-06-29 | End: 2022-07-08

## 2022-06-29 RX ORDER — LEVETIRACETAM 250 MG/1
500 TABLET, FILM COATED ORAL EVERY 12 HOURS
Refills: 0 | Status: DISCONTINUED | OUTPATIENT
Start: 2022-06-29 | End: 2022-07-14

## 2022-06-29 RX ADMIN — Medication 25 GRAM(S): at 10:17

## 2022-06-29 RX ADMIN — Medication 100 GRAM(S): at 22:46

## 2022-06-29 RX ADMIN — CHLORHEXIDINE GLUCONATE 1 APPLICATION(S): 213 SOLUTION TOPICAL at 10:18

## 2022-06-29 RX ADMIN — SODIUM CHLORIDE 75 MILLILITER(S): 9 INJECTION INTRAMUSCULAR; INTRAVENOUS; SUBCUTANEOUS at 17:53

## 2022-06-29 RX ADMIN — LEVETIRACETAM 400 MILLIGRAM(S): 250 TABLET, FILM COATED ORAL at 17:53

## 2022-06-29 RX ADMIN — Medication 100 MILLIGRAM(S): at 21:39

## 2022-06-29 RX ADMIN — SODIUM CHLORIDE 50 MILLILITER(S): 9 INJECTION INTRAMUSCULAR; INTRAVENOUS; SUBCUTANEOUS at 07:15

## 2022-06-29 NOTE — PROGRESS NOTE ADULT - SUBJECTIVE AND OBJECTIVE BOX
NSGY Attg:    Patient seen and examined.  No acute overnight events reported.    Exam:  eyes open   speaks words in Albanian  not oriented  follows commands in [place]ian  [employer] [facility] [employer] [facility]    I explained the risks, benefits, and alternatives of surgical intervention to the patient's mother using a licensed  as below:    benefit: improved protection of brain, improved cosmetic appearance   alternative: no surgical intervention; continued surveillance  risks: bleeding, infection, CSF leak, failure of procedure, need for re-operation, seizure, stroke, coma, death, DVT, PE, MI, PNA, UTI, difficulty/failure to intubate or extubate, new or worsening numbness, tingling, weakness, paralysis, sensory changes, difficulty/inability to ambulate, difficulty with expressive or receptive speech, altered personality or judgment, sexual dysfunction, incontinence    The patient's mother verbalizes her understanding of the above.  I have answered all her questions.  She wishes to proceed with operative intervention.    A/P:  - to OR for right cranioplasty with complex wound closure by plastic surgery
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
## 2022-06-30 LAB
ALBUMIN SERPL ELPH-MCNC: 3.4 G/DL — SIGNIFICANT CHANGE UP (ref 3.3–5.2)
ALP SERPL-CCNC: 118 U/L — SIGNIFICANT CHANGE UP (ref 40–120)
ALT FLD-CCNC: 14 U/L — SIGNIFICANT CHANGE UP
ANION GAP SERPL CALC-SCNC: 14 MMOL/L — SIGNIFICANT CHANGE UP (ref 5–17)
AST SERPL-CCNC: 15 U/L — SIGNIFICANT CHANGE UP
BILIRUB DIRECT SERPL-MCNC: 0.1 MG/DL — SIGNIFICANT CHANGE UP (ref 0–0.3)
BILIRUB INDIRECT FLD-MCNC: 0.3 MG/DL — SIGNIFICANT CHANGE UP (ref 0.2–1)
BILIRUB SERPL-MCNC: 0.4 MG/DL — SIGNIFICANT CHANGE UP (ref 0.4–2)
BUN SERPL-MCNC: 10.7 MG/DL — SIGNIFICANT CHANGE UP (ref 8–20)
CALCIUM SERPL-MCNC: 8.8 MG/DL — SIGNIFICANT CHANGE UP (ref 8.6–10.2)
CHLORIDE SERPL-SCNC: 95 MMOL/L — LOW (ref 98–107)
CO2 SERPL-SCNC: 23 MMOL/L — SIGNIFICANT CHANGE UP (ref 22–29)
CREAT SERPL-MCNC: 0.58 MG/DL — SIGNIFICANT CHANGE UP (ref 0.5–1.3)
EGFR: 125 ML/MIN/1.73M2 — SIGNIFICANT CHANGE UP
GLUCOSE SERPL-MCNC: 102 MG/DL — HIGH (ref 70–99)
HCT VFR BLD CALC: 28.5 % — LOW (ref 39–50)
HGB BLD-MCNC: 9.7 G/DL — LOW (ref 13–17)
MAGNESIUM SERPL-MCNC: 1.7 MG/DL — LOW (ref 1.8–2.6)
MCHC RBC-ENTMCNC: 29.8 PG — SIGNIFICANT CHANGE UP (ref 27–34)
MCHC RBC-ENTMCNC: 34 GM/DL — SIGNIFICANT CHANGE UP (ref 32–36)
MCV RBC AUTO: 87.7 FL — SIGNIFICANT CHANGE UP (ref 80–100)
PHOSPHATE SERPL-MCNC: 4.9 MG/DL — HIGH (ref 2.4–4.7)
PLATELET # BLD AUTO: 163 K/UL — SIGNIFICANT CHANGE UP (ref 150–400)
POTASSIUM SERPL-MCNC: 4 MMOL/L — SIGNIFICANT CHANGE UP (ref 3.5–5.3)
POTASSIUM SERPL-SCNC: 4 MMOL/L — SIGNIFICANT CHANGE UP (ref 3.5–5.3)
PROT SERPL-MCNC: 6.4 G/DL — LOW (ref 6.6–8.7)
RBC # BLD: 3.25 M/UL — LOW (ref 4.2–5.8)
RBC # FLD: 12.6 % — SIGNIFICANT CHANGE UP (ref 10.3–14.5)
SODIUM SERPL-SCNC: 132 MMOL/L — LOW (ref 135–145)
WBC # BLD: 6.04 K/UL — SIGNIFICANT CHANGE UP (ref 3.8–10.5)
WBC # FLD AUTO: 6.04 K/UL — SIGNIFICANT CHANGE UP (ref 3.8–10.5)

## 2022-06-30 PROCEDURE — 99024 POSTOP FOLLOW-UP VISIT: CPT

## 2022-06-30 PROCEDURE — 99233 SBSQ HOSP IP/OBS HIGH 50: CPT

## 2022-06-30 PROCEDURE — 70450 CT HEAD/BRAIN W/O DYE: CPT | Mod: 26

## 2022-06-30 RX ORDER — THIAMINE MONONITRATE (VIT B1) 100 MG
100 TABLET ORAL DAILY
Refills: 0 | Status: DISCONTINUED | OUTPATIENT
Start: 2022-06-30 | End: 2022-07-17

## 2022-06-30 RX ORDER — ACETAMINOPHEN 500 MG
1000 TABLET ORAL ONCE
Refills: 0 | Status: COMPLETED | OUTPATIENT
Start: 2022-06-30 | End: 2022-06-30

## 2022-06-30 RX ORDER — LANOLIN ALCOHOL/MO/W.PET/CERES
5 CREAM (GRAM) TOPICAL AT BEDTIME
Refills: 0 | Status: DISCONTINUED | OUTPATIENT
Start: 2022-06-30 | End: 2022-07-17

## 2022-06-30 RX ORDER — BUPROPION HYDROCHLORIDE 150 MG/1
75 TABLET, EXTENDED RELEASE ORAL EVERY 12 HOURS
Refills: 0 | Status: DISCONTINUED | OUTPATIENT
Start: 2022-06-30 | End: 2022-07-17

## 2022-06-30 RX ORDER — FERROUS SULFATE 325(65) MG
300 TABLET ORAL DAILY
Refills: 0 | Status: DISCONTINUED | OUTPATIENT
Start: 2022-06-30 | End: 2022-07-17

## 2022-06-30 RX ORDER — MAGNESIUM SULFATE 500 MG/ML
2 VIAL (ML) INJECTION ONCE
Refills: 0 | Status: COMPLETED | OUTPATIENT
Start: 2022-06-30 | End: 2022-06-30

## 2022-06-30 RX ORDER — CHLORHEXIDINE GLUCONATE 213 G/1000ML
1 SOLUTION TOPICAL DAILY
Refills: 0 | Status: DISCONTINUED | OUTPATIENT
Start: 2022-06-30 | End: 2022-07-02

## 2022-06-30 RX ORDER — NALOXONE HYDROCHLORIDE 4 MG/.1ML
4 SPRAY NASAL ONCE
Refills: 0 | Status: COMPLETED | OUTPATIENT
Start: 2022-06-30 | End: 2022-06-30

## 2022-06-30 RX ORDER — FOLIC ACID 0.8 MG
1 TABLET ORAL DAILY
Refills: 0 | Status: DISCONTINUED | OUTPATIENT
Start: 2022-06-30 | End: 2022-07-17

## 2022-06-30 RX ADMIN — SODIUM CHLORIDE 1 GRAM(S): 9 INJECTION INTRAMUSCULAR; INTRAVENOUS; SUBCUTANEOUS at 14:43

## 2022-06-30 RX ADMIN — Medication 100 MILLIGRAM(S): at 11:48

## 2022-06-30 RX ADMIN — Medication 100 MILLIGRAM(S): at 05:39

## 2022-06-30 RX ADMIN — OXYCODONE HYDROCHLORIDE 5 MILLIGRAM(S): 5 TABLET ORAL at 13:00

## 2022-06-30 RX ADMIN — CHLORHEXIDINE GLUCONATE 1 APPLICATION(S): 213 SOLUTION TOPICAL at 11:49

## 2022-06-30 RX ADMIN — Medication 300 MILLIGRAM(S): at 11:48

## 2022-06-30 RX ADMIN — Medication 1 TABLET(S): at 11:48

## 2022-06-30 RX ADMIN — OXYCODONE HYDROCHLORIDE 5 MILLIGRAM(S): 5 TABLET ORAL at 12:00

## 2022-06-30 RX ADMIN — Medication 25 GRAM(S): at 06:32

## 2022-06-30 RX ADMIN — LEVETIRACETAM 400 MILLIGRAM(S): 250 TABLET, FILM COATED ORAL at 17:07

## 2022-06-30 RX ADMIN — Medication 400 MILLIGRAM(S): at 18:48

## 2022-06-30 RX ADMIN — SODIUM CHLORIDE 75 MILLILITER(S): 9 INJECTION INTRAMUSCULAR; INTRAVENOUS; SUBCUTANEOUS at 12:00

## 2022-06-30 RX ADMIN — BUPROPION HYDROCHLORIDE 75 MILLIGRAM(S): 150 TABLET, EXTENDED RELEASE ORAL at 17:07

## 2022-06-30 RX ADMIN — SODIUM CHLORIDE 1 GRAM(S): 9 INJECTION INTRAMUSCULAR; INTRAVENOUS; SUBCUTANEOUS at 05:39

## 2022-06-30 RX ADMIN — Medication 1 MILLIGRAM(S): at 11:48

## 2022-06-30 RX ADMIN — HYDROMORPHONE HYDROCHLORIDE 0.5 MILLIGRAM(S): 2 INJECTION INTRAMUSCULAR; INTRAVENOUS; SUBCUTANEOUS at 03:13

## 2022-06-30 RX ADMIN — OXYCODONE HYDROCHLORIDE 5 MILLIGRAM(S): 5 TABLET ORAL at 21:34

## 2022-06-30 RX ADMIN — Medication 1000 MILLIGRAM(S): at 19:18

## 2022-06-30 RX ADMIN — Medication 5 MILLIGRAM(S): at 21:25

## 2022-06-30 RX ADMIN — HYDROMORPHONE HYDROCHLORIDE 0.5 MILLIGRAM(S): 2 INJECTION INTRAMUSCULAR; INTRAVENOUS; SUBCUTANEOUS at 03:54

## 2022-06-30 RX ADMIN — LEVETIRACETAM 400 MILLIGRAM(S): 250 TABLET, FILM COATED ORAL at 06:37

## 2022-06-30 RX ADMIN — OXYCODONE HYDROCHLORIDE 5 MILLIGRAM(S): 5 TABLET ORAL at 22:34

## 2022-06-30 RX ADMIN — SODIUM CHLORIDE 1 GRAM(S): 9 INJECTION INTRAMUSCULAR; INTRAVENOUS; SUBCUTANEOUS at 21:25

## 2022-06-30 NOTE — PHYSICAL THERAPY INITIAL EVALUATION ADULT - GENERAL OBSERVATIONS, REHAB EVAL
Pt received supine in bed, + telemetry//BP + IV + Helmet donned in bed, no non verbal indication of pain. Pt minimally verbal, follow 50% of commands in Maltese.
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
## 2022-06-30 NOTE — SWALLOW BEDSIDE ASSESSMENT ADULT - SWALLOW EVAL: DIAGNOSIS
Oral stage WFL for trials presented. Suspected delayed swallow trigger w/ moderately thick liquids. Oral stage WFL for trials presented. Suspected pharyngeal dysphagia w/ moderately thick liquids. Pharyngeal stage for puree judged to be WFL.
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
## 2022-07-01 LAB
ANION GAP SERPL CALC-SCNC: 11 MMOL/L — SIGNIFICANT CHANGE UP (ref 5–17)
BUN SERPL-MCNC: 11.9 MG/DL — SIGNIFICANT CHANGE UP (ref 8–20)
CALCIUM SERPL-MCNC: 8.6 MG/DL — SIGNIFICANT CHANGE UP (ref 8.6–10.2)
CHLORIDE SERPL-SCNC: 94 MMOL/L — LOW (ref 98–107)
CO2 SERPL-SCNC: 26 MMOL/L — SIGNIFICANT CHANGE UP (ref 22–29)
CREAT SERPL-MCNC: 0.49 MG/DL — LOW (ref 0.5–1.3)
EGFR: 131 ML/MIN/1.73M2 — SIGNIFICANT CHANGE UP
GLUCOSE SERPL-MCNC: 103 MG/DL — HIGH (ref 70–99)
HCT VFR BLD CALC: 29.6 % — LOW (ref 39–50)
HGB BLD-MCNC: 10.1 G/DL — LOW (ref 13–17)
MAGNESIUM SERPL-MCNC: 1.5 MG/DL — LOW (ref 1.8–2.6)
MCHC RBC-ENTMCNC: 29.7 PG — SIGNIFICANT CHANGE UP (ref 27–34)
MCHC RBC-ENTMCNC: 34.1 GM/DL — SIGNIFICANT CHANGE UP (ref 32–36)
MCV RBC AUTO: 87.1 FL — SIGNIFICANT CHANGE UP (ref 80–100)
PHOSPHATE SERPL-MCNC: 4.3 MG/DL — SIGNIFICANT CHANGE UP (ref 2.4–4.7)
PLATELET # BLD AUTO: 163 K/UL — SIGNIFICANT CHANGE UP (ref 150–400)
POTASSIUM SERPL-MCNC: 4 MMOL/L — SIGNIFICANT CHANGE UP (ref 3.5–5.3)
POTASSIUM SERPL-SCNC: 4 MMOL/L — SIGNIFICANT CHANGE UP (ref 3.5–5.3)
RBC # BLD: 3.4 M/UL — LOW (ref 4.2–5.8)
RBC # FLD: 12.7 % — SIGNIFICANT CHANGE UP (ref 10.3–14.5)
SODIUM SERPL-SCNC: 131 MMOL/L — LOW (ref 135–145)
WBC # BLD: 4.19 K/UL — SIGNIFICANT CHANGE UP (ref 3.8–10.5)
WBC # FLD AUTO: 4.19 K/UL — SIGNIFICANT CHANGE UP (ref 3.8–10.5)

## 2022-07-01 PROCEDURE — 99233 SBSQ HOSP IP/OBS HIGH 50: CPT

## 2022-07-01 PROCEDURE — 99024 POSTOP FOLLOW-UP VISIT: CPT

## 2022-07-01 RX ORDER — SENNA PLUS 8.6 MG/1
10 TABLET ORAL AT BEDTIME
Refills: 0 | Status: DISCONTINUED | OUTPATIENT
Start: 2022-07-01 | End: 2022-07-12

## 2022-07-01 RX ORDER — MAGNESIUM SULFATE 500 MG/ML
1 VIAL (ML) INJECTION ONCE
Refills: 0 | Status: COMPLETED | OUTPATIENT
Start: 2022-07-01 | End: 2022-07-01

## 2022-07-01 RX ORDER — TRAMADOL HYDROCHLORIDE 50 MG/1
25 TABLET ORAL EVERY 6 HOURS
Refills: 0 | Status: DISCONTINUED | OUTPATIENT
Start: 2022-07-01 | End: 2022-07-01

## 2022-07-01 RX ORDER — ENOXAPARIN SODIUM 100 MG/ML
40 INJECTION SUBCUTANEOUS EVERY 24 HOURS
Refills: 0 | Status: DISCONTINUED | OUTPATIENT
Start: 2022-07-01 | End: 2022-07-10

## 2022-07-01 RX ORDER — TRAMADOL HYDROCHLORIDE 50 MG/1
50 TABLET ORAL EVERY 6 HOURS
Refills: 0 | Status: DISCONTINUED | OUTPATIENT
Start: 2022-07-01 | End: 2022-07-04

## 2022-07-01 RX ORDER — TRAMADOL HYDROCHLORIDE 50 MG/1
25 TABLET ORAL EVERY 6 HOURS
Refills: 0 | Status: DISCONTINUED | OUTPATIENT
Start: 2022-07-01 | End: 2022-07-04

## 2022-07-01 RX ORDER — SODIUM CHLORIDE 9 MG/ML
2 INJECTION INTRAMUSCULAR; INTRAVENOUS; SUBCUTANEOUS EVERY 8 HOURS
Refills: 0 | Status: DISCONTINUED | OUTPATIENT
Start: 2022-07-01 | End: 2022-07-17

## 2022-07-01 RX ADMIN — Medication 100 GRAM(S): at 08:01

## 2022-07-01 RX ADMIN — BUPROPION HYDROCHLORIDE 75 MILLIGRAM(S): 150 TABLET, EXTENDED RELEASE ORAL at 05:11

## 2022-07-01 RX ADMIN — SODIUM CHLORIDE 2 GRAM(S): 9 INJECTION INTRAMUSCULAR; INTRAVENOUS; SUBCUTANEOUS at 22:07

## 2022-07-01 RX ADMIN — Medication 100 MILLIGRAM(S): at 12:03

## 2022-07-01 RX ADMIN — OXYCODONE HYDROCHLORIDE 5 MILLIGRAM(S): 5 TABLET ORAL at 03:26

## 2022-07-01 RX ADMIN — TRAMADOL HYDROCHLORIDE 50 MILLIGRAM(S): 50 TABLET ORAL at 13:00

## 2022-07-01 RX ADMIN — Medication 5 MILLIGRAM(S): at 22:07

## 2022-07-01 RX ADMIN — TRAMADOL HYDROCHLORIDE 50 MILLIGRAM(S): 50 TABLET ORAL at 12:14

## 2022-07-01 RX ADMIN — SODIUM CHLORIDE 1 GRAM(S): 9 INJECTION INTRAMUSCULAR; INTRAVENOUS; SUBCUTANEOUS at 05:11

## 2022-07-01 RX ADMIN — ENOXAPARIN SODIUM 40 MILLIGRAM(S): 100 INJECTION SUBCUTANEOUS at 22:07

## 2022-07-01 RX ADMIN — CHLORHEXIDINE GLUCONATE 1 APPLICATION(S): 213 SOLUTION TOPICAL at 12:03

## 2022-07-01 RX ADMIN — Medication 1 TABLET(S): at 12:02

## 2022-07-01 RX ADMIN — Medication 300 MILLIGRAM(S): at 12:13

## 2022-07-01 RX ADMIN — OXYCODONE HYDROCHLORIDE 5 MILLIGRAM(S): 5 TABLET ORAL at 02:26

## 2022-07-01 RX ADMIN — LEVETIRACETAM 400 MILLIGRAM(S): 250 TABLET, FILM COATED ORAL at 05:11

## 2022-07-01 RX ADMIN — SENNA PLUS 10 MILLILITER(S): 8.6 TABLET ORAL at 22:06

## 2022-07-01 RX ADMIN — SODIUM CHLORIDE 75 MILLILITER(S): 9 INJECTION INTRAMUSCULAR; INTRAVENOUS; SUBCUTANEOUS at 05:11

## 2022-07-01 RX ADMIN — Medication 1 MILLIGRAM(S): at 12:02

## 2022-07-01 RX ADMIN — LEVETIRACETAM 400 MILLIGRAM(S): 250 TABLET, FILM COATED ORAL at 17:06

## 2022-07-01 RX ADMIN — BUPROPION HYDROCHLORIDE 75 MILLIGRAM(S): 150 TABLET, EXTENDED RELEASE ORAL at 17:05

## 2022-07-01 RX ADMIN — SODIUM CHLORIDE 2 GRAM(S): 9 INJECTION INTRAMUSCULAR; INTRAVENOUS; SUBCUTANEOUS at 14:04

## 2022-07-01 NOTE — PHARMACOTHERAPY INTERVENTION NOTE - INTERVENTION TYPE RECOOMEND
Dose Optimization/Non-renal Dose Adjustment
Therapy Recommended - Drug indicated but not ordered
Therapy Recommended - Additional therapy
Therapy Recommended - Additional therapy
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
## 2022-07-02 LAB
ANION GAP SERPL CALC-SCNC: 12 MMOL/L — SIGNIFICANT CHANGE UP (ref 5–17)
BUN SERPL-MCNC: 13.1 MG/DL — SIGNIFICANT CHANGE UP (ref 8–20)
CALCIUM SERPL-MCNC: 9 MG/DL — SIGNIFICANT CHANGE UP (ref 8.6–10.2)
CHLORIDE SERPL-SCNC: 95 MMOL/L — LOW (ref 98–107)
CO2 SERPL-SCNC: 24 MMOL/L — SIGNIFICANT CHANGE UP (ref 22–29)
CREAT SERPL-MCNC: 0.5 MG/DL — SIGNIFICANT CHANGE UP (ref 0.5–1.3)
EGFR: 131 ML/MIN/1.73M2 — SIGNIFICANT CHANGE UP
GLUCOSE BLDC GLUCOMTR-MCNC: 84 MG/DL — SIGNIFICANT CHANGE UP (ref 70–99)
GLUCOSE SERPL-MCNC: 98 MG/DL — SIGNIFICANT CHANGE UP (ref 70–99)
HCT VFR BLD CALC: 31.7 % — LOW (ref 39–50)
HGB BLD-MCNC: 10.7 G/DL — LOW (ref 13–17)
MAGNESIUM SERPL-MCNC: 1.6 MG/DL — SIGNIFICANT CHANGE UP (ref 1.6–2.6)
MCHC RBC-ENTMCNC: 30.3 PG — SIGNIFICANT CHANGE UP (ref 27–34)
MCHC RBC-ENTMCNC: 33.8 GM/DL — SIGNIFICANT CHANGE UP (ref 32–36)
MCV RBC AUTO: 89.8 FL — SIGNIFICANT CHANGE UP (ref 80–100)
PHOSPHATE SERPL-MCNC: 4.1 MG/DL — SIGNIFICANT CHANGE UP (ref 2.4–4.7)
PLATELET # BLD AUTO: 174 K/UL — SIGNIFICANT CHANGE UP (ref 150–400)
POTASSIUM SERPL-MCNC: 4.2 MMOL/L — SIGNIFICANT CHANGE UP (ref 3.5–5.3)
POTASSIUM SERPL-SCNC: 4.2 MMOL/L — SIGNIFICANT CHANGE UP (ref 3.5–5.3)
RBC # BLD: 3.53 M/UL — LOW (ref 4.2–5.8)
RBC # FLD: 12.9 % — SIGNIFICANT CHANGE UP (ref 10.3–14.5)
SODIUM SERPL-SCNC: 131 MMOL/L — LOW (ref 135–145)
WBC # BLD: 5.69 K/UL — SIGNIFICANT CHANGE UP (ref 3.8–10.5)
WBC # FLD AUTO: 5.69 K/UL — SIGNIFICANT CHANGE UP (ref 3.8–10.5)

## 2022-07-02 PROCEDURE — 99233 SBSQ HOSP IP/OBS HIGH 50: CPT

## 2022-07-02 RX ADMIN — Medication 1 MILLIGRAM(S): at 13:54

## 2022-07-02 RX ADMIN — Medication 100 MILLIGRAM(S): at 13:54

## 2022-07-02 RX ADMIN — SODIUM CHLORIDE 2 GRAM(S): 9 INJECTION INTRAMUSCULAR; INTRAVENOUS; SUBCUTANEOUS at 06:26

## 2022-07-02 RX ADMIN — Medication 300 MILLIGRAM(S): at 13:55

## 2022-07-02 RX ADMIN — Medication 1 TABLET(S): at 13:53

## 2022-07-02 RX ADMIN — SODIUM CHLORIDE 2 GRAM(S): 9 INJECTION INTRAMUSCULAR; INTRAVENOUS; SUBCUTANEOUS at 22:31

## 2022-07-02 RX ADMIN — SODIUM CHLORIDE 2 GRAM(S): 9 INJECTION INTRAMUSCULAR; INTRAVENOUS; SUBCUTANEOUS at 13:55

## 2022-07-02 RX ADMIN — LEVETIRACETAM 400 MILLIGRAM(S): 250 TABLET, FILM COATED ORAL at 17:48

## 2022-07-02 RX ADMIN — ENOXAPARIN SODIUM 40 MILLIGRAM(S): 100 INJECTION SUBCUTANEOUS at 18:41

## 2022-07-02 RX ADMIN — SENNA PLUS 10 MILLILITER(S): 8.6 TABLET ORAL at 22:31

## 2022-07-02 RX ADMIN — Medication 5 MILLIGRAM(S): at 22:31

## 2022-07-02 RX ADMIN — BUPROPION HYDROCHLORIDE 75 MILLIGRAM(S): 150 TABLET, EXTENDED RELEASE ORAL at 17:56

## 2022-07-02 RX ADMIN — BUPROPION HYDROCHLORIDE 75 MILLIGRAM(S): 150 TABLET, EXTENDED RELEASE ORAL at 06:27

## 2022-07-02 RX ADMIN — LEVETIRACETAM 400 MILLIGRAM(S): 250 TABLET, FILM COATED ORAL at 06:26
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
## 2022-07-03 LAB
ANION GAP SERPL CALC-SCNC: 11 MMOL/L — SIGNIFICANT CHANGE UP (ref 5–17)
BASOPHILS # BLD AUTO: 0.03 K/UL — SIGNIFICANT CHANGE UP (ref 0–0.2)
BASOPHILS NFR BLD AUTO: 0.4 % — SIGNIFICANT CHANGE UP (ref 0–2)
BUN SERPL-MCNC: 15 MG/DL — SIGNIFICANT CHANGE UP (ref 8–20)
CALCIUM SERPL-MCNC: 8.7 MG/DL — SIGNIFICANT CHANGE UP (ref 8.6–10.2)
CHLORIDE SERPL-SCNC: 95 MMOL/L — LOW (ref 98–107)
CO2 SERPL-SCNC: 26 MMOL/L — SIGNIFICANT CHANGE UP (ref 22–29)
CREAT SERPL-MCNC: 0.61 MG/DL — SIGNIFICANT CHANGE UP (ref 0.5–1.3)
EGFR: 123 ML/MIN/1.73M2 — SIGNIFICANT CHANGE UP
EOSINOPHIL # BLD AUTO: 0.23 K/UL — SIGNIFICANT CHANGE UP (ref 0–0.5)
EOSINOPHIL NFR BLD AUTO: 3.3 % — SIGNIFICANT CHANGE UP (ref 0–6)
GLUCOSE SERPL-MCNC: 98 MG/DL — SIGNIFICANT CHANGE UP (ref 70–99)
HCT VFR BLD CALC: 29.2 % — LOW (ref 39–50)
HGB BLD-MCNC: 10 G/DL — LOW (ref 13–17)
IMM GRANULOCYTES NFR BLD AUTO: 0.3 % — SIGNIFICANT CHANGE UP (ref 0–1.5)
LYMPHOCYTES # BLD AUTO: 1.29 K/UL — SIGNIFICANT CHANGE UP (ref 1–3.3)
LYMPHOCYTES # BLD AUTO: 18.4 % — SIGNIFICANT CHANGE UP (ref 13–44)
MAGNESIUM SERPL-MCNC: 1.5 MG/DL — LOW (ref 1.6–2.6)
MCHC RBC-ENTMCNC: 30.6 PG — SIGNIFICANT CHANGE UP (ref 27–34)
MCHC RBC-ENTMCNC: 34.2 GM/DL — SIGNIFICANT CHANGE UP (ref 32–36)
MCV RBC AUTO: 89.3 FL — SIGNIFICANT CHANGE UP (ref 80–100)
MONOCYTES # BLD AUTO: 0.77 K/UL — SIGNIFICANT CHANGE UP (ref 0–0.9)
MONOCYTES NFR BLD AUTO: 11 % — SIGNIFICANT CHANGE UP (ref 2–14)
NEUTROPHILS # BLD AUTO: 4.66 K/UL — SIGNIFICANT CHANGE UP (ref 1.8–7.4)
NEUTROPHILS NFR BLD AUTO: 66.6 % — SIGNIFICANT CHANGE UP (ref 43–77)
PHOSPHATE SERPL-MCNC: 3.8 MG/DL — SIGNIFICANT CHANGE UP (ref 2.4–4.7)
PLATELET # BLD AUTO: 182 K/UL — SIGNIFICANT CHANGE UP (ref 150–400)
POTASSIUM SERPL-MCNC: 4.1 MMOL/L — SIGNIFICANT CHANGE UP (ref 3.5–5.3)
POTASSIUM SERPL-SCNC: 4.1 MMOL/L — SIGNIFICANT CHANGE UP (ref 3.5–5.3)
RBC # BLD: 3.27 M/UL — LOW (ref 4.2–5.8)
RBC # FLD: 12.6 % — SIGNIFICANT CHANGE UP (ref 10.3–14.5)
SODIUM SERPL-SCNC: 132 MMOL/L — LOW (ref 135–145)
WBC # BLD: 7 K/UL — SIGNIFICANT CHANGE UP (ref 3.8–10.5)
WBC # FLD AUTO: 7 K/UL — SIGNIFICANT CHANGE UP (ref 3.8–10.5)

## 2022-07-03 PROCEDURE — 99231 SBSQ HOSP IP/OBS SF/LOW 25: CPT

## 2022-07-03 RX ORDER — ACETAMINOPHEN 500 MG
975 TABLET ORAL ONCE
Refills: 0 | Status: COMPLETED | OUTPATIENT
Start: 2022-07-03 | End: 2022-07-03

## 2022-07-03 RX ORDER — MAGNESIUM SULFATE 500 MG/ML
2 VIAL (ML) INJECTION ONCE
Refills: 0 | Status: COMPLETED | OUTPATIENT
Start: 2022-07-03 | End: 2022-07-03

## 2022-07-03 RX ORDER — ACETAMINOPHEN 500 MG
975 TABLET ORAL ONCE
Refills: 0 | Status: DISCONTINUED | OUTPATIENT
Start: 2022-07-03 | End: 2022-07-03

## 2022-07-03 RX ADMIN — ENOXAPARIN SODIUM 40 MILLIGRAM(S): 100 INJECTION SUBCUTANEOUS at 17:01

## 2022-07-03 RX ADMIN — LEVETIRACETAM 400 MILLIGRAM(S): 250 TABLET, FILM COATED ORAL at 05:11

## 2022-07-03 RX ADMIN — Medication 300 MILLIGRAM(S): at 13:38

## 2022-07-03 RX ADMIN — SODIUM CHLORIDE 2 GRAM(S): 9 INJECTION INTRAMUSCULAR; INTRAVENOUS; SUBCUTANEOUS at 13:36

## 2022-07-03 RX ADMIN — SENNA PLUS 10 MILLILITER(S): 8.6 TABLET ORAL at 21:59

## 2022-07-03 RX ADMIN — Medication 25 GRAM(S): at 14:57

## 2022-07-03 RX ADMIN — LEVETIRACETAM 400 MILLIGRAM(S): 250 TABLET, FILM COATED ORAL at 17:00

## 2022-07-03 RX ADMIN — Medication 975 MILLIGRAM(S): at 14:59

## 2022-07-03 RX ADMIN — Medication 5 MILLIGRAM(S): at 21:59

## 2022-07-03 RX ADMIN — BUPROPION HYDROCHLORIDE 75 MILLIGRAM(S): 150 TABLET, EXTENDED RELEASE ORAL at 17:00

## 2022-07-03 RX ADMIN — Medication 1 MILLIGRAM(S): at 13:38

## 2022-07-03 RX ADMIN — SODIUM CHLORIDE 2 GRAM(S): 9 INJECTION INTRAMUSCULAR; INTRAVENOUS; SUBCUTANEOUS at 05:12

## 2022-07-03 RX ADMIN — Medication 1 TABLET(S): at 13:39

## 2022-07-03 RX ADMIN — Medication 975 MILLIGRAM(S): at 13:37

## 2022-07-03 RX ADMIN — BUPROPION HYDROCHLORIDE 75 MILLIGRAM(S): 150 TABLET, EXTENDED RELEASE ORAL at 05:12

## 2022-07-03 RX ADMIN — SODIUM CHLORIDE 2 GRAM(S): 9 INJECTION INTRAMUSCULAR; INTRAVENOUS; SUBCUTANEOUS at 21:59

## 2022-07-03 RX ADMIN — Medication 100 MILLIGRAM(S): at 13:38
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
## 2022-07-04 LAB — GLUCOSE BLDC GLUCOMTR-MCNC: 117 MG/DL — HIGH (ref 70–99)

## 2022-07-04 PROCEDURE — 99231 SBSQ HOSP IP/OBS SF/LOW 25: CPT

## 2022-07-04 RX ORDER — ACETAMINOPHEN 500 MG
1000 TABLET ORAL ONCE
Refills: 0 | Status: COMPLETED | OUTPATIENT
Start: 2022-07-04 | End: 2022-07-04

## 2022-07-04 RX ORDER — ACETAMINOPHEN 500 MG
975 TABLET ORAL EVERY 6 HOURS
Refills: 0 | Status: DISCONTINUED | OUTPATIENT
Start: 2022-07-04 | End: 2022-07-17

## 2022-07-04 RX ORDER — ACETAMINOPHEN 500 MG
975 TABLET ORAL EVERY 6 HOURS
Refills: 0 | Status: DISCONTINUED | OUTPATIENT
Start: 2022-07-04 | End: 2022-07-04

## 2022-07-04 RX ADMIN — Medication 400 MILLIGRAM(S): at 05:48

## 2022-07-04 RX ADMIN — SODIUM CHLORIDE 2 GRAM(S): 9 INJECTION INTRAMUSCULAR; INTRAVENOUS; SUBCUTANEOUS at 21:41

## 2022-07-04 RX ADMIN — Medication 300 MILLIGRAM(S): at 13:41

## 2022-07-04 RX ADMIN — BUPROPION HYDROCHLORIDE 75 MILLIGRAM(S): 150 TABLET, EXTENDED RELEASE ORAL at 05:07

## 2022-07-04 RX ADMIN — Medication 1000 MILLIGRAM(S): at 06:30

## 2022-07-04 RX ADMIN — LEVETIRACETAM 400 MILLIGRAM(S): 250 TABLET, FILM COATED ORAL at 05:07

## 2022-07-04 RX ADMIN — Medication 100 MILLIGRAM(S): at 13:42

## 2022-07-04 RX ADMIN — ENOXAPARIN SODIUM 40 MILLIGRAM(S): 100 INJECTION SUBCUTANEOUS at 18:14

## 2022-07-04 RX ADMIN — Medication 975 MILLIGRAM(S): at 13:42

## 2022-07-04 RX ADMIN — Medication 975 MILLIGRAM(S): at 14:45

## 2022-07-04 RX ADMIN — BUPROPION HYDROCHLORIDE 75 MILLIGRAM(S): 150 TABLET, EXTENDED RELEASE ORAL at 17:06

## 2022-07-04 RX ADMIN — SENNA PLUS 10 MILLILITER(S): 8.6 TABLET ORAL at 21:42

## 2022-07-04 RX ADMIN — Medication 1 TABLET(S): at 13:42

## 2022-07-04 RX ADMIN — SODIUM CHLORIDE 2 GRAM(S): 9 INJECTION INTRAMUSCULAR; INTRAVENOUS; SUBCUTANEOUS at 13:42

## 2022-07-04 RX ADMIN — LEVETIRACETAM 400 MILLIGRAM(S): 250 TABLET, FILM COATED ORAL at 17:07

## 2022-07-04 RX ADMIN — Medication 1 MILLIGRAM(S): at 13:42

## 2022-07-04 RX ADMIN — Medication 5 MILLIGRAM(S): at 21:42

## 2022-07-04 RX ADMIN — SODIUM CHLORIDE 2 GRAM(S): 9 INJECTION INTRAMUSCULAR; INTRAVENOUS; SUBCUTANEOUS at 05:07
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
## 2022-07-04 NOTE — PROGRESS NOTE ADULT - NS ATTEND AMEND GEN_ALL_CORE FT
Patient seen and examined on rounds. No acute issues, Hyponatremia resolving with salt tabs. Cont PT/OT. dispo planning

## 2022-07-05 LAB
ANION GAP SERPL CALC-SCNC: 10 MMOL/L — SIGNIFICANT CHANGE UP (ref 5–17)
BUN SERPL-MCNC: 14.8 MG/DL — SIGNIFICANT CHANGE UP (ref 8–20)
CALCIUM SERPL-MCNC: 8.6 MG/DL — SIGNIFICANT CHANGE UP (ref 8.6–10.2)
CHLORIDE SERPL-SCNC: 96 MMOL/L — LOW (ref 98–107)
CO2 SERPL-SCNC: 25 MMOL/L — SIGNIFICANT CHANGE UP (ref 22–29)
CREAT SERPL-MCNC: 0.53 MG/DL — SIGNIFICANT CHANGE UP (ref 0.5–1.3)
EGFR: 128 ML/MIN/1.73M2 — SIGNIFICANT CHANGE UP
GLUCOSE SERPL-MCNC: 121 MG/DL — HIGH (ref 70–99)
MAGNESIUM SERPL-MCNC: 1.5 MG/DL — LOW (ref 1.6–2.6)
PHOSPHATE SERPL-MCNC: 4.2 MG/DL — SIGNIFICANT CHANGE UP (ref 2.4–4.7)
POTASSIUM SERPL-MCNC: 4.3 MMOL/L — SIGNIFICANT CHANGE UP (ref 3.5–5.3)
POTASSIUM SERPL-SCNC: 4.3 MMOL/L — SIGNIFICANT CHANGE UP (ref 3.5–5.3)
SODIUM SERPL-SCNC: 131 MMOL/L — LOW (ref 135–145)

## 2022-07-05 PROCEDURE — 99233 SBSQ HOSP IP/OBS HIGH 50: CPT

## 2022-07-05 PROCEDURE — 99231 SBSQ HOSP IP/OBS SF/LOW 25: CPT

## 2022-07-05 RX ORDER — MAGNESIUM SULFATE 500 MG/ML
2 VIAL (ML) INJECTION ONCE
Refills: 0 | Status: COMPLETED | OUTPATIENT
Start: 2022-07-05 | End: 2022-07-05

## 2022-07-05 RX ORDER — AMANTADINE HCL 100 MG
100 CAPSULE ORAL
Refills: 0 | Status: DISCONTINUED | OUTPATIENT
Start: 2022-07-05 | End: 2022-07-17

## 2022-07-05 RX ADMIN — SODIUM CHLORIDE 2 GRAM(S): 9 INJECTION INTRAMUSCULAR; INTRAVENOUS; SUBCUTANEOUS at 11:39

## 2022-07-05 RX ADMIN — LEVETIRACETAM 400 MILLIGRAM(S): 250 TABLET, FILM COATED ORAL at 18:15

## 2022-07-05 RX ADMIN — Medication 100 MILLIGRAM(S): at 11:39

## 2022-07-05 RX ADMIN — BUPROPION HYDROCHLORIDE 75 MILLIGRAM(S): 150 TABLET, EXTENDED RELEASE ORAL at 05:47

## 2022-07-05 RX ADMIN — SODIUM CHLORIDE 2 GRAM(S): 9 INJECTION INTRAMUSCULAR; INTRAVENOUS; SUBCUTANEOUS at 05:47

## 2022-07-05 RX ADMIN — LEVETIRACETAM 400 MILLIGRAM(S): 250 TABLET, FILM COATED ORAL at 05:47

## 2022-07-05 RX ADMIN — Medication 1 MILLIGRAM(S): at 11:39

## 2022-07-05 RX ADMIN — SENNA PLUS 10 MILLILITER(S): 8.6 TABLET ORAL at 21:54

## 2022-07-05 RX ADMIN — SODIUM CHLORIDE 2 GRAM(S): 9 INJECTION INTRAMUSCULAR; INTRAVENOUS; SUBCUTANEOUS at 21:54

## 2022-07-05 RX ADMIN — ENOXAPARIN SODIUM 40 MILLIGRAM(S): 100 INJECTION SUBCUTANEOUS at 18:15

## 2022-07-05 RX ADMIN — Medication 1 TABLET(S): at 11:39

## 2022-07-05 RX ADMIN — Medication 100 MILLIGRAM(S): at 18:14

## 2022-07-05 RX ADMIN — BUPROPION HYDROCHLORIDE 75 MILLIGRAM(S): 150 TABLET, EXTENDED RELEASE ORAL at 18:14

## 2022-07-05 RX ADMIN — Medication 5 MILLIGRAM(S): at 21:54

## 2022-07-05 RX ADMIN — Medication 300 MILLIGRAM(S): at 11:39

## 2022-07-05 RX ADMIN — Medication 150 GRAM(S): at 11:38

## 2022-07-05 NOTE — CHART NOTE - NSCHARTNOTEFT_GEN_A_CORE
42yoM s/p decompressive R hemicraniectomy on 5/24 and POD#6 s/p R cranioplasty w/ complex plastics wound closure. MARCEL drain removed 7/1. Wound healing well & is open to air w/o drainage or bleeding noted. No events overnight. Can f/u on discharge 2 weeks p/o w/ Dr Millan. Will sign off at this time. Re consult as needed.

## 2022-07-05 NOTE — PROGRESS NOTE ADULT - NS ATTEND AMEND GEN_ALL_CORE FT
I have seen and examined the patient during rounds form [id number] hrs  events noted    no new issues  follow calory count  Appreciate PM&R recs.  PT  dvt chemoprophylaxes   cont salt tabs
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
## 2022-07-06 LAB
ANION GAP SERPL CALC-SCNC: 12 MMOL/L — SIGNIFICANT CHANGE UP (ref 5–17)
BUN SERPL-MCNC: 16.1 MG/DL — SIGNIFICANT CHANGE UP (ref 8–20)
CALCIUM SERPL-MCNC: 8.9 MG/DL — SIGNIFICANT CHANGE UP (ref 8.6–10.2)
CHLORIDE SERPL-SCNC: 95 MMOL/L — LOW (ref 98–107)
CO2 SERPL-SCNC: 23 MMOL/L — SIGNIFICANT CHANGE UP (ref 22–29)
CREAT SERPL-MCNC: 0.54 MG/DL — SIGNIFICANT CHANGE UP (ref 0.5–1.3)
EGFR: 128 ML/MIN/1.73M2 — SIGNIFICANT CHANGE UP
GLUCOSE BLDC GLUCOMTR-MCNC: 132 MG/DL — HIGH (ref 70–99)
GLUCOSE SERPL-MCNC: 123 MG/DL — HIGH (ref 70–99)
MAGNESIUM SERPL-MCNC: 1.7 MG/DL — SIGNIFICANT CHANGE UP (ref 1.6–2.6)
PHOSPHATE SERPL-MCNC: 4.8 MG/DL — HIGH (ref 2.4–4.7)
POTASSIUM SERPL-MCNC: 4.1 MMOL/L — SIGNIFICANT CHANGE UP (ref 3.5–5.3)
POTASSIUM SERPL-SCNC: 4.1 MMOL/L — SIGNIFICANT CHANGE UP (ref 3.5–5.3)
SARS-COV-2 RNA SPEC QL NAA+PROBE: SIGNIFICANT CHANGE UP
SODIUM SERPL-SCNC: 130 MMOL/L — LOW (ref 135–145)

## 2022-07-06 PROCEDURE — 99233 SBSQ HOSP IP/OBS HIGH 50: CPT

## 2022-07-06 PROCEDURE — 99232 SBSQ HOSP IP/OBS MODERATE 35: CPT

## 2022-07-06 RX ORDER — MAGNESIUM SULFATE 500 MG/ML
2 VIAL (ML) INJECTION ONCE
Refills: 0 | Status: COMPLETED | OUTPATIENT
Start: 2022-07-06 | End: 2022-07-06

## 2022-07-06 RX ADMIN — Medication 1 MILLIGRAM(S): at 11:09

## 2022-07-06 RX ADMIN — Medication 100 MILLIGRAM(S): at 11:09

## 2022-07-06 RX ADMIN — BUPROPION HYDROCHLORIDE 75 MILLIGRAM(S): 150 TABLET, EXTENDED RELEASE ORAL at 05:42

## 2022-07-06 RX ADMIN — Medication 100 MILLIGRAM(S): at 05:42

## 2022-07-06 RX ADMIN — Medication 5 MILLIGRAM(S): at 21:25

## 2022-07-06 RX ADMIN — Medication 100 MILLIGRAM(S): at 11:10

## 2022-07-06 RX ADMIN — SENNA PLUS 10 MILLILITER(S): 8.6 TABLET ORAL at 21:26

## 2022-07-06 RX ADMIN — LEVETIRACETAM 400 MILLIGRAM(S): 250 TABLET, FILM COATED ORAL at 05:42

## 2022-07-06 RX ADMIN — LEVETIRACETAM 400 MILLIGRAM(S): 250 TABLET, FILM COATED ORAL at 17:20

## 2022-07-06 RX ADMIN — Medication 1 TABLET(S): at 11:09

## 2022-07-06 RX ADMIN — Medication 300 MILLIGRAM(S): at 11:09

## 2022-07-06 RX ADMIN — ENOXAPARIN SODIUM 40 MILLIGRAM(S): 100 INJECTION SUBCUTANEOUS at 21:26

## 2022-07-06 RX ADMIN — Medication 150 GRAM(S): at 06:53

## 2022-07-06 RX ADMIN — SODIUM CHLORIDE 2 GRAM(S): 9 INJECTION INTRAMUSCULAR; INTRAVENOUS; SUBCUTANEOUS at 21:25

## 2022-07-06 RX ADMIN — SODIUM CHLORIDE 2 GRAM(S): 9 INJECTION INTRAMUSCULAR; INTRAVENOUS; SUBCUTANEOUS at 13:11

## 2022-07-06 RX ADMIN — BUPROPION HYDROCHLORIDE 75 MILLIGRAM(S): 150 TABLET, EXTENDED RELEASE ORAL at 17:20

## 2022-07-06 RX ADMIN — SODIUM CHLORIDE 2 GRAM(S): 9 INJECTION INTRAMUSCULAR; INTRAVENOUS; SUBCUTANEOUS at 05:42

## 2022-07-06 NOTE — CHART NOTE - NSCHARTNOTEFT_GEN_A_CORE
Reevaluated pt at bedside.  Pt currently being cleaned, awake, eyes open.  Tracks.  BL thumbs up to verbal command.  Baseline mental status as I know the pt to have without concern for decline in mental status.  CT Head cancelled at this time.
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
## 2022-07-07 LAB
ANION GAP SERPL CALC-SCNC: 13 MMOL/L — SIGNIFICANT CHANGE UP (ref 5–17)
BUN SERPL-MCNC: 18.2 MG/DL — SIGNIFICANT CHANGE UP (ref 8–20)
CALCIUM SERPL-MCNC: 9.4 MG/DL — SIGNIFICANT CHANGE UP (ref 8.6–10.2)
CHLORIDE SERPL-SCNC: 96 MMOL/L — LOW (ref 98–107)
CO2 SERPL-SCNC: 25 MMOL/L — SIGNIFICANT CHANGE UP (ref 22–29)
CREAT SERPL-MCNC: 0.57 MG/DL — SIGNIFICANT CHANGE UP (ref 0.5–1.3)
EGFR: 126 ML/MIN/1.73M2 — SIGNIFICANT CHANGE UP
GLUCOSE SERPL-MCNC: 111 MG/DL — HIGH (ref 70–99)
MAGNESIUM SERPL-MCNC: 1.8 MG/DL — SIGNIFICANT CHANGE UP (ref 1.6–2.6)
PHOSPHATE SERPL-MCNC: 4.7 MG/DL — SIGNIFICANT CHANGE UP (ref 2.4–4.7)
POTASSIUM SERPL-MCNC: 4.6 MMOL/L — SIGNIFICANT CHANGE UP (ref 3.5–5.3)
POTASSIUM SERPL-SCNC: 4.6 MMOL/L — SIGNIFICANT CHANGE UP (ref 3.5–5.3)
SODIUM SERPL-SCNC: 134 MMOL/L — LOW (ref 135–145)

## 2022-07-07 PROCEDURE — 99232 SBSQ HOSP IP/OBS MODERATE 35: CPT

## 2022-07-07 PROCEDURE — 99233 SBSQ HOSP IP/OBS HIGH 50: CPT | Mod: GC

## 2022-07-07 RX ORDER — MAGNESIUM SULFATE 500 MG/ML
2 VIAL (ML) INJECTION ONCE
Refills: 0 | Status: COMPLETED | OUTPATIENT
Start: 2022-07-07 | End: 2022-07-07

## 2022-07-07 RX ADMIN — SODIUM CHLORIDE 2 GRAM(S): 9 INJECTION INTRAMUSCULAR; INTRAVENOUS; SUBCUTANEOUS at 12:56

## 2022-07-07 RX ADMIN — Medication 25 GRAM(S): at 09:55

## 2022-07-07 RX ADMIN — Medication 100 MILLIGRAM(S): at 11:42

## 2022-07-07 RX ADMIN — Medication 5 MILLIGRAM(S): at 21:40

## 2022-07-07 RX ADMIN — SODIUM CHLORIDE 2 GRAM(S): 9 INJECTION INTRAMUSCULAR; INTRAVENOUS; SUBCUTANEOUS at 21:41

## 2022-07-07 RX ADMIN — Medication 1 MILLIGRAM(S): at 11:42

## 2022-07-07 RX ADMIN — LEVETIRACETAM 400 MILLIGRAM(S): 250 TABLET, FILM COATED ORAL at 05:22

## 2022-07-07 RX ADMIN — BUPROPION HYDROCHLORIDE 75 MILLIGRAM(S): 150 TABLET, EXTENDED RELEASE ORAL at 17:27

## 2022-07-07 RX ADMIN — Medication 300 MILLIGRAM(S): at 11:42

## 2022-07-07 RX ADMIN — SODIUM CHLORIDE 2 GRAM(S): 9 INJECTION INTRAMUSCULAR; INTRAVENOUS; SUBCUTANEOUS at 05:22

## 2022-07-07 RX ADMIN — SENNA PLUS 10 MILLILITER(S): 8.6 TABLET ORAL at 21:39

## 2022-07-07 RX ADMIN — LEVETIRACETAM 400 MILLIGRAM(S): 250 TABLET, FILM COATED ORAL at 17:27

## 2022-07-07 RX ADMIN — Medication 1 TABLET(S): at 11:42

## 2022-07-07 RX ADMIN — ENOXAPARIN SODIUM 40 MILLIGRAM(S): 100 INJECTION SUBCUTANEOUS at 18:30

## 2022-07-07 RX ADMIN — BUPROPION HYDROCHLORIDE 75 MILLIGRAM(S): 150 TABLET, EXTENDED RELEASE ORAL at 05:22

## 2022-07-07 RX ADMIN — Medication 100 MILLIGRAM(S): at 05:22

## 2022-07-07 NOTE — PROGRESS NOTE ADULT - NS PANP OPT1 GEN_ALL_CORE
I attest my time as PA/NP is greater than 50% of the total combined time spent on qualifying patient care activities by the PA/NP and attending.

## 2022-07-07 NOTE — PROGRESS NOTE ADULT - NS PANP COMMENT GEN_ALL_CORE FT
Patient seen and examined on am rounds. Pt with altered mental status, significantly less responsive. Stat head CT, abg and BG ordered.
NSGY Attg:    see above    patient seen and examined by PA staff    agree with exam and plan as above
Patient seen and examined on am rounds. Dramatically improved, awake and on his cell phone.  TF changed to 24 hrs, NA improving. Dispo planning ongoing.
Plan for the OR next wk. Repeat Ct brain on Monday, 6/27/22 for OR planning.
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
## 2022-07-08 LAB
ANION GAP SERPL CALC-SCNC: 10 MMOL/L — SIGNIFICANT CHANGE UP (ref 5–17)
BUN SERPL-MCNC: 19.9 MG/DL — SIGNIFICANT CHANGE UP (ref 8–20)
CALCIUM SERPL-MCNC: 9.1 MG/DL — SIGNIFICANT CHANGE UP (ref 8.6–10.2)
CHLORIDE SERPL-SCNC: 96 MMOL/L — LOW (ref 98–107)
CO2 SERPL-SCNC: 26 MMOL/L — SIGNIFICANT CHANGE UP (ref 22–29)
CREAT SERPL-MCNC: 0.63 MG/DL — SIGNIFICANT CHANGE UP (ref 0.5–1.3)
EGFR: 122 ML/MIN/1.73M2 — SIGNIFICANT CHANGE UP
GLUCOSE SERPL-MCNC: 117 MG/DL — HIGH (ref 70–99)
MAGNESIUM SERPL-MCNC: 1.9 MG/DL — SIGNIFICANT CHANGE UP (ref 1.6–2.6)
PHOSPHATE SERPL-MCNC: 4.5 MG/DL — SIGNIFICANT CHANGE UP (ref 2.4–4.7)
POTASSIUM SERPL-MCNC: 4 MMOL/L — SIGNIFICANT CHANGE UP (ref 3.5–5.3)
POTASSIUM SERPL-SCNC: 4 MMOL/L — SIGNIFICANT CHANGE UP (ref 3.5–5.3)
SODIUM SERPL-SCNC: 132 MMOL/L — LOW (ref 135–145)

## 2022-07-08 PROCEDURE — 99231 SBSQ HOSP IP/OBS SF/LOW 25: CPT

## 2022-07-08 PROCEDURE — 99233 SBSQ HOSP IP/OBS HIGH 50: CPT | Mod: GC

## 2022-07-08 RX ORDER — MODAFINIL 200 MG/1
100 TABLET ORAL
Refills: 0 | Status: DISCONTINUED | OUTPATIENT
Start: 2022-07-08 | End: 2022-07-15

## 2022-07-08 RX ORDER — MAGNESIUM SULFATE 500 MG/ML
2 VIAL (ML) INJECTION ONCE
Refills: 0 | Status: COMPLETED | OUTPATIENT
Start: 2022-07-08 | End: 2022-07-08

## 2022-07-08 RX ORDER — MODAFINIL 200 MG/1
100 TABLET ORAL
Refills: 0 | Status: DISCONTINUED | OUTPATIENT
Start: 2022-07-08 | End: 2022-07-08

## 2022-07-08 RX ADMIN — BUPROPION HYDROCHLORIDE 75 MILLIGRAM(S): 150 TABLET, EXTENDED RELEASE ORAL at 18:04

## 2022-07-08 RX ADMIN — Medication 100 MILLIGRAM(S): at 05:33

## 2022-07-08 RX ADMIN — Medication 100 MILLIGRAM(S): at 11:21

## 2022-07-08 RX ADMIN — Medication 1 TABLET(S): at 11:22

## 2022-07-08 RX ADMIN — Medication 25 GRAM(S): at 08:52

## 2022-07-08 RX ADMIN — Medication 1 MILLIGRAM(S): at 11:22

## 2022-07-08 RX ADMIN — BUPROPION HYDROCHLORIDE 75 MILLIGRAM(S): 150 TABLET, EXTENDED RELEASE ORAL at 05:32

## 2022-07-08 RX ADMIN — Medication 300 MILLIGRAM(S): at 11:21

## 2022-07-08 RX ADMIN — ENOXAPARIN SODIUM 40 MILLIGRAM(S): 100 INJECTION SUBCUTANEOUS at 18:02

## 2022-07-08 RX ADMIN — SODIUM CHLORIDE 2 GRAM(S): 9 INJECTION INTRAMUSCULAR; INTRAVENOUS; SUBCUTANEOUS at 05:32

## 2022-07-08 RX ADMIN — LEVETIRACETAM 400 MILLIGRAM(S): 250 TABLET, FILM COATED ORAL at 05:36

## 2022-07-08 RX ADMIN — Medication 100 MILLIGRAM(S): at 11:23

## 2022-07-08 RX ADMIN — LEVETIRACETAM 400 MILLIGRAM(S): 250 TABLET, FILM COATED ORAL at 18:04

## 2022-07-08 RX ADMIN — MODAFINIL 100 MILLIGRAM(S): 200 TABLET ORAL at 11:27

## 2022-07-08 RX ADMIN — SODIUM CHLORIDE 2 GRAM(S): 9 INJECTION INTRAMUSCULAR; INTRAVENOUS; SUBCUTANEOUS at 13:24

## 2022-07-08 RX ADMIN — SENNA PLUS 10 MILLILITER(S): 8.6 TABLET ORAL at 21:36

## 2022-07-08 RX ADMIN — SODIUM CHLORIDE 2 GRAM(S): 9 INJECTION INTRAMUSCULAR; INTRAVENOUS; SUBCUTANEOUS at 21:36

## 2022-07-08 RX ADMIN — Medication 5 MILLIGRAM(S): at 21:35
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
## 2022-07-08 NOTE — PROGRESS NOTE ADULT - NS ATTEND AMEND GEN_ALL_CORE FT
I have seen and examined the patient during rounds form [id number] hrs  events noted  no new issues  'tolerating TF 24 hrs   hyponatremias improved  PT  DVT chemoprophyalxy  Dispo planning
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
## 2022-07-08 NOTE — CHART NOTE - NSCHARTNOTEFT_GEN_A_CORE
Called that pt is less alert, & is not speaking     Exam: Sleepy, opens eyes to light touch, PERRL, non-verbal, LUNA, not following commands    Agree with Rehab medicine to get an MRI

## 2022-07-09 PROCEDURE — 99024 POSTOP FOLLOW-UP VISIT: CPT | Mod: GC

## 2022-07-09 PROCEDURE — 70450 CT HEAD/BRAIN W/O DYE: CPT | Mod: 26

## 2022-07-09 RX ADMIN — BUPROPION HYDROCHLORIDE 75 MILLIGRAM(S): 150 TABLET, EXTENDED RELEASE ORAL at 05:05

## 2022-07-09 RX ADMIN — SODIUM CHLORIDE 2 GRAM(S): 9 INJECTION INTRAMUSCULAR; INTRAVENOUS; SUBCUTANEOUS at 05:05

## 2022-07-09 RX ADMIN — Medication 1 MILLIGRAM(S): at 12:08

## 2022-07-09 RX ADMIN — BUPROPION HYDROCHLORIDE 75 MILLIGRAM(S): 150 TABLET, EXTENDED RELEASE ORAL at 18:56

## 2022-07-09 RX ADMIN — LEVETIRACETAM 400 MILLIGRAM(S): 250 TABLET, FILM COATED ORAL at 05:04

## 2022-07-09 RX ADMIN — Medication 300 MILLIGRAM(S): at 12:08

## 2022-07-09 RX ADMIN — Medication 1 TABLET(S): at 12:08

## 2022-07-09 RX ADMIN — ENOXAPARIN SODIUM 40 MILLIGRAM(S): 100 INJECTION SUBCUTANEOUS at 18:56

## 2022-07-09 RX ADMIN — SODIUM CHLORIDE 2 GRAM(S): 9 INJECTION INTRAMUSCULAR; INTRAVENOUS; SUBCUTANEOUS at 13:28

## 2022-07-09 RX ADMIN — Medication 100 MILLIGRAM(S): at 12:08

## 2022-07-09 RX ADMIN — MODAFINIL 100 MILLIGRAM(S): 200 TABLET ORAL at 05:05

## 2022-07-09 RX ADMIN — LEVETIRACETAM 400 MILLIGRAM(S): 250 TABLET, FILM COATED ORAL at 18:56

## 2022-07-09 RX ADMIN — SENNA PLUS 10 MILLILITER(S): 8.6 TABLET ORAL at 21:56

## 2022-07-09 RX ADMIN — Medication 100 MILLIGRAM(S): at 05:20

## 2022-07-09 RX ADMIN — Medication 5 MILLIGRAM(S): at 21:56

## 2022-07-09 RX ADMIN — SODIUM CHLORIDE 2 GRAM(S): 9 INJECTION INTRAMUSCULAR; INTRAVENOUS; SUBCUTANEOUS at 21:56
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
## 2022-07-10 LAB
ANION GAP SERPL CALC-SCNC: 11 MMOL/L — SIGNIFICANT CHANGE UP (ref 5–17)
BASOPHILS # BLD AUTO: 0.03 K/UL — SIGNIFICANT CHANGE UP (ref 0–0.2)
BASOPHILS NFR BLD AUTO: 0.5 % — SIGNIFICANT CHANGE UP (ref 0–2)
BUN SERPL-MCNC: 16.1 MG/DL — SIGNIFICANT CHANGE UP (ref 8–20)
CALCIUM SERPL-MCNC: 9.2 MG/DL — SIGNIFICANT CHANGE UP (ref 8.6–10.2)
CHLORIDE SERPL-SCNC: 98 MMOL/L — SIGNIFICANT CHANGE UP (ref 98–107)
CO2 SERPL-SCNC: 26 MMOL/L — SIGNIFICANT CHANGE UP (ref 22–29)
CREAT SERPL-MCNC: 0.62 MG/DL — SIGNIFICANT CHANGE UP (ref 0.5–1.3)
CRP SERPL-MCNC: 12 MG/L — HIGH
EGFR: 122 ML/MIN/1.73M2 — SIGNIFICANT CHANGE UP
EOSINOPHIL # BLD AUTO: 0.37 K/UL — SIGNIFICANT CHANGE UP (ref 0–0.5)
EOSINOPHIL NFR BLD AUTO: 6.7 % — HIGH (ref 0–6)
ERYTHROCYTE [SEDIMENTATION RATE] IN BLOOD: 56 MM/HR — HIGH (ref 0–20)
GLUCOSE SERPL-MCNC: 127 MG/DL — HIGH (ref 70–99)
HCT VFR BLD CALC: 29.9 % — LOW (ref 39–50)
HGB BLD-MCNC: 9.7 G/DL — LOW (ref 13–17)
IMM GRANULOCYTES NFR BLD AUTO: 0.2 % — SIGNIFICANT CHANGE UP (ref 0–1.5)
LYMPHOCYTES # BLD AUTO: 1.52 K/UL — SIGNIFICANT CHANGE UP (ref 1–3.3)
LYMPHOCYTES # BLD AUTO: 27.5 % — SIGNIFICANT CHANGE UP (ref 13–44)
MAGNESIUM SERPL-MCNC: 1.8 MG/DL — SIGNIFICANT CHANGE UP (ref 1.6–2.6)
MCHC RBC-ENTMCNC: 29 PG — SIGNIFICANT CHANGE UP (ref 27–34)
MCHC RBC-ENTMCNC: 32.4 GM/DL — SIGNIFICANT CHANGE UP (ref 32–36)
MCV RBC AUTO: 89.3 FL — SIGNIFICANT CHANGE UP (ref 80–100)
MONOCYTES # BLD AUTO: 0.38 K/UL — SIGNIFICANT CHANGE UP (ref 0–0.9)
MONOCYTES NFR BLD AUTO: 6.9 % — SIGNIFICANT CHANGE UP (ref 2–14)
NEUTROPHILS # BLD AUTO: 3.22 K/UL — SIGNIFICANT CHANGE UP (ref 1.8–7.4)
NEUTROPHILS NFR BLD AUTO: 58.2 % — SIGNIFICANT CHANGE UP (ref 43–77)
OSMOLALITY SERPL: 290 MOSMOL/KG — SIGNIFICANT CHANGE UP (ref 275–300)
PHOSPHATE SERPL-MCNC: 4.3 MG/DL — SIGNIFICANT CHANGE UP (ref 2.4–4.7)
PLATELET # BLD AUTO: 291 K/UL — SIGNIFICANT CHANGE UP (ref 150–400)
POTASSIUM SERPL-MCNC: 4.1 MMOL/L — SIGNIFICANT CHANGE UP (ref 3.5–5.3)
POTASSIUM SERPL-SCNC: 4.1 MMOL/L — SIGNIFICANT CHANGE UP (ref 3.5–5.3)
RBC # BLD: 3.35 M/UL — LOW (ref 4.2–5.8)
RBC # FLD: 12.5 % — SIGNIFICANT CHANGE UP (ref 10.3–14.5)
SARS-COV-2 RNA SPEC QL NAA+PROBE: SIGNIFICANT CHANGE UP
SODIUM SERPL-SCNC: 135 MMOL/L — SIGNIFICANT CHANGE UP (ref 135–145)
WBC # BLD: 5.53 K/UL — SIGNIFICANT CHANGE UP (ref 3.8–10.5)
WBC # FLD AUTO: 5.53 K/UL — SIGNIFICANT CHANGE UP (ref 3.8–10.5)

## 2022-07-10 PROCEDURE — 70450 CT HEAD/BRAIN W/O DYE: CPT | Mod: 26,77

## 2022-07-10 PROCEDURE — 93010 ELECTROCARDIOGRAM REPORT: CPT

## 2022-07-10 PROCEDURE — 99232 SBSQ HOSP IP/OBS MODERATE 35: CPT

## 2022-07-10 PROCEDURE — 99255 IP/OBS CONSLTJ NEW/EST HI 80: CPT

## 2022-07-10 PROCEDURE — 70450 CT HEAD/BRAIN W/O DYE: CPT | Mod: 26

## 2022-07-10 PROCEDURE — 71045 X-RAY EXAM CHEST 1 VIEW: CPT | Mod: 26

## 2022-07-10 RX ORDER — SODIUM CHLORIDE 9 MG/ML
1000 INJECTION INTRAMUSCULAR; INTRAVENOUS; SUBCUTANEOUS
Refills: 0 | Status: DISCONTINUED | OUTPATIENT
Start: 2022-07-11 | End: 2022-07-17

## 2022-07-10 RX ADMIN — Medication 100 MILLIGRAM(S): at 13:21

## 2022-07-10 RX ADMIN — MODAFINIL 100 MILLIGRAM(S): 200 TABLET ORAL at 05:12

## 2022-07-10 RX ADMIN — Medication 1 TABLET(S): at 13:22

## 2022-07-10 RX ADMIN — Medication 100 MILLIGRAM(S): at 13:22

## 2022-07-10 RX ADMIN — Medication 5 MILLIGRAM(S): at 23:18

## 2022-07-10 RX ADMIN — BUPROPION HYDROCHLORIDE 75 MILLIGRAM(S): 150 TABLET, EXTENDED RELEASE ORAL at 05:12

## 2022-07-10 RX ADMIN — Medication 1 MILLIGRAM(S): at 13:22

## 2022-07-10 RX ADMIN — Medication 100 MILLIGRAM(S): at 05:12

## 2022-07-10 RX ADMIN — LEVETIRACETAM 400 MILLIGRAM(S): 250 TABLET, FILM COATED ORAL at 16:40

## 2022-07-10 RX ADMIN — SENNA PLUS 10 MILLILITER(S): 8.6 TABLET ORAL at 23:19

## 2022-07-10 RX ADMIN — Medication 300 MILLIGRAM(S): at 13:21

## 2022-07-10 RX ADMIN — BUPROPION HYDROCHLORIDE 75 MILLIGRAM(S): 150 TABLET, EXTENDED RELEASE ORAL at 16:40

## 2022-07-10 RX ADMIN — SODIUM CHLORIDE 2 GRAM(S): 9 INJECTION INTRAMUSCULAR; INTRAVENOUS; SUBCUTANEOUS at 13:22

## 2022-07-10 RX ADMIN — LEVETIRACETAM 400 MILLIGRAM(S): 250 TABLET, FILM COATED ORAL at 05:11

## 2022-07-10 RX ADMIN — SODIUM CHLORIDE 2 GRAM(S): 9 INJECTION INTRAMUSCULAR; INTRAVENOUS; SUBCUTANEOUS at 23:18

## 2022-07-10 RX ADMIN — SODIUM CHLORIDE 2 GRAM(S): 9 INJECTION INTRAMUSCULAR; INTRAVENOUS; SUBCUTANEOUS at 05:12

## 2022-07-10 NOTE — PROGRESS NOTE ADULT - SUBJECTIVE AND OBJECTIVE BOX
Neurosurgery SANDRA  Daily note     This is a 42 year old male found unresponsive, brought to ED by EMS. Patient is status post right decompressive craniectomy 5/24/22 and s/post right cranioplasty 6/29/22. Patient had a recent Ct scan which revealed enlargement of collection. Patient was upgraded to step down.         INTERVAL HPI OVERNIGHT EVENTS:  This is a 42 year old male s/post right craniectomy and right cranioplasty seen lying comfortably in bed. Tolerating diet. Passing gas/BM. Voiding. Howard in with __ clear urine. Denies headache, weakness, numbness, n/v/d, fevers, chills, chest pain, SOB.     Vital Signs Last 24 Hrs  T(C): 36.6 (10 Jul 2022 04:00), Max: 37 (09 Jul 2022 11:42)  T(F): 97.9 (10 Jul 2022 04:00), Max: 98.6 (09 Jul 2022 11:42)  HR: 98 (10 Jul 2022 08:00) (63 - 101)  BP: 96/72 (10 Jul 2022 04:00) (96/72 - 115/71)  BP(mean): 81 (10 Jul 2022 04:00) (81 - 83)  RR: 16 (10 Jul 2022 08:00) (15 - 19)  SpO2: 100% (10 Jul 2022 08:00) (97% - 100%)      PHYSICAL EXAM:  GENERAL: NAD  HEAD:  normocephalic  WOUND: crani site healing well, no signs of infection   MENTAL STATUS: Opens eyes to voice, Nonverbal; following simple commands, patient was able to raise his left arm to commands   CRANIAL NERVES: PERRL. Opens his mouth, when asked to stick out his tongue   MOTOR: strength moves upper and lower extremities  SENSATION: grossly intact to light touch all extremities  CHEST/LUNG: Clear to auscultation bilaterally  HEART: +S1/+S2  ABDOMEN: Soft, nontender, nondistended  EXTREMITIES:  2+ peripheral pulses    LABS:                        9.7    5.53  )-----------( 291      ( 10 Jul 2022 07:17 )             29.9     07-10    135  |  98  |  16.1  ----------------------------<  127<H>  4.1   |  26.0  |  0.62    Ca    9.2      10 Jul 2022 07:17  Phos  4.3     07-10  Mg     1.8     07-10      07-09 @ 07:01  -  07-10 @ 07:00  --------------------------------------------------------  IN: 900 mL / OUT: 0 mL / NET: 900 mL    RADIOLOGY & ADDITIONAL TESTS:  ACC: [id number] EXAM:  CT BRAIN                        PROCEDURE DATE:  07/09/2022    INTERPRETATION:  .  CLINICAL INFORMATION: Decline in neurological status.    TECHNIQUE: Multiple axial CT images of the head were obtained without   contrast. Sagittal and coronal reconstructed images were acquired from   the source data.    COMPARISON: Prior head CT study from 6/30/2022.    FINDINGS: Right-sided craniotomy changes are again noted. A   low-attenuation right-sided subdural collection subjacent to the   craniotomy site appears larger in size. This is admixed with extra-axial   air. Mass effect is seen upon the right cervical hemisphere with   worsening shift of the midline structures from right to left currently   measuring up to 9.7mm.    A low-density left-sided frontal convexity subdural collection is also   seen with minimal mass effect upon the left cerebral hemisphere.    No new areas of acute intracranial hemorrhage are seen. There is no   hydrocephalus.    Focal small rounded areas of encephalomalacia and gliosis are seen within   the bilateral basal ganglia.    A small area of encephalomalacia and gliosis within the right anterior   basal frontal lobe appears unchanged.    The paranasal sinuses and mastoid air cellsare clear. The orbits appear   unremarkable.    IMPRESSION: Interval enlargement of a low-density right-sided frontal   convexity subdural collection admixed with air. Worsening mass effect   upon the right cerebral hemisphere with worsening shift of the midline   structures from right to left craniotomy measuring up to 9.7 mm. No   herniation at this time.    Unchanged low-density right frontal subdural collection.    Recommend continued short-term follow-up CT examination.   Neurosurgery SANDRA  Daily note     This is a 42 year old male found unresponsive, brought to ED by EMS. Patient is status post right decompressive craniectomy 5/24/22 and s/post right cranioplasty 6/29/22. Patient had a recent Ct scan which revealed enlargement of collection. Patient was upgraded to step down. Patient was seen this morning with attending following simple commands.         INTERVAL HPI OVERNIGHT EVENTS:  This is a 42 year old male s/post right craniectomy 5/24/22 and right cranioplasty 6/29/22,seen lying comfortably in bed. Patient transported to step down for neuro checks every 2 hours and will have a repeat Ct scan today at 12 noon.     Vital Signs Last 24 Hrs  T(C): 36.6 (10 Jul 2022 04:00), Max: 37 (09 Jul 2022 11:42)  T(F): 97.9 (10 Jul 2022 04:00), Max: 98.6 (09 Jul 2022 11:42)  HR: 98 (10 Jul 2022 08:00) (63 - 101)  BP: 96/72 (10 Jul 2022 04:00) (96/72 - 115/71)  BP(mean): 81 (10 Jul 2022 04:00) (81 - 83)  RR: 16 (10 Jul 2022 08:00) (15 - 19)  SpO2: 100% (10 Jul 2022 08:00) (97% - 100%)      PHYSICAL EXAM:  GENERAL: NAD  HEAD:  normocephalic  WOUND: crani site healing well, no signs of infection   MENTAL STATUS: Opens eyes to voice, Nonverbal; following simple commands, patient was able to raise his left arm to commands   CRANIAL NERVES: PERRL. Opens his mouth, when asked to stick out his tongue   MOTOR: strength moves upper and lower extremities  SENSATION: grossly intact to light touch all extremities  CHEST/LUNG: Clear to auscultation bilaterally  HEART: +S1/+S2  ABDOMEN: Soft, nontender, nondistended  EXTREMITIES:  2+ peripheral pulses    LABS:                        9.7    5.53  )-----------( 291      ( 10 Jul 2022 07:17 )             29.9     07-10    135  |  98  |  16.1  ----------------------------<  127<H>  4.1   |  26.0  |  0.62    Ca    9.2      10 Jul 2022 07:17  Phos  4.3     07-10  Mg     1.8     07-10      07-09 @ 07:01  -  07-10 @ 07:00  --------------------------------------------------------  IN: 900 mL / OUT: 0 mL / NET: 900 mL    RADIOLOGY & ADDITIONAL TESTS:  ACC: [id number] EXAM:  CT BRAIN                        PROCEDURE DATE:  07/09/2022    INTERPRETATION:  .  CLINICAL INFORMATION: Decline in neurological status.    TECHNIQUE: Multiple axial CT images of the head were obtained without   contrast. Sagittal and coronal reconstructed images were acquired from   the source data.    COMPARISON: Prior head CT study from 6/30/2022.    FINDINGS: Right-sided craniotomy changes are again noted. A   low-attenuation right-sided subdural collection subjacent to the   craniotomy site appears larger in size. This is admixed with extra-axial   air. Mass effect is seen upon the right cervical hemisphere with   worsening shift of the midline structures from right to left currently   measuring up to 9.7mm.    A low-density left-sided frontal convexity subdural collection is also   seen with minimal mass effect upon the left cerebral hemisphere.    No new areas of acute intracranial hemorrhage are seen. There is no   hydrocephalus.    Focal small rounded areas of encephalomalacia and gliosis are seen within   the bilateral basal ganglia.    A small area of encephalomalacia and gliosis within the right anterior   basal frontal lobe appears unchanged.    The paranasal sinuses and mastoid air cellsare clear. The orbits appear   unremarkable.    IMPRESSION: Interval enlargement of a low-density right-sided frontal   convexity subdural collection admixed with air. Worsening mass effect   upon the right cerebral hemisphere with worsening shift of the midline   structures from right to left craniotomy measuring up to 9.7 mm. No   herniation at this time.    Unchanged low-density right frontal subdural collection.    Recommend continued short-term follow-up CT examination.        A/P:  This is a 42 year old male s/post right craniectomy 5/24/22 and right cranioplasty 6/29/22,      Neurosurgery SANDRA  Daily note     This is a 42 year old male found unresponsive, brought to ED by EMS. Patient is status post right decompressive craniectomy 5/24/22 and s/post right cranioplasty 6/29/22. Patient had a recent Ct scan which revealed enlargement of collection. Patient was upgraded to step down. Patient was seen this morning with attending following simple commands.       INTERVAL HPI OVERNIGHT EVENTS:  This is a 42 year old male s/post right craniectomy 5/24/22 and right cranioplasty 6/29/22,seen lying comfortably in bed. Patient transported to step down for neuro checks every 2 hours and will have a repeat Ct scan today at 12 noon.     Vital Signs Last 24 Hrs  T(C): 36.6 (10 Jul 2022 04:00), Max: 37 (09 Jul 2022 11:42)  T(F): 97.9 (10 Jul 2022 04:00), Max: 98.6 (09 Jul 2022 11:42)  HR: 98 (10 Jul 2022 08:00) (63 - 101)  BP: 96/72 (10 Jul 2022 04:00) (96/72 - 115/71)  BP(mean): 81 (10 Jul 2022 04:00) (81 - 83)  RR: 16 (10 Jul 2022 08:00) (15 - 19)  SpO2: 100% (10 Jul 2022 08:00) (97% - 100%)      PHYSICAL EXAM:  GENERAL: NAD  HEAD:  normocephalic  WOUND: crani site healing well, no signs of infection   MENTAL STATUS: Opens eyes to voice, Nonverbal; following simple commands, patient was able to raise his left arm to commands   CRANIAL NERVES: PERRL. Opens his mouth, when asked to stick out his tongue   MOTOR: strength moves upper and lower extremities  SENSATION: grossly intact to light touch all extremities  CHEST/LUNG: Clear to auscultation bilaterally  HEART: +S1/+S2  ABDOMEN: Soft, nontender, nondistended  EXTREMITIES:  2+ peripheral pulses    LABS:                        9.7    5.53  )-----------( 291      ( 10 Jul 2022 07:17 )             29.9     07-10    135  |  98  |  16.1  ----------------------------<  127<H>  4.1   |  26.0  |  0.62    Ca    9.2      10 Jul 2022 07:17  Phos  4.3     07-10  Mg     1.8     07-10      07-09 @ 07:01  -  07-10 @ 07:00  --------------------------------------------------------  IN: 900 mL / OUT: 0 mL / NET: 900 mL    RADIOLOGY & ADDITIONAL TESTS:  ACC: [id number] EXAM:  CT BRAIN                        PROCEDURE DATE:  07/09/2022    INTERPRETATION:  .  CLINICAL INFORMATION: Decline in neurological status.    TECHNIQUE: Multiple axial CT images of the head were obtained without   contrast. Sagittal and coronal reconstructed images were acquired from   the source data.    COMPARISON: Prior head CT study from 6/30/2022.    FINDINGS: Right-sided craniotomy changes are again noted. A   low-attenuation right-sided subdural collection subjacent to the   craniotomy site appears larger in size. This is admixed with extra-axial   air. Mass effect is seen upon the right cervical hemisphere with   worsening shift of the midline structures from right to left currently   measuring up to 9.7mm.    A low-density left-sided frontal convexity subdural collection is also   seen with minimal mass effect upon the left cerebral hemisphere.    No new areas of acute intracranial hemorrhage are seen. There is no   hydrocephalus.    Focal small rounded areas of encephalomalacia and gliosis are seen within   the bilateral basal ganglia.    A small area of encephalomalacia and gliosis within the right anterior   basal frontal lobe appears unchanged.    The paranasal sinuses and mastoid air cellsare clear. The orbits appear   unremarkable.    IMPRESSION: Interval enlargement of a low-density right-sided frontal   convexity subdural collection admixed with air. Worsening mass effect   upon the right cerebral hemisphere with worsening shift of the midline   structures from right to left craniotomy measuring up to 9.7 mm. No   herniation at this time.    Unchanged low-density right frontal subdural collection.    Recommend continued short-term follow-up CT examination.        A/P:  This is a 42 year old male s/post right craniectomy 5/24/22 and right cranioplasty 6/29/22, upgraded to Stepdown, after CT scan of the head demonstrating right sided subdural collection admixed with air.    1. Continue Neuro checks every 2 hours   2. Will continue to monitor H /H   3. Lovenox discontinued   4. Pneumatic compression device to lower extremities   5. Patient is to remain, with HOB at 15 degree s, except during meals, then up to 30 degrees   6. Repeat Ct scan of the head at 12 noon  7. NPO at midnight   8. Medicine consult appreciated   9. Plan was discussed with Dr Rivera

## 2022-07-10 NOTE — PROGRESS NOTE ADULT - NS ATTEND AMEND GEN_ALL_CORE FT
I have seen and examined the patient during SICU multidisciplinary rounds from 4732-2703 hrs.   Events noted.    Neuro: [id number] non focal  CV: HD normal  Pulm: SaO2 adequate  GI: NPO,  : urine flow adequate, electrolytes ok  ID: afebrile, ABC normal no shift elevated CPR, wound c/d/i  Heme: H/Hn stable on dvte chemoprophylaxes   Endo: glycemia target    Plan:  TBI, S/P cranioplasty, CT evidenced increased on pneumatocephalus, transferred to Step down.  Will transfer to neurosurgery service for further work up referring his increase pneumocephalus,  Plan discussed with Dr Rivera.

## 2022-07-10 NOTE — PROGRESS NOTE ADULT - SUBJECTIVE AND OBJECTIVE BOX
24h Events:  Upgraded to SDU. Pt was found to be lethargic which prompted a head CT and revealed interval enlargement in subdural collection, increase in mass effect, and pneumocephalus.     Subjective:  Pt offers no acute complaints. Denies abdominal pain, chest pain, fever/chills, shortness of breath, nausea, vomiting, diarrhea, headache.     ICU Vital Signs Last 24 Hrs  T(C): 36.7 (09 Jul 2022 20:00), Max: 37.5 (09 Jul 2022 04:30)  T(F): 98 (09 Jul 2022 20:00), Max: 99.5 (09 Jul 2022 04:30)  HR: 101 (10 Jul 2022 00:00) (80 - 101)  BP: 110/92 (09 Jul 2022 20:00) (103/67 - 114/77)  BP(mean): --  ABP: --  ABP(mean): --  RR: 17 (10 Jul 2022 00:00) (17 - 19)  SpO2: 98% (10 Jul 2022 00:00) (94% - 100%)    O2 Parameters below as of 10 Jul 2022 00:00  Patient On (Oxygen Delivery Method): room air                MEDICATIONS  (STANDING):  amantadine Syrup 100 milliGRAM(s) Oral <User Schedule>  buPROPion . 75 milliGRAM(s) Oral every 12 hours  enoxaparin Injectable 40 milliGRAM(s) SubCutaneous every 24 hours  ferrous    sulfate Liquid 300 milliGRAM(s) Enteral Tube daily  folic acid 1 milliGRAM(s) Oral daily  levETIRAcetam  IVPB 500 milliGRAM(s) IV Intermittent every 12 hours  melatonin 5 milliGRAM(s) Oral at bedtime  modafinil 100 milliGRAM(s) Oral <User Schedule>  multivitamin 1 Tablet(s) Oral daily  senna Syrup 10 milliLiter(s) Oral at bedtime  sodium chloride 2 Gram(s) Oral every 8 hours  thiamine 100 milliGRAM(s) Oral daily    MEDICATIONS  (PRN):  acetaminophen    Suspension .. 975 milliGRAM(s) Enteral Tube every 6 hours PRN Mild Pain (1 - 3)          Physical Exam:    Gen: Resting comfortably in bed, bilateral mittens in place     HEENT: PERRL, EOMI    Neurological: Alert and oriented to self, moving all extremities    Neck: Trachea midline, no evidence of JVD, FROM without pain, neck symmetric    Pulmonary: CTAB with decreased breath sounds at the bases    Cardiovascular: S1S2    Gastrointestinal: Soft, non-tender, non-distended, +PEG    Extremities: Without c/c/e    Skin: Surgical site healing well without evidence of infection    Musculoskeletal: FROM without pain, no deformity or areas of tenderness    LABS:  Pending      ASSESSMENT/PLAN:  42y Male recovering from TBI, s/p craniectomy 5/24, cranioplasty 6/29, now with increased size of intracranial collection with radiographic evidence of intracranial hypertension    Neuro: Repeat CT ordered by Neurosurgery, results pending. Neuro checks q2h with protected sleep time. Likely DC keppra today but will discuss with attending and consulting services prior to change. Continue recommendations as per PM&R    CV: Continue non-invasive blood pressure monitoring.     Pulm: OOB as tolerated. Encourage cough and deep breathing    GI/Nutrition: Written for bolus feeds, hold if patient has adequate oral intake    /Renal: Follow up morning sodium,     ID: No active issues     Endo: No active issues     Heme/DVT Prophylaxis: SCDs, lovenox

## 2022-07-10 NOTE — CHART NOTE - NSCHARTNOTEFT_GEN_A_CORE
Dr Martinez spoke with Dr Rivera and pt to be transferred to the Neurosurgical service for continued care at this time. Dr Martinez spoke with Dr Rivera and pt to be transferred to the Neurosurgical service for continued care at this time../

## 2022-07-10 NOTE — CONSULT NOTE ADULT - SUBJECTIVE AND OBJECTIVE BOX
Patient is a 42y old  Male who presents with a chief complaint of Trauma/ Right Subdural hematoma.   CT scan of the head demonstrating right sided subdural collection mixed with air. Planned for surgery by NS .   Medicine consulted for preop medical clearance .   Patient seen and examined chart reviewed . Patient found comfortable in the bed , hypophonic , able to states his first and last name , tracking me , following simple commands .  Unable to obtain history , so history obtained from chart     CC: CT scan of the head demonstrating right sided subdural collection admixed with air.    HPI:   Patient is a 42yoM brought by EMS, found down in the street unresponsive.  Imaging showed SDH, IPH, TEOFILO.   Patient underwent Right decompressive hemicraniectomy on 5/24, trach/peg 6/1,  R cranioplasty with complex closure 6/29/22.  Cranioplasty healing well/ drain removed on 7/1.  tolerating 24 hr feeds.  Trach (decanulated)/ PEG.    PMH/PSH - UTO  SH : UTO , Alcohol level high on admission,   Cocaine + on admission     FH: UTO     HOME MEDS: UTO    MEDICATIONS  (STANDING):  amantadine Syrup 100 milliGRAM(s) Oral <User Schedule>  buPROPion . 75 milliGRAM(s) Oral every 12 hours  ferrous    sulfate Liquid 300 milliGRAM(s) Enteral Tube daily  folic acid 1 milliGRAM(s) Oral daily  levETIRAcetam  IVPB 500 milliGRAM(s) IV Intermittent every 12 hours  melatonin 5 milliGRAM(s) Oral at bedtime  modafinil 100 milliGRAM(s) Oral <User Schedule>  multivitamin 1 Tablet(s) Oral daily  senna Syrup 10 milliLiter(s) Oral at bedtime  sodium chloride 2 Gram(s) Oral every 8 hours  thiamine 100 milliGRAM(s) Oral daily    MEDICATIONS  (PRN):  acetaminophen    Suspension .. 975 milliGRAM(s) Enteral Tube every 6 hours PRN Mild Pain (1 - 3)      Vital Signs Last 24 Hrs  T(C): 36.6 (10 Jul 2022 12:00), Max: 37.2 (10 Jul 2022 08:00)  T(F): 97.8 (10 Jul 2022 12:00), Max: 99 (10 Jul 2022 08:00)  HR: 64 (10 Jul 2022 12:00) (63 - 101)  BP: 105/71 (10 Jul 2022 12:00) (94/63 - 115/71)  BP(mean): 83 (10 Jul 2022 12:00) (73 - 83)  RR: 17 (10 Jul 2022 12:00) (15 - 19)  SpO2: 100% (10 Jul 2022 12:00) (98% - 100%)    Parameters below as of 10 Jul 2022 12:00  Patient On (Oxygen Delivery Method): room air        PHYSICAL EXAM:    GENERAL: NAD, well-groomed, well-developed  HEAD:  S/P R craniotomy - healing well   s/p tracheostomy/ decannulated    EYES: EOMI, PERRLA, conjunctiva and sclera clear  NECK: Supple, No JVD, Normal thyroid  NERVOUS SYSTEM: hypophonic , AAOX1 , tracking , following simple commands   CHEST/LUNG: CTA  b/l,  no rales, rhonchi, wheezing, or rubs  HEART: Regular rate and rhythm; No murmurs, rubs, or gallops  ABDOMEN: Soft, Nontender, Nondistended; Bowel sounds present,   PEG+   EXTREMITIES:  2+ Peripheral Pulses, No clubbing, cyanosis, or edema ,   LYMPH: No lymphadenopathy noted  Sedimentation Rate, Erythrocyte: 56: [facility] performs the Erythrocyte Sedimentation  Rate assay utilizing the [place] tube method. mm/hr (07.10.22 @ 07:17)    SKIN: No rashes or lesions    LABS:                        9.7    5.53  )-----------( 291      ( 10 Jul 2022 07:17 )             29.9     07-10    135  |  98  |  16.1  ----------------------------<  127<H>  4.1   |  26.0  |  0.62    Ca    9.2      10 Jul 2022 07:17  Phos  4.3     07-10  Mg     1.8     07-10    Hepatic Function Panel in AM (06.30.22 @ 04:17)    Indirect Reacting Bilirubin: 0.3 mg/dL    Protein Total, Serum: 6.4 g/dL    Albumin, Serum: 3.4 g/dL    Bilirubin Total, Serum: 0.4 mg/dL    Bilirubin Direct, Serum: 0.1 mg/dL    Alkaline Phosphatase, Serum: 118 U/L    Aspartate Aminotransferase (AST/SGOT): 15 U/L    Alanine Aminotransferase (ALT/SGPT): 14 U/L    Sedimentation Rate, Erythrocyte (07.10.22 @ 07:17)    Sedimentation Rate, Erythrocyte: 56: [facility] performs the Erythrocyte Sedimentation  Rate assay utilizing the [place] tube method. mm/hr    C-Reactive Protein, Serum (07.10.22 @ 07:17)    C-Reactive Protein, Serum: 12 mg/L    Rapid HIV-1/2 Antibody (05.24.22 @ 12:30)    Rapid HIV-1/2 Antibody: Nonreact: This Rapid HIV test reactive results is preliminary.  Further  confirmatory testing according to the CDC/[place] HIV testing algorithm will  follow, and such confirmatory results must be considered in making a  diagnosis related to HIV infection. FurtherHIV tests include a HIV 4th  generation antibody/antigen assay, HIV confirmatory/differentiation  testing, and a nucleic acid testing if needed.  Method: Qualitative Immunoassay    Osmolality, Serum (07.10.22 @ 07:17)    Osmolality, Serum: 290 mosmol/kg    Acute Hepatitis Panel (05.24.22 @ 17:54)    Hepatitis C Virus Interpretation: Nonreact: Hepatitis C AB  S/CO Ratio                        Interpretation  < 1.00                                   Non-Reactive  1.00 - 4.99                         Weakly-Reactive  >= 5.00                                Reactive  Non-Reactive: A person witha non-reactive HCV antibody result is  considered uninfected.  No further action is needed unless recent  infection is suspected.  In these cases, consider repeat testing later to  detect seroconversion..  Weakly-Reactive: HCV antibody test is abnormal, HCV RNA Qualitative test  will follow.  Reactive: HCV antibody test is abnormal, HCV RNA Qualitative test will  follow.  Note: HCV antibody testing is performed on the Abbott  system.    Hepatitis C Virus S/CO Ratio: 0.13 S/CO    Hepatitis B Core IgM Antibody: Nonreact    Hepatitis B Surface Antigen: Nonreact    Hepatitis A IgM Antibody: Nonreact    COVID-19 PCR: NotDetec (06 Jul 2022 06:27)  COVID-19 PCR: NotDetec (29 Jun 2022 08:34)  COVID-19 PCR: NotDetec (27 Jun 2022 21:10)  COVID-19 PCR: NotDetec (26 Jun 2022 06:15)  COVID-19 PCR: NotDetec (20 Jun 2022 12:37)  COVID-19 PCR: NotDetec (18 Jun 2022 09:19)  COVID-19 PCR: NotDetec (12 Jun 2022 06:40)  COVID-19 PCR: NotDetec (06 Jun 2022 04:01)  COVID-19 PCR: NotDetec (31 May 2022 04:05)  COVID-19 PCR: NotDetec (24 May 2022 02:07)    Cocaine Metabolite, Urine: Positive (05.24.22 @ 00:53)    Alcohol, Blood: 356: TOXIC CONCENTRATIONS (mg/dL):  Flushing, Slowing of  Reflexes, Impaired Visual Acuity:     Depression of CNS:    > 100  Fatalities Reported:       > 400  Results reported as a "< number" are below reliably detectable limits and  considered negative  These ranges are intended as general guidelines.  Alcohol metabolism can vary widely among individuals.  This test  is approved for clinical and not for forensic purposes. mg/dL (05.23.22 @ 23:59)      RADIOLOGY & ADDITIONAL STUDIES:    < from: Xray Chest 1 View AP/PA. (05.24.22 @ 00:21) >    ACC: [id number] EXAM:  XR CHEST PORTABLE URGENT 1V                        ACC: [id number] EXAM:  XR CHEST PORTABLE URGENT 1V                        ACC: [id number] EXAM:  XR CHEST AP OR PA 1V                        ACC: [id number] EXAM:  XR FEMUR 2 VIEWS RT                   < end of copied text >  < from: Xray Chest 1 View AP/PA. (05.24.22 @ 00:21) >  COMPARISON: None available.    Heart normal for projection.    Slight infiltrate off lower left hilum.    Endotracheal tube and nasogastric tube are in good position.    No visible fracture.    Follow-up AP chest on May 24, 2022 at 4:31 AM. No infiltrate at this time.    Follow-up AP chest on May 24, 2022 at 5:41 AM.    A left subclavian line is been inserted. Otherwise no change.    Right femur.    4 views.    The right hip and right knee are unremarkable. No bone destruction or   fracture.    IMPRESSION: Tubes in place in the chest. Heart and lungs unremarkable.   Right femur unremarkable.    --- End of Report ---      < end of copied text >    < from: CT Head No Cont (05.24.22 @ 00:42) >    ACC: [id number] EXAM:  CT CERVICAL SPINE                        ACC: [id number] EXAM:  CT MAXILLOFACIAL                        ACC: [id number] EXAM:  CT BRAIN                          PROCEDURE DATE:  05/24/2022      < end of copied text >  < from: CT Head No Cont (05.24.22 @ 00:42) >  IMPRESSION:    CT BRAIN:  1. Early entrapment of the posterior horn left lateral ventricle,   secondary to multicompartment intracranial hemorrhage. This is manifested   by high right frontal and medial left temporal parenchymal hematomas,   large right holohemispheric subdural hematoma, right parafalcine   hemorrhage extending to the right tentorium, and right frontal   subarachnoid hemorrhage. Additional petechial hemorrhage suspected at the   gray-white matter junction bilaterally.  2. 1.9 cm right to left subfalcine herniation and additional right uncal   herniation with effacement of the ambient cistern.      CT CERVICAL SPINE:  1. No acute fracture.  2. Hyperdensity in the anterior epidural space at C5-6 has the appearance   of a central disc protrusion with caudal subligamentous extension, trace   epidural hemorrhage is technically difficult to exclude.      CT FACE:  1. Extensive, comminuted right zygomaticomaxillary complex fracture,   detailed above. Injury to the right inferior rectus muscle suspected.    At the time of this interpretation, this patient is intraoperative with   neurosurgery, message left for the covering PA with the OR    regarding these results.    --- End of Report ---    < end of copied text >    < from: CT Head No Cont (07.10.22 @ 02:38) >    ACC: [id number] EXAM:  CT BRAIN                          PROCEDURE DATE:  07/10/2022          INTERPRETATION:  CLINICAL INDICATION: Follow-up pneumocephalus.    TECHNIQUE: CT of the head was performed without the administration of   intravenous contrast.    COMPARISON: CT head 7/9/2022.    < end of copied text >  < from: CT Head No Cont (07.10.22 @ 02:38) >  IMPRESSION:    -Status post right-sided craniotomy with associated air and fluid   extra-axial collection grossly stable in size. Grossly stable 0.9 cm   leftward midline shift.    -Grossly stable left frontal subdural collection measuring up to 0.8 cm.    -No new intracranial hemorrhage identified.    --- End of Report ---      KATE PHIPPS MD; Attending Radiologist  This document has been electronically signed. Jul 10 2022 12:53PM    < end of copied text >    < from: 12 Lead ECG (06.19.22 @ 17:31) >    Ventricular Rate 81 BPM    Atrial Rate 81 BPM    P-R Interval 130 ms    QRS Duration 82 ms    Q-T Interval 378 ms    QTC Calculation(Bazett) 439 ms    P Axis 44 degrees    R Axis 28 degrees    T Axis 41 degrees    Diagnosis Line Normal sinus rhythm    Confirmed by ESPERANZA CORADO ([id number]) on 6/20/2022 9:53:13 AM    < end of copied text >      ekg ( 7/10/22 ) NSR at 80 bpm

## 2022-07-11 LAB
ALBUMIN SERPL ELPH-MCNC: 3.8 G/DL — SIGNIFICANT CHANGE UP (ref 3.3–5.2)
ALP SERPL-CCNC: 92 U/L — SIGNIFICANT CHANGE UP (ref 40–120)
ALT FLD-CCNC: 13 U/L — SIGNIFICANT CHANGE UP
ANION GAP SERPL CALC-SCNC: 13 MMOL/L — SIGNIFICANT CHANGE UP (ref 5–17)
APTT BLD: 32.2 SEC — SIGNIFICANT CHANGE UP (ref 27.5–35.5)
AST SERPL-CCNC: 14 U/L — SIGNIFICANT CHANGE UP
BASOPHILS # BLD AUTO: 0.04 K/UL — SIGNIFICANT CHANGE UP (ref 0–0.2)
BASOPHILS NFR BLD AUTO: 0.7 % — SIGNIFICANT CHANGE UP (ref 0–2)
BILIRUB SERPL-MCNC: <0.2 MG/DL — LOW (ref 0.4–2)
BLD GP AB SCN SERPL QL: SIGNIFICANT CHANGE UP
BUN SERPL-MCNC: 14.1 MG/DL — SIGNIFICANT CHANGE UP (ref 8–20)
CALCIUM SERPL-MCNC: 9.1 MG/DL — SIGNIFICANT CHANGE UP (ref 8.6–10.2)
CHLORIDE SERPL-SCNC: 93 MMOL/L — LOW (ref 98–107)
CO2 SERPL-SCNC: 26 MMOL/L — SIGNIFICANT CHANGE UP (ref 22–29)
CREAT SERPL-MCNC: 0.61 MG/DL — SIGNIFICANT CHANGE UP (ref 0.5–1.3)
EGFR: 123 ML/MIN/1.73M2 — SIGNIFICANT CHANGE UP
EOSINOPHIL # BLD AUTO: 0.37 K/UL — SIGNIFICANT CHANGE UP (ref 0–0.5)
EOSINOPHIL NFR BLD AUTO: 6.9 % — HIGH (ref 0–6)
GLUCOSE SERPL-MCNC: 101 MG/DL — HIGH (ref 70–99)
HCT VFR BLD CALC: 29.8 % — LOW (ref 39–50)
HGB BLD-MCNC: 10.1 G/DL — LOW (ref 13–17)
IMM GRANULOCYTES NFR BLD AUTO: 0.4 % — SIGNIFICANT CHANGE UP (ref 0–1.5)
INR BLD: 1.2 RATIO — HIGH (ref 0.88–1.16)
LYMPHOCYTES # BLD AUTO: 1.47 K/UL — SIGNIFICANT CHANGE UP (ref 1–3.3)
LYMPHOCYTES # BLD AUTO: 27.4 % — SIGNIFICANT CHANGE UP (ref 13–44)
MAGNESIUM SERPL-MCNC: 1.8 MG/DL — SIGNIFICANT CHANGE UP (ref 1.6–2.6)
MCHC RBC-ENTMCNC: 29.4 PG — SIGNIFICANT CHANGE UP (ref 27–34)
MCHC RBC-ENTMCNC: 33.9 GM/DL — SIGNIFICANT CHANGE UP (ref 32–36)
MCV RBC AUTO: 86.9 FL — SIGNIFICANT CHANGE UP (ref 80–100)
MONOCYTES # BLD AUTO: 0.55 K/UL — SIGNIFICANT CHANGE UP (ref 0–0.9)
MONOCYTES NFR BLD AUTO: 10.2 % — SIGNIFICANT CHANGE UP (ref 2–14)
NEUTROPHILS # BLD AUTO: 2.92 K/UL — SIGNIFICANT CHANGE UP (ref 1.8–7.4)
NEUTROPHILS NFR BLD AUTO: 54.4 % — SIGNIFICANT CHANGE UP (ref 43–77)
PHOSPHATE SERPL-MCNC: 5.3 MG/DL — HIGH (ref 2.4–4.7)
PLATELET # BLD AUTO: 314 K/UL — SIGNIFICANT CHANGE UP (ref 150–400)
POTASSIUM SERPL-MCNC: 4.3 MMOL/L — SIGNIFICANT CHANGE UP (ref 3.5–5.3)
POTASSIUM SERPL-SCNC: 4.3 MMOL/L — SIGNIFICANT CHANGE UP (ref 3.5–5.3)
PROT SERPL-MCNC: 6.9 G/DL — SIGNIFICANT CHANGE UP (ref 6.6–8.7)
PROTHROM AB SERPL-ACNC: 14 SEC — HIGH (ref 10.5–13.4)
RBC # BLD: 3.43 M/UL — LOW (ref 4.2–5.8)
RBC # FLD: 12.5 % — SIGNIFICANT CHANGE UP (ref 10.3–14.5)
SODIUM SERPL-SCNC: 132 MMOL/L — LOW (ref 135–145)
WBC # BLD: 5.37 K/UL — SIGNIFICANT CHANGE UP (ref 3.8–10.5)
WBC # FLD AUTO: 5.37 K/UL — SIGNIFICANT CHANGE UP (ref 3.8–10.5)

## 2022-07-11 PROCEDURE — 99233 SBSQ HOSP IP/OBS HIGH 50: CPT | Mod: GC

## 2022-07-11 RX ORDER — ENOXAPARIN SODIUM 100 MG/ML
30 INJECTION SUBCUTANEOUS EVERY 24 HOURS
Refills: 0 | Status: DISCONTINUED | OUTPATIENT
Start: 2022-07-11 | End: 2022-07-17

## 2022-07-11 RX ADMIN — Medication 100 MILLIGRAM(S): at 12:57

## 2022-07-11 RX ADMIN — SODIUM CHLORIDE 2 GRAM(S): 9 INJECTION INTRAMUSCULAR; INTRAVENOUS; SUBCUTANEOUS at 05:40

## 2022-07-11 RX ADMIN — Medication 300 MILLIGRAM(S): at 12:56

## 2022-07-11 RX ADMIN — Medication 1 TABLET(S): at 12:56

## 2022-07-11 RX ADMIN — SODIUM CHLORIDE 2 GRAM(S): 9 INJECTION INTRAMUSCULAR; INTRAVENOUS; SUBCUTANEOUS at 21:51

## 2022-07-11 RX ADMIN — MODAFINIL 100 MILLIGRAM(S): 200 TABLET ORAL at 05:40

## 2022-07-11 RX ADMIN — SODIUM CHLORIDE 100 MILLILITER(S): 9 INJECTION INTRAMUSCULAR; INTRAVENOUS; SUBCUTANEOUS at 12:59

## 2022-07-11 RX ADMIN — BUPROPION HYDROCHLORIDE 75 MILLIGRAM(S): 150 TABLET, EXTENDED RELEASE ORAL at 05:40

## 2022-07-11 RX ADMIN — SODIUM CHLORIDE 2 GRAM(S): 9 INJECTION INTRAMUSCULAR; INTRAVENOUS; SUBCUTANEOUS at 14:33

## 2022-07-11 RX ADMIN — LEVETIRACETAM 400 MILLIGRAM(S): 250 TABLET, FILM COATED ORAL at 17:56

## 2022-07-11 RX ADMIN — Medication 100 MILLIGRAM(S): at 05:40

## 2022-07-11 RX ADMIN — ENOXAPARIN SODIUM 30 MILLIGRAM(S): 100 INJECTION SUBCUTANEOUS at 21:54

## 2022-07-11 RX ADMIN — BUPROPION HYDROCHLORIDE 75 MILLIGRAM(S): 150 TABLET, EXTENDED RELEASE ORAL at 17:56

## 2022-07-11 RX ADMIN — Medication 1 MILLIGRAM(S): at 12:56

## 2022-07-11 RX ADMIN — Medication 5 MILLIGRAM(S): at 21:54

## 2022-07-11 RX ADMIN — SODIUM CHLORIDE 100 MILLILITER(S): 9 INJECTION INTRAMUSCULAR; INTRAVENOUS; SUBCUTANEOUS at 21:51

## 2022-07-11 RX ADMIN — LEVETIRACETAM 400 MILLIGRAM(S): 250 TABLET, FILM COATED ORAL at 05:39

## 2022-07-11 RX ADMIN — Medication 100 MILLIGRAM(S): at 12:56

## 2022-07-11 RX ADMIN — SENNA PLUS 10 MILLILITER(S): 8.6 TABLET ORAL at 21:52

## 2022-07-11 NOTE — PROGRESS NOTE ADULT - SUBJECTIVE AND OBJECTIVE BOX
Patient seen and examined. Since last seen has received CT head imaging to [facility] for overall lethargy and noted to have R frontal air-fluid collection. Patient was transferred for close monitoring. Currently NPO.     Patient overall appears much improved this morning compared to 7/8 AM. Patient is awake, able to follow commands consistently, as well as verbalize short phrases though hypophonic. Patient seen with mother at bedside. Interviewed and examined in [place] by me.    He endorses fatigue and R sided headache. Denies other complaints.    REVIEW OF SYSTEMS  Constitutional - No fever,  + fatigue  Neurological - + headaches, +memory loss, +loss of strength,  Musculoskeletal - No joint pain,  No muscle pain      FUNCTIONAL PROGRESS  7/7 SLP -   Recommendations      Speech Language Pathology Recommendations: 1. Puree w/ MILDLY thick liquids 2. Meds crushed 3. 1:1 assistance w/ meals4. Strict aspiration precautions, upright for PO, slow rate, alternate solids/liquids, small bites/sips. 5. D/C diet w/ overt s/s of penetration/aspiration (coughing, throat clearing, fever, PNA)6. Oral hygiene 7. SLP to follow     7/9 PT -     Bed Mobility  Bed Mobility Training Sit-to-Supine: moderate assist (50% patient effort);  2 person assist  Bed Mobility Training Supine-to-Sit: moderate assist (50% patient effort);  2 person assist  Bed Mobility Training Limitations: decreased ability to use legs for bridging/pushing;  decreased ability to use arms for pushing/pulling;  decreased strength;  impaired balance;  impaired coordination;  impaired motor control;  impaired postural control    Sit-Stand Transfer Training  Transfer Training Sit-to-Stand Transfer: moderate assist (50% patient effort);  2 person assist;  full weight-bearing   rolling walker  Transfer Training Stand-to-Sit Transfer: moderate assist (50% patient effort);  2 person assist;  full weight-bearing   rolling walker  Sit-to-Stand Transfer Training Transfer Safety Analysis: decreased weight-shifting ability;  decreased balance;  decreased cognition;  decreased proprioception;  decreased sequencing ability;  abnormal muscle tone;  decreased strength;  impaired balance;  impaired coordination;  impaired motor control;  impaired postural control    OT re-eval pending    VITALS  T(C): 36.7 (07-11-22 @ 08:38), Max: 36.8 (07-10-22 @ 22:00)  HR: 90 (07-11-22 @ 05:55) (64 - 90)  BP: 124/79 (07-11-22 @ 05:55) (105/71 - 124/79)  RR: 18 (07-11-22 @ 05:55) (17 - 18)  SpO2: 99% (07-11-22 @ 05:55) (99% - 100%)  Wt(kg): --    MEDICATIONS   acetaminophen    Suspension .. 975 milliGRAM(s) every 6 hours PRN  amantadine Syrup 100 milliGRAM(s) <User Schedule>  buPROPion . 75 milliGRAM(s) every 12 hours  ferrous    sulfate Liquid 300 milliGRAM(s) daily  folic acid 1 milliGRAM(s) daily  levETIRAcetam  IVPB 500 milliGRAM(s) every 12 hours  melatonin 5 milliGRAM(s) at bedtime  modafinil 100 milliGRAM(s) <User Schedule>  multivitamin 1 Tablet(s) daily  senna Syrup 10 milliLiter(s) at bedtime  sodium chloride 2 Gram(s) every 8 hours  sodium chloride 0.9%. 1000 milliLiter(s) <Continuous>  thiamine 100 milliGRAM(s) daily      RECENT LABS/IMAGING                          10.1   5.37  )-----------( 314      ( 11 Jul 2022 07:20 )             29.8     07-11    132<L>  |  93<L>  |  14.1  ----------------------------<  101<H>  4.3   |  26.0  |  0.61    Ca    9.1      11 Jul 2022 07:20  Phos  5.3     07-11  Mg     1.8     07-11    TPro  6.9  /  Alb  3.8  /  TBili  <0.2<L>  /  DBili  x   /  AST  14  /  ALT  13  /  AlkPhos  92  07-11    PT/INR - ( 11 Jul 2022 07:20 )   PT: 14.0 sec;   INR: 1.20 ratio         PTT - ( 11 Jul 2022 07:20 )  PTT:32.2 sec      CT BRAIN 5/24 - 1. Early entrapment of the posterior horn left lateral ventricle,  secondary to multicompartment intracranial hemorrhage. This is manifested by high right frontal and medial left temporal parenchymal hematomas, large right holohemispheric subdural hematoma, right parafalcine hemorrhage extending to the right tentorium, and right frontal  subarachnoid hemorrhage. Additional petechial hemorrhage suspected at the gray-white matter junction bilaterally.  2. 1.9 cm right to left subfalcine herniation and additional right uncal herniation with effacement of the ambient cistern.      CT CERVICAL SPINE 5/24 - 1. No acute fracture. 2. Hyperdensity in the anterior epidural space at C5-6 has the appearance   of a central disc protrusion with caudal subligamentous extension, trace epidural hemorrhage is technically difficult to exclude.      CT FACE 5/24 - 1. Extensive, comminuted right zygomaticomaxillary complex fracture,  detailed above. Injury to the right inferior rectus muscle suspected. At the time of this interpretation, this patient is intraoperative with  neurosurgery, message left for the covering PA with the OR   regarding these results.    CT CAP 5/24 - No evidence of active contrast extravasation, hemoperitoneum or retroperitoneal hemorrhage. Bibasilar atelectasis, left greater than right. Additional findings as above.     CXR 5/24 - Tubes remain. Lungs remain clear.    CT head 5/24 - Increased right scalp soft tissue swelling. Stable intracranial  hemorrhages and subdural hemorrhages. Stable subarachnoid hemorrhage.     CT C-spine 5/24 - Small amount of blood products circumferentially around the thecal sac at the C1 and C2 levels. No high-grade spinal canal stenosis or obvious cord compression is visualized by CT technique. MRI may be  obtained for further evaluation.    MRI BRAIN 5/26 - Right frontal craniectomy. Residual bilateral subdural hematomas and interhemispheric subdural hemorrhage. Right frontal, right frontal temporal and left temporal parenchymal hemorrhagic contusions with an foci of diffusion restriction suggestive of shear injury and diffuse axonal injury.     Cervical spine MRI 5/26 - No acute fractures or dislocations    EEG 5/26 - Abnormal EEG study.  1. Severe nonspecific diffuse or multifocal cerebral dysfunction.   2. No epileptiform pattern or seizure seen.    HEAD CT 5/30 - Unchanged hemorrhagic contusions within the RIGHT frontal and LEFT temporal lobes with edema and herniation of RIGHT frontal lobe through the craniectomy defect. Small BILATERAL subdural hematomas are also stable. With the layering over the tentorium.    Mild LEFT nasal septal deviation with osteophyte. The facial and skull base bones and calvarium demonstrate comminuted fractures of the RIGHT lateral orbit, RIGHT zygomatic arch and RIGHT maxillary sinus and RIGHT pterygoid plate unchanged.    Xray Kidney Ureter Bladder 5/30: Nonobstructive bowel gas pattern/constipation    CXR 6/7 - Patchy right lower lobe infiltrate, new    US Duplex Venous Lower Ext Complete, Bilateral 6/9: No evidence of deep venous thrombosis in either lower extremity.    HEAD CT 6/20 - Right frontal craniectomy. Resolution of hemorrhage in the right frontal parasagittal region, left medial temporal lobe and in the left frontal parietal subdural hematoma compared with 5/30/2022.  HEAD CT 6/28 -  Less low density subgaleal fluid compared with 6/20/2022. Well-defined lucency right posterior limb internal capsule appears more prominent compared to the prior exam. No change in predominantly low density left frontal parietal subdural collection.  HEAD CT 6/30 - Interval right pterional craniotomy. Subjacent extra-axial hemorrhage measures 5 mm in greatest depth. Similar low density left frontal extra-axial collection measures 8 mm in greatest depth. No midline shift. Basal cisterns are visualized. No hydrocephalus. Encephalomalacia and gliosis in the right mesial frontal lobe.    HEAD CT 7/9           **INCOMPLETE NOTE** Patient seen and examined. Since last seen has received CT head imaging to [facility] for overall lethargy and noted to have R frontal air-fluid collection. Patient was transferred for close monitoring. Currently NPO.     Patient overall appears much improved this morning compared to 7/8 AM. Patient is awake, able to follow commands consistently, as well as verbalize short phrases though hypophonic. Patient seen with mother at bedside. Interviewed and examined in [place] by me.    He endorses fatigue and R sided headache. Denies other complaints.    REVIEW OF SYSTEMS  Constitutional - No fever,  + fatigue  Neurological - + headaches, +memory loss, +loss of strength,  Musculoskeletal - No joint pain,  No muscle pain      FUNCTIONAL PROGRESS  7/7 SLP -   Recommendations      Speech Language Pathology Recommendations: 1. Puree w/ MILDLY thick liquids 2. Meds crushed 3. 1:1 assistance w/ meals4. Strict aspiration precautions, upright for PO, slow rate, alternate solids/liquids, small bites/sips. 5. D/C diet w/ overt s/s of penetration/aspiration (coughing, throat clearing, fever, PNA)6. Oral hygiene 7. SLP to follow     7/9 PT -     Bed Mobility  Bed Mobility Training Sit-to-Supine: moderate assist (50% patient effort);  2 person assist  Bed Mobility Training Supine-to-Sit: moderate assist (50% patient effort);  2 person assist  Bed Mobility Training Limitations: decreased ability to use legs for bridging/pushing;  decreased ability to use arms for pushing/pulling;  decreased strength;  impaired balance;  impaired coordination;  impaired motor control;  impaired postural control    Sit-Stand Transfer Training  Transfer Training Sit-to-Stand Transfer: moderate assist (50% patient effort);  2 person assist;  full weight-bearing   rolling walker  Transfer Training Stand-to-Sit Transfer: moderate assist (50% patient effort);  2 person assist;  full weight-bearing   rolling walker  Sit-to-Stand Transfer Training Transfer Safety Analysis: decreased weight-shifting ability;  decreased balance;  decreased cognition;  decreased proprioception;  decreased sequencing ability;  abnormal muscle tone;  decreased strength;  impaired balance;  impaired coordination;  impaired motor control;  impaired postural control    OT re-eval pending    VITALS  T(C): 36.7 (07-11-22 @ 08:38), Max: 36.8 (07-10-22 @ 22:00)  HR: 90 (07-11-22 @ 05:55) (64 - 90)  BP: 124/79 (07-11-22 @ 05:55) (105/71 - 124/79)  RR: 18 (07-11-22 @ 05:55) (17 - 18)  SpO2: 99% (07-11-22 @ 05:55) (99% - 100%)  Wt(kg): --    MEDICATIONS   acetaminophen    Suspension .. 975 milliGRAM(s) every 6 hours PRN  amantadine Syrup 100 milliGRAM(s) <User Schedule>  buPROPion . 75 milliGRAM(s) every 12 hours  ferrous    sulfate Liquid 300 milliGRAM(s) daily  folic acid 1 milliGRAM(s) daily  levETIRAcetam  IVPB 500 milliGRAM(s) every 12 hours  melatonin 5 milliGRAM(s) at bedtime  modafinil 100 milliGRAM(s) <User Schedule>  multivitamin 1 Tablet(s) daily  senna Syrup 10 milliLiter(s) at bedtime  sodium chloride 2 Gram(s) every 8 hours  sodium chloride 0.9%. 1000 milliLiter(s) <Continuous>  thiamine 100 milliGRAM(s) daily      RECENT LABS/IMAGING                          10.1   5.37  )-----------( 314      ( 11 Jul 2022 07:20 )             29.8     07-11    132<L>  |  93<L>  |  14.1  ----------------------------<  101<H>  4.3   |  26.0  |  0.61    Ca    9.1      11 Jul 2022 07:20  Phos  5.3     07-11  Mg     1.8     07-11    TPro  6.9  /  Alb  3.8  /  TBili  <0.2<L>  /  DBili  x   /  AST  14  /  ALT  13  /  AlkPhos  92  07-11    PT/INR - ( 11 Jul 2022 07:20 )   PT: 14.0 sec;   INR: 1.20 ratio         PTT - ( 11 Jul 2022 07:20 )  PTT:32.2 sec      CT BRAIN 5/24 - 1. Early entrapment of the posterior horn left lateral ventricle,  secondary to multicompartment intracranial hemorrhage. This is manifested by high right frontal and medial left temporal parenchymal hematomas, large right holohemispheric subdural hematoma, right parafalcine hemorrhage extending to the right tentorium, and right frontal  subarachnoid hemorrhage. Additional petechial hemorrhage suspected at the gray-white matter junction bilaterally.  2. 1.9 cm right to left subfalcine herniation and additional right uncal herniation with effacement of the ambient cistern.      CT CERVICAL SPINE 5/24 - 1. No acute fracture. 2. Hyperdensity in the anterior epidural space at C5-6 has the appearance   of a central disc protrusion with caudal subligamentous extension, trace epidural hemorrhage is technically difficult to exclude.      CT FACE 5/24 - 1. Extensive, comminuted right zygomaticomaxillary complex fracture,  detailed above. Injury to the right inferior rectus muscle suspected. At the time of this interpretation, this patient is intraoperative with  neurosurgery, message left for the covering PA with the OR   regarding these results.    CT CAP 5/24 - No evidence of active contrast extravasation, hemoperitoneum or retroperitoneal hemorrhage. Bibasilar atelectasis, left greater than right. Additional findings as above.     CXR 5/24 - Tubes remain. Lungs remain clear.    CT head 5/24 - Increased right scalp soft tissue swelling. Stable intracranial  hemorrhages and subdural hemorrhages. Stable subarachnoid hemorrhage.     CT C-spine 5/24 - Small amount of blood products circumferentially around the thecal sac at the C1 and C2 levels. No high-grade spinal canal stenosis or obvious cord compression is visualized by CT technique. MRI may be  obtained for further evaluation.    MRI BRAIN 5/26 - Right frontal craniectomy. Residual bilateral subdural hematomas and interhemispheric subdural hemorrhage. Right frontal, right frontal temporal and left temporal parenchymal hemorrhagic contusions with an foci of diffusion restriction suggestive of shear injury and diffuse axonal injury.     Cervical spine MRI 5/26 - No acute fractures or dislocations    EEG 5/26 - Abnormal EEG study.  1. Severe nonspecific diffuse or multifocal cerebral dysfunction.   2. No epileptiform pattern or seizure seen.    HEAD CT 5/30 - Unchanged hemorrhagic contusions within the RIGHT frontal and LEFT temporal lobes with edema and herniation of RIGHT frontal lobe through the craniectomy defect. Small BILATERAL subdural hematomas are also stable. With the layering over the tentorium.    Mild LEFT nasal septal deviation with osteophyte. The facial and skull base bones and calvarium demonstrate comminuted fractures of the RIGHT lateral orbit, RIGHT zygomatic arch and RIGHT maxillary sinus and RIGHT pterygoid plate unchanged.    Xray Kidney Ureter Bladder 5/30: Nonobstructive bowel gas pattern/constipation    CXR 6/7 - Patchy right lower lobe infiltrate, new    US Duplex Venous Lower Ext Complete, Bilateral 6/9: No evidence of deep venous thrombosis in either lower extremity.    HEAD CT 6/20 - Right frontal craniectomy. Resolution of hemorrhage in the right frontal parasagittal region, left medial temporal lobe and in the left frontal parietal subdural hematoma compared with 5/30/2022.  HEAD CT 6/28 -  Less low density subgaleal fluid compared with 6/20/2022. Well-defined lucency right posterior limb internal capsule appears more prominent compared to the prior exam. No change in predominantly low density left frontal parietal subdural collection.  HEAD CT 6/30 - Interval right pterional craniotomy. Subjacent extra-axial hemorrhage measures 5 mm in greatest depth. Similar low density left frontal extra-axial collection measures 8 mm in greatest depth. No midline shift. Basal cisterns are visualized. No hydrocephalus. Encephalomalacia and gliosis in the right mesial frontal lobe.  HEAD CT 7/9 - Interval enlargement of a low-density right-sided frontal convexity subdural collection admixed with air. Worsening mass effect upon the right cerebral hemisphere with worsening shift ofthe midline structures from right to left craniotomy measuring up to 9.7 mm. No herniation at this time.Unchanged low-density right frontal subdural collection.Recommend continued short-term follow-up CT examination.  HEAD CT 7/10 -   -Status post right-sided craniotomy with associated air and fluid extra-axial collection grossly stable in size. Grossly stable 0.9 cm leftward midline shift.-Grossly stable left frontal subdural collection measuring up to 0.8 cm.-No new intracranial hemorrhage identified.  HEAD CT 7/10 - 1. Status post right frontal parietal craniotomy, with residual right frontal extra-axial collection of fluid and air, with mass effect on the right lateral ventricle and right-to-left midline shift of approximately 1 cm, which appears somewhat decreased compared with the earlier   examination. Nonetheless, degree of pneumocephalus is not significantly   changed. Close continued follow-up is advised.  2. Minimal fluid appreciated along the inferior aspect of right sided   mastoid air cells.        **INCOMPLETE NOTE** Patient seen and examined. Since last seen has received CT head imaging to [facility] for overall lethargy and noted to have R frontal air-fluid collection. Patient was transferred for close monitoring. Currently NPO.     Patient overall appears much improved this morning compared to 7/8 AM. Patient is awake, able to follow commands consistently, as well as verbalize short phrases though hypophonic. Patient seen with mother at bedside. Interviewed and examined in Citizen of [place] by me.    He endorses fatigue and R sided headache. Denies other complaints.    REVIEW OF SYSTEMS  Constitutional - No fever,  + fatigue  Neurological - + headaches, +memory loss, +loss of strength,  Musculoskeletal - No joint pain,  No muscle pain      FUNCTIONAL PROGRESS  7/7 SLP -   Recommendations      Speech Language Pathology Recommendations: 1. Puree w/ MILDLY thick liquids 2. Meds crushed 3. 1:1 assistance w/ meals4. Strict aspiration precautions, upright for PO, slow rate, alternate solids/liquids, small bites/sips. 5. D/C diet w/ overt s/s of penetration/aspiration (coughing, throat clearing, fever, PNA)6. Oral hygiene 7. SLP to follow     7/9 PT -     Bed Mobility  Bed Mobility Training Sit-to-Supine: moderate assist (50% patient effort);  2 person assist  Bed Mobility Training Supine-to-Sit: moderate assist (50% patient effort);  2 person assist  Bed Mobility Training Limitations: decreased ability to use legs for bridging/pushing;  decreased ability to use arms for pushing/pulling;  decreased strength;  impaired balance;  impaired coordination;  impaired motor control;  impaired postural control    Sit-Stand Transfer Training  Transfer Training Sit-to-Stand Transfer: moderate assist (50% patient effort);  2 person assist;  full weight-bearing   rolling walker  Transfer Training Stand-to-Sit Transfer: moderate assist (50% patient effort);  2 person assist;  full weight-bearing   rolling walker  Sit-to-Stand Transfer Training Transfer Safety Analysis: decreased weight-shifting ability;  decreased balance;  decreased cognition;  decreased proprioception;  decreased sequencing ability;  abnormal muscle tone;  decreased strength;  impaired balance;  impaired coordination;  impaired motor control;  impaired postural control    OT re-eval pending    VITALS  T(C): 36.7 (07-11-22 @ 08:38), Max: 36.8 (07-10-22 @ 22:00)  HR: 90 (07-11-22 @ 05:55) (64 - 90)  BP: 124/79 (07-11-22 @ 05:55) (105/71 - 124/79)  RR: 18 (07-11-22 @ 05:55) (17 - 18)  SpO2: 99% (07-11-22 @ 05:55) (99% - 100%)  Wt(kg): --    MEDICATIONS   acetaminophen    Suspension .. 975 milliGRAM(s) every 6 hours PRN  amantadine Syrup 100 milliGRAM(s) <User Schedule>  buPROPion . 75 milliGRAM(s) every 12 hours  ferrous    sulfate Liquid 300 milliGRAM(s) daily  folic acid 1 milliGRAM(s) daily  levETIRAcetam  IVPB 500 milliGRAM(s) every 12 hours  melatonin 5 milliGRAM(s) at bedtime  modafinil 100 milliGRAM(s) <User Schedule>  multivitamin 1 Tablet(s) daily  senna Syrup 10 milliLiter(s) at bedtime  sodium chloride 2 Gram(s) every 8 hours  sodium chloride 0.9%. 1000 milliLiter(s) <Continuous>  thiamine 100 milliGRAM(s) daily      RECENT LABS/IMAGING                          10.1   5.37  )-----------( 314      ( 11 Jul 2022 07:20 )             29.8     07-11    132<L>  |  93<L>  |  14.1  ----------------------------<  101<H>  4.3   |  26.0  |  0.61    Ca    9.1      11 Jul 2022 07:20  Phos  5.3     07-11  Mg     1.8     07-11    TPro  6.9  /  Alb  3.8  /  TBili  <0.2<L>  /  DBili  x   /  AST  14  /  ALT  13  /  AlkPhos  92  07-11    PT/INR - ( 11 Jul 2022 07:20 )   PT: 14.0 sec;   INR: 1.20 ratio         PTT - ( 11 Jul 2022 07:20 )  PTT:32.2 sec      CT BRAIN 5/24 - 1. Early entrapment of the posterior horn left lateral ventricle,  secondary to multicompartment intracranial hemorrhage. This is manifested by high right frontal and medial left temporal parenchymal hematomas, large right holohemispheric subdural hematoma, right parafalcine hemorrhage extending to the right tentorium, and right frontal  subarachnoid hemorrhage. Additional petechial hemorrhage suspected at the gray-white matter junction bilaterally.  2. 1.9 cm right to left subfalcine herniation and additional right uncal herniation with effacement of the ambient cistern.      CT CERVICAL SPINE 5/24 - 1. No acute fracture. 2. Hyperdensity in the anterior epidural space at C5-6 has the appearance   of a central disc protrusion with caudal subligamentous extension, trace epidural hemorrhage is technically difficult to exclude.      CT FACE 5/24 - 1. Extensive, comminuted right zygomaticomaxillary complex fracture,  detailed above. Injury to the right inferior rectus muscle suspected. At the time of this interpretation, this patient is intraoperative with  neurosurgery, message left for the covering PA with the OR   regarding these results.    CT CAP 5/24 - No evidence of active contrast extravasation, hemoperitoneum or retroperitoneal hemorrhage. Bibasilar atelectasis, left greater than right. Additional findings as above.     CXR 5/24 - Tubes remain. Lungs remain clear.    CT head 5/24 - Increased right scalp soft tissue swelling. Stable intracranial  hemorrhages and subdural hemorrhages. Stable subarachnoid hemorrhage.     CT C-spine 5/24 - Small amount of blood products circumferentially around the thecal sac at the C1 and C2 levels. No high-grade spinal canal stenosis or obvious cord compression is visualized by CT technique. MRI may be  obtained for further evaluation.    MRI BRAIN 5/26 - Right frontal craniectomy. Residual bilateral subdural hematomas and interhemispheric subdural hemorrhage. Right frontal, right frontal temporal and left temporal parenchymal hemorrhagic contusions with an foci of diffusion restriction suggestive of shear injury and diffuse axonal injury.     Cervical spine MRI 5/26 - No acute fractures or dislocations    EEG 5/26 - Abnormal EEG study.  1. Severe nonspecific diffuse or multifocal cerebral dysfunction.   2. No epileptiform pattern or seizure seen.    HEAD CT 5/30 - Unchanged hemorrhagic contusions within the RIGHT frontal and LEFT temporal lobes with edema and herniation of RIGHT frontal lobe through the craniectomy defect. Small BILATERAL subdural hematomas are also stable. With the layering over the tentorium.    Mild LEFT nasal septal deviation with osteophyte. The facial and skull base bones and calvarium demonstrate comminuted fractures of the RIGHT lateral orbit, RIGHT zygomatic arch and RIGHT maxillary sinus and RIGHT pterygoid plate unchanged.    Xray Kidney Ureter Bladder 5/30: Nonobstructive bowel gas pattern/constipation    CXR 6/7 - Patchy right lower lobe infiltrate, new    US Duplex Venous Lower Ext Complete, Bilateral 6/9: No evidence of deep venous thrombosis in either lower extremity.    HEAD CT 6/20 - Right frontal craniectomy. Resolution of hemorrhage in the right frontal parasagittal region, left medial temporal lobe and in the left frontal parietal subdural hematoma compared with 5/30/2022.  HEAD CT 6/28 -  Less low density subgaleal fluid compared with 6/20/2022. Well-defined lucency right posterior limb internal capsule appears more prominent compared to the prior exam. No change in predominantly low density left frontal parietal subdural collection.  HEAD CT 6/30 - Interval right pterional craniotomy. Subjacent extra-axial hemorrhage measures 5 mm in greatest depth. Similar low density left frontal extra-axial collection measures 8 mm in greatest depth. No midline shift. Basal cisterns are

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
## 2022-07-11 NOTE — PROGRESS NOTE ADULT - ASSESSMENT
42ym cranioplasty   42ym cranioplasty    Plan  1. physical therapy ordered  2. Keppra continued  3. mri of the brain with and without marlo

## 2022-07-12 PROCEDURE — 70450 CT HEAD/BRAIN W/O DYE: CPT | Mod: 26

## 2022-07-12 PROCEDURE — 99233 SBSQ HOSP IP/OBS HIGH 50: CPT

## 2022-07-12 RX ORDER — SENNA PLUS 8.6 MG/1
10 TABLET ORAL AT BEDTIME
Refills: 0 | Status: DISCONTINUED | OUTPATIENT
Start: 2022-07-12 | End: 2022-07-15

## 2022-07-12 RX ADMIN — Medication 1 TABLET(S): at 12:30

## 2022-07-12 RX ADMIN — Medication 100 MILLIGRAM(S): at 12:31

## 2022-07-12 RX ADMIN — SODIUM CHLORIDE 2 GRAM(S): 9 INJECTION INTRAMUSCULAR; INTRAVENOUS; SUBCUTANEOUS at 05:24

## 2022-07-12 RX ADMIN — SODIUM CHLORIDE 100 MILLILITER(S): 9 INJECTION INTRAMUSCULAR; INTRAVENOUS; SUBCUTANEOUS at 18:38

## 2022-07-12 RX ADMIN — LEVETIRACETAM 400 MILLIGRAM(S): 250 TABLET, FILM COATED ORAL at 18:37

## 2022-07-12 RX ADMIN — Medication 100 MILLIGRAM(S): at 05:25

## 2022-07-12 RX ADMIN — ENOXAPARIN SODIUM 30 MILLIGRAM(S): 100 INJECTION SUBCUTANEOUS at 22:14

## 2022-07-12 RX ADMIN — Medication 5 MILLIGRAM(S): at 22:14

## 2022-07-12 RX ADMIN — LEVETIRACETAM 400 MILLIGRAM(S): 250 TABLET, FILM COATED ORAL at 05:25

## 2022-07-12 RX ADMIN — Medication 1 MILLIGRAM(S): at 12:30

## 2022-07-12 RX ADMIN — Medication 300 MILLIGRAM(S): at 12:31

## 2022-07-12 RX ADMIN — SODIUM CHLORIDE 2 GRAM(S): 9 INJECTION INTRAMUSCULAR; INTRAVENOUS; SUBCUTANEOUS at 13:39

## 2022-07-12 RX ADMIN — BUPROPION HYDROCHLORIDE 75 MILLIGRAM(S): 150 TABLET, EXTENDED RELEASE ORAL at 05:24

## 2022-07-12 RX ADMIN — BUPROPION HYDROCHLORIDE 75 MILLIGRAM(S): 150 TABLET, EXTENDED RELEASE ORAL at 18:38

## 2022-07-12 RX ADMIN — Medication 100 MILLIGRAM(S): at 12:30

## 2022-07-12 RX ADMIN — SODIUM CHLORIDE 100 MILLILITER(S): 9 INJECTION INTRAMUSCULAR; INTRAVENOUS; SUBCUTANEOUS at 10:12

## 2022-07-12 RX ADMIN — SODIUM CHLORIDE 2 GRAM(S): 9 INJECTION INTRAMUSCULAR; INTRAVENOUS; SUBCUTANEOUS at 22:14

## 2022-07-12 RX ADMIN — MODAFINIL 100 MILLIGRAM(S): 200 TABLET ORAL at 05:25

## 2022-07-12 NOTE — PROGRESS NOTE ADULT - SUBJECTIVE AND OBJECTIVE BOX
INTERVAL HPI/OVERNIGHT EVENTS:  Pt admitted on 5/24  Right decompression craniectomy for right frontoparietal sdh and [facility]   6/29 cranioplasty  Pt is awake, alert, following commands intermittently. Pt eyes open tracking    MEDICATIONS  (STANDING):  amantadine Syrup 100 milliGRAM(s) Oral <User Schedule>  buPROPion . 75 milliGRAM(s) Oral every 12 hours  enoxaparin Injectable 30 milliGRAM(s) SubCutaneous every 24 hours  ferrous    sulfate Liquid 300 milliGRAM(s) Enteral Tube daily  folic acid 1 milliGRAM(s) Oral daily  levETIRAcetam  IVPB 500 milliGRAM(s) IV Intermittent every 12 hours  melatonin 5 milliGRAM(s) Oral at bedtime  modafinil 100 milliGRAM(s) Oral <User Schedule>  multivitamin 1 Tablet(s) Oral daily  sodium chloride 2 Gram(s) Oral every 8 hours  sodium chloride 0.9%. 1000 milliLiter(s) (100 mL/Hr) IV Continuous <Continuous>  thiamine 100 milliGRAM(s) Oral daily    MEDICATIONS  (PRN):  acetaminophen    Suspension .. 975 milliGRAM(s) Enteral Tube every 6 hours PRN Mild Pain (1 - 3)  senna Syrup 10 milliLiter(s) Oral at bedtime PRN no BM >1 day      Allergies  Allergy Status Unknown  Intolerances      Vital Signs Last 24 Hrs  T(C): 36.7 (12 Jul 2022 07:28), Max: 36.7 (12 Jul 2022 00:00)  T(F): 98.1 (12 Jul 2022 07:28), Max: 98.1 (12 Jul 2022 04:00)  HR: 88 (12 Jul 2022 08:03) (59 - 104)  BP: 107/70 (12 Jul 2022 08:03) (99/71 - 113/76)  BP(mean): 81 (12 Jul 2022 08:03) (80 - 87)  RR: 17 (12 Jul 2022 08:03) (16 - 18)  SpO2: 97% (12 Jul 2022 08:03) (97% - 100%)    Parameters below as of 12 Jul 2022 08:03  Patient On (Oxygen Delivery Method): room air       PHYSICAL EXAM  GENERAL: NAD,   HEAd:  Normocephalic right sided clean and dry incision, staples intact  EYES: EOMI, PERRLA, conjunctiva and sclera clear  ENMT: No tonsillar erythema, exudates, or enlargement; Moist mucous membranes,   NECK: Supple,   NERVOUS SYSTEM:  Alert & Oriented , Motor Strength 5/5 B/L upper and lower extremities; DTRs 2+ intact and symmetric  CHEST/LUNG: Clear to percussion bilaterally; No rales, rhonchi, wheezing, or rubs  HEART: Regular rate and rhythm; No murmurs, rubs, or gallops  ABDOMEN: Soft, Nontender, Nondistended; Bowel sounds present  EXTREMITIES:  2+ Peripheral Pulses, No edema      LABS:                          10.1   5.37  )-----------( 314      ( 11 Jul 2022 07:20 )             29.8     07-11    132<L>  |  93<L>  |  14.1  ----------------------------<  101<H>  4.3   |  26.0  |  0.61    Ca    9.1      11 Jul 2022 07:20  Phos  5.3     07-11  Mg     1.8     07-11    TPro  6.9  /  Alb  3.8  /  TBili  <0.2<L>  /  DBili  x   /  AST  14  /  ALT  13  /  AlkPhos  92  07-11    PT/INR - ( 11 Jul 2022 07:20 )   PT: 14.0 sec;   INR: 1.20 ratio         PTT - ( 11 Jul 2022 07:20 )  PTT:32.2 sec    I&O's Detail    11 Jul 2022 07:01  -  12 Jul 2022 07:00  --------------------------------------------------------  IN:    Enteral Tube Flush: 240 mL    IV PiggyBack: 200 mL    Pivot 1.5: 1200 mL    sodium chloride 0.9%: 2300 mL  Total IN: 3940 mL    OUT:    Voided (mL): 2350 mL  Total OUT: 2350 mL    Total NET: 1590 mL      12 Jul 2022 07:01  -  12 Jul 2022 11:11  --------------------------------------------------------  IN:    sodium chloride 0.9%: 300 mL  Total IN: 300 mL    OUT:    Pivot 1.5: 0 mL    Voided (mL): 400 mL  Total OUT: 400 mL    Total NET: -100 mL    RADIOLOGY & ADDITIONAL TESTS:  < from: CT Head No Cont (07.10.22 @ 15:41) >  ACC: [id number] EXAM:  CT BRAIN                        PROCEDURE DATE:  07/10/2022    IMPRESSION:  1. Status post right frontal parietal craniotomy, with residual right   frontal extra-axial collection of fluid and air, with mass effect on the   right lateral ventricle and right-to-left midline shift of approximately   1 cm, which appears somewhat decreased compared with the earlier   examination. Nonetheless, degree of pneumocephalus is not significantly   changed. Close continued follow-up is advised.    < end of copied text >
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
## 2022-07-13 PROCEDURE — 99233 SBSQ HOSP IP/OBS HIGH 50: CPT

## 2022-07-13 PROCEDURE — 74230 X-RAY XM SWLNG FUNCJ C+: CPT | Mod: 26

## 2022-07-13 RX ADMIN — Medication 1 MILLIGRAM(S): at 13:00

## 2022-07-13 RX ADMIN — Medication 1 TABLET(S): at 13:04

## 2022-07-13 RX ADMIN — SODIUM CHLORIDE 2 GRAM(S): 9 INJECTION INTRAMUSCULAR; INTRAVENOUS; SUBCUTANEOUS at 13:04

## 2022-07-13 RX ADMIN — Medication 5 MILLIGRAM(S): at 22:30

## 2022-07-13 RX ADMIN — LEVETIRACETAM 400 MILLIGRAM(S): 250 TABLET, FILM COATED ORAL at 17:12

## 2022-07-13 RX ADMIN — ENOXAPARIN SODIUM 30 MILLIGRAM(S): 100 INJECTION SUBCUTANEOUS at 22:30

## 2022-07-13 RX ADMIN — BUPROPION HYDROCHLORIDE 75 MILLIGRAM(S): 150 TABLET, EXTENDED RELEASE ORAL at 17:12

## 2022-07-13 RX ADMIN — SODIUM CHLORIDE 2 GRAM(S): 9 INJECTION INTRAMUSCULAR; INTRAVENOUS; SUBCUTANEOUS at 22:30

## 2022-07-13 RX ADMIN — Medication 300 MILLIGRAM(S): at 13:00

## 2022-07-13 RX ADMIN — BUPROPION HYDROCHLORIDE 75 MILLIGRAM(S): 150 TABLET, EXTENDED RELEASE ORAL at 05:12

## 2022-07-13 RX ADMIN — SODIUM CHLORIDE 100 MILLILITER(S): 9 INJECTION INTRAMUSCULAR; INTRAVENOUS; SUBCUTANEOUS at 05:12

## 2022-07-13 RX ADMIN — Medication 100 MILLIGRAM(S): at 13:00

## 2022-07-13 RX ADMIN — Medication 100 MILLIGRAM(S): at 05:12

## 2022-07-13 RX ADMIN — SODIUM CHLORIDE 100 MILLILITER(S): 9 INJECTION INTRAMUSCULAR; INTRAVENOUS; SUBCUTANEOUS at 22:31

## 2022-07-13 RX ADMIN — MODAFINIL 100 MILLIGRAM(S): 200 TABLET ORAL at 05:12

## 2022-07-13 RX ADMIN — SODIUM CHLORIDE 2 GRAM(S): 9 INJECTION INTRAMUSCULAR; INTRAVENOUS; SUBCUTANEOUS at 05:12

## 2022-07-13 RX ADMIN — SODIUM CHLORIDE 100 MILLILITER(S): 9 INJECTION INTRAMUSCULAR; INTRAVENOUS; SUBCUTANEOUS at 17:12

## 2022-07-13 RX ADMIN — LEVETIRACETAM 400 MILLIGRAM(S): 250 TABLET, FILM COATED ORAL at 05:12

## 2022-07-13 NOTE — SWALLOW VFSS/MBS ASSESSMENT ADULT - ROSENBEK'S PENETRATION ASPIRATION SCALE
(1) no aspiration, contrast does not enter airway in 3/5 presented trials. 3=contrast remains above the vocal cords, visible residue remains (silent penetration) in 2/5 trials/(2) contrast enters airway, remains above the vocal cords, no residue remains (penetration)

## 2022-07-13 NOTE — SWALLOW VFSS/MBS ASSESSMENT ADULT - SLP GENERAL OBSERVATIONS
Pt recd a&a in radiology suite via transports w/ NM, non verbal at this time, following simple commands, NAD, b/l hand mittens in place, tolerating RA w/ O2 SATs @ 100%, HR 74bpm, 0/10 pain pre/post, Pt recd a&a in radiology suite via transports w/ RN, non verbal at this time, following simple commands, NAD, b/l hand mittens in place, tolerating RA w/ O2 SATs @ 100%, HR 74bpm, 0/10 pain pre/post,
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
## 2022-07-13 NOTE — PROGRESS NOTE ADULT - ASSESSMENT
This 42ym right decompressive craniotomy s/p cranioplasty    Plan  1. mri of the brain with and without pending.  2. swallow being evaluated today   3. physical therapy

## 2022-07-13 NOTE — SWALLOW VFSS/MBS ASSESSMENT ADULT - ORAL PHASE
Piece meal deglutition observed/Delayed oral transit time/Reduced anterior - posterior transport bolus holding w/ piece meal deglutition/Delayed oral transit time/Reduced anterior - posterior transport Piece meal deglutition, decreased attention to bolus/Delayed oral transit time/Reduced anterior - posterior transport/Residue in oral cavity Piece meal deglutition, poor attention to bolus w/ bolus holding, cued to initiate swallow trigger/Delayed oral transit time/Reduced anterior - posterior transport bolus holding, piece meal deglutition/Delayed oral transit time/Reduced anterior - posterior transport

## 2022-07-13 NOTE — SWALLOW VFSS/MBS ASSESSMENT ADULT - RESIDUE IN VALLECULAE
observed, cleared w/ 2nd swallow trace-mild, cleared w/ subsequent swallow Trace observed, cleared w/ subsequent swallow
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
## 2022-07-13 NOTE — PROGRESS NOTE ADULT - NS ATTEND AMEND GEN_ALL_CORE FT
NSGY Attg:    see above    patient seen and examined    agree with exam and plan as above  MRI brain w and wo contrast pending

## 2022-07-13 NOTE — CHART NOTE - NSCHARTNOTEFT_GEN_A_CORE
Source: Patient [ ]  Family [ ]   other [x ]    Current Diet: Diet, Pureed:   Mildly Thick Liquids (MILDTHICKLIQS)  Tube Feeding Modality: Gastrostomy  Pivot 1.5 Micky (PIVOT1.5RTH)  Total Volume for 24 Hours (mL): 2400  Bolus  Total Volume of Bolus (mL):  400  Total # of Feeds: 5  Tube Feed Frequency: Every 4 hours   Tube Feed Start Time: 09:00  Bolus Feed Rate (mL per Hour): 400   Bolus Feed Duration (in Hours): 1  Bolus Feed Instructions:  BOLUS FEEDS are at 6am, 10am, 2pm, 6pm and 10pm  If patient eats >50% of breakfast HOLD BOLUS at 10am  If patient eats >50% of lunch HOLD BOLUS at 2pm   If patient eats >50% of dinner HOLD BOLUS at 6pm.   GIVE STANDING BOLUS at 6am and 10pm  No Carb Prosource (1pkg = 15gms Protein)     Qty per Day:  1  Supplement Feeding Modality:  Gastrostomy (07-08-22 @ 11:53)    PO intake:  < 50% [ ]   50-75%  [x ]   %  [ ]  other :    Enteral /Parenteral Nutrition: If pt receives 5 400ml bolus feedings will provide 2000ml, 3000kcal, 188g protein, 1518ml free water + LPS providing additional 100kcal and 15g protein per serving (Total: 3100kcal, 203g protein).   Standing bolus (2 400ml feedings) providing 800ml, 1200 kcal, 75g protein, 608 ml free water.     Current Weight:   (7/13) 128.5 lbs  (7/11) 129.4 lbs  (7/5) 141.7 lbs  (6/28) 194.2 lbs  (6/25) 160 lbs  Question accuracy of wts, continue to monitor  No edema noted    Pertinent Medications: MEDICATIONS  (STANDING):  amantadine Syrup 100 milliGRAM(s) Oral <User Schedule>  buPROPion . 75 milliGRAM(s) Oral every 12 hours  enoxaparin Injectable 30 milliGRAM(s) SubCutaneous every 24 hours  ferrous    sulfate Liquid 300 milliGRAM(s) Enteral Tube daily  folic acid 1 milliGRAM(s) Oral daily  levETIRAcetam  IVPB 500 milliGRAM(s) IV Intermittent every 12 hours  melatonin 5 milliGRAM(s) Oral at bedtime  modafinil 100 milliGRAM(s) Oral <User Schedule>  multivitamin 1 Tablet(s) Oral daily  sodium chloride 2 Gram(s) Oral every 8 hours  sodium chloride 0.9%. 1000 milliLiter(s) (100 mL/Hr) IV Continuous <Continuous>  thiamine 100 milliGRAM(s) Oral daily    MEDICATIONS  (PRN):  acetaminophen    Suspension .. 975 milliGRAM(s) Enteral Tube every 6 hours PRN Mild Pain (1 - 3)  senna Syrup 10 milliLiter(s) Oral at bedtime PRN no BM >1 day    Pertinent Labs: no recent labs    Skin: R temporal region, R parietal region Surgical incisions    Nutrition focused physical exam conducted - found signs of malnutrition [x ]absent [ ]present    Subcutaneous fat loss: [ ] Orbital fat pads region, [ ]Buccal fat region, [ ]Triceps region,  [ ]Ribs region    Muscle wasting: [ ]Temples region, [ ]Clavicle region, [ ]Shoulder region, [ ]Scapula region, [ ]Interosseous region,  [ ]thigh region, [ ]Calf region    Estimated Needs:   [ ] no change since previous assessment  [x ] recalculated: IBW 67kg    1675-2010kcal (based on 25-30kcal/kg IBW)  107-120g protein (based on 1.6-1.8g/kg IBW)    Current Nutrition Diagnosis: Pt remains at nutrition risk secondary to increased nutrient needs related to increased physiological demand for nutrient as evidenced by Right SDH s/p craniectomy with Left SDH, b/l parenchymal hematomas, +TEOFILO, multiple facial fractures, ETOH abuse. Pt tolerating tube feed and po diet. Per RN, pt regularly does well with first 2 po meals. Yesterday pt consumed 100% of breakfast and slightly <50% of lunch. Last documented BM 7/11. RD to remain available.     Recommendations:   1) d/c LPS (not necessary)   2) Reduce bolus feeding to better meet pt's needs. 3 400ml bolus feeds providing 1200ml, 1800kcal, 113g protein, 912 ml free water, and >100% of RDIs for vitamins/minerals. Additional free water per MD discretion.   3) Continue po intake as tolerated. Reduce to 2 bolus feedings if po intake remains >50% at meals.   4) Continue MVI, folic acid, thiamine daily   5) Monitor wts and labs    Monitoring and Evaluation:   [ x] PO intake [x ] Tolerance to diet prescription [X] Weights  [X] Follow up per protocol [X] Labs:
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
## 2022-07-13 NOTE — SWALLOW VFSS/MBS ASSESSMENT ADULT - DIAGNOSTIC IMPRESSIONS
Pt presented w/ moderate oral dysphagia negatively impacted by reduced cognition. Reduced oral grading from spoon noted &  + bolus holding w/ all trials provided requiring intermittent cues to initiate A-P transit & swallow trigger. Overall reduced attention to bolus w/ solid trials observed resulting in poor mastication/bolus formation. Pt presented w/ mild pharyngeal dysphagia further impacted by reduced cognition and overall decreased coordination of oral and pharyngeal function. Trace-mild valleculae & pharyngeal residue visualized t/o evaluation inconsistent w/ trials presented which independently cleared w/ subsequent swallow. Significantly decreased hyo-laryngeal elevation/excursion observed t/o the evaluation resulting in poor airway protection. Pt was provided w/ 5 trials of thin liquids w/ 3/5 trials entering the airway, remaining above the vocal cords, w/ no remaining residue (penetration, silent) and 2/5 trials contrast remained above the vocal cords w/ remaining reside (penetration/silent). No penetration/aspiration visualized w/ mildly thick liquids, or solid trials. Pt presented w/ moderate oral dysphagia negatively impacted by reduced cognition. Reduced oral grading from spoon noted &  + bolus holding w/ all trials provided requiring intermittent cues to initiate A-P transit & swallow trigger. Overall reduced attention to bolus w/ solid trials observed resulting in poor mastication/bolus formation. Pt presents w/ mild pharyngeal dysphagia w/ thin liquids w/ significantly reduced hyo-laryngeal elevation/excursion and poor upper airway protection resulting in silent penetration above the level of the vocal folds w/ and without remaining residue. Pharyngeal stage grossly WFL for mildly thick liquids & solid trials w/ no penetration/aspiration visualized. Mild valleculae stasis observed which cleared w/ subsequent swallow.

## 2022-07-13 NOTE — PROGRESS NOTE ADULT - SUBJECTIVE AND OBJECTIVE BOX
INTERVAL HPI/OVERNIGHT EVENTS:  Pt admitted on 5/24  Right decompression craniectomy for right frontoparietal sdh and [facility]   6/29 cranioplasty  Pt is awake, alert, following commands         MEDICATIONS  (STANDING):  amantadine Syrup 100 milliGRAM(s) Oral <User Schedule>  buPROPion . 75 milliGRAM(s) Oral every 12 hours  enoxaparin Injectable 30 milliGRAM(s) SubCutaneous every 24 hours  ferrous    sulfate Liquid 300 milliGRAM(s) Enteral Tube daily  folic acid 1 milliGRAM(s) Oral daily  levETIRAcetam  IVPB 500 milliGRAM(s) IV Intermittent every 12 hours  melatonin 5 milliGRAM(s) Oral at bedtime  modafinil 100 milliGRAM(s) Oral <User Schedule>  multivitamin 1 Tablet(s) Oral daily  sodium chloride 2 Gram(s) Oral every 8 hours  sodium chloride 0.9%. 1000 milliLiter(s) (100 mL/Hr) IV Continuous <Continuous>  thiamine 100 milliGRAM(s) Oral daily    MEDICATIONS  (PRN):  acetaminophen    Suspension .. 975 milliGRAM(s) Enteral Tube every 6 hours PRN Mild Pain (1 - 3)  senna Syrup 10 milliLiter(s) Oral at bedtime PRN no BM >1 day      Allergies    Allergy Status Unknown    Intolerances          Vital Signs Last 24 Hrs  T(C): 36.8 (13 Jul 2022 08:00), Max: 36.9 (13 Jul 2022 04:02)  T(F): 98.3 (13 Jul 2022 08:00), Max: 98.5 (13 Jul 2022 04:02)  HR: 75 (13 Jul 2022 12:00) (75 - 98)  BP: 126/94 (13 Jul 2022 12:00) (100/72 - 126/94)  BP(mean): 105 (13 Jul 2022 12:00) (81 - 105)  RR: 15 (13 Jul 2022 12:00) (15 - 17)  SpO2: 99% (13 Jul 2022 12:00) (99% - 100%)    Parameters below as of 13 Jul 2022 12:00  Patient On (Oxygen Delivery Method): room air           PHYSICAL EXAM  GENERAL: NAD,  HEAD:  right sided incision clean and dry. staples intact.   EYES: EOMI, PERRLA, conjunctiva and sclera clear  ENMT: No tonsillar erythema, exudates, or enlargement; Moist mucous membranes, Good dentition, No lesions  NECK: Supple,   NERVOUS SYSTEM:  Alert & Oriented X 2-3; Motor Strength 5/5 B/L upper and lower extremities; DTRs 2+ intact and symmetric  CHEST/LUNG: Clear to percussion bilaterally; No rales, rhonchi, wheezing, or rubs  HEART: Regular rate and rhythm; No murmurs, rubs, or gallops  ABDOMEN: Soft, Nontender, Nondistended; Bowel sounds present  EXTREMITIES:  2+ Peripheral Pulses, No edema        LABS:          I&O's Detail    12 Jul 2022 07:01  -  13 Jul 2022 07:00  --------------------------------------------------------  IN:    Enteral Tube Flush: 280 mL    Pivot 1.5: 1200 mL    sodium chloride 0.9%: 2300 mL  Total IN: 3780 mL    OUT:    Voided (mL): 4200 mL  Total OUT: 4200 mL    Total NET: -420 mL    RADIOLOGY & ADDITIONAL TESTS:  < from: CT Head No Cont (07.12.22 @ 20:21) >  ACC: [id number] EXAM:  CT BRAIN                        PROCEDURE DATE:  07/12/2022    < from: CT Head No Cont (07.12.22 @ 20:21) >  IMPRESSION: Stable right frontal convexity extra-axial collection with   air-fluid level. Stable right to left midline shift, mass effect, and a   stable herniation patterns.  --- End of Report ---     end of copied text >

## 2022-07-14 PROCEDURE — 99233 SBSQ HOSP IP/OBS HIGH 50: CPT | Mod: GC

## 2022-07-14 RX ORDER — LEVETIRACETAM 250 MG/1
500 TABLET, FILM COATED ORAL
Refills: 0 | Status: DISCONTINUED | OUTPATIENT
Start: 2022-07-14 | End: 2022-07-17

## 2022-07-14 RX ADMIN — Medication 1 TABLET(S): at 12:04

## 2022-07-14 RX ADMIN — SODIUM CHLORIDE 2 GRAM(S): 9 INJECTION INTRAMUSCULAR; INTRAVENOUS; SUBCUTANEOUS at 05:07

## 2022-07-14 RX ADMIN — Medication 100 MILLIGRAM(S): at 12:04

## 2022-07-14 RX ADMIN — Medication 100 MILLIGRAM(S): at 05:07

## 2022-07-14 RX ADMIN — BUPROPION HYDROCHLORIDE 75 MILLIGRAM(S): 150 TABLET, EXTENDED RELEASE ORAL at 05:07

## 2022-07-14 RX ADMIN — SODIUM CHLORIDE 2 GRAM(S): 9 INJECTION INTRAMUSCULAR; INTRAVENOUS; SUBCUTANEOUS at 21:13

## 2022-07-14 RX ADMIN — LEVETIRACETAM 400 MILLIGRAM(S): 250 TABLET, FILM COATED ORAL at 05:06

## 2022-07-14 RX ADMIN — SODIUM CHLORIDE 100 MILLILITER(S): 9 INJECTION INTRAMUSCULAR; INTRAVENOUS; SUBCUTANEOUS at 18:04

## 2022-07-14 RX ADMIN — SODIUM CHLORIDE 100 MILLILITER(S): 9 INJECTION INTRAMUSCULAR; INTRAVENOUS; SUBCUTANEOUS at 05:19

## 2022-07-14 RX ADMIN — MODAFINIL 100 MILLIGRAM(S): 200 TABLET ORAL at 05:07

## 2022-07-14 RX ADMIN — Medication 5 MILLIGRAM(S): at 21:13

## 2022-07-14 RX ADMIN — LEVETIRACETAM 500 MILLIGRAM(S): 250 TABLET, FILM COATED ORAL at 18:03

## 2022-07-14 RX ADMIN — BUPROPION HYDROCHLORIDE 75 MILLIGRAM(S): 150 TABLET, EXTENDED RELEASE ORAL at 18:03

## 2022-07-14 RX ADMIN — SENNA PLUS 10 MILLILITER(S): 8.6 TABLET ORAL at 21:14

## 2022-07-14 RX ADMIN — Medication 1 MILLIGRAM(S): at 12:04

## 2022-07-14 RX ADMIN — Medication 300 MILLIGRAM(S): at 12:04

## 2022-07-14 RX ADMIN — ENOXAPARIN SODIUM 30 MILLIGRAM(S): 100 INJECTION SUBCUTANEOUS at 21:13

## 2022-07-14 RX ADMIN — SODIUM CHLORIDE 2 GRAM(S): 9 INJECTION INTRAMUSCULAR; INTRAVENOUS; SUBCUTANEOUS at 12:25

## 2022-07-14 RX ADMIN — SODIUM CHLORIDE 100 MILLILITER(S): 9 INJECTION INTRAMUSCULAR; INTRAVENOUS; SUBCUTANEOUS at 11:25

## 2022-07-14 RX ADMIN — Medication 975 MILLIGRAM(S): at 12:05
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
## 2022-07-14 NOTE — PROGRESS NOTE ADULT - ASSESSMENT
Assessment  26yo M with TBI, s/p Right craniectomy on 5/24 & cranioplasty on 6/29    Plan  awaiting MRI brain  TBI is following  cont AED: Keppra  Amantadine for neuro stimulation  cont PT/OT/OOB  Lovenox for DVT prophylaxis  Tolerating TF  via PEG  Bowel regimen  Awaiting placement

## 2022-07-15 RX ORDER — SENNA PLUS 8.6 MG/1
10 TABLET ORAL AT BEDTIME
Refills: 0 | Status: DISCONTINUED | OUTPATIENT
Start: 2022-07-15 | End: 2022-07-17

## 2022-07-15 RX ORDER — CHLORHEXIDINE GLUCONATE 213 G/1000ML
1 SOLUTION TOPICAL
Refills: 0 | Status: DISCONTINUED | OUTPATIENT
Start: 2022-07-15 | End: 2022-07-17

## 2022-07-15 RX ADMIN — Medication 1 MILLIGRAM(S): at 12:11

## 2022-07-15 RX ADMIN — BUPROPION HYDROCHLORIDE 75 MILLIGRAM(S): 150 TABLET, EXTENDED RELEASE ORAL at 05:05

## 2022-07-15 RX ADMIN — Medication 10 MILLIGRAM(S): at 12:12

## 2022-07-15 RX ADMIN — SODIUM CHLORIDE 100 MILLILITER(S): 9 INJECTION INTRAMUSCULAR; INTRAVENOUS; SUBCUTANEOUS at 12:12

## 2022-07-15 RX ADMIN — Medication 100 MILLIGRAM(S): at 12:10

## 2022-07-15 RX ADMIN — SENNA PLUS 10 MILLILITER(S): 8.6 TABLET ORAL at 22:01

## 2022-07-15 RX ADMIN — Medication 5 MILLIGRAM(S): at 22:00

## 2022-07-15 RX ADMIN — SODIUM CHLORIDE 100 MILLILITER(S): 9 INJECTION INTRAMUSCULAR; INTRAVENOUS; SUBCUTANEOUS at 03:47

## 2022-07-15 RX ADMIN — Medication 975 MILLIGRAM(S): at 12:11

## 2022-07-15 RX ADMIN — Medication 975 MILLIGRAM(S): at 12:35

## 2022-07-15 RX ADMIN — LEVETIRACETAM 500 MILLIGRAM(S): 250 TABLET, FILM COATED ORAL at 17:56

## 2022-07-15 RX ADMIN — LEVETIRACETAM 500 MILLIGRAM(S): 250 TABLET, FILM COATED ORAL at 05:06

## 2022-07-15 RX ADMIN — BUPROPION HYDROCHLORIDE 75 MILLIGRAM(S): 150 TABLET, EXTENDED RELEASE ORAL at 17:57

## 2022-07-15 RX ADMIN — Medication 300 MILLIGRAM(S): at 12:10

## 2022-07-15 RX ADMIN — Medication 100 MILLIGRAM(S): at 05:05

## 2022-07-15 RX ADMIN — SODIUM CHLORIDE 2 GRAM(S): 9 INJECTION INTRAMUSCULAR; INTRAVENOUS; SUBCUTANEOUS at 22:00

## 2022-07-15 RX ADMIN — ENOXAPARIN SODIUM 30 MILLIGRAM(S): 100 INJECTION SUBCUTANEOUS at 22:01

## 2022-07-15 RX ADMIN — CHLORHEXIDINE GLUCONATE 1 APPLICATION(S): 213 SOLUTION TOPICAL at 17:56

## 2022-07-15 RX ADMIN — MODAFINIL 100 MILLIGRAM(S): 200 TABLET ORAL at 05:05

## 2022-07-15 RX ADMIN — SODIUM CHLORIDE 2 GRAM(S): 9 INJECTION INTRAMUSCULAR; INTRAVENOUS; SUBCUTANEOUS at 05:05

## 2022-07-15 RX ADMIN — Medication 1 TABLET(S): at 12:11

## 2022-07-15 RX ADMIN — SODIUM CHLORIDE 2 GRAM(S): 9 INJECTION INTRAMUSCULAR; INTRAVENOUS; SUBCUTANEOUS at 12:11

## 2022-07-15 RX ADMIN — Medication 975 MILLIGRAM(S): at 12:33

## 2022-07-15 RX ADMIN — Medication 100 MILLIGRAM(S): at 12:11
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
## 2022-07-15 NOTE — CHART NOTE - NSCHARTNOTEFT_GEN_A_CORE
Nutrition Note: Calorie count ordered. Aware s/p MBS 7/13 with recommendations for a pureed diet with mildly thick liquids. Pt also receiving tube feeds via PEG; Pivot 1.5, bolus 400 ml once daily @ 10pm (600 kcal, 38g protein, 304 ml free water) and LPS 30 ml once daily (100 kcal, 15g protein). Day one calorie count results below:    Breakfast: ~70 kcal, ~2g protein  Lunch: ~235 kcal, ~8g protein  Dinner: ~175 kcal, ~7g protein  Total: 480 kcal, 17g protein    Based on day one results, pt meeting <50% protein/calorie needs via PO. Spoke with RN, reports pt did not have much of an appetite yesterday. States pt now only to receive 10pm bolus feed; pt was hardly eating breakfast after receiving 400 ml bolus at 6am. Calorie count ongoing to assess if pt will have greater PO intake with only night bolus. RN reports pt had BM overnight. Day two and three calorie count sheets hung in pts room, RD to follow up with results.
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
## 2022-07-15 NOTE — PROGRESS NOTE ADULT - ASSESSMENT
Assessment  26yo M with TBI, s/p Right craniectomy on 5/24 & cranioplasty on 6/29. PEG 6/1    Plan  awaiting MRI brain  TBI is following  cont AED: Keppra  Amantadine & Namenda for neuro stimulation  cont PT/OT/OOB  Lovenox for DVT prophylaxis  Tolerating TF  via PEG  Bowel regimen  Awaiting placement

## 2022-07-16 ENCOUNTER — TRANSCRIPTION ENCOUNTER (OUTPATIENT)
Age: 42
End: 2022-07-16

## 2022-07-16 LAB — GLUCOSE BLDC GLUCOMTR-MCNC: 113 MG/DL — HIGH (ref 70–99)

## 2022-07-16 RX ADMIN — Medication 100 MILLIGRAM(S): at 05:41

## 2022-07-16 RX ADMIN — SODIUM CHLORIDE 2 GRAM(S): 9 INJECTION INTRAMUSCULAR; INTRAVENOUS; SUBCUTANEOUS at 05:42

## 2022-07-16 RX ADMIN — SODIUM CHLORIDE 100 MILLILITER(S): 9 INJECTION INTRAMUSCULAR; INTRAVENOUS; SUBCUTANEOUS at 21:05

## 2022-07-16 RX ADMIN — LEVETIRACETAM 500 MILLIGRAM(S): 250 TABLET, FILM COATED ORAL at 05:41

## 2022-07-16 RX ADMIN — BUPROPION HYDROCHLORIDE 75 MILLIGRAM(S): 150 TABLET, EXTENDED RELEASE ORAL at 17:32

## 2022-07-16 RX ADMIN — Medication 1 TABLET(S): at 13:59

## 2022-07-16 RX ADMIN — SENNA PLUS 10 MILLILITER(S): 8.6 TABLET ORAL at 21:05

## 2022-07-16 RX ADMIN — Medication 975 MILLIGRAM(S): at 19:37

## 2022-07-16 RX ADMIN — Medication 100 MILLIGRAM(S): at 13:58

## 2022-07-16 RX ADMIN — CHLORHEXIDINE GLUCONATE 1 APPLICATION(S): 213 SOLUTION TOPICAL at 05:42

## 2022-07-16 RX ADMIN — LEVETIRACETAM 500 MILLIGRAM(S): 250 TABLET, FILM COATED ORAL at 17:32

## 2022-07-16 RX ADMIN — Medication 5 MILLIGRAM(S): at 21:05

## 2022-07-16 RX ADMIN — Medication 300 MILLIGRAM(S): at 13:58

## 2022-07-16 RX ADMIN — SODIUM CHLORIDE 2 GRAM(S): 9 INJECTION INTRAMUSCULAR; INTRAVENOUS; SUBCUTANEOUS at 21:04

## 2022-07-16 RX ADMIN — SODIUM CHLORIDE 100 MILLILITER(S): 9 INJECTION INTRAMUSCULAR; INTRAVENOUS; SUBCUTANEOUS at 13:59

## 2022-07-16 RX ADMIN — Medication 1 MILLIGRAM(S): at 13:58

## 2022-07-16 RX ADMIN — ENOXAPARIN SODIUM 30 MILLIGRAM(S): 100 INJECTION SUBCUTANEOUS at 21:05

## 2022-07-16 RX ADMIN — BUPROPION HYDROCHLORIDE 75 MILLIGRAM(S): 150 TABLET, EXTENDED RELEASE ORAL at 05:42

## 2022-07-16 RX ADMIN — Medication 975 MILLIGRAM(S): at 18:10

## 2022-07-16 RX ADMIN — SODIUM CHLORIDE 2 GRAM(S): 9 INJECTION INTRAMUSCULAR; INTRAVENOUS; SUBCUTANEOUS at 13:58
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
## 2022-07-17 ENCOUNTER — APPOINTMENT (OUTPATIENT)
Dept: NEUROSURGERY | Facility: HOSPITAL | Age: 42
End: 2022-07-17

## 2022-07-17 ENCOUNTER — TRANSCRIPTION ENCOUNTER (OUTPATIENT)
Age: 42
End: 2022-07-17

## 2022-07-17 LAB
ALBUMIN SERPL ELPH-MCNC: 3.6 G/DL — SIGNIFICANT CHANGE UP (ref 3.3–5.2)
ALBUMIN SERPL ELPH-MCNC: 3.7 G/DL — SIGNIFICANT CHANGE UP (ref 3.3–5.2)
ALP SERPL-CCNC: 106 U/L — SIGNIFICANT CHANGE UP (ref 40–120)
ALP SERPL-CCNC: 110 U/L — SIGNIFICANT CHANGE UP (ref 40–120)
ALT FLD-CCNC: 15 U/L — SIGNIFICANT CHANGE UP
ALT FLD-CCNC: 16 U/L — SIGNIFICANT CHANGE UP
ANION GAP SERPL CALC-SCNC: 14 MMOL/L — SIGNIFICANT CHANGE UP (ref 5–17)
ANION GAP SERPL CALC-SCNC: 15 MMOL/L — SIGNIFICANT CHANGE UP (ref 5–17)
APPEARANCE UR: ABNORMAL
APTT BLD: 30.5 SEC — SIGNIFICANT CHANGE UP (ref 27.5–35.5)
AST SERPL-CCNC: 22 U/L — SIGNIFICANT CHANGE UP
AST SERPL-CCNC: 24 U/L — SIGNIFICANT CHANGE UP
BACTERIA # UR AUTO: ABNORMAL
BASOPHILS # BLD AUTO: 0.04 K/UL — SIGNIFICANT CHANGE UP (ref 0–0.2)
BASOPHILS NFR BLD AUTO: 0.7 % — SIGNIFICANT CHANGE UP (ref 0–2)
BILIRUB SERPL-MCNC: 0.4 MG/DL — SIGNIFICANT CHANGE UP (ref 0.4–2)
BILIRUB SERPL-MCNC: 0.4 MG/DL — SIGNIFICANT CHANGE UP (ref 0.4–2)
BILIRUB UR-MCNC: NEGATIVE — SIGNIFICANT CHANGE UP
BLD GP AB SCN SERPL QL: SIGNIFICANT CHANGE UP
BUN SERPL-MCNC: 6 MG/DL — LOW (ref 8–20)
BUN SERPL-MCNC: 7.3 MG/DL — LOW (ref 8–20)
CALCIUM SERPL-MCNC: 8.7 MG/DL — SIGNIFICANT CHANGE UP (ref 8.6–10.2)
CALCIUM SERPL-MCNC: 8.8 MG/DL — SIGNIFICANT CHANGE UP (ref 8.6–10.2)
CHLORIDE SERPL-SCNC: 88 MMOL/L — LOW (ref 98–107)
CHLORIDE SERPL-SCNC: 94 MMOL/L — LOW (ref 98–107)
CHLORIDE UR-SCNC: 139 MMOL/L — SIGNIFICANT CHANGE UP
CO2 SERPL-SCNC: 20 MMOL/L — LOW (ref 22–29)
CO2 SERPL-SCNC: 20 MMOL/L — LOW (ref 22–29)
COLOR SPEC: YELLOW — SIGNIFICANT CHANGE UP
CREAT SERPL-MCNC: 0.5 MG/DL — SIGNIFICANT CHANGE UP (ref 0.5–1.3)
CREAT SERPL-MCNC: 0.51 MG/DL — SIGNIFICANT CHANGE UP (ref 0.5–1.3)
DIFF PNL FLD: ABNORMAL
EGFR: 130 ML/MIN/1.73M2 — SIGNIFICANT CHANGE UP
EGFR: 131 ML/MIN/1.73M2 — SIGNIFICANT CHANGE UP
EOSINOPHIL # BLD AUTO: 0.34 K/UL — SIGNIFICANT CHANGE UP (ref 0–0.5)
EOSINOPHIL NFR BLD AUTO: 6.1 % — HIGH (ref 0–6)
EPI CELLS # UR: SIGNIFICANT CHANGE UP
GLUCOSE SERPL-MCNC: 101 MG/DL — HIGH (ref 70–99)
GLUCOSE SERPL-MCNC: 116 MG/DL — HIGH (ref 70–99)
GLUCOSE UR QL: NEGATIVE MG/DL — SIGNIFICANT CHANGE UP
HCT VFR BLD CALC: 30.8 % — LOW (ref 39–50)
HCT VFR BLD CALC: 33.5 % — LOW (ref 39–50)
HGB BLD-MCNC: 11.2 G/DL — LOW (ref 13–17)
HGB BLD-MCNC: 12 G/DL — LOW (ref 13–17)
IMM GRANULOCYTES NFR BLD AUTO: 0.4 % — SIGNIFICANT CHANGE UP (ref 0–1.5)
INR BLD: 1.26 RATIO — HIGH (ref 0.88–1.16)
KETONES UR-MCNC: NEGATIVE — SIGNIFICANT CHANGE UP
LEUKOCYTE ESTERASE UR-ACNC: ABNORMAL
LYMPHOCYTES # BLD AUTO: 1.35 K/UL — SIGNIFICANT CHANGE UP (ref 1–3.3)
LYMPHOCYTES # BLD AUTO: 24.3 % — SIGNIFICANT CHANGE UP (ref 13–44)
MAGNESIUM SERPL-MCNC: 1.5 MG/DL — LOW (ref 1.6–2.6)
MCHC RBC-ENTMCNC: 29.5 PG — SIGNIFICANT CHANGE UP (ref 27–34)
MCHC RBC-ENTMCNC: 30 PG — SIGNIFICANT CHANGE UP (ref 27–34)
MCHC RBC-ENTMCNC: 35.8 GM/DL — SIGNIFICANT CHANGE UP (ref 32–36)
MCHC RBC-ENTMCNC: 36.4 GM/DL — HIGH (ref 32–36)
MCV RBC AUTO: 81.1 FL — SIGNIFICANT CHANGE UP (ref 80–100)
MCV RBC AUTO: 83.8 FL — SIGNIFICANT CHANGE UP (ref 80–100)
MONOCYTES # BLD AUTO: 0.55 K/UL — SIGNIFICANT CHANGE UP (ref 0–0.9)
MONOCYTES NFR BLD AUTO: 9.9 % — SIGNIFICANT CHANGE UP (ref 2–14)
NEUTROPHILS # BLD AUTO: 3.26 K/UL — SIGNIFICANT CHANGE UP (ref 1.8–7.4)
NEUTROPHILS NFR BLD AUTO: 58.6 % — SIGNIFICANT CHANGE UP (ref 43–77)
NITRITE UR-MCNC: NEGATIVE — SIGNIFICANT CHANGE UP
OSMOLALITY UR: 377 MOSM/KG — SIGNIFICANT CHANGE UP (ref 300–1000)
PH UR: 7 — SIGNIFICANT CHANGE UP (ref 5–8)
PHOSPHATE SERPL-MCNC: 4.8 MG/DL — HIGH (ref 2.4–4.7)
PLATELET # BLD AUTO: 253 K/UL — SIGNIFICANT CHANGE UP (ref 150–400)
PLATELET # BLD AUTO: 312 K/UL — SIGNIFICANT CHANGE UP (ref 150–400)
POTASSIUM SERPL-MCNC: 4.1 MMOL/L — SIGNIFICANT CHANGE UP (ref 3.5–5.3)
POTASSIUM SERPL-MCNC: 4.2 MMOL/L — SIGNIFICANT CHANGE UP (ref 3.5–5.3)
POTASSIUM SERPL-SCNC: 4.1 MMOL/L — SIGNIFICANT CHANGE UP (ref 3.5–5.3)
POTASSIUM SERPL-SCNC: 4.2 MMOL/L — SIGNIFICANT CHANGE UP (ref 3.5–5.3)
PROT ?TM UR-MCNC: 7 MG/DL — SIGNIFICANT CHANGE UP (ref 0–12)
PROT SERPL-MCNC: 6.7 G/DL — SIGNIFICANT CHANGE UP (ref 6.6–8.7)
PROT SERPL-MCNC: 6.8 G/DL — SIGNIFICANT CHANGE UP (ref 6.6–8.7)
PROT UR-MCNC: 30 MG/DL
PROTHROM AB SERPL-ACNC: 14.6 SEC — HIGH (ref 10.5–13.4)
RBC # BLD: 3.8 M/UL — LOW (ref 4.2–5.8)
RBC # BLD: 4 M/UL — LOW (ref 4.2–5.8)
RBC # FLD: 12.4 % — SIGNIFICANT CHANGE UP (ref 10.3–14.5)
RBC # FLD: 12.5 % — SIGNIFICANT CHANGE UP (ref 10.3–14.5)
RBC CASTS # UR COMP ASSIST: ABNORMAL /HPF (ref 0–4)
SARS-COV-2 RNA SPEC QL NAA+PROBE: SIGNIFICANT CHANGE UP
SODIUM SERPL-SCNC: 123 MMOL/L — LOW (ref 135–145)
SODIUM SERPL-SCNC: 128 MMOL/L — LOW (ref 135–145)
SODIUM UR-SCNC: 147 MMOL/L — SIGNIFICANT CHANGE UP
SP GR SPEC: 1.01 — SIGNIFICANT CHANGE UP (ref 1.01–1.02)
UROBILINOGEN FLD QL: NEGATIVE MG/DL — SIGNIFICANT CHANGE UP
WBC # BLD: 5.56 K/UL — SIGNIFICANT CHANGE UP (ref 3.8–10.5)
WBC # BLD: 9.12 K/UL — SIGNIFICANT CHANGE UP (ref 3.8–10.5)
WBC # FLD AUTO: 5.56 K/UL — SIGNIFICANT CHANGE UP (ref 3.8–10.5)
WBC # FLD AUTO: 9.12 K/UL — SIGNIFICANT CHANGE UP (ref 3.8–10.5)
WBC UR QL: ABNORMAL /HPF (ref 0–5)

## 2022-07-17 PROCEDURE — ZZZZZ: CPT

## 2022-07-17 PROCEDURE — 61322 CRNEC/CRNOT DCMPRV W/O LOBEC: CPT | Mod: 78

## 2022-07-17 PROCEDURE — 70553 MRI BRAIN STEM W/O & W/DYE: CPT | Mod: 26

## 2022-07-17 DEVICE — CATH VENT STD 23CM: Type: IMPLANTABLE DEVICE | Status: FUNCTIONAL

## 2022-07-17 DEVICE — SPONGE HSTAT SURGCEL 4 X 8IN: Type: IMPLANTABLE DEVICE | Status: FUNCTIONAL

## 2022-07-17 DEVICE — GRAFT DURAL MATRX 4X5IN DURGN: Type: IMPLANTABLE DEVICE | Status: FUNCTIONAL

## 2022-07-17 DEVICE — PIN SKULL AD DISP: Type: IMPLANTABLE DEVICE | Status: FUNCTIONAL

## 2022-07-17 DEVICE — GRAFT DURAL MATRX 1 X 3IN DURGN: Type: IMPLANTABLE DEVICE | Status: FUNCTIONAL

## 2022-07-17 DEVICE — CATH PERITONEAL 120 STD: Type: IMPLANTABLE DEVICE | Status: FUNCTIONAL

## 2022-07-17 DEVICE — SPONGE GELFOAM SZ 100 UNCOMPRESSED: Type: IMPLANTABLE DEVICE | Status: FUNCTIONAL

## 2022-07-17 DEVICE — COLLAGEN DURAMATRIX ONLAY PLUS 3X3IN: Type: IMPLANTABLE DEVICE | Status: FUNCTIONAL

## 2022-07-17 DEVICE — SURGIFOAM PAD SZ 100: Type: IMPLANTABLE DEVICE | Status: FUNCTIONAL

## 2022-07-17 RX ORDER — CEFAZOLIN SODIUM 1 G
2000 VIAL (EA) INJECTION EVERY 8 HOURS
Refills: 0 | Status: DISCONTINUED | OUTPATIENT
Start: 2022-07-17 | End: 2022-07-18

## 2022-07-17 RX ORDER — SODIUM CHLORIDE 5 G/100ML
1000 INJECTION, SOLUTION INTRAVENOUS
Refills: 0 | Status: DISCONTINUED | OUTPATIENT
Start: 2022-07-17 | End: 2022-07-17

## 2022-07-17 RX ORDER — LEVETIRACETAM 250 MG/1
500 TABLET, FILM COATED ORAL EVERY 12 HOURS
Refills: 0 | Status: DISCONTINUED | OUTPATIENT
Start: 2022-07-17 | End: 2022-07-18

## 2022-07-17 RX ORDER — LEVETIRACETAM 250 MG/1
1000 TABLET, FILM COATED ORAL ONCE
Refills: 0 | Status: COMPLETED | OUTPATIENT
Start: 2022-07-17 | End: 2022-07-17

## 2022-07-17 RX ORDER — ONDANSETRON 8 MG/1
4 TABLET, FILM COATED ORAL EVERY 4 HOURS
Refills: 0 | Status: DISCONTINUED | OUTPATIENT
Start: 2022-07-17 | End: 2022-09-19

## 2022-07-17 RX ORDER — SODIUM CHLORIDE 9 MG/ML
1000 INJECTION, SOLUTION INTRAVENOUS
Refills: 0 | Status: DISCONTINUED | OUTPATIENT
Start: 2022-07-17 | End: 2022-07-18

## 2022-07-17 RX ORDER — ACETAMINOPHEN 500 MG
1000 TABLET ORAL ONCE
Refills: 0 | Status: COMPLETED | OUTPATIENT
Start: 2022-07-17 | End: 2022-07-18

## 2022-07-17 RX ORDER — CEFAZOLIN SODIUM 1 G
2000 VIAL (EA) INJECTION ONCE
Refills: 0 | Status: COMPLETED | OUTPATIENT
Start: 2022-07-17 | End: 2022-07-17

## 2022-07-17 RX ORDER — SODIUM CHLORIDE 9 MG/ML
1000 INJECTION INTRAMUSCULAR; INTRAVENOUS; SUBCUTANEOUS
Refills: 0 | Status: DISCONTINUED | OUTPATIENT
Start: 2022-07-17 | End: 2022-07-19

## 2022-07-17 RX ORDER — LABETALOL HCL 100 MG
10 TABLET ORAL EVERY 4 HOURS
Refills: 0 | Status: DISCONTINUED | OUTPATIENT
Start: 2022-07-17 | End: 2022-08-02

## 2022-07-17 RX ORDER — DEXAMETHASONE 0.5 MG/5ML
8 ELIXIR ORAL ONCE
Refills: 0 | Status: DISCONTINUED | OUTPATIENT
Start: 2022-07-17 | End: 2022-07-18

## 2022-07-17 RX ORDER — MAGNESIUM SULFATE 500 MG/ML
2 VIAL (ML) INJECTION ONCE
Refills: 0 | Status: COMPLETED | OUTPATIENT
Start: 2022-07-17 | End: 2022-07-17

## 2022-07-17 RX ORDER — FENTANYL CITRATE 50 UG/ML
50 INJECTION INTRAVENOUS
Refills: 0 | Status: DISCONTINUED | OUTPATIENT
Start: 2022-07-17 | End: 2022-07-18

## 2022-07-17 RX ORDER — SODIUM CHLORIDE 5 G/100ML
500 INJECTION, SOLUTION INTRAVENOUS
Refills: 0 | Status: DISCONTINUED | OUTPATIENT
Start: 2022-07-17 | End: 2022-07-17

## 2022-07-17 RX ADMIN — Medication 100 MILLIGRAM(S): at 11:33

## 2022-07-17 RX ADMIN — Medication 100 MILLIGRAM(S): at 05:09

## 2022-07-17 RX ADMIN — BUPROPION HYDROCHLORIDE 75 MILLIGRAM(S): 150 TABLET, EXTENDED RELEASE ORAL at 05:09

## 2022-07-17 RX ADMIN — Medication 1 TABLET(S): at 11:33

## 2022-07-17 RX ADMIN — LEVETIRACETAM 400 MILLIGRAM(S): 250 TABLET, FILM COATED ORAL at 19:00

## 2022-07-17 RX ADMIN — SODIUM CHLORIDE 100 MILLILITER(S): 9 INJECTION INTRAMUSCULAR; INTRAVENOUS; SUBCUTANEOUS at 05:08

## 2022-07-17 RX ADMIN — LEVETIRACETAM 500 MILLIGRAM(S): 250 TABLET, FILM COATED ORAL at 05:09

## 2022-07-17 RX ADMIN — Medication 1 MILLIGRAM(S): at 11:33

## 2022-07-17 RX ADMIN — SODIUM CHLORIDE 75 MILLILITER(S): 9 INJECTION INTRAMUSCULAR; INTRAVENOUS; SUBCUTANEOUS at 22:46

## 2022-07-17 RX ADMIN — Medication 300 MILLIGRAM(S): at 11:33

## 2022-07-17 RX ADMIN — SODIUM CHLORIDE 2 GRAM(S): 9 INJECTION INTRAMUSCULAR; INTRAVENOUS; SUBCUTANEOUS at 05:09

## 2022-07-17 RX ADMIN — SODIUM CHLORIDE 100 MILLILITER(S): 9 INJECTION INTRAMUSCULAR; INTRAVENOUS; SUBCUTANEOUS at 11:33

## 2022-07-17 RX ADMIN — Medication 100 MILLIGRAM(S): at 22:48

## 2022-07-17 RX ADMIN — Medication 10 MILLIGRAM(S): at 22:49

## 2022-07-17 RX ADMIN — CHLORHEXIDINE GLUCONATE 1 APPLICATION(S): 213 SOLUTION TOPICAL at 05:10

## 2022-07-17 RX ADMIN — Medication 25 GRAM(S): at 18:01

## 2022-07-17 RX ADMIN — SODIUM CHLORIDE 2 GRAM(S): 9 INJECTION INTRAMUSCULAR; INTRAVENOUS; SUBCUTANEOUS at 13:02

## 2022-07-17 NOTE — CHART NOTE - NSCHARTNOTEFT_GEN_A_CORE
Called for Medicine follow up for hyponatremia. Case reviewed. Hypertonic saline bolus discontinued. Prior to completion of evaluation, I was notified by SALVADOR MILES that patient's MRI worsened and patient to be taken emergently to OR and will be transferred to ICU for further management.

## 2022-07-17 NOTE — PROGRESS NOTE ADULT - NS ATTEND AMEND GEN_ALL_CORE FT
NSGY Attg:    see above    patient seen and examined    opens eyes  mumbles  protrudes tongue to command  spontaneous with right    MRI reviewed -- increased epidural/subdural fluid with increased mass effect and midline shift    unable to contact family at this time -- will proceed with emergency evacuation of fluid with mass effect without consent for hopeful improvement in symptoms and hopeful prevention of progression of deficit NSGY Attg:    see above    patient seen and examined    opens eyes  mumbles  protrudes tongue to command  spontaneous with right    MRI reviewed -- increased epidural/subdural fluid with increased mass effect and midline shift    unable to contact family at this time -- will proceed with emergency evacuation of fluid with mass effect without consent for hopeful improvement in symptoms and hopeful prevention of progression of deficit    voicemails left with [place] interpreters:  Rama Calles -- [phone] --  ID [id number]  Patel Calles -- [phone] --  ID [id number] NSGY Attg:    see above    patient seen and examined    lethargic  opens eyes  mumbles  protrudes tongue to command  spontaneous with right    MRI reviewed -- increased epidural/subdural fluid with increased mass effect and midline shift; now 3 cm in thickness with 1.4-1.5 cm of midline shift; + subfalcine and uncal herniation per official report    unable to contact family at this time -- will proceed with emergency evacuation of fluid with mass effect without consent for hopeful improvement in symptoms and hopeful prevention of progression of deficit    voicemails left with [place] interpreters:  Rama Calles -- [phone] --  ID [id number]  Patel Calles -- [phone] --  ID [id number]

## 2022-07-17 NOTE — CHART NOTE - NSCHARTNOTEFT_GEN_A_CORE
Pt with symptomatic large extra axial collection/ MLS>1cm/mass effect with subfalcine and uncal herniation on today's MRI.  NSGy team unable to reach pt's family.  I agree that pt requires emergent surgical intervention and evacuation of fluid, which is considered a standard of care with the aim to prevent worsening of the condition and unfavorable outcome (which includes but not limited to permanent disability or death).

## 2022-07-17 NOTE — CHART NOTE - NSCHARTNOTEFT_GEN_A_CORE
Nutrition note:     Calorie Count results:   Day #2; Pt consumed approximately 660 kcal, 23gm protein   Day #3: 2/3 meals documented; pt consumed 305 kcal, 13gm protein.    Pt is receiving 1 bolus of Pivot 400ml/day which is providing and extra 600kcal, 38gm protein per day.     Pt continues to have poor appetite and PO intake; despite encouragement at meals. Pt not meeting estimated nutrition needs via PO. Recommendations below:     1. Consider appetite stimulant   2. Add Ensure Enlive (mildly thick TID) 350kcal, 20gm protein per shake  3. Continue with Pivot Bolus daily to help optimize nutrition status   4. Encourage with meals.     RD to continue to monitor pt and PO intake; will remain available PRN.

## 2022-07-17 NOTE — BRIEF OPERATIVE NOTE - COMMENTS
See pre-operative note.  Patient's mother and brother unable to be reached.  Plan to proceed with emergent intervention as documented.  The 3T nurse received a request for an update from Marika Jansen ([phone]), who identified herself as the patient's brother.  She was in agreement with the plan for surgical intervention after explanation of the risks, benefits, and alternatives via telephone from the OR.  All of her questions were answered and her consent was witness via telephone by the OR staff.
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
## 2022-07-18 LAB
ALBUMIN SERPL ELPH-MCNC: 3.5 G/DL — SIGNIFICANT CHANGE UP (ref 3.3–5.2)
ALP SERPL-CCNC: 104 U/L — SIGNIFICANT CHANGE UP (ref 40–120)
ALT FLD-CCNC: 15 U/L — SIGNIFICANT CHANGE UP
ANION GAP SERPL CALC-SCNC: 12 MMOL/L — SIGNIFICANT CHANGE UP (ref 5–17)
ANION GAP SERPL CALC-SCNC: 15 MMOL/L — SIGNIFICANT CHANGE UP (ref 5–17)
AST SERPL-CCNC: 20 U/L — SIGNIFICANT CHANGE UP
BILIRUB SERPL-MCNC: 0.4 MG/DL — SIGNIFICANT CHANGE UP (ref 0.4–2)
BUN SERPL-MCNC: 7.4 MG/DL — LOW (ref 8–20)
BUN SERPL-MCNC: 8.7 MG/DL — SIGNIFICANT CHANGE UP (ref 8–20)
CALCIUM SERPL-MCNC: 8 MG/DL — LOW (ref 8.6–10.2)
CALCIUM SERPL-MCNC: 9 MG/DL — SIGNIFICANT CHANGE UP (ref 8.6–10.2)
CHLORIDE SERPL-SCNC: 94 MMOL/L — LOW (ref 98–107)
CHLORIDE SERPL-SCNC: 98 MMOL/L — SIGNIFICANT CHANGE UP (ref 98–107)
CO2 SERPL-SCNC: 20 MMOL/L — LOW (ref 22–29)
CO2 SERPL-SCNC: 21 MMOL/L — LOW (ref 22–29)
CREAT SERPL-MCNC: 0.45 MG/DL — LOW (ref 0.5–1.3)
CREAT SERPL-MCNC: 0.57 MG/DL — SIGNIFICANT CHANGE UP (ref 0.5–1.3)
EGFR: 126 ML/MIN/1.73M2 — SIGNIFICANT CHANGE UP
EGFR: 135 ML/MIN/1.73M2 — SIGNIFICANT CHANGE UP
GLUCOSE SERPL-MCNC: 113 MG/DL — HIGH (ref 70–99)
GLUCOSE SERPL-MCNC: 93 MG/DL — SIGNIFICANT CHANGE UP (ref 70–99)
GRAM STN FLD: SIGNIFICANT CHANGE UP
HCT VFR BLD CALC: 29.4 % — LOW (ref 39–50)
HGB BLD-MCNC: 10.5 G/DL — LOW (ref 13–17)
MAGNESIUM SERPL-MCNC: 1.6 MG/DL — LOW (ref 1.8–2.6)
MCHC RBC-ENTMCNC: 29.2 PG — SIGNIFICANT CHANGE UP (ref 27–34)
MCHC RBC-ENTMCNC: 35.7 GM/DL — SIGNIFICANT CHANGE UP (ref 32–36)
MCV RBC AUTO: 81.9 FL — SIGNIFICANT CHANGE UP (ref 80–100)
NIGHT BLUE STAIN TISS: SIGNIFICANT CHANGE UP
PHOSPHATE SERPL-MCNC: 5.3 MG/DL — HIGH (ref 2.4–4.7)
PLATELET # BLD AUTO: 325 K/UL — SIGNIFICANT CHANGE UP (ref 150–400)
POTASSIUM SERPL-MCNC: 3.3 MMOL/L — LOW (ref 3.5–5.3)
POTASSIUM SERPL-MCNC: 4.4 MMOL/L — SIGNIFICANT CHANGE UP (ref 3.5–5.3)
POTASSIUM SERPL-SCNC: 3.3 MMOL/L — LOW (ref 3.5–5.3)
POTASSIUM SERPL-SCNC: 4.4 MMOL/L — SIGNIFICANT CHANGE UP (ref 3.5–5.3)
PROT SERPL-MCNC: 6.9 G/DL — SIGNIFICANT CHANGE UP (ref 6.6–8.7)
RBC # BLD: 3.59 M/UL — LOW (ref 4.2–5.8)
RBC # FLD: 12.6 % — SIGNIFICANT CHANGE UP (ref 10.3–14.5)
SODIUM SERPL-SCNC: 129 MMOL/L — LOW (ref 135–145)
SODIUM SERPL-SCNC: 130 MMOL/L — LOW (ref 135–145)
SPECIMEN SOURCE: SIGNIFICANT CHANGE UP
WBC # BLD: 6.26 K/UL — SIGNIFICANT CHANGE UP (ref 3.8–10.5)
WBC # FLD AUTO: 6.26 K/UL — SIGNIFICANT CHANGE UP (ref 3.8–10.5)

## 2022-07-18 PROCEDURE — 99255 IP/OBS CONSLTJ NEW/EST HI 80: CPT

## 2022-07-18 PROCEDURE — 99291 CRITICAL CARE FIRST HOUR: CPT

## 2022-07-18 PROCEDURE — 70450 CT HEAD/BRAIN W/O DYE: CPT | Mod: 26

## 2022-07-18 RX ORDER — MODAFINIL 200 MG/1
100 TABLET ORAL
Refills: 0 | Status: DISCONTINUED | OUTPATIENT
Start: 2022-07-18 | End: 2022-07-22

## 2022-07-18 RX ORDER — AMANTADINE HCL 100 MG
100 CAPSULE ORAL
Refills: 0 | Status: DISCONTINUED | OUTPATIENT
Start: 2022-07-18 | End: 2022-07-28

## 2022-07-18 RX ORDER — CEFEPIME 1 G/1
2000 INJECTION, POWDER, FOR SOLUTION INTRAMUSCULAR; INTRAVENOUS EVERY 8 HOURS
Refills: 0 | Status: DISCONTINUED | OUTPATIENT
Start: 2022-07-18 | End: 2022-07-26

## 2022-07-18 RX ORDER — POTASSIUM CHLORIDE 20 MEQ
40 PACKET (EA) ORAL ONCE
Refills: 0 | Status: COMPLETED | OUTPATIENT
Start: 2022-07-18 | End: 2022-07-18

## 2022-07-18 RX ORDER — LEVETIRACETAM 250 MG/1
500 TABLET, FILM COATED ORAL EVERY 12 HOURS
Refills: 0 | Status: DISCONTINUED | OUTPATIENT
Start: 2022-07-18 | End: 2022-07-20

## 2022-07-18 RX ORDER — LANOLIN ALCOHOL/MO/W.PET/CERES
5 CREAM (GRAM) TOPICAL AT BEDTIME
Refills: 0 | Status: DISCONTINUED | OUTPATIENT
Start: 2022-07-18 | End: 2022-08-08

## 2022-07-18 RX ADMIN — CEFEPIME 100 MILLIGRAM(S): 1 INJECTION, POWDER, FOR SOLUTION INTRAMUSCULAR; INTRAVENOUS at 21:49

## 2022-07-18 RX ADMIN — Medication 40 MILLIEQUIVALENT(S): at 18:40

## 2022-07-18 RX ADMIN — Medication 5 MILLIGRAM(S): at 21:49

## 2022-07-18 RX ADMIN — LEVETIRACETAM 420 MILLIGRAM(S): 250 TABLET, FILM COATED ORAL at 17:51

## 2022-07-18 RX ADMIN — Medication 100 MILLIGRAM(S): at 06:01

## 2022-07-18 RX ADMIN — Medication 1000 MILLIGRAM(S): at 04:15

## 2022-07-18 RX ADMIN — Medication 100 MILLIGRAM(S): at 13:53

## 2022-07-18 RX ADMIN — MODAFINIL 100 MILLIGRAM(S): 200 TABLET ORAL at 17:53

## 2022-07-18 RX ADMIN — LEVETIRACETAM 420 MILLIGRAM(S): 250 TABLET, FILM COATED ORAL at 06:00

## 2022-07-18 RX ADMIN — CEFEPIME 100 MILLIGRAM(S): 1 INJECTION, POWDER, FOR SOLUTION INTRAMUSCULAR; INTRAVENOUS at 17:53

## 2022-07-18 RX ADMIN — Medication 400 MILLIGRAM(S): at 03:50

## 2022-07-18 RX ADMIN — Medication 100 MILLIGRAM(S): at 13:27

## 2022-07-18 RX ADMIN — SODIUM CHLORIDE 75 MILLILITER(S): 9 INJECTION INTRAMUSCULAR; INTRAVENOUS; SUBCUTANEOUS at 03:45
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
## 2022-07-18 NOTE — PROGRESS NOTE ADULT - SUBJECTIVE AND OBJECTIVE BOX
HPI:  42M s/p Rt craniectomy, wound exploration and evacuation of fluid collection POD#0.  Pt extubated approx 4hrs ago, no issues since.  MARCEL with 10mL serosanguinous fluid.  BP under control.     MEDICATIONS  (STANDING):  acetaminophen   IVPB .. 1000 milliGRAM(s) IV Intermittent once  ceFAZolin   IVPB 2000 milliGRAM(s) IV Intermittent every 8 hours  lactated ringers. 1000 milliLiter(s) (75 mL/Hr) IV Continuous <Continuous>  levETIRAcetam  IVPB 500 milliGRAM(s) IV Intermittent every 12 hours  sodium chloride 0.9%. 1000 milliLiter(s) (75 mL/Hr) IV Continuous <Continuous>    MEDICATIONS  (PRN):  dexAMETHasone  IVPB 8 milliGRAM(s) IV Intermittent once PRN Nausea and/or Vomiting  fentaNYL    Injectable 50 MICROGram(s) IV Push every 5 minutes PRN Severe Pain (7 - 10)  labetalol Injectable 10 milliGRAM(s) IV Push every 4 hours PRN Systolic blood pressure > 140  ondansetron Injectable 4 milliGRAM(s) IV Push every 4 hours PRN Nausea and/or Vomiting      COVID-19 PCR: NotDetec (17 Jul 2022 17:50)                        11.2   9.12  )-----------( 312      ( 17 Jul 2022 22:42 )             30.8     07-17    128<L>  |  94<L>  |  7.3<L>  ----------------------------<  116<H>  4.1   |  20.0<L>  |  0.51    Ca    8.7      17 Jul 2022 22:42  Phos  4.8     07-17  Mg     1.5     07-17    TPro  6.7  /  Alb  3.7  /  TBili  0.4  /  DBili  x   /  AST  22  /  ALT  16  /  AlkPhos  110  07-17    PT/INR - ( 17 Jul 2022 17:42 )   PT: 14.6 sec;   INR: 1.26 ratio         PTT - ( 17 Jul 2022 17:42 )  PTT:30.5 sec      IMAGING  CTH shows postop changes - right craniectomy and e/o fluid collection, improvement in shift/ mass effect    Vital Signs Last 24 Hrs  T(C): 36.9 (17 Jul 2022 21:50), Max: 37.4 (17 Jul 2022 15:02)  T(F): 98.5 (17 Jul 2022 21:50), Max: 99.3 (17 Jul 2022 15:02)  HR: 82 (18 Jul 2022 00:19) (63 - 103)  BP: 115/80 (18 Jul 2022 00:00) (111/86 - 146/82)  BP(mean): 91 (18 Jul 2022 00:00) (91 - 111)  RR: 14 (18 Jul 2022 00:19) (11 - 18)  SpO2: 99% (18 Jul 2022 00:19) (95% - 100%)    Parameters below as of 18 Jul 2022 00:19  Patient On (Oxygen Delivery Method): room air      I&O's Detail    16 Jul 2022 07:01  -  17 Jul 2022 07:00  --------------------------------------------------------  IN:    Pivot 1.5: 400 mL    sodium chloride 0.9%: 2400 mL  Total IN: 2800 mL    OUT:    Voided (mL): 2225 mL  Total OUT: 2225 mL    Total NET: 575 mL      17 Jul 2022 07:01  -  18 Jul 2022 00:43  --------------------------------------------------------  IN:    IV PiggyBack: 50 mL    sodium chloride 0.9%: 225 mL    sodium chloride 0.9%: 400 mL  Total IN: 675 mL    OUT:    Indwelling Catheter - Urethral (mL): 1075 mL    Voided (mL): 1150 mL  Total OUT: 2225 mL    Total NET: -1550 mL        PE  [id number] right side only  primary dressing in place    Imp s/p Rt craniectomy     Plan Admit to Neuro ICU, keppra, Ancef, bp control, follow wound cultures/ gram stain, ID consult, MARCEL to thumb print gentle suction, repeat CTH if mental status declines

## 2022-07-19 LAB
ANION GAP SERPL CALC-SCNC: 11 MMOL/L — SIGNIFICANT CHANGE UP (ref 5–17)
ANION GAP SERPL CALC-SCNC: 14 MMOL/L — SIGNIFICANT CHANGE UP (ref 5–17)
BUN SERPL-MCNC: 10.8 MG/DL — SIGNIFICANT CHANGE UP (ref 8–20)
BUN SERPL-MCNC: 7.7 MG/DL — LOW (ref 8–20)
CALCIUM SERPL-MCNC: 8.4 MG/DL — LOW (ref 8.6–10.2)
CALCIUM SERPL-MCNC: 8.5 MG/DL — LOW (ref 8.6–10.2)
CHLORIDE SERPL-SCNC: 92 MMOL/L — LOW (ref 98–107)
CHLORIDE SERPL-SCNC: 94 MMOL/L — LOW (ref 98–107)
CO2 SERPL-SCNC: 19 MMOL/L — LOW (ref 22–29)
CO2 SERPL-SCNC: 22 MMOL/L — SIGNIFICANT CHANGE UP (ref 22–29)
CREAT SERPL-MCNC: 0.43 MG/DL — LOW (ref 0.5–1.3)
CREAT SERPL-MCNC: 0.47 MG/DL — LOW (ref 0.5–1.3)
EGFR: 133 ML/MIN/1.73M2 — SIGNIFICANT CHANGE UP
EGFR: 137 ML/MIN/1.73M2 — SIGNIFICANT CHANGE UP
GLUCOSE SERPL-MCNC: 164 MG/DL — HIGH (ref 70–99)
GLUCOSE SERPL-MCNC: 97 MG/DL — SIGNIFICANT CHANGE UP (ref 70–99)
HCT VFR BLD CALC: 28 % — LOW (ref 39–50)
HGB BLD-MCNC: 10 G/DL — LOW (ref 13–17)
MAGNESIUM SERPL-MCNC: 1.4 MG/DL — LOW (ref 1.6–2.6)
MCHC RBC-ENTMCNC: 29.2 PG — SIGNIFICANT CHANGE UP (ref 27–34)
MCHC RBC-ENTMCNC: 35.7 GM/DL — SIGNIFICANT CHANGE UP (ref 32–36)
MCV RBC AUTO: 81.9 FL — SIGNIFICANT CHANGE UP (ref 80–100)
PHOSPHATE SERPL-MCNC: 3.8 MG/DL — SIGNIFICANT CHANGE UP (ref 2.4–4.7)
PLATELET # BLD AUTO: 278 K/UL — SIGNIFICANT CHANGE UP (ref 150–400)
POTASSIUM SERPL-MCNC: 4 MMOL/L — SIGNIFICANT CHANGE UP (ref 3.5–5.3)
POTASSIUM SERPL-MCNC: 4 MMOL/L — SIGNIFICANT CHANGE UP (ref 3.5–5.3)
POTASSIUM SERPL-SCNC: 4 MMOL/L — SIGNIFICANT CHANGE UP (ref 3.5–5.3)
POTASSIUM SERPL-SCNC: 4 MMOL/L — SIGNIFICANT CHANGE UP (ref 3.5–5.3)
RBC # BLD: 3.42 M/UL — LOW (ref 4.2–5.8)
RBC # FLD: 12.5 % — SIGNIFICANT CHANGE UP (ref 10.3–14.5)
SODIUM SERPL-SCNC: 125 MMOL/L — LOW (ref 135–145)
SODIUM SERPL-SCNC: 127 MMOL/L — LOW (ref 135–145)
WBC # BLD: 5.55 K/UL — SIGNIFICANT CHANGE UP (ref 3.8–10.5)
WBC # FLD AUTO: 5.55 K/UL — SIGNIFICANT CHANGE UP (ref 3.8–10.5)

## 2022-07-19 PROCEDURE — 93306 TTE W/DOPPLER COMPLETE: CPT | Mod: 26

## 2022-07-19 PROCEDURE — 99233 SBSQ HOSP IP/OBS HIGH 50: CPT

## 2022-07-19 PROCEDURE — 99291 CRITICAL CARE FIRST HOUR: CPT

## 2022-07-19 RX ORDER — POLYETHYLENE GLYCOL 3350 17 G/17G
17 POWDER, FOR SOLUTION ORAL DAILY
Refills: 0 | Status: DISCONTINUED | OUTPATIENT
Start: 2022-07-19 | End: 2022-07-20

## 2022-07-19 RX ORDER — SENNA PLUS 8.6 MG/1
2 TABLET ORAL AT BEDTIME
Refills: 0 | Status: DISCONTINUED | OUTPATIENT
Start: 2022-07-19 | End: 2022-07-20

## 2022-07-19 RX ORDER — POLYETHYLENE GLYCOL 3350 17 G/17G
17 POWDER, FOR SOLUTION ORAL ONCE
Refills: 0 | Status: DISCONTINUED | OUTPATIENT
Start: 2022-07-19 | End: 2022-07-19

## 2022-07-19 RX ORDER — SODIUM CHLORIDE 9 MG/ML
1000 INJECTION INTRAMUSCULAR; INTRAVENOUS; SUBCUTANEOUS ONCE
Refills: 0 | Status: COMPLETED | OUTPATIENT
Start: 2022-07-19 | End: 2022-07-19

## 2022-07-19 RX ORDER — VANCOMYCIN HCL 1 G
1000 VIAL (EA) INTRAVENOUS EVERY 8 HOURS
Refills: 0 | Status: DISCONTINUED | OUTPATIENT
Start: 2022-07-19 | End: 2022-07-19

## 2022-07-19 RX ORDER — VANCOMYCIN HCL 1 G
1000 VIAL (EA) INTRAVENOUS EVERY 8 HOURS
Refills: 0 | Status: DISCONTINUED | OUTPATIENT
Start: 2022-07-19 | End: 2022-07-20

## 2022-07-19 RX ORDER — SODIUM CHLORIDE 5 G/100ML
1000 INJECTION, SOLUTION INTRAVENOUS
Refills: 0 | Status: DISCONTINUED | OUTPATIENT
Start: 2022-07-19 | End: 2022-07-19

## 2022-07-19 RX ORDER — MAGNESIUM SULFATE 500 MG/ML
2 VIAL (ML) INJECTION ONCE
Refills: 0 | Status: COMPLETED | OUTPATIENT
Start: 2022-07-19 | End: 2022-07-19

## 2022-07-19 RX ORDER — SODIUM CHLORIDE 5 G/100ML
1000 INJECTION, SOLUTION INTRAVENOUS
Refills: 0 | Status: DISCONTINUED | OUTPATIENT
Start: 2022-07-19 | End: 2022-07-21

## 2022-07-19 RX ORDER — SENNA PLUS 8.6 MG/1
1 TABLET ORAL
Refills: 0 | Status: DISCONTINUED | OUTPATIENT
Start: 2022-07-19 | End: 2022-07-19

## 2022-07-19 RX ORDER — SODIUM CHLORIDE 9 MG/ML
1 INJECTION INTRAMUSCULAR; INTRAVENOUS; SUBCUTANEOUS EVERY 8 HOURS
Refills: 0 | Status: DISCONTINUED | OUTPATIENT
Start: 2022-07-19 | End: 2022-07-20

## 2022-07-19 RX ORDER — CHLORHEXIDINE GLUCONATE 213 G/1000ML
1 SOLUTION TOPICAL DAILY
Refills: 0 | Status: DISCONTINUED | OUTPATIENT
Start: 2022-07-19 | End: 2022-09-19

## 2022-07-19 RX ADMIN — SODIUM CHLORIDE 1000 MILLILITER(S): 9 INJECTION INTRAMUSCULAR; INTRAVENOUS; SUBCUTANEOUS at 13:08

## 2022-07-19 RX ADMIN — Medication 5 MILLIGRAM(S): at 21:25

## 2022-07-19 RX ADMIN — Medication 100 MILLIGRAM(S): at 13:08

## 2022-07-19 RX ADMIN — CHLORHEXIDINE GLUCONATE 1 APPLICATION(S): 213 SOLUTION TOPICAL at 13:11

## 2022-07-19 RX ADMIN — MODAFINIL 100 MILLIGRAM(S): 200 TABLET ORAL at 05:28

## 2022-07-19 RX ADMIN — SODIUM CHLORIDE 100 MILLILITER(S): 5 INJECTION, SOLUTION INTRAVENOUS at 20:00

## 2022-07-19 RX ADMIN — CEFEPIME 100 MILLIGRAM(S): 1 INJECTION, POWDER, FOR SOLUTION INTRAMUSCULAR; INTRAVENOUS at 13:11

## 2022-07-19 RX ADMIN — Medication 250 MILLIGRAM(S): at 10:25

## 2022-07-19 RX ADMIN — POLYETHYLENE GLYCOL 3350 17 GRAM(S): 17 POWDER, FOR SOLUTION ORAL at 13:11

## 2022-07-19 RX ADMIN — Medication 100 MILLIGRAM(S): at 05:28

## 2022-07-19 RX ADMIN — SODIUM CHLORIDE 1 GRAM(S): 9 INJECTION INTRAMUSCULAR; INTRAVENOUS; SUBCUTANEOUS at 06:13

## 2022-07-19 RX ADMIN — Medication 250 MILLIGRAM(S): at 17:46

## 2022-07-19 RX ADMIN — CEFEPIME 100 MILLIGRAM(S): 1 INJECTION, POWDER, FOR SOLUTION INTRAMUSCULAR; INTRAVENOUS at 05:28

## 2022-07-19 RX ADMIN — SODIUM CHLORIDE 1 GRAM(S): 9 INJECTION INTRAMUSCULAR; INTRAVENOUS; SUBCUTANEOUS at 13:11

## 2022-07-19 RX ADMIN — LEVETIRACETAM 420 MILLIGRAM(S): 250 TABLET, FILM COATED ORAL at 05:28

## 2022-07-19 RX ADMIN — LEVETIRACETAM 420 MILLIGRAM(S): 250 TABLET, FILM COATED ORAL at 18:10

## 2022-07-19 RX ADMIN — CEFEPIME 100 MILLIGRAM(S): 1 INJECTION, POWDER, FOR SOLUTION INTRAMUSCULAR; INTRAVENOUS at 21:25

## 2022-07-19 RX ADMIN — Medication 25 GRAM(S): at 06:13

## 2022-07-19 RX ADMIN — SODIUM CHLORIDE 1 GRAM(S): 9 INJECTION INTRAMUSCULAR; INTRAVENOUS; SUBCUTANEOUS at 21:25

## 2022-07-19 NOTE — PROGRESS NOTE ADULT - CRITICAL CARE SERVICES PROVIDED
Patient is critically ill, requiring critical care services.
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
## 2022-07-19 NOTE — PROGRESS NOTE ADULT - SUBJECTIVE AND OBJECTIVE BOX
[facility]  INFECTIOUS DISEASES at [place] and [place]  =======================================================                               Nestor Russo MD#  Mitesh Woodward MD*                                     Nyla Orantes MD*    Vera Bishop MD*            Diplomates American Board of Internal Medicine & Infectious Diseases                # [place] Office - Appt - Tel  [phone] Fax [phone]                * [place] Office - Appt - Tel [phone] Fax [phone]                                  Hospital Consult line:  [phone]  =======================================================    PASTOR KEMAR [id number]    Follow up: Epidural fluid collection     No fever       Allergies:  Allergy Status Unknown      REVIEW OF SYSTEMS:  Unable to obtain due to medical condition       Physical Exam:  GEN: NAD  HEENT: Surgical bandage of craniectomy site.  Anicteric   NECK: Supple.   LUNGS: diffuse rhonchi   HEART: Regular rate and rhythm   ABDOMEN: Soft, nontender, and nondistended.  Positive bowel sounds.    : Howard   EXTREMITIES: trace edema.  MSK: no joint swelling  NEUROLOGIC: Awake  PSYCHIATRIC: unable to assess  SKIN: No Rash      Vitals:  T(F): 97.5 (19 Jul 2022 07:33), Max: 98.9 (18 Jul 2022 12:03)  HR: 65 (19 Jul 2022 10:00)  BP: 107/78 (19 Jul 2022 10:00)  RR: 14 (19 Jul 2022 10:00)  SpO2: 100% (19 Jul 2022 10:00) (95% - 100%)  temp max in last 48H T(F): , Max: 99.3 (07-17-22 @ 15:02)      Current Antibiotics:  cefepime   IVPB 2000 milliGRAM(s) IV Intermittent every 8 hours  vancomycin  IVPB 1000 milliGRAM(s) IV Intermittent every 8 hours    Other medications:  amantadine Syrup 100 milliGRAM(s) Oral <User Schedule>  chlorhexidine 2% Cloths 1 Application(s) Topical daily  levETIRAcetam  IVPB 500 milliGRAM(s) IV Intermittent every 12 hours  melatonin 5 milliGRAM(s) Oral at bedtime  modafinil 100 milliGRAM(s) Oral <User Schedule>  polyethylene glycol 3350 17 Gram(s) Oral once  senna 1 Tablet(s) Oral two times a day  sodium chloride 1 Gram(s) Oral every 8 hours  sodium chloride 0.9%. 1000 milliLiter(s) IV Continuous <Continuous>                            10.0   5.55  )-----------( 278      ( 19 Jul 2022 04:35 )             28.0     07-19    127<L>  |  94<L>  |  10.8  ----------------------------<  97  4.0   |  22.0  |  0.47<L>    Ca    8.5<L>      19 Jul 2022 04:35  Phos  3.8     07-19  Mg     1.4     07-19    TPro  6.9  /  Alb  3.5  /  TBili  0.4  /  DBili  x   /  AST  20  /  ALT  15  /  AlkPhos  104  07-18    RECENT CULTURES:  07-18 @ 17:06 Bone Bone Flap       07-18 @ 17:03 .Tissue Bone Flap     Few Staphylococcus aureus  Rare polymorphonuclear leukocytes per low power field  No organisms seen    07-18 @ 16:57 .Body Fluid #1 Epidura Fluid     Moderate polymorphonuclear leukocytes seen per low power field  Few Gram positive cocci in pairs seen per oil power field    07-18 @ 16:34 .Tissue Duragen     Few Staphylococcus aureus  Few polymorphonuclear leukocytes per low power field  No organisms seen      WBC Count: 5.55 K/uL (07-19-22 @ 04:35)  WBC Count: 6.26 K/uL (07-18-22 @ 06:38)  WBC Count: 9.12 K/uL (07-17-22 @ 22:42)  WBC Count: 5.56 K/uL (07-17-22 @ 11:41)    Creatinine, Serum: 0.47 mg/dL (07-19-22 @ 04:35)  Creatinine, Serum: 0.45 mg/dL (07-18-22 @ 16:20)  Creatinine, Serum: 0.57 mg/dL (07-18-22 @ 04:07)  Creatinine, Serum: 0.51 mg/dL (07-17-22 @ 22:42)  Creatinine, Serum: 0.50 mg/dL (07-17-22 @ 11:41)    C-Reactive Protein, Serum: 12 mg/L (07-10-22 @ 07:17)    Sedimentation Rate, Erythrocyte: 56 mm/hr (07-10-22 @ 07:17)    COVID-19 PCR: NotDetec (07-17-22 @ 17:50)  COVID-19 PCR: NotDetec (07-10-22 @ 14:17)  COVID-19 PCR: NotDetec (07-06-22 @ 06:27)  COVID-19 PCR: NotDetec (06-29-22 @ 08:34)  COVID-19 PCR: NotDetec (06-27-22 @ 21:10)  COVID-19 PCR: NotDetec (06-26-22 @ 06:15)  COVID-19 PCR: NotDetec (06-20-22 @ 12:37)

## 2022-07-19 NOTE — PROGRESS NOTE ADULT - NS ATTEND AMEND GEN_ALL_CORE FT
NSGY Attg:    see above    patient seen and examined    agree with exam and plan as above    cultures + staph     continue supportive care per ICU  antibiotics per ICU/ID

## 2022-07-19 NOTE — PROGRESS NOTE ADULT - SUBJECTIVE AND OBJECTIVE BOX
Chief complaint:   Patient is a 42y old  Male who presents with a chief complaint of Trauma/ Subdural/ Intubated (19 Jul 2022 02:40)    HPI:  HPI: Patient is a 25y old M brought by EMS, found down in the street unresponsive.     Primary Survey:    A - airway compromised, patient intubated in ED, RSI  B - bilateral breath sound after intubation  C - initial BP: 169/93 (05-24-22 @ 00:00)*** , HR: 107 (05-24-22 @ 00:44)*** , palpable pulses in all extremities  D - GCS 3 on arrival    Exposure obtained, no evident injuries    CXR: No evident PTX or Hemothorax, ET tube in place.  (24 May 2022 01:09)        24hr EVENTS:      ROS: [ ]  Unable to assess due to mental status   All other systems negative    -----------------------------------------------------------------------------------------------------------------------------------------------------------------------------------  ICU Vital Signs Last 24 Hrs  T(C): 36.4 (19 Jul 2022 07:33), Max: 37.2 (18 Jul 2022 12:03)  T(F): 97.5 (19 Jul 2022 07:33), Max: 98.9 (18 Jul 2022 12:03)  HR: 70 (19 Jul 2022 09:00) (62 - 89)  BP: 122/81 (19 Jul 2022 09:00) (108/78 - 138/102)  BP(mean): 94 (19 Jul 2022 09:00) (83 - 113)  ABP: 117/86 (19 Jul 2022 09:00) (112/84 - 150/80)  ABP(mean): 69 (19 Jul 2022 09:00) (43 - 105)  RR: 17 (19 Jul 2022 09:00) (9 - 21)  SpO2: 100% (19 Jul 2022 09:00) (95% - 100%)    O2 Parameters below as of 19 Jul 2022 08:00  Patient On (Oxygen Delivery Method): room air            I&O's Summary    18 Jul 2022 07:01  -  19 Jul 2022 07:00  --------------------------------------------------------  IN: 2575 mL / OUT: 1100 mL / NET: 1475 mL        MEDICATIONS  (STANDING):  amantadine Syrup 100 milliGRAM(s) Oral <User Schedule>  cefepime   IVPB 2000 milliGRAM(s) IV Intermittent every 8 hours  levETIRAcetam  IVPB 500 milliGRAM(s) IV Intermittent every 12 hours  melatonin 5 milliGRAM(s) Oral at bedtime  modafinil 100 milliGRAM(s) Oral <User Schedule>  sodium chloride 1 Gram(s) Oral every 8 hours  sodium chloride 0.9%. 1000 milliLiter(s) (75 mL/Hr) IV Continuous <Continuous>  vancomycin  IVPB 1000 milliGRAM(s) IV Intermittent every 8 hours      RESPIRATORY:        IMAGING:   Recent imaging studies were reviewed.    LAB RESULTS:                          10.0   5.55  )-----------( 278      ( 19 Jul 2022 04:35 )             28.0       PT/INR - ( 17 Jul 2022 17:42 )   PT: 14.6 sec;   INR: 1.26 ratio         PTT - ( 17 Jul 2022 17:42 )  PTT:30.5 sec    07-19    127<L>  |  94<L>  |  10.8  ----------------------------<  97  4.0   |  22.0  |  0.47<L>    Ca    8.5<L>      19 Jul 2022 04:35  Phos  3.8     07-19  Mg     1.4     07-19    TPro  6.9  /  Alb  3.5  /  TBili  0.4  /  DBili  x   /  AST  20  /  ALT  15  /  AlkPhos  104  07-18                Culture - Acid Fast - Tissue w/Smear (collected 07-18-22 @ 17:06)  Source: Bone Bone Flap    Culture - Tissue with Gram Stain (collected 07-18-22 @ 17:03)  Source: .Tissue Bone Flap  Gram Stain (07-18-22 @ 23:28):    Rare polymorphonuclear leukocytes per low power field    No organisms seen    Culture - Acid Fast - Body Fluid w/Smear (collected 07-18-22 @ 16:57)  Source: .Body Fluid #2 Epidura Fluid    Culture - Body Fluid with Gram Stain (collected 07-18-22 @ 16:57)  Source: .Body Fluid #2 Epidura Flud  Gram Stain (07-18-22 @ 23:45):    Rare polymorphonuclear leukocytes seen per low power field    No organisms seen per oil power field    Culture - Acid Fast - Body Fluid w/Smear (collected 07-18-22 @ 16:57)  Source: .Body Fluid #1 Epidura Fluid    Culture - Body Fluid with Gram Stain (collected 07-18-22 @ 16:57)  Source: .Body Fluid #1 Epidura Fluid  Gram Stain (07-18-22 @ 23:45):    Moderate polymorphonuclear leukocytes seen per low power field    Few Gram positive cocci in pairs seen per oil power field    Culture - Acid Fast - Tissue w/Smear (collected 07-18-22 @ 16:34)  Source: .Tissue Duragen    Culture - Tissue with Gram Stain (collected 07-18-22 @ 16:34)  Source: .Tissue Duragen  Gram Stain (07-18-22 @ 23:28):    Few polymorphonuclear leukocytes per low power field    No organisms seen      -----------------------------------------------------------------------------------------------------------------------------------------------------------------------------------      PHYSICAL EXAM:  General: Calm  HEENT: MMM  Neuro:  -Mental status- No acute distress, intermittent FC, mimicing, EO spont  tracking  -CN- PERRL 3mm, EOMI, tongue midline, face symmetric  moving all ext    CV: RRR  Pulm: clear to auscultation  Abd: Soft, nontender, nondistended  Ext: no noted edema in lower ext  Skin: warm, dry          ASSESSMENT/PLAN:     NEURO:   SBP<160  q2hr neurochecks  keppra x 7 days  melatonin  hold wellbutrin with hyponatremia  subgaleal to thumbprint  pain mgt: tylenol and oxy 5 prn  Activity: [x] mobilize as tolerated [] Bedrest [x] PT [x] OT [] PMNR    PULM: room air   SpO2>92%  incentive spirometry   OOB    CV:  SBP goal 100-140  prn hydralazine/ labetalol    RENAL: monitor I/O  replete lytes prn  dc sylvester  salt tabs  NS @75  Fluids: IVF while NPO    GI:  Diet: TF via PEG  GI prophylaxis [x] not indicated   zofran prn for nausea  thiamine/folate  Bowel regimen [x] senna [] other:    ENDO:   Goal euglycemia (-180)    HEME/ONC:  VTE prophylaxis: [] SCDs [x] chemoprophylaxis held 24hrs post-op    ID: afebrile   no leukocytosis  ID consult  post-op abx  *Mod PMNS and few GPC in pairs in surgical path  vanco and cefepime empirically      MISC:  I affirm that this patient is critically ill and at high risk for sudden, fatal deterioration due to one or more of the above stated active issues.     This time does not include bedside procedures that are documented separately.           Chief complaint:   Patient is a 42y old  Male who presents with a chief complaint of Trauma/ Subdural/ Intubated (19 Jul 2022 02:40)    HPI:  HPI: Patient is a 25y old M brought by EMS, found down in the street unresponsive.     Primary Survey:    A - airway compromised, patient intubated in ED, RSI  B - bilateral breath sound after intubation  C - initial BP: 169/93 (05-24-22 @ 00:00)*** , HR: 107 (05-24-22 @ 00:44)*** , palpable pulses in all extremities  D - GCS 3 on arrival    Exposure obtained, no evident injuries    CXR: No evident PTX or Hemothorax, ET tube in place.  (24 May 2022 01:09)    24hr EVENTS:  impulsive    ROS: [x ]  Unable to assess due to mental status   All other systems negative    -----------------------------------------------------------------------------------------------------------------------------------------------------------------------------------  ICU Vital Signs Last 24 Hrs  T(C): 36.4 (19 Jul 2022 07:33), Max: 37.2 (18 Jul 2022 12:03)  T(F): 97.5 (19 Jul 2022 07:33), Max: 98.9 (18 Jul 2022 12:03)  HR: 70 (19 Jul 2022 09:00) (62 - 89)  BP: 122/81 (19 Jul 2022 09:00) (108/78 - 138/102)  BP(mean): 94 (19 Jul 2022 09:00) (83 - 113)  ABP: 117/86 (19 Jul 2022 09:00) (112/84 - 150/80)  ABP(mean): 69 (19 Jul 2022 09:00) (43 - 105)  RR: 17 (19 Jul 2022 09:00) (9 - 21)  SpO2: 100% (19 Jul 2022 09:00) (95% - 100%)    O2 Parameters below as of 19 Jul 2022 08:00  Patient On (Oxygen Delivery Method): room air      I&O's Summary    18 Jul 2022 07:01  -  19 Jul 2022 07:00  --------------------------------------------------------  IN: 2575 mL / OUT: 1100 mL / NET: 1475 mL        MEDICATIONS  (STANDING):  amantadine Syrup 100 milliGRAM(s) Oral <User Schedule>  cefepime   IVPB 2000 milliGRAM(s) IV Intermittent every 8 hours  levETIRAcetam  IVPB 500 milliGRAM(s) IV Intermittent every 12 hours  melatonin 5 milliGRAM(s) Oral at bedtime  modafinil 100 milliGRAM(s) Oral <User Schedule>  sodium chloride 1 Gram(s) Oral every 8 hours  sodium chloride 0.9%. 1000 milliLiter(s) (75 mL/Hr) IV Continuous <Continuous>  vancomycin  IVPB 1000 milliGRAM(s) IV Intermittent every 8 hours      IMAGING:   Recent imaging studies were reviewed.    LAB RESULTS:                          10.0   5.55  )-----------( 278      ( 19 Jul 2022 04:35 )             28.0       PT/INR - ( 17 Jul 2022 17:42 )   PT: 14.6 sec;   INR: 1.26 ratio         PTT - ( 17 Jul 2022 17:42 )  PTT:30.5 sec    07-19    127<L>  |  94<L>  |  10.8  ----------------------------<  97  4.0   |  22.0  |  0.47<L>    Ca    8.5<L>      19 Jul 2022 04:35  Phos  3.8     07-19  Mg     1.4     07-19    TPro  6.9  /  Alb  3.5  /  TBili  0.4  /  DBili  x   /  AST  20  /  ALT  15  /  AlkPhos  104  07-18        Culture - Acid Fast - Tissue w/Smear (collected 07-18-22 @ 17:06)  Source: Bone Bone Flap    Culture - Tissue with Gram Stain (collected 07-18-22 @ 17:03)  Source: .Tissue Bone Flap  Gram Stain (07-18-22 @ 23:28):    Rare polymorphonuclear leukocytes per low power field    No organisms seen    Culture - Acid Fast - Body Fluid w/Smear (collected 07-18-22 @ 16:57)  Source: .Body Fluid #2 Epidura Fluid    Culture - Body Fluid with Gram Stain (collected 07-18-22 @ 16:57)  Source: .Body Fluid #2 Epidura Flud  Gram Stain (07-18-22 @ 23:45):    Rare polymorphonuclear leukocytes seen per low power field    No organisms seen per oil power field    Culture - Acid Fast - Body Fluid w/Smear (collected 07-18-22 @ 16:57)  Source: .Body Fluid #1 Epidura Fluid    Culture - Body Fluid with Gram Stain (collected 07-18-22 @ 16:57)  Source: .Body Fluid #1 Epidura Fluid  Gram Stain (07-18-22 @ 23:45):    Moderate polymorphonuclear leukocytes seen per low power field    Few Gram positive cocci in pairs seen per oil power field    Culture - Acid Fast - Tissue w/Smear (collected 07-18-22 @ 16:34)  Source: .Tissue Duragen    Culture - Tissue with Gram Stain (collected 07-18-22 @ 16:34)  Source: .Tissue Duragen  Gram Stain (07-18-22 @ 23:28):    Few polymorphonuclear leukocytes per low power field    No organisms seen      -----------------------------------------------------------------------------------------------------------------------------------------------------------------------------------      PHYSICAL EXAM:  General: Calm  HEENT: MMM  Neuro:  -Mental status- No acute distress, intermittent FC, mimicing, EO spont  tracking  -CN- PERRL 3mm, EOMI, tongue midline, face symmetric  moving all ext    CV: RRR  Pulm: clear to auscultation  Abd: Soft, nontender, nondistended  Ext: no noted edema in lower ext  Skin: warm, dry          ASSESSMENT/PLAN: 42y old  Male who presents with a chief complaint of Trauma/ Subdural     NEURO:   q2hr neurochecks  keppra x 7 days  melatonin  hold wellbutrin with hyponatremia  subgaleal to thumbprint  pain mgt: tylenol and oxy 5 prn  Activity: [x] mobilize as tolerated [] Bedrest [x] PT [x] OT [] PMNR    PULM: room air   SpO2>92%  incentive spirometry   OOB    CV:  SBP goal 100-140  prn hydralazine/ labetalol    RENAL: monitor I/O  replete lytes prn  salt tabs  NS @75  Fluids: IVF while NPO    GI:  Diet: TF via PEG, 400cc bolus with puree diet  GI prophylaxis [x] not indicated   zofran prn for nausea  thiamine/folate  Bowel regimen [x] senna [] other:  LBM?    ENDO:   Goal euglycemia (-180)    HEME/ONC:  VTE prophylaxis: [] SCDs [x] chemoprophylaxis held 24hrs post-op    ID: afebrile   no leukocytosis  ID consult  post-op abx   surgical path- *Mod PMNS and few GPC in pairs  vanco and cefepime empirically      MISC:  I affirm that this patient is critically ill and at high risk for sudden, fatal deterioration due to one or more of the above stated active issues.     This time does not include bedside procedures that are documented separately.

## 2022-07-19 NOTE — PROGRESS NOTE ADULT - SUBJECTIVE AND OBJECTIVE BOX
42M s/p Rt craniectomy, wound exploration and evacuation of fluid collection POD#2.     Int Events: ID consulted, Cefepime started. Neurologically stable    Physical Exam:  Constitutional: NAD  Neuro  * Mental Status:  Awake, alert, Ox1, following simple commands. speech hypophonic   * Cranial Nerves: Cnii-Cnxii grossly intact. PERRL, EOMI, tongue midline, no gaze deviation  * Motor: LUNA antigravity R>L  * Sensory: Sensation grossly intact to light touch  * Reflexes: Not assessed  * Gait: Not assessed  Incision: Dressing in place, C/D/I. SG MARCEL with serosanguineous op    Vital Signs Last 24 Hrs  T(C): 36.7 (18 Jul 2022 23:49), Max: 37.2 (18 Jul 2022 12:03)  T(F): 98.1 (18 Jul 2022 23:49), Max: 98.9 (18 Jul 2022 12:03)  HR: 62 (19 Jul 2022 02:00) (56 - 89)  BP: 109/83 (19 Jul 2022 02:00) (98/69 - 138/102)  BP(mean): 90 (19 Jul 2022 02:00) (75 - 113)  RR: 16 (19 Jul 2022 02:00) (9 - 21)  SpO2: 97% (19 Jul 2022 02:00) (97% - 100%)    I&O's Summary  17 Jul 2022 07:01  -  18 Jul 2022 07:00  --------------------------------------------------------  IN: 1450 mL / OUT: 2915 mL / NET: -1465 mL    18 Jul 2022 07:01  -  19 Jul 2022 02:42  --------------------------------------------------------  IN: 2075 mL / OUT: 450 mL / NET: 1625 mL    Labs             10.5   6.26  )-----------( 325      ( 18 Jul 2022 06:38 )             29.4     130<L>  |  98  |  7.4<L>  ----------------------------<  93  3.3<L>   |  20.0<L>  |  0.45<L>    Ca    8.0<L>      18 Jul 2022 16:20  Phos  5.3     07-18  Mg     1.6     07-18    TPro  6.9  /  Alb  3.5  /  TBili  0.4  /  DBili  x   /  AST  20  /  ALT  15  /  AlkPhos  104  07-18  Alb: 3.5 g/dL / Pro: 6.9 g/dL / ALK PHOS: 104 U/L / ALT: 15 U/L / AST: 20 U/L / GGT: x           PT/INR - ( 17 Jul 2022 17:42 )   PT: 14.6 sec;   INR: 1.26 ratio    PTT - ( 17 Jul 2022 17:42 )  PTT:30.5 sec    Assessment:   42 year old male 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    Plan:   - Continue q 2 Neuro checks  - SG MARCEL to thumbprint suction, monitor I/O  - AED: Keppra 500 BID x 7 days postop  - Rpt CTH Stat for any neurologic change  - BP goal:   - Medical MMgt per Neuro ICU  - Further plan pending AM rounds with neurosurgical attending

## 2022-07-20 LAB
-  AMPICILLIN/SULBACTAM: SIGNIFICANT CHANGE UP
-  CEFAZOLIN: SIGNIFICANT CHANGE UP
-  CLINDAMYCIN: SIGNIFICANT CHANGE UP
-  ERYTHROMYCIN: SIGNIFICANT CHANGE UP
-  GENTAMICIN: SIGNIFICANT CHANGE UP
-  OXACILLIN: SIGNIFICANT CHANGE UP
-  PENICILLIN: SIGNIFICANT CHANGE UP
-  RIFAMPIN: SIGNIFICANT CHANGE UP
-  TETRACYCLINE: SIGNIFICANT CHANGE UP
-  TRIMETHOPRIM/SULFAMETHOXAZOLE: SIGNIFICANT CHANGE UP
-  VANCOMYCIN: SIGNIFICANT CHANGE UP
ANION GAP SERPL CALC-SCNC: 9 MMOL/L — SIGNIFICANT CHANGE UP (ref 5–17)
BUN SERPL-MCNC: 11 MG/DL — SIGNIFICANT CHANGE UP (ref 8–20)
CALCIUM SERPL-MCNC: 8.2 MG/DL — LOW (ref 8.6–10.2)
CHLORIDE SERPL-SCNC: 97 MMOL/L — LOW (ref 98–107)
CO2 SERPL-SCNC: 24 MMOL/L — SIGNIFICANT CHANGE UP (ref 22–29)
CREAT SERPL-MCNC: 0.44 MG/DL — LOW (ref 0.5–1.3)
EGFR: 136 ML/MIN/1.73M2 — SIGNIFICANT CHANGE UP
GLUCOSE SERPL-MCNC: 108 MG/DL — HIGH (ref 70–99)
HCT VFR BLD CALC: 27.6 % — LOW (ref 39–50)
HGB BLD-MCNC: 9.4 G/DL — LOW (ref 13–17)
MAGNESIUM SERPL-MCNC: 1.5 MG/DL — LOW (ref 1.6–2.6)
MCHC RBC-ENTMCNC: 28.4 PG — SIGNIFICANT CHANGE UP (ref 27–34)
MCHC RBC-ENTMCNC: 34.1 GM/DL — SIGNIFICANT CHANGE UP (ref 32–36)
MCV RBC AUTO: 83.4 FL — SIGNIFICANT CHANGE UP (ref 80–100)
METHOD TYPE: SIGNIFICANT CHANGE UP
PHOSPHATE SERPL-MCNC: 3.3 MG/DL — SIGNIFICANT CHANGE UP (ref 2.4–4.7)
PLATELET # BLD AUTO: 273 K/UL — SIGNIFICANT CHANGE UP (ref 150–400)
POTASSIUM SERPL-MCNC: 4 MMOL/L — SIGNIFICANT CHANGE UP (ref 3.5–5.3)
POTASSIUM SERPL-SCNC: 4 MMOL/L — SIGNIFICANT CHANGE UP (ref 3.5–5.3)
RBC # BLD: 3.31 M/UL — LOW (ref 4.2–5.8)
RBC # FLD: 12.3 % — SIGNIFICANT CHANGE UP (ref 10.3–14.5)
SODIUM SERPL-SCNC: 130 MMOL/L — LOW (ref 135–145)
VANCOMYCIN TROUGH SERPL-MCNC: 16.6 UG/ML — SIGNIFICANT CHANGE UP (ref 10–20)
WBC # BLD: 4.94 K/UL — SIGNIFICANT CHANGE UP (ref 3.8–10.5)
WBC # FLD AUTO: 4.94 K/UL — SIGNIFICANT CHANGE UP (ref 3.8–10.5)

## 2022-07-20 PROCEDURE — 99233 SBSQ HOSP IP/OBS HIGH 50: CPT

## 2022-07-20 RX ORDER — SENNA PLUS 8.6 MG/1
2 TABLET ORAL AT BEDTIME
Refills: 0 | Status: DISCONTINUED | OUTPATIENT
Start: 2022-07-20 | End: 2022-08-08

## 2022-07-20 RX ORDER — LEVETIRACETAM 250 MG/1
500 TABLET, FILM COATED ORAL
Refills: 0 | Status: DISCONTINUED | OUTPATIENT
Start: 2022-07-20 | End: 2022-08-08

## 2022-07-20 RX ORDER — SODIUM CHLORIDE 9 MG/ML
2 INJECTION INTRAMUSCULAR; INTRAVENOUS; SUBCUTANEOUS EVERY 8 HOURS
Refills: 0 | Status: DISCONTINUED | OUTPATIENT
Start: 2022-07-20 | End: 2022-08-05

## 2022-07-20 RX ORDER — POLYETHYLENE GLYCOL 3350 17 G/17G
17 POWDER, FOR SOLUTION ORAL DAILY
Refills: 0 | Status: DISCONTINUED | OUTPATIENT
Start: 2022-07-20 | End: 2022-08-08

## 2022-07-20 RX ORDER — MAGNESIUM SULFATE 500 MG/ML
2 VIAL (ML) INJECTION ONCE
Refills: 0 | Status: COMPLETED | OUTPATIENT
Start: 2022-07-20 | End: 2022-07-20

## 2022-07-20 RX ORDER — SODIUM CHLORIDE 9 MG/ML
1000 INJECTION INTRAMUSCULAR; INTRAVENOUS; SUBCUTANEOUS ONCE
Refills: 0 | Status: COMPLETED | OUTPATIENT
Start: 2022-07-20 | End: 2022-07-20

## 2022-07-20 RX ADMIN — Medication 250 MILLIGRAM(S): at 04:44

## 2022-07-20 RX ADMIN — CEFEPIME 100 MILLIGRAM(S): 1 INJECTION, POWDER, FOR SOLUTION INTRAMUSCULAR; INTRAVENOUS at 21:54

## 2022-07-20 RX ADMIN — CHLORHEXIDINE GLUCONATE 1 APPLICATION(S): 213 SOLUTION TOPICAL at 11:08

## 2022-07-20 RX ADMIN — SENNA PLUS 2 TABLET(S): 8.6 TABLET ORAL at 21:54

## 2022-07-20 RX ADMIN — SODIUM CHLORIDE 100 MILLILITER(S): 5 INJECTION, SOLUTION INTRAVENOUS at 18:44

## 2022-07-20 RX ADMIN — LEVETIRACETAM 500 MILLIGRAM(S): 250 TABLET, FILM COATED ORAL at 20:00

## 2022-07-20 RX ADMIN — Medication 25 GRAM(S): at 04:58

## 2022-07-20 RX ADMIN — CEFEPIME 100 MILLIGRAM(S): 1 INJECTION, POWDER, FOR SOLUTION INTRAMUSCULAR; INTRAVENOUS at 13:48

## 2022-07-20 RX ADMIN — Medication 100 MILLIGRAM(S): at 12:48

## 2022-07-20 RX ADMIN — SODIUM CHLORIDE 1 GRAM(S): 9 INJECTION INTRAMUSCULAR; INTRAVENOUS; SUBCUTANEOUS at 05:15

## 2022-07-20 RX ADMIN — MODAFINIL 100 MILLIGRAM(S): 200 TABLET ORAL at 05:15

## 2022-07-20 RX ADMIN — SODIUM CHLORIDE 2 GRAM(S): 9 INJECTION INTRAMUSCULAR; INTRAVENOUS; SUBCUTANEOUS at 13:49

## 2022-07-20 RX ADMIN — LEVETIRACETAM 420 MILLIGRAM(S): 250 TABLET, FILM COATED ORAL at 05:48

## 2022-07-20 RX ADMIN — Medication 250 MILLIGRAM(S): at 10:30

## 2022-07-20 RX ADMIN — CEFEPIME 100 MILLIGRAM(S): 1 INJECTION, POWDER, FOR SOLUTION INTRAMUSCULAR; INTRAVENOUS at 06:00

## 2022-07-20 RX ADMIN — Medication 100 MILLIGRAM(S): at 05:15

## 2022-07-20 RX ADMIN — POLYETHYLENE GLYCOL 3350 17 GRAM(S): 17 POWDER, FOR SOLUTION ORAL at 11:08

## 2022-07-20 RX ADMIN — SODIUM CHLORIDE 1000 MILLILITER(S): 9 INJECTION INTRAMUSCULAR; INTRAVENOUS; SUBCUTANEOUS at 10:00

## 2022-07-20 RX ADMIN — SODIUM CHLORIDE 2 GRAM(S): 9 INJECTION INTRAMUSCULAR; INTRAVENOUS; SUBCUTANEOUS at 21:54

## 2022-07-20 RX ADMIN — SODIUM CHLORIDE 100 MILLILITER(S): 5 INJECTION, SOLUTION INTRAVENOUS at 13:48

## 2022-07-20 RX ADMIN — Medication 5 MILLIGRAM(S): at 21:54

## 2022-07-20 NOTE — PROGRESS NOTE ADULT - SUBJECTIVE AND OBJECTIVE BOX
[facility]  INFECTIOUS DISEASES at [place] and [place]  =======================================================                               Nestor Russo MD#  Mitesh Woodward MD*                                     Nyla Orantes MD*    Vera Bishop MD*            Diplomates American Board of Internal Medicine & Infectious Diseases                # [place] Office - Appt - Tel  [phone] Fax [phone]                * [place] Office - Appt - Tel [phone] Fax [phone]                                  Hospital Consult line:  [phone]  =======================================================    PASTOR KEMAR [id number]    Follow up: Epidural fluid collection     No fever       Allergies:  Allergy Status Unknown      REVIEW OF SYSTEMS:  Unable to obtain due to medical condition       Physical Exam:  GEN: NAD  HEENT: Surgical bandage of craniectomy site.  Anicteric   NECK: Supple.   LUNGS: diffuse rhonchi   HEART: Regular rate and rhythm   ABDOMEN: Soft, nontender, and nondistended.  Positive bowel sounds.    : [place]   EXTREMITIES: trace edema.  MSK: no joint swelling  NEUROLOGIC: Awake  PSYCHIATRIC: unable to assess  SKIN: No Rash      Vitals:    T(F): 97.6 (20 Jul 2022 08:00), Max: 98.8 (19 Jul 2022 20:00)  HR: 63 (20 Jul 2022 09:00)  BP: 95/78 (20 Jul 2022 09:00)  RR: 16 (20 Jul 2022 09:00)  SpO2: 100% (20 Jul 2022 09:00) (98% - 100%)  temp max in last 48H T(F): , Max: 98.9 (07-18-22 @ 12:03)    Current Antibiotics:  cefepime   IVPB 2000 milliGRAM(s) IV Intermittent every 8 hours  vancomycin  IVPB 1000 milliGRAM(s) IV Intermittent every 8 hours    Other medications:  amantadine Syrup 100 milliGRAM(s) Oral <User Schedule>  chlorhexidine 2% Cloths 1 Application(s) Topical daily  levETIRAcetam  IVPB 500 milliGRAM(s) IV Intermittent every 12 hours  melatonin 5 milliGRAM(s) Oral at bedtime  modafinil 100 milliGRAM(s) Oral <User Schedule>  polyethylene glycol 3350 17 Gram(s) Oral daily  senna 2 Tablet(s) Oral at bedtime  sodium chloride 1 Gram(s) Oral every 8 hours  sodium chloride 0.9% Bolus 1000 milliLiter(s) IV Bolus once  sodium chloride 2% . 1000 milliLiter(s) IV Continuous <Continuous>                            9.4    4.94  )-----------( 273      ( 20 Jul 2022 02:48 )             27.6     07-20    130<L>  |  97<L>  |  11.0  ----------------------------<  108<H>  4.0   |  24.0  |  0.44<L>    Ca    8.2<L>      20 Jul 2022 02:48  Phos  3.3     07-20  Mg     1.5     07-20      RECENT CULTURES:  07-18 @ 22:05 .Blood Blood-Peripheral     No growth to date.    07-18 @ 22:03 .Blood Blood-Peripheral     No growth to date.    07-18 @ 17:06 Bone Bone Flap       07-18 @ 17:03 .Tissue Bone Flap     Few Staphylococcus aureus  Rare polymorphonuclear leukocytes per low power field  No organisms seen    07-18 @ 16:57 .Body Fluid #1 Epidura Fluid Staphylococcus aureus    Few Staphylococcus aureus  Moderate polymorphonuclear leukocytes seen per low power field  Few Gram positive cocci in pairs seen per oil power field    07-18 @ 16:34 .Tissue Duragen     Few Staphylococcus aureus  Few polymorphonuclear leukocytes per low power field  No organisms seen        WBC Count: 4.94 K/uL (07-20-22 @ 02:48)  WBC Count: 5.55 K/uL (07-19-22 @ 04:35)  WBC Count: 6.26 K/uL (07-18-22 @ 06:38)  WBC Count: 9.12 K/uL (07-17-22 @ 22:42)  WBC Count: 5.56 K/uL (07-17-22 @ 11:41)    Creatinine, Serum: 0.44 mg/dL (07-20-22 @ 02:48)  Creatinine, Serum: 0.43 mg/dL (07-19-22 @ 17:20)  Creatinine, Serum: 0.47 mg/dL (07-19-22 @ 04:35)  Creatinine, Serum: 0.45 mg/dL (07-18-22 @ 16:20)  Creatinine, Serum: 0.57 mg/dL (07-18-22 @ 04:07)  Creatinine, Serum: 0.51 mg/dL (07-17-22 @ 22:42)  Creatinine, Serum: 0.50 mg/dL (07-17-22 @ 11:41)    C-Reactive Protein, Serum: 12 mg/L (07-10-22 @ 07:17)    Sedimentation Rate, Erythrocyte: 56 mm/hr (07-10-22 @ 07:17)    COVID-19 PCR: NotDetec (07-17-22 @ 17:50)  COVID-19 PCR: NotDetec (07-10-22 @ 14:17)  COVID-19 PCR: NotDetec (07-06-22 @ 06:27)  COVID-19 PCR: NotDetec (06-29-22 @ 08:34)  COVID-19 PCR: NotDetec (06-27-22 @ 21:10)  COVID-19 PCR: NotDetec (06-26-22 @ 06:15)  COVID-19 PCR: NotDetec (06-20-22 @ 12:37)        < from: CT Head No Cont (07.18.22 @ 00:58) >  ACC: [id number] EXAM:  CT BRAIN                          PROCEDURE DATE:  07/18/2022      INTERPRETATION:  CLINICAL INFORMATION: Alteration of consciousness.    Status post craniectomy.    TECHNIQUE:  Axial CT images were acquired through the head.  Intravenous contrast: None  Two-dimensional reformats were generated.    COMPARISON STUDY: CT head from 07/12/2022 and MRI brain from 07/17/2022.    FINDINGS:  The patient is status post right hemicraniectomy and placement of a   subgaleal drain.There is near complete evacuation of the previously   seen right frontoparietal extra-axial collection.  There is a small small   residual extra-axial of gas and trace postoperative blood products, that   measures approximately 8 mm in thickness, decreased from 3.3 cm on   07/25/2022.  Mild pneumocephalus is also seen lateral to the right   temporal lobe.  There is improved mass effect on the right cerebral   hemisphere with decreased effacement of the right lateral ventricle.    Approximately 9 mm midline shift to the left is improved from 1.4 cm on   07/17/2022.  A small low-attenuation left frontal subdural collection   measures approximately 5 mm in caliber (3:43), compared to 6-7 mm on MRI   07/17/2022.  There is persistent dilatation ofthe temporal horns of both   lateral ventricles, compatible with hydrocephalus from ventricular   entrapment s.    There is no evidence of acute intracranial hemorrhage, central herniation   or acute territorial infarct.    A right zygomaticomaxillary complex fracture is partially visualized.    There is mild mucosal thickening the maxillary sinuses bilaterally.    The mastoids are clear bilaterally.    The orbits appear within normal limits.    There is no calvarial or skull base fracture.    IMPRESSION:  Status post right hemicraniectomy with placement of subgaleal drain and   evacuation of right subdural collection.  Decreased mass effect on the   right cerebral hemisphere.  Decreased effacement of the right lateral   ventricle.  Improved midline shift to the left, now measuring 9 mm,   compared to 1.4 cm preoperatively.    A small low-attenuation left frontal subdural collection measures   approximately 5 mm in caliber, compared to 6-7 mm on MRI from 07/17/2022.    Persistent dilatation of the temporal horns of both lateral ventricles,   compatible with hydrocephalus from ventricular entrapment.    A right zygomaticomaxillary complex fracture is partially visualized.    --- End of Report ---  < end of copied text >

## 2022-07-20 NOTE — CHART NOTE - NSCHARTNOTEFT_GEN_A_CORE
Source: Patient [ ]  Family [ ]   other [ x]    Current Diet: Diet, Pureed:   Tube Feeding Modality: Gastrostomy  Pivot 1.5 Micky (PIVOT1.5RTH)  Total Volume for 24 Hours (mL): 400  Bolus  Total Volume of Bolus (mL):  400  Tube Feed Frequency: Every 24 hours   Tube Feed Start Time: 21:00  Bolus Feed Rate (mL per Hour): 400   Bolus Feed Duration (in Hours): 1 (07-19-22 @ 11:27)    PO intake:  < 50% [ ]   50-75%  [x ]   %  [ ]  other :    Enteral /Parenteral Nutrition: Bolus of Pivot 400ml/day providing and 600kcal, 38g protein per day    Current Weight:   (7/18) 131.8 lbs  (7/16) 126.7 lbs  (7/13) 128.5 lbs  (7/11) 129.4 lbs  (7/5) 141.7 lbs  (6/28) 194.2 lbs  (6/25) 160 lbs  Question accuracy of wts, continue to monitor  No edema noted    Pertinent Medications: MEDICATIONS  (STANDING):  amantadine Syrup 100 milliGRAM(s) Oral <User Schedule>  cefepime   IVPB 2000 milliGRAM(s) IV Intermittent every 8 hours  chlorhexidine 2% Cloths 1 Application(s) Topical daily  levETIRAcetam  Solution 500 milliGRAM(s) Oral two times a day  melatonin 5 milliGRAM(s) Oral at bedtime  modafinil 100 milliGRAM(s) Oral <User Schedule>  polyethylene glycol 3350 17 Gram(s) Oral daily  senna 2 Tablet(s) Oral at bedtime  sodium chloride 2 Gram(s) Oral every 8 hours  sodium chloride 2% . 1000 milliLiter(s) (100 mL/Hr) IV Continuous <Continuous>  vancomycin  IVPB 1000 milliGRAM(s) IV Intermittent every 8 hours    MEDICATIONS  (PRN):  labetalol Injectable 10 milliGRAM(s) IV Push every 4 hours PRN Systolic blood pressure > 140  ondansetron Injectable 4 milliGRAM(s) IV Push every 4 hours PRN Nausea and/or Vomiting    Pertinent Labs: CBC Full  -  ( 20 Jul 2022 02:48 )  WBC Count : 4.94 K/uL  RBC Count : 3.31 M/uL  Hemoglobin : 9.4 g/dL  Hematocrit : 27.6 %  Platelet Count - Automated : 273 K/uL  Mean Cell Volume : 83.4 fl  Mean Cell Hemoglobin : 28.4 pg  Mean Cell Hemoglobin Concentration : 34.1 gm/dL  Auto Neutrophil # : x  Auto Lymphocyte # : x  Auto Monocyte # : x  Auto Eosinophil # : x  Auto Basophil # : x  Auto Neutrophil % : x  Auto Lymphocyte % : x  Auto Monocyte % : x  Auto Eosinophil % : x  Auto Basophil % : x  07-20 Na130 mmol/L<L> Glu 108 mg/dL<H> K+ 4.0 mmol/L Cr  0.44 mg/dL<L> BUN 11.0 mg/dL Phos 3.3 mg/dL Alb n/a   PAB n/a       Skin: R temporal region, R parietal region Surgical incisions  Jalen: 18    Nutrition focused physical exam conducted - found signs of malnutrition [x ]absent [ ]present    Subcutaneous fat loss: [ ] Orbital fat pads region, [ ]Buccal fat region, [ ]Triceps region,  [ ]Ribs region    Muscle wasting: [ ]Temples region, [ ]Clavicle region, [ ]Shoulder region, [ ]Scapula region, [ ]Interosseous region,  [ ]thigh region, [ ]Calf region    Estimated Needs:   [ x] no change since previous assessment  [ ] recalculated:     Current Nutrition Diagnosis: Pt remains at nutrition risk secondary to increased nutrient needs related to increased physiological demand for nutrient as evidenced by Right SDH s/p craniectomy with Left SDH, b/l parenchymal hematomas, +TEOFILO, multiple facial fractures, ETOH abuse. Pt tolerating tube feed and po diet. Per RN, pt consumed 75% of breakfast this morning but did not have lunch yet. Last documented BM 7/19. RD to remain available.     Recommendations:   1) Continue diet as ordered/tolerated  2) Provide assistance with meals prn  3) Add Ensure Enlive TID to optimize po intake and provide an additional 350kcal, 20g protein per serving   4) Consider appetite stimulant if po intake remains inconsistent   5) Continue MVI, folic acid, thiamine daily   6) Monitor wts and labs    Monitoring and Evaluation:   [ x] PO intake [x ] Tolerance to diet prescription [X] Weights  [X] Follow up per protocol [X] Labs:

## 2022-07-20 NOTE — PROGRESS NOTE ADULT - ASSESSMENT
42y  Male initially admitted on 5/24/22 after being found down and was found to have at that time Bilateral IPH, Bilateral SDH. Prolonged hospital course: s/p craniotomy and evacuation of SDH 5/24, s/p trach 6/1, s/p cranioplasty and replacement of bone flap 6/29, s/p R hemicraniectomy, wound exploration, evacuation of epidural fluid collection 7/17.      Epidural fluid collection   s/p R hemicraniectomy, wound exploration, evacuation of epidural fluid collection 7/17  r/o infection       - Blood cultures 7/18 no growth   - fluid cultures reporting Staphylococcus aureus  - MRI head with fluid collection   - Continue Cefepime  - Continue  Vancomycin  - Monitor trough  - Monitor for Vancomycin toxicity   - Vancomycin required at this time pending culture results  - Follow up cultures  - Trend Fever  - Trend WBC      Will Follow      d/w NeuroICU team

## 2022-07-20 NOTE — PROGRESS NOTE ADULT - ASSESSMENT
Assessment:   42 year old male 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    Plan:   - Continue q 2 Neuro checks  - SG MARCEL to thumbprint suction, monitor I/O-> ? REMOVE  - AED: Keppra 500 BID x 7 days postop  - Rpt CTH Stat for any neurologic change  - BP goal:   - appreciate ID recs, continue Vanco and Cefepime pending sensitivities   - transition hypertonic saline to salt tabs  - SCDs for DVT prophylaxis  - tolerating diet  - PT/OT/OOB  - Possible downgrade today   Assessment:   42 year old male 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    Plan:   - Continue q 2 Neuro checks  - SG MARCEL d/joaquín today  - AED: Keppra 500 BID x 7 days postop  - Rpt CTH Stat for any neurologic change  - BP goal:   - appreciate ID recs, continue Vanco and Cefepime pending sensitivities   - transition hypertonic saline to salt tabs  - SCDs for DVT prophylaxis  - tolerating diet  - PT/OT/OOB  - downgraded to SD under NSG     D/W attending on morning rounds

## 2022-07-20 NOTE — PROGRESS NOTE ADULT - NS ATTEND AMEND GEN_ALL_CORE FT
NSGY Attg:    see above    patient seen and examined    agree with exam and plan as above    cultures + staph     continue supportive care per ICU  antibiotics per ICU/ID NSGY Attg:    see above    patient seen and examined    agree with exam and plan as above

## 2022-07-20 NOTE — DIETITIAN INITIAL EVALUATION ADULT - NS FNS DIET ORDER
Diet, NPO with Tube Feed:   Tube Feeding Modality: Nasogastric  Pivot 1.5 Micky (PIVOT1.5RTH)  Total Volume for 24 Hours (mL): 1200  Continuous  Starting Tube Feed Rate {mL per Hour}: 20  Increase Tube Feed Rate by (mL): 10     Every 4 hours  Until Goal Tube Feed Rate (mL per Hour): 50  Tube Feed Duration (in Hours): 24  Tube Feed Start Time: 11:00  No Carb Prosource TF     Qty per Day:  2 (05-24-22 @ 10:20)   yes

## 2022-07-20 NOTE — PROGRESS NOTE ADULT - SUBJECTIVE AND OBJECTIVE BOX
42 year old male s/p multicompartmental ICH/TBI in March of this year requiring right hemicraniectomy, prolonged hospitalization and eventual cranioplasty ,   now s/p rpt right craniectomy for evacuation on 7/17 after MR demonstrated large right ED collection. Downgraded from NICU to step down . Medicine recalled to f/u   for medical mgmt . Patient known to me .     Patient seen and examined . Awake , tracking , moving L UE - not answering any question     CC : unable to obtain         MEDICATIONS  (STANDING):  amantadine Syrup 100 milliGRAM(s) Oral <User Schedule>  cefepime   IVPB 2000 milliGRAM(s) IV Intermittent every 8 hours  chlorhexidine 2% Cloths 1 Application(s) Topical daily  levETIRAcetam  Solution 500 milliGRAM(s) Oral two times a day  melatonin 5 milliGRAM(s) Oral at bedtime  modafinil 100 milliGRAM(s) Oral <User Schedule>  polyethylene glycol 3350 17 Gram(s) Oral daily  senna 2 Tablet(s) Oral at bedtime  sodium chloride 2 Gram(s) Oral every 8 hours  sodium chloride 2% . 1000 milliLiter(s) (100 mL/Hr) IV Continuous <Continuous>    MEDICATIONS  (PRN):  labetalol Injectable 10 milliGRAM(s) IV Push every 4 hours PRN Systolic blood pressure > 140  ondansetron Injectable 4 milliGRAM(s) IV Push every 4 hours PRN Nausea and/or Vomiting      LABS:                          9.4    4.94  )-----------( 273      ( 20 Jul 2022 02:48 )             27.6     07-20    130<L>  |  97<L>  |  11.0  ----------------------------<  108<H>  4.0   |  24.0  |  0.44<L>    Ca    8.2<L>      20 Jul 2022 02:48  Phos  3.3     07-20  Mg     1.5     07-20    Vancomycin Level, Trough (07.20.22 @ 09:00)    Vancomycin Level, Trough: 16.6: Vancomycin trough levels should be rapidly reached and maintained at  15-20 ug/ml for life threatening MRSA  infections such as sepsis, endocarditis, osteomyelitis and pneumonia. A  first trough level should be drawn  before the 3rd or 4th dose.  Risk of renal toxicity is increased for levels >15 ug/ml, in patients on  other nephrotoxic drugs, who are  hemodynamically unstable, have unstable renal function, or are on  Vancomycin therapy for >14 days. Renal function with  creatinine levels should be monitored for those patients. ug/mL        07-18 @ 22:05 .Blood Blood-Peripheral     No growth to date.    07-18 @ 22:03 .Blood Blood-Peripheral     No growth to date.    07-18 @ 17:06 Bone Bone Flap       07-18 @ 17:03 .Tissue Bone Flap     Few Staphylococcus aureus  Rare polymorphonuclear leukocytes per low power field  No organisms seen    07-18 @ 16:57 .Body Fluid #1 Epidura Fluid Staphylococcus aureus    Few Staphylococcus aureus  Moderate polymorphonuclear leukocytes seen per low power field  Few Gram positive cocci in pairs seen per oil power field    07-18 @ 16:34 .Tissue Duragen     Few Staphylococcus aureus  Few polymorphonuclear leukocytes per low power field  No organisms seen          RADIOLOGY & ADDITIONAL TESTS:   from: CT Head No Cont (07.18.22 @ 00:58) >  ACC: [id number] EXAM:  CT BRAIN                          PROCEDURE DATE:  07/18/2022      INTERPRETATION:  CLINICAL INFORMATION: Alteration of consciousness.    Status post craniectomy.    TECHNIQUE:  Axial CT images were acquired through the head.  Intravenous contrast: None  Two-dimensional reformats were generated.    COMPARISON STUDY: CT head from 07/12/2022 and MRI brain from 07/17/2022.    FINDINGS:  The patient is status post right hemicraniectomy and placement of a   subgaleal drain.There is near complete evacuation of the previously   seen right frontoparietal extra-axial collection.  There is a small small   residual extra-axial of gas and trace postoperative blood products, that   measures approximately 8 mm in thickness, decreased from 3.3 cm on   07/25/2022.  Mild pneumocephalus is also seen lateral to the right   temporal lobe.  There is improved mass effect on the right cerebral   hemisphere with decreased effacement of the right lateral ventricle.    Approximately 9 mm midline shift to the left is improved from 1.4 cm on   07/17/2022.  A small low-attenuation left frontal subdural collection   measures approximately 5 mm in caliber (3:43), compared to 6-7 mm on MRI   07/17/2022.  There is persistent dilatation ofthe temporal horns of both   lateral ventricles, compatible with hydrocephalus from ventricular   entrapment s.    There is no evidence of acute intracranial hemorrhage, central herniation   or acute territorial infarct.    A right zygomaticomaxillary complex fracture is partially visualized.    There is mild mucosal thickening the maxillary sinuses bilaterally.    The mastoids are clear bilaterally.    The orbits appear within normal limits.    There is no calvarial or skull base fracture.    IMPRESSION:  Status post right hemicraniectomy with placement of subgaleal drain and   evacuation of right subdural collection.  Decreased mass effect on the   right cerebral hemisphere.  Decreased effacement of the right lateral   ventricle.  Improved midline shift to the left, now measuring 9 mm,   compared to 1.4 cm preoperatively.    A small low-attenuation left frontal subdural collection measures   approximately 5 mm in caliber, compared to 6-7 mm on MRI from 07/17/2022.    Persistent dilatation of the temporal horns of both lateral ventricles,   compatible with hydrocephalus from ventricular entrapment.    A right zygomaticomaxillary complex fracture is partially visualized.    --- End of Report ---  < end of copied text >        REVIEW OF SYSTEMS:    UTO , patient not answering questions , awake   Vital Signs Last 24 Hrs  T(C): 36.7 (20 Jul 2022 16:00), Max: 37.1 (19 Jul 2022 20:00)  T(F): 98 (20 Jul 2022 16:00), Max: 98.8 (19 Jul 2022 20:00)  HR: 58 (20 Jul 2022 16:00) (51 - 86)  BP: 120/80 (20 Jul 2022 16:00) (95/78 - 137/89)  BP(mean): 92 (20 Jul 2022 16:00) (81 - 112)  RR: 13 (20 Jul 2022 16:00) (11 - 19)  SpO2: 100% (20 Jul 2022 16:00) (98% - 100%)    Parameters below as of 20 Jul 2022 16:00  Patient On (Oxygen Delivery Method): room air      PHYSICAL EXAM:    GENERAL: NAD, well-groomed, well-developed  HEAD: drain + , dressing +   EYES: EOMI, PERRLA, conjunctiva and sclera clear  NECK: Supple, No JVD, Normal thyroid  NERVOUS SYSTEM:  Awake , tracking , moving LUE spontaneously ,   not answering and not following commands   CHEST/LUNG: CTA b/l ,  no  rales, rhonchi, wheezing, or rubs  HEART: Regular rate and rhythm; No murmurs, rubs, or gallops  ABDOMEN: Soft, Nontender, Nondistended; Bowel sounds present  EXTREMITIES:  2+ Peripheral Pulses, No clubbing, cyanosis, or edema  LYMPH: No lymphadenopathy noted  SKIN: No rashes or lesions    Assessment and Plan     Patient is a 42yoM brought by EMS, found down in the street unresponsive.  Imaging showed SDH, IPH, TEOFILO.   Patient underwent Right decompressive hemicraniectomy on 5/24, trach/peg 6/1,  R cranioplasty with complex closure 6/29/22.  Cranioplasty  drain removed on 7/1.   s/p rpt right craniectomy for evacuation on 7/17 after MR demonstrated large right ED collection. On Abx   Trach (decanulated)/ PEG    1. SDH/IPH / TBI -   S/P R craniotomy / s/p R comlex closure cranioplasty ,   repeated CTH  demonstrating right sided subdural collection mixed with air.  - Blood cultures 7/18 no growth   - fluid cultures reporting Staphylococcus aureus  - ID is following with further recommendations :   - Continue Cefepime  - Continue  Vancomycin  - Monitor trough  - Monitor for Vancomycin toxicity   - Vancomycin required at this time pending culture results  - Follow up cultures  - Trend Fever  - Trend WBC  - neurocheks as per NS team   - Keppra 500 mg BID   - PT/OT/ speech therapy     2. Dysphagia - on TF / purre diet / ivf     3. Hyponatremia - Na130 -  continue Na tabs - follow Na level in am     4. Hypomagnesemia - supplement - follow level     6. Anemia - likely surgical blood lost - stable     7. DVT prophylaxis  - as per primary team .     Will follow along with you .
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
## 2022-07-20 NOTE — CHART NOTE - NSCHARTNOTEFT_GEN_A_CORE
Calorie count completed 7/17.     Pt is receiving 1 bolus of Pivot 400ml/day which is providing and 600kcal, 38g protein per day and puree po diet.   Pt continues with suboptimal po intake per documentation. Pt is not meeting estimated needs via po.     Recommendations:   1. Consider appetite stimulant   2. Add Ensure Enlive TID to optimize po intake and provide an additional 350kcal, 20g protein per serving (per SLP recommended consistency)  3. Continue with Pivot Bolus daily to help optimize nutrition status   4. Encourage and provide assistance with meals prn    RD to continue to monitor pt and PO intake; will remain available
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
## 2022-07-20 NOTE — PROGRESS NOTE ADULT - SUBJECTIVE AND OBJECTIVE BOX
42M s/p Rt craniectomy, wound exploration and evacuation of fluid collection POD#3.     Int Events: OR cultures + for staph, Abx adjusted after discussion with ID now on Vanco and Cefepime. 2% started for hyponatremia. neurologically stable     Physical Exam:  Constitutional: NAD  Neuro  * Mental Status:  Awake, alert, Ox1 (self) , following simple commands. speech hypophonic   * Cranial Nerves: Cnii-Cnxii grossly intact. PERRL, EOMI, tongue midline, no gaze deviation  * Motor: LUNA antigravity L>R  * Sensory: Sensation grossly intact to light touch  * Reflexes: Not assessed  * Gait: Not assessed  Incision: Dressing in place, C/D/I. SG MARCEL with serosanguineous op    Vital Signs Last 24 Hrs  T(C): 36.9 (20 Jul 2022 00:00), Max: 37.1 (19 Jul 2022 20:00)  T(F): 98.5 (20 Jul 2022 00:00), Max: 98.8 (19 Jul 2022 20:00)  HR: 73 (20 Jul 2022 00:00) (54 - 95)  BP: 114/81 (20 Jul 2022 00:00) (104/83 - 137/89)  BP(mean): 92 (20 Jul 2022 00:00) (86 - 107)  RR: 16 (20 Jul 2022 00:00) (12 - 19)  SpO2: 98% (20 Jul 2022 00:00) (95% - 100%)    I&O's Summary  18 Jul 2022 07:01  -  19 Jul 2022 07:00  --------------------------------------------------------  IN: 2575 mL / OUT: 1100 mL / NET: 1475 mL    19 Jul 2022 07:01  -  20 Jul 2022 00:28  --------------------------------------------------------  IN: 1050 mL / OUT: 450 mL / NET: 600 mL    LABS:             10.0   5.55  )-----------( 278      ( 19 Jul 2022 04:35 )             28.0     125<L>  |  92<L>  |  7.7<L>  ----------------------------<  164<H>  4.0   |  19.0<L>  |  0.43<L>    Ca    8.4<L>      19 Jul 2022 17:20  Phos  3.8     07-19  Mg     1.4     07-19    TPro  6.9  /  Alb  3.5  /  TBili  0.4  /  DBili  x   /  AST  20  /  ALT  15  /  AlkPhos  104  07-18  Alb: 3.5 g/dL / Pro: 6.9 g/dL / ALK PHOS: 104 U/L / ALT: 15 U/L / AST: 20 U/L / GGT: x           Assessment:   42 year old male 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    Plan:   - Continue q 2 Neuro checks  - SG MARCEL to thumbprint suction, monitor I/O  - AED: Keppra 500 BID x 7 days postop  - Rpt CTH Stat for any neurologic change  - BP goal:   - appreciate ID recs, continue Vanco and Cefepime pending sensitivities   - Medical MMgt per Neuro ICU  - Further plan pending AM rounds with neurosurgical attending TRANSFER NOTE    HPI: 25y old M brought by EMS, found down in the street unresponsive. Large right frontoparietal Subdural, IPH, TEOFILO admitted on 5/24     Hospital Interval: Pt underwent Right decompressive craniectomy on 5/24 & subsequently underwent cranioplasty on 6/29.  CT obtained & showed subacute collection & air underneath the cranioplasty sight. MRI showed increase in fluid collection underneath the flap. He underwent Right craniectomy with wound exploration & evacuation of fluid collection on 7/17. OR cultures + for staph, Abx adjusted after discussion with ID now on Vanco and Cefepime. 2% started for hyponatremia. ___________    Physical Exam:  Constitutional: NAD    Neuro  * Mental Status:  Awake, alert, Ox1 (self) , following simple commands. speech hypophonic   * Cranial Nerves: Cnii-Cnxii grossly intact. PERRL, EOMI, tongue midline, no gaze deviation  * Motor: LUNA antigravity L>R  * Sensory: Sensation grossly intact to light touch  * Reflexes: Not assessed  * Gait: Not assessed  Incision: Dressing in place, C/D/I. SG MARCEL with serosanguineous op    Vital Signs Last 24 Hrs  T(C): 36.9 (20 Jul 2022 00:00), Max: 37.1 (19 Jul 2022 20:00)  T(F): 98.5 (20 Jul 2022 00:00), Max: 98.8 (19 Jul 2022 20:00)  HR: 73 (20 Jul 2022 00:00) (54 - 95)  BP: 114/81 (20 Jul 2022 00:00) (104/83 - 137/89)  BP(mean): 92 (20 Jul 2022 00:00) (86 - 107)  RR: 16 (20 Jul 2022 00:00) (12 - 19)  SpO2: 98% (20 Jul 2022 00:00) (95% - 100%)    I&O's Summary  18 Jul 2022 07:01  -  19 Jul 2022 07:00  --------------------------------------------------------  IN: 2575 mL / OUT: 1100 mL / NET: 1475 mL    19 Jul 2022 07:01  -  20 Jul 2022 00:28  --------------------------------------------------------  IN: 1050 mL / OUT: 450 mL / NET: 600 mL    LABS:             10.0   5.55  )-----------( 278      ( 19 Jul 2022 04:35 )             28.0     125<L>  |  92<L>  |  7.7<L>  ----------------------------<  164<H>  4.0   |  19.0<L>  |  0.43<L>    Ca    8.4<L>      19 Jul 2022 17:20  Phos  3.8     07-19  Mg     1.4     07-19    TPro  6.9  /  Alb  3.5  /  TBili  0.4  /  DBili  x   /  AST  20  /  ALT  15  /  AlkPhos  104  07-18  Alb: 3.5 g/dL / Pro: 6.9 g/dL / ALK PHOS: 104 U/L / ALT: 15 U/L / AST: 20 U/L / GGT: x            TRANSFER NOTE    HPI: 25y old M brought by EMS, found down in the street unresponsive. Large right frontoparietal Subdural, IPH, TEOFILO admitted on 5/24     Hospital Interval: Pt underwent Right decompressive craniectomy on 5/24 & subsequently underwent cranioplasty on 6/29.  CT obtained & showed subacute collection & air underneath the cranioplasty sight. MRI showed increase in fluid collection underneath the flap. He underwent Right craniectomy with wound exploration & evacuation of fluid collection on 7/17. OR cultures + for staph, Abx adjusted after discussion with ID now on Vanco and Cefepime. 2% started for hyponatremia, salt tabs added to transition from 2%.   Drain d/joaquín on 7/20, medically stable to downgrade.     Physical Exam:  Constitutional: NAD    Neuro  * Mental Status:  Awake, alert, Ox1 (self) , following simple commands. speech hypophonic   * Cranial Nerves: Cnii-Cnxii grossly intact. PERRL, EOMI, tongue midline, no gaze deviation  * Motor: LUNA antigravity L>R  * Sensory: Sensation grossly intact to light touch  * Reflexes: Not assessed  * Gait: Not assessed  Incision: Dressing in place, C/D/I. SG MARCEL with serosanguineous op= 30cc    Vital Signs Last 24 Hrs  T(C): 36.9 (20 Jul 2022 00:00), Max: 37.1 (19 Jul 2022 20:00)  T(F): 98.5 (20 Jul 2022 00:00), Max: 98.8 (19 Jul 2022 20:00)  HR: 73 (20 Jul 2022 00:00) (54 - 95)  BP: 114/81 (20 Jul 2022 00:00) (104/83 - 137/89)  BP(mean): 92 (20 Jul 2022 00:00) (86 - 107)  RR: 16 (20 Jul 2022 00:00) (12 - 19)  SpO2: 98% (20 Jul 2022 00:00) (95% - 100%)    I&O's Summary  18 Jul 2022 07:01  -  19 Jul 2022 07:00  --------------------------------------------------------  IN: 2575 mL / OUT: 1100 mL / NET: 1475 mL    19 Jul 2022 07:01  -  20 Jul 2022 00:28  --------------------------------------------------------  IN: 1050 mL / OUT: 450 mL / NET: 600 mL    LABS:             10.0   5.55  )-----------( 278      ( 19 Jul 2022 04:35 )             28.0     125<L>  |  92<L>  |  7.7<L>  ----------------------------<  164<H>  4.0   |  19.0<L>  |  0.43<L>    Ca    8.4<L>      19 Jul 2022 17:20  Phos  3.8     07-19  Mg     1.4     07-19    TPro  6.9  /  Alb  3.5  /  TBili  0.4  /  DBili  x   /  AST  20  /  ALT  15  /  AlkPhos  104  07-18  Alb: 3.5 g/dL / Pro: 6.9 g/dL / ALK PHOS: 104 U/L / ALT: 15 U/L / AST: 20 U/L / GGT: x

## 2022-07-21 LAB
ANION GAP SERPL CALC-SCNC: 10 MMOL/L — SIGNIFICANT CHANGE UP (ref 5–17)
BUN SERPL-MCNC: 5.4 MG/DL — LOW (ref 8–20)
CALCIUM SERPL-MCNC: 8.5 MG/DL — LOW (ref 8.6–10.2)
CHLORIDE SERPL-SCNC: 98 MMOL/L — SIGNIFICANT CHANGE UP (ref 98–107)
CO2 SERPL-SCNC: 24 MMOL/L — SIGNIFICANT CHANGE UP (ref 22–29)
CREAT SERPL-MCNC: 0.46 MG/DL — LOW (ref 0.5–1.3)
EGFR: 134 ML/MIN/1.73M2 — SIGNIFICANT CHANGE UP
GLUCOSE BLDC GLUCOMTR-MCNC: 93 MG/DL — SIGNIFICANT CHANGE UP (ref 70–99)
GLUCOSE SERPL-MCNC: 110 MG/DL — HIGH (ref 70–99)
HCT VFR BLD CALC: 28.2 % — LOW (ref 39–50)
HGB BLD-MCNC: 9.9 G/DL — LOW (ref 13–17)
MAGNESIUM SERPL-MCNC: 1.5 MG/DL — LOW (ref 1.6–2.6)
MCHC RBC-ENTMCNC: 29 PG — SIGNIFICANT CHANGE UP (ref 27–34)
MCHC RBC-ENTMCNC: 35.1 GM/DL — SIGNIFICANT CHANGE UP (ref 32–36)
MCV RBC AUTO: 82.7 FL — SIGNIFICANT CHANGE UP (ref 80–100)
PHOSPHATE SERPL-MCNC: 3.7 MG/DL — SIGNIFICANT CHANGE UP (ref 2.4–4.7)
PLATELET # BLD AUTO: 283 K/UL — SIGNIFICANT CHANGE UP (ref 150–400)
POTASSIUM SERPL-MCNC: 3.6 MMOL/L — SIGNIFICANT CHANGE UP (ref 3.5–5.3)
POTASSIUM SERPL-SCNC: 3.6 MMOL/L — SIGNIFICANT CHANGE UP (ref 3.5–5.3)
RBC # BLD: 3.41 M/UL — LOW (ref 4.2–5.8)
RBC # FLD: 12.3 % — SIGNIFICANT CHANGE UP (ref 10.3–14.5)
SODIUM SERPL-SCNC: 132 MMOL/L — LOW (ref 135–145)
WBC # BLD: 2.97 K/UL — LOW (ref 3.8–10.5)
WBC # FLD AUTO: 2.97 K/UL — LOW (ref 3.8–10.5)

## 2022-07-21 PROCEDURE — 99233 SBSQ HOSP IP/OBS HIGH 50: CPT

## 2022-07-21 PROCEDURE — 99233 SBSQ HOSP IP/OBS HIGH 50: CPT | Mod: GC

## 2022-07-21 RX ORDER — MAGNESIUM SULFATE 500 MG/ML
2 VIAL (ML) INJECTION ONCE
Refills: 0 | Status: COMPLETED | OUTPATIENT
Start: 2022-07-21 | End: 2022-07-21

## 2022-07-21 RX ORDER — VANCOMYCIN HCL 1 G
1250 VIAL (EA) INTRAVENOUS EVERY 12 HOURS
Refills: 0 | Status: DISCONTINUED | OUTPATIENT
Start: 2022-07-21 | End: 2022-07-24

## 2022-07-21 RX ORDER — ENOXAPARIN SODIUM 100 MG/ML
40 INJECTION SUBCUTANEOUS EVERY 24 HOURS
Refills: 0 | Status: DISCONTINUED | OUTPATIENT
Start: 2022-07-21 | End: 2022-07-22

## 2022-07-21 RX ADMIN — Medication 5 MILLIGRAM(S): at 21:43

## 2022-07-21 RX ADMIN — ENOXAPARIN SODIUM 40 MILLIGRAM(S): 100 INJECTION SUBCUTANEOUS at 14:42

## 2022-07-21 RX ADMIN — Medication 25 GRAM(S): at 18:18

## 2022-07-21 RX ADMIN — Medication 100 MILLIGRAM(S): at 12:08

## 2022-07-21 RX ADMIN — CEFEPIME 100 MILLIGRAM(S): 1 INJECTION, POWDER, FOR SOLUTION INTRAMUSCULAR; INTRAVENOUS at 14:43

## 2022-07-21 RX ADMIN — SODIUM CHLORIDE 2 GRAM(S): 9 INJECTION INTRAMUSCULAR; INTRAVENOUS; SUBCUTANEOUS at 05:25

## 2022-07-21 RX ADMIN — MODAFINIL 100 MILLIGRAM(S): 200 TABLET ORAL at 05:25

## 2022-07-21 RX ADMIN — Medication 166.67 MILLIGRAM(S): at 21:42

## 2022-07-21 RX ADMIN — CEFEPIME 100 MILLIGRAM(S): 1 INJECTION, POWDER, FOR SOLUTION INTRAMUSCULAR; INTRAVENOUS at 05:16

## 2022-07-21 RX ADMIN — SODIUM CHLORIDE 100 MILLILITER(S): 5 INJECTION, SOLUTION INTRAVENOUS at 03:11

## 2022-07-21 RX ADMIN — SODIUM CHLORIDE 2 GRAM(S): 9 INJECTION INTRAMUSCULAR; INTRAVENOUS; SUBCUTANEOUS at 21:39

## 2022-07-21 RX ADMIN — LEVETIRACETAM 500 MILLIGRAM(S): 250 TABLET, FILM COATED ORAL at 20:00

## 2022-07-21 RX ADMIN — LEVETIRACETAM 500 MILLIGRAM(S): 250 TABLET, FILM COATED ORAL at 05:26

## 2022-07-21 RX ADMIN — SENNA PLUS 2 TABLET(S): 8.6 TABLET ORAL at 21:39

## 2022-07-21 RX ADMIN — CHLORHEXIDINE GLUCONATE 1 APPLICATION(S): 213 SOLUTION TOPICAL at 12:09

## 2022-07-21 RX ADMIN — SODIUM CHLORIDE 2 GRAM(S): 9 INJECTION INTRAMUSCULAR; INTRAVENOUS; SUBCUTANEOUS at 14:43

## 2022-07-21 RX ADMIN — Medication 166.67 MILLIGRAM(S): at 10:48

## 2022-07-21 RX ADMIN — POLYETHYLENE GLYCOL 3350 17 GRAM(S): 17 POWDER, FOR SOLUTION ORAL at 12:08

## 2022-07-21 RX ADMIN — CEFEPIME 100 MILLIGRAM(S): 1 INJECTION, POWDER, FOR SOLUTION INTRAMUSCULAR; INTRAVENOUS at 21:35

## 2022-07-21 RX ADMIN — Medication 100 MILLIGRAM(S): at 05:25

## 2022-07-21 NOTE — PROGRESS NOTE ADULT - SUBJECTIVE AND OBJECTIVE BOX
INTERVAL HPI/OVERNIGHT EVENTS:   Pt underwent Right decompressive craniectomy on 5/24 & subsequently underwent cranioplasty on 6/29.  CT obtained & showed subacute collection & air underneath the cranioplasty sight. MRI showed increase in fluid collection underneath the flap. He underwent Right craniectomy with wound exploration & evacuation of fluid collection on 7/17. OR cultures + for staph, Abx adjusted after discussion with ID now on Vanco and Cefepime. 2% started for hyponatremia, salt tabs added to transition from 2%.   Drain d/joaquín on 7/20, medically stable to downgrade.     Pt is awaken, opens eyes, neg following commands today.    MEDICATIONS  (STANDING):  amantadine Syrup 100 milliGRAM(s) Oral <User Schedule>  cefepime   IVPB 2000 milliGRAM(s) IV Intermittent every 8 hours  chlorhexidine 2% Cloths 1 Application(s) Topical daily  levETIRAcetam  Solution 500 milliGRAM(s) Oral two times a day  melatonin 5 milliGRAM(s) Oral at bedtime  modafinil 100 milliGRAM(s) Oral <User Schedule>  polyethylene glycol 3350 17 Gram(s) Oral daily  senna 2 Tablet(s) Oral at bedtime  sodium chloride 2 Gram(s) Oral every 8 hours  sodium chloride 2% . 1000 milliLiter(s) (100 mL/Hr) IV Continuous <Continuous>  vancomycin  IVPB 1250 milliGRAM(s) IV Intermittent every 12 hours    MEDICATIONS  (PRN):  labetalol Injectable 10 milliGRAM(s) IV Push every 4 hours PRN Systolic blood pressure > 140  ondansetron Injectable 4 milliGRAM(s) IV Push every 4 hours PRN Nausea and/or Vomiting      Allergies  Allergy Status Unknown  Intolerances    Vital Signs Last 24 Hrs  T(C): 36.8 (21 Jul 2022 07:32), Max: 36.8 (20 Jul 2022 16:54)  T(F): 98.2 (21 Jul 2022 07:32), Max: 98.3 (20 Jul 2022 16:54)  HR: 56 (21 Jul 2022 07:32) (56 - 65)  BP: 116/74 (21 Jul 2022 07:32) (112/71 - 127/82)  BP(mean): 92 (20 Jul 2022 16:00) (88 - 92)  RR: 18 (21 Jul 2022 07:32) (13 - 18)  SpO2: 99% (21 Jul 2022 07:32) (97% - 100%)    Parameters below as of 21 Jul 2022 08:15  Patient On (Oxygen Delivery Method): room air      PHYSICAL EXAM  GENERAL: NAD,   HEAD:  suture line clean dry intact, neg drainage. MARCEL x 1 5cc   EYES: EOMI, PERRLA, conjunctiva and sclera clear  ENMT: No tonsillar erythema, exudates, or enlargement; Moist mucous membranes, Good dentition, No lesions  NECK: Supple,   NERVOUS SYSTEM:  Alert & Oriented X3, Silva upper and lower extremities; DTRs 2+ intact and symmetric. left greater than right.   CHEST/LUNG: Clear BS bilaterally; No rales, rhonchi, wheezing, or rubs  HEART: Regular rate and rhythm; No murmurs, rubs, or gallops  ABDOMEN: Soft, Nontender, Nondistended; Bowel sounds present  EXTREMITIES:  2+ Peripheral Pulses, No edema      LABS:                          9.9    2.97  )-----------( 283      ( 21 Jul 2022 06:56 )             28.2     07-21    132<L>  |  98  |  5.4<L>  ----------------------------<  110<H>  3.6   |  24.0  |  0.46<L>    Ca    8.5<L>      21 Jul 2022 06:56  Phos  3.7     07-21  Mg     1.5     07-21      I&O's Detail    20 Jul 2022 07:01  -  21 Jul 2022 07:00  --------------------------------------------------------  IN:    IV PiggyBack: 250 mL    IV PiggyBack: 150 mL    Oral Fluid: 240 mL    Sodium Chloride 0.9% Bolus: 1000 mL    sodium chloride 2%: 1600 mL  Total IN: 3240 mL    OUT:    Bulb (mL): 5 mL    Voided (mL): 4900 mL  Total OUT: 4905 mL    Total NET: -1665 mL    07-18 @ 17:03 .Tissue Bone Flap Staphylococcus aureus    Few Staphylococcus aureus  Rare polymorphonuclear leukocytes per low power field  No organisms seen    07-18 @ 16:57 .Body Fluid #1 Epidura Fluid Staphylococcus aureus  Staphylococcus epidermidis    Few Staphylococcus aureus  Few Staphylococcus epidermidis  Moderate polymorphonuclear leukocytes seen per low power field  Few Gram positive cocci in pairs seen per oil power field    07-18 @ 16:34 .Tissue Duragen Staphylococcus aureus    Few Staphylococcus aureus  Few polymorphonuclear leukocytes per low power field  No organisms seen      RADIOLOGY & ADDITIONAL TESTS:  < from: CT Head No Cont (07.18.22 @ 00:58) >  ACC: [id number] EXAM:  CT BRAIN                        PROCEDURE DATE:  07/18/2022    IMPRESSION: Stable right frontal convexity extra-axial collection with   air-fluid level. Stable right to left midline shift, mass effect, and a   stable herniation patterns.  --- End of Report ---  < end of copied text >

## 2022-07-21 NOTE — PHYSICAL THERAPY INITIAL EVALUATION ADULT - BALANCE DISTURBANCE, IDENTIFIED IMPAIRMENT CONTRIBUTE, REHAB EVAL
impaired coordination/impaired postural control/decreased strength
impaired coordination/impaired motor control/impaired postural control/decreased strength
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
## 2022-07-21 NOTE — PHYSICAL THERAPY INITIAL EVALUATION ADULT - DISCHARGE DISPOSITION, PT EVAL
[facility]/rehabilitation facility
pt will benefit from inpatient rehab. Pending progress and discussion with interdisciplinary team/rehabilitation facility
plan pending, pending progress/pending completion of functional assessment and pending confirmation of house setup/DME/PLOF and availability of assistance at home, as pt is an increased falls risk ,deeming pt unsafe to return home at this time.

## 2022-07-21 NOTE — PROGRESS NOTE ADULT - NS ATTEND AMEND GEN_ALL_CORE FT
Patient seen and examined , Awake , tracing with eyes ,   moves L side involuntary , following simple commands     Vital Signs Last 24 Hrs  T(C): 36.8 (07-21-22 @ 07:32), Max: 36.8 (07-20-22 @ 16:54)  T(F): 98.2 (07-21-22 @ 07:32), Max: 98.3 (07-20-22 @ 16:54)  HR: 56 (07-21-22 @ 07:32) (56 - 65)  BP: 116/74 (07-21-22 @ 07:32) (112/71 - 127/82)  BP(mean): 92 (07-20-22 @ 16:00) (88 - 92)  RR: 18 (07-21-22 @ 07:32) (13 - 18)  SpO2: 99% (07-21-22 @ 07:32) (97% - 100%)    Head - dressing + , clean and dry   Lungs ; CTA , B/L  CVS: s1s2 , no rubs , no murmurs  Above noted and reviewed with NP   Labs reviewed :   Hypomagnesemia - will supplement   Hyponatremia - Na 132- IMPROVING,   will d/c 2% NS, continue Na tabs , follow labs in am   Will follow along with you .

## 2022-07-21 NOTE — PHYSICAL THERAPY INITIAL EVALUATION ADULT - GENERAL OBSERVATIONS, REHAB EVAL
Pt received supine in bed, +  + Subgaleal MARCEL drain + Helmet donned in bed, + Venous Compression Boots + bilateral CAIR boots + bilateral wrist restraints + [place] cath. Non verbal, no indication of pain,

## 2022-07-21 NOTE — PROGRESS NOTE ADULT - SUBJECTIVE AND OBJECTIVE BOX
CC: s/p multicompartmental ICH/TBI in March of this year requiring right hemicraniectomy, prolonged hospitalization/ cranioplasty     INTERVAL HPI/OVERNIGHT EVENTS: Patient seen and examined. no acute issues overnight. Tolerating oral diet. Appears comfortable.     Vital Signs Last 24 Hrs  T(C): 36.8 (21 Jul 2022 07:32), Max: 36.8 (20 Jul 2022 16:54)  T(F): 98.2 (21 Jul 2022 07:32), Max: 98.3 (20 Jul 2022 16:54)  HR: 56 (21 Jul 2022 07:32) (56 - 65)  BP: 116/74 (21 Jul 2022 07:32) (112/71 - 127/82)  BP(mean): 92 (20 Jul 2022 16:00) (88 - 92)  RR: 18 (21 Jul 2022 07:32) (13 - 18)  SpO2: 99% (21 Jul 2022 07:32) (97% - 100%)      Parameters below as of 21 Jul 2022 08:15  Patient On (Oxygen Delivery Method): room air    ROS:  Unable to assess due to mental status    PHYSICAL EXAM:    GENERAL: NAD, sitting upright in bed  HEAD: drain + , dressing C/D/I  EYES: EOMI, PERRLA, conjunctiva and sclera clear  NECK: Supple, No JVD, Normal thyroid  NERVOUS SYSTEM:  Awake , tracking , moving LUE spontaneously , aphasic, does not follow commands  CHEST/LUNG: CTA b/l ,  no  rales, rhonchi, wheezing, or rubs  HEART: Regular rate and rhythm; No murmurs, rubs, or gallops  ABDOMEN: Soft, Nontender, Nondistended; Bowel sounds present  EXTREMITIES:  2+ Peripheral Pulses, No clubbing, cyanosis, or edema  LYMPH: No lymphadenopathy noted  PSYCH: Calm at present  I&O's Detail    20 Jul 2022 07:01  -  21 Jul 2022 07:00  --------------------------------------------------------  IN:    IV PiggyBack: 250 mL    IV PiggyBack: 150 mL    Oral Fluid: 240 mL    Sodium Chloride 0.9% Bolus: 1000 mL    sodium chloride 2%: 1600 mL  Total IN: 3240 mL    OUT:    Bulb (mL): 5 mL    Voided (mL): 4900 mL  Total OUT: 4905 mL    Total NET: -1665 mL                                    9.9    2.97  )-----------( 283      ( 21 Jul 2022 06:56 )             28.2     21 Jul 2022 06:56    132    |  98     |  5.4    ----------------------------<  110    3.6     |  24.0   |  0.46     Ca    8.5        21 Jul 2022 06:56  Phos  3.7       21 Jul 2022 06:56  Mg     1.5       21 Jul 2022 06:56        CAPILLARY BLOOD GLUCOSE              MEDICATIONS  (STANDING):  amantadine Syrup 100 milliGRAM(s) Oral <User Schedule>  cefepime   IVPB 2000 milliGRAM(s) IV Intermittent every 8 hours  chlorhexidine 2% Cloths 1 Application(s) Topical daily  levETIRAcetam  Solution 500 milliGRAM(s) Oral two times a day  melatonin 5 milliGRAM(s) Oral at bedtime  modafinil 100 milliGRAM(s) Oral <User Schedule>  polyethylene glycol 3350 17 Gram(s) Oral daily  senna 2 Tablet(s) Oral at bedtime  sodium chloride 2 Gram(s) Oral every 8 hours  sodium chloride 2% . 1000 milliLiter(s) (100 mL/Hr) IV Continuous <Continuous>  vancomycin  IVPB 1250 milliGRAM(s) IV Intermittent every 12 hours    MEDICATIONS  (PRN):  labetalol Injectable 10 milliGRAM(s) IV Push every 4 hours PRN Systolic blood pressure > 140  ondansetron Injectable 4 milliGRAM(s) IV Push every 4 hours PRN Nausea and/or Vomiting      RADIOLOGY & ADDITIONAL TESTS:
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
## 2022-07-21 NOTE — PROGRESS NOTE ADULT - SUBJECTIVE AND OBJECTIVE BOX
[facility]  INFECTIOUS DISEASES at [place] [facility] [place]  =======================================================                               Nestor Russo MD#  Mitesh Woodward MD*                                     Nyla Orantes MD*    Vera Bishop MD*            Diplomates American Board of Internal Medicine & Infectious Diseases                # [place] Office - Appt - Tel  [phone] Fax [phone]                * [place] Office - Appt - Tel [phone] Fax [phone]                                  Hospital Consult line:  [phone]  =======================================================    PASTOR KEMAR [id number]    Follow up: Epidural fluid collection     No fever       Allergies:  Allergy Status Unknown      REVIEW OF SYSTEMS:  Unable to obtain due to medical condition       Physical Exam:  GEN: NAD  HEENT: Surgical bandage of craniectomy site.  Anicteric   NECK: Supple.   LUNGS: diffuse rhonchi   HEART: Regular rate and rhythm   ABDOMEN: Soft, nontender, and nondistended.  Positive bowel sounds.    : Howard   EXTREMITIES: trace edema.  MSK: no joint swelling  NEUROLOGIC: Awake  PSYCHIATRIC: unable to assess  SKIN: No Rash      Vitals:    T(F): 98.2 (21 Jul 2022 07:32), Max: 98.3 (20 Jul 2022 16:54)  HR: 56 (21 Jul 2022 07:32)  BP: 116/74 (21 Jul 2022 07:32)  RR: 18 (21 Jul 2022 07:32)  SpO2: 99% (21 Jul 2022 07:32) (97% - 100%)  temp max in last 48H T(F): , Max: 98.8 (07-19-22 @ 20:00)    Current Antibiotics:  cefepime   IVPB 2000 milliGRAM(s) IV Intermittent every 8 hours  vancomycin  IVPB 1250 milliGRAM(s) IV Intermittent every 12 hours    Other medications:  amantadine Syrup 100 milliGRAM(s) Oral <User Schedule>  chlorhexidine 2% Cloths 1 Application(s) Topical daily  levETIRAcetam  Solution 500 milliGRAM(s) Oral two times a day  melatonin 5 milliGRAM(s) Oral at bedtime  modafinil 100 milliGRAM(s) Oral <User Schedule>  polyethylene glycol 3350 17 Gram(s) Oral daily  senna 2 Tablet(s) Oral at bedtime  sodium chloride 2 Gram(s) Oral every 8 hours  sodium chloride 2% . 1000 milliLiter(s) IV Continuous <Continuous>                            9.9    2.97  )-----------( 283      ( 21 Jul 2022 06:56 )             28.2     07-21    132<L>  |  98  |  5.4<L>  ----------------------------<  110<H>  3.6   |  24.0  |  0.46<L>    Ca    8.5<L>      21 Jul 2022 06:56  Phos  3.7     07-21  Mg     1.5     07-21      RECENT CULTURES:  07-18 @ 22:05 .Blood Blood-Peripheral     No growth to date.    07-18 @ 22:03 .Blood Blood-Peripheral     No growth to date.    07-18 @ 17:06 Bone Bone Flap     Culture is being performed.    07-18 @ 17:03 .Tissue Bone Flap Staphylococcus aureus    Few Staphylococcus aureus  Rare polymorphonuclear leukocytes per low power field  No organisms seen    07-18 @ 16:57 .Body Fluid #1 Epidura Fluid Staphylococcus aureus  Staphylococcus epidermidis    Few Staphylococcus aureus  Few Staphylococcus epidermidis  Moderate polymorphonuclear leukocytes seen per low power field  Few Gram positive cocci in pairs seen per oil power field    07-18 @ 16:34 .Tissue Duragen Staphylococcus aureus    Few Staphylococcus aureus  Few polymorphonuclear leukocytes per low power field  No organisms seen      WBC Count: 2.97 K/uL (07-21-22 @ 06:56)  WBC Count: 4.94 K/uL (07-20-22 @ 02:48)  WBC Count: 5.55 K/uL (07-19-22 @ 04:35)  WBC Count: 6.26 K/uL (07-18-22 @ 06:38)  WBC Count: 9.12 K/uL (07-17-22 @ 22:42)  WBC Count: 5.56 K/uL (07-17-22 @ 11:41)    Creatinine, Serum: 0.46 mg/dL (07-21-22 @ 06:56)  Creatinine, Serum: 0.44 mg/dL (07-20-22 @ 02:48)  Creatinine, Serum: 0.43 mg/dL (07-19-22 @ 17:20)  Creatinine, Serum: 0.47 mg/dL (07-19-22 @ 04:35)  Creatinine, Serum: 0.45 mg/dL (07-18-22 @ 16:20)  Creatinine, Serum: 0.57 mg/dL (07-18-22 @ 04:07)  Creatinine, Serum: 0.51 mg/dL (07-17-22 @ 22:42)  Creatinine, Serum: 0.50 mg/dL (07-17-22 @ 11:41)    C-Reactive Protein, Serum: 12 mg/L (07-10-22 @ 07:17)    Sedimentation Rate, Erythrocyte: 56 mm/hr (07-10-22 @ 07:17)    COVID-19 PCR: NotDetec (07-17-22 @ 17:50)  COVID-19 PCR: NotDetec (07-10-22 @ 14:17)  COVID-19 PCR: NotDetec (07-06-22 @ 06:27)  COVID-19 PCR: NotDetec (06-29-22 @ 08:34)  COVID-19 PCR: NotDetec (06-27-22 @ 21:10)  COVID-19 PCR: NotDetec (06-26-22 @ 06:15)        < from: CT Head No Cont (07.18.22 @ 00:58) >  ACC: [id number] EXAM:  CT BRAIN                          PROCEDURE DATE:  07/18/2022      INTERPRETATION:  CLINICAL INFORMATION: Alteration of consciousness.    Status post craniectomy.    TECHNIQUE:  Axial CT images were acquired through the head.  Intravenous contrast: None  Two-dimensional reformats were generated.    COMPARISON STUDY: CT head from 07/12/2022 and MRI brain from 07/17/2022.    FINDINGS:  The patient is status post right hemicraniectomy and placement of a   subgaleal drain.There is near complete evacuation of the previously   seen right frontoparietal extra-axial collection.  There is a small small   residual extra-axial of gas and trace postoperative blood products, that   measures approximately 8 mm in thickness, decreased from 3.3 cm on   07/25/2022.  Mild pneumocephalus is also seen lateral to the right   temporal lobe.  There is improved mass effect on the right cerebral   hemisphere with decreased effacement of the right lateral ventricle.    Approximately 9 mm midline shift to the left is improved from 1.4 cm on   07/17/2022.  A small low-attenuation left frontal subdural collection   measures approximately 5 mm in caliber (3:43), compared to 6-7 mm on MRI   07/17/2022.  There is persistent dilatation ofthe temporal horns of both   lateral ventricles, compatible with hydrocephalus from ventricular   entrapment s.    There is no evidence of acute intracranial hemorrhage, central herniation   or acute territorial infarct.    A right zygomaticomaxillary complex fracture is partially visualized.    There is mild mucosal thickening the maxillary sinuses bilaterally.    The mastoids are clear bilaterally.    The orbits appear within normal limits.    There is no calvarial or skull base fracture.    IMPRESSION:  Status post right hemicraniectomy with placement of subgaleal drain and   evacuation of right subdural collection.  Decreased mass effect on the   right cerebral hemisphere.  Decreased effacement of the right lateral   ventricle.  Improved midline shift to the left, now measuring 9 mm,   compared to 1.4 cm preoperatively.    A small low-attenuation left frontal subdural collection measures   approximately 5 mm in caliber, compared to 6-7 mm on MRI from 07/17/2022.    Persistent dilatation of the temporal horns of both lateral ventricles,   compatible with hydrocephalus from ventricular entrapment.    A right zygomaticomaxillary complex fracture is partially visualized.    --- End of Report ---  < end of copied text >

## 2022-07-21 NOTE — OCCUPATIONAL THERAPY INITIAL EVALUATION ADULT - FINE MOTOR COORDINATION, RIGHT HAND, DIADOCHOKINESIS SKILLS, OT EVAL
unable to assess - pt not following commands of assessment
unable to assess - pt not following commands of assessment

## 2022-07-21 NOTE — OCCUPATIONAL THERAPY INITIAL EVALUATION ADULT - COORDINATION ASSESSED, REHAB EVAL
with opposite extremity gently held by this writer to promote following commands of assessment/finger to nose
unable to assess - pt not following commands of assessment

## 2022-07-21 NOTE — PHYSICAL THERAPY INITIAL EVALUATION ADULT - IMPAIRED TRANSFERS: SIT/STAND, REHAB EVAL
impaired balance/cognition/impaired coordination/impaired postural control/decreased strength
impaired balance/impaired coordination/impaired postural control/decreased strength

## 2022-07-21 NOTE — PROGRESS NOTE ADULT - ASSESSMENT
This is a 42 y m admitted on 5/24  cranio for right IPH/ SDH  hemicraniectomy 5/24 6/29 cranioplasty   7/17 right craniotomy for ED collection     Plan  1. ID following anbx continued Cefepime and Vanco  cultures in the OR Staph aureus and staph EPi  2. Pt is hyponatremic will follow  3. Continue keppra.  This is a 42 y m admitted on 5/24  cranio for right IPH/ SDH  hemicraniectomy 5/24 6/29 cranioplasty   7/17 right craniotomy for ED collection     Plan  1. ID following anbx continued Cefepime and Vanco  cultures in the OR Staph aureus and staph EPi  2. Pt is hyponatremic will follow  3. Continue keppra.   4. Pt can start on Lovenox and hold the night prior to discontinuing the Drain.   5. Drain has been recommended to maintain

## 2022-07-21 NOTE — OCCUPATIONAL THERAPY INITIAL EVALUATION ADULT - FINE MOTOR COORDINATION, RIGHT HAND THUMB/FINGER OPPOSITION SKILLS, OT EVAL
unable to assess - pt not following commands of assessment
with increased time and effort/mild impairment

## 2022-07-21 NOTE — PHYSICAL THERAPY INITIAL EVALUATION ADULT - TRANSFER SAFETY CONCERNS NOTED: SIT/STAND, REHAB EVAL
decreased safety awareness/decreased sequencing ability/decreased weight-shifting ability
decreased safety awareness/losing balance/decreased proprioception/decreased sequencing ability/decreased weight-shifting ability
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
## 2022-07-21 NOTE — OCCUPATIONAL THERAPY INITIAL EVALUATION ADULT - VISUAL ACUITY
unable to assess - pt not following commands of assessment.  Pt with poor eye contact.  Visual assessment recommended as pt's ability to follow improves.
unable to assess at this time
unable to assess - pt not following commands of assessment

## 2022-07-21 NOTE — OCCUPATIONAL THERAPY INITIAL EVALUATION ADULT - IMPAIRMENTS CONTRIBUTING IMPAIRED BED MOBILITY, REHAB EVAL
impaired balance/cognition/impaired postural control/decreased strength
impaired balance/cognition/impaired coordination/impaired postural control/impaired sensory feedback/decreased strength

## 2022-07-22 LAB
ANION GAP SERPL CALC-SCNC: 10 MMOL/L — SIGNIFICANT CHANGE UP (ref 5–17)
BUN SERPL-MCNC: 10.5 MG/DL — SIGNIFICANT CHANGE UP (ref 8–20)
BUN SERPL-MCNC: 5.1 MG/DL — LOW (ref 8–20)
BUN SERPL-MCNC: 9.3 MG/DL — SIGNIFICANT CHANGE UP (ref 8–20)
CALCIUM SERPL-MCNC: 8.8 MG/DL — SIGNIFICANT CHANGE UP (ref 8.6–10.2)
CALCIUM SERPL-MCNC: 8.8 MG/DL — SIGNIFICANT CHANGE UP (ref 8.6–10.2)
CALCIUM SERPL-MCNC: 9 MG/DL — SIGNIFICANT CHANGE UP (ref 8.6–10.2)
CHLORIDE SERPL-SCNC: 88 MMOL/L — LOW (ref 98–107)
CHLORIDE SERPL-SCNC: 90 MMOL/L — LOW (ref 98–107)
CHLORIDE SERPL-SCNC: 92 MMOL/L — LOW (ref 98–107)
CO2 SERPL-SCNC: 26 MMOL/L — SIGNIFICANT CHANGE UP (ref 22–29)
CO2 SERPL-SCNC: 26 MMOL/L — SIGNIFICANT CHANGE UP (ref 22–29)
CO2 SERPL-SCNC: 27 MMOL/L — SIGNIFICANT CHANGE UP (ref 22–29)
CREAT SERPL-MCNC: 0.52 MG/DL — SIGNIFICANT CHANGE UP (ref 0.5–1.3)
CREAT SERPL-MCNC: 0.54 MG/DL — SIGNIFICANT CHANGE UP (ref 0.5–1.3)
CREAT SERPL-MCNC: 0.58 MG/DL — SIGNIFICANT CHANGE UP (ref 0.5–1.3)
EGFR: 125 ML/MIN/1.73M2 — SIGNIFICANT CHANGE UP
EGFR: 128 ML/MIN/1.73M2 — SIGNIFICANT CHANGE UP
EGFR: 129 ML/MIN/1.73M2 — SIGNIFICANT CHANGE UP
GLUCOSE SERPL-MCNC: 127 MG/DL — HIGH (ref 70–99)
GLUCOSE SERPL-MCNC: 139 MG/DL — HIGH (ref 70–99)
GLUCOSE SERPL-MCNC: 95 MG/DL — SIGNIFICANT CHANGE UP (ref 70–99)
HCT VFR BLD CALC: 30.4 % — LOW (ref 39–50)
HGB BLD-MCNC: 10.5 G/DL — LOW (ref 13–17)
MAGNESIUM SERPL-MCNC: 1.7 MG/DL — SIGNIFICANT CHANGE UP (ref 1.6–2.6)
MCHC RBC-ENTMCNC: 28.8 PG — SIGNIFICANT CHANGE UP (ref 27–34)
MCHC RBC-ENTMCNC: 34.5 GM/DL — SIGNIFICANT CHANGE UP (ref 32–36)
MCV RBC AUTO: 83.3 FL — SIGNIFICANT CHANGE UP (ref 80–100)
OSMOLALITY SERPL: 271 MOSMOL/KG — LOW (ref 275–300)
OSMOLALITY UR: 563 MOSM/KG — SIGNIFICANT CHANGE UP (ref 300–1000)
PLATELET # BLD AUTO: 290 K/UL — SIGNIFICANT CHANGE UP (ref 150–400)
POTASSIUM SERPL-MCNC: 3.7 MMOL/L — SIGNIFICANT CHANGE UP (ref 3.5–5.3)
POTASSIUM SERPL-MCNC: 4.1 MMOL/L — SIGNIFICANT CHANGE UP (ref 3.5–5.3)
POTASSIUM SERPL-MCNC: 4.5 MMOL/L — SIGNIFICANT CHANGE UP (ref 3.5–5.3)
POTASSIUM SERPL-SCNC: 3.7 MMOL/L — SIGNIFICANT CHANGE UP (ref 3.5–5.3)
POTASSIUM SERPL-SCNC: 4.1 MMOL/L — SIGNIFICANT CHANGE UP (ref 3.5–5.3)
POTASSIUM SERPL-SCNC: 4.5 MMOL/L — SIGNIFICANT CHANGE UP (ref 3.5–5.3)
RBC # BLD: 3.65 M/UL — LOW (ref 4.2–5.8)
RBC # FLD: 12.2 % — SIGNIFICANT CHANGE UP (ref 10.3–14.5)
SODIUM SERPL-SCNC: 124 MMOL/L — LOW (ref 135–145)
SODIUM SERPL-SCNC: 127 MMOL/L — LOW (ref 135–145)
SODIUM SERPL-SCNC: 128 MMOL/L — LOW (ref 135–145)
SODIUM UR-SCNC: 185 MMOL/L — SIGNIFICANT CHANGE UP
TSH SERPL-MCNC: 1.35 UIU/ML — SIGNIFICANT CHANGE UP (ref 0.27–4.2)
VANCOMYCIN TROUGH SERPL-MCNC: 12.8 UG/ML — SIGNIFICANT CHANGE UP (ref 10–20)
WBC # BLD: 4.14 K/UL — SIGNIFICANT CHANGE UP (ref 3.8–10.5)
WBC # FLD AUTO: 4.14 K/UL — SIGNIFICANT CHANGE UP (ref 3.8–10.5)

## 2022-07-22 PROCEDURE — 99233 SBSQ HOSP IP/OBS HIGH 50: CPT | Mod: GC

## 2022-07-22 PROCEDURE — 99255 IP/OBS CONSLTJ NEW/EST HI 80: CPT

## 2022-07-22 PROCEDURE — 99232 SBSQ HOSP IP/OBS MODERATE 35: CPT

## 2022-07-22 RX ORDER — SODIUM CHLORIDE 5 G/100ML
1000 INJECTION, SOLUTION INTRAVENOUS
Refills: 0 | Status: DISCONTINUED | OUTPATIENT
Start: 2022-07-22 | End: 2022-07-23

## 2022-07-22 RX ORDER — MODAFINIL 200 MG/1
200 TABLET ORAL
Refills: 0 | Status: DISCONTINUED | OUTPATIENT
Start: 2022-07-23 | End: 2022-07-28

## 2022-07-22 RX ADMIN — SENNA PLUS 2 TABLET(S): 8.6 TABLET ORAL at 21:47

## 2022-07-22 RX ADMIN — LEVETIRACETAM 500 MILLIGRAM(S): 250 TABLET, FILM COATED ORAL at 05:58

## 2022-07-22 RX ADMIN — CEFEPIME 100 MILLIGRAM(S): 1 INJECTION, POWDER, FOR SOLUTION INTRAMUSCULAR; INTRAVENOUS at 05:57

## 2022-07-22 RX ADMIN — Medication 166.67 MILLIGRAM(S): at 21:48

## 2022-07-22 RX ADMIN — CEFEPIME 100 MILLIGRAM(S): 1 INJECTION, POWDER, FOR SOLUTION INTRAMUSCULAR; INTRAVENOUS at 21:47

## 2022-07-22 RX ADMIN — CEFEPIME 100 MILLIGRAM(S): 1 INJECTION, POWDER, FOR SOLUTION INTRAMUSCULAR; INTRAVENOUS at 13:52

## 2022-07-22 RX ADMIN — Medication 166.67 MILLIGRAM(S): at 11:11

## 2022-07-22 RX ADMIN — POLYETHYLENE GLYCOL 3350 17 GRAM(S): 17 POWDER, FOR SOLUTION ORAL at 11:12

## 2022-07-22 RX ADMIN — LEVETIRACETAM 500 MILLIGRAM(S): 250 TABLET, FILM COATED ORAL at 17:24

## 2022-07-22 RX ADMIN — Medication 100 MILLIGRAM(S): at 06:01

## 2022-07-22 RX ADMIN — CHLORHEXIDINE GLUCONATE 1 APPLICATION(S): 213 SOLUTION TOPICAL at 11:12

## 2022-07-22 RX ADMIN — Medication 100 MILLIGRAM(S): at 11:11

## 2022-07-22 RX ADMIN — SODIUM CHLORIDE 50 MILLILITER(S): 5 INJECTION, SOLUTION INTRAVENOUS at 17:24

## 2022-07-22 RX ADMIN — SODIUM CHLORIDE 2 GRAM(S): 9 INJECTION INTRAMUSCULAR; INTRAVENOUS; SUBCUTANEOUS at 21:47

## 2022-07-22 RX ADMIN — Medication 5 MILLIGRAM(S): at 21:47

## 2022-07-22 RX ADMIN — SODIUM CHLORIDE 2 GRAM(S): 9 INJECTION INTRAMUSCULAR; INTRAVENOUS; SUBCUTANEOUS at 05:58

## 2022-07-22 RX ADMIN — SODIUM CHLORIDE 2 GRAM(S): 9 INJECTION INTRAMUSCULAR; INTRAVENOUS; SUBCUTANEOUS at 13:52
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
## 2022-07-22 NOTE — PROGRESS NOTE ADULT - SUBJECTIVE AND OBJECTIVE BOX
[facility]  INFECTIOUS DISEASES at [place] [facility] [place]  =======================================================                               Nestor Russo MD#  Mitesh Woodward MD*                                     Nyla Orantes MD*    Vera Bishop MD*            Diplomates American Board of Internal Medicine & Infectious Diseases                # [place] Office - Appt - Tel  [phone] Fax [phone]                * [place] Office - Appt - Tel [phone] Fax [phone]                                  Hospital Consult line:  [phone]  =======================================================    PASTOR KEMAR [id number]    Follow up: Epidural fluid collection     No fever       Allergies:  Allergy Status Unknown      REVIEW OF SYSTEMS:  Unable to obtain due to medical condition       Physical Exam:  GEN: NAD  HEENT: Surgical bandage of craniectomy site.  Anicteric   NECK: Supple.   LUNGS: diffuse rhonchi   HEART: Regular rate and rhythm   ABDOMEN: Soft, nontender, and nondistended.  Positive bowel sounds.    : [place]   EXTREMITIES: trace edema.  MSK: no joint swelling  NEUROLOGIC: Awake  PSYCHIATRIC: unable to assess  SKIN: No Rash      Vitals:    T(F): 98 (22 Jul 2022 07:24), Max: 98.6 (21 Jul 2022 20:00)  HR: 60 (22 Jul 2022 07:24)  BP: 128/86 (22 Jul 2022 07:24)  RR: 18 (22 Jul 2022 07:24)  SpO2: 99% (22 Jul 2022 07:24) (92% - 100%)  temp max in last 48H T(F): , Max: 98.6 (07-21-22 @ 20:00)    Current Antibiotics:  cefepime   IVPB 2000 milliGRAM(s) IV Intermittent every 8 hours  vancomycin  IVPB 1250 milliGRAM(s) IV Intermittent every 12 hours    Other medications:  amantadine Syrup 100 milliGRAM(s) Oral <User Schedule>  chlorhexidine 2% Cloths 1 Application(s) Topical daily  enoxaparin Injectable 40 milliGRAM(s) SubCutaneous every 24 hours  levETIRAcetam  Solution 500 milliGRAM(s) Oral two times a day  melatonin 5 milliGRAM(s) Oral at bedtime  modafinil 100 milliGRAM(s) Oral <User Schedule>  polyethylene glycol 3350 17 Gram(s) Oral daily  senna 2 Tablet(s) Oral at bedtime  sodium chloride 2 Gram(s) Oral every 8 hours                            10.5   4.14  )-----------( 290      ( 22 Jul 2022 05:19 )             30.4     07-22    127<L>  |  90<L>  |  5.1<L>  ----------------------------<  95  3.7   |  27.0  |  0.52    Ca    9.0      22 Jul 2022 05:19  Phos  3.7     07-21  Mg     1.7     07-22      RECENT CULTURES:  07-18 @ 22:05 .Blood Blood-Peripheral     No growth to date.    07-18 @ 22:03 .Blood Blood-Peripheral     No growth to date.    07-18 @ 17:06 Bone Bone Flap     Culture is being performed.    07-18 @ 17:03 .Tissue Bone Flap Staphylococcus aureus    Few Staphylococcus aureus  Rare polymorphonuclear leukocytes per low power field  No organisms seen    07-18 @ 16:57 .Body Fluid #1 Epidura Fluid Staphylococcus aureus  Staphylococcus epidermidis    Few Staphylococcus aureus  Few Staphylococcus epidermidis  Moderate polymorphonuclear leukocytes seen per low power field  Few Gram positive cocci in pairs seen per oil power field    07-18 @ 16:34 .Tissue Duragen Staphylococcus aureus    Few Staphylococcus aureus  Few polymorphonuclear leukocytes per low power field  No organisms seen      WBC Count: 4.14 K/uL (07-22-22 @ 05:19)  WBC Count: 2.97 K/uL (07-21-22 @ 06:56)  WBC Count: 4.94 K/uL (07-20-22 @ 02:48)  WBC Count: 5.55 K/uL (07-19-22 @ 04:35)  WBC Count: 6.26 K/uL (07-18-22 @ 06:38)  WBC Count: 9.12 K/uL (07-17-22 @ 22:42)  WBC Count: 5.56 K/uL (07-17-22 @ 11:41)    Creatinine, Serum: 0.52 mg/dL (07-22-22 @ 05:19)  Creatinine, Serum: 0.46 mg/dL (07-21-22 @ 06:56)  Creatinine, Serum: 0.44 mg/dL (07-20-22 @ 02:48)  Creatinine, Serum: 0.43 mg/dL (07-19-22 @ 17:20)  Creatinine, Serum: 0.47 mg/dL (07-19-22 @ 04:35)  Creatinine, Serum: 0.45 mg/dL (07-18-22 @ 16:20)  Creatinine, Serum: 0.57 mg/dL (07-18-22 @ 04:07)  Creatinine, Serum: 0.51 mg/dL (07-17-22 @ 22:42)  Creatinine, Serum: 0.50 mg/dL (07-17-22 @ 11:41)    C-Reactive Protein, Serum: 12 mg/L (07-10-22 @ 07:17)    Sedimentation Rate, Erythrocyte: 56 mm/hr (07-10-22 @ 07:17)    COVID-19 PCR: NotDetec (07-17-22 @ 17:50)  COVID-19 PCR: NotDetec (07-10-22 @ 14:17)  COVID-19 PCR: NotDetec (07-06-22 @ 06:27)  COVID-19 PCR: NotDetec (06-29-22 @ 08:34)  COVID-19 PCR: NotDetec (06-27-22 @ 21:10)  COVID-19 PCR: NotDetec (06-26-22 @ 06:15)        < from: CT Head No Cont (07.18.22 @ 00:58) >  ACC: [id number] EXAM:  CT BRAIN                          PROCEDURE DATE:  07/18/2022      INTERPRETATION:  CLINICAL INFORMATION: Alteration of consciousness.    Status post craniectomy.    TECHNIQUE:  Axial CT images were acquired through the head.  Intravenous contrast: None  Two-dimensional reformats were generated.    COMPARISON STUDY: CT head from 07/12/2022 and MRI brain from 07/17/2022.    FINDINGS:  The patient is status post right hemicraniectomy and placement of a   subgaleal drain.There is near complete evacuation of the previously   seen right frontoparietal extra-axial collection.  There is a small small   residual extra-axial of gas and trace postoperative blood products, that   measures approximately 8 mm in thickness, decreased from 3.3 cm on   07/25/2022.  Mild pneumocephalus is also seen lateral to the right   temporal lobe.  There is improved mass effect on the right cerebral   hemisphere with decreased effacement of the right lateral ventricle.    Approximately 9 mm midline shift to the left is improved from 1.4 cm on   07/17/2022.  A small low-attenuation left frontal subdural collection   measures approximately 5 mm in caliber (3:43), compared to 6-7 mm on MRI   07/17/2022.  There is persistent dilatation ofthe temporal horns of both   lateral ventricles, compatible with hydrocephalus from ventricular   entrapment s.    There is no evidence of acute intracranial hemorrhage, central herniation   or acute territorial infarct.    A right zygomaticomaxillary complex fracture is partially visualized.    There is mild mucosal thickening the maxillary sinuses bilaterally.    The mastoids are clear bilaterally.    The orbits appear within normal limits.    There is no calvarial or skull base fracture.    IMPRESSION:  Status post right hemicraniectomy with placement of subgaleal drain and   evacuation of right subdural collection.  Decreased mass effect on the   right cerebral hemisphere.  Decreased effacement of the right lateral   ventricle.  Improved midline shift to the left, now measuring 9 mm,   compared to 1.4 cm preoperatively.    A small low-attenuation left frontal subdural collection measures   approximately 5 mm in caliber, compared to 6-7 mm on MRI from 07/17/2022.    Persistent dilatation of the temporal horns of both lateral ventricles,   compatible with hydrocephalus from ventricular entrapment.    A right zygomaticomaxillary complex fracture is partially visualized.    --- End of Report ---  < end of copied text >

## 2022-07-22 NOTE — PROGRESS NOTE ADULT - SUBJECTIVE AND OBJECTIVE BOX
HPI: Patient is a 25y old M brought by EMS, found down in the street unresponsive.     Primary Survey:    A - airway compromised, patient intubated in ED, RSI  B - bilateral breath sound after intubation  C - initial BP: 169/93 (05-24-22 @ 00:00)*** , HR: 107 (05-24-22 @ 00:44)*** , palpable pulses in all extremities  D - GCS 3 on arrival    Exposure obtained, no evident injuries    CXR: No evident PTX or Hemothorax, ET tube in place.  (24 May 2022 01:09)      INTERVAL HPI/OVERNIGHT EVENTS:  42y Male seen lying comfortably in bed. No acute over night events. Acute drop in sodium today, will continue to monitor.    Vital Signs Last 24 Hrs  T(C): 36.8 (22 Jul 2022 11:25), Max: 37 (21 Jul 2022 20:00)  T(F): 98.2 (22 Jul 2022 11:25), Max: 98.6 (21 Jul 2022 20:00)  HR: 93 (22 Jul 2022 11:25) (57 - 93)  BP: 111/70 (22 Jul 2022 11:25) (107/78 - 131/76)  BP(mean): --  RR: 18 (22 Jul 2022 11:25) (18 - 18)  SpO2: 98% (22 Jul 2022 11:25) (92% - 100%)    Parameters below as of 22 Jul 2022 11:25  Patient On (Oxygen Delivery Method): room air    PHYSICAL EXAM:  GENERAL: NAD, well-groomed  HEAD:  s/p R craniectomy  WOUND: clean dry intact, healing appropriately without any evidence of dehiscence or active drainage.  MENTAL STATUS: Awake. Eyes open spontaneously. Tracks. Nonverbal, not following commands,   CRANIAL NERVES: PERRL. EOMI without nystagmus.  Face appears symmetric w/ normal eye closure and smile, tongue midline.   MOTOR: LUE AG and spontaneous, difficult to assess strength secondary to mental status    LABS:                        10.5   4.14  )-----------( 290      ( 22 Jul 2022 05:19 )             30.4     07-22    127<L>  |  90<L>  |  5.1<L>  ----------------------------<  95  3.7   |  27.0  |  0.52    Ca    9.0      22 Jul 2022 05:19  Phos  3.7     07-21  Mg     1.7     07-22 07-21 @ 07:01  -  07-22 @ 07:00  --------------------------------------------------------  IN: 100 mL / OUT: 1805 mL / NET: -1705 mL        RADIOLOGY & ADDITIONAL TESTS:  < from: CT Head No Cont (07.18.22 @ 00:58) >  IMPRESSION:  Status post right hemicraniectomy with placement of subgaleal drain and   evacuation of right subdural collection.  Decreased mass effect on the   right cerebral hemisphere.  Decreased effacement of the right lateral   ventricle.  Improved midline shift to the left, now measuring 9 mm,   compared to 1.4 cm preoperatively.    A small low-attenuation left frontal subdural collection measures   approximately 5 mm in caliber, compared to 6-7 mm on MRI from 07/17/2022.    Persistent dilatation of the temporal horns of both lateral ventricles,   compatible with hydrocephalus from ventricular entrapment.    A right zygomaticomaxillary complex fracture is partially visualized.      < end of copied text >    < from: CT Head No Cont (07.12.22 @ 20:21) >  IMPRESSION: Stable right frontal convexity extra-axial collection with   air-fluid level. Stable right to left midline shift, mass effect, and a   stable herniation patterns.    < end of copied text >    CAPRINI SCORE [CLOT]:  Patient has an estimated Caprini score of greater than 5.  However, the patient's unique clinical situation will be addressed in an individual manner to determine appropriate anticoagulation treatment, if any. HPI: Patient is a 25y old M brought by EMS, found down in the street unresponsive.     Primary Survey:    A - airway compromised, patient intubated in ED, RSI  B - bilateral breath sound after intubation  C - initial BP: 169/93 (05-24-22 @ 00:00)*** , HR: 107 (05-24-22 @ 00:44)*** , palpable pulses in all extremities  D - GCS 3 on arrival    Exposure obtained, no evident injuries    CXR: No evident PTX or Hemothorax, ET tube in place.  (24 May 2022 01:09)      INTERVAL HPI/OVERNIGHT EVENTS:  42y Male seen lying comfortably in bed. No acute over night events. Acute drop in sodium today, will continue to monitor.    Vital Signs Last 24 Hrs  T(C): 36.8 (22 Jul 2022 11:25), Max: 37 (21 Jul 2022 20:00)  T(F): 98.2 (22 Jul 2022 11:25), Max: 98.6 (21 Jul 2022 20:00)  HR: 93 (22 Jul 2022 11:25) (57 - 93)  BP: 111/70 (22 Jul 2022 11:25) (107/78 - 131/76)  BP(mean): --  RR: 18 (22 Jul 2022 11:25) (18 - 18)  SpO2: 98% (22 Jul 2022 11:25) (92% - 100%)    Parameters below as of 22 Jul 2022 11:25  Patient On (Oxygen Delivery Method): room air    PHYSICAL EXAM:  GENERAL: NAD, well-groomed  HEAD:  s/p R craniectomy  WOUND: clean dry intact, healing appropriately without any evidence of dehiscence or active drainage.  MENTAL STATUS: Awake. Eyes open spontaneously. Tracks. Nonverbal, not following commands,   CRANIAL NERVES: PERRL. EOMI without nystagmus.  Face appears symmetric w/ normal eye closure and smile, tongue midline.   MOTOR: LUE AG and spontaneous, difficult to assess strength secondary to mental status    LABS:                        10.5   4.14  )-----------( 290      ( 22 Jul 2022 05:19 )             30.4     07-22    127<L>  |  90<L>  |  5.1<L>  ----------------------------<  95  3.7   |  27.0  |  0.52    Ca    9.0      22 Jul 2022 05:19  Phos  3.7     07-21  Mg     1.7     07-22 07-21 @ 07:01  -  07-22 @ 07:00  --------------------------------------------------------  IN: 100 mL / OUT: 1805 mL / NET: -1705 mL        RADIOLOGY & ADDITIONAL TESTS:  < from: CT Head No Cont (07.18.22 @ 00:58) >  IMPRESSION:  Status post right hemicraniectomy with placement of subgaleal drain and   evacuation of right subdural collection.  Decreased mass effect on the   right cerebral hemisphere.  Decreased effacement of the right lateral   ventricle.  Improved midline shift to the left, now measuring 9 mm,   compared to 1.4 cm preoperatively.    A small low-attenuation left frontal subdural collection measures   approximately 5 mm in caliber, compared to 6-7 mm on MRI from 07/17/2022.    Persistent dilatation of the temporal horns of both lateral ventricles,   compatible with hydrocephalus from ventricular entrapment.    A right zygomaticomaxillary complex fracture is partially visualized.      < end of copied text >    < from: CT Head No Cont (07.12.22 @ 20:21) >  IMPRESSION: Stable right frontal convexity extra-axial collection with   air-fluid level. Stable right to left midline shift, mass effect, and a   stable herniation patterns.    < end of copied text >    CAPRINI SCORE [CLOT]:  Patient has an estimated Caprini score of greater than 5.  However, the patient's unique clinical situation will be addressed in an individual manner to determine appropriate anticoagulation treatment, if any.    ASSESSMENT  42M unknown PMH presented with IPH/SDH s/p hemicraniectomy on 5/24, cranioplasty 6/29, ccb right craniotomy for epidural collection on 7/17, POD 5.    PLAN  -case d/w team  - no acute neurosurgical intervention indicated at this time, plan for cranioplasty when appropriate  - con't neuro checks q4  - pain control as needed avoid over sedation  - con't Keppra 500 bid  - medicine following, reccs appreciated  - ID following, +surgical s/aureus on Cefepime/Vanc reccs appreciated  - SCDs for DVT ppx, Lovenox on hold tonight for drain removal tomorrow 7/22  -PMR following reccs appreciated  -PT/OT. OOB with helmet only HPI: Patient is a 25y old M brought by EMS, found down in the street unresponsive.     Primary Survey:    A - airway compromised, patient intubated in ED, RSI  B - bilateral breath sound after intubation  C - initial BP: 169/93 (05-24-22 @ 00:00)*** , HR: 107 (05-24-22 @ 00:44)*** , palpable pulses in all extremities  D - GCS 3 on arrival    Exposure obtained, no evident injuries    CXR: No evident PTX or Hemothorax, ET tube in place.  (24 May 2022 01:09)      INTERVAL HPI/OVERNIGHT EVENTS:  42y Male seen lying comfortably in bed. No acute over night events. Acute drop in sodium today since d/cing 2% yesterday, resumed for now x6 hours, renal consulted, medicine following.    Vital Signs Last 24 Hrs  T(C): 36.8 (22 Jul 2022 11:25), Max: 37 (21 Jul 2022 20:00)  T(F): 98.2 (22 Jul 2022 11:25), Max: 98.6 (21 Jul 2022 20:00)  HR: 93 (22 Jul 2022 11:25) (57 - 93)  BP: 111/70 (22 Jul 2022 11:25) (107/78 - 131/76)  BP(mean): --  RR: 18 (22 Jul 2022 11:25) (18 - 18)  SpO2: 98% (22 Jul 2022 11:25) (92% - 100%)    Parameters below as of 22 Jul 2022 11:25  Patient On (Oxygen Delivery Method): room air    PHYSICAL EXAM:  GENERAL: NAD, well-groomed  HEAD:  s/p R craniectomy  WOUND: clean dry intact, healing appropriately without any evidence of dehiscence or active drainage.  MENTAL STATUS: Awake. Eyes open spontaneously. Tracks. Nonverbal, not following commands,   CRANIAL NERVES: PERRL. EOMI without nystagmus.  Face appears symmetric w/ normal eye closure and smile, tongue midline.   MOTOR: LUE AG and spontaneous, difficult to assess strength secondary to mental status    LABS:                        10.5   4.14  )-----------( 290      ( 22 Jul 2022 05:19 )             30.4     07-22    127<L>  |  90<L>  |  5.1<L>  ----------------------------<  95  3.7   |  27.0  |  0.52    Ca    9.0      22 Jul 2022 05:19  Phos  3.7     07-21  Mg     1.7     07-22 07-21 @ 07:01  -  07-22 @ 07:00  --------------------------------------------------------  IN: 100 mL / OUT: 1805 mL / NET: -1705 mL        RADIOLOGY & ADDITIONAL TESTS:  < from: CT Head No Cont (07.18.22 @ 00:58) >  IMPRESSION:  Status post right hemicraniectomy with placement of subgaleal drain and   evacuation of right subdural collection.  Decreased mass effect on the   right cerebral hemisphere.  Decreased effacement of the right lateral   ventricle.  Improved midline shift to the left, now measuring 9 mm,   compared to 1.4 cm preoperatively.    A small low-attenuation left frontal subdural collection measures   approximately 5 mm in caliber, compared to 6-7 mm on MRI from 07/17/2022.    Persistent dilatation of the temporal horns of both lateral ventricles,   compatible with hydrocephalus from ventricular entrapment.    A right zygomaticomaxillary complex fracture is partially visualized.      < end of copied text >    < from: CT Head No Cont (07.12.22 @ 20:21) >  IMPRESSION: Stable right frontal convexity extra-axial collection with   air-fluid level. Stable right to left midline shift, mass effect, and a   stable herniation patterns.    < end of copied text >    CAPRINI SCORE [CLOT]:  Patient has an estimated Caprini score of greater than 5.  However, the patient's unique clinical situation will be addressed in an individual manner to determine appropriate anticoagulation treatment, if any.    ASSESSMENT  42M unknown PMH presented with IPH/SDH s/p hemicraniectomy on 5/24, cranioplasty 6/29, ccb right craniotomy for epidural collection on 7/17, POD 5.    PLAN  -case d/w team  - no acute neurosurgical intervention indicated at this time, plan for cranioplasty when appropriate  - con't neuro checks q4  - pain control as needed avoid over sedation  - con't Keppra 500 bid  - medicine following, reccs appreciated  - hyponatremic, 2% restarted temporarily, con't Na tabs, renal consulted  - ID following, +surgical s/aureus on Cefepime/Vanc reccs appreciated  - SCDs for DVT ppx, Lovenox on hold tonight for drain removal tomorrow 7/22  -PMR following reccs appreciated  -PT/OT. OOB with helmet only

## 2022-07-22 NOTE — PROGRESS NOTE ADULT - NS ATTEND AMEND GEN_ALL_CORE FT
Patient seen and examined , more awake and alert than yesterday ,   following simple commands moving all 4 extremities , tracking ,   aphasic      Vital Signs Last 24 Hrs  T(C): 36.8 (22 Jul 2022 11:25), Max: 37 (21 Jul 2022 20:00)  T(F): 98.2 (22 Jul 2022 11:25), Max: 98.6 (21 Jul 2022 20:00)  HR: 93 (22 Jul 2022 11:25) (57 - 93)  BP: 111/70 (22 Jul 2022 11:25) (111/70 - 131/76)  RR: 18 (22 Jul 2022 11:25) (18 - 18)  SpO2: 98% (22 Jul 2022 11:25) (95% - 100%)    O2 Parameters below as of 22 Jul 2022 11:25  Patient On (Oxygen Delivery Method): room air    Lungs: CTA B/L   Head : drain + , dressing +   Neuro : AA , aphasic , following simple commands ,   tracking   Above noted and reviewed with NP .   Hyponatremia - restarted on 2 % NS - repeat BMP at 21:00 -   follow . Renal eval requested   NS PA aware of plan .   Will follow along with you .

## 2022-07-22 NOTE — PROGRESS NOTE ADULT - SUBJECTIVE AND OBJECTIVE BOX
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 how functional progress changes as well as how patient continues to participate and tolerate therapeutic interventions, which may change. Recommend ongoing mobilization by staff to maintain cardiopulmonary function and prevention of secondary complications related to debility. Discussed with rehab team.

## 2022-07-22 NOTE — PROGRESS NOTE ADULT - SUBJECTIVE AND OBJECTIVE BOX
CC: Trauma/ Subdural        INTERVAL HPI/OVERNIGHT EVENTS: Patient seen and examined.  after IV fluids dc'd. As per RN, patient must be fed and does not take in much free water except what is administered with medications.  Urine studies ordered. Appears otherwise comfortable.     Vital Signs Last 24 Hrs  T(C): 36.8 (22 Jul 2022 11:25), Max: 37 (21 Jul 2022 20:00)  T(F): 98.2 (22 Jul 2022 11:25), Max: 98.6 (21 Jul 2022 20:00)  HR: 93 (22 Jul 2022 11:25) (57 - 93)  BP: 111/70 (22 Jul 2022 11:25) (107/78 - 131/76)  BP(mean): --  RR: 18 (22 Jul 2022 11:25) (18 - 18)  SpO2: 98% (22 Jul 2022 11:25) (92% - 100%)    ROS:  Unable to assess due to mental status    PHYSICAL EXAM:    GENERAL: NAD, sitting upright in bed  HEAD: drain + , dressing C/D/I  EYES: EOMI, PERRLA, conjunctiva and sclera clear  NECK: Supple, No JVD, Normal thyroid  NERVOUS SYSTEM:  Awake , tracking , moving LUE spontaneously , aphasic, does not follow commands  CHEST/LUNG: CTA b/l ,  no  rales, rhonchi, wheezing, or rubs  HEART: Regular rate and rhythm; No murmurs, rubs, or gallops  ABDOMEN: Soft, Nontender, Nondistended; Bowel sounds present  EXTREMITIES:  2+ Peripheral Pulses, No clubbing, cyanosis, or edema  LYMPH: No lymphadenopathy noted  PSYCH: Calm at present        Parameters below as of 22 Jul 2022 11:25  Patient On (Oxygen Delivery Method): room air      I&O's Detail    21 Jul 2022 07:01  -  22 Jul 2022 07:00  --------------------------------------------------------  IN:    Oral Fluid: 100 mL  Total IN: 100 mL    OUT:    Bulb (mL): 5 mL    Voided (mL): 1800 mL  Total OUT: 1805 mL    Total NET: -1705 mL                                    10.5   4.14  )-----------( 290      ( 22 Jul 2022 05:19 )             30.4     22 Jul 2022 05:19    127    |  90     |  5.1    ----------------------------<  95     3.7     |  27.0   |  0.52     Ca    9.0        22 Jul 2022 05:19  Phos  3.7       21 Jul 2022 06:56  Mg     1.7       22 Jul 2022 05:19        CAPILLARY BLOOD GLUCOSE      POCT Blood Glucose.: 93 mg/dL (21 Jul 2022 21:34)          MEDICATIONS  (STANDING):  amantadine Syrup 100 milliGRAM(s) Oral <User Schedule>  cefepime   IVPB 2000 milliGRAM(s) IV Intermittent every 8 hours  chlorhexidine 2% Cloths 1 Application(s) Topical daily  levETIRAcetam  Solution 500 milliGRAM(s) Oral two times a day  melatonin 5 milliGRAM(s) Oral at bedtime  polyethylene glycol 3350 17 Gram(s) Oral daily  senna 2 Tablet(s) Oral at bedtime  sodium chloride 2 Gram(s) Oral every 8 hours  vancomycin  IVPB 1250 milliGRAM(s) IV Intermittent every 12 hours    MEDICATIONS  (PRN):  labetalol Injectable 10 milliGRAM(s) IV Push every 4 hours PRN Systolic blood pressure > 140  ondansetron Injectable 4 milliGRAM(s) IV Push every 4 hours PRN Nausea and/or Vomiting      RADIOLOGY & ADDITIONAL TESTS:   CC: Trauma/ Subdural        INTERVAL HPI/OVERNIGHT EVENTS: Patient seen and examined.  after IV fluids dc'd. As per RN, patient must be fed and does not take in much free water except what is administered with medications.  Urine studies ordered. Appears otherwise comfortable.     Vital Signs Last 24 Hrs  T(C): 36.8 (22 Jul 2022 11:25), Max: 37 (21 Jul 2022 20:00)  T(F): 98.2 (22 Jul 2022 11:25), Max: 98.6 (21 Jul 2022 20:00)  HR: 93 (22 Jul 2022 11:25) (57 - 93)  BP: 111/70 (22 Jul 2022 11:25) (107/78 - 131/76)  BP(mean): --  RR: 18 (22 Jul 2022 11:25) (18 - 18)  SpO2: 98% (22 Jul 2022 11:25) (92% - 100%)    ROS:  Unable to assess due to mental status    PHYSICAL EXAM:    GENERAL: NAD, sitting upright in bed  HEAD: drain + , dressing C/D/I  EYES: EOMI, PERRLA, conjunctiva and sclera clear  NECK: Supple, No JVD, Normal thyroid  NERVOUS SYSTEM:  Awake , tracking , moving LUE spontaneously , aphasic  CHEST/LUNG: CTA b/l ,  no  rales, rhonchi, wheezing, or rubs  HEART: Regular rate and rhythm; No murmurs, rubs, or gallops  ABDOMEN: Soft, Nontender, Nondistended; Bowel sounds present  EXTREMITIES:  2+ Peripheral Pulses, No clubbing, cyanosis, or edema  LYMPH: No lymphadenopathy noted  PSYCH: Calm at present        Parameters below as of 22 Jul 2022 11:25  Patient On (Oxygen Delivery Method): room air      I&O's Detail    21 Jul 2022 07:01  -  22 Jul 2022 07:00  --------------------------------------------------------  IN:    Oral Fluid: 100 mL  Total IN: 100 mL    OUT:    Bulb (mL): 5 mL    Voided (mL): 1800 mL  Total OUT: 1805 mL    Total NET: -1705 mL                                    10.5   4.14  )-----------( 290      ( 22 Jul 2022 05:19 )             30.4     22 Jul 2022 05:19    127    |  90     |  5.1    ----------------------------<  95     3.7     |  27.0   |  0.52     Ca    9.0        22 Jul 2022 05:19  Phos  3.7       21 Jul 2022 06:56  Mg     1.7       22 Jul 2022 05:19    Sodium, Random Urine: 185: Reference Ranges have NOT been established for random urine analytes due  to variability in fluid intake and concentration. mmol/L (07.22.22 @ 13:40)    Osmolality, Random Urine: 563 mosm/kg (07.22.22 @ 13:40)    Osmolality, Serum: 271 mosmol/kg (07.22.22 @ 05:19)    Culture - Blood (07.18.22 @ 22:05)    Specimen Source: .Blood Blood-Peripheral    Culture Results:   No growth to date.    Culture - Blood (07.18.22 @ 22:03)    Specimen Source: .Blood Blood-Peripheral    Culture Results:   No growth to date.    Culture - Tissue with Gram Stain (07.18.22 @ 17:03)    -  Trimethoprim/Sulfamethoxazole: S <=0.5/9.5    -  Vancomycin: S 2    Gram Stain:   Rare polymorphonuclear leukocytes per low power field  No organisms seen    -  Ampicillin/Sulbactam: S <=8/4    -  Cefazolin: S <=4    -  Clindamycin: S <=0.25    -  Gentamicin: S <=1 Should not be used as monotherapy    -  Erythromycin: S <=0.25    -  Penicillin: R >8    -  Oxacillin: S 0.5 Oxacillin predicts susceptibility for dicloxacillin, methicillin, and nafcillin    -  Rifampin: S <=1 Should not be used as monotherapy    -  Tetra/Doxy: R >8    Specimen Source: .Tissue Bone Flap    Culture Results:   Few Staphylococcus aureus    Organism Identification: Staphylococcus aureus    Organism: Staphylococcus aureus    Method Type: COLE            CAPILLARY BLOOD GLUCOSE      POCT Blood Glucose.: 93 mg/dL (21 Jul 2022 21:34)          MEDICATIONS  (STANDING):  amantadine Syrup 100 milliGRAM(s) Oral <User Schedule>  cefepime   IVPB 2000 milliGRAM(s) IV Intermittent every 8 hours  chlorhexidine 2% Cloths 1 Application(s) Topical daily  levETIRAcetam  Solution 500 milliGRAM(s) Oral two times a day  melatonin 5 milliGRAM(s) Oral at bedtime  polyethylene glycol 3350 17 Gram(s) Oral daily  senna 2 Tablet(s) Oral at bedtime  sodium chloride 2 Gram(s) Oral every 8 hours  vancomycin  IVPB 1250 milliGRAM(s) IV Intermittent every 12 hours    MEDICATIONS  (PRN):  labetalol Injectable 10 milliGRAM(s) IV Push every 4 hours PRN Systolic blood pressure > 140  ondansetron Injectable 4 milliGRAM(s) IV Push every 4 hours PRN Nausea and/or Vomiting      RADIOLOGY & ADDITIONAL TESTS:

## 2022-07-22 NOTE — PROGRESS NOTE ADULT - NS ATTEND AMEND GEN_ALL_CORE FT
NSGY Attg:    see above    patient seen and examined    wound C/D/I    agree with exam and plan as above

## 2022-07-22 NOTE — CONSULT NOTE ADULT - SUBJECTIVE AND OBJECTIVE BOX
REASON FOR CONSULTATION:  Elevated BUN and serum creatinine.    HISTORY OF PRESENT ILLNESS:  Patient is a 42y Male with complex hospital stay for IPH/SDH s/p hemicraniectomy on 5/24, cranioplasty 6/29, ccb right craniotomy for epidural collection on 7/17. We have been consulted for hyponatremia. Serum sodium down to 124 mmol/L dropping after d/c of 2% NS. High Nuha and UOsm.    REVIEW OF SYSTEM:  Unable to obtain 2/2 pt current clinical condition    PAST MEDICAL AND SURGICAL HISTORY: Unknown    ALLERGIES:  Allergy Status Unknown    MEDICATIONS:  amantadine Syrup 100 milliGRAM(s) Oral <User Schedule>  cefepime   IVPB 2000 milliGRAM(s) IV Intermittent every 8 hours  chlorhexidine 2% Cloths 1 Application(s) Topical daily  labetalol Injectable 10 milliGRAM(s) IV Push every 4 hours PRN  levETIRAcetam  Solution 500 milliGRAM(s) Oral two times a day  melatonin 5 milliGRAM(s) Oral at bedtime  ondansetron Injectable 4 milliGRAM(s) IV Push every 4 hours PRN  polyethylene glycol 3350 17 Gram(s) Oral daily  senna 2 Tablet(s) Oral at bedtime  sodium chloride 2 Gram(s) Oral every 8 hours  sodium chloride 2% . 1000 milliLiter(s) IV Continuous <Continuous>  vancomycin  IVPB 1250 milliGRAM(s) IV Intermittent every 12 hours      FAMILY HISTORY: Unknown    SOCIAL HISTORY: Unknown    VITALS:  T(F): 98.9 (15:10)  HR: 77 (15:10)  BP: 130/87 (15:10)  RR: 18 (15:10)  SpO2: 99% (15:10)    PHYSICAL EXAMINATION:   Gen: no acute distress  MS: awake   Eyes: EOMI, no icterus  HENT: Surgical dressing of craniectomy site, MMM, trach  CV: rhythm reg reg, rate normal, no m/g/r, no LE edema  Chest: CTAB, no w/r/r,  Abd: soft, NT, ND  Neuro: moving all 4 limbs spontaneously, no tremor  MSK: normal bulk and tone, no joint swelling  Skin: dry, warm, no rash or jaundice    LABS:  CBC:            10.5   4.14  )-----------( 290      ( 07-22-22 @ 05:19 )             30.4         Chem:         ( 07-22-22 @ 20:34 )    128<L>  |  92<L>  |  9.3  ----------------------------<  127<H>  4.1   |  26.0  |  0.58      Radiology reviewed

## 2022-07-23 LAB
ANION GAP SERPL CALC-SCNC: 12 MMOL/L — SIGNIFICANT CHANGE UP (ref 5–17)
ANION GAP SERPL CALC-SCNC: 9 MMOL/L — SIGNIFICANT CHANGE UP (ref 5–17)
BUN SERPL-MCNC: 8.2 MG/DL — SIGNIFICANT CHANGE UP (ref 8–20)
BUN SERPL-MCNC: 8.4 MG/DL — SIGNIFICANT CHANGE UP (ref 8–20)
CALCIUM SERPL-MCNC: 8.8 MG/DL — SIGNIFICANT CHANGE UP (ref 8.6–10.2)
CALCIUM SERPL-MCNC: 8.9 MG/DL — SIGNIFICANT CHANGE UP (ref 8.6–10.2)
CHLORIDE SERPL-SCNC: 88 MMOL/L — LOW (ref 98–107)
CHLORIDE SERPL-SCNC: 90 MMOL/L — LOW (ref 98–107)
CO2 SERPL-SCNC: 25 MMOL/L — SIGNIFICANT CHANGE UP (ref 22–29)
CO2 SERPL-SCNC: 27 MMOL/L — SIGNIFICANT CHANGE UP (ref 22–29)
CREAT SERPL-MCNC: 0.5 MG/DL — SIGNIFICANT CHANGE UP (ref 0.5–1.3)
CREAT SERPL-MCNC: 0.53 MG/DL — SIGNIFICANT CHANGE UP (ref 0.5–1.3)
CULTURE RESULTS: SIGNIFICANT CHANGE UP
EGFR: 128 ML/MIN/1.73M2 — SIGNIFICANT CHANGE UP
EGFR: 131 ML/MIN/1.73M2 — SIGNIFICANT CHANGE UP
GLUCOSE SERPL-MCNC: 104 MG/DL — HIGH (ref 70–99)
GLUCOSE SERPL-MCNC: 120 MG/DL — HIGH (ref 70–99)
HCT VFR BLD CALC: 30.1 % — LOW (ref 39–50)
HGB BLD-MCNC: 10.7 G/DL — LOW (ref 13–17)
MAGNESIUM SERPL-MCNC: 1.6 MG/DL — LOW (ref 1.8–2.6)
MCHC RBC-ENTMCNC: 29.1 PG — SIGNIFICANT CHANGE UP (ref 27–34)
MCHC RBC-ENTMCNC: 35.5 GM/DL — SIGNIFICANT CHANGE UP (ref 32–36)
MCV RBC AUTO: 81.8 FL — SIGNIFICANT CHANGE UP (ref 80–100)
ORGANISM # SPEC MICROSCOPIC CNT: SIGNIFICANT CHANGE UP
PHOSPHATE SERPL-MCNC: 4.8 MG/DL — HIGH (ref 2.4–4.7)
PLATELET # BLD AUTO: 295 K/UL — SIGNIFICANT CHANGE UP (ref 150–400)
POTASSIUM SERPL-MCNC: 4.1 MMOL/L — SIGNIFICANT CHANGE UP (ref 3.5–5.3)
POTASSIUM SERPL-MCNC: 4.2 MMOL/L — SIGNIFICANT CHANGE UP (ref 3.5–5.3)
POTASSIUM SERPL-SCNC: 4.1 MMOL/L — SIGNIFICANT CHANGE UP (ref 3.5–5.3)
POTASSIUM SERPL-SCNC: 4.2 MMOL/L — SIGNIFICANT CHANGE UP (ref 3.5–5.3)
RBC # BLD: 3.68 M/UL — LOW (ref 4.2–5.8)
RBC # FLD: 12.1 % — SIGNIFICANT CHANGE UP (ref 10.3–14.5)
SODIUM SERPL-SCNC: 125 MMOL/L — LOW (ref 135–145)
SODIUM SERPL-SCNC: 126 MMOL/L — LOW (ref 135–145)
SPECIMEN SOURCE: SIGNIFICANT CHANGE UP
WBC # BLD: 4.45 K/UL — SIGNIFICANT CHANGE UP (ref 3.8–10.5)
WBC # FLD AUTO: 4.45 K/UL — SIGNIFICANT CHANGE UP (ref 3.8–10.5)

## 2022-07-23 PROCEDURE — 99232 SBSQ HOSP IP/OBS MODERATE 35: CPT

## 2022-07-23 PROCEDURE — 99233 SBSQ HOSP IP/OBS HIGH 50: CPT

## 2022-07-23 RX ORDER — SODIUM CHLORIDE 5 G/100ML
1000 INJECTION, SOLUTION INTRAVENOUS
Refills: 0 | Status: DISCONTINUED | OUTPATIENT
Start: 2022-07-23 | End: 2022-07-25

## 2022-07-23 RX ORDER — MAGNESIUM SULFATE 500 MG/ML
2 VIAL (ML) INJECTION ONCE
Refills: 0 | Status: COMPLETED | OUTPATIENT
Start: 2022-07-23 | End: 2022-07-23

## 2022-07-23 RX ORDER — ENOXAPARIN SODIUM 100 MG/ML
40 INJECTION SUBCUTANEOUS EVERY 24 HOURS
Refills: 0 | Status: DISCONTINUED | OUTPATIENT
Start: 2022-07-23 | End: 2022-09-18

## 2022-07-23 RX ADMIN — Medication 166.67 MILLIGRAM(S): at 12:22

## 2022-07-23 RX ADMIN — CEFEPIME 100 MILLIGRAM(S): 1 INJECTION, POWDER, FOR SOLUTION INTRAMUSCULAR; INTRAVENOUS at 14:35

## 2022-07-23 RX ADMIN — SODIUM CHLORIDE 2 GRAM(S): 9 INJECTION INTRAMUSCULAR; INTRAVENOUS; SUBCUTANEOUS at 21:18

## 2022-07-23 RX ADMIN — CEFEPIME 100 MILLIGRAM(S): 1 INJECTION, POWDER, FOR SOLUTION INTRAMUSCULAR; INTRAVENOUS at 21:18

## 2022-07-23 RX ADMIN — MODAFINIL 200 MILLIGRAM(S): 200 TABLET ORAL at 05:34

## 2022-07-23 RX ADMIN — Medication 5 MILLIGRAM(S): at 21:18

## 2022-07-23 RX ADMIN — SODIUM CHLORIDE 50 MILLILITER(S): 5 INJECTION, SOLUTION INTRAVENOUS at 21:45

## 2022-07-23 RX ADMIN — SODIUM CHLORIDE 2 GRAM(S): 9 INJECTION INTRAMUSCULAR; INTRAVENOUS; SUBCUTANEOUS at 14:35

## 2022-07-23 RX ADMIN — CEFEPIME 100 MILLIGRAM(S): 1 INJECTION, POWDER, FOR SOLUTION INTRAMUSCULAR; INTRAVENOUS at 05:33

## 2022-07-23 RX ADMIN — LEVETIRACETAM 500 MILLIGRAM(S): 250 TABLET, FILM COATED ORAL at 05:34

## 2022-07-23 RX ADMIN — ENOXAPARIN SODIUM 40 MILLIGRAM(S): 100 INJECTION SUBCUTANEOUS at 19:38

## 2022-07-23 RX ADMIN — CHLORHEXIDINE GLUCONATE 1 APPLICATION(S): 213 SOLUTION TOPICAL at 14:34

## 2022-07-23 RX ADMIN — LEVETIRACETAM 500 MILLIGRAM(S): 250 TABLET, FILM COATED ORAL at 19:38

## 2022-07-23 RX ADMIN — Medication 25 GRAM(S): at 14:35

## 2022-07-23 RX ADMIN — Medication 100 MILLIGRAM(S): at 12:22

## 2022-07-23 RX ADMIN — Medication 166.67 MILLIGRAM(S): at 21:19

## 2022-07-23 RX ADMIN — SODIUM CHLORIDE 2 GRAM(S): 9 INJECTION INTRAMUSCULAR; INTRAVENOUS; SUBCUTANEOUS at 05:34

## 2022-07-23 RX ADMIN — Medication 100 MILLIGRAM(S): at 05:34

## 2022-07-23 RX ADMIN — POLYETHYLENE GLYCOL 3350 17 GRAM(S): 17 POWDER, FOR SOLUTION ORAL at 12:22

## 2022-07-23 RX ADMIN — SENNA PLUS 2 TABLET(S): 8.6 TABLET ORAL at 21:18

## 2022-07-23 NOTE — PROGRESS NOTE ADULT - NS ATTEND AMEND GEN_ALL_CORE FT
Neurosurgery attending attestation:    Patient seen and examined at bedside.  Agree with note and plan as stated above.      Patient overall neurologically stable, eyes open, interactive, and following commands.  Plan for hyponatremia treatment per nephrology and continued antibiotics for infection vancomycin/cefepime.

## 2022-07-23 NOTE — PROGRESS NOTE ADULT - SUBJECTIVE AND OBJECTIVE BOX
Subjective: No acute overnight event.  Serum sodium 126-->125    ROS: All systems were reviewed in detail and negative except as detailed in HPI.    Medications: reviewed     T(F): 98.9 (16:53)  HR: 88 (16:53)  BP: 125/70 (16:53)  RR: 18 (16:53)  SpO2: 98% (16:53)    Physical Exam:  Gen: no acute distress  MS: awake   Eyes: EOMI, no icterus  HENT: Surgical dressing of craniectomy site, MMM, trach  CV: rhythm reg reg, rate normal, no m/g/r, no LE edema  Chest: CTAB, no w/r/r,  Abd: soft, NT, ND  Neuro: moving all 4 limbs spontaneously, no tremor  MSK: normal bulk and tone, no joint swelling  Skin: dry, warm, no rash or jaundice    Input and Output:    07:01  -  07:00  --------------------------------------------------------  IN: 0 mL / OUT: 1100 mL / NET: -1100 mL        Labs:  CBC:            10.7   4.45  )-----------( 295      ( 07-23-22 @ 07:38 )             30.1         Chem:         ( 07-23-22 @ 13:00 )    125<L>  |  88<L>  |  8.2  ----------------------------<  120<H>  4.2   |  25.0  |  0.50      	  IMPRESSION: 42y  Male initially admitted on 5/24/22 after being found down and was found to have at that time Bilateral IPH, Bilateral SDH. Prolonged hospital course: s/p craniotomy and evacuation of SDH 5/24, s/p trach 6/1, s/p cranioplasty and replacement of bone flap 6/29, s/p R hemicraniectomy, wound exploration, evacuation of epidural fluid collection 7/17.  1.  Hyponatremia    RECOMMENDATIONS:    Hyponatremia in relative euvolemic pt w/ high Manueal and UOsm indicates inappropriate ADH secretion.  Recommend free fluid restriction <  1L/day  Will add tolvapatan tomorrow morning (was not able to get it from pharmacy today)  Ok w/ 2%NS overnight

## 2022-07-23 NOTE — PROGRESS NOTE ADULT - ASSESSMENT
42y  Male initially admitted on 5/24/22 after being found down and was found to have at that time Bilateral IPH, Bilateral SDH. Prolonged hospital course: s/p craniotomy and evacuation of SDH 5/24, s/p trach 6/1, s/p cranioplasty and replacement of bone flap 6/29, s/p R hemicraniectomy, wound exploration, evacuation of epidural fluid collection 7/17.      Epidural fluid collection   s/p R hemicraniectomy, wound exploration, evacuation of epidural fluid collection 7/17  r/o infection       - Blood cultures 7/18 no growth   - fluid cultures reporting Staphylococcus aureus (MSSA) and 1 culture with Staph Epi (MRSE)  - MRI head with fluid collection   - Continue Cefepime  - Continue  Vancomycin  - Monitor trough  - Monitor for Vancomycin toxicity   - Follow up cultures  - Trend Fever  - Trend WBC      Will Follow

## 2022-07-23 NOTE — PROGRESS NOTE ADULT - SUBJECTIVE AND OBJECTIVE BOX
HPI: Patient is a 25y old M brought by EMS, found down in the street unresponsive.     Primary Survey:    A - airway compromised, patient intubated in ED, RSI  B - bilateral breath sound after intubation  C - initial BP: 169/93 (05-24-22 @ 00:00)*** , HR: 107 (05-24-22 @ 00:44)*** , palpable pulses in all extremities  D - GCS 3 on arrival    Exposure obtained, no evident injuries    CXR: No evident PTX or Hemothorax, ET tube in place.  (24 May 2022 01:09)      INTERVAL HPI/OVERNIGHT EVENTS:  42y Male seen lying comfortably in bed with mother at bedside, more interactive today. Denies pain of any sort. Drain to be removed today.    Vital Signs Last 24 Hrs  T(C): 36.7 (23 Jul 2022 08:16), Max: 37.2 (22 Jul 2022 15:10)  T(F): 98 (23 Jul 2022 08:16), Max: 98.9 (22 Jul 2022 15:10)  HR: 74 (23 Jul 2022 08:16) (59 - 93)  BP: 129/80 (23 Jul 2022 08:16) (111/69 - 130/87)  BP(mean): --  RR: 19 (23 Jul 2022 08:16) (15 - 19)  SpO2: 98% (23 Jul 2022 08:16) (97% - 99%)    Parameters below as of 23 Jul 2022 04:00  Patient On (Oxygen Delivery Method): room air    PHYSICAL EXAM:  GENERAL: NAD, well-groomed  HEAD:  s/p R craniectomy  WOUND: clean dry intact, healing appropriately without any evidence of dehiscence or active drainage.  MENTAL STATUS: Awake. Eyes open spontaneously. Tracks. Shakes head yes/no to questions, following commands on all extremities.  CRANIAL NERVES: PERRL. EOMI without nystagmus.  Face appears symmetric w/ normal eye closure and smile, tongue midline.   MOTOR: LUNA AG  SENSATION: grossly intact to light touch    LABS:                        10.7   4.45  )-----------( 295      ( 23 Jul 2022 07:38 )             30.1     07-23    126<L>  |  90<L>  |  8.4  ----------------------------<  104<H>  4.1   |  27.0  |  0.53    Ca    8.9      23 Jul 2022 07:38  Phos  4.8     07-23  Mg     1.6     07-23 07-22 @ 07:01  -  07-23 @ 07:00  --------------------------------------------------------  IN: 1160 mL / OUT: 1850 mL / NET: -690 mL        RADIOLOGY & ADDITIONAL TESTS:  < from: CT Head No Cont (07.18.22 @ 00:58) >  IMPRESSION:  Status post right hemicraniectomy with placement of subgaleal drain and   evacuation of right subdural collection.  Decreased mass effect on the   right cerebral hemisphere.  Decreased effacement of the right lateral   ventricle.  Improved midline shift to the left, now measuring 9 mm,   compared to 1.4 cm preoperatively.    A small low-attenuation left frontal subdural collection measures   approximately 5 mm in caliber, compared to 6-7 mm on MRI from 07/17/2022.    Persistent dilatation of the temporal horns of both lateral ventricles,   compatible with hydrocephalus from ventricular entrapment.    A right zygomaticomaxillary complex fracture is partially visualized.      < end of copied text >    < from: CT Head No Cont (07.12.22 @ 20:21) >  IMPRESSION: Stable right frontal convexity extra-axial collection with   air-fluid level. Stable right to left midline shift, mass effect, and a   stable herniation patterns.    < end of copied text >    CAPRINI SCORE [CLOT]:  Patient has an estimated Caprini score of greater than 5.  However, the patient's unique clinical situation will be addressed in an individual manner to determine appropriate anticoagulation treatment, if any.    ASSESSMENT  42M unknown PMH presented with IPH/SDH s/p hemicraniectomy on 5/24, cranioplasty 6/29, ccb right crani for epidural collection on 7/17, POD 6.    PLAN  -case d/w team  - no acute neurosurgical intervention indicated at this time, plan for cranioplasty when appropriate  - con't neuro checks q4  - pain control as needed avoid over sedation  - con't Keppra 500 bid  - medicine following, reccs appreciated  - hyponatremic, 2% restarted temporarily, con't Na tabs, renal following recc fluid restriction <1L /day & starting Tolvaptan today.  - ID following, +surgical s/aureus on Cefepime/Vanc reccs appreciated  - SCDs for DVT ppx, Lovenox to resume tonight s/p drain removal  -PMR following reccs appreciated  -PT/OT. OOB with helmet only HPI: Patient is a 25y old M brought by EMS, found down in the street unresponsive.     Primary Survey:    A - airway compromised, patient intubated in ED, RSI  B - bilateral breath sound after intubation  C - initial BP: 169/93 (05-24-22 @ 00:00)*** , HR: 107 (05-24-22 @ 00:44)*** , palpable pulses in all extremities  D - GCS 3 on arrival    Exposure obtained, no evident injuries    CXR: No evident PTX or Hemothorax, ET tube in place.  (24 May 2022 01:09)      INTERVAL HPI/OVERNIGHT EVENTS:  42y Male seen lying comfortably in bed with mother at bedside, more interactive today. Denies pain of any sort. Drain to be removed today.    Vital Signs Last 24 Hrs  T(C): 36.7 (23 Jul 2022 08:16), Max: 37.2 (22 Jul 2022 15:10)  T(F): 98 (23 Jul 2022 08:16), Max: 98.9 (22 Jul 2022 15:10)  HR: 74 (23 Jul 2022 08:16) (59 - 93)  BP: 129/80 (23 Jul 2022 08:16) (111/69 - 130/87)  BP(mean): --  RR: 19 (23 Jul 2022 08:16) (15 - 19)  SpO2: 98% (23 Jul 2022 08:16) (97% - 99%)    Parameters below as of 23 Jul 2022 04:00  Patient On (Oxygen Delivery Method): room air    PHYSICAL EXAM:  GENERAL: NAD, well-groomed  HEAD:  s/p R craniectomy, flap not full or tense, sunken gravity dependent   WOUND: clean dry intact, open to air. Healing appropriately without any evidence of dehiscence or active drainage.  MENTAL STATUS: Awake. Eyes open spontaneously. Tracks. Shakes head yes/no to questions, following commands on all extremities.  CRANIAL NERVES: PERRL. EOMI without nystagmus.  Face appears symmetric w/ normal eye closure and smile, tongue midline.   MOTOR: LUNA AG  SENSATION: grossly intact to light touch    LABS:                        10.7   4.45  )-----------( 295      ( 23 Jul 2022 07:38 )             30.1     07-23    126<L>  |  90<L>  |  8.4  ----------------------------<  104<H>  4.1   |  27.0  |  0.53    Ca    8.9      23 Jul 2022 07:38  Phos  4.8     07-23  Mg     1.6     07-23 07-22 @ 07:01  -  07-23 @ 07:00  --------------------------------------------------------  IN: 1160 mL / OUT: 1850 mL / NET: -690 mL        RADIOLOGY & ADDITIONAL TESTS:  < from: CT Head No Cont (07.18.22 @ 00:58) >  IMPRESSION:  Status post right hemicraniectomy with placement of subgaleal drain and   evacuation of right subdural collection.  Decreased mass effect on the   right cerebral hemisphere.  Decreased effacement of the right lateral   ventricle.  Improved midline shift to the left, now measuring 9 mm,   compared to 1.4 cm preoperatively.    A small low-attenuation left frontal subdural collection measures   approximately 5 mm in caliber, compared to 6-7 mm on MRI from 07/17/2022.    Persistent dilatation of the temporal horns of both lateral ventricles,   compatible with hydrocephalus from ventricular entrapment.    A right zygomaticomaxillary complex fracture is partially visualized.      < end of copied text >    < from: CT Head No Cont (07.12.22 @ 20:21) >  IMPRESSION: Stable right frontal convexity extra-axial collection with   air-fluid level. Stable right to left midline shift, mass effect, and a   stable herniation patterns.    < end of copied text >    CAPRINI SCORE [CLOT]:  Patient has an estimated Caprini score of greater than 5.  However, the patient's unique clinical situation will be addressed in an individual manner to determine appropriate anticoagulation treatment, if any.    ASSESSMENT  42M unknown PMH presented with IPH/SDH s/p hemicraniectomy on 5/24, cranioplasty 6/29, ccb right crani for epidural collection on 7/17, POD 6.    PLAN  -case d/w team  - no acute neurosurgical intervention indicated at this time, plan for cranioplasty when appropriate  - con't neuro checks q4  - pain control as needed avoid over sedation  - con't Keppra 500 bid  - medicine following, reccs appreciated  - hyponatremic, 2% restarted temporarily, con't Na tabs, renal following recc fluid restriction <1L /day & starting Tolvaptan today.  - ID following, +surgical s/aureus on Cefepime/Vanc reccs appreciated  - SCDs for DVT ppx, Lovenox to resume tonight s/p drain removal  -PMR following reccs appreciated  -PT/OT. OOB with helmet only

## 2022-07-23 NOTE — CHART NOTE - NSCHARTNOTEFT_GEN_A_CORE
Patient was positioned flat. Drain was taken off suction. Dressing removed with no blood noted. Incision clean, dry, intact without drainage or dehiscence noted. MARCEL drain removed slowly with minimal drainage from site. 1 staple placed with cessation of drainage. Patient tolerated procedure well. RN/primary care team aware.

## 2022-07-23 NOTE — PROGRESS NOTE ADULT - SUBJECTIVE AND OBJECTIVE BOX
Patient seen and examined . Laying down in the bed , NAD , tracking  with eyes , moving LUE on his own , f/u simple commands  ( squeezes  my finger with LUE ) ,unable to follow with other extremities . Aphasic , mother at bed side     CC : UTO due to aphasia           LABS:                          10.7   4.45  )-----------( 295      ( 23 Jul 2022 07:38 )             30.1     07-23    126<L>  |  90<L>  |  8.4  ----------------------------<  104<H>  4.1   |  27.0  |  0.53    Ca    8.9      23 Jul 2022 07:38  Phos  4.8     07-23  Mg     1.6     07-23      RADIOLOGY & ADDITIONAL TESTS:    < from: CT Head No Cont (07.18.22 @ 00:58) >    ACC: [id number] EXAM:  CT BRAIN                          PROCEDURE DATE:  07/18/2022          INTERPRETATION:  CLINICAL INFORMATION: Alteration of consciousness.    Status post craniectomy.    TECHNIQUE:  Axial CT images were acquired through the head.  Intravenous contrast: None  Two-dimensional reformats were generated.    COMPARISON STUDY: CT head from 07/12/2022 and MRI brain from 07/17/2022.    < end of copied text >        REVIEW OF SYSTEMS:    UTO due to patient being aphasic     Vital Signs Last 24 Hrs  T(C): 36.7 (23 Jul 2022 08:16), Max: 37.2 (22 Jul 2022 15:10)  T(F): 98 (23 Jul 2022 08:16), Max: 98.9 (22 Jul 2022 15:10)  HR: 74 (23 Jul 2022 08:16) (59 - 82)  BP: 129/80 (23 Jul 2022 08:16) (111/69 - 130/87)  BP(mean): --  RR: 19 (23 Jul 2022 08:16) (15 - 19)  SpO2: 98% (23 Jul 2022 08:16) (97% - 99%)    Parameters below as of 23 Jul 2022 08:10  Patient On (Oxygen Delivery Method): room air      PHYSICAL EXAM:    GENERAL: NAD, well-groomed, well-developed  HEAD:  s/p R craniotomy , healing well , drain +   EYES: EOMI, PERRLA, conjunctiva and sclera clear  NECK: Supple, No JVD, Normal thyroid  NERVOUS SYSTEM: Awake , tracking , follow simple commands ,   aphasic   CHEST/LUNG: CTA b/l ,  no  rales, rhonchi, wheezing, or rubs  HEART: Regular rate and rhythm; No murmurs, rubs, or gallops  ABDOMEN: Soft, Nontender, Nondistended; Bowel sounds present  EXTREMITIES:  2+ Peripheral Pulses, No clubbing, cyanosis, or edema  LYMPH: No lymphadenopathy noted  SKIN: No rashes or lesions      Assessment and plan :  Patient is a 42yoM brought by EMS, found down in the street unresponsive.  Imaging showed SDH, IPH, TEOFILO.   Patient underwent Right decompressive hemicraniectomy on 5/24, trach/peg 6/1,  R cranioplasty with complex closure 6/29/22.  Cranioplasty  drain removed on 7/1.   s/p rpt right craniectomy for evacuation on 7/17 after MR demonstrated large right ED collection. On Abx   Trach (decanulated)/ PEG    1. SDH/IPH / TBI -   S/P R craniotomy / s/p R complex closure cranioplasty ,   repeated CTH  demonstrating right sided subdural collection mixed with air.  - Blood cultures 7/18 no growth   - fluid cultures reporting Staphylococcus aureus (MSSA) and 1 culture with Staph Epi (MRSE)  - ID is following with further recommendations :   - Continue Cefepime  - Continue  Vancomycin, dosed as per ID  - Monitor trough  - Monitor for Vancomycin toxicity     - Trend Fever  - Trend WBC  - neurochecks as per NS team   - Keppra 500 mg BID   - PT/OT/ speech therapy   - Can d/c cardiac monitor     2. Dysphagia - on TF / pure diet     3. Hyponatremia - w/u c/w SIADH , renal appreciated -  NS 2% 50 ml x 6 hrs given last night ,   Na- 124--> Na --> 128 , this am again dropped down to 126. Renal noted and appreciated ,   awaiting for this am f/u and recommendations   Repeat BMP in am     4. Hypomagnesemia - supplement as needed    6. Anemia - likely surgical blood lost - stable     7. DVT prophylaxis  - as per primary team .     8. Mag - 1.6 - will supplement     Will follow along with you .

## 2022-07-23 NOTE — PROGRESS NOTE ADULT - SUBJECTIVE AND OBJECTIVE BOX
[facility]  INFECTIOUS DISEASES at [place] and [place]  =======================================================                               Nestor Russo MD#  Mitesh Woodward MD*                                     Nyla Orantes MD*    Vera Bishop MD*            Diplomates American Board of Internal Medicine & Infectious Diseases                # [place] Office - Appt - Tel  [phone] Fax [phone]                * [place] Office - Appt - Tel [phone] Fax [phone]                                  Hospital Consult line:  [phone]  =======================================================    PASTOR KEMAR [id number]    Follow up: Epidural fluid collection     No fever       Allergies:  Allergy Status Unknown      REVIEW OF SYSTEMS:  Unable to obtain due to medical condition       Physical Exam:  GEN: NAD  HEENT: Surgical bandage of craniectomy site.  Anicteric   NECK: Supple.   LUNGS: diffuse rhonchi   HEART: Regular rate and rhythm   ABDOMEN: Soft, nontender, and nondistended.  Positive bowel sounds.    : Howard   EXTREMITIES: trace edema.  MSK: no joint swelling  NEUROLOGIC: Awake  PSYCHIATRIC: unable to assess  SKIN: No Rash      Vitals:    T(F): 98 (23 Jul 2022 08:16), Max: 98.9 (22 Jul 2022 15:10)  HR: 74 (23 Jul 2022 08:16)  BP: 129/80 (23 Jul 2022 08:16)  RR: 19 (23 Jul 2022 08:16)  SpO2: 98% (23 Jul 2022 08:16) (97% - 99%)  temp max in last 48H T(F): , Max: 98.9 (07-22-22 @ 15:10)    Current Antibiotics:  cefepime   IVPB 2000 milliGRAM(s) IV Intermittent every 8 hours  vancomycin  IVPB 1250 milliGRAM(s) IV Intermittent every 12 hours    Other medications:  amantadine Syrup 100 milliGRAM(s) Oral <User Schedule>  chlorhexidine 2% Cloths 1 Application(s) Topical daily  levETIRAcetam  Solution 500 milliGRAM(s) Oral two times a day  melatonin 5 milliGRAM(s) Oral at bedtime  modafinil 200 milliGRAM(s) Oral <User Schedule>  polyethylene glycol 3350 17 Gram(s) Oral daily  senna 2 Tablet(s) Oral at bedtime  sodium chloride 2 Gram(s) Oral every 8 hours  sodium chloride 2% . 1000 milliLiter(s) IV Continuous <Continuous>                            10.7   4.45  )-----------( 295      ( 23 Jul 2022 07:38 )             30.1     07-23    126<L>  |  90<L>  |  8.4  ----------------------------<  104<H>  4.1   |  27.0  |  0.53    Ca    8.9      23 Jul 2022 07:38  Phos  4.8     07-23  Mg     1.6     07-23      RECENT CULTURES:  07-18 @ 22:05 .Blood Blood-Peripheral     No growth to date.    07-18 @ 22:03 .Blood Blood-Peripheral     No growth to date.    07-18 @ 17:06 Bone Bone Flap     Culture is being performed.    07-18 @ 17:03 .Tissue Bone Flap Staphylococcus aureus    Few Staphylococcus aureus  Rare polymorphonuclear leukocytes per low power field  No organisms seen    07-18 @ 16:57 .Body Fluid #1 Epidura Fluid Staphylococcus aureus  Staphylococcus epidermidis    Few Staphylococcus aureus  Few Staphylococcus epidermidis  Moderate polymorphonuclear leukocytes seen per low power field  Few Gram positive cocci in pairs seen per oil power field    07-18 @ 16:34 .Tissue Duragen Staphylococcus aureus    Few Staphylococcus aureus  Few polymorphonuclear leukocytes per low power field  No organisms seen        WBC Count: 4.45 K/uL (07-23-22 @ 07:38)  WBC Count: 4.14 K/uL (07-22-22 @ 05:19)  WBC Count: 2.97 K/uL (07-21-22 @ 06:56)  WBC Count: 4.94 K/uL (07-20-22 @ 02:48)  WBC Count: 5.55 K/uL (07-19-22 @ 04:35)    Creatinine, Serum: 0.53 mg/dL (07-23-22 @ 07:38)  Creatinine, Serum: 0.58 mg/dL (07-22-22 @ 20:34)  Creatinine, Serum: 0.54 mg/dL (07-22-22 @ 12:39)  Creatinine, Serum: 0.52 mg/dL (07-22-22 @ 05:19)  Creatinine, Serum: 0.46 mg/dL (07-21-22 @ 06:56)  Creatinine, Serum: 0.44 mg/dL (07-20-22 @ 02:48)  Creatinine, Serum: 0.43 mg/dL (07-19-22 @ 17:20)  Creatinine, Serum: 0.47 mg/dL (07-19-22 @ 04:35)  Creatinine, Serum: 0.45 mg/dL (07-18-22 @ 16:20)    C-Reactive Protein, Serum: 12 mg/L (07-10-22 @ 07:17)    Sedimentation Rate, Erythrocyte: 56 mm/hr (07-10-22 @ 07:17)     COVID-19 PCR: NotDetec (07-17-22 @ 17:50)  COVID-19 PCR: NotDetec (07-10-22 @ 14:17)  COVID-19 PCR: NotDetec (07-06-22 @ 06:27)  COVID-19 PCR: NotDetec (06-29-22 @ 08:34)  COVID-19 PCR: NotDetec (06-27-22 @ 21:10)  COVID-19 PCR: NotDetec (06-26-22 @ 06:15)          < from: CT Head No Cont (07.18.22 @ 00:58) >  ACC: [id number] EXAM:  CT BRAIN                          PROCEDURE DATE:  07/18/2022      INTERPRETATION:  CLINICAL INFORMATION: Alteration of consciousness.    Status post craniectomy.    TECHNIQUE:  Axial CT images were acquired through the head.  Intravenous contrast: None  Two-dimensional reformats were generated.    COMPARISON STUDY: CT head from 07/12/2022 and MRI brain from 07/17/2022.    FINDINGS:  The patient is status post right hemicraniectomy and placement of a   subgaleal drain.There is near complete evacuation of the previously   seen right frontoparietal extra-axial collection.  There is a small small   residual extra-axial of gas and trace postoperative blood products, that   measures approximately 8 mm in thickness, decreased from 3.3 cm on   07/25/2022.  Mild pneumocephalus is also seen lateral to the right   temporal lobe.  There is improved mass effect on the right cerebral   hemisphere with decreased effacement of the right lateral ventricle.    Approximately 9 mm midline shift to the left is improved from 1.4 cm on   07/17/2022.  A small low-attenuation left frontal subdural collection   measures approximately 5 mm in caliber (3:43), compared to 6-7 mm on MRI   07/17/2022.  There is persistent dilatation ofthe temporal horns of both   lateral ventricles, compatible with hydrocephalus from ventricular   entrapment s.    There is no evidence of acute intracranial hemorrhage, central herniation   or acute territorial infarct.    A right zygomaticomaxillary complex fracture is partially visualized.    There is mild mucosal thickening the maxillary sinuses bilaterally.    The mastoids are clear bilaterally.    The orbits appear within normal limits.    There is no calvarial or skull base fracture.    IMPRESSION:  Status post right hemicraniectomy with placement of subgaleal drain and   evacuation of right subdural collection.  Decreased mass effect on the   right cerebral hemisphere.  Decreased effacement of the right lateral   ventricle.  Improved midline shift to the left, now measuring 9 mm,   compared to 1.4 cm preoperatively.    A small low-attenuation left frontal subdural collection measures   approximately 5 mm in caliber, compared to 6-7 mm on MRI from 07/17/2022.    Persistent dilatation of the temporal horns of both lateral ventricles,   compatible with hydrocephalus from ventricular entrapment.    A right zygomaticomaxillary complex fracture is partially visualized.    --- End of Report ---  < end of copied text >

## 2022-07-24 LAB
ANION GAP SERPL CALC-SCNC: 8 MMOL/L — SIGNIFICANT CHANGE UP (ref 5–17)
BUN SERPL-MCNC: 9.5 MG/DL — SIGNIFICANT CHANGE UP (ref 8–20)
CALCIUM SERPL-MCNC: 8.7 MG/DL — SIGNIFICANT CHANGE UP (ref 8.6–10.2)
CHLORIDE SERPL-SCNC: 95 MMOL/L — LOW (ref 98–107)
CO2 SERPL-SCNC: 27 MMOL/L — SIGNIFICANT CHANGE UP (ref 22–29)
CREAT SERPL-MCNC: 0.65 MG/DL — SIGNIFICANT CHANGE UP (ref 0.5–1.3)
EGFR: 121 ML/MIN/1.73M2 — SIGNIFICANT CHANGE UP
GLUCOSE SERPL-MCNC: 101 MG/DL — HIGH (ref 70–99)
MAGNESIUM SERPL-MCNC: 1.8 MG/DL — SIGNIFICANT CHANGE UP (ref 1.6–2.6)
POTASSIUM SERPL-MCNC: 4.2 MMOL/L — SIGNIFICANT CHANGE UP (ref 3.5–5.3)
POTASSIUM SERPL-SCNC: 4.2 MMOL/L — SIGNIFICANT CHANGE UP (ref 3.5–5.3)
SODIUM SERPL-SCNC: 130 MMOL/L — LOW (ref 135–145)
VANCOMYCIN TROUGH SERPL-MCNC: 6.9 UG/ML — LOW (ref 10–20)

## 2022-07-24 PROCEDURE — 99232 SBSQ HOSP IP/OBS MODERATE 35: CPT

## 2022-07-24 PROCEDURE — 99233 SBSQ HOSP IP/OBS HIGH 50: CPT

## 2022-07-24 RX ORDER — VANCOMYCIN HCL 1 G
1500 VIAL (EA) INTRAVENOUS EVERY 12 HOURS
Refills: 0 | Status: DISCONTINUED | OUTPATIENT
Start: 2022-07-24 | End: 2022-07-28

## 2022-07-24 RX ORDER — TOLVAPTAN 15 MG/1
15 TABLET ORAL ONCE
Refills: 0 | Status: COMPLETED | OUTPATIENT
Start: 2022-07-24 | End: 2022-07-24

## 2022-07-24 RX ADMIN — Medication 166.67 MILLIGRAM(S): at 09:53

## 2022-07-24 RX ADMIN — CEFEPIME 100 MILLIGRAM(S): 1 INJECTION, POWDER, FOR SOLUTION INTRAMUSCULAR; INTRAVENOUS at 16:12

## 2022-07-24 RX ADMIN — LEVETIRACETAM 500 MILLIGRAM(S): 250 TABLET, FILM COATED ORAL at 05:15

## 2022-07-24 RX ADMIN — ENOXAPARIN SODIUM 40 MILLIGRAM(S): 100 INJECTION SUBCUTANEOUS at 18:29

## 2022-07-24 RX ADMIN — MODAFINIL 200 MILLIGRAM(S): 200 TABLET ORAL at 05:15

## 2022-07-24 RX ADMIN — Medication 5 MILLIGRAM(S): at 22:04

## 2022-07-24 RX ADMIN — SODIUM CHLORIDE 2 GRAM(S): 9 INJECTION INTRAMUSCULAR; INTRAVENOUS; SUBCUTANEOUS at 05:15

## 2022-07-24 RX ADMIN — Medication 300 MILLIGRAM(S): at 22:04

## 2022-07-24 RX ADMIN — CEFEPIME 100 MILLIGRAM(S): 1 INJECTION, POWDER, FOR SOLUTION INTRAMUSCULAR; INTRAVENOUS at 05:16

## 2022-07-24 RX ADMIN — CEFEPIME 100 MILLIGRAM(S): 1 INJECTION, POWDER, FOR SOLUTION INTRAMUSCULAR; INTRAVENOUS at 22:00

## 2022-07-24 RX ADMIN — LEVETIRACETAM 500 MILLIGRAM(S): 250 TABLET, FILM COATED ORAL at 18:28

## 2022-07-24 RX ADMIN — CHLORHEXIDINE GLUCONATE 1 APPLICATION(S): 213 SOLUTION TOPICAL at 16:13

## 2022-07-24 RX ADMIN — SODIUM CHLORIDE 2 GRAM(S): 9 INJECTION INTRAMUSCULAR; INTRAVENOUS; SUBCUTANEOUS at 22:40

## 2022-07-24 RX ADMIN — SODIUM CHLORIDE 50 MILLILITER(S): 5 INJECTION, SOLUTION INTRAVENOUS at 22:00

## 2022-07-24 RX ADMIN — SODIUM CHLORIDE 2 GRAM(S): 9 INJECTION INTRAMUSCULAR; INTRAVENOUS; SUBCUTANEOUS at 14:38

## 2022-07-24 RX ADMIN — SENNA PLUS 2 TABLET(S): 8.6 TABLET ORAL at 22:01

## 2022-07-24 RX ADMIN — Medication 100 MILLIGRAM(S): at 14:38

## 2022-07-24 RX ADMIN — POLYETHYLENE GLYCOL 3350 17 GRAM(S): 17 POWDER, FOR SOLUTION ORAL at 14:39

## 2022-07-24 RX ADMIN — TOLVAPTAN 15 MILLIGRAM(S): 15 TABLET ORAL at 18:29

## 2022-07-24 RX ADMIN — Medication 100 MILLIGRAM(S): at 05:15

## 2022-07-24 NOTE — PROGRESS NOTE ADULT - SUBJECTIVE AND OBJECTIVE BOX
HPI: 42 year old male with extended stay due to complicated medical course. POD #7 s/p removal right cranioplasty. no overnight events.     Vital Signs Last 24 Hrs  T(C): 36.7 (24 Jul 2022 07:25), Max: 37.2 (23 Jul 2022 16:53)  T(F): 98.1 (24 Jul 2022 07:25), Max: 98.9 (23 Jul 2022 16:53)  HR: 55 (24 Jul 2022 07:25) (55 - 88)  BP: 101/65 (24 Jul 2022 07:25) (101/65 - 125/70)  BP(mean): --  RR: 18 (24 Jul 2022 07:25) (18 - 18)  SpO2: 98% (24 Jul 2022 07:25) (96% - 98%)    Parameters below as of 24 Jul 2022 08:00  Patient On (Oxygen Delivery Method): room air        MEDICATIONS  (STANDING):  amantadine Syrup 100 milliGRAM(s) Oral <User Schedule>  cefepime   IVPB 2000 milliGRAM(s) IV Intermittent every 8 hours  chlorhexidine 2% Cloths 1 Application(s) Topical daily  enoxaparin Injectable 40 milliGRAM(s) SubCutaneous every 24 hours  levETIRAcetam  Solution 500 milliGRAM(s) Oral two times a day  melatonin 5 milliGRAM(s) Oral at bedtime  modafinil 200 milliGRAM(s) Oral <User Schedule>  polyethylene glycol 3350 17 Gram(s) Oral daily  senna 2 Tablet(s) Oral at bedtime  sodium chloride 2 Gram(s) Oral every 8 hours  sodium chloride 2% . 1000 milliLiter(s) (50 mL/Hr) IV Continuous <Continuous>  vancomycin  IVPB 1250 milliGRAM(s) IV Intermittent every 12 hours    MEDICATIONS  (PRN):  labetalol Injectable 10 milliGRAM(s) IV Push every 4 hours PRN Systolic blood pressure > 140  ondansetron Injectable 4 milliGRAM(s) IV Push every 4 hours PRN Nausea and/or Vomiting      PHYSICAL EXAM:      Constitutional: awake and alert.  HEENT: PERRLA, EOMI,   Respiratory: Breath sounds are clear bilaterally  Cardiovascular: S1 and S2, regular   Gastrointestinal: soft, nontender  Extremities:  no edema  Musculoskeletal: no joint swelling/tenderness, no abnormal movements  Skin: No rashes    Neurological exam:  HF: A x O x 2 knows name and place, unable to state year. PERRL, EOMI, face appears symmetrical, follows commands, speech is clear non fluent able to answer simple questions with 1 word answers LUE 5/5 LLE 5/5 RUE 1+/5 RLE 2+/5   sensation intact to light touch grossly.     incision appears c/d/i     LABS:                         10.7   4.45  )-----------( 295      ( 23 Jul 2022 07:38 )             30.1     07-24    130<L>  |  95<L>  |  9.5  ----------------------------<  101<H>  4.2   |  27.0  |  0.65    Ca    8.7      24 Jul 2022 08:44  Phos  4.8     07-23  Mg     1.8     07-24      A/P: 42M unknown PMH presented with IPH/SDH s/p hemicraniectomy on 5/24, cranioplasty 6/29, ccb right crani for epidural collection on 7/17, POD 7.    PLAN  -case d/w team  - seen on rounds with Dr. Kelly   - no acute neurosurgical intervention indicated at this time, plan for cranioplasty when appropriate  - con't neuro checks q4  - pain control as needed avoid over sedation  - con't Keppra 500 bid  - medicine following and appreciated   - hyponatremic, 2% restarted temporarily, con't Na tabs, renal following recc fluid restriction <1L /day & starting Tolvaptan today was unable to receive from pharmacy yesterday. appreciate nephrology input.   - ID following, +surgical s/aureus on Cefepime/Vanc reccs appreciated  - will order vanco trough   - SCDs for DVT ppx, Lovenox   -PMR following reccs appreciated  -PT/OT. OOB with helmet only

## 2022-07-24 NOTE — PROGRESS NOTE ADULT - SUBJECTIVE AND OBJECTIVE BOX
Patient seen and examined . AAOX2 to self and Hospital not to year , conversant , answering questions with yes or no .   Mother at bed side , recognizes mother . Following command with all 4 extremity . Denies pain , sob . Drain removed yesterday .     CC : denies any         MEDICATIONS  (STANDING):  amantadine Syrup 100 milliGRAM(s) Oral <User Schedule>  cefepime   IVPB 2000 milliGRAM(s) IV Intermittent every 8 hours  chlorhexidine 2% Cloths 1 Application(s) Topical daily  enoxaparin Injectable 40 milliGRAM(s) SubCutaneous every 24 hours  levETIRAcetam  Solution 500 milliGRAM(s) Oral two times a day  melatonin 5 milliGRAM(s) Oral at bedtime  modafinil 200 milliGRAM(s) Oral <User Schedule>  polyethylene glycol 3350 17 Gram(s) Oral daily  senna 2 Tablet(s) Oral at bedtime  sodium chloride 2 Gram(s) Oral every 8 hours  sodium chloride 2% . 1000 milliLiter(s) (50 mL/Hr) IV Continuous <Continuous>  vancomycin  IVPB 1500 milliGRAM(s) IV Intermittent every 12 hours    MEDICATIONS  (PRN):  labetalol Injectable 10 milliGRAM(s) IV Push every 4 hours PRN Systolic blood pressure > 140  ondansetron Injectable 4 milliGRAM(s) IV Push every 4 hours PRN Nausea and/or Vomiting      LABS:                          10.7   4.45  )-----------( 295      ( 23 Jul 2022 07:38 )             30.1     07-24    130<L>  |  95<L>  |  9.5  ----------------------------<  101<H>  4.2   |  27.0  |  0.65    Ca    8.7      24 Jul 2022 08:44  Phos  4.8     07-23  Mg     1.8     07-24 07-18 @ 22:05 .Blood Blood-Peripheral     No Growth Final    07-18 @ 22:03 .Blood Blood-Peripheral     No Growth Final    07-18 @ 17:06 Bone Bone Flap     Culture is being performed.    07-18 @ 17:03 .Tissue Bone Flap Staphylococcus aureus    Few Staphylococcus aureus  Rare polymorphonuclear leukocytes per low power field  No organisms seen    07-18 @ 16:57 .Body Fluid #1 Epidura Fluid Staphylococcus aureus  Staphylococcus epidermidis    Few Staphylococcus aureus  Few Staphylococcus epidermidis    Moderate polymorphonuclear leukocytes seen per low power field  Few Gram positive cocci in pairs seen per oil power field      07-18 @ 16:34 .Tissue Duragen Staphylococcus aureus    Few Staphylococcus aureus  Few polymorphonuclear leukocytes per low power field  No organisms seen          RADIOLOGY & ADDITIONAL TESTS:    < from: CT Head No Cont (07.18.22 @ 00:58) >    ACC: [id number] EXAM:  CT BRAIN                          PROCEDURE DATE:  07/18/2022      < end of copied text >        REVIEW OF SYSTEMS:    all systems are reviewed     Vital Signs Last 24 Hrs  T(C): 36.7 (24 Jul 2022 07:25), Max: 37.2 (23 Jul 2022 16:53)  T(F): 98.1 (24 Jul 2022 07:25), Max: 98.9 (23 Jul 2022 16:53)  HR: 55 (24 Jul 2022 07:25) (55 - 88)  BP: 101/65 (24 Jul 2022 07:25) (101/65 - 125/70)  BP(mean): --  RR: 18 (24 Jul 2022 07:25) (18 - 18)  SpO2: 98% (24 Jul 2022 07:25) (96% - 98%)    Parameters below as of 24 Jul 2022 08:00  Patient On (Oxygen Delivery Method): room air      PHYSICAL EXAM:    GENERAL: NAD, well-groomed, well-developed  HEAD:  s/p R craniotomy , surgical site clean , drain removed   EYES: EOMI, PERRLA, conjunctiva and sclera clear  NECK: Supple, No JVD, Normal thyroid  NERVOUS SYSTEM:  Alert & Oriented x2 , follows simple commands ,   moving all 4 ext on his own , L side 5/5 , RUE 1/5, RLE 2/5   CHEST/LUNG: CTA b/l ,  no  rales, rhonchi, wheezing, or rubs  HEART: Regular rate and rhythm; No murmurs, rubs, or gallops  ABDOMEN: Soft, Nontender, Nondistended; Bowel sounds present  EXTREMITIES:  2+ Peripheral Pulses, No clubbing, cyanosis, or edema  LYMPH: No lymphadenopathy noted  SKIN: No rashes or lesions      ASSESSMENT AND PLAN:     Patient is a 42yoM brought by EMS, found down in the street unresponsive.  Imaging showed SDH, IPH, TEOFILO.   Patient underwent Right decompressive hemicraniectomy on 5/24, trach/peg 6/1,  R cranioplasty with complex closure 6/29/22.  Cranioplasty  drain removed .   s/p rpt right craniectomy for evacuation on 7/17 after MR demonstrated large right ED collection. On Abx as per ID . 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   Renal following , on 2% NS, Na tabs- today Na - 130 . Renal follow up today pending .   Trach (decanulated)/ PEG +     1. SDH/IPH / TBI -   S/P R craniotomy / s/p R complex closure cranioplasty ,   repeated CTH  demonstrating right sided subdural collection mixed with air.  s/p rpt right craniectomy for evacuation on 7/17 after MR demonstrated large right ED collection.   Drain removed   - Blood cultures 7/18 no growth   - fluid cultures reporting Staphylococcus aureus (MSSA) and 1 culture with Staph Epi (MRSE)  - ID is following with further recommendations :   - Continue Cefepime  - Continue  Vancomycin, dosed as per ID  - Monitor trough  - Monitor for Vancomycin toxicity   - today patient much more awake / alert - following commands , conversant   - continue PT/OT/ speech therapy   - Trend Fever  - Trend WBC  - neurochecks as per NS team   - Keppra 500 mg BID - as per NS     - Can d/c cardiac monitor     2. Dysphagia - on TF / pure diet     3. Hyponatremia - w/u c/w SIADH , renal appreciated -  NS 2% 50 ml Na tabs -  continued   Plan for Tolvapatan - as per renal   Na- 124--> Na --> 128- 130      Repeat BMP in am     4. Hypomagnesemia - supplemented - add Magnesium 400 mg TID     6. Anemia - likely surgical blood lost - stable     7. DVT prophylaxis  - as per primary team .     Will follow along with you .

## 2022-07-24 NOTE — PROGRESS NOTE ADULT - SUBJECTIVE AND OBJECTIVE BOX
[facility]  INFECTIOUS DISEASES at [place] and [place]  =======================================================                               Nestor Russo MD#  Mitesh Woodward MD*                                     Nyla Orantes MD*    Vera Bishop MD*            Diplomates American Board of Internal Medicine & Infectious Diseases                # [place] Office - Appt - Tel  [phone] Fax [phone]                * [place] Office - Appt - Tel [phone] Fax [phone]                                  Hospital Consult line:  [phone]  =======================================================    PASTOR KEMAR [id number]    Follow up: Epidural fluid collection     No fever       Allergies:  Allergy Status Unknown      REVIEW OF SYSTEMS:  Unable to obtain due to medical condition       Physical Exam:  GEN: NAD  HEENT: Surgical bandage of craniectomy site.  Anicteric   NECK: Supple.   LUNGS: diffuse rhonchi   HEART: Regular rate and rhythm   ABDOMEN: Soft, nontender, and nondistended.  Positive bowel sounds.    : Howard   EXTREMITIES: trace edema.  MSK: no joint swelling  NEUROLOGIC: Awake  PSYCHIATRIC: unable to assess  SKIN: No Rash      Vitals:  T(F): 97.8 (24 Jul 2022 05:20), Max: 98.9 (23 Jul 2022 16:53)  HR: 87 (24 Jul 2022 05:20)  BP: 110/58 (24 Jul 2022 05:20)  RR: 18 (24 Jul 2022 05:20)  SpO2: 96% (24 Jul 2022 05:20) (96% - 98%)  temp max in last 48H T(F): , Max: 98.9 (07-22-22 @ 15:10)      Current Antibiotics:  cefepime   IVPB 2000 milliGRAM(s) IV Intermittent every 8 hours  vancomycin  IVPB 1250 milliGRAM(s) IV Intermittent every 12 hours    Other medications:  amantadine Syrup 100 milliGRAM(s) Oral <User Schedule>  chlorhexidine 2% Cloths 1 Application(s) Topical daily  enoxaparin Injectable 40 milliGRAM(s) SubCutaneous every 24 hours  levETIRAcetam  Solution 500 milliGRAM(s) Oral two times a day  melatonin 5 milliGRAM(s) Oral at bedtime  modafinil 200 milliGRAM(s) Oral <User Schedule>  polyethylene glycol 3350 17 Gram(s) Oral daily  senna 2 Tablet(s) Oral at bedtime  sodium chloride 2 Gram(s) Oral every 8 hours  sodium chloride 2% . 1000 milliLiter(s) IV Continuous <Continuous>                            10.7   4.45  )-----------( 295      ( 23 Jul 2022 07:38 )             30.1     07-23    125<L>  |  88<L>  |  8.2  ----------------------------<  120<H>  4.2   |  25.0  |  0.50    Ca    8.8      23 Jul 2022 13:00  Phos  4.8     07-23  Mg     1.6     07-23      RECENT CULTURES:  07-18 @ 22:05 .Blood Blood-Peripheral     No Growth Final    07-18 @ 22:03 .Blood Blood-Peripheral     No Growth Final    07-18 @ 17:06 Bone Bone Flap     Culture is being performed.    07-18 @ 17:03 .Tissue Bone Flap Staphylococcus aureus    Few Staphylococcus aureus  Rare polymorphonuclear leukocytes per low power field  No organisms seen    07-18 @ 16:57 .Body Fluid #1 Epidura Fluid Staphylococcus aureus  Staphylococcus epidermidis    Few Staphylococcus aureus  Few Staphylococcus epidermidis    Moderate polymorphonuclear leukocytes seen per low power field  Few Gram positive cocci in pairs seen per oil power field      07-18 @ 16:34 .Tissue Duragen Staphylococcus aureus    Few Staphylococcus aureus  Few polymorphonuclear leukocytes per low power field  No organisms seen      WBC Count: 4.45 K/uL (07-23-22 @ 07:38)  WBC Count: 4.14 K/uL (07-22-22 @ 05:19)  WBC Count: 2.97 K/uL (07-21-22 @ 06:56)  WBC Count: 4.94 K/uL (07-20-22 @ 02:48)    Creatinine, Serum: 0.50 mg/dL (07-23-22 @ 13:00)  Creatinine, Serum: 0.53 mg/dL (07-23-22 @ 07:38)  Creatinine, Serum: 0.58 mg/dL (07-22-22 @ 20:34)  Creatinine, Serum: 0.54 mg/dL (07-22-22 @ 12:39)  Creatinine, Serum: 0.52 mg/dL (07-22-22 @ 05:19)  Creatinine, Serum: 0.46 mg/dL (07-21-22 @ 06:56)  Creatinine, Serum: 0.44 mg/dL (07-20-22 @ 02:48)  Creatinine, Serum: 0.43 mg/dL (07-19-22 @ 17:20)    COVID-19 PCR: NotDetec (07-17-22 @ 17:50)  COVID-19 PCR: NotDetec (07-10-22 @ 14:17)  COVID-19 PCR: NotDetec (07-06-22 @ 06:27)  COVID-19 PCR: NotDetec (06-29-22 @ 08:34)  COVID-19 PCR: NotDetec (06-27-22 @ 21:10)  COVID-19 PCR: NotDetec (06-26-22 @ 06:15)

## 2022-07-24 NOTE — PROGRESS NOTE ADULT - SUBJECTIVE AND OBJECTIVE BOX
Subjective: No acute overnight event.  Serum sodium 126-->125-->130    ROS: All systems were reviewed in detail and negative except as detailed in HPI.    Medications: reviewed     Vital Signs Last 24 Hrs  T(C): 36.7 (24 Jul 2022 07:25), Max: 37.2 (23 Jul 2022 16:53)  T(F): 98.1 (24 Jul 2022 07:25), Max: 98.9 (23 Jul 2022 16:53)  HR: 55 (24 Jul 2022 07:25) (55 - 88)  BP: 101/65 (24 Jul 2022 07:25) (101/65 - 125/70)  BP(mean): --  RR: 18 (24 Jul 2022 07:25) (18 - 18)  SpO2: 98% (24 Jul 2022 07:25) (96% - 98%)    Parameters below as of 24 Jul 2022 08:00  Patient On (Oxygen Delivery Method): room air        Physical Exam:  Gen: no acute distress  MS: awake   Eyes: EOMI, no icterus  HENT: Surgical dressing of craniectomy site, MMM, trach  CV: rhythm reg reg, rate normal, no m/g/r, no LE edema  Chest: CTAB, no w/r/r,  Abd: soft, NT, ND  Neuro: moving all 4 limbs spontaneously, no tremor  MSK: normal bulk and tone, no joint swelling  Skin: dry, warm, no rash or jaundice    Input and Output:    CBC:            10.7   4.45  )-----------( 295      ( 07-23-22 @ 07:38 )             30.1         Chem:         ( 07-24-22 @ 08:44 )    130<L>  |  95<L>  |  9.5  ----------------------------<  101<H>  4.2   |  27.0  |  0.65        Liver Functions:     Type & Screen:         	  IMPRESSION: 42y  Male initially admitted on 5/24/22 after being found down and was found to have at that time Bilateral IPH, Bilateral SDH. Prolonged hospital course: s/p craniotomy and evacuation of SDH 5/24, s/p trach 6/1, s/p cranioplasty and replacement of bone flap 6/29, s/p R hemicraniectomy, wound exploration, evacuation of epidural fluid collection 7/17.  1.  Hyponatremia    RECOMMENDATIONS:    Hyponatremia in relative euvolemic pt w/ high Manuela and UOsm indicates inappropriate ADH secretion.  Recommend free fluid restriction <  1L/day  Giving tolvapatan 15 mg x 1 today  Can d/c 2%NS once patient gets tolvaptan

## 2022-07-24 NOTE — PROGRESS NOTE ADULT - NS ATTEND AMEND GEN_ALL_CORE FT
Neurosurgery Attending Attestation:    Patient seen and examined at bedside. Agree with plan and note as documented above.     Neurologically doing well with unchanged exam. Hyponatremia recs per renal and abx rec per ID. Dispo planning

## 2022-07-25 LAB
ANION GAP SERPL CALC-SCNC: 10 MMOL/L — SIGNIFICANT CHANGE UP (ref 5–17)
BUN SERPL-MCNC: 10.6 MG/DL — SIGNIFICANT CHANGE UP (ref 8–20)
CALCIUM SERPL-MCNC: 8.8 MG/DL — SIGNIFICANT CHANGE UP (ref 8.6–10.2)
CHLORIDE SERPL-SCNC: 100 MMOL/L — SIGNIFICANT CHANGE UP (ref 98–107)
CO2 SERPL-SCNC: 25 MMOL/L — SIGNIFICANT CHANGE UP (ref 22–29)
CREAT SERPL-MCNC: 0.64 MG/DL — SIGNIFICANT CHANGE UP (ref 0.5–1.3)
EGFR: 121 ML/MIN/1.73M2 — SIGNIFICANT CHANGE UP
GLUCOSE SERPL-MCNC: 106 MG/DL — HIGH (ref 70–99)
MAGNESIUM SERPL-MCNC: 1.7 MG/DL — SIGNIFICANT CHANGE UP (ref 1.6–2.6)
POTASSIUM SERPL-MCNC: 4.3 MMOL/L — SIGNIFICANT CHANGE UP (ref 3.5–5.3)
POTASSIUM SERPL-SCNC: 4.3 MMOL/L — SIGNIFICANT CHANGE UP (ref 3.5–5.3)
SODIUM SERPL-SCNC: 135 MMOL/L — SIGNIFICANT CHANGE UP (ref 135–145)

## 2022-07-25 PROCEDURE — 99233 SBSQ HOSP IP/OBS HIGH 50: CPT

## 2022-07-25 PROCEDURE — 99232 SBSQ HOSP IP/OBS MODERATE 35: CPT

## 2022-07-25 PROCEDURE — 99233 SBSQ HOSP IP/OBS HIGH 50: CPT | Mod: GC

## 2022-07-25 RX ADMIN — Medication 300 MILLIGRAM(S): at 22:09

## 2022-07-25 RX ADMIN — Medication 300 MILLIGRAM(S): at 09:34

## 2022-07-25 RX ADMIN — SODIUM CHLORIDE 2 GRAM(S): 9 INJECTION INTRAMUSCULAR; INTRAVENOUS; SUBCUTANEOUS at 05:29

## 2022-07-25 RX ADMIN — LEVETIRACETAM 500 MILLIGRAM(S): 250 TABLET, FILM COATED ORAL at 05:29

## 2022-07-25 RX ADMIN — SODIUM CHLORIDE 2 GRAM(S): 9 INJECTION INTRAMUSCULAR; INTRAVENOUS; SUBCUTANEOUS at 15:04

## 2022-07-25 RX ADMIN — CEFEPIME 100 MILLIGRAM(S): 1 INJECTION, POWDER, FOR SOLUTION INTRAMUSCULAR; INTRAVENOUS at 15:04

## 2022-07-25 RX ADMIN — SODIUM CHLORIDE 2 GRAM(S): 9 INJECTION INTRAMUSCULAR; INTRAVENOUS; SUBCUTANEOUS at 22:10

## 2022-07-25 RX ADMIN — MODAFINIL 200 MILLIGRAM(S): 200 TABLET ORAL at 05:29

## 2022-07-25 RX ADMIN — CHLORHEXIDINE GLUCONATE 1 APPLICATION(S): 213 SOLUTION TOPICAL at 15:04

## 2022-07-25 RX ADMIN — SENNA PLUS 2 TABLET(S): 8.6 TABLET ORAL at 22:10

## 2022-07-25 RX ADMIN — ENOXAPARIN SODIUM 40 MILLIGRAM(S): 100 INJECTION SUBCUTANEOUS at 18:50

## 2022-07-25 RX ADMIN — Medication 5 MILLIGRAM(S): at 22:10

## 2022-07-25 RX ADMIN — CEFEPIME 100 MILLIGRAM(S): 1 INJECTION, POWDER, FOR SOLUTION INTRAMUSCULAR; INTRAVENOUS at 05:35

## 2022-07-25 RX ADMIN — CEFEPIME 100 MILLIGRAM(S): 1 INJECTION, POWDER, FOR SOLUTION INTRAMUSCULAR; INTRAVENOUS at 22:09

## 2022-07-25 RX ADMIN — POLYETHYLENE GLYCOL 3350 17 GRAM(S): 17 POWDER, FOR SOLUTION ORAL at 15:03

## 2022-07-25 RX ADMIN — LEVETIRACETAM 500 MILLIGRAM(S): 250 TABLET, FILM COATED ORAL at 18:49

## 2022-07-25 RX ADMIN — Medication 100 MILLIGRAM(S): at 05:30

## 2022-07-25 RX ADMIN — Medication 100 MILLIGRAM(S): at 15:03

## 2022-07-25 NOTE — PROGRESS NOTE ADULT - SUBJECTIVE AND OBJECTIVE BOX
PASTOR BASHIRTA    [id number]    42y      Male    Patient is a 42y old  Male who presents with a chief complaint of Trauma/ Subdural/ Intubated (25 Jul 2022 10:43)      INTERVAL HPI/OVERNIGHT EVENTS:    Able to answer simple question, as Per him he has no sob, or chest pain, headache     REVIEW OF SYSTEMS:    Limited        Vital Signs Last 24 Hrs  T(C): 36.8 (25 Jul 2022 07:28), Max: 36.9 (24 Jul 2022 16:46)  T(F): 98.2 (25 Jul 2022 07:28), Max: 98.5 (24 Jul 2022 16:46)  HR: 66 (25 Jul 2022 07:28) (64 - 76)  BP: 106/68 (25 Jul 2022 07:28) (82/67 - 127/81)  BP(mean): 76 (25 Jul 2022 05:10) (76 - 76)  RR: 17 (25 Jul 2022 07:28) (16 - 19)  SpO2: 100% (25 Jul 2022 07:28) (97% - 100%)    Parameters below as of 25 Jul 2022 07:28  Patient On (Oxygen Delivery Method): room air        PHYSICAL EXAM:    GENERAL: Young male looking comfortable    HEENT: Craniotomy   NECK: Trach scar   CHEST/LUNG: Clear to auscultate bilaterally; No wheezing  HEART: S1S2+, Regular rate and rhythm; No murmurs  ABDOMEN: Soft, Nontender, Nondistended; Bowel sounds present  EXTREMITIES:  1+ Peripheral Pulses, No edema  SKIN: No rashes or lesions  NEURO: not following commands, Aphasia     LABS:    07-25    135  |  100  |  10.6  ----------------------------<  106<H>  4.3   |  25.0  |  0.64    Ca    8.8      25 Jul 2022 06:26  Mg     1.7     07-25              I&O's Summary    24 Jul 2022 07:01  -  25 Jul 2022 07:00  --------------------------------------------------------  IN: 600 mL / OUT: 4400 mL / NET: -3800 mL        MEDICATIONS  (STANDING):  amantadine Syrup 100 milliGRAM(s) Oral <User Schedule>  cefepime   IVPB 2000 milliGRAM(s) IV Intermittent every 8 hours  chlorhexidine 2% Cloths 1 Application(s) Topical daily  enoxaparin Injectable 40 milliGRAM(s) SubCutaneous every 24 hours  levETIRAcetam  Solution 500 milliGRAM(s) Oral two times a day  melatonin 5 milliGRAM(s) Oral at bedtime  modafinil 200 milliGRAM(s) Oral <User Schedule>  polyethylene glycol 3350 17 Gram(s) Oral daily  senna 2 Tablet(s) Oral at bedtime  sodium chloride 2 Gram(s) Oral every 8 hours  vancomycin  IVPB 1500 milliGRAM(s) IV Intermittent every 12 hours    MEDICATIONS  (PRN):  labetalol Injectable 10 milliGRAM(s) IV Push every 4 hours PRN Systolic blood pressure > 140  ondansetron Injectable 4 milliGRAM(s) IV Push every 4 hours PRN Nausea and/or Vomiting      Assessment:     42yoM brought by EMS, found down in the street unresponsive.  Imaging showed SDH, IPH, TEOFILO, Patient underwent Right decompressive hemicraniectomy on 5/24, trach/peg 6/1,  R cranioplasty with complex closure 6/29/22.  Cranioplasty  drain removed, s/p rpt right craniectomy for evacuation on 7/17 after MR demonstrated large right ED collection. On Abx as per ID . 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   Renal following , on 2% NS, Na tabs- today Na - 135 . Renal following.   Trach (decanulated)/ PEG +     1. SDH/IPH / TBI  S/P R craniotomy / s/p R complex closure cranioplasty ,   repeated CTH  demonstrating right sided subdural collection mixed with air.  s/p rpt right craniectomy for evacuation on 7/17 after MR demonstrated large right ED collection.   Drain removed   - Blood cultures 7/18 no growth   - fluid cultures reporting Staphylococcus aureus (MSSA) and 1 culture with Staph Epi (MRSE)  - ID is following with further recommendations :   - Continue Cefepime  - Continue  Vancomycin, dosed as per ID  - Monitor trough  - Monitor for Vancomycin toxicity   - patient much more awake / alert - following commands , conversant   - continue PT/OT/ speech therapy   - Trend Fever  - Trend WBC  - neurochecks as per NS team   - Keppra 500 mg BID - as per NS       2. Dysphagia - on TF / pure diet, tolerating well, speech therapy team is following      3. Hyponatremia - w/u c/w SIADH , renal appreciated -  NS 2% 50 ml, d/joaquín, now has been started on Tolvapatan - as per renal   Na- 124--> Na --> 128- 130---->135, on Na tabs, Repeat BMP in am     4. Hypomagnesemia - supplemented - add Magnesium 400 mg TID     6. Anemia - likely surgical blood lost - stable     7. DVT prophylaxis  - as per primary team .     Will follow along with you.

## 2022-07-25 NOTE — PROGRESS NOTE ADULT - SUBJECTIVE AND OBJECTIVE BOX
INTERVAL HPI/OVERNIGHT EVENTS:  PT admitted on 5/24   right ICH, SDH, IPH,   5/24 craniectomy for decompression on  the right.  6/29 cranioplasty  7/17 return to or cranio for EDH  Pt awake, alert following commands       MEDICATIONS  (STANDING):  amantadine Syrup 100 milliGRAM(s) Oral <User Schedule>  cefepime   IVPB 2000 milliGRAM(s) IV Intermittent every 8 hours  chlorhexidine 2% Cloths 1 Application(s) Topical daily  enoxaparin Injectable 40 milliGRAM(s) SubCutaneous every 24 hours  levETIRAcetam  Solution 500 milliGRAM(s) Oral two times a day  melatonin 5 milliGRAM(s) Oral at bedtime  modafinil 200 milliGRAM(s) Oral <User Schedule>  polyethylene glycol 3350 17 Gram(s) Oral daily  senna 2 Tablet(s) Oral at bedtime  sodium chloride 2 Gram(s) Oral every 8 hours  vancomycin  IVPB 1500 milliGRAM(s) IV Intermittent every 12 hours    MEDICATIONS  (PRN):  labetalol Injectable 10 milliGRAM(s) IV Push every 4 hours PRN Systolic blood pressure > 140  ondansetron Injectable 4 milliGRAM(s) IV Push every 4 hours PRN Nausea and/or Vomiting      Allergies  Allergy Status Unknown  Intolerances      Vital Signs Last 24 Hrs  T(C): 36.9 (25 Jul 2022 16:38), Max: 36.9 (25 Jul 2022 04:50)  T(F): 98.4 (25 Jul 2022 16:38), Max: 98.5 (25 Jul 2022 04:50)  HR: 70 (25 Jul 2022 16:38) (66 - 76)  BP: 105/67 (25 Jul 2022 16:38) (82/67 - 106/68)  BP(mean): 76 (25 Jul 2022 05:10) (76 - 76)  RR: 19 (25 Jul 2022 16:38) (16 - 19)  SpO2: 98% (25 Jul 2022 16:38) (97% - 100%)    Parameters below as of 25 Jul 2022 16:38  Patient On (Oxygen Delivery Method): room air        PHYSICAL EXAM  awake, alert, x2, verbalized one word answers with encouragement  GENERAL: NAD,   HEAD:   Normocephalic, incision clean and dry intact  EYES: EOMI, PERRLA, conjunctiva and sclera clear  ENMT: No tonsillar erythema, exudates, or enlargement; Moist mucous membranes, Good dentition, No lesions  NECK: Supple,   NERVOUS SYSTEM:  Alert & Oriented X3, Good concentration; Motor Strength 5/5 B/L upper and lower extremities; DTRs 2+ intact and symmetric    EXTREMITIES:  2+ Peripheral Pulses, No edema, right sided weakness.       LABS:      07-25    135  |  100  |  10.6  ----------------------------<  106<H>  4.3   |  25.0  |  0.64    Ca    8.8      25 Jul 2022 06:26  Mg     1.7     07-25          I&O's Detail    24 Jul 2022 07:01  -  25 Jul 2022 07:00  --------------------------------------------------------  IN:    Enteral Tube Flush: 200 mL    Pivot 1.5: 400 mL  Total IN: 600 mL    OUT:    Voided (mL): 4400 mL  Total OUT: 4400 mL    Total NET: -3800 mL      RADIOLOGY & ADDITIONAL TESTS:  < from: CT Head No Cont (07.18.22 @ 00:58) >  ACC: [id number] EXAM:  CT BRAIN                        PROCEDURE DATE:  07/18/2022    IMPRESSION:  Status post right hemicraniectomy with placement of subgaleal drain and   evacuation of right subdural collection.  Decreased mass effect on the   right cerebral hemisphere.  Decreased effacement of the right lateral   ventricle.  Improved midline shift to the left, now measuring 9 mm,   compared to 1.4 cm preoperatively.  A small low-attenuation left frontal subdural collection measures   approximately 5 mm in caliber, compared to 6-7 mm on MRI from 07/17/2022.  Persistent dilatation of the temporal horns of both lateral ventricles,   compatible with hydrocephalus from ventricular entrapment.  A right zygomaticomaxillary complex fracture is partially visualized.  --- End of Report ---  < end of copied text >

## 2022-07-25 NOTE — PROGRESS NOTE ADULT - ASSESSMENT
This is a 42ym presented to neurosurgery as trauma hemicraniectomy, cranioplasty and repeat cranio for EDH     Plan  keppra 50o mg q 12  ID on anbx for cefepime, vanco  PT/OT   PMR pending - awaiting acceptances.  will continue to monitor wound and healing.  This is a 42ym presented to neurosurgery as trauma hemicraniectomy, cranioplasty and repeat cranio for EDH     Plan  keppra 50o mg q 12  ID on anbx for cefepime, vanco  PT/OT   PMR pending - awaiting acceptances.  will continue to monitor wound and healing.   Med followed for hyponatremia Na today 135, mag supplemented.  This is a 42ym presented to neurosurgery as trauma hemicraniectomy, cranioplasty and repeat cranio for EDH     Plan  keppra 50o mg q 12  ID on anbx for cefepime, vanco  PT/OT   PMR pending - awaiting acceptances.  will continue to monitor wound and healing.   Med followed for hyponatremia Na today 135, mag supplemented.     NSGY Attg:    see above    patient seen and examined by PA staff    agree with exam and plan as above

## 2022-07-25 NOTE — PROGRESS NOTE ADULT - SUBJECTIVE AND OBJECTIVE BOX
Subjective: No acute overnight event.  Serum sodium 126-->125-->130-->135    ROS: All systems were reviewed in detail and negative except as detailed in HPI.    Medications: reviewed     ICU Vital Signs Last 24 Hrs  T(C): 36.8 (25 Jul 2022 07:28), Max: 36.9 (24 Jul 2022 16:46)  T(F): 98.2 (25 Jul 2022 07:28), Max: 98.5 (24 Jul 2022 16:46)  HR: 66 (25 Jul 2022 07:28) (64 - 76)  BP: 106/68 (25 Jul 2022 07:28) (82/67 - 127/81)  BP(mean): 76 (25 Jul 2022 05:10) (76 - 76)  ABP: --  ABP(mean): --  RR: 17 (25 Jul 2022 07:28) (16 - 19)  SpO2: 100% (25 Jul 2022 07:28) (97% - 100%)    Physical Exam:  Gen: no acute distress  MS: awake   Eyes: EOMI, no icterus  HENT: Surgical dressing of craniectomy site, MMM, trach  CV: rhythm reg reg, rate normal, no m/g/r, no LE edema  Chest: CTAB, no w/r/r,  Abd: soft, NT, ND  Neuro: moving all 4 limbs spontaneously, no tremor  MSK: normal bulk and tone, no joint swelling  Skin: dry, warm, no rash or jaundice    Input and Output:    CBC:     Chem:         ( 07-25-22 @ 06:26 )    135  |  100  |  10.6  ----------------------------<  106<H>  4.3   |  25.0  |  0.64      IMPRESSION: 42y  Male initially admitted on 5/24/22 after being found down and was found to have at that time Bilateral IPH, Bilateral SDH. Prolonged hospital course: s/p craniotomy and evacuation of SDH 5/24, s/p trach 6/1, s/p cranioplasty and replacement of bone flap 6/29, s/p R hemicraniectomy, wound exploration, evacuation of epidural fluid collection 7/17.  1.  Hyponatremia    RECOMMENDATIONS:    Hyponatremia in relative euvolemic pt w/ high Manuela and UOsm indicates inappropriate ADH secretion.  Improved now to 135  Continue free fluid restriction <  1L/day  Continue on salt  tabs as ordered  Will follow.

## 2022-07-25 NOTE — PROGRESS NOTE ADULT - SUBJECTIVE AND OBJECTIVE BOX
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 the appropriate progress.     Will continue to follow. Rehab recommendations are dependent on how functional progress changes as well as how patient continues to participate and tolerate therapeutic interventions, which may change. Recommend ongoing mobilization by staff to maintain cardiopulmonary function and prevention of secondary complications related to debility. Discussed with rehab team.

## 2022-07-26 LAB
ANION GAP SERPL CALC-SCNC: 8 MMOL/L — SIGNIFICANT CHANGE UP (ref 5–17)
BUN SERPL-MCNC: 9.4 MG/DL — SIGNIFICANT CHANGE UP (ref 8–20)
CALCIUM SERPL-MCNC: 9 MG/DL — SIGNIFICANT CHANGE UP (ref 8.6–10.2)
CHLORIDE SERPL-SCNC: 93 MMOL/L — LOW (ref 98–107)
CO2 SERPL-SCNC: 28 MMOL/L — SIGNIFICANT CHANGE UP (ref 22–29)
CREAT SERPL-MCNC: 0.51 MG/DL — SIGNIFICANT CHANGE UP (ref 0.5–1.3)
EGFR: 130 ML/MIN/1.73M2 — SIGNIFICANT CHANGE UP
GLUCOSE SERPL-MCNC: 96 MG/DL — SIGNIFICANT CHANGE UP (ref 70–99)
POTASSIUM SERPL-MCNC: 4.2 MMOL/L — SIGNIFICANT CHANGE UP (ref 3.5–5.3)
POTASSIUM SERPL-SCNC: 4.2 MMOL/L — SIGNIFICANT CHANGE UP (ref 3.5–5.3)
SODIUM SERPL-SCNC: 129 MMOL/L — LOW (ref 135–145)
VANCOMYCIN TROUGH SERPL-MCNC: 16.7 UG/ML — SIGNIFICANT CHANGE UP (ref 10–20)

## 2022-07-26 PROCEDURE — 99232 SBSQ HOSP IP/OBS MODERATE 35: CPT

## 2022-07-26 PROCEDURE — 99233 SBSQ HOSP IP/OBS HIGH 50: CPT

## 2022-07-26 RX ORDER — NAFCILLIN 10 G/100ML
2 INJECTION, POWDER, FOR SOLUTION INTRAVENOUS EVERY 4 HOURS
Refills: 0 | Status: COMPLETED | OUTPATIENT
Start: 2022-07-26 | End: 2022-09-01

## 2022-07-26 RX ORDER — UREA 15 G
30 POWDER IN PACKET (EA) ORAL DAILY
Refills: 0 | Status: DISCONTINUED | OUTPATIENT
Start: 2022-07-26 | End: 2022-09-08

## 2022-07-26 RX ADMIN — CHLORHEXIDINE GLUCONATE 1 APPLICATION(S): 213 SOLUTION TOPICAL at 13:17

## 2022-07-26 RX ADMIN — SODIUM CHLORIDE 2 GRAM(S): 9 INJECTION INTRAMUSCULAR; INTRAVENOUS; SUBCUTANEOUS at 13:15

## 2022-07-26 RX ADMIN — Medication 30 GRAM(S): at 18:06

## 2022-07-26 RX ADMIN — SENNA PLUS 2 TABLET(S): 8.6 TABLET ORAL at 21:39

## 2022-07-26 RX ADMIN — ENOXAPARIN SODIUM 40 MILLIGRAM(S): 100 INJECTION SUBCUTANEOUS at 18:05

## 2022-07-26 RX ADMIN — Medication 5 MILLIGRAM(S): at 21:39

## 2022-07-26 RX ADMIN — Medication 100 MILLIGRAM(S): at 07:00

## 2022-07-26 RX ADMIN — SODIUM CHLORIDE 2 GRAM(S): 9 INJECTION INTRAMUSCULAR; INTRAVENOUS; SUBCUTANEOUS at 05:02

## 2022-07-26 RX ADMIN — NAFCILLIN 200 GRAM(S): 10 INJECTION, POWDER, FOR SOLUTION INTRAVENOUS at 13:18

## 2022-07-26 RX ADMIN — CEFEPIME 100 MILLIGRAM(S): 1 INJECTION, POWDER, FOR SOLUTION INTRAMUSCULAR; INTRAVENOUS at 05:03

## 2022-07-26 RX ADMIN — Medication 300 MILLIGRAM(S): at 21:40

## 2022-07-26 RX ADMIN — NAFCILLIN 200 GRAM(S): 10 INJECTION, POWDER, FOR SOLUTION INTRAVENOUS at 18:17

## 2022-07-26 RX ADMIN — MODAFINIL 200 MILLIGRAM(S): 200 TABLET ORAL at 05:02

## 2022-07-26 RX ADMIN — Medication 100 MILLIGRAM(S): at 13:17

## 2022-07-26 RX ADMIN — SODIUM CHLORIDE 2 GRAM(S): 9 INJECTION INTRAMUSCULAR; INTRAVENOUS; SUBCUTANEOUS at 21:38

## 2022-07-26 RX ADMIN — Medication 300 MILLIGRAM(S): at 10:00

## 2022-07-26 RX ADMIN — LEVETIRACETAM 500 MILLIGRAM(S): 250 TABLET, FILM COATED ORAL at 05:02

## 2022-07-26 RX ADMIN — NAFCILLIN 200 GRAM(S): 10 INJECTION, POWDER, FOR SOLUTION INTRAVENOUS at 21:31

## 2022-07-26 RX ADMIN — POLYETHYLENE GLYCOL 3350 17 GRAM(S): 17 POWDER, FOR SOLUTION ORAL at 13:16

## 2022-07-26 RX ADMIN — LEVETIRACETAM 500 MILLIGRAM(S): 250 TABLET, FILM COATED ORAL at 18:05

## 2022-07-26 NOTE — PROGRESS NOTE ADULT - SUBJECTIVE AND OBJECTIVE BOX
HPI: 42y  Male initially admitted on 5/24/22 after being found down and was found to have at that time Bilateral IPH, Bilateral SDH. Prolonged hospital course: s/p craniotomy and evacuation of SDH 5/24, s/p trach 6/1, s/p cranioplasty and replacement of bone flap 6/29, s/p R hemicraniectomy, wound exploration, evacuation of epidural fluid collection 7/17.      INTERVAL HPI/OVERNIGHT EVENTS:  Pt awake, alert following commands. ID following for epidural fluid cultures reporting Staphylococcus aureus (MSSA) and 1 culture with Staph Epi (MRSE)  Nephrology following for hyponatremia.         Vital Signs Last 24 Hrs  T(C): 36.8 (26 Jul 2022 07:50), Max: 36.9 (25 Jul 2022 16:38)  T(F): 98.2 (26 Jul 2022 07:50), Max: 98.4 (25 Jul 2022 16:38)  HR: 71 (26 Jul 2022 07:50) (55 - 73)  BP: 120/84 (26 Jul 2022 07:50) (100/65 - 120/84)  BP(mean): 84 (25 Jul 2022 20:00) (84 - 84)  RR: 18 (26 Jul 2022 07:50) (18 - 19)  SpO2: 91% (26 Jul 2022 07:50) (91% - 99%)    Parameters below as of 26 Jul 2022 10:00  Patient On (Oxygen Delivery Method): room air      PHYSICAL EXAM:  GENERAL: NAD, well-groomed, well-developed  HEAD: s/p L craniotomy, surgical site clean and dry  EYES: EOMI, PERRLA, conjunctiva and sclera clear  NECK: Supple, No JVD, Normal thyroid  NERVOUS SYSTEM: Alert & Oriented X2, following simple commands   moving all 4 extremity   CHEST/LUNG: CTA b/l, no rales, rhonchi, wheezing, or rubs  HEART: Regular rate and rhythm; No murmurs, rubs, or gallops  ABDOMEN: Soft, Nontender, Nondistended; Bowel sounds present  EXTREMITIES:  2+ Peripheral Pulses, No clubbing, cyanosis, or edema  LYMPH: No lymphadenopathy noted  SKIN: No rashes or lesions      LABS:    07-26    129<L>  |  93<L>  |  9.4  ----------------------------<  96  4.2   |  28.0  |  0.51    Ca    9.0      26 Jul 2022 09:12  Mg     1.7     07-25 07-25 @ 07:01 - 07-26 @ 07:00  --------------------------------------------------------  IN: 0 mL / OUT: 600 mL / NET: -600 mL    07-26 @ 07:01 - 07-26 @ 16:05  --------------------------------------------------------  IN: 0 mL / OUT: 1300 mL / NET: -1300 mL        RADIOLOGY & ADDITIONAL TESTS:      < from: CT Head No Cont (07.18.22 @ 00:58) >    IMPRESSION:  Status post right hemicraniectomy with placement of subgaleal drain and   evacuation of right subdural collection.  Decreased mass effect on the   right cerebral hemisphere.  Decreased effacement of the right lateral   ventricle.  Improved midline shift to the left, now measuring 9 mm,   compared to 1.4 cm preoperatively.    A small low-attenuation left frontal subdural collection measures   approximately 5 mm in caliber, compared to 6-7 mm on MRI from 07/17/2022.    Persistent dilatation of the temporal horns of both lateral ventricles,   compatible with hydrocephalus from ventricular entrapment.    A right zygomaticomaxillary complex fracture is partially visualized.    --- End of Report ---    < end of copied text >      ASSESSMENT/PLAN:   42y  Male initially admitted on 5/24/22 after being found down and was found to have at that time Bilateral IPH, Bilateral SDH. Prolonged hospital course: s/p craniotomy and evacuation of SDH 5/24, s/p trach 6/1, s/p cranioplasty and replacement of bone flap 6/29, s/p R hemicraniectomy, wound exploration, evacuation of epidural fluid collection 7/17.    Plan  keppra 500 mg q 12  ID following for infected epidural fluid collection- discontinued Cefepime today, continue Vancomycin  PT/OT   PMR following, PT/OT  will continue to monitor wound and healing.   Med and nephrology following for hyponatremia, likely SIADH  Continue free fluid restriction <  1L/day  Continue salt tabs  Nephrology trying to get PO samsca that patient has been on in past (15 mg likely 3 times per week), attempting to get it approved w/ pharmacy       HPI: 42y  Male initially admitted on 5/24/22 after being found down and was found to have at that time Bilateral IPH, Bilateral SDH. Prolonged hospital course: s/p craniotomy and evacuation of SDH 5/24, s/p trach 6/1, s/p cranioplasty and replacement of bone flap 6/29, s/p R hemicraniectomy, wound exploration, evacuation of epidural fluid collection 7/17.      INTERVAL HPI/OVERNIGHT EVENTS:  Pt awake, alert following commands. ID following for epidural fluid cultures reporting Staphylococcus aureus (MSSA) and 1 culture with Staph Epi (MRSE)  Nephrology following for hyponatremia.         Vital Signs Last 24 Hrs  T(C): 36.8 (26 Jul 2022 07:50), Max: 36.9 (25 Jul 2022 16:38)  T(F): 98.2 (26 Jul 2022 07:50), Max: 98.4 (25 Jul 2022 16:38)  HR: 71 (26 Jul 2022 07:50) (55 - 73)  BP: 120/84 (26 Jul 2022 07:50) (100/65 - 120/84)  BP(mean): 84 (25 Jul 2022 20:00) (84 - 84)  RR: 18 (26 Jul 2022 07:50) (18 - 19)  SpO2: 91% (26 Jul 2022 07:50) (91% - 99%)    Parameters below as of 26 Jul 2022 10:00  Patient On (Oxygen Delivery Method): room air      PHYSICAL EXAM:  GENERAL: NAD, well-groomed, well-developed  HEAD: s/p L craniotomy, surgical site clean and dry  EYES: EOMI, PERRLA, conjunctiva and sclera clear  NECK: Supple, No JVD, Normal thyroid  NERVOUS SYSTEM: Alert & Oriented X2, following simple commands   moving all 4 extremity   CHEST/LUNG: CTA b/l, no rales, rhonchi, wheezing, or rubs  HEART: Regular rate and rhythm; No murmurs, rubs, or gallops  ABDOMEN: Soft, Nontender, Nondistended; Bowel sounds present  EXTREMITIES:  2+ Peripheral Pulses, No clubbing, cyanosis, or edema  LYMPH: No lymphadenopathy noted  SKIN: No rashes or lesions      LABS:    07-26    129<L>  |  93<L>  |  9.4  ----------------------------<  96  4.2   |  28.0  |  0.51    Ca    9.0      26 Jul 2022 09:12  Mg     1.7     07-25 07-25 @ 07:01 - 07-26 @ 07:00  --------------------------------------------------------  IN: 0 mL / OUT: 600 mL / NET: -600 mL    07-26 @ 07:01 - 07-26 @ 16:05  --------------------------------------------------------  IN: 0 mL / OUT: 1300 mL / NET: -1300 mL        RADIOLOGY & ADDITIONAL TESTS:      < from: CT Head No Cont (07.18.22 @ 00:58) >    IMPRESSION:  Status post right hemicraniectomy with placement of subgaleal drain and   evacuation of right subdural collection.  Decreased mass effect on the   right cerebral hemisphere.  Decreased effacement of the right lateral   ventricle.  Improved midline shift to the left, now measuring 9 mm,   compared to 1.4 cm preoperatively.    A small low-attenuation left frontal subdural collection measures   approximately 5 mm in caliber, compared to 6-7 mm on MRI from 07/17/2022.    Persistent dilatation of the temporal horns of both lateral ventricles,   compatible with hydrocephalus from ventricular entrapment.    A right zygomaticomaxillary complex fracture is partially visualized.    --- End of Report ---    < end of copied text >      ASSESSMENT/PLAN:   42y  Male initially admitted on 5/24/22 after being found down and was found to have at that time Bilateral IPH, Bilateral SDH. Prolonged hospital course: s/p craniotomy and evacuation of SDH 5/24, s/p trach 6/1, s/p cranioplasty and replacement of bone flap 6/29, s/p R hemicraniectomy, wound exploration, evacuation of epidural fluid collection 7/17.    Plan  keppra 500 mg q 12  ID following for infected epidural fluid collection- discontinued Cefepime today, started Nafcillin, continue Vancomycin  PT/OT   PMR following, PT/OT  will continue to monitor wound and healing.   Med and nephrology following for hyponatremia, likely SIADH  Continue free fluid restriction <  1L/day  Continue salt tabs  Nephrology trying to get PO samsca that patient has been on in past (15 mg likely 3 times per week), attempting to get it approved w/ pharmacy

## 2022-07-26 NOTE — PROGRESS NOTE ADULT - SUBJECTIVE AND OBJECTIVE BOX
Patient seen and examined . Comfortable , more awake / alert and conversant . Mother at bed side ,   states he doesn't talk to her much. Mother very impressed how did he answering questions and following commands .   AAOX2 , recognize his mother , states has to kids , able to recall kids name and age . Denies pain .   Following simple commands , moving all 4 extremity when asked to do it . Holding phone and watching in the phone .      CC : no complaints           MEDICATIONS  (STANDING):  amantadine Syrup 100 milliGRAM(s) Oral <User Schedule>  chlorhexidine 2% Cloths 1 Application(s) Topical daily  enoxaparin Injectable 40 milliGRAM(s) SubCutaneous every 24 hours  levETIRAcetam  Solution 500 milliGRAM(s) Oral two times a day  melatonin 5 milliGRAM(s) Oral at bedtime  modafinil 200 milliGRAM(s) Oral <User Schedule>  nafcillin  IVPB 2 Gram(s) IV Intermittent every 4 hours  polyethylene glycol 3350 17 Gram(s) Oral daily  senna 2 Tablet(s) Oral at bedtime  sodium chloride 2 Gram(s) Oral every 8 hours  vancomycin  IVPB 1500 milliGRAM(s) IV Intermittent every 12 hours    MEDICATIONS  (PRN):  labetalol Injectable 10 milliGRAM(s) IV Push every 4 hours PRN Systolic blood pressure > 140  ondansetron Injectable 4 milliGRAM(s) IV Push every 4 hours PRN Nausea and/or Vomiting      LABS:      07-26    129<L>  |  93<L>  |  9.4  ----------------------------<  96  4.2   |  28.0  |  0.51    Ca    9.0      26 Jul 2022 09:12  Mg     1.7     07-25  Culture - Tissue with Gram Stain (07.18.22 @ 17:03)    -  Rifampin: S <=1 Should not be used as monotherapy    -  Trimethoprim/Sulfamethoxazole: S <=0.5/9.5    -  Vancomycin: S 2    -  Ampicillin/Sulbactam: S <=8/4    Gram Stain:   Rare polymorphonuclear leukocytes per low power field  No organisms seen    -  Cefazolin: S <=4    -  Clindamycin: S <=0.25    -  Erythromycin: S <=0.25    -  Gentamicin: S <=1 Should not be used as monotherapy    -  Penicillin: R >8    -  Oxacillin: S 0.5 Oxacillin predicts susceptibility for dicloxacillin, methicillin, and nafcillin    -  Tetra/Doxy: R >8    Specimen Source: .Tissue Bone Flap    Culture Results:   Few Staphylococcus aureus    Organism Identification: Staphylococcus aureus    Organism: Staphylococcus aureus    Method Type: COLE      Culture - Blood (07.18.22 @ 22:05)    Specimen Source: .Blood Blood-Peripheral    Culture Results:   No Growth Final    Culture - Blood (07.18.22 @ 22:03)    Specimen Source: .Blood Blood-Peripheral    Culture Results:   No Growth Final    Culture - Acid Fast - Tissue w/Smear (07.18.22 @ 17:06)    Specimen Source: Bone Bone Flap    Acid Fast Bacilli Smear:   No acid fast bacilli seen by fluorochrome stain    Culture Results:   Culture is being performed.      RADIOLOGY & ADDITIONAL TESTS:      REVIEW OF SYSTEMS:    All systems are reviewed and are negative .     Vital Signs Last 24 Hrs  T(C): 36.8 (26 Jul 2022 07:50), Max: 36.9 (25 Jul 2022 16:38)  T(F): 98.2 (26 Jul 2022 07:50), Max: 98.4 (25 Jul 2022 16:38)  HR: 71 (26 Jul 2022 07:50) (55 - 73)  BP: 120/84 (26 Jul 2022 07:50) (100/65 - 120/84)  BP(mean): 84 (25 Jul 2022 20:00) (84 - 84)  RR: 18 (26 Jul 2022 07:50) (18 - 19)  SpO2: 91% (26 Jul 2022 07:50) (91% - 99%)    Parameters below as of 26 Jul 2022 10:00  Patient On (Oxygen Delivery Method): room air      PHYSICAL EXAM:    GENERAL: NAD, well-groomed, well-developed  HEAD: s/p L craniotomy , surgical site clean and dry ,  EYES: EOMI, PERRLA, conjunctiva and sclera clear  NECK: Supple, No JVD, Normal thyroid  NERVOUS SYSTEM:  Alert & Oriented X2, following simple commands ,   moving all 4 extremity   CHEST/LUNG: CTA b/l ,  no  rales, rhonchi, wheezing, or rubs  HEART: Regular rate and rhythm; No murmurs, rubs, or gallops  ABDOMEN: Soft, Nontender, Nondistended; Bowel sounds present  EXTREMITIES:  2+ Peripheral Pulses, No clubbing, cyanosis, or edema  LYMPH: No lymphadenopathy noted  SKIN: No rashes or lesions        42yoM brought by EMS, found down in the street unresponsive.  Imaging showed SDH, IPH, TEOFILO, Patient underwent Right decompressive hemicraniectomy on 5/24, trach/peg 6/1,  R cranioplasty with complex closure 6/29/22.  Cranioplasty  drain removed, s/p rpt right craniectomy for evacuation on 7/17 after MR demonstrated large right ED collection. On Abx as per ID . 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 Renal is following .   Trach (decanulated)/ PEG +     ASSESSMENT AND PLAN :    1. SDH/IPH / TBI  S/P R craniotomy / s/p R complex closure cranioplasty ,   repeated CTH  demonstrating right sided subdural collection mixed with air.  s/p rpt right craniectomy for evacuation on 7/17 after MR demonstrated large right ED collection.   Drain removed   - Blood cultures 7/18 no growth   - fluid cultures reporting Staphylococcus aureus (MSSA) and 1 culture with Staph Epi (MRSE)  - ID is following with further recommendations :   - Cefapime d/c , started on Nafcillin   - Continue  Vancomycin as per ID   - Monitor trough  - patient much more awake / alert - following commands , conversant   - continue PT/OT/ speech therapy   - Trend Fever  - Trend WBC  - neurochecks as per NS team   - Keppra 500 mg BID - as per NS       2. Dysphagia - on TF / pure diet, tolerating well, speech therapy team is following      3. Hyponatremia - w/u c/w SIADH , renal appreciated -  NS 2% 50 ml, d/joaquín, now has been started on Tolvapatan - as per renal   Na- 124--> Na --> 128- 130---->135, today down to 129  on Na tabs. Renal follow up for further recommendations pending ,   follow Na in am .    4. Hypomagnesemia - supplemented - add Magnesium 400 mg TID     6. Anemia - likely surgical blood lost - stable     7. DVT prophylaxis  - as per primary team .     Will follow along with you.

## 2022-07-26 NOTE — PROGRESS NOTE ADULT - SUBJECTIVE AND OBJECTIVE BOX
Subjective: No acute overnight event.  Serum sodium 126-->125-->130-->135-->129    ROS: All systems were reviewed in detail and negative except as detailed in HPI.    Medications:   MEDICATIONS  (STANDING):  amantadine Syrup 100 milliGRAM(s) Oral <User Schedule>  chlorhexidine 2% Cloths 1 Application(s) Topical daily  enoxaparin Injectable 40 milliGRAM(s) SubCutaneous every 24 hours  levETIRAcetam  Solution 500 milliGRAM(s) Oral two times a day  melatonin 5 milliGRAM(s) Oral at bedtime  modafinil 200 milliGRAM(s) Oral <User Schedule>  nafcillin  IVPB 2 Gram(s) IV Intermittent every 4 hours  polyethylene glycol 3350 17 Gram(s) Oral daily  senna 2 Tablet(s) Oral at bedtime  sodium chloride 2 Gram(s) Oral every 8 hours  vancomycin  IVPB 1500 milliGRAM(s) IV Intermittent every 12 hours    MEDICATIONS  (PRN):  labetalol Injectable 10 milliGRAM(s) IV Push every 4 hours PRN Systolic blood pressure > 140  ondansetron Injectable 4 milliGRAM(s) IV Push every 4 hours PRN Nausea and/or Vomiting      I&O's Summary    25 Jul 2022 07:01  -  26 Jul 2022 07:00  --------------------------------------------------------  IN: 0 mL / OUT: 600 mL / NET: -600 mL        Physical Exam:  Gen: no acute distress  MS: awake   Eyes: EOMI, no icterus  HENT: Surgical dressing of craniectomy site, MMM, trach  CV: rhythm reg reg, rate normal, no m/g/r, no LE edema  Chest: CTAB, no w/r/r,  Abd: soft, NT, ND  Neuro: moving all 4 limbs spontaneously, no tremor  MSK: normal bulk and tone, no joint swelling  Skin: dry, warm, no rash or jaundice      Chem:         ( 07-26-22 @ 09:12 )    129<L>  |  93<L>  |  9.4  ----------------------------<  96  4.2   |  28.0  |  0.51      IMPRESSION: 42y  Male initially admitted on 5/24/22 after being found down and was found to have at that time Bilateral IPH, Bilateral SDH. Prolonged hospital course: s/p craniotomy and evacuation of SDH 5/24, s/p trach 6/1, s/p cranioplasty and replacement of bone flap 6/29, s/p R hemicraniectomy, wound exploration, evacuation of epidural fluid collection 7/17.  1.  Hyponatremia    RECOMMENDATIONS:    Hyponatremia in relative euvolemic pt w/ high Manuela and UOsm indicates inappropriate ADH secretion.  Improved now to 135 but again drooped   Continue free fluid restriction <  1L/day  Continue on salt  tabs as ordered but it is not keeping it up  He seems to have been on PO samsca 15 mg likely 3 times per week, attempting to get it approved w/ pharmacy  meanwhile will also add Jacome   Will follow.         Subjective: No acute overnight event.  Serum sodium 126-->125-->130-->135-->129    ROS: All systems were reviewed in detail and negative except as detailed in HPI.    Medications:   MEDICATIONS  (STANDING):  amantadine Syrup 100 milliGRAM(s) Oral <User Schedule>  chlorhexidine 2% Cloths 1 Application(s) Topical daily  enoxaparin Injectable 40 milliGRAM(s) SubCutaneous every 24 hours  levETIRAcetam  Solution 500 milliGRAM(s) Oral two times a day  melatonin 5 milliGRAM(s) Oral at bedtime  modafinil 200 milliGRAM(s) Oral <User Schedule>  nafcillin  IVPB 2 Gram(s) IV Intermittent every 4 hours  polyethylene glycol 3350 17 Gram(s) Oral daily  senna 2 Tablet(s) Oral at bedtime  sodium chloride 2 Gram(s) Oral every 8 hours  vancomycin  IVPB 1500 milliGRAM(s) IV Intermittent every 12 hours    MEDICATIONS  (PRN):  labetalol Injectable 10 milliGRAM(s) IV Push every 4 hours PRN Systolic blood pressure > 140  ondansetron Injectable 4 milliGRAM(s) IV Push every 4 hours PRN Nausea and/or Vomiting      I&O's Summary    25 Jul 2022 07:01  -  26 Jul 2022 07:00  --------------------------------------------------------  IN: 0 mL / OUT: 600 mL / NET: -600 mL        Physical Exam:  Gen: no acute distress  MS: awake   Eyes: EOMI, no icterus  HENT: Surgical dressing of craniectomy site, MMM, trach  CV: rhythm reg reg, rate normal, no m/g/r, no LE edema  Chest: CTAB, no w/r/r,  Abd: soft, NT, ND  Neuro: moving all 4 limbs spontaneously, no tremor  MSK: normal bulk and tone, no joint swelling  Skin: dry, warm, no rash or jaundice      Chem:         ( 07-26-22 @ 09:12 )    129<L>  |  93<L>  |  9.4  ----------------------------<  96  4.2   |  28.0  |  0.51      IMPRESSION: 42y  Male initially admitted on 5/24/22 after being found down and was found to have at that time Bilateral IPH, Bilateral SDH. Prolonged hospital course: s/p craniotomy and evacuation of SDH 5/24, s/p trach 6/1, s/p cranioplasty and replacement of bone flap 6/29, s/p R hemicraniectomy, wound exploration, evacuation of epidural fluid collection 7/17.  1.  Hyponatremia    RECOMMENDATIONS:    Hyponatremia in relative euvolemic pt w/ high Manuela and UOsm indicates inappropriate ADH secretion.  Improved now to 135 but again drooped   Continue free fluid restriction <  1L/day  Continue on salt  tabs as ordered but it is not keeping it up  Going forward he likely will have to be on PO samsca 15 mg 3 times per week, attempting to get it approved w/ pharmacy  meanwhile will also add Jacome   Will follow.

## 2022-07-26 NOTE — PROGRESS NOTE ADULT - ASSESSMENT
42y  Male initially admitted on 5/24/22 after being found down and was found to have at that time Bilateral IPH, Bilateral SDH. Prolonged hospital course: s/p craniotomy and evacuation of SDH 5/24, s/p trach 6/1, s/p cranioplasty and replacement of bone flap 6/29, s/p R hemicraniectomy, wound exploration, evacuation of epidural fluid collection 7/17.      Epidural fluid collection   s/p R hemicraniectomy, wound exploration, evacuation of epidural fluid collection 7/17  r/o infection       - Blood cultures 7/18 no growth   - fluid cultures reporting Staphylococcus aureus (MSSA) and 1 culture with Staph Epi (MRSE)  - MRI head with fluid collection   - D/C Cefepime  - Start Nafcillin  - Continue  Vancomycin  - Monitor trough  - Monitor for Vancomycin toxicity   - Follow up cultures  - Trend Fever  - Trend WBC      Will Follow    d/w clinical pharmacy

## 2022-07-26 NOTE — PROGRESS NOTE ADULT - SUBJECTIVE AND OBJECTIVE BOX
[facility]  INFECTIOUS DISEASES at [place] and [place]  =======================================================                               Nestor Russo MD#  Mitesh Woodward MD*                                     Nyla Orantes MD*    Vera Bishop MD*            Diplomates American Board of Internal Medicine & Infectious Diseases                # [place] Office - Appt - Tel  [phone] Fax [phone]                * [place] Office - Appt - Tel [phone] Fax [phone]                                  Hospital Consult line:  [phone]  =======================================================    PASTOR KEMAR [id number]    Follow up: Epidural fluid collection     No fever       Allergies:  Allergy Status Unknown      REVIEW OF SYSTEMS:  Unable to obtain due to medical condition       Physical Exam:  GEN: NAD  HEENT: Surgical bandage of craniectomy site.  Anicteric   NECK: Supple.   LUNGS: diffuse rhonchi   HEART: Regular rate and rhythm   ABDOMEN: Soft, nontender, and nondistended.  Positive bowel sounds.    : Howard   EXTREMITIES: trace edema.  MSK: no joint swelling  NEUROLOGIC: Awake  PSYCHIATRIC: unable to assess  SKIN: No Rash      Vitals:  T(F): 98.2 (26 Jul 2022 07:50), Max: 98.4 (25 Jul 2022 16:38)  HR: 71 (26 Jul 2022 07:50)  BP: 120/84 (26 Jul 2022 07:50)  RR: 18 (26 Jul 2022 07:50)  SpO2: 91% (26 Jul 2022 07:50) (91% - 99%)  temp max in last 48H T(F): , Max: 98.5 (07-24-22 @ 16:46)    Current Antibiotics:  cefepime   IVPB 2000 milliGRAM(s) IV Intermittent every 8 hours  vancomycin  IVPB 1500 milliGRAM(s) IV Intermittent every 12 hours    Other medications:  amantadine Syrup 100 milliGRAM(s) Oral <User Schedule>  chlorhexidine 2% Cloths 1 Application(s) Topical daily  enoxaparin Injectable 40 milliGRAM(s) SubCutaneous every 24 hours  levETIRAcetam  Solution 500 milliGRAM(s) Oral two times a day  melatonin 5 milliGRAM(s) Oral at bedtime  modafinil 200 milliGRAM(s) Oral <User Schedule>  polyethylene glycol 3350 17 Gram(s) Oral daily  senna 2 Tablet(s) Oral at bedtime  sodium chloride 2 Gram(s) Oral every 8 hours        07-25    135  |  100  |  10.6  ----------------------------<  106<H>  4.3   |  25.0  |  0.64    Ca    8.8      25 Jul 2022 06:26  Mg     1.7     07-25      RECENT CULTURES:  07-18 @ 22:05 .Blood Blood-Peripheral     No Growth Final    07-18 @ 22:03 .Blood Blood-Peripheral     No Growth Final    07-18 @ 17:06 Bone Bone Flap     Culture is being performed.    07-18 @ 17:03 .Tissue Bone Flap Staphylococcus aureus    Few Staphylococcus aureus  Rare polymorphonuclear leukocytes per low power field  No organisms seen    07-18 @ 16:57 .Body Fluid #1 Epidura Fluid Staphylococcus aureus  Staphylococcus epidermidis    Few Staphylococcus aureus  Few Staphylococcus epidermidis  Moderate polymorphonuclear leukocytes seen per low power field  Few Gram positive cocci in pairs seen per oil power field    07-18 @ 16:34 .Tissue Duragen Staphylococcus aureus    Few Staphylococcus aureus  Few polymorphonuclear leukocytes per low power field  No organisms seen      WBC Count: 4.45 K/uL (07-23-22 @ 07:38)  WBC Count: 4.14 K/uL (07-22-22 @ 05:19)    Creatinine, Serum: 0.64 mg/dL (07-25-22 @ 06:26)  Creatinine, Serum: 0.65 mg/dL (07-24-22 @ 08:44)  Creatinine, Serum: 0.50 mg/dL (07-23-22 @ 13:00)  Creatinine, Serum: 0.53 mg/dL (07-23-22 @ 07:38)  Creatinine, Serum: 0.58 mg/dL (07-22-22 @ 20:34)  Creatinine, Serum: 0.54 mg/dL (07-22-22 @ 12:39)  Creatinine, Serum: 0.52 mg/dL (07-22-22 @ 05:19)    COVID-19 PCR: NotDetec (07-17-22 @ 17:50)  COVID-19 PCR: NotDetec (07-10-22 @ 14:17)  COVID-19 PCR: NotDetec (07-06-22 @ 06:27)  COVID-19 PCR: NotDetec (06-29-22 @ 08:34)  COVID-19 PCR: NotDetec (06-27-22 @ 21:10)

## 2022-07-27 LAB
ANION GAP SERPL CALC-SCNC: 10 MMOL/L — SIGNIFICANT CHANGE UP (ref 5–17)
ANION GAP SERPL CALC-SCNC: 13 MMOL/L — SIGNIFICANT CHANGE UP (ref 5–17)
BUN SERPL-MCNC: 22.7 MG/DL — HIGH (ref 8–20)
BUN SERPL-MCNC: 33.8 MG/DL — HIGH (ref 8–20)
CALCIUM SERPL-MCNC: 8.9 MG/DL — SIGNIFICANT CHANGE UP (ref 8.4–10.5)
CALCIUM SERPL-MCNC: 9.1 MG/DL — SIGNIFICANT CHANGE UP (ref 8.4–10.5)
CHLORIDE SERPL-SCNC: 87 MMOL/L — LOW (ref 98–107)
CHLORIDE SERPL-SCNC: 95 MMOL/L — LOW (ref 98–107)
CO2 SERPL-SCNC: 24 MMOL/L — SIGNIFICANT CHANGE UP (ref 22–29)
CO2 SERPL-SCNC: 27 MMOL/L — SIGNIFICANT CHANGE UP (ref 22–29)
CREAT SERPL-MCNC: 0.58 MG/DL — SIGNIFICANT CHANGE UP (ref 0.5–1.3)
CREAT SERPL-MCNC: 0.66 MG/DL — SIGNIFICANT CHANGE UP (ref 0.5–1.3)
EGFR: 120 ML/MIN/1.73M2 — SIGNIFICANT CHANGE UP
EGFR: 125 ML/MIN/1.73M2 — SIGNIFICANT CHANGE UP
GLUCOSE SERPL-MCNC: 107 MG/DL — HIGH (ref 70–99)
GLUCOSE SERPL-MCNC: 156 MG/DL — HIGH (ref 70–99)
HCT VFR BLD CALC: 31.8 % — LOW (ref 39–50)
HGB BLD-MCNC: 11 G/DL — LOW (ref 13–17)
MAGNESIUM SERPL-MCNC: 1.6 MG/DL — SIGNIFICANT CHANGE UP (ref 1.6–2.6)
MCHC RBC-ENTMCNC: 28.1 PG — SIGNIFICANT CHANGE UP (ref 27–34)
MCHC RBC-ENTMCNC: 34.6 GM/DL — SIGNIFICANT CHANGE UP (ref 32–36)
MCV RBC AUTO: 81.1 FL — SIGNIFICANT CHANGE UP (ref 80–100)
PHOSPHATE SERPL-MCNC: 4.6 MG/DL — SIGNIFICANT CHANGE UP (ref 2.4–4.7)
PLATELET # BLD AUTO: 268 K/UL — SIGNIFICANT CHANGE UP (ref 150–400)
POTASSIUM SERPL-MCNC: 4.2 MMOL/L — SIGNIFICANT CHANGE UP (ref 3.5–5.3)
POTASSIUM SERPL-MCNC: 4.2 MMOL/L — SIGNIFICANT CHANGE UP (ref 3.5–5.3)
POTASSIUM SERPL-SCNC: 4.2 MMOL/L — SIGNIFICANT CHANGE UP (ref 3.5–5.3)
POTASSIUM SERPL-SCNC: 4.2 MMOL/L — SIGNIFICANT CHANGE UP (ref 3.5–5.3)
RBC # BLD: 3.92 M/UL — LOW (ref 4.2–5.8)
RBC # FLD: 12 % — SIGNIFICANT CHANGE UP (ref 10.3–14.5)
SODIUM SERPL-SCNC: 124 MMOL/L — LOW (ref 135–145)
SODIUM SERPL-SCNC: 132 MMOL/L — LOW (ref 135–145)
SODIUM SERPL-SCNC: 133 MMOL/L — LOW (ref 135–145)
WBC # BLD: 5.21 K/UL — SIGNIFICANT CHANGE UP (ref 3.8–10.5)
WBC # FLD AUTO: 5.21 K/UL — SIGNIFICANT CHANGE UP (ref 3.8–10.5)

## 2022-07-27 PROCEDURE — 99233 SBSQ HOSP IP/OBS HIGH 50: CPT

## 2022-07-27 PROCEDURE — 99232 SBSQ HOSP IP/OBS MODERATE 35: CPT

## 2022-07-27 PROCEDURE — 70450 CT HEAD/BRAIN W/O DYE: CPT | Mod: 26

## 2022-07-27 PROCEDURE — 93010 ELECTROCARDIOGRAM REPORT: CPT

## 2022-07-27 RX ORDER — TOLVAPTAN 15 MG/1
30 TABLET ORAL ONCE
Refills: 0 | Status: COMPLETED | OUTPATIENT
Start: 2022-07-27 | End: 2022-07-27

## 2022-07-27 RX ORDER — TOLVAPTAN 15 MG/1
15 TABLET ORAL
Refills: 0 | Status: COMPLETED | OUTPATIENT
Start: 2022-07-27 | End: 2022-08-03

## 2022-07-27 RX ORDER — SODIUM CHLORIDE 5 G/100ML
1000 INJECTION, SOLUTION INTRAVENOUS
Refills: 0 | Status: DISCONTINUED | OUTPATIENT
Start: 2022-07-27 | End: 2022-07-27

## 2022-07-27 RX ADMIN — Medication 100 MILLIGRAM(S): at 13:36

## 2022-07-27 RX ADMIN — CHLORHEXIDINE GLUCONATE 1 APPLICATION(S): 213 SOLUTION TOPICAL at 13:36

## 2022-07-27 RX ADMIN — Medication 100 MILLIGRAM(S): at 06:13

## 2022-07-27 RX ADMIN — SENNA PLUS 2 TABLET(S): 8.6 TABLET ORAL at 21:36

## 2022-07-27 RX ADMIN — SODIUM CHLORIDE 50 MILLILITER(S): 5 INJECTION, SOLUTION INTRAVENOUS at 14:29

## 2022-07-27 RX ADMIN — Medication 30 GRAM(S): at 13:36

## 2022-07-27 RX ADMIN — TOLVAPTAN 30 MILLIGRAM(S): 15 TABLET ORAL at 09:53

## 2022-07-27 RX ADMIN — NAFCILLIN 200 GRAM(S): 10 INJECTION, POWDER, FOR SOLUTION INTRAVENOUS at 21:40

## 2022-07-27 RX ADMIN — SODIUM CHLORIDE 2 GRAM(S): 9 INJECTION INTRAMUSCULAR; INTRAVENOUS; SUBCUTANEOUS at 06:13

## 2022-07-27 RX ADMIN — SODIUM CHLORIDE 2 GRAM(S): 9 INJECTION INTRAMUSCULAR; INTRAVENOUS; SUBCUTANEOUS at 21:36

## 2022-07-27 RX ADMIN — NAFCILLIN 200 GRAM(S): 10 INJECTION, POWDER, FOR SOLUTION INTRAVENOUS at 06:09

## 2022-07-27 RX ADMIN — ENOXAPARIN SODIUM 40 MILLIGRAM(S): 100 INJECTION SUBCUTANEOUS at 17:39

## 2022-07-27 RX ADMIN — LEVETIRACETAM 500 MILLIGRAM(S): 250 TABLET, FILM COATED ORAL at 17:40

## 2022-07-27 RX ADMIN — Medication 300 MILLIGRAM(S): at 09:54

## 2022-07-27 RX ADMIN — POLYETHYLENE GLYCOL 3350 17 GRAM(S): 17 POWDER, FOR SOLUTION ORAL at 13:35

## 2022-07-27 RX ADMIN — SODIUM CHLORIDE 2 GRAM(S): 9 INJECTION INTRAMUSCULAR; INTRAVENOUS; SUBCUTANEOUS at 13:36

## 2022-07-27 RX ADMIN — MODAFINIL 200 MILLIGRAM(S): 200 TABLET ORAL at 06:13

## 2022-07-27 RX ADMIN — NAFCILLIN 200 GRAM(S): 10 INJECTION, POWDER, FOR SOLUTION INTRAVENOUS at 17:42

## 2022-07-27 RX ADMIN — NAFCILLIN 200 GRAM(S): 10 INJECTION, POWDER, FOR SOLUTION INTRAVENOUS at 13:35

## 2022-07-27 RX ADMIN — NAFCILLIN 200 GRAM(S): 10 INJECTION, POWDER, FOR SOLUTION INTRAVENOUS at 09:54

## 2022-07-27 RX ADMIN — Medication 300 MILLIGRAM(S): at 21:40

## 2022-07-27 RX ADMIN — Medication 5 MILLIGRAM(S): at 21:36

## 2022-07-27 RX ADMIN — LEVETIRACETAM 500 MILLIGRAM(S): 250 TABLET, FILM COATED ORAL at 06:13
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
## 2022-07-27 NOTE — PROGRESS NOTE ADULT - SUBJECTIVE AND OBJECTIVE BOX
[facility]  INFECTIOUS DISEASES at [place] [facility] [place]  =======================================================                               Nestor Russo MD#  iMtesh Woodward MD*                                     Nyla Orantes MD*    Vera Bishop MD*            Diplomates American Board of Internal Medicine & Infectious Diseases                # [place] Office - Appt - Tel  [phone] Fax [phone]                * [place] Office - Appt - Tel [phone] Fax [phone]                                  Hospital Consult line:  [phone]  =======================================================    PASTOR KEMAR [id number]    Follow up: Epidural fluid collection     No fever       Allergies:  Allergy Status Unknown      REVIEW OF SYSTEMS:  Unable to obtain due to medical condition       Physical Exam:  GEN: NAD  HEENT: Surgical bandage of craniectomy site.  Anicteric   NECK: Supple.   LUNGS: diffuse rhonchi   HEART: Regular rate and rhythm   ABDOMEN: Soft, nontender, and nondistended.  Positive bowel sounds.    : [place]   EXTREMITIES: trace edema.  MSK: no joint swelling  NEUROLOGIC: Awake  PSYCHIATRIC: unable to assess  SKIN: No Rash        Vitals:    T(F): 98.5 (27 Jul 2022 08:53), Max: 98.9 (26 Jul 2022 20:00)  HR: 76 (27 Jul 2022 08:53)  BP: 131/92 (27 Jul 2022 08:53)  RR: 18 (27 Jul 2022 08:53)  SpO2: 96% (27 Jul 2022 08:53) (96% - 96%)  temp max in last 48H T(F): , Max: 98.9 (07-26-22 @ 20:00)    Current Antibiotics:  nafcillin  IVPB 2 Gram(s) IV Intermittent every 4 hours  vancomycin  IVPB 1500 milliGRAM(s) IV Intermittent every 12 hours    Other medications:  amantadine Syrup 100 milliGRAM(s) Oral <User Schedule>  chlorhexidine 2% Cloths 1 Application(s) Topical daily  enoxaparin Injectable 40 milliGRAM(s) SubCutaneous every 24 hours  levETIRAcetam  Solution 500 milliGRAM(s) Oral two times a day  melatonin 5 milliGRAM(s) Oral at bedtime  modafinil 200 milliGRAM(s) Oral <User Schedule>  polyethylene glycol 3350 17 Gram(s) Oral daily  senna 2 Tablet(s) Oral at bedtime  sodium chloride 2 Gram(s) Oral every 8 hours  tolvaptan 15 milliGRAM(s) Oral <User Schedule>  urea Oral Powder 30 Gram(s) Oral daily                            11.0   5.21  )-----------( 268      ( 27 Jul 2022 06:15 )             31.8     07-27    124<L>  |  87<L>  |  22.7<H>  ----------------------------<  107<H>  4.2   |  27.0  |  0.58    Ca    8.9      27 Jul 2022 06:15  Phos  4.6     07-27  Mg     1.6     07-27      RECENT CULTURES:  07-18 @ 22:05 .Blood Blood-Peripheral     No Growth Final    07-18 @ 22:03 .Blood Blood-Peripheral     No Growth Final    07-18 @ 17:06 Bone Bone Flap     Culture is being performed.    07-18 @ 17:03 .Tissue Bone Flap Staphylococcus aureus    Few Staphylococcus aureus  Rare polymorphonuclear leukocytes per low power field  No organisms seen    07-18 @ 16:57 .Body Fluid #1 Epidura Fluid Staphylococcus aureus  Staphylococcus epidermidis    Few Staphylococcus aureus  Few Staphylococcus epidermidis  Moderate polymorphonuclear leukocytes seen per low power field  Few Gram positive cocci in pairs seen per oil power field    07-18 @ 16:34 .Tissue Duragen Staphylococcus aureus    Few Staphylococcus aureus  Few polymorphonuclear leukocytes per low power field  No organisms seen      WBC Count: 5.21 K/uL (07-27-22 @ 06:15)  WBC Count: 4.45 K/uL (07-23-22 @ 07:38)    Creatinine, Serum: 0.58 mg/dL (07-27-22 @ 06:15)  Creatinine, Serum: 0.51 mg/dL (07-26-22 @ 09:12)  Creatinine, Serum: 0.64 mg/dL (07-25-22 @ 06:26)  Creatinine, Serum: 0.65 mg/dL (07-24-22 @ 08:44)  Creatinine, Serum: 0.50 mg/dL (07-23-22 @ 13:00)  Creatinine, Serum: 0.53 mg/dL (07-23-22 @ 07:38)  Creatinine, Serum: 0.58 mg/dL (07-22-22 @ 20:34)  Creatinine, Serum: 0.54 mg/dL (07-22-22 @ 12:39)     COVID-19 PCR: NotDetec (07-17-22 @ 17:50)  COVID-19 PCR: NotDetec (07-10-22 @ 14:17)  COVID-19 PCR: NotDetec (07-06-22 @ 06:27)  COVID-19 PCR: NotDetec (06-29-22 @ 08:34)  COVID-19 PCR: NotDetec (06-27-22 @ 21:10)
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
## 2022-07-27 NOTE — PROGRESS NOTE ADULT - SUBJECTIVE AND OBJECTIVE BOX
HPI: Patient is a 25y old M brought by EMS, found down in the street unresponsive.     Primary Survey:    A - airway compromised, patient intubated in ED, RSI  B - bilateral breath sound after intubation  C - initial BP: 169/93 (05-24-22 @ 00:00)*** , HR: 107 (05-24-22 @ 00:44)*** , palpable pulses in all extremities  D - GCS 3 on arrival    Exposure obtained, no evident injuries    CXR: No evident PTX or Hemothorax, ET tube in place.  (24 May 2022 01:09)      INTERVAL HPI/OVERNIGHT EVENTS:  42y Male seen lying comfortably in bed. Persistently hyponatremic, renal following, 2% hypertonic x6 hours until Tolvaptan avail by pharmacy. Transferred to medicine team as primary today.    Vital Signs Last 24 Hrs  T(C): 36.9 (27 Jul 2022 08:53), Max: 37.2 (26 Jul 2022 20:00)  T(F): 98.5 (27 Jul 2022 08:53), Max: 98.9 (26 Jul 2022 20:00)  HR: 76 (27 Jul 2022 08:53) (75 - 90)  BP: 131/92 (27 Jul 2022 08:53) (131/92 - 133/67)  BP(mean): 89 (27 Jul 2022 06:00) (89 - 95)  RR: 18 (27 Jul 2022 08:53) (16 - 18)  SpO2: 96% (27 Jul 2022 08:53) (96% - 96%)    Parameters below as of 27 Jul 2022 08:53  Patient On (Oxygen Delivery Method): room air    PHYSICAL EXAM:  GENERAL: NAD, well-groomed, well-developed  HEAD:  Atraumatic, normocephalic  DRAINS:   WOUND: Dressing clean dry intact  GEORGES COMA SCORE: E- V- M- =       E: 4= opens eyes spontaneously 3= to voice 2= to noxious 1= no opening       V: 5= oriented 4= confused 3= inappropriate words 2= incomprehensible sounds 1= nonverbal 1T= intubated       M: 6= follows commands 5= localizes 4= withdraws 3= flexor posturing 2= extensor posturing 1= no movement  MENTAL STATUS: AAO x3; Awake/Comatose; Opens eyes spontaneously/to voice/to light touch/to noxious stimuli; Appropriately conversant without aphasia/Nonverbal; following simple commands/mimicking/not following commands  CRANIAL NERVES: Visual acuity normal for age, visual fields full to confrontation, PERRL. EOMI without nystagmus. Facial sensation intact V1-3 distribution b/l. Face symmetric w/ normal eye closure and smile, tongue midline. Hearing grossly intact. Speech clear. Head turning and shoulder shrug intact.   REFLEXES: PERRL. Corneals intact b/l. Gag intact. Cough intact. Oculocephalic reflex intact (Doll's eye). Negative Kendall's b/l. Negative clonus b/l  MOTOR: strength 5/5 b/l upper and lower extremities  Uppers     Delt (C5/6)     Bicep (C5/6)     Wrist Extend (C6)     Tricep (C7)     HG (C8/T1)  R                     5/5                 5/5                         5/5                           5/5                   5/5  L                      5/5                 5/5                         5/5                           5/5                   5/5  Lowers      HF(L1/L2)     KE (L3)     DF (L4)     EHL (L5)     PF (S1)      R                     5/5              5/5           5/5           5/5            5/5  L                     5/5               5/5          5/5            5/5            5/5  SENSATION: grossly intact to light touch all extremities  COORDINATION: Gait intact; rapid alternating movements intact; heel to shin intact; no upper extremity dysmetria  CHEST/LUNG: Clear to auscultation bilaterally; no rales, rhonchi, wheezing, or rubs  HEART: +S1/+S2; Regular rate and rhythm; no murmurs, rubs, or gallops  ABDOMEN: Soft, nontender, nondistended; bowel sounds present all four quadrants  EXTREMITIES:  2+ peripheral pulses, no clubbing, cyanosis, or edema  SKIN: Warm, dry; no rashes or lesions    LABS:                        11.0   5.21  )-----------( 268      ( 27 Jul 2022 06:15 )             31.8     07-27    124<L>  |  87<L>  |  22.7<H>  ----------------------------<  107<H>  4.2   |  27.0  |  0.58    Ca    8.9      27 Jul 2022 06:15  Phos  4.6     07-27  Mg     1.6     07-27            07-26 @ 07:01  -  07-27 @ 07:00  --------------------------------------------------------  IN: 600 mL / OUT: 2600 mL / NET: -2000 mL        RADIOLOGY & ADDITIONAL TESTS:  < from: CT Head No Cont (07.18.22 @ 00:58) >    IMPRESSION:  Status post right hemicraniectomy with placement of subgaleal drain and   evacuation of right subdural collection.  Decreased mass effect on the   right cerebral hemisphere.  Decreased effacement of the right lateral   ventricle.  Improved midline shift to the left, now measuring 9 mm,   compared to 1.4 cm preoperatively.    A small low-attenuation left frontal subdural collection measures   approximately 5 mm in caliber, compared to 6-7 mm on MRI from 07/17/2022.    Persistent dilatation of the temporal horns of both lateral ventricles,   compatible with hydrocephalus from ventricular entrapment.    A right zygomaticomaxillary complex fracture is partially visualized.    < end of copied text >    < from: MR Head w/wo IV Cont (07.17.22 @ 14:30) >  IMPRESSION: Postop changes are identified with large extra axial   collection associated air. There is some peripheral enhancement seen   around the collection as well as adjacent T2 prolongation. The   possibility of adjacent leptomeningeal enhancement cannot be entirely   excluded.    Mass effect on the right lateral ventricle is seen with subfalcine and   uncal herniation identified.    < end of copied text >      CAPRINI SCORE [CLOT]:  Patient has an estimated Caprini score of greater than 5.  However, the patient's unique clinical situation will be addressed in an individual manner to determine appropriate anticoagulation treatment, if any.    ASSESSMENT  42M unknown PMH presented with IPH/SDH s/p hemicraniectomy on 5/24, cranioplasty 6/29, ccb right crani for epidural collection on 7/17.    PLAN  - case d/w team  - no acute neurosurgical intervention indicated at this time, plan for cranioplasty when appropriate  - patient transferred to medicine service as primary  - nephro following for hyponatremia, on salt tabs, fluid restriction <1L/day, 2% saline x6 hours while waiting for Samsca from pharmacy  - on abx until 9/1 per ID for MSSA in fluid cx, MRSE   - SCDs, Lovenox for dvt ppx  - PT/OT, OOB as tolerated with helmet  - will continue to follow HPI: Patient is a 25y old M brought by EMS, found down in the street unresponsive.     Primary Survey:    A - airway compromised, patient intubated in ED, RSI  B - bilateral breath sound after intubation  C - initial BP: 169/93 (05-24-22 @ 00:00)*** , HR: 107 (05-24-22 @ 00:44)*** , palpable pulses in all extremities  D - GCS 3 on arrival    Exposure obtained, no evident injuries    CXR: No evident PTX or Hemothorax, ET tube in place.  (24 May 2022 01:09)      INTERVAL HPI/OVERNIGHT EVENTS:  42y Male seen lying comfortably in bed. Persistently hyponatremic, renal following, 2% hypertonic x6 hours until Tolvaptan avail by pharmacy. Transferred to medicine team as primary today. Reportedly speaking to mother yesterday, nonverbal on exam consistently for staff.    Vital Signs Last 24 Hrs  T(C): 36.9 (27 Jul 2022 08:53), Max: 37.2 (26 Jul 2022 20:00)  T(F): 98.5 (27 Jul 2022 08:53), Max: 98.9 (26 Jul 2022 20:00)  HR: 76 (27 Jul 2022 08:53) (75 - 90)  BP: 131/92 (27 Jul 2022 08:53) (131/92 - 133/67)  BP(mean): 89 (27 Jul 2022 06:00) (89 - 95)  RR: 18 (27 Jul 2022 08:53) (16 - 18)  SpO2: 96% (27 Jul 2022 08:53) (96% - 96%)    Parameters below as of 27 Jul 2022 08:53  Patient On (Oxygen Delivery Method): room air    PHYSICAL EXAM:  GENERAL: NAD, well-groomed  HEAD: s/p R craniectomy, flap sunken, not full.  WOUND: open to air, sutured, clean dry intact, no active drainage or wound dehiscence   MENTAL STATUS: Awake. Opens eyes spontaneously. Nonverbal, following simple commands in [employer].   CRANIAL NERVES: PERRL. Face symmetric w/ normal eye closure. Hearing grossly intact.   MOTOR: LUNA AG    LABS:                        11.0   5.21  )-----------( 268      ( 27 Jul 2022 06:15 )             31.8     07-27    124<L>  |  87<L>  |  22.7<H>  ----------------------------<  107<H>  4.2   |  27.0  |  0.58    Ca    8.9      27 Jul 2022 06:15  Phos  4.6     07-27  Mg     1.6     07-27 07-26 @ 07:01  -  07-27 @ 07:00  --------------------------------------------------------  IN: 600 mL / OUT: 2600 mL / NET: -2000 mL        RADIOLOGY & ADDITIONAL TESTS:  < from: CT Head No Cont (07.18.22 @ 00:58) >    IMPRESSION:  Status post right hemicraniectomy with placement of subgaleal drain and   evacuation of right subdural collection.  Decreased mass effect on the   right cerebral hemisphere.  Decreased effacement of the right lateral   ventricle.  Improved midline shift to the left, now measuring 9 mm,   compared to 1.4 cm preoperatively.    A small low-attenuation left frontal subdural collection measures   approximately 5 mm in caliber, compared to 6-7 mm on MRI from 07/17/2022.    Persistent dilatation of the temporal horns of both lateral ventricles,   compatible with hydrocephalus from ventricular entrapment.    A right zygomaticomaxillary complex fracture is partially visualized.    < end of copied text >    < from: MR Head w/wo IV Cont (07.17.22 @ 14:30) >  IMPRESSION: Postop changes are identified with large extra axial   collection associated air. There is some peripheral enhancement seen   around the collection as well as adjacent T2 prolongation. The   possibility of adjacent leptomeningeal enhancement cannot be entirely   excluded.    Mass effect on the right lateral ventricle is seen with subfalcine and   uncal herniation identified.    < end of copied text >      CAPRINI SCORE [CLOT]:  Patient has an estimated Caprini score of greater than 5.  However, the patient's unique clinical situation will be addressed in an individual manner to determine appropriate anticoagulation treatment, if any.    ASSESSMENT  42M unknown PMH presented with IPH/SDH s/p hemicraniectomy on 5/24, cranioplasty 6/29, ccb right crani for epidural collection on 7/17.    PLAN  - case d/w team  - no acute neurosurgical intervention indicated at this time, plan for cranioplasty when appropriate  - patient transferred to medicine service as primary  - nephro following for hyponatremia, on salt tabs, fluid restriction <1L/day, 2% saline x6 hours while waiting for Samsca from pharmacy  - on abx until 9/1 per ID for MSSA in fluid cx, MRSE   - SCDs, Lovenox for dvt ppx  - PT/OT, OOB as tolerated with helmet  - will continue to follow

## 2022-07-27 NOTE — PROGRESS NOTE ADULT - SUBJECTIVE AND OBJECTIVE BOX
Subjective: No acute overnight event.  Serum sodium 126-->125-->130-->135-->129-->124    ROS: Unable to obtain 2/2 current clinical condition    Medications:   MEDICATIONS  (STANDING):  amantadine Syrup 100 milliGRAM(s) Oral <User Schedule>  chlorhexidine 2% Cloths 1 Application(s) Topical daily  enoxaparin Injectable 40 milliGRAM(s) SubCutaneous every 24 hours  levETIRAcetam  Solution 500 milliGRAM(s) Oral two times a day  melatonin 5 milliGRAM(s) Oral at bedtime  modafinil 200 milliGRAM(s) Oral <User Schedule>  nafcillin  IVPB 2 Gram(s) IV Intermittent every 4 hours  polyethylene glycol 3350 17 Gram(s) Oral daily  senna 2 Tablet(s) Oral at bedtime  sodium chloride 2 Gram(s) Oral every 8 hours  tolvaptan 15 milliGRAM(s) Oral <User Schedule>  urea Oral Powder 30 Gram(s) Oral daily  vancomycin  IVPB 1500 milliGRAM(s) IV Intermittent every 12 hours      I&O's Summary  26 Jul 2022 07:01  -  27 Jul 2022 07:00  --------------------------------------------------------  IN: 600 mL / OUT: 2600 mL / NET: -2000 mL    27 Jul 2022 07:01  -  27 Jul 2022 18:08  --------------------------------------------------------  IN: 1050 mL / OUT: 3200 mL / NET: -2150 mL        Physical Exam:  Gen: no acute distress  MS: awake   Eyes: EOMI, no icterus  HENT: Surgical dressing of craniectomy site, MMM, trach  CV: rhythm reg reg, rate normal, no m/g/r, no LE edema  Chest: CTAB, no w/r/r,  Abd: soft, NT, ND  Neuro: moving all 4 limbs spontaneously, no tremor  MSK: normal bulk and tone, no joint swelling  Skin: dry, warm, no rash or jaundice      07-27    124<L>  |  87<L>  |  22.7<H>  ----------------------------<  107<H>  4.2   |  27.0  |  0.58    Ca    8.9      27 Jul 2022 06:15  Phos  4.6     07-27  Mg     1.6     07-27      IMPRESSION: 42y  Male initially admitted on 5/24/22 after being found down and was found to have at that time Bilateral IPH, Bilateral SDH. Prolonged hospital course: s/p craniotomy and evacuation of SDH 5/24, s/p trach 6/1, s/p cranioplasty and replacement of bone flap 6/29, s/p R hemicraniectomy, wound exploration, evacuation of epidural fluid collection 7/17.  1.  Hyponatremia    RECOMMENDATIONS:    ·	Hyponatremia in relative euvolemic pt w/ high Manuela and UOsm indicates inappropriate ADH secretion.  ·	Improved on samsca to 135 but again drooped even on salt tabs and UreNa  ·	Continue free fluid restriction <  1L/day  ·	Going forward he likely will have to be on PO samsca 15 mg 3 times per week, for now got it approved for 3 doses. Giving 30 mg today f/c 15 mg on Fri/Mon/Wed  ·	Will follow.

## 2022-07-27 NOTE — CHART NOTE - NSCHARTNOTEFT_GEN_A_CORE
Source: Patient [ ]  Family [ ]   other [ ]  42y  Male initially admitted on 5/24/22 after being found down and was found to have at that time Bilateral IPH, Bilateral SDH. Prolonged hospital course: s/p craniotomy and evacuation of SDH 5/24, s/p trach 6/1, s/p cranioplasty and replacement of bone flap 6/29, s/p R hemicraniectomy, wound exploration, evacuation of epidural fluid collection 7/17    Current Diet: Diet, Pureed:   1000mL Fluid Restriction ([id number])  Tube Feeding Modality: Gastrostomy  Pivot 1.5 Micky (PIVOT1.5RTH)  Total Volume for 24 Hours (mL): 400  Bolus  Total Volume of Bolus (mL):  400  Tube Feed Frequency: Every 24 hours   Tube Feed Start Time: 21:00  Bolus Feed Rate (mL per Hour): 400   Bolus Feed Duration (in Hours): 1 (07-23-22 @ 10:39)      Patient reports [ ] nausea  [ ] vomiting [ ] diarrhea [ ] constipation  [ ]chewing problems [ ] swallowing issues  [ ] other:     PO intake:  < 50% [ ]   50-75%  [ ]   %  [ ]  other :    Source for PO intake [ ] Patient [ ] family [ ] chart [ ] staff [ ] other    Enteral /Parenteral Nutrition:     Current Weight:     % Weight Change     Pertinent Medications: MEDICATIONS  (STANDING):  amantadine Syrup 100 milliGRAM(s) Oral <User Schedule>  chlorhexidine 2% Cloths 1 Application(s) Topical daily  enoxaparin Injectable 40 milliGRAM(s) SubCutaneous every 24 hours  levETIRAcetam  Solution 500 milliGRAM(s) Oral two times a day  melatonin 5 milliGRAM(s) Oral at bedtime  modafinil 200 milliGRAM(s) Oral <User Schedule>  nafcillin  IVPB 2 Gram(s) IV Intermittent every 4 hours  polyethylene glycol 3350 17 Gram(s) Oral daily  senna 2 Tablet(s) Oral at bedtime  sodium chloride 2 Gram(s) Oral every 8 hours  tolvaptan 15 milliGRAM(s) Oral <User Schedule>  urea Oral Powder 30 Gram(s) Oral daily  vancomycin  IVPB 1500 milliGRAM(s) IV Intermittent every 12 hours    MEDICATIONS  (PRN):  labetalol Injectable 10 milliGRAM(s) IV Push every 4 hours PRN Systolic blood pressure > 140  ondansetron Injectable 4 milliGRAM(s) IV Push every 4 hours PRN Nausea and/or Vomiting    Pertinent Labs: CBC Full  -  ( 27 Jul 2022 06:15 )  WBC Count : 5.21 K/uL  RBC Count : 3.92 M/uL  Hemoglobin : 11.0 g/dL  Hematocrit : 31.8 %  Platelet Count - Automated : 268 K/uL  Mean Cell Volume : 81.1 fl  Mean Cell Hemoglobin : 28.1 pg  Mean Cell Hemoglobin Concentration : 34.6 gm/dL  Auto Neutrophil # : x  Auto Lymphocyte # : x  Auto Monocyte # : x  Auto Eosinophil # : x  Auto Basophil # : x  Auto Neutrophil % : x  Auto Lymphocyte % : x  Auto Monocyte % : x  Auto Eosinophil % : x  Auto Basophil % : x    07-27 Na124 mmol/L<L> Glu 107 mg/dL<H> K+ 4.2 mmol/L Cr  0.58 mg/dL BUN 22.7 mg/dL<H> Phos 4.6 mg/dL Alb n/a   PAB n/a             Skin:     Nutrition focused physical exam conducted - found signs of malnutrition [ ]absent [ ]present    Subcutaneous fat loss: [ ] Orbital fat pads region, [ ]Buccal fat region, [ ]Triceps region,  [ ]Ribs region    Muscle wasting: [ ]Temples region, [ ]Clavicle region, [ ]Shoulder region, [ ]Scapula region, [ ]Interosseous region,  [ ]thigh region, [ ]Calf region    Estimated Needs:   [ ] no change since previous assessment  [ ] recalculated:     Current Nutrition Diagnosis:    Recommendations:     Monitoring and Evaluation:   [ ] PO intake [ ] Tolerance to diet prescription [X] Weights  [X] Follow up per protocol [X] Labs: Source: Patient [ ]  Family [ ]   other [x ]  42y  Male initially admitted on 5/24/22 after being found down and was found to have at that time Bilateral IPH, Bilateral SDH. Prolonged hospital course: s/p craniotomy and evacuation of SDH 5/24, s/p trach 6/1, s/p cranioplasty and replacement of bone flap 6/29, s/p R hemicraniectomy, wound exploration, evacuation of epidural fluid collection 7/17    Current Diet: Diet, Pureed:   1000mL Fluid Restriction ([id number])  Tube Feeding Modality: Gastrostomy  Pivot 1.5 Micky (PIVOT1.5RTH)  Total Volume for 24 Hours (mL): 400  Bolus  Total Volume of Bolus (mL):  400  Tube Feed Frequency: Every 24 hours   Tube Feed Start Time: 21:00  Bolus Feed Rate (mL per Hour): 400   Bolus Feed Duration (in Hours): 1 (07-23-22 @ 10:39)      Patient reports [ ] nausea  [ ] vomiting [ ] diarrhea [ ] constipation  [ ]chewing problems [ ] swallowing issues  [ ] other:     PO intake:  < 50% [ ]   50-75%  [x ]   %  [ ]  other :    Source for PO intake [ ] Patient [ ] family [ ] chart [ ] staff [ ] other    Enteral /Parenteral Nutrition:  Bolus of Pivot 400ml/day providing and 600kcal, 38g protein per day    Current Weight:   (7/18) 131.8 lbs  (7/16) 126.7 lbs  (7/13) 128.5 lbs  (7/11) 129.4 lbs  (7/5) 141.7 lbs  (6/28) 194.2 lbs  (6/25) 160 lbs  Question accuracy of wts, continue to monitor  No edema noted        Pertinent Medications: MEDICATIONS  (STANDING):  amantadine Syrup 100 milliGRAM(s) Oral <User Schedule>  chlorhexidine 2% Cloths 1 Application(s) Topical daily  enoxaparin Injectable 40 milliGRAM(s) SubCutaneous every 24 hours  levETIRAcetam  Solution 500 milliGRAM(s) Oral two times a day  melatonin 5 milliGRAM(s) Oral at bedtime  modafinil 200 milliGRAM(s) Oral <User Schedule>  nafcillin  IVPB 2 Gram(s) IV Intermittent every 4 hours  polyethylene glycol 3350 17 Gram(s) Oral daily  senna 2 Tablet(s) Oral at bedtime  sodium chloride 2 Gram(s) Oral every 8 hours  tolvaptan 15 milliGRAM(s) Oral <User Schedule>  urea Oral Powder 30 Gram(s) Oral daily  vancomycin  IVPB 1500 milliGRAM(s) IV Intermittent every 12 hours    MEDICATIONS  (PRN):  labetalol Injectable 10 milliGRAM(s) IV Push every 4 hours PRN Systolic blood pressure > 140  ondansetron Injectable 4 milliGRAM(s) IV Push every 4 hours PRN Nausea and/or Vomiting    Pertinent Labs: CBC Full  -  ( 27 Jul 2022 06:15 )  WBC Count : 5.21 K/uL  RBC Count : 3.92 M/uL  Hemoglobin : 11.0 g/dL  Hematocrit : 31.8 %  Platelet Count - Automated : 268 K/uL  Mean Cell Volume : 81.1 fl  Mean Cell Hemoglobin : 28.1 pg  Mean Cell Hemoglobin Concentration : 34.6 gm/dL  Auto Neutrophil # : x  Auto Lymphocyte # : x  Auto Monocyte # : x  Auto Eosinophil # : x  Auto Basophil # : x  Auto Neutrophil % : x  Auto Lymphocyte % : x  Auto Monocyte % : x  Auto Eosinophil % : x  Auto Basophil % : x    07-27 Na124 mmol/L<L> Glu 107 mg/dL<H> K+ 4.2 mmol/L Cr  0.58 mg/dL BUN 22.7 mg/dL<H> Phos 4.6 mg/dL Alb n/a   PAB n/a             Skin: R temporal region, R parietal region Surgical incisions    Nutrition focused physical exam conducted - found signs of malnutrition [x ]absent [ ]present    Subcutaneous fat loss: [ ] Orbital fat pads region, [ ]Buccal fat region, [ ]Triceps region,  [ ]Ribs region    Muscle wasting: [ ]Temples region, [ ]Clavicle region, [ ]Shoulder region, [ ]Scapula region, [ ]Interosseous region,  [ ]thigh region, [ ]Calf region    Estimated Needs:   [ x] no change since previous assessment  [ ] recalculated:     Current Nutrition Diagnosis: Pt remains at nutrition risk secondary to increased nutrient needs related to increased physiological demand for nutrient as evidenced by Right SDH s/p craniectomy with Left SDH, b/l parenchymal hematomas, +TEOFILO, multiple facial fractures, ETOH abuse. Pt tolerating tube feed and po diet. Pt tolerating PO diet with fair/good PO intake per staff      Recommendations:   1) Continue diet as ordered/tolerated  2) Provide assistance with meals prn  3) Add Ensure Enlive TID to optimize po intake and provide an additional 350kcal, 20g protein per serving   4) Consider appetite stimulant if po intake remains inconsistent   5) Continue MVI, folic acid, thiamine daily   6) Monitor wts and labs      Monitoring and Evaluation:   [ ] PO intake [ ] Tolerance to diet prescription [X] Weights  [X] Follow up per protocol [X] Labs:

## 2022-07-27 NOTE — PROGRESS NOTE ADULT - SUBJECTIVE AND OBJECTIVE BOX
Transfer note/ acceptance note   Asked by Neurosurgical team to transfer patient from NS to Medicine .   Patient was followed by me , known to me .   Patient seen and examined . Comfortable , NAD , awake , alert , not answering ,   tracing , follows simple command with all 4 extremity .     CURRENT MEDICATIONS :   levETIRAcetam  Solution 500 milliGRAM(s) Oral two times a day  melatonin 5 milliGRAM(s) Oral at bedtime  modafinil 200 milliGRAM(s) Oral <User Schedule>  nafcillin  IVPB 2 Gram(s) IV Intermittent every 4 hours  polyethylene glycol 3350 17 Gram(s) Oral daily  senna 2 Tablet(s) Oral at bedtime  sodium chloride 2 Gram(s) Oral every 8 hours  sodium chloride 2% . 1000 milliLiter(s) (50 mL/Hr) IV Continuous <Continuous>  tolvaptan 15 milliGRAM(s) Oral <User Schedule>  urea Oral Powder 30 Gram(s) Oral daily  vancomycin  IVPB 1500 milliGRAM(s) IV Intermittent every 12 hours    MEDICATIONS  (PRN):  labetalol Injectable 10 milliGRAM(s) IV Push every 4 hours PRN Systolic blood pressure > 140  ondansetron Injectable 4 milliGRAM(s) IV Push every 4 hours PRN Nausea and/or Vomiting      LABS:                          11.0   5.21  )-----------( 268      ( 27 Jul 2022 06:15 )             31.8     07-27    124<L>  |  87<L>  |  22.7<H>  ----------------------------<  107<H>  4.2   |  27.0  |  0.58    Ca    8.9      27 Jul 2022 06:15  Phos  4.6     07-27  Mg     1.6     07-27      RECENT CULTURES:  07-18 @ 22:05 .Blood Blood-Peripheral     No Growth Final    07-18 @ 22:03 .Blood Blood-Peripheral     No Growth Final    07-18 @ 17:06 Bone Bone Flap     Culture is being performed.    07-18 @ 17:03 .Tissue Bone Flap Staphylococcus aureus    Few Staphylococcus aureus  Rare polymorphonuclear leukocytes per low power field  No organisms seen    07-18 @ 16:57 .Body Fluid #1 Epidura Fluid Staphylococcus aureus  Staphylococcus epidermidis    Few Staphylococcus aureus  Few Staphylococcus epidermidis  Moderate polymorphonuclear leukocytes seen per low power field  Few Gram positive cocci in pairs seen per oil power field    07-18 @ 16:34 .Tissue Duragen Staphylococcus aureus    Few Staphylococcus aureus  Few polymorphonuclear leukocytes per low power field  No organisms seen        RADIOLOGY & ADDITIONAL TESTS:                  CT BRAIN 5/24 - 1. Early entrapment of the posterior horn left lateral ventricle,  secondary to multicompartment intracranial hemorrhage. This is manifested by high right frontal and medial left temporal parenchymal hematomas, large right holohemispheric subdural hematoma, right parafalcine hemorrhage extending to the right tentorium, and right frontal  subarachnoid hemorrhage. Additional petechial hemorrhage suspected at the gray-white matter junction bilaterally.  2. 1.9 cm right to left subfalcine herniation and additional right uncal herniation with effacement of the ambient cistern.    CT CERVICAL SPINE 5/24 - 1. No acute fracture. 2. Hyperdensity in the anterior epidural space at C5-6 has the appearance   of a central disc protrusion with caudal subligamentous extension, trace epidural hemorrhage is technically difficult to exclude.    CT FACE 5/24 - 1. Extensive, comminuted right zygomaticomaxillary complex fracture,  detailed above. Injury to the right inferior rectus muscle suspected. At the time of this interpretation, this patient is intraoperative with  neurosurgery, message left for the covering PA with the OR   regarding these results.    CT CAP 5/24 - No evidence of active contrast extravasation, hemoperitoneum or retroperitoneal hemorrhage. Bibasilar atelectasis, left greater than right. Additional findings as above.     CXR 5/24 - Tubes remain. Lungs remain clear.    CT head 5/24 - Increased right scalp soft tissue swelling. Stable intracranial  hemorrhages and subdural hemorrhages. Stable subarachnoid hemorrhage.     CT C-spine 5/24 - Small amount of blood products circumferentially around the thecal sac at the C1 and C2 levels. No high-grade spinal canal stenosis or obvious cord compression is visualized by CT technique. MRI may be  obtained for further evaluation.    MRI BRAIN 5/26 - Right frontal craniectomy. Residual bilateral subdural hematomas and interhemispheric subdural hemorrhage. Right frontal, right frontal temporal and left temporal parenchymal hemorrhagic contusions with an foci of diffusion restriction suggestive of shear injury and diffuse axonal injury.     Cervical spine MRI 5/26 - No acute fractures or dislocations    EEG 5/26 - Abnormal EEG study.  1. Severe nonspecific diffuse or multifocal cerebral dysfunction.   2. No epileptiform pattern or seizure seen.    HEAD CT 5/30 - Unchanged hemorrhagic contusions within the RIGHT frontal and LEFT temporal lobes with edema and herniation of RIGHT frontal lobe through the craniectomy defect. Small BILATERAL subdural hematomas are also stable. With the layering over the tentorium.    Mild LEFT nasal septal deviation with osteophyte. The facial and skull base bones and calvarium demonstrate comminuted fractures of the RIGHT lateral orbit, RIGHT zygomatic arch and RIGHT maxillary sinus and RIGHT pterygoid plate unchanged.    Xray Kidney Ureter Bladder 5/30: Nonobstructive bowel gas pattern/constipation    CXR 6/7 - Patchy right lower lobe infiltrate, new    US Duplex Venous Lower Ext Complete, Bilateral 6/9: No evidence of deep venous thrombosis in either lower extremity.    HEAD CT 6/20 - Right frontal craniectomy. Resolution of hemorrhage in the right frontal parasagittal region, left medial temporal lobe and in the left frontal parietal subdural hematoma compared with 5/30/2022.    HEAD CT 6/28 -  Less low density subgaleal fluid compared with 6/20/2022. Well-defined lucency right posterior limb internal capsule appears more prominent compared to the prior exam. No change in predominantly low density left frontal parietal subdural collection.    HEAD CT 6/30 - Interval right pterional craniotomy. Subjacent extra-axial hemorrhage measures 5 mm in greatest depth. Similar low density left frontal extra-axial collection measures 8 mm in greatest depth. No midline shift. Basal cisterns are visualized. No hydrocephalus. Encephalomalacia and gliosis in the right mesial frontal lobe.    HEAD CT 7/9 - Interval enlargement of a low-density right-sided frontal convexity subdural collection admixed with air. Worsening mass effect upon the right cerebral hemisphere with worsening shift ofthe midline structures from right to left craniotomy measuring up to 9.7 mm. No herniation at this time.Unchanged low-density right frontal subdural collection.Recommend continued short-term follow-up CT examination.    HEAD CT 7/10 - Status post right-sided craniotomy with associated air and fluid extra-axial collection grossly stable in size. Grossly stable 0.9 cm leftward midline shift.-Grossly stable left frontal subdural collection measuring up to 0.8 cm.-No new intracranial hemorrhage identified.    HEAD CT 7/10 - 1. Status post right frontal parietal craniotomy, with residual right frontal extra-axial collection of fluid and air, with mass effect on the right lateral ventricle and right-to-left midline shift of approximately 1 cm, which appears somewhat decreased compared with the earlier examination. Nonetheless, degree of pneumocephalus is not significantly changed. Close continued follow-up is advised. 2. Minimal fluid appreciated along the inferior aspect of right sided mastoid air cells.    HEAD CT 7/12 - Stable right frontal convexity extra-axial collection with air-fluid level. Stable right to left midline shift, mass effect, and a stable herniation patterns.    MR brain w/w/o IVC 7/17 - Postop changes are identified with large extra axial collection associated air. There is some peripheral enhancement seen around the collection as well as adjacent T2 prolongation. The possibility of adjacent leptomeningeal enhancement cannot be entirely excluded.Mass effect on the right lateral ventricle is seen with subfalcine and uncal herniation identified.    HEAD CT 7/18 - Status post right hemicraniectomy with placement of subgaleal drain and evacuation of right subdural collection.  Decreased mass effect on the right cerebral hemisphere.  Decreased effacement of the right lateral ventricle.  Improved midline shift to the left, now measuring 9 mm, compared to 1.4 cm preoperatively.A small low-attenuation left frontal subdural collection measures approximately 5 mm in caliber, compared to 6-7 mm on MRI from 07/17/2022. Persistent dilatation of the temporal horns of both lateral ventricles, compatible with hydrocephalus from ventricular entrapment. A right zygomaticomaxillary complex fracture is partially visualized.      REVIEW OF SYSTEMS:    UTO patient doesn't answering today     Vital Signs Last 24 Hrs  T(C): 36.9 (27 Jul 2022 08:53), Max: 37.2 (26 Jul 2022 20:00)  T(F): 98.5 (27 Jul 2022 08:53), Max: 98.9 (26 Jul 2022 20:00)  HR: 76 (27 Jul 2022 08:53) (75 - 90)  BP: 131/92 (27 Jul 2022 08:53) (131/92 - 133/67)  BP(mean): 89 (27 Jul 2022 06:00) (89 - 95)  RR: 18 (27 Jul 2022 08:53) (16 - 18)  SpO2: 96% (27 Jul 2022 08:53) (96% - 96%)    Parameters below as of 27 Jul 2022 08:53  Patient On (Oxygen Delivery Method): room air      PHYSICAL EXAM:    GENERAL: NAD, well-groomed, well-developed  HEAD: s/p L craniotomy , surgical site clean and dry ,   sutures +   EYES: EOMI, PERRLA, conjunctiva and sclera clear  NECK: Supple, No JVD, Normal thyroid  NERVOUS SYSTEM: awake , alert , not answering today ,   following simple commands , moving all 4 extremity   CHEST/LUNG: CTA b/l ,  no  rales, rhonchi, wheezing, or rubs  HEART: Regular rate and rhythm; No murmurs, rubs, or gallops  ABDOMEN: Soft, Nontender, Nondistended; Bowel sounds present  EXTREMITIES:  2+ Peripheral Pulses, No clubbing, cyanosis, or edema  LYMPH: No lymphadenopathy noted  SKIN: No rashes or lesions   Transfer note/ acceptance note   Asked by Neurosurgical team to transfer patient from NS to Medicine .   Patient was followed by me , known to me .   Patient seen and examined . Comfortable , NAD , awake , alert , not answering ,   tracing , follows simple command with all 4 extremity .     CURRENT MEDICATIONS :   levETIRAcetam  Solution 500 milliGRAM(s) Oral two times a day  melatonin 5 milliGRAM(s) Oral at bedtime  modafinil 200 milliGRAM(s) Oral <User Schedule>  nafcillin  IVPB 2 Gram(s) IV Intermittent every 4 hours  polyethylene glycol 3350 17 Gram(s) Oral daily  senna 2 Tablet(s) Oral at bedtime  sodium chloride 2 Gram(s) Oral every 8 hours  sodium chloride 2% . 1000 milliLiter(s) (50 mL/Hr) IV Continuous <Continuous>  tolvaptan 15 milliGRAM(s) Oral <User Schedule>  urea Oral Powder 30 Gram(s) Oral daily  vancomycin  IVPB 1500 milliGRAM(s) IV Intermittent every 12 hours    MEDICATIONS  (PRN):  labetalol Injectable 10 milliGRAM(s) IV Push every 4 hours PRN Systolic blood pressure > 140  ondansetron Injectable 4 milliGRAM(s) IV Push every 4 hours PRN Nausea and/or Vomiting      LABS:                          11.0   5.21  )-----------( 268      ( 27 Jul 2022 06:15 )             31.8     07-27    124<L>  |  87<L>  |  22.7<H>  ----------------------------<  107<H>  4.2   |  27.0  |  0.58    Ca    8.9      27 Jul 2022 06:15  Phos  4.6     07-27  Mg     1.6     07-27      RECENT CULTURES:  07-18 @ 22:05 .Blood Blood-Peripheral     No Growth Final    07-18 @ 22:03 .Blood Blood-Peripheral     No Growth Final    07-18 @ 17:06 Bone Bone Flap     Culture is being performed.    07-18 @ 17:03 .Tissue Bone Flap Staphylococcus aureus    Few Staphylococcus aureus  Rare polymorphonuclear leukocytes per low power field  No organisms seen    07-18 @ 16:57 .Body Fluid #1 Epidura Fluid Staphylococcus aureus  Staphylococcus epidermidis    Few Staphylococcus aureus  Few Staphylococcus epidermidis  Moderate polymorphonuclear leukocytes seen per low power field  Few Gram positive cocci in pairs seen per oil power field    07-18 @ 16:34 .Tissue Duragen Staphylococcus aureus    Few Staphylococcus aureus  Few polymorphonuclear leukocytes per low power field  No organisms seen        RADIOLOGY & ADDITIONAL TESTS:                  CT BRAIN 5/24 - 1. Early entrapment of the posterior horn left lateral ventricle,  secondary to multicompartment intracranial hemorrhage. This is manifested by high right frontal and medial left temporal parenchymal hematomas, large right holohemispheric subdural hematoma, right parafalcine hemorrhage extending to the right tentorium, and right frontal  subarachnoid hemorrhage. Additional petechial hemorrhage suspected at the gray-white matter junction bilaterally.  2. 1.9 cm right to left subfalcine herniation and additional right uncal herniation with effacement of the ambient cistern.    CT CERVICAL SPINE 5/24 - 1. No acute fracture. 2. Hyperdensity in the anterior epidural space at C5-6 has the appearance   of a central disc protrusion with caudal subligamentous extension, trace epidural hemorrhage is technically difficult to exclude.    CT FACE 5/24 - 1. Extensive, comminuted right zygomaticomaxillary complex fracture,  detailed above. Injury to the right inferior rectus muscle suspected. At the time of this interpretation, this patient is intraoperative with  neurosurgery, message left for the covering PA with the OR   regarding these results.    CT CAP 5/24 - No evidence of active contrast extravasation, hemoperitoneum or retroperitoneal hemorrhage. Bibasilar atelectasis, left greater than right. Additional findings as above.     CXR 5/24 - Tubes remain. Lungs remain clear.    CT head 5/24 - Increased right scalp soft tissue swelling. Stable intracranial  hemorrhages and subdural hemorrhages. Stable subarachnoid hemorrhage.     CT C-spine 5/24 - Small amount of blood products circumferentially around the thecal sac at the C1 and C2 levels. No high-grade spinal canal stenosis or obvious cord compression is visualized by CT technique. MRI may be  obtained for further evaluation.    MRI BRAIN 5/26 - Right frontal craniectomy. Residual bilateral subdural hematomas and interhemispheric subdural hemorrhage. Right frontal, right frontal temporal and left temporal parenchymal hemorrhagic contusions with an foci of diffusion restriction suggestive of shear injury and diffuse axonal injury.     Cervical spine MRI 5/26 - No acute fractures or dislocations    EEG 5/26 - Abnormal EEG study.  1. Severe nonspecific diffuse or multifocal cerebral dysfunction.   2. No epileptiform pattern or seizure seen.    HEAD CT 5/30 - Unchanged hemorrhagic contusions within the RIGHT frontal and LEFT temporal lobes with edema and herniation of RIGHT frontal lobe through the craniectomy defect. Small BILATERAL subdural hematomas are also stable. With the layering over the tentorium.    Mild LEFT nasal septal deviation with osteophyte. The facial and skull base bones and calvarium demonstrate comminuted fractures of the RIGHT lateral orbit, RIGHT zygomatic arch and RIGHT maxillary sinus and RIGHT pterygoid plate unchanged.    Xray Kidney Ureter Bladder 5/30: Nonobstructive bowel gas pattern/constipation    CXR 6/7 - Patchy right lower lobe infiltrate, new    US Duplex Venous Lower Ext Complete, Bilateral 6/9: No evidence of deep venous thrombosis in either lower extremity.    HEAD CT 6/20 - Right frontal craniectomy. Resolution of hemorrhage in the right frontal parasagittal region, left medial temporal lobe and in the left frontal parietal subdural hematoma compared with 5/30/2022.    HEAD CT 6/28 -  Less low density subgaleal fluid compared with 6/20/2022. Well-defined lucency right posterior limb internal capsule appears more prominent compared to the prior exam. No change in predominantly low density left frontal parietal subdural collection.    HEAD CT 6/30 - Interval right pterional craniotomy. Subjacent extra-axial hemorrhage measures 5 mm in greatest depth. Similar low density left frontal extra-axial collection measures 8 mm in greatest depth. No midline shift. Basal cisterns are visualized. No hydrocephalus. Encephalomalacia and gliosis in the right mesial frontal lobe.    HEAD CT 7/9 - Interval enlargement of a low-density right-sided frontal convexity subdural collection admixed with air. Worsening mass effect upon the right cerebral hemisphere with worsening shift ofthe midline structures from right to left craniotomy measuring up to 9.7 mm. No herniation at this time.Unchanged low-density right frontal subdural collection.Recommend continued short-term follow-up CT examination.    HEAD CT 7/10 - Status post right-sided craniotomy with associated air and fluid extra-axial collection grossly stable in size. Grossly stable 0.9 cm leftward midline shift.-Grossly stable left frontal subdural collection measuring up to 0.8 cm.-No new intracranial hemorrhage identified.    HEAD CT 7/10 - 1. Status post right frontal parietal craniotomy, with residual right frontal extra-axial collection of fluid and air, with mass effect on the right lateral ventricle and right-to-left midline shift of approximately 1 cm, which appears somewhat decreased compared with the earlier examination. Nonetheless, degree of pneumocephalus is not significantly changed. Close continued follow-up is advised. 2. Minimal fluid appreciated along the inferior aspect of right sided mastoid air cells.    HEAD CT 7/12 - Stable right frontal convexity extra-axial collection with air-fluid level. Stable right to left midline shift, mass effect, and a stable herniation patterns.    MR brain w/w/o IVC 7/17 - Postop changes are identified with large extra axial collection associated air. There is some peripheral enhancement seen around the collection as well as adjacent T2 prolongation. The possibility of adjacent leptomeningeal enhancement cannot be entirely excluded.Mass effect on the right lateral ventricle is seen with subfalcine and uncal herniation identified.    HEAD CT 7/18 - Status post right hemicraniectomy with placement of subgaleal drain and evacuation of right subdural collection.  Decreased mass effect on the right cerebral hemisphere.  Decreased effacement of the right lateral ventricle.  Improved midline shift to the left, now measuring 9 mm, compared to 1.4 cm preoperatively.A small low-attenuation left frontal subdural collection measures approximately 5 mm in caliber, compared to 6-7 mm on MRI from 07/17/2022. Persistent dilatation of the temporal horns of both lateral ventricles, compatible with hydrocephalus from ventricular entrapment. A right zygomaticomaxillary complex fracture is partially visualized.      REVIEW OF SYSTEMS:    UTO patient doesn't answering today     Vital Signs Last 24 Hrs  T(C): 36.9 (27 Jul 2022 08:53), Max: 37.2 (26 Jul 2022 20:00)  T(F): 98.5 (27 Jul 2022 08:53), Max: 98.9 (26 Jul 2022 20:00)  HR: 76 (27 Jul 2022 08:53) (75 - 90)  BP: 131/92 (27 Jul 2022 08:53) (131/92 - 133/67)  BP(mean): 89 (27 Jul 2022 06:00) (89 - 95)  RR: 18 (27 Jul 2022 08:53) (16 - 18)  SpO2: 96% (27 Jul 2022 08:53) (96% - 96%)    Parameters below as of 27 Jul 2022 08:53  Patient On (Oxygen Delivery Method): room air      PHYSICAL EXAM:    GENERAL: NAD, well-groomed, well-developed  HEAD: s/p L craniotomy , surgical site clean and dry ,   sutures +   EYES: EOMI, PERRLA, conjunctiva and sclera clear  NECK: Supple, No JVD, Normal thyroid  NERVOUS SYSTEM: awake , alert , not answering today ,   following simple commands , moving all 4 extremity   CHEST/LUNG: CTA b/l ,  no  rales, rhonchi, wheezing, or rubs  HEART: Regular rate and rhythm; No murmurs, rubs, or gallops  ABDOMEN: Soft, Nontender, Nondistended; Bowel sounds present  EXTREMITIES:  2+ Peripheral Pulses, No clubbing, cyanosis, or edema  LYMPH: No lymphadenopathy noted  SKIN: No rashes or lesions      ASSESSMENT AND PLAN :     42yoM brought by EMS, found down in the street unresponsive.  Imaging showed SDH, IPH, TEOFILO,   Patient underwent Right decompressive hemicraniectomy on 5/24, trach/peg 6/1,  R cranioplasty with complex closure 6/29/22.    Cranioplasty  drain removed, s/p rpt right craniectomy for evacuation on 7/17 after MR demonstrated large right ED collection.   On Abx as per ID . 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 Renal is following .   Trach (decanulated)/ PEG + , on pureed diet     ASSESSMENT AND PLAN :    1. SDH/IPH / TBI  S/P R craniotomy / s/p R complex closure cranioplasty ,   repeated CTH  demonstrating right sided subdural collection mixed with air.  s/p rpt right craniectomy for evacuation on 7/17 after MR demonstrated large right ED collection.   Drain removed   - Blood cultures 7/18 no growth   - fluid cultures reporting Staphylococcus aureus (MSSA) and 1 culture with Staph Epi (MRSE)  - ID is following with further recommendations :   - Cefapime d/c , started on Nafcillin   - Continue  Vancomycin as per ID   - Monitor trough  - patient much more awake / alert - on 7/26 patient very conversant with me ,   oriented to self/ hospital not year , answered appropriately that he has 2 kids with names and ages-   mother was just shocked watching him being so conversant , stated he doesn't talk to her .   Today patient awake /alert , tracing , following commands but not answering   - continue PT/OT/ speech th  - Trend Fever  - Trend WBC  - neurochecks as per NS team -  still q 4 hrs   - Keppra 500 mg BID - as per NS   - Provigil / Amantadine - for wakefulness  - PMR is following - acute rehab once medically stable         2. Dysphagia - on TF / pure diet, tolerating well, speech therapy team is following      3. Hyponatremia - w/u c/w SIADH , renal appreciated - Na level fluctuating ,  today dropped down to 127 - given 2% sodium several times ,   on Na tabs , fluid restriction , Samsca started , Urea started . Will give 2% NS x6 hrs ,   renal following . Will repeat Na level at 7 pm and follow . Follow Na level in am also     4. Hypomagnesemia - supplemented - add Magnesium 400 mg TID     6. Anemia - likely surgical blood lost - stable     7. DVT prophylaxis  - on Lovenox     8. Constipation prophylaxis - continue Senna/ Miralax

## 2022-07-28 LAB
ANION GAP SERPL CALC-SCNC: 12 MMOL/L — SIGNIFICANT CHANGE UP (ref 5–17)
BUN SERPL-MCNC: 22 MG/DL — HIGH (ref 8–20)
CALCIUM SERPL-MCNC: 9.2 MG/DL — SIGNIFICANT CHANGE UP (ref 8.4–10.5)
CHLORIDE SERPL-SCNC: 95 MMOL/L — LOW (ref 98–107)
CO2 SERPL-SCNC: 28 MMOL/L — SIGNIFICANT CHANGE UP (ref 22–29)
CREAT SERPL-MCNC: 0.73 MG/DL — SIGNIFICANT CHANGE UP (ref 0.5–1.3)
EGFR: 116 ML/MIN/1.73M2 — SIGNIFICANT CHANGE UP
GLUCOSE SERPL-MCNC: 125 MG/DL — HIGH (ref 70–99)
HCT VFR BLD CALC: 31.1 % — LOW (ref 39–50)
HGB BLD-MCNC: 10.7 G/DL — LOW (ref 13–17)
MCHC RBC-ENTMCNC: 29 PG — SIGNIFICANT CHANGE UP (ref 27–34)
MCHC RBC-ENTMCNC: 34.4 GM/DL — SIGNIFICANT CHANGE UP (ref 32–36)
MCV RBC AUTO: 84.3 FL — SIGNIFICANT CHANGE UP (ref 80–100)
PLATELET # BLD AUTO: 262 K/UL — SIGNIFICANT CHANGE UP (ref 150–400)
POTASSIUM SERPL-MCNC: 3.6 MMOL/L — SIGNIFICANT CHANGE UP (ref 3.5–5.3)
POTASSIUM SERPL-SCNC: 3.6 MMOL/L — SIGNIFICANT CHANGE UP (ref 3.5–5.3)
RBC # BLD: 3.69 M/UL — LOW (ref 4.2–5.8)
RBC # FLD: 12.5 % — SIGNIFICANT CHANGE UP (ref 10.3–14.5)
SODIUM SERPL-SCNC: 135 MMOL/L — SIGNIFICANT CHANGE UP (ref 135–145)
VANCOMYCIN TROUGH SERPL-MCNC: 26.8 UG/ML — CRITICAL HIGH (ref 10–20)
WBC # BLD: 4.22 K/UL — SIGNIFICANT CHANGE UP (ref 3.8–10.5)
WBC # FLD AUTO: 4.22 K/UL — SIGNIFICANT CHANGE UP (ref 3.8–10.5)

## 2022-07-28 PROCEDURE — 99233 SBSQ HOSP IP/OBS HIGH 50: CPT | Mod: GC

## 2022-07-28 PROCEDURE — 99232 SBSQ HOSP IP/OBS MODERATE 35: CPT

## 2022-07-28 PROCEDURE — 99233 SBSQ HOSP IP/OBS HIGH 50: CPT

## 2022-07-28 RX ORDER — MODAFINIL 200 MG/1
200 TABLET ORAL
Refills: 0 | Status: DISCONTINUED | OUTPATIENT
Start: 2022-07-29 | End: 2022-08-02

## 2022-07-28 RX ORDER — DONEPEZIL HYDROCHLORIDE 10 MG/1
5 TABLET, FILM COATED ORAL
Refills: 0 | Status: DISCONTINUED | OUTPATIENT
Start: 2022-07-28 | End: 2022-08-12

## 2022-07-28 RX ORDER — VANCOMYCIN HCL 1 G
1000 VIAL (EA) INTRAVENOUS EVERY 12 HOURS
Refills: 0 | Status: DISCONTINUED | OUTPATIENT
Start: 2022-07-29 | End: 2022-08-09

## 2022-07-28 RX ORDER — AMANTADINE HCL 100 MG
200 CAPSULE ORAL
Refills: 0 | Status: DISCONTINUED | OUTPATIENT
Start: 2022-07-28 | End: 2022-08-03

## 2022-07-28 RX ADMIN — DONEPEZIL HYDROCHLORIDE 5 MILLIGRAM(S): 10 TABLET, FILM COATED ORAL at 14:34

## 2022-07-28 RX ADMIN — LEVETIRACETAM 500 MILLIGRAM(S): 250 TABLET, FILM COATED ORAL at 17:49

## 2022-07-28 RX ADMIN — NAFCILLIN 200 GRAM(S): 10 INJECTION, POWDER, FOR SOLUTION INTRAVENOUS at 13:24

## 2022-07-28 RX ADMIN — LEVETIRACETAM 500 MILLIGRAM(S): 250 TABLET, FILM COATED ORAL at 05:31

## 2022-07-28 RX ADMIN — NAFCILLIN 200 GRAM(S): 10 INJECTION, POWDER, FOR SOLUTION INTRAVENOUS at 21:15

## 2022-07-28 RX ADMIN — NAFCILLIN 200 GRAM(S): 10 INJECTION, POWDER, FOR SOLUTION INTRAVENOUS at 05:33

## 2022-07-28 RX ADMIN — SODIUM CHLORIDE 2 GRAM(S): 9 INJECTION INTRAMUSCULAR; INTRAVENOUS; SUBCUTANEOUS at 13:23

## 2022-07-28 RX ADMIN — Medication 5 MILLIGRAM(S): at 21:15

## 2022-07-28 RX ADMIN — MODAFINIL 200 MILLIGRAM(S): 200 TABLET ORAL at 05:37

## 2022-07-28 RX ADMIN — SODIUM CHLORIDE 2 GRAM(S): 9 INJECTION INTRAMUSCULAR; INTRAVENOUS; SUBCUTANEOUS at 21:15

## 2022-07-28 RX ADMIN — Medication 200 MILLIGRAM(S): at 14:34

## 2022-07-28 RX ADMIN — ENOXAPARIN SODIUM 40 MILLIGRAM(S): 100 INJECTION SUBCUTANEOUS at 17:51

## 2022-07-28 RX ADMIN — SENNA PLUS 2 TABLET(S): 8.6 TABLET ORAL at 21:15

## 2022-07-28 RX ADMIN — SODIUM CHLORIDE 2 GRAM(S): 9 INJECTION INTRAMUSCULAR; INTRAVENOUS; SUBCUTANEOUS at 05:32

## 2022-07-28 RX ADMIN — NAFCILLIN 200 GRAM(S): 10 INJECTION, POWDER, FOR SOLUTION INTRAVENOUS at 17:49

## 2022-07-28 RX ADMIN — Medication 30 GRAM(S): at 13:23

## 2022-07-28 RX ADMIN — NAFCILLIN 200 GRAM(S): 10 INJECTION, POWDER, FOR SOLUTION INTRAVENOUS at 09:17

## 2022-07-28 RX ADMIN — Medication 100 MILLIGRAM(S): at 05:32

## 2022-07-28 RX ADMIN — NAFCILLIN 200 GRAM(S): 10 INJECTION, POWDER, FOR SOLUTION INTRAVENOUS at 02:01

## 2022-07-28 RX ADMIN — CHLORHEXIDINE GLUCONATE 1 APPLICATION(S): 213 SOLUTION TOPICAL at 13:24

## 2022-07-28 NOTE — PROGRESS NOTE ADULT - SUBJECTIVE AND OBJECTIVE BOX
KEMAR    [id number]    42y      Male    Patient is a 42y old  Male who presents with a chief complaint of Trauma/ Subdural/ Intubated (28 Jul 2022 10:00)      INTERVAL HPI/OVERNIGHT EVENTS:      Able to answer simple question, as Per him he has no sob, or chest pain, headache     REVIEW OF SYSTEMS:    Limited        Vital Signs Last 24 Hrs  T(C): 36.9 (28 Jul 2022 08:10), Max: 36.9 (28 Jul 2022 04:05)  T(F): 98.5 (28 Jul 2022 08:10), Max: 98.5 (28 Jul 2022 08:10)  HR: 72 (28 Jul 2022 08:10) (72 - 100)  BP: 94/58 (28 Jul 2022 08:10) (94/58 - 110/69)  RR: 18 (28 Jul 2022 08:10) (18 - 18)  SpO2: 99% (28 Jul 2022 08:10) (97% - 99%)    Parameters below as of 28 Jul 2022 08:10  Patient On (Oxygen Delivery Method): room air        PHYSICAL EXAM:  GENERAL: Young male looking comfortable    HEENT: Craniotomy   NECK: Trach scar   CHEST/LUNG: Clear to auscultate bilaterally; No wheezing  HEART: S1S2+, Regular rate and rhythm; No murmurs  ABDOMEN: Soft, Nontender, Nondistended; Bowel sounds present  EXTREMITIES:  1+ Peripheral Pulses, No edema  SKIN: No rashes or lesions  NEURO: not following commands, Aphasia       LABS:                        10.7   4.22  )-----------( 262      ( 28 Jul 2022 06:20 )             31.1     07-28    135  |  95<L>  |  22.0<H>  ----------------------------<  125<H>  3.6   |  28.0  |  0.73    Ca    9.2      28 Jul 2022 06:20  Phos  4.6     07-27  Mg     1.6     07-27              I&O's Summary    27 Jul 2022 07:01  -  28 Jul 2022 07:00  --------------------------------------------------------  IN: 1050 mL / OUT: 5300 mL / NET: -4250 mL        MEDICATIONS  (STANDING):  amantadine 200 milliGRAM(s) Oral <User Schedule>  chlorhexidine 2% Cloths 1 Application(s) Topical daily  donepezil 5 milliGRAM(s) Oral <User Schedule>  enoxaparin Injectable 40 milliGRAM(s) SubCutaneous every 24 hours  levETIRAcetam  Solution 500 milliGRAM(s) Oral two times a day  melatonin 5 milliGRAM(s) Oral at bedtime  nafcillin  IVPB 2 Gram(s) IV Intermittent every 4 hours  polyethylene glycol 3350 17 Gram(s) Oral daily  senna 2 Tablet(s) Oral at bedtime  sodium chloride 2 Gram(s) Oral every 8 hours  tolvaptan 15 milliGRAM(s) Oral <User Schedule>  urea Oral Powder 30 Gram(s) Oral daily    MEDICATIONS  (PRN):  labetalol Injectable 10 milliGRAM(s) IV Push every 4 hours PRN Systolic blood pressure > 140  ondansetron Injectable 4 milliGRAM(s) IV Push every 4 hours PRN Nausea and/or Vomiting      Assessment and Plan:      42yoM brought by EMS, found down in the street unresponsive.  Imaging showed SDH, IPH, TEOFILO,   Patient underwent Right decompressive hemicraniectomy on 5/24, trach/peg 6/1,  R cranioplasty with complex closure 6/29/22.    Cranioplasty  drain removed, s/p rpt right craniectomy for evacuation on 7/17 after MR demonstrated large right ED collection.   On Abx as per ID . 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 Renal is following .   Trach (decanulated)/ PEG + , on pureed diet     ASSESSMENT AND PLAN :    1. SDH/IPH / TBI  S/P R craniotomy / s/p R complex closure cranioplasty ,   repeated CTH  demonstrating right sided subdural collection mixed with air.  s/p rpt right craniectomy for evacuation on 7/17 after MR demonstrated large right ED collection.   Drain removed   - Blood cultures 7/18 no growth   - fluid cultures reporting Staphylococcus aureus (MSSA) and 1 culture with Staph Epi (MRSE)  - ID is following with further recommendations :   - Cefapime d/c , started on Nafcillin   - Continue  Vancomycin as per ID   - Monitor trough  - patient much more awake / alert - on 7/26 patient very conversant with me ,   oriented to self/ hospital not year , answered appropriately that he has 2 kids with names and ages-   mother was just shocked watching him being so conversant , stated he doesn't talk to her .   Today patient awake /alert , tracing , following commands but not answering   - continue PT/OT/ speech th  - Trend Fever  - Trend WBC  - neurochecks as per NS team -  still q 4 hrs   - Keppra 500 mg BID - as per NS   - Provigil / Amantadine - for wakefulness  - PMR is following - acute rehab once medically stable         2. Dysphagia - on TF / pure diet, tolerating well, speech therapy team is following      3. Hyponatremia - w/u c/w SIADH , renal appreciated - Na level fluctuating ,  today dropped down to 127 - given 2% sodium several times ,   on Na tabs , fluid restriction , Samsca started , Urea started . Will give 2% NS x6 hrs ,   renal following, now sodium level normalized.    4. Hypomagnesemia - supplemented - add Magnesium 400 mg TID     6. Anemia - likely surgical blood lost - stable     7. DVT prophylaxis  - on Lovenox     8. Constipation prophylaxis - continue Senna/ Miralax

## 2022-07-28 NOTE — PROGRESS NOTE ADULT - SUBJECTIVE AND OBJECTIVE BOX
HPI: Patient is a 25y old M brought by EMS, found down in the street unresponsive.     Primary Survey:    A - airway compromised, patient intubated in ED, RSI  B - bilateral breath sound after intubation  C - initial BP: 169/93 (05-24-22 @ 00:00)*** , HR: 107 (05-24-22 @ 00:44)*** , palpable pulses in all extremities  D - GCS 3 on arrival    Exposure obtained, no evident injuries    CXR: No evident PTX or Hemothorax, ET tube in place.  (24 May 2022 01:09)      INTERVAL HPI/OVERNIGHT EVENTS:  42y Male seen lying comfortably in bed. More lethargic towards end of shift, CTH ordered & stable. Sodium improving, most recently 135.      Vital Signs Last 24 Hrs  T(C): 36.9 (28 Jul 2022 08:10), Max: 36.9 (28 Jul 2022 04:05)  T(F): 98.5 (28 Jul 2022 08:10), Max: 98.5 (28 Jul 2022 08:10)  HR: 72 (28 Jul 2022 08:10) (72 - 100)  BP: 94/58 (28 Jul 2022 08:10) (94/58 - 110/69)  BP(mean): --  RR: 18 (28 Jul 2022 08:10) (18 - 18)  SpO2: 99% (28 Jul 2022 08:10) (97% - 99%)    Parameters below as of 28 Jul 2022 08:10  Patient On (Oxygen Delivery Method): room air    PHYSICAL EXAM:  GENERAL: NAD, well-groomed  HEAD: s/p R craniectomy, flap sunken, not full.  WOUND: open to air, sutured, clean dry intact, no active drainage or wound dehiscence   MENTAL STATUS: Awake. Opens eyes spontaneously. Nonverbal, following simple commands in [place]ese with much encouragement, L>R.  CRANIAL NERVES: PERRL. Face symmetric w/ normal eye closure. Hearing grossly intact.   MOTOR: LUNA AG L>R      LABS:                        10.7   4.22  )-----------( 262      ( 28 Jul 2022 06:20 )             31.1     07-28    135  |  95<L>  |  22.0<H>  ----------------------------<  125<H>  3.6   |  28.0  |  0.73    Ca    9.2      28 Jul 2022 06:20  Phos  4.6     07-27  Mg     1.6     07-27 07-27 @ 07:01  -  07-28 @ 07:00  --------------------------------------------------------  IN: 1050 mL / OUT: 5300 mL / NET: -4250 mL        RADIOLOGY & ADDITIONAL TESTS:  < from: CT Head No Cont (07.18.22 @ 00:58) >    IMPRESSION:  Status post right hemicraniectomy with placement of subgaleal drain and   evacuation of right subdural collection.  Decreased mass effect on the   right cerebral hemisphere.  Decreased effacement of the right lateral   ventricle.  Improved midline shift to the left, now measuring 9 mm,   compared to 1.4 cm preoperatively.    A small low-attenuation left frontal subdural collection measures   approximately 5 mm in caliber, compared to 6-7 mm on MRI from 07/17/2022.    Persistent dilatation of the temporal horns of both lateral ventricles,   compatible with hydrocephalus from ventricular entrapment.    A right zygomaticomaxillary complex fracture is partially visualized.    < end of copied text >    < from: MR Head w/wo IV Cont (07.17.22 @ 14:30) >  IMPRESSION: Postop changes are identified with large extra axial   collection associated air. There is some peripheral enhancement seen   around the collection as well as adjacent T2 prolongation. The   possibility of adjacent leptomeningeal enhancement cannot be entirely   excluded.    Mass effect on the right lateral ventricle is seen with subfalcine and   uncal herniation identified.    < end of copied text >        CAPRINI SCORE [CLOT]:  Patient has an estimated Caprini score of greater than 5.  However, the patient's unique clinical situation will be addressed in an individual manner to determine appropriate anticoagulation treatment, if any.      ASSESSMENT  42M unknown PMH presented with IPH/SDH s/p hemicraniectomy on 5/24, cranioplasty 6/29, ccb right crani for epidural collection on 7/17. Pending cranioplasty when appropriate.       PLAN  - case d/w team  - no acute neurosurgical intervention indicated at this time, plan for cranioplasty when appropriate & cleared by ID, will need mesh  - patient under medicine service as primary  - nephro following for hyponatremia, on salt tabs, fluid restriction <1L/day, & Samsca   - on abx until 9/1 per ID for MSSA in fluid cx, MRSE   - SCDs, Lovenox for dvt ppx  - PT/OT, OOB as tolerated with helmet  - will continue to follow HPI: Patient is a 25y old M brought by EMS, found down in the street unresponsive.     Primary Survey:    A - airway compromised, patient intubated in ED, RSI  B - bilateral breath sound after intubation  C - initial BP: 169/93 (05-24-22 @ 00:00)*** , HR: 107 (05-24-22 @ 00:44)*** , palpable pulses in all extremities  D - GCS 3 on arrival    Exposure obtained, no evident injuries    CXR: No evident PTX or Hemothorax, ET tube in place.  (24 May 2022 01:09)      INTERVAL HPI/OVERNIGHT EVENTS:  42y Male seen lying comfortably in bed. More lethargic towards end of shift, CTH ordered & stable. Sodium improving, most recently 135.      Vital Signs Last 24 Hrs  T(C): 36.9 (28 Jul 2022 08:10), Max: 36.9 (28 Jul 2022 04:05)  T(F): 98.5 (28 Jul 2022 08:10), Max: 98.5 (28 Jul 2022 08:10)  HR: 72 (28 Jul 2022 08:10) (72 - 100)  BP: 94/58 (28 Jul 2022 08:10) (94/58 - 110/69)  BP(mean): --  RR: 18 (28 Jul 2022 08:10) (18 - 18)  SpO2: 99% (28 Jul 2022 08:10) (97% - 99%)    Parameters below as of 28 Jul 2022 08:10  Patient On (Oxygen Delivery Method): room air    PHYSICAL EXAM:  GENERAL: NAD, well-groomed  HEAD: s/p R craniectomy, flap sunken, not full.  WOUND: open to air, sutured, clean dry intact, no active drainage or wound dehiscence   MENTAL STATUS: Awake. Opens eyes spontaneously. Nonverbal, following simple commands in [place]er with much encouragement, L>R.  CRANIAL NERVES: PERRL. Face symmetric w/ normal eye closure. Hearing grossly intact.   MOTOR: LUNA AG L>R      LABS:                        10.7   4.22  )-----------( 262      ( 28 Jul 2022 06:20 )             31.1     07-28    135  |  95<L>  |  22.0<H>  ----------------------------<  125<H>  3.6   |  28.0  |  0.73    Ca    9.2      28 Jul 2022 06:20  Phos  4.6     07-27  Mg     1.6     07-27 07-27 @ 07:01  -  07-28 @ 07:00  --------------------------------------------------------  IN: 1050 mL / OUT: 5300 mL / NET: -4250 mL        RADIOLOGY & ADDITIONAL TESTS:  < from: CT Head No Cont (07.18.22 @ 00:58) >    IMPRESSION:  Status post right hemicraniectomy with placement of subgaleal drain and   evacuation of right subdural collection.  Decreased mass effect on the   right cerebral hemisphere.  Decreased effacement of the right lateral   ventricle.  Improved midline shift to the left, now measuring 9 mm,   compared to 1.4 cm preoperatively.    A small low-attenuation left frontal subdural collection measures   approximately 5 mm in caliber, compared to 6-7 mm on MRI from 07/17/2022.    Persistent dilatation of the temporal horns of both lateral ventricles,   compatible with hydrocephalus from ventricular entrapment.    A right zygomaticomaxillary complex fracture is partially visualized.    < end of copied text >    < from: MR Head w/wo IV Cont (07.17.22 @ 14:30) >  IMPRESSION: Postop changes are identified with large extra axial   collection associated air. There is some peripheral enhancement seen   around the collection as well as adjacent T2 prolongation. The   possibility of adjacent leptomeningeal enhancement cannot be entirely   excluded.    Mass effect on the right lateral ventricle is seen with subfalcine and   uncal herniation identified.    < end of copied text >        CAPRINI SCORE [CLOT]:  Patient has an estimated Caprini score of greater than 5.  However, the patient's unique clinical situation will be addressed in an individual manner to determine appropriate anticoagulation treatment, if any.      ASSESSMENT  42M unknown PMH presented with IPH/SDH s/p hemicraniectomy on 5/24, cranioplasty 6/29, ccb right crani for epidural collection on 7/17. Pending cranioplasty when appropriate.       PLAN  - case d/w team  - no acute neurosurgical intervention indicated at this time, plan for cranioplasty when appropriate & cleared by ID, will need mesh  - patient under medicine service as primary  - nephro following for hyponatremia, on salt tabs, fluid restriction <1L/day, & Samsca   - on abx until 9/1 per ID for MSSA in fluid cx, MRSE   - SCDs, Lovenox for dvt ppx  - PT/OT, OOB as tolerated with helmet  - will continue to follow    NSGY Attg:    see above    patient seen and examined by PA staff    agree with above
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
## 2022-07-29 LAB
ANION GAP SERPL CALC-SCNC: 12 MMOL/L — SIGNIFICANT CHANGE UP (ref 5–17)
BUN SERPL-MCNC: 20.9 MG/DL — HIGH (ref 8–20)
CALCIUM SERPL-MCNC: 8.8 MG/DL — SIGNIFICANT CHANGE UP (ref 8.4–10.5)
CHLORIDE SERPL-SCNC: 94 MMOL/L — LOW (ref 98–107)
CO2 SERPL-SCNC: 27 MMOL/L — SIGNIFICANT CHANGE UP (ref 22–29)
CREAT SERPL-MCNC: 0.65 MG/DL — SIGNIFICANT CHANGE UP (ref 0.5–1.3)
EGFR: 121 ML/MIN/1.73M2 — SIGNIFICANT CHANGE UP
GLUCOSE SERPL-MCNC: 165 MG/DL — HIGH (ref 70–99)
POTASSIUM SERPL-MCNC: 3.8 MMOL/L — SIGNIFICANT CHANGE UP (ref 3.5–5.3)
POTASSIUM SERPL-SCNC: 3.8 MMOL/L — SIGNIFICANT CHANGE UP (ref 3.5–5.3)
SODIUM SERPL-SCNC: 133 MMOL/L — LOW (ref 135–145)
VANCOMYCIN TROUGH SERPL-MCNC: 12.8 UG/ML — SIGNIFICANT CHANGE UP (ref 10–20)
VANCOMYCIN TROUGH SERPL-MCNC: 38.3 UG/ML — CRITICAL HIGH (ref 10–20)

## 2022-07-29 PROCEDURE — 99233 SBSQ HOSP IP/OBS HIGH 50: CPT

## 2022-07-29 PROCEDURE — 99232 SBSQ HOSP IP/OBS MODERATE 35: CPT

## 2022-07-29 RX ADMIN — NAFCILLIN 200 GRAM(S): 10 INJECTION, POWDER, FOR SOLUTION INTRAVENOUS at 05:15

## 2022-07-29 RX ADMIN — LEVETIRACETAM 500 MILLIGRAM(S): 250 TABLET, FILM COATED ORAL at 07:26

## 2022-07-29 RX ADMIN — SENNA PLUS 2 TABLET(S): 8.6 TABLET ORAL at 21:34

## 2022-07-29 RX ADMIN — SODIUM CHLORIDE 2 GRAM(S): 9 INJECTION INTRAMUSCULAR; INTRAVENOUS; SUBCUTANEOUS at 05:16

## 2022-07-29 RX ADMIN — CHLORHEXIDINE GLUCONATE 1 APPLICATION(S): 213 SOLUTION TOPICAL at 13:34

## 2022-07-29 RX ADMIN — Medication 30 GRAM(S): at 13:33

## 2022-07-29 RX ADMIN — LEVETIRACETAM 500 MILLIGRAM(S): 250 TABLET, FILM COATED ORAL at 17:45

## 2022-07-29 RX ADMIN — NAFCILLIN 200 GRAM(S): 10 INJECTION, POWDER, FOR SOLUTION INTRAVENOUS at 09:30

## 2022-07-29 RX ADMIN — SODIUM CHLORIDE 2 GRAM(S): 9 INJECTION INTRAMUSCULAR; INTRAVENOUS; SUBCUTANEOUS at 21:34

## 2022-07-29 RX ADMIN — TOLVAPTAN 15 MILLIGRAM(S): 15 TABLET ORAL at 09:30

## 2022-07-29 RX ADMIN — Medication 5 MILLIGRAM(S): at 21:35

## 2022-07-29 RX ADMIN — Medication 200 MILLIGRAM(S): at 09:29

## 2022-07-29 RX ADMIN — SODIUM CHLORIDE 2 GRAM(S): 9 INJECTION INTRAMUSCULAR; INTRAVENOUS; SUBCUTANEOUS at 13:38

## 2022-07-29 RX ADMIN — NAFCILLIN 200 GRAM(S): 10 INJECTION, POWDER, FOR SOLUTION INTRAVENOUS at 17:45

## 2022-07-29 RX ADMIN — Medication 200 MILLIGRAM(S): at 13:33

## 2022-07-29 RX ADMIN — NAFCILLIN 200 GRAM(S): 10 INJECTION, POWDER, FOR SOLUTION INTRAVENOUS at 02:00

## 2022-07-29 RX ADMIN — MODAFINIL 200 MILLIGRAM(S): 200 TABLET ORAL at 09:29

## 2022-07-29 RX ADMIN — ENOXAPARIN SODIUM 40 MILLIGRAM(S): 100 INJECTION SUBCUTANEOUS at 17:57

## 2022-07-29 RX ADMIN — DONEPEZIL HYDROCHLORIDE 5 MILLIGRAM(S): 10 TABLET, FILM COATED ORAL at 13:34

## 2022-07-29 RX ADMIN — NAFCILLIN 200 GRAM(S): 10 INJECTION, POWDER, FOR SOLUTION INTRAVENOUS at 21:33

## 2022-07-29 RX ADMIN — Medication 250 MILLIGRAM(S): at 06:24

## 2022-07-29 RX ADMIN — Medication 250 MILLIGRAM(S): at 17:58

## 2022-07-29 RX ADMIN — NAFCILLIN 200 GRAM(S): 10 INJECTION, POWDER, FOR SOLUTION INTRAVENOUS at 13:35
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
## 2022-07-30 LAB
ANION GAP SERPL CALC-SCNC: 13 MMOL/L — SIGNIFICANT CHANGE UP (ref 5–17)
BUN SERPL-MCNC: 22.7 MG/DL — HIGH (ref 8–20)
CALCIUM SERPL-MCNC: 9.1 MG/DL — SIGNIFICANT CHANGE UP (ref 8.4–10.5)
CHLORIDE SERPL-SCNC: 100 MMOL/L — SIGNIFICANT CHANGE UP (ref 98–107)
CO2 SERPL-SCNC: 26 MMOL/L — SIGNIFICANT CHANGE UP (ref 22–29)
CREAT SERPL-MCNC: 0.69 MG/DL — SIGNIFICANT CHANGE UP (ref 0.5–1.3)
EGFR: 118 ML/MIN/1.73M2 — SIGNIFICANT CHANGE UP
GLUCOSE SERPL-MCNC: 165 MG/DL — HIGH (ref 70–99)
POTASSIUM SERPL-MCNC: 3.8 MMOL/L — SIGNIFICANT CHANGE UP (ref 3.5–5.3)
POTASSIUM SERPL-SCNC: 3.8 MMOL/L — SIGNIFICANT CHANGE UP (ref 3.5–5.3)
SARS-COV-2 RNA SPEC QL NAA+PROBE: SIGNIFICANT CHANGE UP
SODIUM SERPL-SCNC: 139 MMOL/L — SIGNIFICANT CHANGE UP (ref 135–145)

## 2022-07-30 PROCEDURE — 99232 SBSQ HOSP IP/OBS MODERATE 35: CPT

## 2022-07-30 PROCEDURE — 99233 SBSQ HOSP IP/OBS HIGH 50: CPT

## 2022-07-30 RX ADMIN — LEVETIRACETAM 500 MILLIGRAM(S): 250 TABLET, FILM COATED ORAL at 17:11

## 2022-07-30 RX ADMIN — Medication 5 MILLIGRAM(S): at 22:13

## 2022-07-30 RX ADMIN — Medication 200 MILLIGRAM(S): at 12:41

## 2022-07-30 RX ADMIN — ENOXAPARIN SODIUM 40 MILLIGRAM(S): 100 INJECTION SUBCUTANEOUS at 17:11

## 2022-07-30 RX ADMIN — NAFCILLIN 200 GRAM(S): 10 INJECTION, POWDER, FOR SOLUTION INTRAVENOUS at 17:05

## 2022-07-30 RX ADMIN — MODAFINIL 200 MILLIGRAM(S): 200 TABLET ORAL at 09:57

## 2022-07-30 RX ADMIN — NAFCILLIN 200 GRAM(S): 10 INJECTION, POWDER, FOR SOLUTION INTRAVENOUS at 02:20

## 2022-07-30 RX ADMIN — NAFCILLIN 200 GRAM(S): 10 INJECTION, POWDER, FOR SOLUTION INTRAVENOUS at 13:47

## 2022-07-30 RX ADMIN — NAFCILLIN 200 GRAM(S): 10 INJECTION, POWDER, FOR SOLUTION INTRAVENOUS at 22:14

## 2022-07-30 RX ADMIN — NAFCILLIN 200 GRAM(S): 10 INJECTION, POWDER, FOR SOLUTION INTRAVENOUS at 09:18

## 2022-07-30 RX ADMIN — Medication 250 MILLIGRAM(S): at 06:46

## 2022-07-30 RX ADMIN — DONEPEZIL HYDROCHLORIDE 5 MILLIGRAM(S): 10 TABLET, FILM COATED ORAL at 12:42

## 2022-07-30 RX ADMIN — SODIUM CHLORIDE 2 GRAM(S): 9 INJECTION INTRAMUSCULAR; INTRAVENOUS; SUBCUTANEOUS at 06:44

## 2022-07-30 RX ADMIN — SODIUM CHLORIDE 2 GRAM(S): 9 INJECTION INTRAMUSCULAR; INTRAVENOUS; SUBCUTANEOUS at 22:13

## 2022-07-30 RX ADMIN — Medication 250 MILLIGRAM(S): at 17:09

## 2022-07-30 RX ADMIN — LEVETIRACETAM 500 MILLIGRAM(S): 250 TABLET, FILM COATED ORAL at 06:43

## 2022-07-30 RX ADMIN — SENNA PLUS 2 TABLET(S): 8.6 TABLET ORAL at 22:14

## 2022-07-30 RX ADMIN — CHLORHEXIDINE GLUCONATE 1 APPLICATION(S): 213 SOLUTION TOPICAL at 12:41

## 2022-07-30 RX ADMIN — NAFCILLIN 200 GRAM(S): 10 INJECTION, POWDER, FOR SOLUTION INTRAVENOUS at 06:43

## 2022-07-30 RX ADMIN — Medication 200 MILLIGRAM(S): at 10:13

## 2022-07-30 RX ADMIN — POLYETHYLENE GLYCOL 3350 17 GRAM(S): 17 POWDER, FOR SOLUTION ORAL at 12:42

## 2022-07-30 RX ADMIN — SODIUM CHLORIDE 2 GRAM(S): 9 INJECTION INTRAMUSCULAR; INTRAVENOUS; SUBCUTANEOUS at 13:48

## 2022-07-30 RX ADMIN — Medication 30 GRAM(S): at 12:42
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
## 2022-07-30 NOTE — PROGRESS NOTE ADULT - PROVIDER SPECIALTY LIST ADULT
History     Chief Complaint   Patient presents with     Abdominal Pain     HPI  Faisal Muñzo is a 72 year old male who reports he had a hamburger and some fries last night at a restaurant.  Upon returning home he felt nauseated with some abdominal pain.  He reports feeling this throughout the night.  Today this seemed to persist so he came to the ER and upon arrival to the ER he had 1 episode of emesis and afterwards he is feeling much better.  Denies any fever or chills.  No sore throat, cough, or flulike symptoms.  He was vaccinated for Covid.  Past medical history is significant for being on Plavix due to coronary artery disease and a drug-eluting coronary stent placement, [place], osteoarthritis, former smoker, hypertension, controlled type 2 diabetes mellitus, angina of effort, and pain medication agreement broken.    Allergies:  No Known Allergies    Problem List:    Patient Active Problem List    Diagnosis Date Noted     Angina of effort (H) 04/02/2021     Priority: Medium     Formatting of this note might be different from the original.  Added automatically from request for surgery [id number]       Former smoker 06/19/2019     Priority: Medium     Formatting of this note might be different from the original.  Smoked < 1ppd for 50 years. Pack used to last a week until fall 2016 when started to smoke heavily x 1 month. Then quit cold turkey. Hasn't smoked since.       Adhesive capsulitis of left shoulder 10/31/2017     Priority: Medium     S/P drug eluting coronary stent placement 08/11/2017     Priority: Medium     Formatting of this note might be different from the original.   PCI of distal RCA into PDA with a 2.75 x 18 mm Resolute CRISTOFER; mid RCA stented wtih a 3.0 x 26 mm Resolute CRISTOFER; large PLVB dilated with a 3.0 x 15 mm balloon with a nice result.  He had normal LVEDP. Plavix and ASA x 1 year. ASA indefinitely.       Controlled type 2 diabetes mellitus with circulatory disorder, with long-term current use of  insulin (H) 2017     Priority: Medium     Pain medication agreement broken 12/15/2014     Priority: Medium     Facet arthritis of lumbar region 2013     Priority: Medium     Osteoarthrosis involving lower leg 2012     Priority: Medium     Formatting of this note might be different from the original.  IMO Update  Updated per 10/1/17 IMO import       YOUNG (nonalcoholic steatohepatitis) 2012     Priority: Medium     Formatting of this note might be different from the original.  IMO Update 10/11       Essential hypertension 2006     Priority: Medium     Formatting of this note might be different from the original.  [facility]/ER -          Past Medical History:    Past Medical History:   Diagnosis Date     Adhesive capsulitis of left shoulder        Past Surgical History:    No past surgical history on file.    Family History:    No family history on file.    Social History:  Marital Status:   [2]  Social History     Tobacco Use     Smoking status: Former Smoker     Quit date: 10/2/2016     Years since quittin.9     Smokeless tobacco: Never Used   Substance Use Topics     Alcohol use: No     Alcohol/week: 0.0 standard drinks     Drug use: Unknown     Types: Other     Comment: Drug use: No        Medications:    amLODIPine-benazepril (LOTREL) 10-20 MG per capsule  aspirin EC 81 MG EC tablet  atorvastatin (LIPITOR) 80 MG tablet  blood glucose monitoring (NO BRAND SPECIFIED) test strip  Blood Glucose Monitoring Suppl (FIFTY50 GLUCOSE METER 2.0) W/DEVICE KIT  clopidogrel (PLAVIX) 75 MG tablet  gabapentin (NEURONTIN) 300 MG capsule  HYDROcodone-acetaminophen (NORCO) 5-325 MG per tablet  insulin glargine (LANTUS SOLOSTAR) 100 UNIT/ML injection  metFORMIN (GLUCOPHAGE) 500 MG tablet  metoprolol succinate (TOPROL-XL) 50 MG 24 hr tablet  nitroGLYcerin (NITROSTAT) 0.4 MG sublingual tablet          Review of Systems   Constitutional: Positive for appetite change. Negative for chills, fatigue and  "fever.   HENT: Negative for drooling and facial swelling.    Eyes: Negative for pain and visual disturbance.   Respiratory: Negative for stridor.    Cardiovascular: Negative for chest pain.   Gastrointestinal: Positive for abdominal pain and nausea. Negative for vomiting.   Genitourinary: Negative for flank pain.   Musculoskeletal: Negative for back pain.   Skin: Negative for pallor.   Neurological: Negative for dizziness, tremors, seizures, facial asymmetry and light-headedness.   Psychiatric/Behavioral: Negative for agitation and confusion.   All other systems reviewed and are negative.      Physical Exam   BP: (!) 144/99  Pulse: 87  Temp: 98.2  F (36.8  C)  Resp: 16  Height: 190.5 cm (6' 3\")  Weight: 111.1 kg (245 lb)  SpO2: 95 %      Physical Exam  Vitals and nursing note reviewed.   Constitutional:       General: He is not in acute distress.     Appearance: Normal appearance. He is not ill-appearing or toxic-appearing.   HENT:      Head: Normocephalic. No raccoon eyes, right periorbital erythema or left periorbital erythema.      Right Ear: No drainage or tenderness.      Left Ear: No drainage or tenderness.      Nose: Nose normal.   Eyes:      General: Lids are normal. Gaze aligned appropriately. No scleral icterus.     Extraocular Movements: Extraocular movements intact.   Neck:      Trachea: No tracheal deviation.   Cardiovascular:      Rate and Rhythm: Normal rate.   Pulmonary:      Effort: Pulmonary effort is normal. No respiratory distress.      Breath sounds: No stridor. No wheezing.   Abdominal:      General: Bowel sounds are normal. There is no distension.      Palpations: Abdomen is soft. There is no mass.      Tenderness: There is abdominal tenderness. There is no rebound.      Comments: Minimal generalized abdominal pain no rebound or masses.  Bowel sounds are normal.   Musculoskeletal:         General: No deformity or signs of injury. Normal range of motion.      Cervical back: Normal range of " Neurosurgery motion. No signs of trauma.   Skin:     General: Skin is warm and dry.      Coloration: Skin is not jaundiced or pale.   Neurological:      General: No focal deficit present.      Mental Status: He is alert and oriented to person, place, and time.      GCS: GCS eye subscore is 4. GCS verbal subscore is 5. GCS motor subscore is 6.      Motor: No tremor or seizure activity.   Psychiatric:         Attention and Perception: Attention normal.         Mood and Affect: Mood normal.         ED Course       Results for orders placed or performed during the hospital encounter of 09/11/21 (from the past 24 hour(s))   XR Abdomen 2 Views    Narrative    XR ABDOMEN 2VIEWS   9/11/2021 2:24 PM    History:Male,age  72 years, gastritis    Comparison: None    FINDINGS: Two views are submitted. Large volume of stool is seen  within the colon. There is no evidence of free air or evidence of  obstruction. Lung bases appear to be clear.      Impression    IMPRESSION:  Large volume of stool, no acute abnormality.    ROMIE MUNOZ MD         SYSTEM ID:  [id number]       Medications   ondansetron (ZOFRAN-ODT) ODT tab 4 mg (4 mg Oral Not Given 9/11/21 1438)   docusate sodium (COLACE) capsule 200 mg (200 mg Oral Given 9/11/21 1506)   magnesium hydroxide (MILK OF MAGNESIA) suspension 30 mL (30 mLs Oral Given 9/11/21 1506)       Assessments & Plan (with Medical Decision Making)     I have reviewed the nursing notes.    I have reviewed the findings, diagnosis, plan and need for follow up with the patient.      Discharge Medication List as of 9/11/2021  3:02 PM      START taking these medications    Details   docusate sodium (COLACE) 100 MG capsule Take 1 capsule (100 mg) by mouth 2 times daily for 5 days, Disp-10 capsule, R-0, Local Print      magnesium hydroxide (MILK OF MAGNESIA) 400 MG/5ML suspension Take 30 mLs by mouth daily for 5 days, Disp-150 mL, R-0, Local Print             Final diagnoses:   Slow transit constipation   Other acute  gastritis without hemorrhage     Afebrile.  Vital signs stable.  Patient with sudden onset of nausea and generalized abdominal pain after eating a burger and fries last night.  This has improved after emesis x1 when arriving in the emergency room.  He is feeling much better.  Abdominal x-rays show a fair amount of fecal retention and constipation.  No signs of SBO.  The patient was given Zofran, Colace and milk of magnesia.  I discussed increasing fluids and fiber in his diet.  Rx for 5-day course of Colace and milk of magnesia.  Return if there is any concerns for further evaluation as needed.  9/11/2021   [facility]     Andres Michaels PA-C  09/11/21 [id number]

## 2022-07-30 NOTE — PROGRESS NOTE ADULT - SUBJECTIVE AND OBJECTIVE BOX
42y  Male initially admitted on 5/24/22 after being found down and was found to have at that time Bilateral IPH, Bilateral SDH. Prolonged hospital course: s/p craniotomy and evacuation of SDH 5/24, s/p trach 6/1, s/p cranioplasty and replacement of bone flap 6/29, s/p R hemicraniectomy, wound exploration, evacuation of epidural fluid collection 7/17.      Seen lying comfortably in bed. Patient awake, alert following commands.   ID following for epidural fluid cultures reporting Staphylococcus aureus (MSSA) and 1 culture with Staph Epi (MRSE) currently on IV antibiotics   Nephrology following for hyponatremia, now slowly improving 139      Vital Signs Last 24 Hrs  T(C): 37.2 (30 Jul 2022 08:19), Max: 37.2 (29 Jul 2022 20:00)  T(F): 98.9 (30 Jul 2022 08:19), Max: 98.9 (29 Jul 2022 20:00)  HR: 61 (30 Jul 2022 08:19) (61 - 102)  BP: 105/67 (30 Jul 2022 08:19) (105/67 - 112/72)  BP(mean): --  RR: 18 (30 Jul 2022 08:19) (18 - 18)  SpO2: 97% (30 Jul 2022 08:19) (97% - 98%)    Parameters below as of 30 Jul 2022 08:19  Patient On (Oxygen Delivery Method): room air      PHYSICAL EXAM:  GENERAL: NAD  HEAD: R craniectomy, flap sunken, suture/staples   WOUND: open to air, clean without signs of infection along incision suture in place     MENTAL STATUS: AAO x3  Opens eyes spontaneously  Non verbal   Following simple commands with encouragement/mimicking  Face symmetric w/ normal eye closure and smile, tongue midline.   Hearing grossly intact.  MOTOR: MAEx4 AG with weakness noted to the LEFT       LABS:    07-30    139  |  100  |  22.7<H>  ----------------------------<  165<H>  3.8   |  26.0  |  0.69    Ca    9.1      30 Jul 2022 10:07            07-29 @ 07:01  -  07-30 @ 07:00  --------------------------------------------------------  IN: 0 mL / OUT: 3800 mL / NET: -3800 mL        RADIOLOGY & ADDITIONAL TESTS:    CT HEAD    Exam is compared to prior study of 7/18/2022    FINDINGS:  Patient is again noted to be status post a large right pterional   craniectomy. Increasing concavity is present at the craniectomy site.   Previously seen surgical drain has been removed. Single surgical clip is   present within the midline frontal scalp. Mild low density fluid and air   is seen at the craniectomy site. There air has decreased. Fluid is   essentially unchanged.    Patchy regions of decreased attenuation within the cerebral hemisphere   are again seen. They have mildly improved. Chronic lacunar infarcts   involve the bilateral basal ganglia.    There is no hydrocephalus. Right to left midline shift at the level of   septum pellucidum measures 1.1 cm which isincreased from prior    No acute intracranial hemorrhage.    Mastoids are well-aerated.    IMPRESSION:  *  PATCHY REGIONS OF DECREASED ATTENUATION RIGHT CEREBRAL HEMISPHERE   IMPROVED FROM PRIOR STUDY. NO INTRACRANIAL HEMORRHAGE.  *  LARGE RIGHT PTERIONAL CRANIECTOMY WITH INCREASING CONCAVITY AT THE   CRANIECTOMY SITE      ASSESSMENT/PLAN:   42y admitted on 5/24/22 after being found down and was found to have at that time Bilateral IPH, Bilateral SDH. Prolonged hospital course: s/p craniotomy and evacuation of SDH 5/24, s/p trach 6/1, s/p cranioplasty and replacement of bone flap 6/29, s/p R hemicraniectomy, wound exploration, evacuation of epidural fluid collection 7/17.      - No acute neurosurgical intervention indicated at this time, plan for cranioplasty when appropriate & cleared by ID and medicine   - Nephrology following for hyponatremia, currently 139, improving   - ID following, on abx until 9/1 per ID for MSSA in fluid cx, MRSE   - SCDs, Lovenox for DVT ppx  - Keppra 500 BID   - PT/OT, OOB as tolerated with helmet  - Will continue to follow   42y  Male initially admitted on 5/24/22 after being found down and was found to have at that time Bilateral IPH, Bilateral SDH. Prolonged hospital course: s/p craniotomy and evacuation of SDH 5/24, s/p trach 6/1, s/p cranioplasty and replacement of bone flap 6/29, s/p R hemicraniectomy, wound exploration, evacuation of epidural fluid collection 7/17.      Seen lying comfortably in bed. Patient awake, alert following commands intermittently.   ID following for epidural fluid cultures reporting Staphylococcus aureus (MSSA) and 1 culture with Staph Epi (MRSE) currently on IV antibiotics   Nephrology following for hyponatremia, now slowly improving 139      Vital Signs Last 24 Hrs  T(C): 37.2 (30 Jul 2022 08:19), Max: 37.2 (29 Jul 2022 20:00)  T(F): 98.9 (30 Jul 2022 08:19), Max: 98.9 (29 Jul 2022 20:00)  HR: 61 (30 Jul 2022 08:19) (61 - 102)  BP: 105/67 (30 Jul 2022 08:19) (105/67 - 112/72)  BP(mean): --  RR: 18 (30 Jul 2022 08:19) (18 - 18)  SpO2: 97% (30 Jul 2022 08:19) (97% - 98%)    Parameters below as of 30 Jul 2022 08:19  Patient On (Oxygen Delivery Method): room air      PHYSICAL EXAM:  GENERAL: NAD  HEAD: R craniectomy, flap sunken, suture in place   WOUND: open to air, clean without signs of infection along incision suture in place   MENTAL STATUS: AAO to name and place   Opens eyes spontaneously   Able to mouth words, severely hypophonic   Following simple commands with encouragement  MOTOR: MAEx4 AG with weakness noted to the RIGHT compared to LEFT       LABS:    07-30    139  |  100  |  22.7<H>  ----------------------------<  165<H>  3.8   |  26.0  |  0.69    Ca    9.1      30 Jul 2022 10:07            07-29 @ 07:01  -  07-30 @ 07:00  --------------------------------------------------------  IN: 0 mL / OUT: 3800 mL / NET: -3800 mL        RADIOLOGY & ADDITIONAL TESTS:    CT HEAD    Exam is compared to prior study of 7/18/2022    FINDINGS:  Patient is again noted to be status post a large right pterional   craniectomy. Increasing concavity is present at the craniectomy site.   Previously seen surgical drain has been removed. Single surgical clip is   present within the midline frontal scalp. Mild low density fluid and air   is seen at the craniectomy site. There air has decreased. Fluid is   essentially unchanged.    Patchy regions of decreased attenuation within the cerebral hemisphere   are again seen. They have mildly improved. Chronic lacunar infarcts   involve the bilateral basal ganglia.    There is no hydrocephalus. Right to left midline shift at the level of   septum pellucidum measures 1.1 cm which isincreased from prior    No acute intracranial hemorrhage.    Mastoids are well-aerated.    IMPRESSION:  *  PATCHY REGIONS OF DECREASED ATTENUATION RIGHT CEREBRAL HEMISPHERE   IMPROVED FROM PRIOR STUDY. NO INTRACRANIAL HEMORRHAGE.  *  LARGE RIGHT PTERIONAL CRANIECTOMY WITH INCREASING CONCAVITY AT THE   CRANIECTOMY SITE      ASSESSMENT/PLAN:   42y admitted on 5/24/22 after being found down and was found to have at that time Bilateral IPH, Bilateral SDH. Prolonged hospital course: s/p craniotomy and evacuation of SDH 5/24, s/p trach 6/1, s/p cranioplasty and replacement of bone flap 6/29, s/p R hemicraniectomy, wound exploration, evacuation of epidural fluid collection 7/17.      - No acute neurosurgical intervention indicated at this time, plan for cranioplasty when appropriate & cleared by ID and medicine   - Nephrology following for hyponatremia, currently 139, improving   - ID following, on abx until 9/1 per ID for MSSA in fluid cx, MRSE   - SCDs, Lovenox for DVT ppx  - Keppra 500 BID   - PT/OT, OOB as tolerated with helmet  - Will continue to follow

## 2022-07-30 NOTE — PROGRESS NOTE ADULT - SUBJECTIVE AND OBJECTIVE BOX
Subjective: No acute overnight event.  Serum sodium improved to 139 today.     ROS: Unable to obtain 2/2 current clinical condition    Medications:   MEDICATIONS  (STANDING):  amantadine 200 milliGRAM(s) Oral <User Schedule>  chlorhexidine 2% Cloths 1 Application(s) Topical daily  donepezil 5 milliGRAM(s) Oral <User Schedule>  enoxaparin Injectable 40 milliGRAM(s) SubCutaneous every 24 hours  levETIRAcetam  Solution 500 milliGRAM(s) Oral two times a day  melatonin 5 milliGRAM(s) Oral at bedtime  modafinil 200 milliGRAM(s) Oral <User Schedule>  nafcillin  IVPB 2 Gram(s) IV Intermittent every 4 hours  polyethylene glycol 3350 17 Gram(s) Oral daily  senna 2 Tablet(s) Oral at bedtime  sodium chloride 2 Gram(s) Oral every 8 hours  tolvaptan 15 milliGRAM(s) Oral <User Schedule>  urea Oral Powder 30 Gram(s) Oral daily  vancomycin  IVPB 1000 milliGRAM(s) IV Intermittent every 12 hours    I&O's Summary  I&O's Summary    29 Jul 2022 07:01  -  30 Jul 2022 07:00  --------------------------------------------------------  IN: 0 mL / OUT: 3800 mL / NET: -3800 mL    30 Jul 2022 07:01  -  30 Jul 2022 18:19  --------------------------------------------------------  IN: 340 mL / OUT: 1350 mL / NET: -1010 mL        Physical Exam:  Gen: no acute distress  MS: awake   Eyes: EOMI, no icterus  HENT: Surgical dressing of craniectomy site, MMM, trach  CV: rhythm reg reg, rate normal, no m/g/r, no LE edema  Chest: CTAB, no w/r/r,  Abd: soft, NT, ND  Neuro: moving all 4 limbs spontaneously, no tremor  MSK: normal bulk and tone, no joint swelling  Skin: dry, warm, no rash or jaundice      07-30    139  |  100  |  22.7<H>  ----------------------------<  165<H>  3.8   |  26.0  |  0.69    Ca    9.1      30 Jul 2022 10:07      IMPRESSION: 42y  Male initially admitted on 5/24/22 after being found down and was found to have at that time Bilateral IPH, Bilateral SDH. Prolonged hospital course: s/p craniotomy and evacuation of SDH 5/24, s/p trach 6/1, s/p cranioplasty and replacement of bone flap 6/29, s/p R hemicraniectomy, wound exploration, evacuation of epidural fluid collection 7/17.  1.  Hyponatremia    RECOMMENDATIONS:    ·	Hyponatremia in relative euvolemic pt w/ high Manuela and UOsm indicates inappropriate ADH secretion.  ·	Improved on samsca;  but it is otherwise dropping even on salt tabs and UreNa  ·	Continue free fluid restriction <  1L/day  ·	Going forward he likely will have to be on PO samsca 15 mg 2-3 times per week, for now got it approved for 3 doses. (15 mg on Fri/Mon/Wed)  ·	next samsca dose on monday  ·	Will follow.

## 2022-07-31 LAB
ANION GAP SERPL CALC-SCNC: 13 MMOL/L — SIGNIFICANT CHANGE UP (ref 5–17)
BUN SERPL-MCNC: 21 MG/DL — HIGH (ref 8–20)
CALCIUM SERPL-MCNC: 9 MG/DL — SIGNIFICANT CHANGE UP (ref 8.4–10.5)
CHLORIDE SERPL-SCNC: 100 MMOL/L — SIGNIFICANT CHANGE UP (ref 98–107)
CO2 SERPL-SCNC: 27 MMOL/L — SIGNIFICANT CHANGE UP (ref 22–29)
CREAT SERPL-MCNC: 0.64 MG/DL — SIGNIFICANT CHANGE UP (ref 0.5–1.3)
EGFR: 121 ML/MIN/1.73M2 — SIGNIFICANT CHANGE UP
GLUCOSE SERPL-MCNC: 141 MG/DL — HIGH (ref 70–99)
HCT VFR BLD CALC: 30.3 % — LOW (ref 39–50)
HGB BLD-MCNC: 10.3 G/DL — LOW (ref 13–17)
MCHC RBC-ENTMCNC: 29.3 PG — SIGNIFICANT CHANGE UP (ref 27–34)
MCHC RBC-ENTMCNC: 34 GM/DL — SIGNIFICANT CHANGE UP (ref 32–36)
MCV RBC AUTO: 86.3 FL — SIGNIFICANT CHANGE UP (ref 80–100)
PLATELET # BLD AUTO: 230 K/UL — SIGNIFICANT CHANGE UP (ref 150–400)
POTASSIUM SERPL-MCNC: 4.1 MMOL/L — SIGNIFICANT CHANGE UP (ref 3.5–5.3)
POTASSIUM SERPL-SCNC: 4.1 MMOL/L — SIGNIFICANT CHANGE UP (ref 3.5–5.3)
RBC # BLD: 3.51 M/UL — LOW (ref 4.2–5.8)
RBC # FLD: 12.7 % — SIGNIFICANT CHANGE UP (ref 10.3–14.5)
SODIUM SERPL-SCNC: 140 MMOL/L — SIGNIFICANT CHANGE UP (ref 135–145)
WBC # BLD: 5.49 K/UL — SIGNIFICANT CHANGE UP (ref 3.8–10.5)
WBC # FLD AUTO: 5.49 K/UL — SIGNIFICANT CHANGE UP (ref 3.8–10.5)

## 2022-07-31 PROCEDURE — 99232 SBSQ HOSP IP/OBS MODERATE 35: CPT

## 2022-07-31 RX ADMIN — POLYETHYLENE GLYCOL 3350 17 GRAM(S): 17 POWDER, FOR SOLUTION ORAL at 14:22

## 2022-07-31 RX ADMIN — Medication 200 MILLIGRAM(S): at 11:09

## 2022-07-31 RX ADMIN — NAFCILLIN 200 GRAM(S): 10 INJECTION, POWDER, FOR SOLUTION INTRAVENOUS at 02:11

## 2022-07-31 RX ADMIN — Medication 30 GRAM(S): at 14:22

## 2022-07-31 RX ADMIN — SODIUM CHLORIDE 2 GRAM(S): 9 INJECTION INTRAMUSCULAR; INTRAVENOUS; SUBCUTANEOUS at 05:58

## 2022-07-31 RX ADMIN — Medication 5 MILLIGRAM(S): at 21:32

## 2022-07-31 RX ADMIN — LEVETIRACETAM 500 MILLIGRAM(S): 250 TABLET, FILM COATED ORAL at 05:58

## 2022-07-31 RX ADMIN — CHLORHEXIDINE GLUCONATE 1 APPLICATION(S): 213 SOLUTION TOPICAL at 14:22

## 2022-07-31 RX ADMIN — DONEPEZIL HYDROCHLORIDE 5 MILLIGRAM(S): 10 TABLET, FILM COATED ORAL at 14:21

## 2022-07-31 RX ADMIN — Medication 250 MILLIGRAM(S): at 07:07

## 2022-07-31 RX ADMIN — MODAFINIL 200 MILLIGRAM(S): 200 TABLET ORAL at 11:08

## 2022-07-31 RX ADMIN — NAFCILLIN 200 GRAM(S): 10 INJECTION, POWDER, FOR SOLUTION INTRAVENOUS at 18:09

## 2022-07-31 RX ADMIN — Medication 200 MILLIGRAM(S): at 18:07

## 2022-07-31 RX ADMIN — NAFCILLIN 200 GRAM(S): 10 INJECTION, POWDER, FOR SOLUTION INTRAVENOUS at 11:09

## 2022-07-31 RX ADMIN — SENNA PLUS 2 TABLET(S): 8.6 TABLET ORAL at 22:04

## 2022-07-31 RX ADMIN — ENOXAPARIN SODIUM 40 MILLIGRAM(S): 100 INJECTION SUBCUTANEOUS at 18:08

## 2022-07-31 RX ADMIN — SODIUM CHLORIDE 2 GRAM(S): 9 INJECTION INTRAMUSCULAR; INTRAVENOUS; SUBCUTANEOUS at 14:23

## 2022-07-31 RX ADMIN — SODIUM CHLORIDE 2 GRAM(S): 9 INJECTION INTRAMUSCULAR; INTRAVENOUS; SUBCUTANEOUS at 22:04

## 2022-07-31 RX ADMIN — NAFCILLIN 200 GRAM(S): 10 INJECTION, POWDER, FOR SOLUTION INTRAVENOUS at 05:58

## 2022-07-31 RX ADMIN — Medication 250 MILLIGRAM(S): at 18:08

## 2022-07-31 RX ADMIN — LEVETIRACETAM 500 MILLIGRAM(S): 250 TABLET, FILM COATED ORAL at 18:09

## 2022-07-31 RX ADMIN — NAFCILLIN 200 GRAM(S): 10 INJECTION, POWDER, FOR SOLUTION INTRAVENOUS at 21:32

## 2022-07-31 RX ADMIN — NAFCILLIN 200 GRAM(S): 10 INJECTION, POWDER, FOR SOLUTION INTRAVENOUS at 14:21

## 2022-07-31 NOTE — PROGRESS NOTE ADULT - SUBJECTIVE AND OBJECTIVE BOX
KEMAR    [id number]    42y      Male    Patient is a 42y old  Male who presents with a chief complaint of Trauma/ Subdural/ Intubated (30 Jul 2022 18:17)      INTERVAL HPI/OVERNIGHT EVENTS:    Able to answer simple question, as Per him he has no sob, or chest pain, headache     REVIEW OF SYSTEMS:    Limited      Vital Signs Last 24 Hrs  T(C): 36.8 (31 Jul 2022 09:23), Max: 37.6 (30 Jul 2022 16:31)  T(F): 98.3 (31 Jul 2022 09:23), Max: 99.6 (30 Jul 2022 16:31)  HR: 72 (31 Jul 2022 09:23) (69 - 102)  BP: 111/65 (31 Jul 2022 09:23) (99/66 - 134/75)  BP(mean): --  RR: 18 (31 Jul 2022 09:23) (18 - 20)  SpO2: 100% (31 Jul 2022 09:23) (96% - 100%)    Parameters below as of 31 Jul 2022 09:23  Patient On (Oxygen Delivery Method): room air        PHYSICAL EXAM:    GENERAL: Young male looking comfortable    HEENT: Craniotomy   NECK: Trach scar   CHEST/LUNG: Clear to auscultate bilaterally; No wheezing  HEART: S1S2+, Regular rate and rhythm; No murmurs  ABDOMEN: Soft, Nontender, Nondistended; Bowel sounds present  EXTREMITIES:  1+ Peripheral Pulses, No edema  SKIN: No rashes or lesions  NEURO: not following commands, Aphasia       LABS:                        10.3   5.49  )-----------( 230      ( 31 Jul 2022 06:58 )             30.3     07-31    140  |  100  |  21.0<H>  ----------------------------<  141<H>  4.1   |  27.0  |  0.64    Ca    9.0      31 Jul 2022 06:58              I&O's Summary    30 Jul 2022 07:01  -  31 Jul 2022 07:00  --------------------------------------------------------  IN: 580 mL / OUT: 2550 mL / NET: -1970 mL        MEDICATIONS  (STANDING):  amantadine 200 milliGRAM(s) Oral <User Schedule>  chlorhexidine 2% Cloths 1 Application(s) Topical daily  donepezil 5 milliGRAM(s) Oral <User Schedule>  enoxaparin Injectable 40 milliGRAM(s) SubCutaneous every 24 hours  levETIRAcetam  Solution 500 milliGRAM(s) Oral two times a day  melatonin 5 milliGRAM(s) Oral at bedtime  modafinil 200 milliGRAM(s) Oral <User Schedule>  nafcillin  IVPB 2 Gram(s) IV Intermittent every 4 hours  polyethylene glycol 3350 17 Gram(s) Oral daily  senna 2 Tablet(s) Oral at bedtime  sodium chloride 2 Gram(s) Oral every 8 hours  tolvaptan 15 milliGRAM(s) Oral <User Schedule>  urea Oral Powder 30 Gram(s) Oral daily  vancomycin  IVPB 1000 milliGRAM(s) IV Intermittent every 12 hours    MEDICATIONS  (PRN):  labetalol Injectable 10 milliGRAM(s) IV Push every 4 hours PRN Systolic blood pressure > 140  ondansetron Injectable 4 milliGRAM(s) IV Push every 4 hours PRN Nausea and/or Vomiting    42yoM brought by EMS, found down in the street unresponsive.  Imaging showed SDH, IPH, TEOFILO, Patient underwent Right decompressive hemicraniectomy on 5/24, trach/peg 6/1,  R cranioplasty with complex closure 6/29/22.    Cranioplasty  drain removed, s/p rpt right craniectomy for evacuation on 7/17 after MR demonstrated large right ED collection. On Abx as per ID . 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 Renal is following .   Trach (decanulated)/ PEG + , on pureed diet       1. SDH/IPH / TBI  S/P R craniotomy / s/p R complex closure cranioplasty ,   repeated CTH  demonstrating right sided subdural collection mixed with air.  s/p rpt right craniectomy for evacuation on 7/17 after MR demonstrated large right ED collection.   Drain removed   - Blood cultures 7/18 no growth   - fluid cultures reporting Staphylococcus aureus (MSSA) and 1 culture with Staph Epi (MRSE)  - ID is following with further recommendations :   - Cefapime d/c , started on Nafcillin   - Continue  Vancomycin as per ID   - Monitor trough  - patient much more awake / alert - on 7/26 patient very conversant with me ,   oriented to self/ hospital not year , answered appropriately that he has 2 kids with names and ages-   mother was just shocked watching him being so conversant , stated he doesn't talk to her .   Today patient awake /alert , tracing , following commands but not answering   - continue PT/OT/ speech th  - Trend Fever  - Trend WBC  - neurochecks as per NS team -  Neuro checks has been stable   - Keppra 500 mg BID - as per NS   - Provigil / Amantadine - for wakefulness  - PMR is following - acute rehab once medically stable     - has mitten restrains as he is harm to himself      2. Dysphagia - on TF / pure diet, tolerating well, speech therapy team is following      3. Hyponatremia - w/u c/w SIADH , renal appreciated - Na level fluctuating ,  today dropped down to 127 - given 2% sodium several times ,   on Na tabs , fluid restriction , Samsca started , Urea started . Will give 2% NS x6 hrs ,   renal following, now sodium level is 133, will monitor    4. Hypomagnesemia - supplemented - add Magnesium 400 mg TID     6. Anemia - likely surgical blood lost - stable     7. DVT prophylaxis  - on Lovenox     8. Constipation prophylaxis - continue Senna/ Miralax     Dispo: Uninsured, waiting for placement.           REINOSO    [id number]    42y      Male    Patient is a 42y old  Male who presents with a chief complaint of Trauma/ Subdural/ Intubated (30 Jul 2022 18:17)      INTERVAL HPI/OVERNIGHT EVENTS:    Able to answer simple question, as Per him he has no sob, or chest pain, headache, able ot recognised his mother at bedside.      REVIEW OF SYSTEMS:    Limited      Vital Signs Last 24 Hrs  T(C): 36.8 (31 Jul 2022 09:23), Max: 37.6 (30 Jul 2022 16:31)  T(F): 98.3 (31 Jul 2022 09:23), Max: 99.6 (30 Jul 2022 16:31)  HR: 72 (31 Jul 2022 09:23) (69 - 102)  BP: 111/65 (31 Jul 2022 09:23) (99/66 - 134/75)  BP(mean): --  RR: 18 (31 Jul 2022 09:23) (18 - 20)  SpO2: 100% (31 Jul 2022 09:23) (96% - 100%)    Parameters below as of 31 Jul 2022 09:23  Patient On (Oxygen Delivery Method): room air        PHYSICAL EXAM:    GENERAL: Young male looking comfortable    HEENT: Craniotomy   NECK: Trach scar   CHEST/LUNG: Clear to auscultate bilaterally; No wheezing  HEART: S1S2+, Regular rate and rhythm; No murmurs  ABDOMEN: Soft, Nontender, Nondistended; Bowel sounds present  EXTREMITIES:  1+ Peripheral Pulses, No edema  SKIN: No rashes or lesions  NEURO: not following commands, Aphasia       LABS:                        10.3   5.49  )-----------( 230      ( 31 Jul 2022 06:58 )             30.3     07-31    140  |  100  |  21.0<H>  ----------------------------<  141<H>  4.1   |  27.0  |  0.64    Ca    9.0      31 Jul 2022 06:58              I&O's Summary    30 Jul 2022 07:01  -  31 Jul 2022 07:00  --------------------------------------------------------  IN: 580 mL / OUT: 2550 mL / NET: -1970 mL        MEDICATIONS  (STANDING):  amantadine 200 milliGRAM(s) Oral <User Schedule>  chlorhexidine 2% Cloths 1 Application(s) Topical daily  donepezil 5 milliGRAM(s) Oral <User Schedule>  enoxaparin Injectable 40 milliGRAM(s) SubCutaneous every 24 hours  levETIRAcetam  Solution 500 milliGRAM(s) Oral two times a day  melatonin 5 milliGRAM(s) Oral at bedtime  modafinil 200 milliGRAM(s) Oral <User Schedule>  nafcillin  IVPB 2 Gram(s) IV Intermittent every 4 hours  polyethylene glycol 3350 17 Gram(s) Oral daily  senna 2 Tablet(s) Oral at bedtime  sodium chloride 2 Gram(s) Oral every 8 hours  tolvaptan 15 milliGRAM(s) Oral <User Schedule>  urea Oral Powder 30 Gram(s) Oral daily  vancomycin  IVPB 1000 milliGRAM(s) IV Intermittent every 12 hours    MEDICATIONS  (PRN):  labetalol Injectable 10 milliGRAM(s) IV Push every 4 hours PRN Systolic blood pressure > 140  ondansetron Injectable 4 milliGRAM(s) IV Push every 4 hours PRN Nausea and/or Vomiting    42yoM brought by EMS, found down in the street unresponsive.  Imaging showed SDH, IPH, TEOFILO, Patient underwent Right decompressive hemicraniectomy on 5/24, trach/peg 6/1,  R cranioplasty with complex closure 6/29/22.    Cranioplasty  drain removed, s/p rpt right craniectomy for evacuation on 7/17 after MR demonstrated large right ED collection. On Abx as per ID . 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 Renal is following .   Trach (decanulated)/ PEG + , on pureed diet       1. SDH/IPH / TBI  S/P R craniotomy / s/p R complex closure cranioplasty ,   repeated CTH  demonstrating right sided subdural collection mixed with air.  s/p rpt right craniectomy for evacuation on 7/17 after MR demonstrated large right ED collection.   Drain removed   - Blood cultures 7/18 no growth   - fluid cultures reporting Staphylococcus aureus (MSSA) and 1 culture with Staph Epi (MRSE)  - ID is following with further recommendations :   - Cefapime d/c , started on Nafcillin   - Continue  Vancomycin as per ID   - Monitor trough by pharmacy and does being adjusted  - patient much more awake / alert   - continue PT/OT/ speech th  - Trend Fever  - Trend WBC  - neurochecks as per NS team -  Neuro checks has been stable   - Keppra 500 mg BID - as per NS   - Provigil / Amantadine - for wakefulness  - PMR is following - acute rehab once medically stable     - has mitten restrains as he is harm to himself      2. Dysphagia - on TF / pure diet, tolerating well, speech therapy team is following      3. Hyponatremia - w/u c/w SIADH , renal appreciated - Na level fluctuating ,  today dropped down to 127 - given 2% sodium several times, fluid restriction , Samsca started , Urea started,  renal following, now sodium level is normal, will monitor    4. Hypomagnesemia - supplemented - add Magnesium 400 mg TID     6. Anemia - likely surgical blood lost - stable     7. DVT prophylaxis  - on Lovenox     8. Constipation prophylaxis - continue Senna/ Miralax     Dispo: Uninsured, waiting for placement.

## 2022-07-31 NOTE — PROGRESS NOTE ADULT - SUBJECTIVE AND OBJECTIVE BOX
Subjective: No acute overnight event.  Serum sodium improved to 140 today.     ROS: Unable to obtain 2/2 current clinical condition    Medications:   MEDICATIONS  (STANDING):  amantadine 200 milliGRAM(s) Oral <User Schedule>  chlorhexidine 2% Cloths 1 Application(s) Topical daily  donepezil 5 milliGRAM(s) Oral <User Schedule>  enoxaparin Injectable 40 milliGRAM(s) SubCutaneous every 24 hours  levETIRAcetam  Solution 500 milliGRAM(s) Oral two times a day  melatonin 5 milliGRAM(s) Oral at bedtime  modafinil 200 milliGRAM(s) Oral <User Schedule>  nafcillin  IVPB 2 Gram(s) IV Intermittent every 4 hours  polyethylene glycol 3350 17 Gram(s) Oral daily  senna 2 Tablet(s) Oral at bedtime  sodium chloride 2 Gram(s) Oral every 8 hours  tolvaptan 15 milliGRAM(s) Oral <User Schedule>  urea Oral Powder 30 Gram(s) Oral daily  vancomycin  IVPB 1000 milliGRAM(s) IV Intermittent every 12 hours    I&O's Summary    30 Jul 2022 07:01  -  31 Jul 2022 07:00  --------------------------------------------------------  IN: 580 mL / OUT: 2550 mL / NET: -1970 mL    31 Jul 2022 07:01  -  31 Jul 2022 21:03  --------------------------------------------------------  IN: 0 mL / OUT: 350 mL / NET: -350 mL        Physical Exam:  Gen: no acute distress  MS: awake   Eyes: EOMI, no icterus  HENT: Surgical dressing of craniectomy site, MMM, trach  CV: rhythm reg reg, rate normal, no m/g/r, no LE edema  Chest: CTAB, no w/r/r,  Abd: soft, NT, ND  Neuro: moving all 4 limbs spontaneously, no tremor  MSK: normal bulk and tone, no joint swelling  Skin: dry, warm, no rash or jaundice    07-31    140  |  100  |  21.0<H>  ----------------------------<  141<H>  4.1   |  27.0  |  0.64    Ca    9.0      31 Jul 2022 06:58        IMPRESSION: 42y  Male initially admitted on 5/24/22 after being found down and was found to have at that time Bilateral IPH, Bilateral SDH. Prolonged hospital course: s/p craniotomy and evacuation of SDH 5/24, s/p trach 6/1, s/p cranioplasty and replacement of bone flap 6/29, s/p R hemicraniectomy, wound exploration, evacuation of epidural fluid collection 7/17.  1.  Hyponatremia    RECOMMENDATIONS:    ·	Hyponatremia in relative euvolemic pt w/ high Manuela and UOsm indicates inappropriate ADH secretion.  ·	Improved on samsca;  but it is otherwise dropping even on salt tabs and UreNa  ·	Continue free fluid restriction <  1L/day  ·	Going forward he likely will have to be on PO samsca 15 mg 2 times per week, for now got it approved for 2 more doses. (15 mg on Mon/Wed)  ·	next samsca dose on monday  ·	Will follow.

## 2022-08-01 LAB — VANCOMYCIN TROUGH SERPL-MCNC: 10.1 UG/ML — SIGNIFICANT CHANGE UP (ref 10–20)

## 2022-08-01 PROCEDURE — 99233 SBSQ HOSP IP/OBS HIGH 50: CPT

## 2022-08-01 PROCEDURE — 99232 SBSQ HOSP IP/OBS MODERATE 35: CPT

## 2022-08-01 RX ADMIN — Medication 250 MILLIGRAM(S): at 20:05

## 2022-08-01 RX ADMIN — SODIUM CHLORIDE 2 GRAM(S): 9 INJECTION INTRAMUSCULAR; INTRAVENOUS; SUBCUTANEOUS at 22:07

## 2022-08-01 RX ADMIN — Medication 30 GRAM(S): at 11:58

## 2022-08-01 RX ADMIN — SODIUM CHLORIDE 2 GRAM(S): 9 INJECTION INTRAMUSCULAR; INTRAVENOUS; SUBCUTANEOUS at 13:49

## 2022-08-01 RX ADMIN — TOLVAPTAN 15 MILLIGRAM(S): 15 TABLET ORAL at 09:00

## 2022-08-01 RX ADMIN — DONEPEZIL HYDROCHLORIDE 5 MILLIGRAM(S): 10 TABLET, FILM COATED ORAL at 11:58

## 2022-08-01 RX ADMIN — POLYETHYLENE GLYCOL 3350 17 GRAM(S): 17 POWDER, FOR SOLUTION ORAL at 11:59

## 2022-08-01 RX ADMIN — NAFCILLIN 200 GRAM(S): 10 INJECTION, POWDER, FOR SOLUTION INTRAVENOUS at 22:07

## 2022-08-01 RX ADMIN — NAFCILLIN 200 GRAM(S): 10 INJECTION, POWDER, FOR SOLUTION INTRAVENOUS at 03:47

## 2022-08-01 RX ADMIN — NAFCILLIN 200 GRAM(S): 10 INJECTION, POWDER, FOR SOLUTION INTRAVENOUS at 10:18

## 2022-08-01 RX ADMIN — LEVETIRACETAM 500 MILLIGRAM(S): 250 TABLET, FILM COATED ORAL at 04:33

## 2022-08-01 RX ADMIN — CHLORHEXIDINE GLUCONATE 1 APPLICATION(S): 213 SOLUTION TOPICAL at 11:59

## 2022-08-01 RX ADMIN — NAFCILLIN 200 GRAM(S): 10 INJECTION, POWDER, FOR SOLUTION INTRAVENOUS at 03:52

## 2022-08-01 RX ADMIN — SODIUM CHLORIDE 2 GRAM(S): 9 INJECTION INTRAMUSCULAR; INTRAVENOUS; SUBCUTANEOUS at 04:33

## 2022-08-01 RX ADMIN — LEVETIRACETAM 500 MILLIGRAM(S): 250 TABLET, FILM COATED ORAL at 17:56

## 2022-08-01 RX ADMIN — SENNA PLUS 2 TABLET(S): 8.6 TABLET ORAL at 22:07

## 2022-08-01 RX ADMIN — Medication 5 MILLIGRAM(S): at 22:07

## 2022-08-01 RX ADMIN — Medication 200 MILLIGRAM(S): at 11:58

## 2022-08-01 RX ADMIN — NAFCILLIN 200 GRAM(S): 10 INJECTION, POWDER, FOR SOLUTION INTRAVENOUS at 13:46

## 2022-08-01 RX ADMIN — Medication 250 MILLIGRAM(S): at 08:55

## 2022-08-01 RX ADMIN — NAFCILLIN 200 GRAM(S): 10 INJECTION, POWDER, FOR SOLUTION INTRAVENOUS at 17:53

## 2022-08-01 RX ADMIN — Medication 200 MILLIGRAM(S): at 08:59

## 2022-08-01 RX ADMIN — MODAFINIL 200 MILLIGRAM(S): 200 TABLET ORAL at 09:02

## 2022-08-01 RX ADMIN — ENOXAPARIN SODIUM 40 MILLIGRAM(S): 100 INJECTION SUBCUTANEOUS at 17:57

## 2022-08-01 NOTE — PROGRESS NOTE ADULT - SUBJECTIVE AND OBJECTIVE BOX
HPI: Patient is a 25y old M brought by EMS, found down in the street unresponsive.     Primary Survey:    A - airway compromised, patient intubated in ED, RSI  B - bilateral breath sound after intubation  C - initial BP: 169/93 (05-24-22 @ 00:00)*** , HR: 107 (05-24-22 @ 00:44)*** , palpable pulses in all extremities  D - GCS 3 on arrival    Exposure obtained, no evident injuries    CXR: No evident PTX or Hemothorax, ET tube in place.  (24 May 2022 01:09)    INTERVAL HPI/OVERNIGHT EVENTS:  42y Male seen lying comfortably in bed. No overnight events reported.    Vital Signs Last 24 Hrs  T(C): 36.8 (01 Aug 2022 08:00), Max: 37.1 (31 Jul 2022 15:20)  T(F): 98.2 (01 Aug 2022 08:00), Max: 98.7 (31 Jul 2022 15:20)  HR: 76 (01 Aug 2022 08:00) (76 - 88)  BP: 113/72 (01 Aug 2022 08:00) (113/72 - 126/84)  BP(mean): --  RR: 18 (01 Aug 2022 08:00) (18 - 18)  SpO2: 100% (01 Aug 2022 08:00) (98% - 100%)    Parameters below as of 01 Aug 2022 08:00  Patient On (Oxygen Delivery Method): room air      PHYSICAL EXAM:  GENERAL: NAD, well-groomed  HEAD: s/p R craniectomy, flap sunken, not full.  WOUND: open to air, sutured, clean dry intact, no active drainage or wound dehiscence   MENTAL STATUS: Awake. Opens eyes spontaneously. Nonverbal, following simple commands in [place]ian with much encouragement, L>R.  CRANIAL NERVES: PERRL. Face symmetric w/ normal eye closure. Hearing grossly intact.   MOTOR: LUNA AG L>R      LABS:                        10.3   5.49  )-----------( 230      ( 31 Jul 2022 06:58 )             30.3     07-31    140  |  100  |  21.0<H>  ----------------------------<  141<H>  4.1   |  27.0  |  0.64    Ca    9.0      31 Jul 2022 06:58            07-31 @ 07:01  -  08-01 @ 07:00  --------------------------------------------------------  IN: 0 mL / OUT: 1450 mL / NET: -1450 mL        RADIOLOGY & ADDITIONAL TESTS:  < from: CT Head No Cont (07.18.22 @ 00:58) >    IMPRESSION:  Status post right hemicraniectomy with placement of subgaleal drain and   evacuation of right subdural collection.  Decreased mass effect on the   right cerebral hemisphere.  Decreased effacement of the right lateral   ventricle.  Improved midline shift to the left, now measuring 9 mm,   compared to 1.4 cm preoperatively.    A small low-attenuation left frontal subdural collection measures   approximately 5 mm in caliber, compared to 6-7 mm on MRI from 07/17/2022.    Persistent dilatation of the temporal horns of both lateral ventricles,   compatible with hydrocephalus from ventricular entrapment.    A right zygomaticomaxillary complex fracture is partially visualized.    < end of copied text >    < from: MR Head w/wo IV Cont (07.17.22 @ 14:30) >  IMPRESSION: Postop changes are identified with large extra axial   collection associated air. There is some peripheral enhancement seen   around the collection as well as adjacent T2 prolongation. The   possibility of adjacent leptomeningeal enhancement cannot be entirely   excluded.    Mass effect on the right lateral ventricle is seen with subfalcine and   uncal herniation identified.    < end of copied text >      ASSESSMENT:  42M unknown PMH presented with IPH/SDH s/p hemicraniectomy on 5/24, cranioplasty 6/29, ccb right crani for epidural collection on 7/17. Pending cranioplasty when appropriate.     PLAN:  -D/w Dr. Millan  -Plan for cranioplasty when appropriate, once course of antibiotics finished & cleared by ID   -Cleared from a neurosurgery standpoint to be D/C'd to rehab & return for cranioplasty once appropriate & cleared  -Nephro following for hyponatremia   -SCDs & Lovenox for DVT PPX   -PT/OT/OOB with helmet  -Please re-call neurosurgery PA once cleared by ID & course of antibiotics complete if pt. not D/C'd prior to then
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
## 2022-08-01 NOTE — PROGRESS NOTE ADULT - SUBJECTIVE AND OBJECTIVE BOX
Awake but not alert. Patient's mother was at bedside. I communicated with patient's mother through [employer] interpretor #[id number]. She complained of the patient's waxing and waning mental status.     Physical Exam  General: WDWN male in NAD  HEENT: Scalp incision is CDI  Respiratory: Lying flat on room air; no use of accessory muscles of respiration  Abdomen: Soft, NT  Extremities: No appreciable edema  Neuro: Awake but not alert    Assessment and Plan: The patient is a 42 year old male with a prolong and complex hospital course for for IPH/SDH s/p hemicraniectomy on 5/24, cranioplasty 6/29, right craniotomy for epidural collection on 7/17. Nephrology is managing hyponatremia.    -Serum osm 271, urine osm 563, urine sodium 185  -Urine uric acid, uric acid, TSH and random cortisol not available  -HypoNa refractory to salt tabs2g q8h and Ure-Na    -Consequently tolvaptan added   -Currently normonatremic while on tolvaptan 15mg PO BIW (Mondays/Wednesdays) + Ure-Na 30g daily   -Also on tube feeds (without free water flushes - confirmed with RN)  -Continue above regimen     Marivel Lucero DO  Nephrology  Office Number [phone]             Awake but not alert. Patient's mother was at bedside. I communicated with patient's mother through [employer] interpretor #[id number]. She complained of the patient's waxing and waning mental status.     Physical Exam  General: WDWN male in NAD  HEENT: Scalp incision is CDI  Respiratory: Lying flat on room air; no use of accessory muscles of respiration  Abdomen: Soft, NT  Extremities: No appreciable edema  Neuro: Awake but not alert    Assessment and Plan: The patient is a 42 year old male with a prolong and complex hospital course for IPH/SDH s/p hemicraniectomy on 5/24, cranioplasty 6/29, right craniotomy for epidural collection on 7/17. Nephrology is managing hyponatremia.    -Serum osm 271, urine osm 563, urine sodium 185  -Urine uric acid, uric acid, TSH and random cortisol not available  -HypoNa refractory to salt tabs2g q8h and Ure-Na    -Consequently tolvaptan added   -Currently normonatremic while on tolvaptan 15mg PO BIW (Mondays/Wednesdays) + Ure-Na 30g daily   -Also on tube feeds (without free water flushes - confirmed with RN)  -Continue above regimen     Marivel Lucero DO  Nephrology  Office Number [phone]             Awake but not alert. Patient's mother was at bedside. I communicated with patient's mother through [employer] interpretor #[id number]. She complained of the patient's waxing and waning mental status.     Physical Exam  General: WDWN male in NAD  HEENT: Scalp incision is CDI  Respiratory: Lying flat on room air; no use of accessory muscles of respiration  Abdomen: Soft, NT  Extremities: No appreciable edema  Neuro: Awake but not alert    Assessment and Plan: The patient is a 42 year old male with a prolong and complex hospital course for IPH/SDH s/p hemicraniectomy on 5/24, cranioplasty 6/29, right craniotomy for epidural collection on 7/17. Nephrology is managing hyponatremia.    -Serum osm 271, urine osm 563, urine sodium 185  -Urine uric acid, uric acid, TSH and random cortisol not available  -HypoNa refractory to salt tabs2g q8h and Ure-Na    -Consequently tolvaptan added   -Currently normonatremic while on tolvaptan 15mg PO BIW (Mondays/Wednesdays) + Ure-Na 30g daily   -Also on tube feeds (without free water flushes - confirmed with RN)  -Continue above regimen   -Will check LFT's with a.m. labs for completeness as the patient is on Tolvaptan     Marivel Lucero DO  Nephrology  Office Number [phone]             Awake but not alert. Patient's mother was at bedside. I communicated with patient's mother through [employer] English interpretor #[id number]. She complained of the patient's waxing and waning mental status.     Physical Exam  General: WDWN male in NAD  HEENT: Scalp incision is CDI  Respiratory: Lying flat on room air; no use of accessory muscles of respiration  Abdomen: Soft, NT  Extremities: No appreciable edema  Neuro: Awake but not alert    Assessment and Plan: The patient is a 42 year old male with a prolong and complex hospital course for IPH/SDH s/p hemicraniectomy on 5/24, cranioplasty 6/29, right craniotomy for epidural collection on 7/17. Nephrology is managing hyponatremia.    -Serum osm 271, urine osm 563, urine sodium 185  -Urine uric acid, uric acid, TSH and random cortisol not available  -HypoNa refractory to salt tabs 2g q8h and Ure-Na packets    -Consequently tolvaptan added   -Currently normonatremic while on tolvaptan 15mg PO BIW (Mondays/Wednesdays) + Ure-Na 30g daily   -Also on tube feeds (without free water flushes - confirmed with RN)  -Continue above regimen   -Will check LFT's with a.m. labs for completeness as the patient is on Tolvaptan     Marivel Lucero DO  Nephrology  Office Number [phone]

## 2022-08-02 LAB
ALBUMIN SERPL ELPH-MCNC: 3.7 G/DL — SIGNIFICANT CHANGE UP (ref 3.3–5.2)
ALP SERPL-CCNC: 79 U/L — SIGNIFICANT CHANGE UP (ref 40–120)
ALT FLD-CCNC: 9 U/L — SIGNIFICANT CHANGE UP
ANION GAP SERPL CALC-SCNC: 12 MMOL/L — SIGNIFICANT CHANGE UP (ref 5–17)
AST SERPL-CCNC: 8 U/L — SIGNIFICANT CHANGE UP
BILIRUB SERPL-MCNC: 0.2 MG/DL — LOW (ref 0.4–2)
BUN SERPL-MCNC: 27.6 MG/DL — HIGH (ref 8–20)
CALCIUM SERPL-MCNC: 9.3 MG/DL — SIGNIFICANT CHANGE UP (ref 8.4–10.5)
CHLORIDE SERPL-SCNC: 99 MMOL/L — SIGNIFICANT CHANGE UP (ref 98–107)
CO2 SERPL-SCNC: 28 MMOL/L — SIGNIFICANT CHANGE UP (ref 22–29)
CORTIS AM PEAK SERPL-MCNC: 15.1 UG/DL — SIGNIFICANT CHANGE UP (ref 6–18.4)
CREAT ?TM UR-MCNC: 83 MG/DL — SIGNIFICANT CHANGE UP
CREAT SERPL-MCNC: 0.9 MG/DL — SIGNIFICANT CHANGE UP (ref 0.5–1.3)
EGFR: 109 ML/MIN/1.73M2 — SIGNIFICANT CHANGE UP
GLUCOSE SERPL-MCNC: 99 MG/DL — SIGNIFICANT CHANGE UP (ref 70–99)
POTASSIUM SERPL-MCNC: 4 MMOL/L — SIGNIFICANT CHANGE UP (ref 3.5–5.3)
POTASSIUM SERPL-SCNC: 4 MMOL/L — SIGNIFICANT CHANGE UP (ref 3.5–5.3)
PROT SERPL-MCNC: 7 G/DL — SIGNIFICANT CHANGE UP (ref 6.6–8.7)
SODIUM SERPL-SCNC: 139 MMOL/L — SIGNIFICANT CHANGE UP (ref 135–145)
TSH SERPL-MCNC: 1.34 UIU/ML — SIGNIFICANT CHANGE UP (ref 0.27–4.2)
URATE SERPL-MCNC: 2.3 MG/DL — LOW (ref 3.4–7)
URATE UR-MCNC: 33.2 MG/DL — SIGNIFICANT CHANGE UP
VANCOMYCIN TROUGH SERPL-MCNC: 13.2 UG/ML — SIGNIFICANT CHANGE UP (ref 10–20)

## 2022-08-02 PROCEDURE — 99232 SBSQ HOSP IP/OBS MODERATE 35: CPT

## 2022-08-02 PROCEDURE — 99233 SBSQ HOSP IP/OBS HIGH 50: CPT

## 2022-08-02 RX ORDER — ACETAMINOPHEN 500 MG
650 TABLET ORAL EVERY 6 HOURS
Refills: 0 | Status: DISCONTINUED | OUTPATIENT
Start: 2022-08-02 | End: 2022-09-09

## 2022-08-02 RX ADMIN — NAFCILLIN 200 GRAM(S): 10 INJECTION, POWDER, FOR SOLUTION INTRAVENOUS at 17:45

## 2022-08-02 RX ADMIN — NAFCILLIN 200 GRAM(S): 10 INJECTION, POWDER, FOR SOLUTION INTRAVENOUS at 21:23

## 2022-08-02 RX ADMIN — SENNA PLUS 2 TABLET(S): 8.6 TABLET ORAL at 21:22

## 2022-08-02 RX ADMIN — NAFCILLIN 200 GRAM(S): 10 INJECTION, POWDER, FOR SOLUTION INTRAVENOUS at 13:33

## 2022-08-02 RX ADMIN — CHLORHEXIDINE GLUCONATE 1 APPLICATION(S): 213 SOLUTION TOPICAL at 11:36

## 2022-08-02 RX ADMIN — SODIUM CHLORIDE 2 GRAM(S): 9 INJECTION INTRAMUSCULAR; INTRAVENOUS; SUBCUTANEOUS at 06:36

## 2022-08-02 RX ADMIN — NAFCILLIN 200 GRAM(S): 10 INJECTION, POWDER, FOR SOLUTION INTRAVENOUS at 09:28

## 2022-08-02 RX ADMIN — SODIUM CHLORIDE 2 GRAM(S): 9 INJECTION INTRAMUSCULAR; INTRAVENOUS; SUBCUTANEOUS at 21:22

## 2022-08-02 RX ADMIN — Medication 5 MILLIGRAM(S): at 21:22

## 2022-08-02 RX ADMIN — Medication 250 MILLIGRAM(S): at 08:30

## 2022-08-02 RX ADMIN — Medication 650 MILLIGRAM(S): at 14:28

## 2022-08-02 RX ADMIN — Medication 200 MILLIGRAM(S): at 08:30

## 2022-08-02 RX ADMIN — POLYETHYLENE GLYCOL 3350 17 GRAM(S): 17 POWDER, FOR SOLUTION ORAL at 11:37

## 2022-08-02 RX ADMIN — LEVETIRACETAM 500 MILLIGRAM(S): 250 TABLET, FILM COATED ORAL at 17:45

## 2022-08-02 RX ADMIN — ONDANSETRON 4 MILLIGRAM(S): 8 TABLET, FILM COATED ORAL at 14:28

## 2022-08-02 RX ADMIN — DONEPEZIL HYDROCHLORIDE 5 MILLIGRAM(S): 10 TABLET, FILM COATED ORAL at 11:37

## 2022-08-02 RX ADMIN — ENOXAPARIN SODIUM 40 MILLIGRAM(S): 100 INJECTION SUBCUTANEOUS at 17:48

## 2022-08-02 RX ADMIN — MODAFINIL 200 MILLIGRAM(S): 200 TABLET ORAL at 08:29

## 2022-08-02 RX ADMIN — SODIUM CHLORIDE 2 GRAM(S): 9 INJECTION INTRAMUSCULAR; INTRAVENOUS; SUBCUTANEOUS at 13:33

## 2022-08-02 RX ADMIN — LEVETIRACETAM 500 MILLIGRAM(S): 250 TABLET, FILM COATED ORAL at 06:36

## 2022-08-02 RX ADMIN — NAFCILLIN 200 GRAM(S): 10 INJECTION, POWDER, FOR SOLUTION INTRAVENOUS at 03:16

## 2022-08-02 RX ADMIN — Medication 650 MILLIGRAM(S): at 15:30

## 2022-08-02 RX ADMIN — Medication 250 MILLIGRAM(S): at 21:22

## 2022-08-02 RX ADMIN — Medication 200 MILLIGRAM(S): at 11:36

## 2022-08-02 RX ADMIN — NAFCILLIN 200 GRAM(S): 10 INJECTION, POWDER, FOR SOLUTION INTRAVENOUS at 06:36

## 2022-08-02 RX ADMIN — Medication 30 GRAM(S): at 11:37
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
## 2022-08-02 NOTE — PROGRESS NOTE ADULT - SUBJECTIVE AND OBJECTIVE BOX
Remains normonatremic at this time. Awake, alert and following some commands today. Mother is at bedside.    Physical Exam  General: WDWN male in NAD  HEENT: Scalp incision is CDI  Respiratory: Lying flat on room air; no use of accessory muscles of respiration  Abdomen: Soft, NT  Extremities: No appreciable edema  Neuro: Awake, alert    Assessment and Plan: The patient is a 42 year old male with a prolong and complex hospital course for IPH/SDH s/p hemicraniectomy on 5/24, cranioplasty 6/29, right craniotomy for epidural collection on 7/17. Nephrology is managing hyponatremia.    -Serum osm 271, urine osm 563, urine sodium 185  -Urine uric acid, uric acid, TSH and random cortisol are pending at this time  -HypoNa have been refractory to salt tabs 2g q8h and Ure-Na packets    -Consequently tolvaptan added   -Currently normonatremic while on tolvaptan 15mg PO BIW (Mondays/Wednesdays) + Ure-Na 30g daily   -Also on tube feeds without free water flushes  -LFTs are okay  -Continue above regimen     Marivel Lucreo DO  Nephrology  Office Number [phone]

## 2022-08-02 NOTE — PROGRESS NOTE ADULT - SUBJECTIVE AND OBJECTIVE BOX
CC: Trauma/ Subdural/ SIADH    INTERVAL HPI/OVERNIGHT EVENTS: Patient seen and examined. More awake and alert. Speaking more. Appears comfortable.  No sob, or chest pain, headache, no overnight issues.       Vital Signs Last 24 Hrs  T(C): 36.8 (02 Aug 2022 08:00), Max: 37.4 (01 Aug 2022 20:53)  T(F): 98.3 (02 Aug 2022 08:00), Max: 99.4 (01 Aug 2022 20:53)  HR: 84 (02 Aug 2022 08:00) (81 - 92)  BP: 102/65 (02 Aug 2022 08:00) (102/65 - 127/87)  BP(mean): --  RR: 18 (02 Aug 2022 08:00) (17 - 18)  SpO2: 100% (02 Aug 2022 08:00) (95% - 100%)    Parameters below as of 02 Aug 2022 08:00  Patient On (Oxygen Delivery Method): room air      PHYSICAL EXAM:    GENERAL: NAD, well-groomed, well-developed  HEAD: s/p L craniotomy , surgical site clean and dry ,   sutures +   EYES: EOMI, PERRLA, conjunctiva and sclera clear  NECK: Supple, No JVD, Normal thyroid  NERVOUS SYSTEM: awake , alert , following simple commands , moving all 4 extremity   CHEST/LUNG: CTA b/l ,  no  rales, rhonchi, wheezing, or rubs  HEART: Regular rate and rhythm; No murmurs, rubs, or gallops  ABDOMEN: Soft, Nontender, Nondistended; Bowel sounds present  EXTREMITIES:  2+ Peripheral Pulses, No clubbing, cyanosis, or edema  LYMPH: No lymphadenopathy noted  SKIN: No rashes or lesions        I&O's Detail    01 Aug 2022 07:01  -  02 Aug 2022 07:00  --------------------------------------------------------  IN:    Enteral Tube Flush: 100 mL    IV PiggyBack: 300 mL    IV PiggyBack: 250 mL    Pivot 1.5: 800 mL  Total IN: 1450 mL    OUT:    Voided (mL): 3500 mL  Total OUT: 3500 mL    Total NET: -2050 mL                02 Aug 2022 04:34    139    |  99     |  27.6   ----------------------------<  99     4.0     |  28.0   |  0.90     Ca    9.3        02 Aug 2022 04:34    TPro  7.0    /  Alb  3.7    /  TBili  0.2    /  DBili  x      /  AST  8      /  ALT  9      /  AlkPhos  79     02 Aug 2022 04:34      CAPILLARY BLOOD GLUCOSE        LIVER FUNCTIONS - ( 02 Aug 2022 04:34 )  Alb: 3.7 g/dL / Pro: 7.0 g/dL / ALK PHOS: 79 U/L / ALT: 9 U/L / AST: 8 U/L / GGT: x               MEDICATIONS  (STANDING):  amantadine 200 milliGRAM(s) Oral <User Schedule>  chlorhexidine 2% Cloths 1 Application(s) Topical daily  donepezil 5 milliGRAM(s) Oral <User Schedule>  enoxaparin Injectable 40 milliGRAM(s) SubCutaneous every 24 hours  levETIRAcetam  Solution 500 milliGRAM(s) Oral two times a day  melatonin 5 milliGRAM(s) Oral at bedtime  nafcillin  IVPB 2 Gram(s) IV Intermittent every 4 hours  polyethylene glycol 3350 17 Gram(s) Oral daily  senna 2 Tablet(s) Oral at bedtime  sodium chloride 2 Gram(s) Oral every 8 hours  tolvaptan 15 milliGRAM(s) Oral <User Schedule>  urea Oral Powder 30 Gram(s) Oral daily  vancomycin  IVPB 1000 milliGRAM(s) IV Intermittent every 12 hours    MEDICATIONS  (PRN):  ondansetron Injectable 4 milliGRAM(s) IV Push every 4 hours PRN Nausea and/or Vomiting      RADIOLOGY & ADDITIONAL TESTS:   Declined

## 2022-08-03 LAB
ANION GAP SERPL CALC-SCNC: 12 MMOL/L — SIGNIFICANT CHANGE UP (ref 5–17)
BUN SERPL-MCNC: 26.1 MG/DL — HIGH (ref 8–20)
CALCIUM SERPL-MCNC: 9.6 MG/DL — SIGNIFICANT CHANGE UP (ref 8.4–10.5)
CHLORIDE SERPL-SCNC: 101 MMOL/L — SIGNIFICANT CHANGE UP (ref 98–107)
CO2 SERPL-SCNC: 28 MMOL/L — SIGNIFICANT CHANGE UP (ref 22–29)
CREAT SERPL-MCNC: 0.76 MG/DL — SIGNIFICANT CHANGE UP (ref 0.5–1.3)
EGFR: 115 ML/MIN/1.73M2 — SIGNIFICANT CHANGE UP
GLUCOSE SERPL-MCNC: 102 MG/DL — HIGH (ref 70–99)
OSMOLALITY UR: 659 MOSM/KG — SIGNIFICANT CHANGE UP (ref 300–1000)
POTASSIUM SERPL-MCNC: 4.4 MMOL/L — SIGNIFICANT CHANGE UP (ref 3.5–5.3)
POTASSIUM SERPL-SCNC: 4.4 MMOL/L — SIGNIFICANT CHANGE UP (ref 3.5–5.3)
SODIUM SERPL-SCNC: 141 MMOL/L — SIGNIFICANT CHANGE UP (ref 135–145)
VANCOMYCIN TROUGH SERPL-MCNC: 6.9 UG/ML — LOW (ref 10–20)

## 2022-08-03 PROCEDURE — 99233 SBSQ HOSP IP/OBS HIGH 50: CPT

## 2022-08-03 PROCEDURE — 99232 SBSQ HOSP IP/OBS MODERATE 35: CPT

## 2022-08-03 RX ORDER — AMANTADINE HCL 100 MG
100 CAPSULE ORAL
Refills: 0 | Status: DISCONTINUED | OUTPATIENT
Start: 2022-08-03 | End: 2022-08-22

## 2022-08-03 RX ADMIN — POLYETHYLENE GLYCOL 3350 17 GRAM(S): 17 POWDER, FOR SOLUTION ORAL at 14:30

## 2022-08-03 RX ADMIN — NAFCILLIN 200 GRAM(S): 10 INJECTION, POWDER, FOR SOLUTION INTRAVENOUS at 10:50

## 2022-08-03 RX ADMIN — NAFCILLIN 200 GRAM(S): 10 INJECTION, POWDER, FOR SOLUTION INTRAVENOUS at 02:54

## 2022-08-03 RX ADMIN — Medication 250 MILLIGRAM(S): at 08:51

## 2022-08-03 RX ADMIN — TOLVAPTAN 15 MILLIGRAM(S): 15 TABLET ORAL at 08:52

## 2022-08-03 RX ADMIN — NAFCILLIN 200 GRAM(S): 10 INJECTION, POWDER, FOR SOLUTION INTRAVENOUS at 18:43

## 2022-08-03 RX ADMIN — CHLORHEXIDINE GLUCONATE 1 APPLICATION(S): 213 SOLUTION TOPICAL at 14:29

## 2022-08-03 RX ADMIN — LEVETIRACETAM 500 MILLIGRAM(S): 250 TABLET, FILM COATED ORAL at 05:32

## 2022-08-03 RX ADMIN — NAFCILLIN 200 GRAM(S): 10 INJECTION, POWDER, FOR SOLUTION INTRAVENOUS at 05:32

## 2022-08-03 RX ADMIN — Medication 250 MILLIGRAM(S): at 21:59

## 2022-08-03 RX ADMIN — NAFCILLIN 200 GRAM(S): 10 INJECTION, POWDER, FOR SOLUTION INTRAVENOUS at 22:22

## 2022-08-03 RX ADMIN — SODIUM CHLORIDE 2 GRAM(S): 9 INJECTION INTRAMUSCULAR; INTRAVENOUS; SUBCUTANEOUS at 05:32

## 2022-08-03 RX ADMIN — Medication 30 GRAM(S): at 14:30

## 2022-08-03 RX ADMIN — NAFCILLIN 200 GRAM(S): 10 INJECTION, POWDER, FOR SOLUTION INTRAVENOUS at 14:30

## 2022-08-03 RX ADMIN — SENNA PLUS 2 TABLET(S): 8.6 TABLET ORAL at 22:23

## 2022-08-03 RX ADMIN — Medication 200 MILLIGRAM(S): at 08:52

## 2022-08-03 RX ADMIN — DONEPEZIL HYDROCHLORIDE 5 MILLIGRAM(S): 10 TABLET, FILM COATED ORAL at 14:31

## 2022-08-03 RX ADMIN — LEVETIRACETAM 500 MILLIGRAM(S): 250 TABLET, FILM COATED ORAL at 18:43

## 2022-08-03 RX ADMIN — Medication 100 MILLIGRAM(S): at 14:31

## 2022-08-03 RX ADMIN — SODIUM CHLORIDE 2 GRAM(S): 9 INJECTION INTRAMUSCULAR; INTRAVENOUS; SUBCUTANEOUS at 14:30

## 2022-08-03 RX ADMIN — ENOXAPARIN SODIUM 40 MILLIGRAM(S): 100 INJECTION SUBCUTANEOUS at 18:43

## 2022-08-03 RX ADMIN — SODIUM CHLORIDE 2 GRAM(S): 9 INJECTION INTRAMUSCULAR; INTRAVENOUS; SUBCUTANEOUS at 22:48

## 2022-08-03 RX ADMIN — Medication 5 MILLIGRAM(S): at 22:23
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
## 2022-08-03 NOTE — PROGRESS NOTE ADULT - SUBJECTIVE AND OBJECTIVE BOX
CC: Trauma/ Subdural/Hyponatremia        INTERVAL HPI/OVERNIGHT EVENTS: Patient seen and examined. Sitting in recliner chair, helmet in place. Mother at bedside, feeding patient. Tolerated well. Following commands. Awake and alert. No sob, or chest pain, headache, no overnight issues.       Vital Signs Last 24 Hrs  T(C): 36.9 (03 Aug 2022 07:15), Max: 37.1 (02 Aug 2022 15:35)  T(F): 98.4 (03 Aug 2022 07:15), Max: 98.8 (02 Aug 2022 15:35)  HR: 82 (03 Aug 2022 07:15) (77 - 88)  BP: 117/69 (03 Aug 2022 07:15) (101/74 - 118/74)  BP(mean): --  RR: 19 (03 Aug 2022 07:15) (17 - 19)  SpO2: 98% (03 Aug 2022 07:15) (97% - 99%)    Parameters below as of 03 Aug 2022 04:00  Patient On (Oxygen Delivery Method): room air      PHYSICAL EXAM:    GENERAL: NAD, well-groomed, well-developed  HEAD: s/p L craniotomy , Rubens on  EYES: EOMI, PERRLA, conjunctiva and sclera clear  NECK: Supple, No JVD, Normal thyroid  NERVOUS SYSTEM: awake , alert , following simple commands , moving all 4 extremity   CHEST/LUNG: CTA b/l ,  no  rales, rhonchi, wheezing, or rubs  HEART: Regular rate and rhythm; No murmurs, rubs, or gallops  ABDOMEN: Soft, Nontender, Nondistended; Bowel sounds present  EXTREMITIES:  2+ Peripheral Pulses, No clubbing, cyanosis, or edema  LYMPH: No lymphadenopathy noted  SKIN: No rashes or lesions      I&O's Detail    02 Aug 2022 07:01  -  03 Aug 2022 07:00  --------------------------------------------------------  IN:  Total IN: 0 mL    OUT:    Voided (mL): 750 mL  Total OUT: 750 mL    Total NET: -750 mL                03 Aug 2022 07:10    141    |  101    |  26.1   ----------------------------<  102    4.4     |  28.0   |  0.76     Ca    9.6        03 Aug 2022 07:10    TPro  7.0    /  Alb  3.7    /  TBili  0.2    /  DBili  x      /  AST  8      /  ALT  9      /  AlkPhos  79     02 Aug 2022 04:34      CAPILLARY BLOOD GLUCOSE        LIVER FUNCTIONS - ( 02 Aug 2022 04:34 )  Alb: 3.7 g/dL / Pro: 7.0 g/dL / ALK PHOS: 79 U/L / ALT: 9 U/L / AST: 8 U/L / GGT: x               MEDICATIONS  (STANDING):  amantadine 100 milliGRAM(s) Oral <User Schedule>  chlorhexidine 2% Cloths 1 Application(s) Topical daily  donepezil 5 milliGRAM(s) Oral <User Schedule>  enoxaparin Injectable 40 milliGRAM(s) SubCutaneous every 24 hours  levETIRAcetam  Solution 500 milliGRAM(s) Oral two times a day  melatonin 5 milliGRAM(s) Oral at bedtime  nafcillin  IVPB 2 Gram(s) IV Intermittent every 4 hours  polyethylene glycol 3350 17 Gram(s) Oral daily  senna 2 Tablet(s) Oral at bedtime  sodium chloride 2 Gram(s) Oral every 8 hours  urea Oral Powder 30 Gram(s) Oral daily  vancomycin  IVPB 1000 milliGRAM(s) IV Intermittent every 12 hours    MEDICATIONS  (PRN):  acetaminophen     Tablet .. 650 milliGRAM(s) Oral every 6 hours PRN Temp greater or equal to 38C (100.4F), Moderate Pain (4 - 6)  ondansetron Injectable 4 milliGRAM(s) IV Push every 4 hours PRN Nausea and/or Vomiting      RADIOLOGY & ADDITIONAL TESTS:

## 2022-08-03 NOTE — PROGRESS NOTE ADULT - SUBJECTIVE AND OBJECTIVE BOX
Remains normonatremic at this time. Sitting in chair with helmet. Awake, alert and following some commands. Mother is at bedside.    Physical Exam  General: WDWN male in NAD  HEENT: +Helmet  Respiratory: CTAB  Abdomen: Soft, NT  Extremities: No appreciable edema  Neuro: Awake, alert    Assessment and Plan: The patient is a 42 year old male with a prolong and complex hospital course for IPH/SDH s/p hemicraniectomy on 5/24, cranioplasty 6/29, right craniotomy for epidural collection on 7/17. Nephrology is managing hyponatremia.    #Hyponatremia    -Serum osm 271, urine osm 563, urine sodium 185  -HypoNa have been refractory to salt tabs 2g q8h and Ure-Na packets    -Consequently tolvaptan added   -Currently normonatremic while on tolvaptan 15mg PO BIW (Mondays/Wednesdays) + Ure-Na 30g daily   -Also on tube feeds without free water flushes  -LFTs are okay  -Continue above regimen     Marivel Lucero DO  Nephrology  Office Number [phone]

## 2022-08-04 LAB
ANION GAP SERPL CALC-SCNC: 13 MMOL/L — SIGNIFICANT CHANGE UP (ref 5–17)
BUN SERPL-MCNC: 20.4 MG/DL — HIGH (ref 8–20)
CALCIUM SERPL-MCNC: 9.5 MG/DL — SIGNIFICANT CHANGE UP (ref 8.4–10.5)
CHLORIDE SERPL-SCNC: 100 MMOL/L — SIGNIFICANT CHANGE UP (ref 98–107)
CO2 SERPL-SCNC: 26 MMOL/L — SIGNIFICANT CHANGE UP (ref 22–29)
CREAT SERPL-MCNC: 0.81 MG/DL — SIGNIFICANT CHANGE UP (ref 0.5–1.3)
EGFR: 113 ML/MIN/1.73M2 — SIGNIFICANT CHANGE UP
GLUCOSE SERPL-MCNC: 97 MG/DL — SIGNIFICANT CHANGE UP (ref 70–99)
POTASSIUM SERPL-MCNC: 3.9 MMOL/L — SIGNIFICANT CHANGE UP (ref 3.5–5.3)
POTASSIUM SERPL-SCNC: 3.9 MMOL/L — SIGNIFICANT CHANGE UP (ref 3.5–5.3)
SODIUM SERPL-SCNC: 139 MMOL/L — SIGNIFICANT CHANGE UP (ref 135–145)

## 2022-08-04 PROCEDURE — 99232 SBSQ HOSP IP/OBS MODERATE 35: CPT

## 2022-08-04 RX ADMIN — NAFCILLIN 200 GRAM(S): 10 INJECTION, POWDER, FOR SOLUTION INTRAVENOUS at 06:15

## 2022-08-04 RX ADMIN — POLYETHYLENE GLYCOL 3350 17 GRAM(S): 17 POWDER, FOR SOLUTION ORAL at 13:40

## 2022-08-04 RX ADMIN — NAFCILLIN 200 GRAM(S): 10 INJECTION, POWDER, FOR SOLUTION INTRAVENOUS at 21:30

## 2022-08-04 RX ADMIN — CHLORHEXIDINE GLUCONATE 1 APPLICATION(S): 213 SOLUTION TOPICAL at 13:40

## 2022-08-04 RX ADMIN — NAFCILLIN 200 GRAM(S): 10 INJECTION, POWDER, FOR SOLUTION INTRAVENOUS at 18:04

## 2022-08-04 RX ADMIN — Medication 250 MILLIGRAM(S): at 09:49

## 2022-08-04 RX ADMIN — SODIUM CHLORIDE 2 GRAM(S): 9 INJECTION INTRAMUSCULAR; INTRAVENOUS; SUBCUTANEOUS at 22:14

## 2022-08-04 RX ADMIN — NAFCILLIN 200 GRAM(S): 10 INJECTION, POWDER, FOR SOLUTION INTRAVENOUS at 14:00

## 2022-08-04 RX ADMIN — LEVETIRACETAM 500 MILLIGRAM(S): 250 TABLET, FILM COATED ORAL at 18:03

## 2022-08-04 RX ADMIN — Medication 5 MILLIGRAM(S): at 21:29

## 2022-08-04 RX ADMIN — Medication 250 MILLIGRAM(S): at 20:03

## 2022-08-04 RX ADMIN — LEVETIRACETAM 500 MILLIGRAM(S): 250 TABLET, FILM COATED ORAL at 06:16

## 2022-08-04 RX ADMIN — ENOXAPARIN SODIUM 40 MILLIGRAM(S): 100 INJECTION SUBCUTANEOUS at 18:03

## 2022-08-04 RX ADMIN — Medication 30 GRAM(S): at 13:39

## 2022-08-04 RX ADMIN — NAFCILLIN 200 GRAM(S): 10 INJECTION, POWDER, FOR SOLUTION INTRAVENOUS at 02:31

## 2022-08-04 RX ADMIN — SODIUM CHLORIDE 2 GRAM(S): 9 INJECTION INTRAMUSCULAR; INTRAVENOUS; SUBCUTANEOUS at 13:40

## 2022-08-04 RX ADMIN — Medication 100 MILLIGRAM(S): at 13:39

## 2022-08-04 RX ADMIN — DONEPEZIL HYDROCHLORIDE 5 MILLIGRAM(S): 10 TABLET, FILM COATED ORAL at 13:39

## 2022-08-04 RX ADMIN — NAFCILLIN 200 GRAM(S): 10 INJECTION, POWDER, FOR SOLUTION INTRAVENOUS at 10:57

## 2022-08-04 RX ADMIN — SODIUM CHLORIDE 2 GRAM(S): 9 INJECTION INTRAMUSCULAR; INTRAVENOUS; SUBCUTANEOUS at 06:16

## 2022-08-04 RX ADMIN — Medication 100 MILLIGRAM(S): at 09:12

## 2022-08-04 NOTE — PROGRESS NOTE ADULT - SUBJECTIVE AND OBJECTIVE BOX
Remains normonatremic at this time. Mother is at bedside.    Physical Exam  General: WDWN male in NAD  Respiratory: CTAB  Abdomen: Soft, NT  Extremities: No appreciable edema  Neuro: Awake, alert    Assessment and Plan: The patient is a 42 year old male with a prolong and complex hospital course for IPH/SDH s/p hemicraniectomy on 5/24, cranioplasty 6/29, right craniotomy for epidural collection on 7/17. Nephrology is managing hyponatremia.    #Hyponatremia    -Serum osm 271, urine osm 563, urine sodium 185  -HypoNa have been refractory to salt tabs 2g q8h and Ure-Na packets    -Consequently tolvaptan added   -Currently normonatremic while on tolvaptan 15mg PO BIW (Mondays/Wednesdays) + Ure-Na 30g daily   -Also on tube feeds without free water flushes  -LFTs are okay  -Continue above regimen     Marivel Lucero DO  Nephrology  Office Number [phone]
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
## 2022-08-04 NOTE — PROGRESS NOTE ADULT - SUBJECTIVE AND OBJECTIVE BOX
CC: Trauma/ Subdural/Hyponatremia    INTERVAL HPI/OVERNIGHT EVENTS: Patient seen and examined. No acute events overnight. Up in recliner chair. Follows commands, eating well, fed by Mother.     Vital Signs Last 24 Hrs  T(C): 36.9 (04 Aug 2022 09:06), Max: 37 (03 Aug 2022 15:10)  T(F): 98.5 (04 Aug 2022 09:06), Max: 98.6 (03 Aug 2022 15:10)  HR: 70 (04 Aug 2022 09:06) (70 - 92)  BP: 117/73 (04 Aug 2022 09:06) (103/70 - 117/73)  BP(mean): 94 (03 Aug 2022 20:00) (94 - 94)  RR: 19 (04 Aug 2022 09:06) (18 - 19)  SpO2: 98% (04 Aug 2022 09:06) (96% - 99%)    Parameters below as of 04 Aug 2022 04:31  Patient On (Oxygen Delivery Method): room air    ROS:  Constitutional, Eyes, ENT, Cardiovascular, Respiratory,  Gastrointestinal, Genitourinary, Musculoskeletal, Neurological, Integumentary, Psychiatric, Endocrine, Heme/Lymph are otherwise negative.    PHYSICAL EXAM:    GENERAL: NAD, well-groomed, well-developed  HEAD: s/p L craniotomy , Rubens on  EYES: EOMI, PERRLA, conjunctiva and sclera clear  NECK: Supple, No JVD, Normal thyroid  NERVOUS SYSTEM: awake , alert , following simple commands , moving all 4 extremity   CHEST/LUNG: CTA b/l ,  no  rales, rhonchi, wheezing, or rubs  HEART: Regular rate and rhythm; No murmurs, rubs, or gallops  ABDOMEN: Soft, Nontender, Nondistended; Bowel sounds present  EXTREMITIES:  2+ Peripheral Pulses, No clubbing, cyanosis, or edema  LYMPH: No lymphadenopathy noted  SKIN: No rashes or lesions    I&O's Detail    03 Aug 2022 07:01  -  04 Aug 2022 07:00  --------------------------------------------------------  IN:    Enteral Tube Flush: 400 mL    Pivot 1.5: 800 mL  Total IN: 1200 mL    OUT:    Incontinent per Collection Bag (mL): 750 mL  Total OUT: 750 mL    Total NET: 450 mL                04 Aug 2022 04:34    139    |  100    |  20.4   ----------------------------<  97     3.9     |  26.0   |  0.81     Ca    9.5        04 Aug 2022 04:34        CAPILLARY BLOOD GLUCOSE              MEDICATIONS  (STANDING):  amantadine 100 milliGRAM(s) Oral <User Schedule>  chlorhexidine 2% Cloths 1 Application(s) Topical daily  donepezil 5 milliGRAM(s) Oral <User Schedule>  enoxaparin Injectable 40 milliGRAM(s) SubCutaneous every 24 hours  levETIRAcetam  Solution 500 milliGRAM(s) Oral two times a day  melatonin 5 milliGRAM(s) Oral at bedtime  nafcillin  IVPB 2 Gram(s) IV Intermittent every 4 hours  polyethylene glycol 3350 17 Gram(s) Oral daily  senna 2 Tablet(s) Oral at bedtime  sodium chloride 2 Gram(s) Oral every 8 hours  urea Oral Powder 30 Gram(s) Oral daily  vancomycin  IVPB 1000 milliGRAM(s) IV Intermittent every 12 hours    MEDICATIONS  (PRN):  acetaminophen     Tablet .. 650 milliGRAM(s) Oral every 6 hours PRN Temp greater or equal to 38C (100.4F), Moderate Pain (4 - 6)  ondansetron Injectable 4 milliGRAM(s) IV Push every 4 hours PRN Nausea and/or Vomiting      RADIOLOGY & ADDITIONAL TESTS:

## 2022-08-05 LAB
ANION GAP SERPL CALC-SCNC: 13 MMOL/L — SIGNIFICANT CHANGE UP (ref 5–17)
BUN SERPL-MCNC: 24.7 MG/DL — HIGH (ref 8–20)
CALCIUM SERPL-MCNC: 9.2 MG/DL — SIGNIFICANT CHANGE UP (ref 8.4–10.5)
CHLORIDE SERPL-SCNC: 103 MMOL/L — SIGNIFICANT CHANGE UP (ref 98–107)
CO2 SERPL-SCNC: 26 MMOL/L — SIGNIFICANT CHANGE UP (ref 22–29)
CREAT SERPL-MCNC: 0.76 MG/DL — SIGNIFICANT CHANGE UP (ref 0.5–1.3)
EGFR: 115 ML/MIN/1.73M2 — SIGNIFICANT CHANGE UP
GLUCOSE SERPL-MCNC: 108 MG/DL — HIGH (ref 70–99)
HCT VFR BLD CALC: 32.3 % — LOW (ref 39–50)
HGB BLD-MCNC: 10.6 G/DL — LOW (ref 13–17)
MCHC RBC-ENTMCNC: 28.4 PG — SIGNIFICANT CHANGE UP (ref 27–34)
MCHC RBC-ENTMCNC: 32.8 GM/DL — SIGNIFICANT CHANGE UP (ref 32–36)
MCV RBC AUTO: 86.6 FL — SIGNIFICANT CHANGE UP (ref 80–100)
PLATELET # BLD AUTO: 286 K/UL — SIGNIFICANT CHANGE UP (ref 150–400)
POTASSIUM SERPL-MCNC: 4.2 MMOL/L — SIGNIFICANT CHANGE UP (ref 3.5–5.3)
POTASSIUM SERPL-SCNC: 4.2 MMOL/L — SIGNIFICANT CHANGE UP (ref 3.5–5.3)
RBC # BLD: 3.73 M/UL — LOW (ref 4.2–5.8)
RBC # FLD: 12.7 % — SIGNIFICANT CHANGE UP (ref 10.3–14.5)
SARS-COV-2 RNA SPEC QL NAA+PROBE: SIGNIFICANT CHANGE UP
SODIUM SERPL-SCNC: 142 MMOL/L — SIGNIFICANT CHANGE UP (ref 135–145)
VANCOMYCIN TROUGH SERPL-MCNC: 18.4 UG/ML — SIGNIFICANT CHANGE UP (ref 10–20)
WBC # BLD: 3.87 K/UL — SIGNIFICANT CHANGE UP (ref 3.8–10.5)
WBC # FLD AUTO: 3.87 K/UL — SIGNIFICANT CHANGE UP (ref 3.8–10.5)

## 2022-08-05 PROCEDURE — 99232 SBSQ HOSP IP/OBS MODERATE 35: CPT

## 2022-08-05 RX ORDER — SODIUM CHLORIDE 9 MG/ML
1 INJECTION INTRAMUSCULAR; INTRAVENOUS; SUBCUTANEOUS
Refills: 0 | Status: DISCONTINUED | OUTPATIENT
Start: 2022-08-05 | End: 2022-08-08

## 2022-08-05 RX ADMIN — Medication 5 MILLIGRAM(S): at 21:03

## 2022-08-05 RX ADMIN — LEVETIRACETAM 500 MILLIGRAM(S): 250 TABLET, FILM COATED ORAL at 05:21

## 2022-08-05 RX ADMIN — SENNA PLUS 2 TABLET(S): 8.6 TABLET ORAL at 21:03

## 2022-08-05 RX ADMIN — SODIUM CHLORIDE 1 GRAM(S): 9 INJECTION INTRAMUSCULAR; INTRAVENOUS; SUBCUTANEOUS at 18:00

## 2022-08-05 RX ADMIN — DONEPEZIL HYDROCHLORIDE 5 MILLIGRAM(S): 10 TABLET, FILM COATED ORAL at 11:58

## 2022-08-05 RX ADMIN — NAFCILLIN 200 GRAM(S): 10 INJECTION, POWDER, FOR SOLUTION INTRAVENOUS at 11:54

## 2022-08-05 RX ADMIN — NAFCILLIN 200 GRAM(S): 10 INJECTION, POWDER, FOR SOLUTION INTRAVENOUS at 23:56

## 2022-08-05 RX ADMIN — ENOXAPARIN SODIUM 40 MILLIGRAM(S): 100 INJECTION SUBCUTANEOUS at 18:01

## 2022-08-05 RX ADMIN — NAFCILLIN 200 GRAM(S): 10 INJECTION, POWDER, FOR SOLUTION INTRAVENOUS at 14:04

## 2022-08-05 RX ADMIN — Medication 250 MILLIGRAM(S): at 10:05

## 2022-08-05 RX ADMIN — NAFCILLIN 200 GRAM(S): 10 INJECTION, POWDER, FOR SOLUTION INTRAVENOUS at 18:00

## 2022-08-05 RX ADMIN — CHLORHEXIDINE GLUCONATE 1 APPLICATION(S): 213 SOLUTION TOPICAL at 11:58

## 2022-08-05 RX ADMIN — SODIUM CHLORIDE 2 GRAM(S): 9 INJECTION INTRAMUSCULAR; INTRAVENOUS; SUBCUTANEOUS at 05:21

## 2022-08-05 RX ADMIN — Medication 250 MILLIGRAM(S): at 20:53

## 2022-08-05 RX ADMIN — Medication 100 MILLIGRAM(S): at 11:58

## 2022-08-05 RX ADMIN — Medication 30 GRAM(S): at 11:58

## 2022-08-05 RX ADMIN — Medication 100 MILLIGRAM(S): at 07:41

## 2022-08-05 RX ADMIN — NAFCILLIN 200 GRAM(S): 10 INJECTION, POWDER, FOR SOLUTION INTRAVENOUS at 07:03

## 2022-08-05 RX ADMIN — LEVETIRACETAM 500 MILLIGRAM(S): 250 TABLET, FILM COATED ORAL at 18:00

## 2022-08-05 RX ADMIN — NAFCILLIN 200 GRAM(S): 10 INJECTION, POWDER, FOR SOLUTION INTRAVENOUS at 03:33

## 2022-08-05 NOTE — PROGRESS NOTE ADULT - SUBJECTIVE AND OBJECTIVE BOX
no events     MEDICATIONS  (STANDING):  amantadine 100 milliGRAM(s) Oral <User Schedule>  chlorhexidine 2% Cloths 1 Application(s) Topical daily  donepezil 5 milliGRAM(s) Oral <User Schedule>  enoxaparin Injectable 40 milliGRAM(s) SubCutaneous every 24 hours  levETIRAcetam  Solution 500 milliGRAM(s) Oral two times a day  melatonin 5 milliGRAM(s) Oral at bedtime  nafcillin  IVPB 2 Gram(s) IV Intermittent every 4 hours  polyethylene glycol 3350 17 Gram(s) Oral daily  senna 2 Tablet(s) Oral at bedtime  sodium chloride 1 Gram(s) Oral two times a day  urea Oral Powder 30 Gram(s) Oral daily  vancomycin  IVPB 1000 milliGRAM(s) IV Intermittent every 12 hours    MEDICATIONS  (PRN):  acetaminophen     Tablet .. 650 milliGRAM(s) Oral every 6 hours PRN Temp greater or equal to 38C (100.4F), Moderate Pain (4 - 6)  ondansetron Injectable 4 milliGRAM(s) IV Push every 4 hours PRN Nausea and/or Vomiting      Allergies    Allergy Status Unknown      Vital Signs Last 24 Hrs  T(C): 37.1 (05 Aug 2022 08:01), Max: 37.3 (05 Aug 2022 00:00)  T(F): 98.8 (05 Aug 2022 08:01), Max: 99.2 (05 Aug 2022 00:00)  HR: 92 (05 Aug 2022 08:01) (82 - 92)  BP: 103/68 (05 Aug 2022 08:01) (101/69 - 117/86)  BP(mean): --  RR: 18 (05 Aug 2022 08:01) (18 - 19)  SpO2: 97% (05 Aug 2022 08:01) (97% - 98%)    Parameters below as of 05 Aug 2022 08:01  Patient On (Oxygen Delivery Method): room air        PHYSICAL EXAM:    GENERAL: NAD  CHEST/LUNG: Clear to ausculation bilaterall  HEART: Regular rate and rhythm; S1 S2  ABDOMEN: Soft,  Bowel sounds present  EXTREMITIES:  no edema   NERVOUS SYSTEM: awakens to voice, nonverbal  moves ext x4     LABS:                        10.6   3.87  )-----------( 286      ( 05 Aug 2022 04:49 )             32.3     08-05    142  |  103  |  24.7<H>  ----------------------------<  108<H>  4.2   |  26.0  |  0.76    Ca    9.2      05 Aug 2022 04:49            CAPILLARY BLOOD GLUCOSE            RADIOLOGY & ADDITIONAL TESTS:

## 2022-08-05 NOTE — PROGRESS NOTE ADULT - SUBJECTIVE AND OBJECTIVE BOX
Remains normonatremic at this time.    Physical Exam  General: WDWN male in NAD  Respiratory: CTAB  Abdomen: Soft, NT  Extremities: No appreciable edema, +b/l mitts  Neuro: Awake, alert    Assessment and Plan: The patient is a 42 year old male with a prolong and complex hospital course for IPH/SDH s/p hemicraniectomy on 5/24, cranioplasty 6/29, right craniotomy for epidural collection on 7/17. Nephrology is managing hyponatremia.    #Hyponatremia, resolved    -Serum osm 271, urine osm 563, urine sodium 185  -HypoNa have been refractory to salt tabs 2g q8h and Ure-Na packets    -Consequently tolvaptan added; last dose received on 08/03/2022   -Remains normonatremic at this time; latest sodium is 142meQ/L   -No longer candidate for Tolvaptan given sodium that is WNL   -Will place patient on salt tabs 1g BID + continue Ure-Na packets   -Also on tube feeds without free water flushes    Marivel Lucero DO  Nephrology  Office Number [phone]
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
## 2022-08-06 LAB — VANCOMYCIN TROUGH SERPL-MCNC: 15.8 UG/ML — SIGNIFICANT CHANGE UP (ref 10–20)

## 2022-08-06 PROCEDURE — 99232 SBSQ HOSP IP/OBS MODERATE 35: CPT

## 2022-08-06 RX ADMIN — POLYETHYLENE GLYCOL 3350 17 GRAM(S): 17 POWDER, FOR SOLUTION ORAL at 13:42

## 2022-08-06 RX ADMIN — DONEPEZIL HYDROCHLORIDE 5 MILLIGRAM(S): 10 TABLET, FILM COATED ORAL at 12:54

## 2022-08-06 RX ADMIN — SENNA PLUS 2 TABLET(S): 8.6 TABLET ORAL at 21:10

## 2022-08-06 RX ADMIN — NAFCILLIN 200 GRAM(S): 10 INJECTION, POWDER, FOR SOLUTION INTRAVENOUS at 16:20

## 2022-08-06 RX ADMIN — Medication 100 MILLIGRAM(S): at 12:54

## 2022-08-06 RX ADMIN — Medication 30 GRAM(S): at 21:11

## 2022-08-06 RX ADMIN — NAFCILLIN 200 GRAM(S): 10 INJECTION, POWDER, FOR SOLUTION INTRAVENOUS at 18:14

## 2022-08-06 RX ADMIN — CHLORHEXIDINE GLUCONATE 1 APPLICATION(S): 213 SOLUTION TOPICAL at 12:54

## 2022-08-06 RX ADMIN — NAFCILLIN 200 GRAM(S): 10 INJECTION, POWDER, FOR SOLUTION INTRAVENOUS at 23:23

## 2022-08-06 RX ADMIN — LEVETIRACETAM 500 MILLIGRAM(S): 250 TABLET, FILM COATED ORAL at 05:10

## 2022-08-06 RX ADMIN — NAFCILLIN 200 GRAM(S): 10 INJECTION, POWDER, FOR SOLUTION INTRAVENOUS at 03:03

## 2022-08-06 RX ADMIN — Medication 5 MILLIGRAM(S): at 21:10

## 2022-08-06 RX ADMIN — Medication 250 MILLIGRAM(S): at 21:10

## 2022-08-06 RX ADMIN — NAFCILLIN 200 GRAM(S): 10 INJECTION, POWDER, FOR SOLUTION INTRAVENOUS at 12:55

## 2022-08-06 RX ADMIN — SODIUM CHLORIDE 1 GRAM(S): 9 INJECTION INTRAMUSCULAR; INTRAVENOUS; SUBCUTANEOUS at 05:09

## 2022-08-06 RX ADMIN — Medication 100 MILLIGRAM(S): at 16:20

## 2022-08-06 RX ADMIN — LEVETIRACETAM 500 MILLIGRAM(S): 250 TABLET, FILM COATED ORAL at 16:21

## 2022-08-06 RX ADMIN — SODIUM CHLORIDE 1 GRAM(S): 9 INJECTION INTRAMUSCULAR; INTRAVENOUS; SUBCUTANEOUS at 16:20

## 2022-08-06 RX ADMIN — NAFCILLIN 200 GRAM(S): 10 INJECTION, POWDER, FOR SOLUTION INTRAVENOUS at 06:07

## 2022-08-06 RX ADMIN — Medication 250 MILLIGRAM(S): at 13:41

## 2022-08-06 RX ADMIN — ENOXAPARIN SODIUM 40 MILLIGRAM(S): 100 INJECTION SUBCUTANEOUS at 16:20

## 2022-08-06 NOTE — PROGRESS NOTE ADULT - SUBJECTIVE AND OBJECTIVE BOX
no acute events  bedside  utilized.  nods no for pain/discomfort.    MEDICATIONS  (STANDING):  amantadine 100 milliGRAM(s) Oral <User Schedule>  chlorhexidine 2% Cloths 1 Application(s) Topical daily  donepezil 5 milliGRAM(s) Oral <User Schedule>  enoxaparin Injectable 40 milliGRAM(s) SubCutaneous every 24 hours  levETIRAcetam  Solution 500 milliGRAM(s) Oral two times a day  melatonin 5 milliGRAM(s) Oral at bedtime  nafcillin  IVPB 2 Gram(s) IV Intermittent every 4 hours  polyethylene glycol 3350 17 Gram(s) Oral daily  senna 2 Tablet(s) Oral at bedtime  sodium chloride 1 Gram(s) Oral two times a day  urea Oral Powder 30 Gram(s) Oral daily  vancomycin  IVPB 1000 milliGRAM(s) IV Intermittent every 12 hours    MEDICATIONS  (PRN):  acetaminophen     Tablet .. 650 milliGRAM(s) Oral every 6 hours PRN Temp greater or equal to 38C (100.4F), Moderate Pain (4 - 6)  ondansetron Injectable 4 milliGRAM(s) IV Push every 4 hours PRN Nausea and/or Vomiting      Allergies    Allergy Status Unknown    Intolerances      Vital Signs Last 24 Hrs  T(C): 36.4 (06 Aug 2022 08:00), Max: 37 (05 Aug 2022 20:29)  T(F): 97.6 (06 Aug 2022 08:00), Max: 98.6 (05 Aug 2022 20:29)  HR: 80 (06 Aug 2022 08:00) (80 - 90)  BP: 117/73 (06 Aug 2022 08:00) (103/69 - 127/79)  BP(mean): --  RR: 18 (06 Aug 2022 08:00) (17 - 18)  SpO2: 99% (06 Aug 2022 08:00) (95% - 100%)    Parameters below as of 06 Aug 2022 08:00  Patient On (Oxygen Delivery Method): room air        PHYSICAL EXAM:    GENERAL: NAD  CHEST/LUNG: Clear to ausculation bilaterally  HEART: Regular rate and rhythm; S1 S2  ABDOMEN: Soft, Bowel sounds present  EXTREMITIES:  no edema   NERVOUS SYSTEM:  awake, follows simple commands, moves ext x4. nonverbal.    LABS:                        10.6   3.87  )-----------( 286      ( 05 Aug 2022 04:49 )             32.3     08-05    142  |  103  |  24.7<H>  ----------------------------<  108<H>  4.2   |  26.0  |  0.76    Ca    9.2      05 Aug 2022 04:49            CAPILLARY BLOOD GLUCOSE            RADIOLOGY & ADDITIONAL TESTS:

## 2022-08-06 NOTE — PROGRESS NOTE ADULT - SUBJECTIVE AND OBJECTIVE BOX
Remains normonatremic at this time. Patient's mother is at bedside.    Physical Exam  General: WDWN male in NAD  Respiratory: CTAB  Abdomen: Soft, NT  Extremities: No appreciable edema  Neuro: Awake, alert    Assessment and Plan: The patient is a 42 year old male with a prolong and complex hospital course for IPH/SDH s/p hemicraniectomy on 5/24, cranioplasty 6/29, right craniotomy for epidural collection on 7/17. Nephrology is managing hyponatremia.    #Hyponatremia, resolved    -Serum osm 271, urine osm 563, urine sodium 185  -HypoNa have been refractory to salt tabs 2g q8h and Ure-Na packets    -Consequently tolvaptan added; last dose received on 08/03/2022   -Remains normonatremic at this time; latest sodium is 142meQ/L   -No longer candidate for Tolvaptan given normonatremia   -Continue salt tabs 1g BID + Ure-Na 30g daily      Marivel Lucero DO  Nephrology  Office Number [phone]   Remains normonatremic at this time. Patient's mother is at bedside.    Physical Exam  General: WDWN male in NAD  Respiratory: CTAB  Abdomen: Soft, NT  Extremities: No appreciable edema  Neuro: Awake, alert    Assessment and Plan: The patient is a 42 year old male with a prolong and complex hospital course for IPH/SDH s/p hemicraniectomy on 5/24, cranioplasty 6/29, right craniotomy for epidural collection on 7/17. Nephrology is managing hyponatremia.    #Hyponatremia, resolved    -Serum osm 271, urine osm 563, urine sodium 185  -HypoNa have been refractory to salt tabs 2g q8h and Ure-Na packets    -Consequently tolvaptan added; last dose received on 08/03/2022   -Remains normonatremic at this time; latest sodium is 142meQ/L   -No longer candidate for Tolvaptan given normonatremia   -Continue salt tabs 1g BID + Ure-Na 30g daily      Marivel Lucero, DO  Nephrology
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
## 2022-08-07 LAB
ANION GAP SERPL CALC-SCNC: 13 MMOL/L — SIGNIFICANT CHANGE UP (ref 5–17)
BUN SERPL-MCNC: 23.7 MG/DL — HIGH (ref 8–20)
CALCIUM SERPL-MCNC: 9.2 MG/DL — SIGNIFICANT CHANGE UP (ref 8.4–10.5)
CHLORIDE SERPL-SCNC: 100 MMOL/L — SIGNIFICANT CHANGE UP (ref 98–107)
CO2 SERPL-SCNC: 25 MMOL/L — SIGNIFICANT CHANGE UP (ref 22–29)
CREAT SERPL-MCNC: 0.71 MG/DL — SIGNIFICANT CHANGE UP (ref 0.5–1.3)
EGFR: 117 ML/MIN/1.73M2 — SIGNIFICANT CHANGE UP
GLUCOSE SERPL-MCNC: 101 MG/DL — HIGH (ref 70–99)
POTASSIUM SERPL-MCNC: 4.1 MMOL/L — SIGNIFICANT CHANGE UP (ref 3.5–5.3)
POTASSIUM SERPL-SCNC: 4.1 MMOL/L — SIGNIFICANT CHANGE UP (ref 3.5–5.3)
SODIUM SERPL-SCNC: 138 MMOL/L — SIGNIFICANT CHANGE UP (ref 135–145)

## 2022-08-07 PROCEDURE — 99232 SBSQ HOSP IP/OBS MODERATE 35: CPT

## 2022-08-07 RX ADMIN — Medication 100 MILLIGRAM(S): at 09:44

## 2022-08-07 RX ADMIN — LEVETIRACETAM 500 MILLIGRAM(S): 250 TABLET, FILM COATED ORAL at 18:41

## 2022-08-07 RX ADMIN — NAFCILLIN 200 GRAM(S): 10 INJECTION, POWDER, FOR SOLUTION INTRAVENOUS at 03:33

## 2022-08-07 RX ADMIN — NAFCILLIN 200 GRAM(S): 10 INJECTION, POWDER, FOR SOLUTION INTRAVENOUS at 12:06

## 2022-08-07 RX ADMIN — Medication 250 MILLIGRAM(S): at 22:45

## 2022-08-07 RX ADMIN — Medication 30 GRAM(S): at 12:05

## 2022-08-07 RX ADMIN — NAFCILLIN 200 GRAM(S): 10 INJECTION, POWDER, FOR SOLUTION INTRAVENOUS at 18:43

## 2022-08-07 RX ADMIN — NAFCILLIN 200 GRAM(S): 10 INJECTION, POWDER, FOR SOLUTION INTRAVENOUS at 17:30

## 2022-08-07 RX ADMIN — NAFCILLIN 200 GRAM(S): 10 INJECTION, POWDER, FOR SOLUTION INTRAVENOUS at 09:44

## 2022-08-07 RX ADMIN — SODIUM CHLORIDE 1 GRAM(S): 9 INJECTION INTRAMUSCULAR; INTRAVENOUS; SUBCUTANEOUS at 18:43

## 2022-08-07 RX ADMIN — CHLORHEXIDINE GLUCONATE 1 APPLICATION(S): 213 SOLUTION TOPICAL at 12:07

## 2022-08-07 RX ADMIN — SODIUM CHLORIDE 1 GRAM(S): 9 INJECTION INTRAMUSCULAR; INTRAVENOUS; SUBCUTANEOUS at 05:07

## 2022-08-07 RX ADMIN — Medication 100 MILLIGRAM(S): at 12:06

## 2022-08-07 RX ADMIN — LEVETIRACETAM 500 MILLIGRAM(S): 250 TABLET, FILM COATED ORAL at 05:07

## 2022-08-07 RX ADMIN — DONEPEZIL HYDROCHLORIDE 5 MILLIGRAM(S): 10 TABLET, FILM COATED ORAL at 12:06

## 2022-08-07 RX ADMIN — POLYETHYLENE GLYCOL 3350 17 GRAM(S): 17 POWDER, FOR SOLUTION ORAL at 12:06

## 2022-08-07 RX ADMIN — SENNA PLUS 2 TABLET(S): 8.6 TABLET ORAL at 22:46

## 2022-08-07 RX ADMIN — Medication 5 MILLIGRAM(S): at 22:46

## 2022-08-07 RX ADMIN — Medication 250 MILLIGRAM(S): at 09:42

## 2022-08-07 RX ADMIN — ENOXAPARIN SODIUM 40 MILLIGRAM(S): 100 INJECTION SUBCUTANEOUS at 18:41

## 2022-08-07 RX ADMIN — NAFCILLIN 200 GRAM(S): 10 INJECTION, POWDER, FOR SOLUTION INTRAVENOUS at 22:45
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
## 2022-08-07 NOTE — PROGRESS NOTE ADULT - SUBJECTIVE AND OBJECTIVE BOX
no events/complaints  ros negative      MEDICATIONS  (STANDING):  amantadine 100 milliGRAM(s) Oral <User Schedule>  chlorhexidine 2% Cloths 1 Application(s) Topical daily  donepezil 5 milliGRAM(s) Oral <User Schedule>  enoxaparin Injectable 40 milliGRAM(s) SubCutaneous every 24 hours  levETIRAcetam  Solution 500 milliGRAM(s) Oral two times a day  melatonin 5 milliGRAM(s) Oral at bedtime  nafcillin  IVPB 2 Gram(s) IV Intermittent every 4 hours  polyethylene glycol 3350 17 Gram(s) Oral daily  senna 2 Tablet(s) Oral at bedtime  sodium chloride 1 Gram(s) Oral two times a day  urea Oral Powder 30 Gram(s) Oral daily  vancomycin  IVPB 1000 milliGRAM(s) IV Intermittent every 12 hours    MEDICATIONS  (PRN):  acetaminophen     Tablet .. 650 milliGRAM(s) Oral every 6 hours PRN Temp greater or equal to 38C (100.4F), Moderate Pain (4 - 6)  ondansetron Injectable 4 milliGRAM(s) IV Push every 4 hours PRN Nausea and/or Vomiting      Allergies    Allergy Status Unknown      Vital Signs Last 24 Hrs  T(C): 36.8 (07 Aug 2022 09:30), Max: 37.1 (07 Aug 2022 04:00)  T(F): 98.3 (07 Aug 2022 09:30), Max: 98.7 (07 Aug 2022 04:00)  HR: 70 (07 Aug 2022 09:30) (70 - 83)  BP: 101/65 (07 Aug 2022 09:30) (101/65 - 127/83)  BP(mean): --  RR: 18 (07 Aug 2022 09:30) (17 - 18)  SpO2: 98% (07 Aug 2022 09:30) (97% - 99%)    Parameters below as of 07 Aug 2022 09:30  Patient On (Oxygen Delivery Method): room air        PHYSICAL EXAM:    GENERAL: NAD  CHEST/LUNG: Clear to ausculation bilaterally  HEART: Regular rate and rhythm; S1 S2  ABDOMEN: Soft, Bowel sounds present  EXTREMITIES:  no edema   NERVOUS SYSTEM:  awake, follows simple commands, moves ext x4. nonverbal.      LABS:    08-07    138  |  100  |  23.7<H>  ----------------------------<  101<H>  4.1   |  25.0  |  0.71    Ca    9.2      07 Aug 2022 05:38            CAPILLARY BLOOD GLUCOSE            RADIOLOGY & ADDITIONAL TESTS:

## 2022-08-08 LAB
ANION GAP SERPL CALC-SCNC: 14 MMOL/L — SIGNIFICANT CHANGE UP (ref 5–17)
BUN SERPL-MCNC: 16.8 MG/DL — SIGNIFICANT CHANGE UP (ref 8–20)
CALCIUM SERPL-MCNC: 9.2 MG/DL — SIGNIFICANT CHANGE UP (ref 8.4–10.5)
CHLORIDE SERPL-SCNC: 96 MMOL/L — LOW (ref 98–107)
CO2 SERPL-SCNC: 25 MMOL/L — SIGNIFICANT CHANGE UP (ref 22–29)
CREAT SERPL-MCNC: 0.74 MG/DL — SIGNIFICANT CHANGE UP (ref 0.5–1.3)
EGFR: 116 ML/MIN/1.73M2 — SIGNIFICANT CHANGE UP
GLUCOSE SERPL-MCNC: 111 MG/DL — HIGH (ref 70–99)
POTASSIUM SERPL-MCNC: 3.8 MMOL/L — SIGNIFICANT CHANGE UP (ref 3.5–5.3)
POTASSIUM SERPL-SCNC: 3.8 MMOL/L — SIGNIFICANT CHANGE UP (ref 3.5–5.3)
SODIUM SERPL-SCNC: 135 MMOL/L — SIGNIFICANT CHANGE UP (ref 135–145)

## 2022-08-08 PROCEDURE — 99233 SBSQ HOSP IP/OBS HIGH 50: CPT

## 2022-08-08 PROCEDURE — 99232 SBSQ HOSP IP/OBS MODERATE 35: CPT

## 2022-08-08 RX ORDER — POLYETHYLENE GLYCOL 3350 17 G/17G
17 POWDER, FOR SOLUTION ORAL DAILY
Refills: 0 | Status: DISCONTINUED | OUTPATIENT
Start: 2022-08-08 | End: 2022-08-10

## 2022-08-08 RX ORDER — LEVETIRACETAM 250 MG/1
500 TABLET, FILM COATED ORAL
Refills: 0 | Status: DISCONTINUED | OUTPATIENT
Start: 2022-08-08 | End: 2022-09-10

## 2022-08-08 RX ORDER — SODIUM CHLORIDE 9 MG/ML
2 INJECTION INTRAMUSCULAR; INTRAVENOUS; SUBCUTANEOUS EVERY 8 HOURS
Refills: 0 | Status: DISCONTINUED | OUTPATIENT
Start: 2022-08-08 | End: 2022-09-08

## 2022-08-08 RX ORDER — SENNA PLUS 8.6 MG/1
2 TABLET ORAL AT BEDTIME
Refills: 0 | Status: DISCONTINUED | OUTPATIENT
Start: 2022-08-08 | End: 2022-09-19

## 2022-08-08 RX ORDER — SODIUM CHLORIDE 9 MG/ML
2 INJECTION INTRAMUSCULAR; INTRAVENOUS; SUBCUTANEOUS
Refills: 0 | Status: DISCONTINUED | OUTPATIENT
Start: 2022-08-08 | End: 2022-08-08

## 2022-08-08 RX ORDER — LANOLIN ALCOHOL/MO/W.PET/CERES
5 CREAM (GRAM) TOPICAL AT BEDTIME
Refills: 0 | Status: DISCONTINUED | OUTPATIENT
Start: 2022-08-08 | End: 2022-09-19

## 2022-08-08 RX ADMIN — SENNA PLUS 2 TABLET(S): 8.6 TABLET ORAL at 22:16

## 2022-08-08 RX ADMIN — SODIUM CHLORIDE 1 GRAM(S): 9 INJECTION INTRAMUSCULAR; INTRAVENOUS; SUBCUTANEOUS at 05:55

## 2022-08-08 RX ADMIN — POLYETHYLENE GLYCOL 3350 17 GRAM(S): 17 POWDER, FOR SOLUTION ORAL at 12:07

## 2022-08-08 RX ADMIN — NAFCILLIN 200 GRAM(S): 10 INJECTION, POWDER, FOR SOLUTION INTRAVENOUS at 22:15

## 2022-08-08 RX ADMIN — NAFCILLIN 200 GRAM(S): 10 INJECTION, POWDER, FOR SOLUTION INTRAVENOUS at 03:24

## 2022-08-08 RX ADMIN — CHLORHEXIDINE GLUCONATE 1 APPLICATION(S): 213 SOLUTION TOPICAL at 12:42

## 2022-08-08 RX ADMIN — Medication 250 MILLIGRAM(S): at 22:14

## 2022-08-08 RX ADMIN — LEVETIRACETAM 500 MILLIGRAM(S): 250 TABLET, FILM COATED ORAL at 05:54

## 2022-08-08 RX ADMIN — Medication 5 MILLIGRAM(S): at 22:16

## 2022-08-08 RX ADMIN — NAFCILLIN 200 GRAM(S): 10 INJECTION, POWDER, FOR SOLUTION INTRAVENOUS at 15:55

## 2022-08-08 RX ADMIN — LEVETIRACETAM 500 MILLIGRAM(S): 250 TABLET, FILM COATED ORAL at 17:14

## 2022-08-08 RX ADMIN — DONEPEZIL HYDROCHLORIDE 5 MILLIGRAM(S): 10 TABLET, FILM COATED ORAL at 12:06

## 2022-08-08 RX ADMIN — ENOXAPARIN SODIUM 40 MILLIGRAM(S): 100 INJECTION SUBCUTANEOUS at 22:15

## 2022-08-08 RX ADMIN — NAFCILLIN 200 GRAM(S): 10 INJECTION, POWDER, FOR SOLUTION INTRAVENOUS at 12:06

## 2022-08-08 RX ADMIN — Medication 100 MILLIGRAM(S): at 12:06

## 2022-08-08 RX ADMIN — SODIUM CHLORIDE 2 GRAM(S): 9 INJECTION INTRAMUSCULAR; INTRAVENOUS; SUBCUTANEOUS at 22:16

## 2022-08-08 RX ADMIN — NAFCILLIN 200 GRAM(S): 10 INJECTION, POWDER, FOR SOLUTION INTRAVENOUS at 08:44

## 2022-08-08 RX ADMIN — Medication 250 MILLIGRAM(S): at 10:59

## 2022-08-08 RX ADMIN — Medication 30 GRAM(S): at 12:07

## 2022-08-08 RX ADMIN — Medication 100 MILLIGRAM(S): at 08:45

## 2022-08-08 NOTE — SWALLOW BEDSIDE ASSESSMENT ADULT - ORAL PREPARATORY PHASE
Reduced oral grading Within functional limits/Decreased mastication ability Reduced oral grading/Anterior loss of bolus Within functional limits

## 2022-08-08 NOTE — PROGRESS NOTE ADULT - NS ATTEND AMEND GEN_ALL_CORE FT
43 y/o M brought by EMS, found down in the street unresponsive.  Imaging showed SDH, IPH, TEOFILO, Patient underwent Right decompressive hemicraniectomy on 5/24, trach/peg 6/1,  R cranioplasty with complex closure 6/29/22.    Cranioplasty  drain removed, s/p rpt right craniectomy for evacuation on 7/17 after MR demonstrated large right ED collection. On Abx as per ID . 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 Renal is following.   Trach (decannulated)/ PEG + , on pureed diet       1. SDH/IPH / TBI  S/P R craniotomy / s/p R complex closure cranioplasty ,   repeated CTH  demonstrating right sided subdural collection mixed with air.  s/p rpt right craniectomy for evacuation on 7/17 after MR demonstrated large right ED collection.   Drain removed   - Blood cultures 7/18 no growth   - fluid cultures reporting Staphylococcus aureus (MSSA) and 1 culture with Staph Epi (MRSE)  - ID is following with further recommendations :   - Cefapime d/c, on Nafcillin and Vancomycin as per ID : - Will need antibiotics till 9/1/22  - Monitor trough by pharmacy   - continue PT/OT/ speech   - neurochecks as per NS team -  Neuro checks have  been stable   - Keppra 500 mg BID - as per NS   - Provigil / Amantadine - for wakefulness  - PMR is following - recommend ongoing mobilization  --Cleared from a neurosurgery standpoint to be D/C'd to rehab & return for cranioplasty   - Plan for cranioplasty when appropriate, once course of antibiotics finished & cleared by ID     2. Dysphagia - on TF / pure diet, tolerating well, speech therapy team is following      3. Hyponatremia - w/u c/w SIADH , renal appreciated   given 2% sodium several times, fluid restriction , s/p samsca  c/w nacl and urea    4 Anemia - likely surgical blood lost - stable     5  DVT prophylaxis  - on Lovenox     6  Constipation prophylaxis - continue Senna/ Miralax     Dispo: Uninsured, OOB daily, daily PT, family pursuing insurance.    d/w mother/patient at bedside.

## 2022-08-08 NOTE — SWALLOW BEDSIDE ASSESSMENT ADULT - SWALLOW EVAL: DIAGNOSIS
Oral dysphagia for minced/moist & mild & moderately thick fluids, impacted by cognition. Pharyngeal dysphagia suspected for mildly thick fluids: overt s/s aspiration post intake. Pharyngeal stage of swallow clinically unremarkable for puree, minced/moist solids & moderately thick fluids

## 2022-08-08 NOTE — SWALLOW BEDSIDE ASSESSMENT ADULT - SWALLOW EVAL: RECOMMENDED FEEDING/EATING TECHNIQUES
allow for swallow between intakes/check mouth frequently for oral residue/pocketing/crush medication (when feasible)/maintain upright posture during/after eating for 30 mins/oral hygiene/position upright (90 degrees)/small sips/bites
allow for swallow between intakes/crush medication (when feasible)/maintain upright posture during/after eating for 30 mins/no straws/oral hygiene/position upright (90 degrees)/small sips/bites

## 2022-08-08 NOTE — PROGRESS NOTE ADULT - SUBJECTIVE AND OBJECTIVE BOX
Remains normonatremic, however is downtrending.     Vital Signs Last 24 Hrs  T(C): 37 (08 Aug 2022 09:34), Max: 37 (08 Aug 2022 09:34)  T(F): 98.6 (08 Aug 2022 09:34), Max: 98.6 (08 Aug 2022 09:34)  HR: 79 (08 Aug 2022 09:34) (69 - 79)  BP: 119/74 (08 Aug 2022 09:34) (97/66 - 119/82)  BP(mean): --  RR: 18 (08 Aug 2022 09:34) (18 - 18)  SpO2: 96% (08 Aug 2022 09:34) (96% - 98%)    Parameters below as of 08 Aug 2022 09:34  Patient On (Oxygen Delivery Method): room air    Physical Exam  Physical Exam  General: WDWN male in NAD  Respiratory: CTAB  Abdomen: Soft, NT  Extremities: No appreciable edema  Neuro: Awake, alert    08-08    135  |  96<L>  |  16.8  ----------------------------<  111<H>  3.8   |  25.0  |  0.74    Ca    9.2      08 Aug 2022 09:52    MEDICATIONS  (STANDING):  amantadine 100 milliGRAM(s) Oral <User Schedule>  chlorhexidine 2% Cloths 1 Application(s) Topical daily  donepezil 5 milliGRAM(s) Oral <User Schedule>  enoxaparin Injectable 40 milliGRAM(s) SubCutaneous every 24 hours  levETIRAcetam  Solution 500 milliGRAM(s) Oral two times a day  melatonin 5 milliGRAM(s) Oral at bedtime  nafcillin  IVPB 2 Gram(s) IV Intermittent every 4 hours  polyethylene glycol 3350 17 Gram(s) Oral daily  senna 2 Tablet(s) Oral at bedtime  sodium chloride 2 Gram(s) Oral two times a day  urea Oral Powder 30 Gram(s) Oral daily  vancomycin  IVPB 1000 milliGRAM(s) IV Intermittent every 12 hours    MEDICATIONS  (PRN):  acetaminophen     Tablet .. 650 milliGRAM(s) Oral every 6 hours PRN Temp greater or equal to 38C (100.4F), Moderate Pain (4 - 6)  ondansetron Injectable 4 milliGRAM(s) IV Push every 4 hours PRN Nausea and/or Vomiting    Assessment and Plan: The patient is a 42 year old male with a prolong and complex hospital course for IPH/SDH s/p hemicraniectomy on 5/24, cranioplasty 6/29, right craniotomy for epidural collection on 7/17. Nephrology is managing hyponatremia.    #Hyponatremia, resolved    -Serum osm 271, urine osm 563, urine sodium 185  -HypoNa have been refractory to salt tabs 2g q8h and Ure-Na packets    -Consequently tolvaptan added; last dose received on 08/03/2022   -Remains normonatremic at this time however downtrending    -No longer candidate for Tolvaptan given normonatremia   -Will increase sodium chloride tabs from 1g BID back to 2g TID   -Continue Ure-Na 30g daily      Marivel Lucero DO  [facility]

## 2022-08-08 NOTE — PROGRESS NOTE ADULT - SUBJECTIVE AND OBJECTIVE BOX
no events  ros unable to obtain as nonverbal    MEDICATIONS  (STANDING):  amantadine 100 milliGRAM(s) Oral <User Schedule>  chlorhexidine 2% Cloths 1 Application(s) Topical daily  donepezil 5 milliGRAM(s) Oral <User Schedule>  enoxaparin Injectable 40 milliGRAM(s) SubCutaneous every 24 hours  levETIRAcetam  Solution 500 milliGRAM(s) Oral two times a day  melatonin 5 milliGRAM(s) Oral at bedtime  nafcillin  IVPB 2 Gram(s) IV Intermittent every 4 hours  polyethylene glycol 3350 17 Gram(s) Oral daily  senna 2 Tablet(s) Oral at bedtime  sodium chloride 1 Gram(s) Oral two times a day  urea Oral Powder 30 Gram(s) Oral daily  vancomycin  IVPB 1000 milliGRAM(s) IV Intermittent every 12 hours    MEDICATIONS  (PRN):  acetaminophen     Tablet .. 650 milliGRAM(s) Oral every 6 hours PRN Temp greater or equal to 38C (100.4F), Moderate Pain (4 - 6)  ondansetron Injectable 4 milliGRAM(s) IV Push every 4 hours PRN Nausea and/or Vomiting      Allergies    Allergy Status Unknown    Vital Signs Last 24 Hrs  T(C): 37 (08 Aug 2022 09:34), Max: 37 (08 Aug 2022 09:34)  T(F): 98.6 (08 Aug 2022 09:34), Max: 98.6 (08 Aug 2022 09:34)  HR: 79 (08 Aug 2022 09:34) (69 - 79)  BP: 119/74 (08 Aug 2022 09:34) (97/66 - 119/82)  BP(mean): --  RR: 18 (08 Aug 2022 09:34) (18 - 18)  SpO2: 96% (08 Aug 2022 09:34) (96% - 98%)    Parameters below as of 08 Aug 2022 09:34  Patient On (Oxygen Delivery Method): room air        PHYSICAL EXAM:    GENERAL: NAD  CHEST/LUNG: Clear to ausculation bilaterally  HEART: Regular rate and rhythm; S1 S2  ABDOMEN: Soft, Bowel sounds present  EXTREMITIES:  no edema   NERVOUS SYSTEM:  awake, follows simple commands, moves ext x4. nonverbal.    LABS:    08-08    135  |  96<L>  |  16.8  ----------------------------<  111<H>  3.8   |  25.0  |  0.74    Ca    9.2      08 Aug 2022 09:52            CAPILLARY BLOOD GLUCOSE            RADIOLOGY & ADDITIONAL TESTS:

## 2022-08-09 LAB
ANION GAP SERPL CALC-SCNC: 13 MMOL/L — SIGNIFICANT CHANGE UP (ref 5–17)
BUN SERPL-MCNC: 24.6 MG/DL — HIGH (ref 8–20)
CALCIUM SERPL-MCNC: 9.2 MG/DL — SIGNIFICANT CHANGE UP (ref 8.4–10.5)
CHLORIDE SERPL-SCNC: 97 MMOL/L — LOW (ref 98–107)
CO2 SERPL-SCNC: 26 MMOL/L — SIGNIFICANT CHANGE UP (ref 22–29)
CREAT SERPL-MCNC: 0.83 MG/DL — SIGNIFICANT CHANGE UP (ref 0.5–1.3)
EGFR: 112 ML/MIN/1.73M2 — SIGNIFICANT CHANGE UP
GLUCOSE SERPL-MCNC: 110 MG/DL — HIGH (ref 70–99)
POTASSIUM SERPL-MCNC: 3.9 MMOL/L — SIGNIFICANT CHANGE UP (ref 3.5–5.3)
POTASSIUM SERPL-SCNC: 3.9 MMOL/L — SIGNIFICANT CHANGE UP (ref 3.5–5.3)
SODIUM SERPL-SCNC: 136 MMOL/L — SIGNIFICANT CHANGE UP (ref 135–145)
VANCOMYCIN TROUGH SERPL-MCNC: 20.6 UG/ML — HIGH (ref 10–20)

## 2022-08-09 PROCEDURE — 99232 SBSQ HOSP IP/OBS MODERATE 35: CPT

## 2022-08-09 RX ORDER — VANCOMYCIN HCL 1 G
750 VIAL (EA) INTRAVENOUS EVERY 12 HOURS
Refills: 0 | Status: DISCONTINUED | OUTPATIENT
Start: 2022-08-09 | End: 2022-08-13

## 2022-08-09 RX ADMIN — NAFCILLIN 200 GRAM(S): 10 INJECTION, POWDER, FOR SOLUTION INTRAVENOUS at 22:13

## 2022-08-09 RX ADMIN — NAFCILLIN 200 GRAM(S): 10 INJECTION, POWDER, FOR SOLUTION INTRAVENOUS at 01:35

## 2022-08-09 RX ADMIN — Medication 30 GRAM(S): at 12:12

## 2022-08-09 RX ADMIN — SODIUM CHLORIDE 2 GRAM(S): 9 INJECTION INTRAMUSCULAR; INTRAVENOUS; SUBCUTANEOUS at 05:15

## 2022-08-09 RX ADMIN — POLYETHYLENE GLYCOL 3350 17 GRAM(S): 17 POWDER, FOR SOLUTION ORAL at 12:14

## 2022-08-09 RX ADMIN — SODIUM CHLORIDE 2 GRAM(S): 9 INJECTION INTRAMUSCULAR; INTRAVENOUS; SUBCUTANEOUS at 12:13

## 2022-08-09 RX ADMIN — Medication 5 MILLIGRAM(S): at 22:06

## 2022-08-09 RX ADMIN — SENNA PLUS 2 TABLET(S): 8.6 TABLET ORAL at 22:06

## 2022-08-09 RX ADMIN — ENOXAPARIN SODIUM 40 MILLIGRAM(S): 100 INJECTION SUBCUTANEOUS at 18:19

## 2022-08-09 RX ADMIN — NAFCILLIN 200 GRAM(S): 10 INJECTION, POWDER, FOR SOLUTION INTRAVENOUS at 09:42

## 2022-08-09 RX ADMIN — Medication 100 MILLIGRAM(S): at 09:41

## 2022-08-09 RX ADMIN — NAFCILLIN 200 GRAM(S): 10 INJECTION, POWDER, FOR SOLUTION INTRAVENOUS at 05:01

## 2022-08-09 RX ADMIN — Medication 100 MILLIGRAM(S): at 12:13

## 2022-08-09 RX ADMIN — DONEPEZIL HYDROCHLORIDE 5 MILLIGRAM(S): 10 TABLET, FILM COATED ORAL at 12:13

## 2022-08-09 RX ADMIN — LEVETIRACETAM 500 MILLIGRAM(S): 250 TABLET, FILM COATED ORAL at 05:15

## 2022-08-09 RX ADMIN — LEVETIRACETAM 500 MILLIGRAM(S): 250 TABLET, FILM COATED ORAL at 18:19

## 2022-08-09 RX ADMIN — NAFCILLIN 200 GRAM(S): 10 INJECTION, POWDER, FOR SOLUTION INTRAVENOUS at 18:19

## 2022-08-09 RX ADMIN — SODIUM CHLORIDE 2 GRAM(S): 9 INJECTION INTRAMUSCULAR; INTRAVENOUS; SUBCUTANEOUS at 22:07

## 2022-08-09 RX ADMIN — NAFCILLIN 200 GRAM(S): 10 INJECTION, POWDER, FOR SOLUTION INTRAVENOUS at 12:14

## 2022-08-09 RX ADMIN — CHLORHEXIDINE GLUCONATE 1 APPLICATION(S): 213 SOLUTION TOPICAL at 12:14

## 2022-08-09 RX ADMIN — Medication 250 MILLIGRAM(S): at 18:18

## 2022-08-09 NOTE — PROGRESS NOTE ADULT - NS ATTEND AMEND GEN_ALL_CORE FT
41 y/o M brought by EMS, found down in the street unresponsive.  Imaging showed SDH, IPH, TEOFILO, Patient underwent Right decompressive hemicraniectomy on 5/24, trach/peg 6/1,  R cranioplasty with complex closure 6/29/22.    Cranioplasty  drain removed, s/p rpt right craniectomy for evacuation on 7/17 after MR demonstrated large right ED collection. On Abx as per ID . 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 Renal is following.   Trach (decannulated)/ + PEG , on pureed diet       1. SDH/IPH / TBI  S/P R craniotomy / s/p R complex closure cranioplasty ,   repeated CTH  demonstrating right sided subdural collection mixed with air.  s/p rpt right craniectomy for evacuation on 7/17 after MR demonstrated large right ED collection.   Drain removed   - Blood cultures 7/18 no growth   - fluid cultures reporting Staphylococcus aureus (MSSA) and 1 culture with Staph Epi (MRSE)  - ID is following with further recommendations :   - Cefapime d/c, on Nafcillin and Vancomycin as per ID : - Will need antibiotics till 9/1/22  - Monitor trough by pharmacy   - continue PT/OT/ speech   - neurochecks as per NS team -  Neuro checks have  been stable   - Keppra 500 mg BID - as per NS   - Provigil / Amantadine - for wakefulness  - PMR is following - recommend ongoing mobilization  --Cleared from a neurosurgery standpoint to be D/C'd to rehab & return for cranioplasty   - Plan for cranioplasty when appropriate, once course of antibiotics finished & cleared by ID     2. Dysphagia - on TF / pure diet, tolerating well, speech therapy team is following      3. Hyponatremia - w/u c/w SIADH , renal appreciated   given 2% sodium several times, fluid restriction , s/p samsca  c/w nacl and urea    4 Anemia - likely surgical blood lost - stable     5  DVT prophylaxis  - on Lovenox     6  Constipation prophylaxis - continue Senna/ Miralax     Dispo: Uninsured, OOB daily, daily PT, family pursuing insurance.    d/w mother at bedside.

## 2022-08-09 NOTE — PROGRESS NOTE ADULT - SUBJECTIVE AND OBJECTIVE BOX
Sodium tabs increased yesterday. Remains normonatremic today.     Vital Signs Last 24 Hrs  T(C): 36.9 (09 Aug 2022 11:00), Max: 36.9 (08 Aug 2022 17:04)  T(F): 98.5 (09 Aug 2022 11:00), Max: 98.5 (09 Aug 2022 11:00)  HR: 86 (09 Aug 2022 11:00) (79 - 97)  BP: 110/69 (09 Aug 2022 11:00) (100/67 - 128/89)  BP(mean): --  RR: 16 (09 Aug 2022 11:00) (16 - 18)  SpO2: 98% (09 Aug 2022 11:00) (96% - 99%)    Parameters below as of 09 Aug 2022 11:00  Patient On (Oxygen Delivery Method): room air    Physical Exam  General: WDWN male in NAD  Respiratory: CTAB  Abdomen: Soft, NT  Extremities: No appreciable edema  Neuro: Sleeping     08-09    136  |  97<L>  |  24.6<H>  ----------------------------<  110<H>  3.9   |  26.0  |  0.83    Ca    9.2      09 Aug 2022 06:44    MEDICATIONS  (STANDING):  amantadine 100 milliGRAM(s) Oral <User Schedule>  chlorhexidine 2% Cloths 1 Application(s) Topical daily  donepezil 5 milliGRAM(s) Oral <User Schedule>  enoxaparin Injectable 40 milliGRAM(s) SubCutaneous every 24 hours  levETIRAcetam  Solution 500 milliGRAM(s) Oral two times a day  melatonin 5 milliGRAM(s) Oral at bedtime  nafcillin  IVPB 2 Gram(s) IV Intermittent every 4 hours  polyethylene glycol 3350 17 Gram(s) Oral daily  senna 2 Tablet(s) Oral at bedtime  sodium chloride 2 Gram(s) Oral every 8 hours  urea Oral Powder 30 Gram(s) Oral daily  vancomycin  IVPB 750 milliGRAM(s) IV Intermittent every 12 hours    MEDICATIONS  (PRN):  acetaminophen     Tablet .. 650 milliGRAM(s) Oral every 6 hours PRN Temp greater or equal to 38C (100.4F), Moderate Pain (4 - 6)  ondansetron Injectable 4 milliGRAM(s) IV Push every 4 hours PRN Nausea and/or Vomiting    Assessment and Plan: The patient is a 42 year old male with a prolong and complex hospital course for IPH/SDH s/p hemicraniectomy on 5/24, cranioplasty 6/29, right craniotomy for epidural collection on 7/17. Nephrology is managing hyponatremia.    -Serum osm 271, urine osm 563, urine sodium 185  -HypoNa have been refractory to salt tabs 2g q8h and Ure-Na packets    -Consequently tolvaptan added; last dose was given on 08/03/2022   -No longer candidate for Tolvaptan given normonatremia   -Notable for downward trend of sodium over the past few days   -Sodium tabs subsequently increased; serum sodium level improved   -Continue sodium chloride tabs from 2g TID and Ure-Na 30g daily    -Will continue to monitor closely     Marivel Lucero DO  [facility]   Sodium tabs increased yesterday. Remains normonatremic today.     Vital Signs Last 24 Hrs  T(C): 36.9 (09 Aug 2022 11:00), Max: 36.9 (08 Aug 2022 17:04)  T(F): 98.5 (09 Aug 2022 11:00), Max: 98.5 (09 Aug 2022 11:00)  HR: 86 (09 Aug 2022 11:00) (79 - 97)  BP: 110/69 (09 Aug 2022 11:00) (100/67 - 128/89)  BP(mean): --  RR: 16 (09 Aug 2022 11:00) (16 - 18)  SpO2: 98% (09 Aug 2022 11:00) (96% - 99%)    Parameters below as of 09 Aug 2022 11:00  Patient On (Oxygen Delivery Method): room air    Physical Exam  General: WDWN male in NAD  Respiratory: CTAB  Abdomen: Soft, NT  Extremities: No appreciable edema  Neuro: Sleeping     08-09    136  |  97<L>  |  24.6<H>  ----------------------------<  110<H>  3.9   |  26.0  |  0.83    Ca    9.2      09 Aug 2022 06:44    MEDICATIONS  (STANDING):  amantadine 100 milliGRAM(s) Oral <User Schedule>  chlorhexidine 2% Cloths 1 Application(s) Topical daily  donepezil 5 milliGRAM(s) Oral <User Schedule>  enoxaparin Injectable 40 milliGRAM(s) SubCutaneous every 24 hours  levETIRAcetam  Solution 500 milliGRAM(s) Oral two times a day  melatonin 5 milliGRAM(s) Oral at bedtime  nafcillin  IVPB 2 Gram(s) IV Intermittent every 4 hours  polyethylene glycol 3350 17 Gram(s) Oral daily  senna 2 Tablet(s) Oral at bedtime  sodium chloride 2 Gram(s) Oral every 8 hours  urea Oral Powder 30 Gram(s) Oral daily  vancomycin  IVPB 750 milliGRAM(s) IV Intermittent every 12 hours    MEDICATIONS  (PRN):  acetaminophen     Tablet .. 650 milliGRAM(s) Oral every 6 hours PRN Temp greater or equal to 38C (100.4F), Moderate Pain (4 - 6)  ondansetron Injectable 4 milliGRAM(s) IV Push every 4 hours PRN Nausea and/or Vomiting    Assessment and Plan: The patient is a 42 year old male with a prolong and complex hospital course for IPH/SDH s/p hemicraniectomy on 5/24, cranioplasty 6/29, right craniotomy for epidural collection on 7/17. Nephrology is managing hyponatremia.    -Serum osm 271, urine osm 563, urine sodium 185  -HypoNa have been refractory to salt tabs 2g q8h and Ure-Na packets    -Consequently tolvaptan added; last dose was given on 08/03/2022   -No longer candidate for Tolvaptan given normonatremia   -Notable for downward trend of sodium over the past few days   -Sodium tabs subsequently increased; serum sodium level improved   -Continue sodium chloride tabs 2g TID and Ure-Na 30g daily    -Will continue to monitor closely     Marivel Lucero DO  [facility]

## 2022-08-09 NOTE — PROGRESS NOTE ADULT - SUBJECTIVE AND OBJECTIVE BOX
no events  tolerating diet  ros unable to obtain as nonverbal    MEDICATIONS  (STANDING):  amantadine 100 milliGRAM(s) Oral <User Schedule>  chlorhexidine 2% Cloths 1 Application(s) Topical daily  donepezil 5 milliGRAM(s) Oral <User Schedule>  enoxaparin Injectable 40 milliGRAM(s) SubCutaneous every 24 hours  levETIRAcetam  Solution 500 milliGRAM(s) Oral two times a day  melatonin 5 milliGRAM(s) Oral at bedtime  nafcillin  IVPB 2 Gram(s) IV Intermittent every 4 hours  polyethylene glycol 3350 17 Gram(s) Oral daily  senna 2 Tablet(s) Oral at bedtime  sodium chloride 2 Gram(s) Oral every 8 hours  urea Oral Powder 30 Gram(s) Oral daily  vancomycin  IVPB 750 milliGRAM(s) IV Intermittent every 12 hours    MEDICATIONS  (PRN):  acetaminophen     Tablet .. 650 milliGRAM(s) Oral every 6 hours PRN Temp greater or equal to 38C (100.4F), Moderate Pain (4 - 6)  ondansetron Injectable 4 milliGRAM(s) IV Push every 4 hours PRN Nausea and/or Vomiting      Allergies    Allergy Status Unknown      Vital Signs Last 24 Hrs  T(C): 36.9 (09 Aug 2022 11:00), Max: 36.9 (08 Aug 2022 17:04)  T(F): 98.5 (09 Aug 2022 11:00), Max: 98.5 (09 Aug 2022 11:00)  HR: 86 (09 Aug 2022 11:00) (79 - 97)  BP: 110/69 (09 Aug 2022 11:00) (100/67 - 128/89)  BP(mean): --  RR: 16 (09 Aug 2022 11:00) (16 - 18)  SpO2: 98% (09 Aug 2022 11:00) (96% - 99%)    Parameters below as of 09 Aug 2022 11:00  Patient On (Oxygen Delivery Method): room air      PHYSICAL EXAM:    GENERAL: NAD  CHEST/LUNG: Clear to ausculation bilaterally  HEART: Regular rate and rhythm; S1 S2  ABDOMEN: Soft, Bowel sounds present  EXTREMITIES:  no edema   NERVOUS SYSTEM:  awake, follows simple commands, moves ext x4. nonverbal.      LABS:    08-09    136  |  97<L>  |  24.6<H>  ----------------------------<  110<H>  3.9   |  26.0  |  0.83    Ca    9.2      09 Aug 2022 06:44            CAPILLARY BLOOD GLUCOSE            RADIOLOGY & ADDITIONAL TESTS:

## 2022-08-09 NOTE — CHART NOTE - NSCHARTNOTEFT_GEN_A_CORE
Source: Patient [ ]  Family [ ]   other [x ]EMR, rounds    Current Diet: pureed with mod thick  total feed      PO intake:  < 50% [ ]   50-75%  [x ]   %  [x ]  other :    Source for PO intake [ ] Patient [ ] family [ ] chart [x ] staff [ ] other    Enteral /Parenteral Nutrition:     Current Weight: 131.8# 7/18  no edema    % Weight Change     Pertinent Medications: MEDICATIONS  (STANDING):  amantadine 100 milliGRAM(s) Oral <User Schedule>  chlorhexidine 2% Cloths 1 Application(s) Topical daily  donepezil 5 milliGRAM(s) Oral <User Schedule>  enoxaparin Injectable 40 milliGRAM(s) SubCutaneous every 24 hours  levETIRAcetam  Solution 500 milliGRAM(s) Oral two times a day  melatonin 5 milliGRAM(s) Oral at bedtime  nafcillin  IVPB 2 Gram(s) IV Intermittent every 4 hours  polyethylene glycol 3350 17 Gram(s) Oral daily  senna 2 Tablet(s) Oral at bedtime  sodium chloride 2 Gram(s) Oral every 8 hours  urea Oral Powder 30 Gram(s) Oral daily  vancomycin  IVPB 1000 milliGRAM(s) IV Intermittent every 12 hours    MEDICATIONS  (PRN):  acetaminophen     Tablet .. 650 milliGRAM(s) Oral every 6 hours PRN Temp greater or equal to 38C (100.4F), Moderate Pain (4 - 6)  ondansetron Injectable 4 milliGRAM(s) IV Push every 4 hours PRN Nausea and/or Vomiting    Pertinent Labs: 08-08 Na135 mmol/L Glu 111 mg/dL<H> K+ 3.8 mmol/L Cr  0.74 mg/dL BUN 16.8 mg/dL Phos n/a   Alb n/a   PAB n/a               Skin: R temporal region, R parietal region Surgical incisions      Nutrition focused physical exam conducted - found signs of malnutrition [x ]absent [ ]present    Subcutaneous fat loss: [ ] Orbital fat pads region, [ ]Buccal fat region, [ ]Triceps region,  [ ]Ribs region    Muscle wasting: [ ]Temples region, [ ]Clavicle region, [ ]Shoulder region, [ ]Scapula region, [ ]Interosseous region,  [ ]thigh region, [ ]Calf region    Estimated Needs:   [ x] no change since previous assessment  [ ] recalculated:     Current Nutrition Diagnosis: Pt remains at nutrition risk secondary to increased nutrient needs related to increased physiological demand for nutrient as evidenced by Right SDH s/p craniectomy with Left SDH, b/l parenchymal hematomas, +TEOFILO, multiple facial fractures, ETOH abuse. Tube feeds now discontinued.  Pt tolerating PO diet with fair/good PO intake per staff. Elevated BUN levels noted.       Recommendations:   1) Continue diet per SLP  2) Daily weight  3) Rec Ensure Pudding TID to optimize po intake and provide an additional 170 kcal, 4g protein per serving   4) RX MVI   5) Provide Magic cup TID     Monitoring and Evaluation:   [x ] PO intake [x ] Tolerance to diet prescription [X] Weights  [X] Follow up per protocol [X] Labs: Source: Patient [ ]  Family [ ]   other [x ]EMR, rounds    Current Diet: pureed with mod thick  total feed      PO intake:  < 50% [ ]   50-75%  [x ]   %  [ ]  other :    Source for PO intake [ ] Patient [ ] family [ ] chart [x ] staff [ ] other    Enteral /Parenteral Nutrition:     Current Weight: 131.8# 7/18  no edema    % Weight Change     Pertinent Medications: MEDICATIONS  (STANDING):  amantadine 100 milliGRAM(s) Oral <User Schedule>  chlorhexidine 2% Cloths 1 Application(s) Topical daily  donepezil 5 milliGRAM(s) Oral <User Schedule>  enoxaparin Injectable 40 milliGRAM(s) SubCutaneous every 24 hours  levETIRAcetam  Solution 500 milliGRAM(s) Oral two times a day  melatonin 5 milliGRAM(s) Oral at bedtime  nafcillin  IVPB 2 Gram(s) IV Intermittent every 4 hours  polyethylene glycol 3350 17 Gram(s) Oral daily  senna 2 Tablet(s) Oral at bedtime  sodium chloride 2 Gram(s) Oral every 8 hours  urea Oral Powder 30 Gram(s) Oral daily  vancomycin  IVPB 1000 milliGRAM(s) IV Intermittent every 12 hours    MEDICATIONS  (PRN):  acetaminophen     Tablet .. 650 milliGRAM(s) Oral every 6 hours PRN Temp greater or equal to 38C (100.4F), Moderate Pain (4 - 6)  ondansetron Injectable 4 milliGRAM(s) IV Push every 4 hours PRN Nausea and/or Vomiting    Pertinent Labs: 08-08 Na135 mmol/L Glu 111 mg/dL<H> K+ 3.8 mmol/L Cr  0.74 mg/dL BUN 16.8 mg/dL Phos n/a   Alb n/a   PAB n/a               Skin: R temporal region, R parietal region Surgical incisions      Nutrition focused physical exam not conducted - found signs of malnutrition [x ]absent [ ]present    Subcutaneous fat loss: [ ] Orbital fat pads region, [ ]Buccal fat region, [ ]Triceps region,  [ ]Ribs region    Muscle wasting: [ ]Temples region, [ ]Clavicle region, [ ]Shoulder region, [ ]Scapula region, [ ]Interosseous region,  [ ]thigh region, [ ]Calf region    Estimated Needs:   [ x] no change since previous assessment  [ ] recalculated:     Current Nutrition Diagnosis: Pt now meets severe acute malnutrition criteria related to inadequate energy intake in setting of Right SDH s/p craniectomy with Left SDH, b/l parenchymal hematomas, +TEOFILO, multiple facial fractures, ETOH abuse as evidenced by <75% intake last few weeks, 22.5# weight loss in last 6 weeks if EMR weights are accurate. Tube feeds were discontinued however bolus feeds restarted.  Pt tolerating PO diet with fair po with staff. Pt needs to be fed. Pt to be bolused if eats <50% of meals.  Elevated BUN levels noted.       Recommendations:   1) Continue diet per SLP  2) Pivot 1.5cal bolus of 240cc x 3 daily if pt eats <50% of meals.   3) Rec Ensure Pudding TID to optimize po intake and provide an additional 170 kcal, 4g protein per serving   4) RX MVI   5) Provide Magic cup TID     Monitoring and Evaluation:   [x ] PO intake [x ] Tolerance to diet prescription [X] Weights  [X] Follow up per protocol [X] Labs:

## 2022-08-10 LAB
BUN SERPL-MCNC: 23.6 MG/DL — HIGH (ref 8–20)
CALCIUM SERPL-MCNC: 9.4 MG/DL — SIGNIFICANT CHANGE UP (ref 8.4–10.5)
CHLORIDE SERPL-SCNC: 98 MMOL/L — SIGNIFICANT CHANGE UP (ref 98–107)
CO2 SERPL-SCNC: 23 MMOL/L — SIGNIFICANT CHANGE UP (ref 22–29)
CREAT SERPL-MCNC: 0.82 MG/DL — SIGNIFICANT CHANGE UP (ref 0.5–1.3)
EGFR: 112 ML/MIN/1.73M2 — SIGNIFICANT CHANGE UP
GLUCOSE SERPL-MCNC: 102 MG/DL — HIGH (ref 70–99)
POTASSIUM SERPL-MCNC: 4.2 MMOL/L — SIGNIFICANT CHANGE UP (ref 3.5–5.3)
POTASSIUM SERPL-SCNC: 4.2 MMOL/L — SIGNIFICANT CHANGE UP (ref 3.5–5.3)
SODIUM SERPL-SCNC: 136 MMOL/L — SIGNIFICANT CHANGE UP (ref 135–145)

## 2022-08-10 PROCEDURE — 99232 SBSQ HOSP IP/OBS MODERATE 35: CPT

## 2022-08-10 RX ORDER — DIPHENHYDRAMINE HYDROCHLORIDE AND LIDOCAINE HYDROCHLORIDE AND ALUMINUM HYDROXIDE AND MAGNESIUM HYDRO
5 KIT
Refills: 0 | Status: DISCONTINUED | OUTPATIENT
Start: 2022-08-10 | End: 2022-09-19

## 2022-08-10 RX ADMIN — SODIUM CHLORIDE 2 GRAM(S): 9 INJECTION INTRAMUSCULAR; INTRAVENOUS; SUBCUTANEOUS at 21:12

## 2022-08-10 RX ADMIN — Medication 5 MILLIGRAM(S): at 21:11

## 2022-08-10 RX ADMIN — NAFCILLIN 200 GRAM(S): 10 INJECTION, POWDER, FOR SOLUTION INTRAVENOUS at 17:13

## 2022-08-10 RX ADMIN — LEVETIRACETAM 500 MILLIGRAM(S): 250 TABLET, FILM COATED ORAL at 17:43

## 2022-08-10 RX ADMIN — DIPHENHYDRAMINE HYDROCHLORIDE AND LIDOCAINE HYDROCHLORIDE AND ALUMINUM HYDROXIDE AND MAGNESIUM HYDRO 5 MILLILITER(S): KIT at 17:41

## 2022-08-10 RX ADMIN — NAFCILLIN 200 GRAM(S): 10 INJECTION, POWDER, FOR SOLUTION INTRAVENOUS at 02:08

## 2022-08-10 RX ADMIN — Medication 250 MILLIGRAM(S): at 17:40

## 2022-08-10 RX ADMIN — Medication 100 MILLIGRAM(S): at 12:09

## 2022-08-10 RX ADMIN — SODIUM CHLORIDE 2 GRAM(S): 9 INJECTION INTRAMUSCULAR; INTRAVENOUS; SUBCUTANEOUS at 12:08

## 2022-08-10 RX ADMIN — Medication 250 MILLIGRAM(S): at 07:28

## 2022-08-10 RX ADMIN — ENOXAPARIN SODIUM 40 MILLIGRAM(S): 100 INJECTION SUBCUTANEOUS at 17:43

## 2022-08-10 RX ADMIN — SENNA PLUS 2 TABLET(S): 8.6 TABLET ORAL at 21:12

## 2022-08-10 RX ADMIN — POLYETHYLENE GLYCOL 3350 17 GRAM(S): 17 POWDER, FOR SOLUTION ORAL at 12:10

## 2022-08-10 RX ADMIN — CHLORHEXIDINE GLUCONATE 1 APPLICATION(S): 213 SOLUTION TOPICAL at 12:10

## 2022-08-10 RX ADMIN — SODIUM CHLORIDE 2 GRAM(S): 9 INJECTION INTRAMUSCULAR; INTRAVENOUS; SUBCUTANEOUS at 06:09

## 2022-08-10 RX ADMIN — LEVETIRACETAM 500 MILLIGRAM(S): 250 TABLET, FILM COATED ORAL at 06:09

## 2022-08-10 RX ADMIN — Medication 30 GRAM(S): at 12:10

## 2022-08-10 RX ADMIN — NAFCILLIN 200 GRAM(S): 10 INJECTION, POWDER, FOR SOLUTION INTRAVENOUS at 06:09

## 2022-08-10 RX ADMIN — NAFCILLIN 200 GRAM(S): 10 INJECTION, POWDER, FOR SOLUTION INTRAVENOUS at 21:12

## 2022-08-10 RX ADMIN — NAFCILLIN 200 GRAM(S): 10 INJECTION, POWDER, FOR SOLUTION INTRAVENOUS at 09:02

## 2022-08-10 RX ADMIN — Medication 100 MILLIGRAM(S): at 09:02

## 2022-08-10 RX ADMIN — NAFCILLIN 200 GRAM(S): 10 INJECTION, POWDER, FOR SOLUTION INTRAVENOUS at 12:15

## 2022-08-10 RX ADMIN — DONEPEZIL HYDROCHLORIDE 5 MILLIGRAM(S): 10 TABLET, FILM COATED ORAL at 12:09

## 2022-08-10 NOTE — PROGRESS NOTE ADULT - NS ATTEND AMEND GEN_ALL_CORE FT
43 y/o M brought by EMS, found down in the street unresponsive.  Imaging showed SDH, IPH, TEOFILO, Patient underwent Right decompressive hemicraniectomy on 5/24, trach/peg 6/1,  R cranioplasty with complex closure 6/29/22.    Cranioplasty  drain removed, s/p rpt right craniectomy for evacuation on 7/17 after MR demonstrated large right ED collection. On Abx as per ID . 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 Renal is following.   Trach (decannulated)/ + PEG , on pureed diet       1. SDH/IPH / TBI  S/P R craniotomy / s/p R complex closure cranioplasty ,   repeated CTH  demonstrating right sided subdural collection mixed with air.  s/p rpt right craniectomy for evacuation on 7/17 after MR demonstrated large right ED collection.   Drain removed   - Blood cultures 7/18 no growth   - fluid cultures reporting Staphylococcus aureus (MSSA) and 1 culture with Staph Epi (MRSE)  - ID is following with further recommendations :   - Cefapime d/c, on Nafcillin and Vancomycin as per ID : - Will need antibiotics till 9/1/22  - Monitor trough by pharmacy   - continue PT/OT/ speech   - neurochecks as per NS team -  Neuro checks have  been stable   - Keppra 500 mg BID - as per NS   - Provigil / Amantadine - for wakefulness  - PMR is following - recommend ongoing mobilization  --Cleared from a neurosurgery standpoint to be D/C'd to rehab & return for cranioplasty   - Plan for cranioplasty when appropriate, once course of antibiotics finished & cleared by ID     2. Dysphagia - on TF / pure diet, tolerating well, speech therapy team is following      3. Hyponatremia - w/u c/w SIADH , renal appreciated   given 2% sodium several times, fluid restriction , s/p samsca  c/w nacl and urea    4 Anemia - likely surgical blood lost - stable     5  DVT prophylaxis  - on Lovenox     6  Constipation prophylaxis - continue Senna    7.  throat pain: trial of magic mouthwash as unable to examine oropharynx d/t patient resistance to exam    Dispo: Uninsured, OOB daily, daily PT, family pursuing insurance.    d/w mother at bedside.

## 2022-08-10 NOTE — PROGRESS NOTE ADULT - SUBJECTIVE AND OBJECTIVE BOX
Patient's complained of poor PO intake due to throat pain.     Vital Signs Last 24 Hrs  T(C): 36.5 (10 Aug 2022 10:44), Max: 36.7 (09 Aug 2022 21:57)  T(F): 97.7 (10 Aug 2022 10:44), Max: 98.1 (09 Aug 2022 21:57)  HR: 81 (10 Aug 2022 10:44) (68 - 92)  BP: 110/76 (10 Aug 2022 10:44) (104/70 - 116/78)  BP(mean): --  RR: 18 (10 Aug 2022 10:44) (18 - 20)  SpO2: 95% (10 Aug 2022 10:44) (95% - 100%)    Parameters below as of 10 Aug 2022 13:22  Patient On (Oxygen Delivery Method): room air    I&O's Summary  09 Aug 2022 07:01  -  10 Aug 2022 07:00  --------------------------------------------------------  IN: 230 mL / OUT: 2400 mL / NET: -2170 mL    Physical Exam  General: WDWN male sitting up in chair; NAD  Respiratory: CTAB  Abdomen: Soft, NT  Extremities: No appreciable edema  Neuro: Awake, alert, nonverbal    08-10    136  |  98  |  23.6<H>  ----------------------------<  102<H>  4.2   |  23.0  |  0.82    Ca    9.4      10 Aug 2022 06:10    MEDICATIONS  (STANDING):  amantadine 100 milliGRAM(s) Oral <User Schedule>  chlorhexidine 2% Cloths 1 Application(s) Topical daily  donepezil 5 milliGRAM(s) Oral <User Schedule>  enoxaparin Injectable 40 milliGRAM(s) SubCutaneous every 24 hours  FIRST- Mouthwash  BLM 5 milliLiter(s) Swish and Swallow four times a day  levETIRAcetam  Solution 500 milliGRAM(s) Oral two times a day  melatonin 5 milliGRAM(s) Oral at bedtime  nafcillin  IVPB 2 Gram(s) IV Intermittent every 4 hours  senna 2 Tablet(s) Oral at bedtime  sodium chloride 2 Gram(s) Oral every 8 hours  urea Oral Powder 30 Gram(s) Oral daily  vancomycin  IVPB 750 milliGRAM(s) IV Intermittent every 12 hours    MEDICATIONS  (PRN):  acetaminophen     Tablet .. 650 milliGRAM(s) Oral every 6 hours PRN Temp greater or equal to 38C (100.4F), Moderate Pain (4 - 6)  ondansetron Injectable 4 milliGRAM(s) IV Push every 4 hours PRN Nausea and/or Vomiting    Assessment and Plan: The patient is a 42 year old male with a prolong and complex hospital course for IPH/SDH s/p hemicraniectomy on 5/24, cranioplasty 6/29, right craniotomy for epidural collection on 7/17. Nephrology is managing hyponatremia.    -Serum osm 271, urine osm 563, urine sodium 185   -HypoNa have initially been refractory to salt tabs and Ure-Na packets    -Consequently tolvaptan added; last dose was given on 08/03/2022   -No longer candidate for Tolvaptan given normonatremia    -Continue sodium chloride tabs 2g TID and Ure-Na 30g daily    -Will continue to monitor closely     Marivel Lucero DO  [facility] Patient's mother complained of patient having poor PO intake due to throat pain.     Vital Signs Last 24 Hrs  T(C): 36.5 (10 Aug 2022 10:44), Max: 36.7 (09 Aug 2022 21:57)  T(F): 97.7 (10 Aug 2022 10:44), Max: 98.1 (09 Aug 2022 21:57)  HR: 81 (10 Aug 2022 10:44) (68 - 92)  BP: 110/76 (10 Aug 2022 10:44) (104/70 - 116/78)  BP(mean): --  RR: 18 (10 Aug 2022 10:44) (18 - 20)  SpO2: 95% (10 Aug 2022 10:44) (95% - 100%)    Parameters below as of 10 Aug 2022 13:22  Patient On (Oxygen Delivery Method): room air    I&O's Summary  09 Aug 2022 07:01  -  10 Aug 2022 07:00  --------------------------------------------------------  IN: 230 mL / OUT: 2400 mL / NET: -2170 mL    Physical Exam  General: WDWN male sitting up in chair; NAD  Respiratory: CTAB  Abdomen: Soft, NT  Extremities: No appreciable edema  Neuro: Awake, alert, nonverbal    08-10    136  |  98  |  23.6<H>  ----------------------------<  102<H>  4.2   |  23.0  |  0.82    Ca    9.4      10 Aug 2022 06:10    MEDICATIONS  (STANDING):  amantadine 100 milliGRAM(s) Oral <User Schedule>  chlorhexidine 2% Cloths 1 Application(s) Topical daily  donepezil 5 milliGRAM(s) Oral <User Schedule>  enoxaparin Injectable 40 milliGRAM(s) SubCutaneous every 24 hours  FIRST- Mouthwash  BLM 5 milliLiter(s) Swish and Swallow four times a day  levETIRAcetam  Solution 500 milliGRAM(s) Oral two times a day  melatonin 5 milliGRAM(s) Oral at bedtime  nafcillin  IVPB 2 Gram(s) IV Intermittent every 4 hours  senna 2 Tablet(s) Oral at bedtime  sodium chloride 2 Gram(s) Oral every 8 hours  urea Oral Powder 30 Gram(s) Oral daily  vancomycin  IVPB 750 milliGRAM(s) IV Intermittent every 12 hours    MEDICATIONS  (PRN):  acetaminophen     Tablet .. 650 milliGRAM(s) Oral every 6 hours PRN Temp greater or equal to 38C (100.4F), Moderate Pain (4 - 6)  ondansetron Injectable 4 milliGRAM(s) IV Push every 4 hours PRN Nausea and/or Vomiting    Assessment and Plan: The patient is a 42 year old male with a prolong and complex hospital course for IPH/SDH s/p hemicraniectomy on 5/24, cranioplasty 6/29, right craniotomy for epidural collection on 7/17. Nephrology is managing hyponatremia.    -Serum osm 271, urine osm 563, urine sodium 185   -HypoNa have initially been refractory to salt tabs and Ure-Na packets    -Consequently tolvaptan added; last dose was given on 08/03/2022   -No longer candidate for Tolvaptan given normonatremia    -Continue sodium chloride tabs 2g TID and Ure-Na 30g daily    -Will continue to monitor closely     Marivel Lucero DO  [facility]

## 2022-08-10 NOTE — CHART NOTE - NSCHARTNOTEFT_GEN_A_CORE
Nutrition Note:   Pt not eating well today. Bolus TF of Pivot to continue if pt does not eat>50% of meals.  Calorie count started today. Spoke to nursing. Calorie count sheets placed on wall in pts's room.   Will follow up with results. Nutrition Note:   Pt not eating well today. Bolus TF of Pivot to continue if pt does not eat>50% of meals.  Rec Pivot 3 cans 240cchr to provide 720cc, 1080kcal, 68gr pro daily plus diet tray.   Calorie count started today. Spoke to nursing. Calorie count sheets placed on wall in pts's room.   Will follow up with results.
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
## 2022-08-10 NOTE — PROGRESS NOTE ADULT - SUBJECTIVE AND OBJECTIVE BOX
mother concerned about throat pain,  unable to evaluate throat with tongue depression as unable to open mouth wide enough and not following directions  bedside  utilized     MEDICATIONS  (STANDING):  amantadine 100 milliGRAM(s) Oral <User Schedule>  chlorhexidine 2% Cloths 1 Application(s) Topical daily  donepezil 5 milliGRAM(s) Oral <User Schedule>  enoxaparin Injectable 40 milliGRAM(s) SubCutaneous every 24 hours  FIRST- Mouthwash  BLM 5 milliLiter(s) Swish and Swallow four times a day  levETIRAcetam  Solution 500 milliGRAM(s) Oral two times a day  melatonin 5 milliGRAM(s) Oral at bedtime  nafcillin  IVPB 2 Gram(s) IV Intermittent every 4 hours  polyethylene glycol 3350 17 Gram(s) Oral daily  senna 2 Tablet(s) Oral at bedtime  sodium chloride 2 Gram(s) Oral every 8 hours  urea Oral Powder 30 Gram(s) Oral daily  vancomycin  IVPB 750 milliGRAM(s) IV Intermittent every 12 hours    MEDICATIONS  (PRN):  acetaminophen     Tablet .. 650 milliGRAM(s) Oral every 6 hours PRN Temp greater or equal to 38C (100.4F), Moderate Pain (4 - 6)  ondansetron Injectable 4 milliGRAM(s) IV Push every 4 hours PRN Nausea and/or Vomiting      Allergies    Allergy Status Unknown      Vital Signs Last 24 Hrs  T(C): 36.5 (10 Aug 2022 10:44), Max: 36.7 (09 Aug 2022 21:57)  T(F): 97.7 (10 Aug 2022 10:44), Max: 98.1 (09 Aug 2022 21:57)  HR: 81 (10 Aug 2022 10:44) (68 - 92)  BP: 110/76 (10 Aug 2022 10:44) (104/70 - 116/78)  BP(mean): --  RR: 18 (10 Aug 2022 10:44) (18 - 20)  SpO2: 95% (10 Aug 2022 10:44) (95% - 100%)    Parameters below as of 10 Aug 2022 10:44  Patient On (Oxygen Delivery Method): room air        PHYSICAL EXAM:    GENERAL: NAD  CHEST/LUNG: Clear to ausculation bilaterally  HEART: Regular rate and rhythm; S1 S2;  ABDOMEN: Soft,  Bowel sounds present  EXTREMITIES: no edema   NERVOUS SYSTEM:  awake, nonverbal but tries to speak. follows simple commands     LABS:    08-10    136  |  98  |  23.6<H>  ----------------------------<  102<H>  4.2   |  23.0  |  0.82    Ca    9.4      10 Aug 2022 06:10            CAPILLARY BLOOD GLUCOSE            RADIOLOGY & ADDITIONAL TESTS:

## 2022-08-11 LAB
SARS-COV-2 RNA SPEC QL NAA+PROBE: SIGNIFICANT CHANGE UP
VANCOMYCIN TROUGH SERPL-MCNC: 12.8 UG/ML — SIGNIFICANT CHANGE UP (ref 10–20)

## 2022-08-11 PROCEDURE — 99232 SBSQ HOSP IP/OBS MODERATE 35: CPT

## 2022-08-11 PROCEDURE — 99233 SBSQ HOSP IP/OBS HIGH 50: CPT | Mod: GC

## 2022-08-11 RX ADMIN — SENNA PLUS 2 TABLET(S): 8.6 TABLET ORAL at 22:35

## 2022-08-11 RX ADMIN — SODIUM CHLORIDE 2 GRAM(S): 9 INJECTION INTRAMUSCULAR; INTRAVENOUS; SUBCUTANEOUS at 11:53

## 2022-08-11 RX ADMIN — Medication 100 MILLIGRAM(S): at 08:30

## 2022-08-11 RX ADMIN — CHLORHEXIDINE GLUCONATE 1 APPLICATION(S): 213 SOLUTION TOPICAL at 11:53

## 2022-08-11 RX ADMIN — LEVETIRACETAM 500 MILLIGRAM(S): 250 TABLET, FILM COATED ORAL at 17:36

## 2022-08-11 RX ADMIN — Medication 250 MILLIGRAM(S): at 07:32

## 2022-08-11 RX ADMIN — SODIUM CHLORIDE 2 GRAM(S): 9 INJECTION INTRAMUSCULAR; INTRAVENOUS; SUBCUTANEOUS at 22:35

## 2022-08-11 RX ADMIN — NAFCILLIN 200 GRAM(S): 10 INJECTION, POWDER, FOR SOLUTION INTRAVENOUS at 08:30

## 2022-08-11 RX ADMIN — ENOXAPARIN SODIUM 40 MILLIGRAM(S): 100 INJECTION SUBCUTANEOUS at 17:36

## 2022-08-11 RX ADMIN — NAFCILLIN 200 GRAM(S): 10 INJECTION, POWDER, FOR SOLUTION INTRAVENOUS at 12:03

## 2022-08-11 RX ADMIN — Medication 100 MILLIGRAM(S): at 11:52

## 2022-08-11 RX ADMIN — SODIUM CHLORIDE 2 GRAM(S): 9 INJECTION INTRAMUSCULAR; INTRAVENOUS; SUBCUTANEOUS at 05:51

## 2022-08-11 RX ADMIN — Medication 30 GRAM(S): at 11:54

## 2022-08-11 RX ADMIN — Medication 5 MILLIGRAM(S): at 22:35

## 2022-08-11 RX ADMIN — NAFCILLIN 200 GRAM(S): 10 INJECTION, POWDER, FOR SOLUTION INTRAVENOUS at 17:35

## 2022-08-11 RX ADMIN — NAFCILLIN 200 GRAM(S): 10 INJECTION, POWDER, FOR SOLUTION INTRAVENOUS at 22:35

## 2022-08-11 RX ADMIN — DIPHENHYDRAMINE HYDROCHLORIDE AND LIDOCAINE HYDROCHLORIDE AND ALUMINUM HYDROXIDE AND MAGNESIUM HYDRO 5 MILLILITER(S): KIT at 17:36

## 2022-08-11 RX ADMIN — DONEPEZIL HYDROCHLORIDE 5 MILLIGRAM(S): 10 TABLET, FILM COATED ORAL at 11:52

## 2022-08-11 RX ADMIN — NAFCILLIN 200 GRAM(S): 10 INJECTION, POWDER, FOR SOLUTION INTRAVENOUS at 03:09

## 2022-08-11 RX ADMIN — DIPHENHYDRAMINE HYDROCHLORIDE AND LIDOCAINE HYDROCHLORIDE AND ALUMINUM HYDROXIDE AND MAGNESIUM HYDRO 5 MILLILITER(S): KIT at 05:51

## 2022-08-11 RX ADMIN — DIPHENHYDRAMINE HYDROCHLORIDE AND LIDOCAINE HYDROCHLORIDE AND ALUMINUM HYDROXIDE AND MAGNESIUM HYDRO 5 MILLILITER(S): KIT at 00:21

## 2022-08-11 RX ADMIN — Medication 250 MILLIGRAM(S): at 18:33

## 2022-08-11 RX ADMIN — DIPHENHYDRAMINE HYDROCHLORIDE AND LIDOCAINE HYDROCHLORIDE AND ALUMINUM HYDROXIDE AND MAGNESIUM HYDRO 5 MILLILITER(S): KIT at 11:53

## 2022-08-11 RX ADMIN — NAFCILLIN 200 GRAM(S): 10 INJECTION, POWDER, FOR SOLUTION INTRAVENOUS at 05:51

## 2022-08-11 RX ADMIN — LEVETIRACETAM 500 MILLIGRAM(S): 250 TABLET, FILM COATED ORAL at 05:51
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
## 2022-08-11 NOTE — PROGRESS NOTE ADULT - SUBJECTIVE AND OBJECTIVE BOX
Sleeping this a.m. Patient's mother is at bedside.    Vital Signs Last 24 Hrs  T(C): 36.9 (11 Aug 2022 09:48), Max: 36.9 (11 Aug 2022 09:48)  T(F): 98.4 (11 Aug 2022 09:48), Max: 98.4 (11 Aug 2022 09:48)  HR: 76 (11 Aug 2022 09:48) (76 - 85)  BP: 99/69 (11 Aug 2022 09:48) (99/69 - 111/82)  BP(mean): --  RR: 18 (11 Aug 2022 09:48) (18 - 18)  SpO2: 98% (11 Aug 2022 09:48) (95% - 98%)    Parameters below as of 11 Aug 2022 16:04  Patient On (Oxygen Delivery Method): room air    I&O's Summary  10 Aug 2022 07:01  -  11 Aug 2022 07:00  --------------------------------------------------------  IN: 230 mL / OUT: 550 mL / NET: -320 mL    Physical Exam  General: WDWN male in NAD  Respiratory: CTAB  Abdomen: Soft, NT  Extremities: No appreciable edema  Neuro: Awake     08-10    136  |  98  |  23.6<H>  ----------------------------<  102<H>  4.2   |  23.0  |  0.82    Ca    9.4      10 Aug 2022 06:10    Assessment and Plan: The patient is a 42 year old male with a prolong and complex hospital course for IPH/SDH s/p hemicraniectomy on 5/24, cranioplasty 6/29, right craniotomy for epidural collection on 7/17. Nephrology is managing hyponatremia.    -Serum osm 271, urine osm 563, urine sodium 185   -HypoNa have initially been refractory to salt tabs and Ure-Na packets    -Consequently tolvaptan added; last dose was given on 08/03/2022   -No longer candidate for Tolvaptan given normonatremia    -Continue sodium chloride tabs 2g TID and Ure-Na 30g daily    -Serum sodium have remained within normal limits on the above regimen  -Consequently will check basic metabolic panel TIW (Mondays Wednesdays Fridays)    Marivel Lucero DO  [facility]

## 2022-08-11 NOTE — CHART NOTE - NSCHARTNOTEFT_GEN_A_CORE
I have evaluated this patient and have determined that restraints are warranted to optimize medical care. Patient was assessed for current physical and psychological risk factors as well as special needs. There are no medical conditions or limitations that would place this patient at risk while in restraints. Dr. Zavala made aware.

## 2022-08-11 NOTE — CHART NOTE - NSCHARTNOTEFT_GEN_A_CORE
Nutrition Note:   Calorie count started last night. As per calorie count sheet pt ate well last night for dinner. This am pt is tired. Will follow up with todays meals later today.   Continue calorie count today.
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
## 2022-08-11 NOTE — PROGRESS NOTE ADULT - SUBJECTIVE AND OBJECTIVE BOX
no acute events  tolerated diet  bedside  utilized   mouths "mom" when asked who is woman in the room (his mother)      MEDICATIONS  (STANDING):  amantadine 100 milliGRAM(s) Oral <User Schedule>  chlorhexidine 2% Cloths 1 Application(s) Topical daily  donepezil 5 milliGRAM(s) Oral <User Schedule>  enoxaparin Injectable 40 milliGRAM(s) SubCutaneous every 24 hours  FIRST- Mouthwash  BLM 5 milliLiter(s) Swish and Swallow four times a day  levETIRAcetam  Solution 500 milliGRAM(s) Oral two times a day  melatonin 5 milliGRAM(s) Oral at bedtime  nafcillin  IVPB 2 Gram(s) IV Intermittent every 4 hours  senna 2 Tablet(s) Oral at bedtime  sodium chloride 2 Gram(s) Oral every 8 hours  urea Oral Powder 30 Gram(s) Oral daily  vancomycin  IVPB 750 milliGRAM(s) IV Intermittent every 12 hours    MEDICATIONS  (PRN):  acetaminophen     Tablet .. 650 milliGRAM(s) Oral every 6 hours PRN Temp greater or equal to 38C (100.4F), Moderate Pain (4 - 6)  ondansetron Injectable 4 milliGRAM(s) IV Push every 4 hours PRN Nausea and/or Vomiting      Allergies    Allergy Status Unknown    MISCELLANEOUS:    Vital Signs Last 24 Hrs  T(C): 36.9 (11 Aug 2022 09:48), Max: 36.9 (11 Aug 2022 09:48)  T(F): 98.4 (11 Aug 2022 09:48), Max: 98.4 (11 Aug 2022 09:48)  HR: 76 (11 Aug 2022 09:48) (76 - 85)  BP: 99/69 (11 Aug 2022 09:48) (99/69 - 111/82)  BP(mean): --  RR: 18 (11 Aug 2022 09:48) (18 - 18)  SpO2: 98% (11 Aug 2022 09:48) (95% - 98%)    Parameters below as of 11 Aug 2022 09:48  Patient On (Oxygen Delivery Method): room air        PHYSICAL EXAM:    GENERAL: NAD  CHEST/LUNG: Clear to ausculation bilaterally  HEART: Regular rate and rhythm; S1 S2  ABDOMEN: Soft, Bowel sounds present  EXTREMITIES: no edema   NERVOUS SYSTEM:  follows simple commands, moves ext x4. unable to verbalize words yet    LABS:    08-10    136  |  98  |  23.6<H>  ----------------------------<  102<H>  4.2   |  23.0  |  0.82    Ca    9.4      10 Aug 2022 06:10            CAPILLARY BLOOD GLUCOSE            RADIOLOGY & ADDITIONAL TESTS:

## 2022-08-12 LAB
ANION GAP SERPL CALC-SCNC: 11 MMOL/L — SIGNIFICANT CHANGE UP (ref 5–17)
BUN SERPL-MCNC: 17.7 MG/DL — SIGNIFICANT CHANGE UP (ref 8–20)
CALCIUM SERPL-MCNC: 9.1 MG/DL — SIGNIFICANT CHANGE UP (ref 8.4–10.5)
CHLORIDE SERPL-SCNC: 100 MMOL/L — SIGNIFICANT CHANGE UP (ref 98–107)
CO2 SERPL-SCNC: 27 MMOL/L — SIGNIFICANT CHANGE UP (ref 22–29)
CREAT SERPL-MCNC: 0.81 MG/DL — SIGNIFICANT CHANGE UP (ref 0.5–1.3)
EGFR: 113 ML/MIN/1.73M2 — SIGNIFICANT CHANGE UP
GLUCOSE SERPL-MCNC: 93 MG/DL — SIGNIFICANT CHANGE UP (ref 70–99)
POTASSIUM SERPL-MCNC: 3.5 MMOL/L — SIGNIFICANT CHANGE UP (ref 3.5–5.3)
POTASSIUM SERPL-SCNC: 3.5 MMOL/L — SIGNIFICANT CHANGE UP (ref 3.5–5.3)
SODIUM SERPL-SCNC: 137 MMOL/L — SIGNIFICANT CHANGE UP (ref 135–145)

## 2022-08-12 PROCEDURE — 99233 SBSQ HOSP IP/OBS HIGH 50: CPT

## 2022-08-12 PROCEDURE — 99233 SBSQ HOSP IP/OBS HIGH 50: CPT | Mod: GC

## 2022-08-12 PROCEDURE — 99232 SBSQ HOSP IP/OBS MODERATE 35: CPT

## 2022-08-12 RX ORDER — MEMANTINE HYDROCHLORIDE 10 MG/1
5 TABLET ORAL
Refills: 0 | Status: DISCONTINUED | OUTPATIENT
Start: 2022-08-12 | End: 2022-08-23

## 2022-08-12 RX ADMIN — SENNA PLUS 2 TABLET(S): 8.6 TABLET ORAL at 21:31

## 2022-08-12 RX ADMIN — NAFCILLIN 200 GRAM(S): 10 INJECTION, POWDER, FOR SOLUTION INTRAVENOUS at 21:32

## 2022-08-12 RX ADMIN — MEMANTINE HYDROCHLORIDE 5 MILLIGRAM(S): 10 TABLET ORAL at 18:41

## 2022-08-12 RX ADMIN — SODIUM CHLORIDE 2 GRAM(S): 9 INJECTION INTRAMUSCULAR; INTRAVENOUS; SUBCUTANEOUS at 21:31

## 2022-08-12 RX ADMIN — Medication 5 MILLIGRAM(S): at 21:31

## 2022-08-12 RX ADMIN — DIPHENHYDRAMINE HYDROCHLORIDE AND LIDOCAINE HYDROCHLORIDE AND ALUMINUM HYDROXIDE AND MAGNESIUM HYDRO 5 MILLILITER(S): KIT at 06:13

## 2022-08-12 RX ADMIN — SODIUM CHLORIDE 2 GRAM(S): 9 INJECTION INTRAMUSCULAR; INTRAVENOUS; SUBCUTANEOUS at 15:09

## 2022-08-12 RX ADMIN — SODIUM CHLORIDE 2 GRAM(S): 9 INJECTION INTRAMUSCULAR; INTRAVENOUS; SUBCUTANEOUS at 06:13

## 2022-08-12 RX ADMIN — DIPHENHYDRAMINE HYDROCHLORIDE AND LIDOCAINE HYDROCHLORIDE AND ALUMINUM HYDROXIDE AND MAGNESIUM HYDRO 5 MILLILITER(S): KIT at 15:08

## 2022-08-12 RX ADMIN — LEVETIRACETAM 500 MILLIGRAM(S): 250 TABLET, FILM COATED ORAL at 18:40

## 2022-08-12 RX ADMIN — NAFCILLIN 200 GRAM(S): 10 INJECTION, POWDER, FOR SOLUTION INTRAVENOUS at 13:07

## 2022-08-12 RX ADMIN — Medication 30 GRAM(S): at 15:09

## 2022-08-12 RX ADMIN — NAFCILLIN 200 GRAM(S): 10 INJECTION, POWDER, FOR SOLUTION INTRAVENOUS at 01:36

## 2022-08-12 RX ADMIN — Medication 100 MILLIGRAM(S): at 13:08

## 2022-08-12 RX ADMIN — Medication 100 MILLIGRAM(S): at 10:37

## 2022-08-12 RX ADMIN — NAFCILLIN 200 GRAM(S): 10 INJECTION, POWDER, FOR SOLUTION INTRAVENOUS at 10:36

## 2022-08-12 RX ADMIN — DIPHENHYDRAMINE HYDROCHLORIDE AND LIDOCAINE HYDROCHLORIDE AND ALUMINUM HYDROXIDE AND MAGNESIUM HYDRO 5 MILLILITER(S): KIT at 00:06

## 2022-08-12 RX ADMIN — ENOXAPARIN SODIUM 40 MILLIGRAM(S): 100 INJECTION SUBCUTANEOUS at 18:40

## 2022-08-12 RX ADMIN — LEVETIRACETAM 500 MILLIGRAM(S): 250 TABLET, FILM COATED ORAL at 06:13

## 2022-08-12 RX ADMIN — Medication 250 MILLIGRAM(S): at 18:40

## 2022-08-12 RX ADMIN — DIPHENHYDRAMINE HYDROCHLORIDE AND LIDOCAINE HYDROCHLORIDE AND ALUMINUM HYDROXIDE AND MAGNESIUM HYDRO 5 MILLILITER(S): KIT at 18:41

## 2022-08-12 RX ADMIN — NAFCILLIN 200 GRAM(S): 10 INJECTION, POWDER, FOR SOLUTION INTRAVENOUS at 18:38

## 2022-08-12 RX ADMIN — MEMANTINE HYDROCHLORIDE 5 MILLIGRAM(S): 10 TABLET ORAL at 10:41

## 2022-08-12 RX ADMIN — NAFCILLIN 200 GRAM(S): 10 INJECTION, POWDER, FOR SOLUTION INTRAVENOUS at 06:13

## 2022-08-12 RX ADMIN — CHLORHEXIDINE GLUCONATE 1 APPLICATION(S): 213 SOLUTION TOPICAL at 15:09

## 2022-08-12 RX ADMIN — Medication 250 MILLIGRAM(S): at 06:43

## 2022-08-12 NOTE — CHART NOTE - NSCHARTNOTEFT_GEN_A_CORE
Nutrition Note:     Calorie count revealed did well with dinner last night eating most of meal, however at Breakfast and lunch, pt ate poorly at about 25%.  Unclear if bolus was provided yesterday. Family meeting scheduled for today. Nitza PHILIP informed of updated nutrition intake.   Will monitor po intake, bolus intake.   Continue with puree with mod thick liquids and Ensure Pudding TID.  Bolus with Pivot 1.5cal 240cc to be provided if pt eats <50%, B, L and dinner.
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
## 2022-08-12 NOTE — PROGRESS NOTE ADULT - SUBJECTIVE AND OBJECTIVE BOX
no events  family meeting today    MEDICATIONS  (STANDING):  amantadine 100 milliGRAM(s) Oral <User Schedule>  chlorhexidine 2% Cloths 1 Application(s) Topical daily  enoxaparin Injectable 40 milliGRAM(s) SubCutaneous every 24 hours  FIRST- Mouthwash  BLM 5 milliLiter(s) Swish and Swallow four times a day  levETIRAcetam  Solution 500 milliGRAM(s) Oral two times a day  melatonin 5 milliGRAM(s) Oral at bedtime  memantine 5 milliGRAM(s) Oral two times a day  nafcillin  IVPB 2 Gram(s) IV Intermittent every 4 hours  senna 2 Tablet(s) Oral at bedtime  sodium chloride 2 Gram(s) Oral every 8 hours  urea Oral Powder 30 Gram(s) Oral daily  vancomycin  IVPB 750 milliGRAM(s) IV Intermittent every 12 hours    MEDICATIONS  (PRN):  acetaminophen     Tablet .. 650 milliGRAM(s) Oral every 6 hours PRN Temp greater or equal to 38C (100.4F), Moderate Pain (4 - 6)  ondansetron Injectable 4 milliGRAM(s) IV Push every 4 hours PRN Nausea and/or Vomiting      Allergies    Allergy Status Unknown        Vital Signs Last 24 Hrs  T(C): 36.7 (12 Aug 2022 10:00), Max: 36.7 (12 Aug 2022 04:00)  T(F): 98 (12 Aug 2022 10:00), Max: 98 (12 Aug 2022 04:00)  HR: 66 (12 Aug 2022 10:00) (66 - 68)  BP: 115/69 (12 Aug 2022 10:00) (103/68 - 120/82)  BP(mean): --  RR: 18 (12 Aug 2022 04:00) (18 - 18)  SpO2: 98% (12 Aug 2022 10:00) (97% - 98%)    Parameters below as of 12 Aug 2022 04:00  Patient On (Oxygen Delivery Method): room air        PHYSICAL EXAM:    GENERAL: NAD  CHEST/LUNG: Clear to ausculation bilaterally  HEART: Regular rate and rhythm; S1 S2  ABDOMEN: Soft,  Bowel sounds present  EXTREMITIES no edema   NERVOUS SYSTEM:  awake, follows simple commands     LABS:    08-12    137  |  100  |  17.7  ----------------------------<  93  3.5   |  27.0  |  0.81    Ca    9.1      12 Aug 2022 06:22            CAPILLARY BLOOD GLUCOSE            RADIOLOGY & ADDITIONAL TESTS:

## 2022-08-12 NOTE — PROGRESS NOTE ADULT - SUBJECTIVE AND OBJECTIVE BOX
Patient's mother stated that his oral intake has improved somewhat. Sodium remains WNL.    Vital Signs Last 24 Hrs  T(C): 36.4 (12 Aug 2022 17:16), Max: 36.7 (12 Aug 2022 04:00)  T(F): 97.5 (12 Aug 2022 17:16), Max: 98 (12 Aug 2022 04:00)  HR: 67 (12 Aug 2022 17:16) (66 - 68)  BP: 132/88 (12 Aug 2022 17:16) (103/68 - 132/88)  BP(mean): --  RR: 16 (12 Aug 2022 17:16) (16 - 18)  SpO2: 94% (12 Aug 2022 17:16) (94% - 98%)    Parameters below as of 12 Aug 2022 17:16  Patient On (Oxygen Delivery Method): room air    I&O's Summary  11 Aug 2022 07:01  -  12 Aug 2022 07:00  --------------------------------------------------------  IN: 230 mL / OUT: 350 mL / NET: -120 mL    Physical Exam  General: WDWN male in NAD  Respiratory: CTAB  Abdomen: Soft, NT  Extremities: No appreciable edema  Neuro: Awake    08-12    137  |  100  |  17.7  ----------------------------<  93  3.5   |  27.0  |  0.81    Ca    9.1      12 Aug 2022 06:22    Assessment and Plan: The patient is a 42 year old male with a prolong and complex hospital course for IPH/SDH s/p hemicraniectomy on 5/24, cranioplasty 6/29, right craniotomy for epidural collection on 7/17. Nephrology is managing hyponatremia.    -Serum osm 271, urine osm 563, urine sodium 185   -HypoNa have initially been refractory to salt tabs and Ure-Na packets    -Consequently tolvaptan added; last dose was given on 08/03/2022   -No longer candidate for Tolvaptan given normonatremia    -Continue sodium chloride tabs 2g TID and Ure-Na 30g daily    -Serum sodium have remained within normal limits on the above regimen  -Consequently will check basic metabolic panel TIW (Mondays Wednesdays Fridays)     Marivel Lucero DO  Nephrology

## 2022-08-12 NOTE — CHART NOTE - NSCHARTNOTEFT_GEN_A_CORE
Evaluated pt for midline access.  Pt currently has new IV acces placed by nursing to LUE that is functioning well.  Pink band was on RUE for previous midline, can now be removed and RUE may be used
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
## 2022-08-13 LAB — VANCOMYCIN TROUGH SERPL-MCNC: 9.4 UG/ML — LOW (ref 10–20)

## 2022-08-13 PROCEDURE — 99232 SBSQ HOSP IP/OBS MODERATE 35: CPT

## 2022-08-13 RX ORDER — VANCOMYCIN HCL 1 G
1000 VIAL (EA) INTRAVENOUS EVERY 12 HOURS
Refills: 0 | Status: COMPLETED | OUTPATIENT
Start: 2022-08-13 | End: 2022-09-01

## 2022-08-13 RX ADMIN — NAFCILLIN 200 GRAM(S): 10 INJECTION, POWDER, FOR SOLUTION INTRAVENOUS at 18:22

## 2022-08-13 RX ADMIN — SODIUM CHLORIDE 2 GRAM(S): 9 INJECTION INTRAMUSCULAR; INTRAVENOUS; SUBCUTANEOUS at 21:44

## 2022-08-13 RX ADMIN — Medication 250 MILLIGRAM(S): at 08:57

## 2022-08-13 RX ADMIN — NAFCILLIN 200 GRAM(S): 10 INJECTION, POWDER, FOR SOLUTION INTRAVENOUS at 09:12

## 2022-08-13 RX ADMIN — DIPHENHYDRAMINE HYDROCHLORIDE AND LIDOCAINE HYDROCHLORIDE AND ALUMINUM HYDROXIDE AND MAGNESIUM HYDRO 5 MILLILITER(S): KIT at 18:21

## 2022-08-13 RX ADMIN — NAFCILLIN 200 GRAM(S): 10 INJECTION, POWDER, FOR SOLUTION INTRAVENOUS at 05:20

## 2022-08-13 RX ADMIN — CHLORHEXIDINE GLUCONATE 1 APPLICATION(S): 213 SOLUTION TOPICAL at 11:31

## 2022-08-13 RX ADMIN — Medication 250 MILLIGRAM(S): at 18:22

## 2022-08-13 RX ADMIN — Medication 30 GRAM(S): at 11:30

## 2022-08-13 RX ADMIN — SENNA PLUS 2 TABLET(S): 8.6 TABLET ORAL at 21:44

## 2022-08-13 RX ADMIN — NAFCILLIN 200 GRAM(S): 10 INJECTION, POWDER, FOR SOLUTION INTRAVENOUS at 01:21

## 2022-08-13 RX ADMIN — SODIUM CHLORIDE 2 GRAM(S): 9 INJECTION INTRAMUSCULAR; INTRAVENOUS; SUBCUTANEOUS at 05:19

## 2022-08-13 RX ADMIN — LEVETIRACETAM 500 MILLIGRAM(S): 250 TABLET, FILM COATED ORAL at 18:21

## 2022-08-13 RX ADMIN — DIPHENHYDRAMINE HYDROCHLORIDE AND LIDOCAINE HYDROCHLORIDE AND ALUMINUM HYDROXIDE AND MAGNESIUM HYDRO 5 MILLILITER(S): KIT at 05:20

## 2022-08-13 RX ADMIN — DIPHENHYDRAMINE HYDROCHLORIDE AND LIDOCAINE HYDROCHLORIDE AND ALUMINUM HYDROXIDE AND MAGNESIUM HYDRO 5 MILLILITER(S): KIT at 01:20

## 2022-08-13 RX ADMIN — NAFCILLIN 200 GRAM(S): 10 INJECTION, POWDER, FOR SOLUTION INTRAVENOUS at 21:44

## 2022-08-13 RX ADMIN — NAFCILLIN 200 GRAM(S): 10 INJECTION, POWDER, FOR SOLUTION INTRAVENOUS at 15:10

## 2022-08-13 RX ADMIN — LEVETIRACETAM 500 MILLIGRAM(S): 250 TABLET, FILM COATED ORAL at 05:20

## 2022-08-13 RX ADMIN — Medication 100 MILLIGRAM(S): at 08:59

## 2022-08-13 RX ADMIN — MEMANTINE HYDROCHLORIDE 5 MILLIGRAM(S): 10 TABLET ORAL at 18:21

## 2022-08-13 RX ADMIN — Medication 100 MILLIGRAM(S): at 11:30

## 2022-08-13 RX ADMIN — SODIUM CHLORIDE 2 GRAM(S): 9 INJECTION INTRAMUSCULAR; INTRAVENOUS; SUBCUTANEOUS at 15:11

## 2022-08-13 RX ADMIN — DIPHENHYDRAMINE HYDROCHLORIDE AND LIDOCAINE HYDROCHLORIDE AND ALUMINUM HYDROXIDE AND MAGNESIUM HYDRO 5 MILLILITER(S): KIT at 11:30

## 2022-08-13 RX ADMIN — MEMANTINE HYDROCHLORIDE 5 MILLIGRAM(S): 10 TABLET ORAL at 05:19

## 2022-08-13 RX ADMIN — ENOXAPARIN SODIUM 40 MILLIGRAM(S): 100 INJECTION SUBCUTANEOUS at 18:23

## 2022-08-13 RX ADMIN — Medication 5 MILLIGRAM(S): at 21:44

## 2022-08-13 NOTE — CHART NOTE - NSCHARTNOTEFT_GEN_A_CORE
I have evaluated this patient and have determined that restraints are warranted to optimize medical care. Patient was assessed for current physical and psychological risk factors as well as special needs. There are no medical conditions or limitations that would place this patient at risk while in restraints. Dr. Barron made aware.

## 2022-08-13 NOTE — PROGRESS NOTE ADULT - NUTRITIONAL ASSESSMENT
This patient has been assessed with a concern for Malnutrition and has been determined to have a diagnosis/diagnoses of Severe protein-calorie malnutrition.    This patient is being managed with:   Diet Pureed-  Moderately Thick Liquids (MODTHICKLIQS)  Tube Feeding Modality: Gastrostomy  Pivot 1.5 Micky (PIVOT1.5RTH)  Total Volume for 24 Hours (mL): 720  Bolus  Total Volume of Bolus (mL):  240  Total # of Feeds: 3  Tube Feed Frequency: Every 8 hours   Tube Feed Start Time: 12:00  Bolus Feed Rate (mL per Hour): 0   Bolus Feed Duration (in Hours): 0  Bolus Feed Instructions:  Bolus feeds to be given if the following occurs:  patient eats less than 50% of Breakfast lunch or dinner  Free Water Flush  Bolus   Total Volume per Flush (mL): 250   Frequency: Every 6 Hours  Supplement Feeding Modality:  Oral  Ensure Pudding Cans or Servings Per Day:  1       Frequency:  Three Times a day  Entered: Aug 10 2022  2:35PM

## 2022-08-13 NOTE — PROGRESS NOTE ADULT - SUBJECTIVE AND OBJECTIVE BOX
Patient is a 42y old  Male who presents with a chief complaint of Trauma/ Subdural/ Intubated (12 Aug 2022 19:50)    Pt is aware,non verbal, NAD  REVIEW OF SYSTEMS: UNABLE NON VERBAL    MEDICATIONS  (STANDING):  amantadine 100 milliGRAM(s) Oral <User Schedule>  chlorhexidine 2% Cloths 1 Application(s) Topical daily  enoxaparin Injectable 40 milliGRAM(s) SubCutaneous every 24 hours  FIRST- Mouthwash  BLM 5 milliLiter(s) Swish and Swallow four times a day  levETIRAcetam  Solution 500 milliGRAM(s) Oral two times a day  melatonin 5 milliGRAM(s) Oral at bedtime  memantine 5 milliGRAM(s) Oral two times a day  nafcillin  IVPB 2 Gram(s) IV Intermittent every 4 hours  senna 2 Tablet(s) Oral at bedtime  sodium chloride 2 Gram(s) Oral every 8 hours  urea Oral Powder 30 Gram(s) Oral daily  vancomycin  IVPB 1000 milliGRAM(s) IV Intermittent every 12 hours    MEDICATIONS  (PRN):  acetaminophen     Tablet .. 650 milliGRAM(s) Oral every 6 hours PRN Temp greater or equal to 38C (100.4F), Moderate Pain (4 - 6)  ondansetron Injectable 4 milliGRAM(s) IV Push every 4 hours PRN Nausea and/or Vomiting      CAPILLARY BLOOD GLUCOSE        I&O's Summary    12 Aug 2022 07:01  -  13 Aug 2022 07:00  --------------------------------------------------------  IN: 200 mL / OUT: 0 mL / NET: 200 mL        PHYSICAL EXAM:  Vital Signs Last 24 Hrs  T(C): 36.4 (13 Aug 2022 08:59), Max: 36.8 (12 Aug 2022 20:48)  T(F): 97.6 (13 Aug 2022 08:59), Max: 98.2 (12 Aug 2022 20:48)  HR: 58 (13 Aug 2022 08:59) (58 - 68)  BP: 114/74 (13 Aug 2022 08:59) (105/67 - 132/88)  BP(mean): --  RR: 17 (13 Aug 2022 08:59) (16 - 18)  SpO2: 100% (13 Aug 2022 08:59) (94% - 100%)    Parameters below as of 13 Aug 2022 08:59  Patient On (Oxygen Delivery Method): room air        CONSTITUTIONAL: NAD,  EYES: PERRLA; conjunctiva and sclera clear  ENMT: Moist oral mucosa,   RESPIRATORY: Normal respiratory effort; lungs are clear to auscultation bilaterally  CARDIOVASCULAR: Regular rate and rhythm, normal S1 and S2, no murmur   EXTS: No lower extremity edema; Peripheral pulses are 2+ bilaterally  ABDOMEN: Nontender to palpation, normoactive bowel sounds, no rebound/guarding;   MUSCLOSKELETAL:  no joint swelling or tenderness to palpation  PSYCH: calm   NEUROLOGY: aware,MOVING EXTS,. not following commands      LABS:    08-12    137  |  100  |  17.7  ----------------------------<  93  3.5   |  27.0  |  0.81    Ca    9.1      12 Aug 2022 06:22                  RADIOLOGY & ADDITIONAL TESTS:  Results Reviewed:

## 2022-08-13 NOTE — PROGRESS NOTE ADULT - ASSESSMENT
43 y/o M brought by EMS, found down in the street unresponsive.  Imaging showed SDH, IPH, TEOFILO, Patient underwent Right decompressive hemicraniectomy on 5/24, trach/peg 6/1,  R cranioplasty with complex closure 6/29/22.    Cranioplasty drain removed, s/p rpt right craniectomy for evacuation on 7/17 after MR demonstrated large right ED collection. On Abx as per ID . 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 Renal is following.   Trach (decannulated)/ + PEG , on pureed diet       SDH/IPH / TBI S/P R craniotomy / s/p R complex closure cranioplasty ,   repeated CTH  demonstrating right sided subdural collection mixed with air.  s/p rpt right craniectomy for evacuation on 7/17 after MR demonstrated large right ED collection.   Drain removed   - Blood cultures 7/18 no growth   - fluid cultures reporting Staphylococcus aureus (MSSA) and 1 culture with Staph Epi (MRSE)  - Cefapime d/c, on Nafcillin and Vancomycin as per ID : - Will need antibiotics till 9/1/22  - Monitor trough by pharmacy   - continue PT/OT/ speech   - neurochecks as per NS team -  Neuro checks have  been stable   - Keppra 500 mg BID - as per NS   - Provigil / Amantadine - for wakefulness  - PMR is following - recommend ongoing mobilization  --Cleared from a neurosurgery standpoint to be D/C'd to rehab & return for cranioplasty   - Plan for cranioplasty when appropriate, once course of antibiotics finished & cleared by ID      Dysphagia - on TF / pure diet, tolerating well, speech therapy team is following      Hyponatremia - w/u c/w SIADH , renal appreciated  -Na stable  s/p 2% sodium several times, fluid restriction , s/p samsca  c/w nacl and urea    Anemia - likely surgical blood lost - stable     Constipation prophylaxis - continue Senna  Throat pain: trial of magic mouthwash   DVT prophylaxis  - on Lovenox     CARE OF PLAN DW MOTHER AND PT             Dispo: Uninsured, OOB daily, daily PT, family pursuing insurance.    d/w mother/brother at bedside.  advised plan for abx until 9/1 then ID clearance for cranioplasty per neurosurgery. dr domingo also at bedside reiterating plan.

## 2022-08-14 LAB
ANION GAP SERPL CALC-SCNC: 12 MMOL/L — SIGNIFICANT CHANGE UP (ref 5–17)
BUN SERPL-MCNC: 19.2 MG/DL — SIGNIFICANT CHANGE UP (ref 8–20)
CALCIUM SERPL-MCNC: 8.9 MG/DL — SIGNIFICANT CHANGE UP (ref 8.4–10.5)
CHLORIDE SERPL-SCNC: 103 MMOL/L — SIGNIFICANT CHANGE UP (ref 98–107)
CO2 SERPL-SCNC: 26 MMOL/L — SIGNIFICANT CHANGE UP (ref 22–29)
CREAT SERPL-MCNC: 0.93 MG/DL — SIGNIFICANT CHANGE UP (ref 0.5–1.3)
EGFR: 105 ML/MIN/1.73M2 — SIGNIFICANT CHANGE UP
GLUCOSE SERPL-MCNC: 94 MG/DL — SIGNIFICANT CHANGE UP (ref 70–99)
HCT VFR BLD CALC: 34.1 % — LOW (ref 39–50)
HGB BLD-MCNC: 11.5 G/DL — LOW (ref 13–17)
MCHC RBC-ENTMCNC: 28.3 PG — SIGNIFICANT CHANGE UP (ref 27–34)
MCHC RBC-ENTMCNC: 33.7 GM/DL — SIGNIFICANT CHANGE UP (ref 32–36)
MCV RBC AUTO: 84 FL — SIGNIFICANT CHANGE UP (ref 80–100)
PLATELET # BLD AUTO: 312 K/UL — SIGNIFICANT CHANGE UP (ref 150–400)
POTASSIUM SERPL-MCNC: 4.1 MMOL/L — SIGNIFICANT CHANGE UP (ref 3.5–5.3)
POTASSIUM SERPL-SCNC: 4.1 MMOL/L — SIGNIFICANT CHANGE UP (ref 3.5–5.3)
RBC # BLD: 4.06 M/UL — LOW (ref 4.2–5.8)
RBC # FLD: 12.6 % — SIGNIFICANT CHANGE UP (ref 10.3–14.5)
SODIUM SERPL-SCNC: 141 MMOL/L — SIGNIFICANT CHANGE UP (ref 135–145)
VANCOMYCIN TROUGH SERPL-MCNC: 10.3 UG/ML — SIGNIFICANT CHANGE UP (ref 10–20)
WBC # BLD: 2.31 K/UL — LOW (ref 3.8–10.5)
WBC # FLD AUTO: 2.31 K/UL — LOW (ref 3.8–10.5)

## 2022-08-14 PROCEDURE — 99232 SBSQ HOSP IP/OBS MODERATE 35: CPT

## 2022-08-14 RX ADMIN — NAFCILLIN 200 GRAM(S): 10 INJECTION, POWDER, FOR SOLUTION INTRAVENOUS at 10:19

## 2022-08-14 RX ADMIN — ENOXAPARIN SODIUM 40 MILLIGRAM(S): 100 INJECTION SUBCUTANEOUS at 18:13

## 2022-08-14 RX ADMIN — DIPHENHYDRAMINE HYDROCHLORIDE AND LIDOCAINE HYDROCHLORIDE AND ALUMINUM HYDROXIDE AND MAGNESIUM HYDRO 5 MILLILITER(S): KIT at 02:02

## 2022-08-14 RX ADMIN — NAFCILLIN 200 GRAM(S): 10 INJECTION, POWDER, FOR SOLUTION INTRAVENOUS at 01:57

## 2022-08-14 RX ADMIN — DIPHENHYDRAMINE HYDROCHLORIDE AND LIDOCAINE HYDROCHLORIDE AND ALUMINUM HYDROXIDE AND MAGNESIUM HYDRO 5 MILLILITER(S): KIT at 05:14

## 2022-08-14 RX ADMIN — DIPHENHYDRAMINE HYDROCHLORIDE AND LIDOCAINE HYDROCHLORIDE AND ALUMINUM HYDROXIDE AND MAGNESIUM HYDRO 5 MILLILITER(S): KIT at 18:13

## 2022-08-14 RX ADMIN — DIPHENHYDRAMINE HYDROCHLORIDE AND LIDOCAINE HYDROCHLORIDE AND ALUMINUM HYDROXIDE AND MAGNESIUM HYDRO 5 MILLILITER(S): KIT at 11:45

## 2022-08-14 RX ADMIN — MEMANTINE HYDROCHLORIDE 5 MILLIGRAM(S): 10 TABLET ORAL at 05:13

## 2022-08-14 RX ADMIN — Medication 100 MILLIGRAM(S): at 09:01

## 2022-08-14 RX ADMIN — NAFCILLIN 200 GRAM(S): 10 INJECTION, POWDER, FOR SOLUTION INTRAVENOUS at 11:49

## 2022-08-14 RX ADMIN — Medication 250 MILLIGRAM(S): at 18:50

## 2022-08-14 RX ADMIN — SODIUM CHLORIDE 2 GRAM(S): 9 INJECTION INTRAMUSCULAR; INTRAVENOUS; SUBCUTANEOUS at 05:13

## 2022-08-14 RX ADMIN — NAFCILLIN 200 GRAM(S): 10 INJECTION, POWDER, FOR SOLUTION INTRAVENOUS at 18:14

## 2022-08-14 RX ADMIN — LEVETIRACETAM 500 MILLIGRAM(S): 250 TABLET, FILM COATED ORAL at 18:14

## 2022-08-14 RX ADMIN — LEVETIRACETAM 500 MILLIGRAM(S): 250 TABLET, FILM COATED ORAL at 05:14

## 2022-08-14 RX ADMIN — CHLORHEXIDINE GLUCONATE 1 APPLICATION(S): 213 SOLUTION TOPICAL at 11:47

## 2022-08-14 RX ADMIN — Medication 250 MILLIGRAM(S): at 09:01

## 2022-08-14 RX ADMIN — SODIUM CHLORIDE 2 GRAM(S): 9 INJECTION INTRAMUSCULAR; INTRAVENOUS; SUBCUTANEOUS at 11:49

## 2022-08-14 RX ADMIN — Medication 30 GRAM(S): at 11:49

## 2022-08-14 RX ADMIN — SODIUM CHLORIDE 2 GRAM(S): 9 INJECTION INTRAMUSCULAR; INTRAVENOUS; SUBCUTANEOUS at 22:43

## 2022-08-14 RX ADMIN — DIPHENHYDRAMINE HYDROCHLORIDE AND LIDOCAINE HYDROCHLORIDE AND ALUMINUM HYDROXIDE AND MAGNESIUM HYDRO 5 MILLILITER(S): KIT at 22:45

## 2022-08-14 RX ADMIN — MEMANTINE HYDROCHLORIDE 5 MILLIGRAM(S): 10 TABLET ORAL at 18:13

## 2022-08-14 RX ADMIN — Medication 100 MILLIGRAM(S): at 11:47

## 2022-08-14 RX ADMIN — SENNA PLUS 2 TABLET(S): 8.6 TABLET ORAL at 22:43

## 2022-08-14 RX ADMIN — Medication 5 MILLIGRAM(S): at 22:43

## 2022-08-14 RX ADMIN — NAFCILLIN 200 GRAM(S): 10 INJECTION, POWDER, FOR SOLUTION INTRAVENOUS at 22:44

## 2022-08-14 RX ADMIN — NAFCILLIN 200 GRAM(S): 10 INJECTION, POWDER, FOR SOLUTION INTRAVENOUS at 05:14

## 2022-08-14 NOTE — PROGRESS NOTE ADULT - SUBJECTIVE AND OBJECTIVE BOX
Patient is a 42y old  Male who presents with a chief complaint of Trauma/ Subdural/ Intubated (13 Aug 2022 09:39)    pt is speaking with low tone. communicating via RN speaks Armenian but pt is answering to english, mother at bedside  Pt is AAOX2 AM, following all commands.  denies headache,cp,sob,fever,chill  REVIEW OF SYSTEMS: All systems are reviewed and found to be negative except above    MEDICATIONS  (STANDING):  amantadine 100 milliGRAM(s) Oral <User Schedule>  chlorhexidine 2% Cloths 1 Application(s) Topical daily  enoxaparin Injectable 40 milliGRAM(s) SubCutaneous every 24 hours  FIRST- Mouthwash  BLM 5 milliLiter(s) Swish and Swallow four times a day  levETIRAcetam  Solution 500 milliGRAM(s) Oral two times a day  melatonin 5 milliGRAM(s) Oral at bedtime  memantine 5 milliGRAM(s) Oral two times a day  nafcillin  IVPB 2 Gram(s) IV Intermittent every 4 hours  senna 2 Tablet(s) Oral at bedtime  sodium chloride 2 Gram(s) Oral every 8 hours  urea Oral Powder 30 Gram(s) Oral daily  vancomycin  IVPB 1000 milliGRAM(s) IV Intermittent every 12 hours    MEDICATIONS  (PRN):  acetaminophen     Tablet .. 650 milliGRAM(s) Oral every 6 hours PRN Temp greater or equal to 38C (100.4F), Moderate Pain (4 - 6)  ondansetron Injectable 4 milliGRAM(s) IV Push every 4 hours PRN Nausea and/or Vomiting      CAPILLARY BLOOD GLUCOSE        I&O's Summary    13 Aug 2022 07:01  -  14 Aug 2022 07:00  --------------------------------------------------------  IN: 300 mL / OUT: 600 mL / NET: -300 mL        PHYSICAL EXAM:  Vital Signs Last 24 Hrs  T(C): 37.1 (14 Aug 2022 10:47), Max: 37.2 (13 Aug 2022 20:54)  T(F): 98.7 (14 Aug 2022 10:47), Max: 98.9 (13 Aug 2022 20:54)  HR: 84 (14 Aug 2022 10:47) (64 - 84)  BP: 107/71 (14 Aug 2022 10:47) (99/69 - 130/86)  BP(mean): --  RR: 18 (14 Aug 2022 10:47) (18 - 18)  SpO2: 97% (14 Aug 2022 10:47) (97% - 100%)    Parameters below as of 14 Aug 2022 04:52  Patient On (Oxygen Delivery Method): room air        CONSTITUTIONAL: NAD,  EYES: PERRLA; conjunctiva and sclera clear  ENMT: Moist oral mucosa,   RESPIRATORY: Normal respiratory effort; lungs are clear to auscultation bilaterally  CARDIOVASCULAR: Regular rate and rhythm, normal S1 and S2, no murmur   EXTS: No lower extremity edema; Peripheral pulses are 2+ bilaterally  ABDOMEN: Nontender to palpation, normoactive bowel sounds, no rebound/guarding;   MUSCLOSKELETAL:   no  cyanosis of digits; no joint swelling or tenderness to palpation  PSYCH: calm,coop  NEUROLOGY: A+O to person, place, and not to  time; moving all exts.       LABS:                        11.5   2.31  )-----------( 312      ( 14 Aug 2022 07:14 )             34.1     08-14    141  |  103  |  19.2  ----------------------------<  94  4.1   |  26.0  |  0.93    Ca    8.9      14 Aug 2022 07:41                  RADIOLOGY & ADDITIONAL TESTS:  Results Reviewed:

## 2022-08-14 NOTE — PROGRESS NOTE ADULT - SUBJECTIVE AND OBJECTIVE BOX
NO acute overnight event. Sodium remains WNL.    Vital Signs Last 24 Hrs  T(C): 37.1 (14 Aug 2022 10:47), Max: 37.2 (13 Aug 2022 20:54)  T(F): 98.7 (14 Aug 2022 10:47), Max: 98.9 (13 Aug 2022 20:54)  HR: 84 (14 Aug 2022 10:47) (64 - 84)  BP: 107/71 (14 Aug 2022 10:47) (99/69 - 130/86)  BP(mean): --  RR: 18 (14 Aug 2022 10:47) (18 - 18)  SpO2: 97% (14 Aug 2022 10:47) (97% - 100%)    Parameters below as of 14 Aug 2022 12:27  Patient On (Oxygen Delivery Method): room air    I&O's Summary    13 Aug 2022 07:01  -  14 Aug 2022 07:00  --------------------------------------------------------  IN: 300 mL / OUT: 600 mL / NET: -300 mL    Physical Exam  General: WDWN male in NAD  Respiratory: CTAB  Abdomen: Soft, NT  Extremities: No appreciable edema  Neuro: Awake    08-14    141  |  103  |  19.2  ----------------------------<  94  4.1   |  26.0  |  0.93    Ca    8.9      14 Aug 2022 07:41    Assessment and Plan: The patient is a 42 year old male with a prolong and complex hospital course for IPH/SDH s/p hemicraniectomy on 5/24, cranioplasty 6/29, right craniotomy for epidural collection on 7/17. Nephrology is managing hyponatremia.    -Serum osm 271, urine osm 563, urine sodium 185   -HypoNa have initially been refractory to salt tabs and Ure-Na packets    -Consequently tolvaptan added; last dose was given on 08/03/2022   -No longer candidate for Tolvaptan given normonatremia    -Continue sodium chloride tabs 2g TID and Ure-Na 30g daily    -Serum sodium have remained within normal limits on the above regimen  -Consequently will check basic metabolic panel [facility] (Mondays Wednesdays Fridays)     No other recs for now  Nephrology will sign off, please call for ? or concerns.

## 2022-08-14 NOTE — PROGRESS NOTE ADULT - ASSESSMENT
41 y/o M brought by EMS, found down in the street unresponsive.  Imaging showed SDH, IPH, TEOFILO, Patient underwent Right decompressive hemicraniectomy on 5/24, trach/peg 6/1,  R cranioplasty with complex closure 6/29/22.    Cranioplasty drain removed, s/p rpt right craniectomy for evacuation on 7/17 after MR demonstrated large right ED collection. On Abx as per ID . 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 Renal is following.   Trach (decannulated)/ + PEG , on pureed diet       SDH/IPH / TBI S/P R craniotomy / s/p R complex closure cranioplasty ,   repeated CTH  demonstrating right sided subdural collection mixed with air.  s/p rpt right craniectomy for evacuation on 7/17 after MR demonstrated large right ED collection.   Drain removed   - Blood cultures 7/18 no growth   - fluid cultures reporting Staphylococcus aureus (MSSA) and 1 culture with Staph Epi (MRSE)  - Cefapime d/c, on Nafcillin and Vancomycin as per ID : - Will need antibiotics till 9/1/22  - Monitor trough by pharmacy   - continue PT/OT/ speech   - neurochecks as per NS team -  Neuro checks have  been stable   - Keppra 500 mg BID - as per NS   - Provigil / Amantadine - for wakefulness  - PMR is following - recommend ongoing mobilization  --Cleared from a neurosurgery standpoint to be D/C'd to rehab & return for cranioplasty   - Plan for cranioplasty when appropriate, once course of antibiotics finished & cleared by ID      Dysphagia - on TF / pure diet, tolerating well, speech therapy team is following      Hyponatremia - w/u c/w SIADH , renal appreciated  -Na stable  s/p 2% sodium several times, fluid restriction , s/p samsca  c/w nacl and urea    Anemia - likely surgical blood lost - stable     Constipation prophylaxis - continue Senna  Throat pain: trial of magic mouthwash   DVT prophylaxis  - on Lovenox     CARE OF PLAN DW MOTHER AND PT             Dispo: Uninsured, OOB daily, daily PT, family pursuing insurance.    d/w mother/pt  at bedside.  advised plan for abx until 9/1 then ID clearance for cranioplasty per neurosurgery. dr domingo also at bedside reiterating plan.  sw for safe disposition

## 2022-08-15 LAB
ANION GAP SERPL CALC-SCNC: 13 MMOL/L — SIGNIFICANT CHANGE UP (ref 5–17)
BUN SERPL-MCNC: 11.5 MG/DL — SIGNIFICANT CHANGE UP (ref 8–20)
CALCIUM SERPL-MCNC: 9 MG/DL — SIGNIFICANT CHANGE UP (ref 8.4–10.5)
CHLORIDE SERPL-SCNC: 100 MMOL/L — SIGNIFICANT CHANGE UP (ref 98–107)
CO2 SERPL-SCNC: 24 MMOL/L — SIGNIFICANT CHANGE UP (ref 22–29)
CREAT SERPL-MCNC: 0.76 MG/DL — SIGNIFICANT CHANGE UP (ref 0.5–1.3)
EGFR: 115 ML/MIN/1.73M2 — SIGNIFICANT CHANGE UP
GLUCOSE SERPL-MCNC: 98 MG/DL — SIGNIFICANT CHANGE UP (ref 70–99)
HCT VFR BLD CALC: 32.2 % — LOW (ref 39–50)
HGB BLD-MCNC: 11 G/DL — LOW (ref 13–17)
MCHC RBC-ENTMCNC: 28.4 PG — SIGNIFICANT CHANGE UP (ref 27–34)
MCHC RBC-ENTMCNC: 34.2 GM/DL — SIGNIFICANT CHANGE UP (ref 32–36)
MCV RBC AUTO: 83 FL — SIGNIFICANT CHANGE UP (ref 80–100)
PLATELET # BLD AUTO: 297 K/UL — SIGNIFICANT CHANGE UP (ref 150–400)
POTASSIUM SERPL-MCNC: 3.9 MMOL/L — SIGNIFICANT CHANGE UP (ref 3.5–5.3)
POTASSIUM SERPL-SCNC: 3.9 MMOL/L — SIGNIFICANT CHANGE UP (ref 3.5–5.3)
RBC # BLD: 3.88 M/UL — LOW (ref 4.2–5.8)
RBC # FLD: 12.6 % — SIGNIFICANT CHANGE UP (ref 10.3–14.5)
SODIUM SERPL-SCNC: 137 MMOL/L — SIGNIFICANT CHANGE UP (ref 135–145)
VANCOMYCIN TROUGH SERPL-MCNC: 15.2 UG/ML — SIGNIFICANT CHANGE UP (ref 10–20)
WBC # BLD: 2.9 K/UL — LOW (ref 3.8–10.5)
WBC # FLD AUTO: 2.9 K/UL — LOW (ref 3.8–10.5)

## 2022-08-15 PROCEDURE — 99233 SBSQ HOSP IP/OBS HIGH 50: CPT | Mod: GC

## 2022-08-15 PROCEDURE — 99232 SBSQ HOSP IP/OBS MODERATE 35: CPT

## 2022-08-15 RX ADMIN — DIPHENHYDRAMINE HYDROCHLORIDE AND LIDOCAINE HYDROCHLORIDE AND ALUMINUM HYDROXIDE AND MAGNESIUM HYDRO 5 MILLILITER(S): KIT at 05:43

## 2022-08-15 RX ADMIN — MEMANTINE HYDROCHLORIDE 5 MILLIGRAM(S): 10 TABLET ORAL at 05:42

## 2022-08-15 RX ADMIN — NAFCILLIN 200 GRAM(S): 10 INJECTION, POWDER, FOR SOLUTION INTRAVENOUS at 02:53

## 2022-08-15 RX ADMIN — SODIUM CHLORIDE 2 GRAM(S): 9 INJECTION INTRAMUSCULAR; INTRAVENOUS; SUBCUTANEOUS at 21:45

## 2022-08-15 RX ADMIN — Medication 250 MILLIGRAM(S): at 18:02

## 2022-08-15 RX ADMIN — CHLORHEXIDINE GLUCONATE 1 APPLICATION(S): 213 SOLUTION TOPICAL at 12:00

## 2022-08-15 RX ADMIN — NAFCILLIN 200 GRAM(S): 10 INJECTION, POWDER, FOR SOLUTION INTRAVENOUS at 21:45

## 2022-08-15 RX ADMIN — SENNA PLUS 2 TABLET(S): 8.6 TABLET ORAL at 21:45

## 2022-08-15 RX ADMIN — NAFCILLIN 200 GRAM(S): 10 INJECTION, POWDER, FOR SOLUTION INTRAVENOUS at 11:59

## 2022-08-15 RX ADMIN — DIPHENHYDRAMINE HYDROCHLORIDE AND LIDOCAINE HYDROCHLORIDE AND ALUMINUM HYDROXIDE AND MAGNESIUM HYDRO 5 MILLILITER(S): KIT at 12:00

## 2022-08-15 RX ADMIN — LEVETIRACETAM 500 MILLIGRAM(S): 250 TABLET, FILM COATED ORAL at 05:43

## 2022-08-15 RX ADMIN — NAFCILLIN 200 GRAM(S): 10 INJECTION, POWDER, FOR SOLUTION INTRAVENOUS at 05:44

## 2022-08-15 RX ADMIN — ENOXAPARIN SODIUM 40 MILLIGRAM(S): 100 INJECTION SUBCUTANEOUS at 18:04

## 2022-08-15 RX ADMIN — Medication 100 MILLIGRAM(S): at 09:20

## 2022-08-15 RX ADMIN — NAFCILLIN 200 GRAM(S): 10 INJECTION, POWDER, FOR SOLUTION INTRAVENOUS at 17:59

## 2022-08-15 RX ADMIN — Medication 5 MILLIGRAM(S): at 21:45

## 2022-08-15 RX ADMIN — LEVETIRACETAM 500 MILLIGRAM(S): 250 TABLET, FILM COATED ORAL at 17:59

## 2022-08-15 RX ADMIN — MEMANTINE HYDROCHLORIDE 5 MILLIGRAM(S): 10 TABLET ORAL at 18:00

## 2022-08-15 RX ADMIN — SODIUM CHLORIDE 2 GRAM(S): 9 INJECTION INTRAMUSCULAR; INTRAVENOUS; SUBCUTANEOUS at 05:42

## 2022-08-15 RX ADMIN — DIPHENHYDRAMINE HYDROCHLORIDE AND LIDOCAINE HYDROCHLORIDE AND ALUMINUM HYDROXIDE AND MAGNESIUM HYDRO 5 MILLILITER(S): KIT at 18:00

## 2022-08-15 RX ADMIN — Medication 30 GRAM(S): at 11:57

## 2022-08-15 RX ADMIN — NAFCILLIN 200 GRAM(S): 10 INJECTION, POWDER, FOR SOLUTION INTRAVENOUS at 09:20

## 2022-08-15 RX ADMIN — Medication 100 MILLIGRAM(S): at 11:57

## 2022-08-15 RX ADMIN — SODIUM CHLORIDE 2 GRAM(S): 9 INJECTION INTRAMUSCULAR; INTRAVENOUS; SUBCUTANEOUS at 11:56

## 2022-08-15 RX ADMIN — Medication 250 MILLIGRAM(S): at 05:44
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
## 2022-08-15 NOTE — PROGRESS NOTE ADULT - SUBJECTIVE AND OBJECTIVE BOX
Patient is a 42y old  Male who presents with a chief complaint of Trauma/ Subdural/ Intubated (14 Aug 2022 13:44)    pt denies any pain,cp,sob, n/v/d,fever,chill  REVIEW OF SYSTEMS: All systems are reviewed and found to be negative except above    MEDICATIONS  (STANDING):  amantadine 100 milliGRAM(s) Oral <User Schedule>  chlorhexidine 2% Cloths 1 Application(s) Topical daily  enoxaparin Injectable 40 milliGRAM(s) SubCutaneous every 24 hours  FIRST- Mouthwash  BLM 5 milliLiter(s) Swish and Swallow four times a day  levETIRAcetam  Solution 500 milliGRAM(s) Oral two times a day  melatonin 5 milliGRAM(s) Oral at bedtime  memantine 5 milliGRAM(s) Oral two times a day  nafcillin  IVPB 2 Gram(s) IV Intermittent every 4 hours  senna 2 Tablet(s) Oral at bedtime  sodium chloride 2 Gram(s) Oral every 8 hours  urea Oral Powder 30 Gram(s) Oral daily  vancomycin  IVPB 1000 milliGRAM(s) IV Intermittent every 12 hours    MEDICATIONS  (PRN):  acetaminophen     Tablet .. 650 milliGRAM(s) Oral every 6 hours PRN Temp greater or equal to 38C (100.4F), Moderate Pain (4 - 6)  ondansetron Injectable 4 milliGRAM(s) IV Push every 4 hours PRN Nausea and/or Vomiting      CAPILLARY BLOOD GLUCOSE        I&O's Summary    14 Aug 2022 07:01  -  15 Aug 2022 07:00  --------------------------------------------------------  IN: 550 mL / OUT: 1100 mL / NET: -550 mL        PHYSICAL EXAM:  Vital Signs Last 24 Hrs  T(C): 36.7 (15 Aug 2022 11:04), Max: 37.3 (14 Aug 2022 20:48)  T(F): 98.1 (15 Aug 2022 11:04), Max: 99.2 (14 Aug 2022 20:48)  HR: 87 (15 Aug 2022 11:04) (63 - 87)  BP: 116/78 (15 Aug 2022 11:04) (105/69 - 124/81)  BP(mean): --  RR: 18 (15 Aug 2022 11:04) (18 - 18)  SpO2: 97% (15 Aug 2022 11:04) (96% - 99%)    Parameters below as of 15 Aug 2022 11:04  Patient On (Oxygen Delivery Method): room air        CONSTITUTIONAL: NAD,  EYES: PERRLA; conjunctiva and sclera clear  ENMT: Moist oral mucosa,   RESPIRATORY: Normal respiratory effort; lungs are clear to auscultation bilaterally  CARDIOVASCULAR: Regular rate and rhythm, normal S1 and S2, no murmur   EXTS: No lower extremity edema; Peripheral pulses are 2+ bilaterally  ABDOMEN: Nontender to palpation, normoactive bowel sounds, no rebound/guarding;   MUSCLOSKELETA:no joint swelling or tenderness to palpation  PSYCH: coop,calm  NEUROLOGY: A+O to person, place, and not to  time; moving all exts      LABS:                        11.0   2.90  )-----------( 297      ( 15 Aug 2022 04:30 )             32.2     08-15    137  |  100  |  11.5  ----------------------------<  98  3.9   |  24.0  |  0.76    Ca    9.0      15 Aug 2022 04:30                  RADIOLOGY & ADDITIONAL TESTS:  Results Reviewed:

## 2022-08-16 PROCEDURE — 99232 SBSQ HOSP IP/OBS MODERATE 35: CPT

## 2022-08-16 PROCEDURE — 99233 SBSQ HOSP IP/OBS HIGH 50: CPT

## 2022-08-16 RX ADMIN — Medication 100 MILLIGRAM(S): at 12:00

## 2022-08-16 RX ADMIN — Medication 30 GRAM(S): at 12:01

## 2022-08-16 RX ADMIN — NAFCILLIN 200 GRAM(S): 10 INJECTION, POWDER, FOR SOLUTION INTRAVENOUS at 05:39

## 2022-08-16 RX ADMIN — NAFCILLIN 200 GRAM(S): 10 INJECTION, POWDER, FOR SOLUTION INTRAVENOUS at 15:24

## 2022-08-16 RX ADMIN — ENOXAPARIN SODIUM 40 MILLIGRAM(S): 100 INJECTION SUBCUTANEOUS at 18:15

## 2022-08-16 RX ADMIN — DIPHENHYDRAMINE HYDROCHLORIDE AND LIDOCAINE HYDROCHLORIDE AND ALUMINUM HYDROXIDE AND MAGNESIUM HYDRO 5 MILLILITER(S): KIT at 05:38

## 2022-08-16 RX ADMIN — DIPHENHYDRAMINE HYDROCHLORIDE AND LIDOCAINE HYDROCHLORIDE AND ALUMINUM HYDROXIDE AND MAGNESIUM HYDRO 5 MILLILITER(S): KIT at 12:01

## 2022-08-16 RX ADMIN — Medication 250 MILLIGRAM(S): at 18:13

## 2022-08-16 RX ADMIN — SODIUM CHLORIDE 2 GRAM(S): 9 INJECTION INTRAMUSCULAR; INTRAVENOUS; SUBCUTANEOUS at 05:38

## 2022-08-16 RX ADMIN — MEMANTINE HYDROCHLORIDE 5 MILLIGRAM(S): 10 TABLET ORAL at 18:14

## 2022-08-16 RX ADMIN — Medication 5 MILLIGRAM(S): at 21:35

## 2022-08-16 RX ADMIN — DIPHENHYDRAMINE HYDROCHLORIDE AND LIDOCAINE HYDROCHLORIDE AND ALUMINUM HYDROXIDE AND MAGNESIUM HYDRO 5 MILLILITER(S): KIT at 21:37

## 2022-08-16 RX ADMIN — NAFCILLIN 200 GRAM(S): 10 INJECTION, POWDER, FOR SOLUTION INTRAVENOUS at 18:14

## 2022-08-16 RX ADMIN — MEMANTINE HYDROCHLORIDE 5 MILLIGRAM(S): 10 TABLET ORAL at 05:38

## 2022-08-16 RX ADMIN — SODIUM CHLORIDE 2 GRAM(S): 9 INJECTION INTRAMUSCULAR; INTRAVENOUS; SUBCUTANEOUS at 21:35

## 2022-08-16 RX ADMIN — SENNA PLUS 2 TABLET(S): 8.6 TABLET ORAL at 21:35

## 2022-08-16 RX ADMIN — LEVETIRACETAM 500 MILLIGRAM(S): 250 TABLET, FILM COATED ORAL at 05:38

## 2022-08-16 RX ADMIN — LEVETIRACETAM 500 MILLIGRAM(S): 250 TABLET, FILM COATED ORAL at 18:14

## 2022-08-16 RX ADMIN — Medication 250 MILLIGRAM(S): at 05:39

## 2022-08-16 RX ADMIN — NAFCILLIN 200 GRAM(S): 10 INJECTION, POWDER, FOR SOLUTION INTRAVENOUS at 01:49

## 2022-08-16 RX ADMIN — DIPHENHYDRAMINE HYDROCHLORIDE AND LIDOCAINE HYDROCHLORIDE AND ALUMINUM HYDROXIDE AND MAGNESIUM HYDRO 5 MILLILITER(S): KIT at 00:00

## 2022-08-16 RX ADMIN — DIPHENHYDRAMINE HYDROCHLORIDE AND LIDOCAINE HYDROCHLORIDE AND ALUMINUM HYDROXIDE AND MAGNESIUM HYDRO 5 MILLILITER(S): KIT at 18:13

## 2022-08-16 RX ADMIN — NAFCILLIN 200 GRAM(S): 10 INJECTION, POWDER, FOR SOLUTION INTRAVENOUS at 21:31

## 2022-08-16 RX ADMIN — NAFCILLIN 200 GRAM(S): 10 INJECTION, POWDER, FOR SOLUTION INTRAVENOUS at 11:59

## 2022-08-16 RX ADMIN — CHLORHEXIDINE GLUCONATE 1 APPLICATION(S): 213 SOLUTION TOPICAL at 12:06

## 2022-08-16 RX ADMIN — Medication 100 MILLIGRAM(S): at 15:24

## 2022-08-16 RX ADMIN — SODIUM CHLORIDE 2 GRAM(S): 9 INJECTION INTRAMUSCULAR; INTRAVENOUS; SUBCUTANEOUS at 15:24
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
## 2022-08-16 NOTE — PROGRESS NOTE ADULT - SUBJECTIVE AND OBJECTIVE BOX
Patient is a 42y old  Male who presents with a chief complaint of Trauma/ Subdural/ Intubated (16 Aug 2022 08:45)    no acute change .denies any pain,chill,headache,sob,cp  REVIEW OF SYSTEMS: All systems are reviewed and found to be negative except above    MEDICATIONS  (STANDING):  amantadine 100 milliGRAM(s) Oral <User Schedule>  chlorhexidine 2% Cloths 1 Application(s) Topical daily  enoxaparin Injectable 40 milliGRAM(s) SubCutaneous every 24 hours  FIRST- Mouthwash  BLM 5 milliLiter(s) Swish and Swallow four times a day  levETIRAcetam  Solution 500 milliGRAM(s) Oral two times a day  melatonin 5 milliGRAM(s) Oral at bedtime  memantine 5 milliGRAM(s) Oral two times a day  nafcillin  IVPB 2 Gram(s) IV Intermittent every 4 hours  senna 2 Tablet(s) Oral at bedtime  sodium chloride 2 Gram(s) Oral every 8 hours  urea Oral Powder 30 Gram(s) Oral daily  vancomycin  IVPB 1000 milliGRAM(s) IV Intermittent every 12 hours    MEDICATIONS  (PRN):  acetaminophen     Tablet .. 650 milliGRAM(s) Oral every 6 hours PRN Temp greater or equal to 38C (100.4F), Moderate Pain (4 - 6)  ondansetron Injectable 4 milliGRAM(s) IV Push every 4 hours PRN Nausea and/or Vomiting      CAPILLARY BLOOD GLUCOSE        I&O's Summary    15 Aug 2022 07:01  -  16 Aug 2022 07:00  --------------------------------------------------------  IN: 0 mL / OUT: 725 mL / NET: -725 mL        PHYSICAL EXAM:  Vital Signs Last 24 Hrs  T(C): 37 (16 Aug 2022 10:37), Max: 37 (16 Aug 2022 10:37)  T(F): 98.6 (16 Aug 2022 10:37), Max: 98.6 (16 Aug 2022 10:37)  HR: 78 (16 Aug 2022 10:37) (60 - 82)  BP: 125/86 (16 Aug 2022 10:37) (112/74 - 125/86)  BP(mean): --  RR: 16 (16 Aug 2022 10:37) (16 - 18)  SpO2: 94% (16 Aug 2022 10:37) (94% - 100%)    Parameters below as of 16 Aug 2022 10:37  Patient On (Oxygen Delivery Method): room air        CONSTITUTIONAL: NAD,  EYES: PERRLA; conjunctiva and sclera clear  ENMT: Moist oral mucosa,   RESPIRATORY: Normal respiratory effort; lungs are clear to auscultation bilaterally  CARDIOVASCULAR: Regular rate and rhythm, normal S1 and S2, no murmur   EXTS: No lower extremity edema; Peripheral pulses are 2+ bilaterally  ABDOMEN: Nontender to palpation, normoactive bowel sounds, no rebound/guarding;   MUSCLOSKELETAL:  ; no joint swelling or tenderness to palpation  PSYCH:calm  NEUROLOGY: Awake,alert,slow speech,moving exts.      LABS:                        11.0   2.90  )-----------( 297      ( 15 Aug 2022 04:30 )             32.2     08-15    137  |  100  |  11.5  ----------------------------<  98  3.9   |  24.0  |  0.76    Ca    9.0      15 Aug 2022 04:30                  RADIOLOGY & ADDITIONAL TESTS:  Results Reviewed:

## 2022-08-16 NOTE — PROGRESS NOTE ADULT - NUTRITIONAL ASSESSMENT
This patient has been assessed with a concern for Malnutrition and has been determined to have a diagnosis/diagnoses of Severe protein-calorie malnutrition.    This patient is being managed with:   Diet Pureed-  Moderately Thick Liquids (MODTHICKLIQS)  Free Water Flush  Bolus   Total Volume per Flush (mL): 100   Frequency: Every 24 Hours   Total Daily Volume of Flush (mL): 100  Entered: Aug 16 2022  8:51AM
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
## 2022-08-17 LAB
ALBUMIN SERPL ELPH-MCNC: 3.4 G/DL — SIGNIFICANT CHANGE UP (ref 3.3–5.2)
ALP SERPL-CCNC: 74 U/L — SIGNIFICANT CHANGE UP (ref 40–120)
ALT FLD-CCNC: 8 U/L — SIGNIFICANT CHANGE UP
ANION GAP SERPL CALC-SCNC: 11 MMOL/L — SIGNIFICANT CHANGE UP (ref 5–17)
AST SERPL-CCNC: 9 U/L — SIGNIFICANT CHANGE UP
BILIRUB SERPL-MCNC: <0.2 MG/DL — LOW (ref 0.4–2)
BUN SERPL-MCNC: 15.6 MG/DL — SIGNIFICANT CHANGE UP (ref 8–20)
CALCIUM SERPL-MCNC: 8.9 MG/DL — SIGNIFICANT CHANGE UP (ref 8.4–10.5)
CHLORIDE SERPL-SCNC: 104 MMOL/L — SIGNIFICANT CHANGE UP (ref 98–107)
CO2 SERPL-SCNC: 24 MMOL/L — SIGNIFICANT CHANGE UP (ref 22–29)
CREAT SERPL-MCNC: 0.78 MG/DL — SIGNIFICANT CHANGE UP (ref 0.5–1.3)
EGFR: 114 ML/MIN/1.73M2 — SIGNIFICANT CHANGE UP
GLUCOSE SERPL-MCNC: 142 MG/DL — HIGH (ref 70–99)
HCT VFR BLD CALC: 34.9 % — LOW (ref 39–50)
HGB BLD-MCNC: 11.6 G/DL — LOW (ref 13–17)
MCHC RBC-ENTMCNC: 27.8 PG — SIGNIFICANT CHANGE UP (ref 27–34)
MCHC RBC-ENTMCNC: 33.2 GM/DL — SIGNIFICANT CHANGE UP (ref 32–36)
MCV RBC AUTO: 83.5 FL — SIGNIFICANT CHANGE UP (ref 80–100)
PLATELET # BLD AUTO: 302 K/UL — SIGNIFICANT CHANGE UP (ref 150–400)
POTASSIUM SERPL-MCNC: 3.6 MMOL/L — SIGNIFICANT CHANGE UP (ref 3.5–5.3)
POTASSIUM SERPL-SCNC: 3.6 MMOL/L — SIGNIFICANT CHANGE UP (ref 3.5–5.3)
PROT SERPL-MCNC: 6.6 G/DL — SIGNIFICANT CHANGE UP (ref 6.6–8.7)
RBC # BLD: 4.18 M/UL — LOW (ref 4.2–5.8)
RBC # FLD: 12.8 % — SIGNIFICANT CHANGE UP (ref 10.3–14.5)
SODIUM SERPL-SCNC: 139 MMOL/L — SIGNIFICANT CHANGE UP (ref 135–145)
WBC # BLD: 3.16 K/UL — LOW (ref 3.8–10.5)
WBC # FLD AUTO: 3.16 K/UL — LOW (ref 3.8–10.5)

## 2022-08-17 PROCEDURE — 99232 SBSQ HOSP IP/OBS MODERATE 35: CPT

## 2022-08-17 PROCEDURE — 99233 SBSQ HOSP IP/OBS HIGH 50: CPT

## 2022-08-17 RX ADMIN — MEMANTINE HYDROCHLORIDE 5 MILLIGRAM(S): 10 TABLET ORAL at 17:23

## 2022-08-17 RX ADMIN — NAFCILLIN 200 GRAM(S): 10 INJECTION, POWDER, FOR SOLUTION INTRAVENOUS at 21:11

## 2022-08-17 RX ADMIN — Medication 250 MILLIGRAM(S): at 17:23

## 2022-08-17 RX ADMIN — MEMANTINE HYDROCHLORIDE 5 MILLIGRAM(S): 10 TABLET ORAL at 05:55

## 2022-08-17 RX ADMIN — DIPHENHYDRAMINE HYDROCHLORIDE AND LIDOCAINE HYDROCHLORIDE AND ALUMINUM HYDROXIDE AND MAGNESIUM HYDRO 5 MILLILITER(S): KIT at 12:51

## 2022-08-17 RX ADMIN — NAFCILLIN 200 GRAM(S): 10 INJECTION, POWDER, FOR SOLUTION INTRAVENOUS at 10:28

## 2022-08-17 RX ADMIN — NAFCILLIN 200 GRAM(S): 10 INJECTION, POWDER, FOR SOLUTION INTRAVENOUS at 14:27

## 2022-08-17 RX ADMIN — DIPHENHYDRAMINE HYDROCHLORIDE AND LIDOCAINE HYDROCHLORIDE AND ALUMINUM HYDROXIDE AND MAGNESIUM HYDRO 5 MILLILITER(S): KIT at 05:55

## 2022-08-17 RX ADMIN — LEVETIRACETAM 500 MILLIGRAM(S): 250 TABLET, FILM COATED ORAL at 05:55

## 2022-08-17 RX ADMIN — SODIUM CHLORIDE 2 GRAM(S): 9 INJECTION INTRAMUSCULAR; INTRAVENOUS; SUBCUTANEOUS at 14:27

## 2022-08-17 RX ADMIN — Medication 30 GRAM(S): at 14:28

## 2022-08-17 RX ADMIN — SENNA PLUS 2 TABLET(S): 8.6 TABLET ORAL at 21:10

## 2022-08-17 RX ADMIN — Medication 250 MILLIGRAM(S): at 05:59

## 2022-08-17 RX ADMIN — SODIUM CHLORIDE 2 GRAM(S): 9 INJECTION INTRAMUSCULAR; INTRAVENOUS; SUBCUTANEOUS at 21:10

## 2022-08-17 RX ADMIN — SODIUM CHLORIDE 2 GRAM(S): 9 INJECTION INTRAMUSCULAR; INTRAVENOUS; SUBCUTANEOUS at 05:55

## 2022-08-17 RX ADMIN — CHLORHEXIDINE GLUCONATE 1 APPLICATION(S): 213 SOLUTION TOPICAL at 17:23

## 2022-08-17 RX ADMIN — DIPHENHYDRAMINE HYDROCHLORIDE AND LIDOCAINE HYDROCHLORIDE AND ALUMINUM HYDROXIDE AND MAGNESIUM HYDRO 5 MILLILITER(S): KIT at 21:18

## 2022-08-17 RX ADMIN — Medication 100 MILLIGRAM(S): at 12:51

## 2022-08-17 RX ADMIN — LEVETIRACETAM 500 MILLIGRAM(S): 250 TABLET, FILM COATED ORAL at 17:23

## 2022-08-17 RX ADMIN — NAFCILLIN 200 GRAM(S): 10 INJECTION, POWDER, FOR SOLUTION INTRAVENOUS at 06:00

## 2022-08-17 RX ADMIN — Medication 5 MILLIGRAM(S): at 21:10

## 2022-08-17 RX ADMIN — ENOXAPARIN SODIUM 40 MILLIGRAM(S): 100 INJECTION SUBCUTANEOUS at 21:10

## 2022-08-17 RX ADMIN — NAFCILLIN 200 GRAM(S): 10 INJECTION, POWDER, FOR SOLUTION INTRAVENOUS at 03:13

## 2022-08-17 RX ADMIN — DIPHENHYDRAMINE HYDROCHLORIDE AND LIDOCAINE HYDROCHLORIDE AND ALUMINUM HYDROXIDE AND MAGNESIUM HYDRO 5 MILLILITER(S): KIT at 17:22

## 2022-08-17 RX ADMIN — NAFCILLIN 200 GRAM(S): 10 INJECTION, POWDER, FOR SOLUTION INTRAVENOUS at 17:22

## 2022-08-17 RX ADMIN — Medication 100 MILLIGRAM(S): at 09:12

## 2022-08-17 NOTE — CHART NOTE - NSCHARTNOTEFT_GEN_A_CORE
Called to evaluate patient for restraints    Other interventions attempted: de-escalation, orientation check, environment modification, medication assessment    I have evaluated this patient and have determined that restraints are warranted to optimize medical care. Patient was assessed for current physical and psychological risk factors as well as special needs. There are no medical conditions or limitations that would place this patient at risk while in restraints.    Pt was previously on b/l wrist restraints, but has still be able to pull at lines.    - Type of restraint: Bilateral secured mittens   - Attending MD Aware
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
## 2022-08-17 NOTE — PROGRESS NOTE ADULT - SUBJECTIVE AND OBJECTIVE BOX
Patient is a 42y old  Male who presents with a chief complaint of Trauma/ Subdural/ Intubated (17 Aug 2022 08:25)    pt denies any pain/headache,sob,chest pain,chill,n/v/d  REVIEW OF SYSTEMS: All systems are reviewed and found to be negative except above    MEDICATIONS  (STANDING):  amantadine 100 milliGRAM(s) Oral <User Schedule>  chlorhexidine 2% Cloths 1 Application(s) Topical daily  enoxaparin Injectable 40 milliGRAM(s) SubCutaneous every 24 hours  FIRST- Mouthwash  BLM 5 milliLiter(s) Swish and Swallow four times a day  levETIRAcetam  Solution 500 milliGRAM(s) Oral two times a day  melatonin 5 milliGRAM(s) Oral at bedtime  memantine 5 milliGRAM(s) Oral two times a day  nafcillin  IVPB 2 Gram(s) IV Intermittent every 4 hours  senna 2 Tablet(s) Oral at bedtime  sodium chloride 2 Gram(s) Oral every 8 hours  urea Oral Powder 30 Gram(s) Oral daily  vancomycin  IVPB 1000 milliGRAM(s) IV Intermittent every 12 hours    MEDICATIONS  (PRN):  acetaminophen     Tablet .. 650 milliGRAM(s) Oral every 6 hours PRN Temp greater or equal to 38C (100.4F), Moderate Pain (4 - 6)  ondansetron Injectable 4 milliGRAM(s) IV Push every 4 hours PRN Nausea and/or Vomiting      CAPILLARY BLOOD GLUCOSE        I&O's Summary    16 Aug 2022 07:01  -  17 Aug 2022 07:00  --------------------------------------------------------  IN: 750 mL / OUT: 1000 mL / NET: -250 mL        PHYSICAL EXAM:  Vital Signs Last 24 Hrs  T(C): 36.9 (17 Aug 2022 05:07), Max: 36.9 (17 Aug 2022 05:07)  T(F): 98.4 (17 Aug 2022 05:07), Max: 98.4 (17 Aug 2022 05:07)  HR: 64 (17 Aug 2022 05:07) (64 - 89)  BP: 121/77 (17 Aug 2022 05:07) (110/69 - 121/77)  BP(mean): --  RR: 18 (17 Aug 2022 05:07) (18 - 18)  SpO2: 97% (17 Aug 2022 05:07) (97% - 99%)    Parameters below as of 16 Aug 2022 20:53  Patient On (Oxygen Delivery Method): room air        CONSTITUTIONAL: NAD,  EYES: PERRLA; conjunctiva and sclera clear  ENMT: Moist oral mucosa,   RESPIRATORY: Normal respiratory effort; lungs are clear to auscultation bilaterally  CARDIOVASCULAR: Regular rate and rhythm, normal S1 and S2, no murmur   EXTS: No lower extremity edema; Peripheral pulses are 2+ bilaterally  ABDOMEN: Nontender to palpation, normoactive bowel sounds, no rebound/guarding;   MUSCLOSKELETAL:   no  cyanosis of digits; no joint swelling or tenderness to palpation  PSYCH: calm  NEUROLOGY: A+O to person, place, and not to time;  no acute motor/ sensory deficits;       LABS:                        11.6   3.16  )-----------( 302      ( 17 Aug 2022 07:17 )             34.9     08-17    139  |  104  |  15.6  ----------------------------<  142<H>  3.6   |  24.0  |  0.78    Ca    8.9      17 Aug 2022 07:17    TPro  6.6  /  Alb  3.4  /  TBili  <0.2<L>  /  DBili  x   /  AST  9   /  ALT  8   /  AlkPhos  74  08-17                RADIOLOGY & ADDITIONAL TESTS:  Results Reviewed:
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
## 2022-08-18 LAB
SARS-COV-2 RNA SPEC QL NAA+PROBE: SIGNIFICANT CHANGE UP
VANCOMYCIN TROUGH SERPL-MCNC: 15.3 UG/ML — SIGNIFICANT CHANGE UP (ref 10–20)

## 2022-08-18 PROCEDURE — 99232 SBSQ HOSP IP/OBS MODERATE 35: CPT

## 2022-08-18 PROCEDURE — 99233 SBSQ HOSP IP/OBS HIGH 50: CPT

## 2022-08-18 RX ADMIN — DIPHENHYDRAMINE HYDROCHLORIDE AND LIDOCAINE HYDROCHLORIDE AND ALUMINUM HYDROXIDE AND MAGNESIUM HYDRO 5 MILLILITER(S): KIT at 17:18

## 2022-08-18 RX ADMIN — SODIUM CHLORIDE 2 GRAM(S): 9 INJECTION INTRAMUSCULAR; INTRAVENOUS; SUBCUTANEOUS at 14:35

## 2022-08-18 RX ADMIN — LEVETIRACETAM 500 MILLIGRAM(S): 250 TABLET, FILM COATED ORAL at 05:31

## 2022-08-18 RX ADMIN — Medication 250 MILLIGRAM(S): at 17:21

## 2022-08-18 RX ADMIN — NAFCILLIN 200 GRAM(S): 10 INJECTION, POWDER, FOR SOLUTION INTRAVENOUS at 10:21

## 2022-08-18 RX ADMIN — NAFCILLIN 200 GRAM(S): 10 INJECTION, POWDER, FOR SOLUTION INTRAVENOUS at 18:21

## 2022-08-18 RX ADMIN — MEMANTINE HYDROCHLORIDE 5 MILLIGRAM(S): 10 TABLET ORAL at 05:32

## 2022-08-18 RX ADMIN — Medication 30 GRAM(S): at 17:20

## 2022-08-18 RX ADMIN — LEVETIRACETAM 500 MILLIGRAM(S): 250 TABLET, FILM COATED ORAL at 17:20

## 2022-08-18 RX ADMIN — SODIUM CHLORIDE 2 GRAM(S): 9 INJECTION INTRAMUSCULAR; INTRAVENOUS; SUBCUTANEOUS at 05:32

## 2022-08-18 RX ADMIN — Medication 250 MILLIGRAM(S): at 05:32

## 2022-08-18 RX ADMIN — Medication 100 MILLIGRAM(S): at 12:54

## 2022-08-18 RX ADMIN — DIPHENHYDRAMINE HYDROCHLORIDE AND LIDOCAINE HYDROCHLORIDE AND ALUMINUM HYDROXIDE AND MAGNESIUM HYDRO 5 MILLILITER(S): KIT at 21:18

## 2022-08-18 RX ADMIN — ENOXAPARIN SODIUM 40 MILLIGRAM(S): 100 INJECTION SUBCUTANEOUS at 18:21

## 2022-08-18 RX ADMIN — NAFCILLIN 200 GRAM(S): 10 INJECTION, POWDER, FOR SOLUTION INTRAVENOUS at 14:35

## 2022-08-18 RX ADMIN — NAFCILLIN 200 GRAM(S): 10 INJECTION, POWDER, FOR SOLUTION INTRAVENOUS at 05:33

## 2022-08-18 RX ADMIN — MEMANTINE HYDROCHLORIDE 5 MILLIGRAM(S): 10 TABLET ORAL at 17:19

## 2022-08-18 RX ADMIN — Medication 5 MILLIGRAM(S): at 21:17

## 2022-08-18 RX ADMIN — DIPHENHYDRAMINE HYDROCHLORIDE AND LIDOCAINE HYDROCHLORIDE AND ALUMINUM HYDROXIDE AND MAGNESIUM HYDRO 5 MILLILITER(S): KIT at 12:54

## 2022-08-18 RX ADMIN — NAFCILLIN 200 GRAM(S): 10 INJECTION, POWDER, FOR SOLUTION INTRAVENOUS at 21:17

## 2022-08-18 RX ADMIN — SODIUM CHLORIDE 2 GRAM(S): 9 INJECTION INTRAMUSCULAR; INTRAVENOUS; SUBCUTANEOUS at 21:18

## 2022-08-18 RX ADMIN — Medication 100 MILLIGRAM(S): at 10:51

## 2022-08-18 RX ADMIN — NAFCILLIN 200 GRAM(S): 10 INJECTION, POWDER, FOR SOLUTION INTRAVENOUS at 01:35

## 2022-08-18 RX ADMIN — DIPHENHYDRAMINE HYDROCHLORIDE AND LIDOCAINE HYDROCHLORIDE AND ALUMINUM HYDROXIDE AND MAGNESIUM HYDRO 5 MILLILITER(S): KIT at 05:32

## 2022-08-18 RX ADMIN — SENNA PLUS 2 TABLET(S): 8.6 TABLET ORAL at 21:17

## 2022-08-18 NOTE — CHART NOTE - NSCHARTNOTEFT_GEN_A_CORE
Called by RN to evaluate pt for renewal of restraints.  As per RN, pt tries to pull on PEG tube/ peripheral IV line.  Pt is disoriented, unable to be redirected, orientation check, environment modification have not been successful.      ICU Vital Signs Last 24 Hrs  T(C): 36.6 (18 Aug 2022 19:59), Max: 37.2 (18 Aug 2022 16:42)  T(F): 97.8 (18 Aug 2022 19:59), Max: 99 (18 Aug 2022 16:42)  HR: 61 (18 Aug 2022 23:14) (60 - 75)  BP: 111/75 (18 Aug 2022 23:14) (107/67 - 111/75)  BP(mean): 87 (18 Aug 2022 23:14) (80 - 87)  ABP: --  ABP(mean): --  RR: 18 (18 Aug 2022 23:14) (18 - 18)  SpO2: 98% (18 Aug 2022 23:14) (96% - 98%)    O2 Parameters below as of 18 Aug 2022 23:14  Patient On (Oxygen Delivery Method): room air    Pt found to be in no distress.  At this time, bilateral secured mittens are warranted for optimal medical care.    Nocturnist was made aware and in agreement.    RN to monitor for improvement in pt's compliance and alert provider to de escalate. Called by RN to evaluate pt for renewal of restraints.  As per RN, pt tries to pull on PEG tube/ peripheral IV line.  Pt is disoriented, unable to be redirected, orientation check, environment modification have not been successful.      ICU Vital Signs Last 24 Hrs  T(C): 36.6 (18 Aug 2022 19:59), Max: 37.2 (18 Aug 2022 16:42)  T(F): 97.8 (18 Aug 2022 19:59), Max: 99 (18 Aug 2022 16:42)  HR: 61 (18 Aug 2022 23:14) (60 - 75)  BP: 111/75 (18 Aug 2022 23:14) (107/67 - 111/75)  BP(mean): 87 (18 Aug 2022 23:14) (80 - 87)  ABP: --  ABP(mean): --  RR: 18 (18 Aug 2022 23:14) (18 - 18)  SpO2: 98% (18 Aug 2022 23:14) (96% - 98%)    O2 Parameters below as of 18 Aug 2022 23:14  Patient On (Oxygen Delivery Method): room air    Pt found to be in no distress.  At this time, bilateral secured mittens are warranted for optimal medical care.    Dr. Fatima was made aware and in agreement.    RN to monitor for improvement in pt's compliance and alert provider to de escalate.
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
## 2022-08-18 NOTE — PROGRESS NOTE ADULT - SUBJECTIVE AND OBJECTIVE BOX
Patient is a 42y old  Male who presents with a chief complaint of Trauma/ Subdural/ Intubated (18 Aug 2022 08:25)    No acute issues  pt denies any pain,fever,chill,sob,weakness,numbness  REVIEW OF SYSTEMS: All systems are reviewed and found to be negative except above    MEDICATIONS  (STANDING):  amantadine 100 milliGRAM(s) Oral <User Schedule>  chlorhexidine 2% Cloths 1 Application(s) Topical daily  enoxaparin Injectable 40 milliGRAM(s) SubCutaneous every 24 hours  FIRST- Mouthwash  BLM 5 milliLiter(s) Swish and Swallow four times a day  levETIRAcetam  Solution 500 milliGRAM(s) Oral two times a day  melatonin 5 milliGRAM(s) Oral at bedtime  memantine 5 milliGRAM(s) Oral two times a day  nafcillin  IVPB 2 Gram(s) IV Intermittent every 4 hours  senna 2 Tablet(s) Oral at bedtime  sodium chloride 2 Gram(s) Oral every 8 hours  urea Oral Powder 30 Gram(s) Oral daily  vancomycin  IVPB 1000 milliGRAM(s) IV Intermittent every 12 hours    MEDICATIONS  (PRN):  acetaminophen     Tablet .. 650 milliGRAM(s) Oral every 6 hours PRN Temp greater or equal to 38C (100.4F), Moderate Pain (4 - 6)  ondansetron Injectable 4 milliGRAM(s) IV Push every 4 hours PRN Nausea and/or Vomiting      CAPILLARY BLOOD GLUCOSE        I&O's Summary      PHYSICAL EXAM:  Vital Signs Last 24 Hrs  T(C): 36.8 (18 Aug 2022 10:03), Max: 36.8 (17 Aug 2022 16:15)  T(F): 98.2 (18 Aug 2022 10:03), Max: 98.3 (17 Aug 2022 21:08)  HR: 65 (18 Aug 2022 10:03) (64 - 68)  BP: 109/65 (18 Aug 2022 10:03) (107/67 - 119/78)  BP(mean): 86 (17 Aug 2022 21:08) (86 - 86)  RR: 18 (18 Aug 2022 10:03) (18 - 18)  SpO2: 96% (18 Aug 2022 10:03) (95% - 98%)    Parameters below as of 18 Aug 2022 10:03  Patient On (Oxygen Delivery Method): room air        CONSTITUTIONAL: NAD,  EYES: PERRLA; conjunctiva and sclera clear  ENMT: Moist oral mucosa,   RESPIRATORY: Normal respiratory effort; lungs are clear to auscultation bilaterally  CARDIOVASCULAR: Regular rate and rhythm, normal S1 and S2, no murmur   EXTS: No lower extremity edema; Peripheral pulses are 2+ bilaterally  ABDOMEN: Nontender to palpation, normoactive bowel sounds, no rebound/guarding;   MUSCLOSKELETAL:  ; no joint swelling or tenderness to palpation  PSYCH: coop  NEUROLOGY: A+O to person, place, and not to  time;  no acute motor/ sensory deficits;       LABS:                        11.6   3.16  )-----------( 302      ( 17 Aug 2022 07:17 )             34.9     08-17    139  |  104  |  15.6  ----------------------------<  142<H>  3.6   |  24.0  |  0.78    Ca    8.9      17 Aug 2022 07:17    TPro  6.6  /  Alb  3.4  /  TBili  <0.2<L>  /  DBili  x   /  AST  9   /  ALT  8   /  AlkPhos  74  08-17                RADIOLOGY & ADDITIONAL TESTS:  Results Reviewed:
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
## 2022-08-19 LAB
ANION GAP SERPL CALC-SCNC: 12 MMOL/L — SIGNIFICANT CHANGE UP (ref 5–17)
BUN SERPL-MCNC: 7.1 MG/DL — LOW (ref 8–20)
CALCIUM SERPL-MCNC: 8.8 MG/DL — SIGNIFICANT CHANGE UP (ref 8.4–10.5)
CHLORIDE SERPL-SCNC: 98 MMOL/L — SIGNIFICANT CHANGE UP (ref 98–107)
CO2 SERPL-SCNC: 23 MMOL/L — SIGNIFICANT CHANGE UP (ref 22–29)
CREAT SERPL-MCNC: 0.81 MG/DL — SIGNIFICANT CHANGE UP (ref 0.5–1.3)
EGFR: 113 ML/MIN/1.73M2 — SIGNIFICANT CHANGE UP
GLUCOSE SERPL-MCNC: 127 MG/DL — HIGH (ref 70–99)
HCT VFR BLD CALC: 33.9 % — LOW (ref 39–50)
HGB BLD-MCNC: 11.4 G/DL — LOW (ref 13–17)
MCHC RBC-ENTMCNC: 27.7 PG — SIGNIFICANT CHANGE UP (ref 27–34)
MCHC RBC-ENTMCNC: 33.6 GM/DL — SIGNIFICANT CHANGE UP (ref 32–36)
MCV RBC AUTO: 82.5 FL — SIGNIFICANT CHANGE UP (ref 80–100)
PLATELET # BLD AUTO: 248 K/UL — SIGNIFICANT CHANGE UP (ref 150–400)
POTASSIUM SERPL-MCNC: 3.8 MMOL/L — SIGNIFICANT CHANGE UP (ref 3.5–5.3)
POTASSIUM SERPL-SCNC: 3.8 MMOL/L — SIGNIFICANT CHANGE UP (ref 3.5–5.3)
RBC # BLD: 4.11 M/UL — LOW (ref 4.2–5.8)
RBC # FLD: 12.8 % — SIGNIFICANT CHANGE UP (ref 10.3–14.5)
SODIUM SERPL-SCNC: 133 MMOL/L — LOW (ref 135–145)
WBC # BLD: 2.33 K/UL — LOW (ref 3.8–10.5)
WBC # FLD AUTO: 2.33 K/UL — LOW (ref 3.8–10.5)

## 2022-08-19 PROCEDURE — 99232 SBSQ HOSP IP/OBS MODERATE 35: CPT

## 2022-08-19 PROCEDURE — 99233 SBSQ HOSP IP/OBS HIGH 50: CPT

## 2022-08-19 RX ADMIN — Medication 100 MILLIGRAM(S): at 12:50

## 2022-08-19 RX ADMIN — DIPHENHYDRAMINE HYDROCHLORIDE AND LIDOCAINE HYDROCHLORIDE AND ALUMINUM HYDROXIDE AND MAGNESIUM HYDRO 5 MILLILITER(S): KIT at 05:34

## 2022-08-19 RX ADMIN — NAFCILLIN 200 GRAM(S): 10 INJECTION, POWDER, FOR SOLUTION INTRAVENOUS at 05:36

## 2022-08-19 RX ADMIN — CHLORHEXIDINE GLUCONATE 1 APPLICATION(S): 213 SOLUTION TOPICAL at 12:51

## 2022-08-19 RX ADMIN — LEVETIRACETAM 500 MILLIGRAM(S): 250 TABLET, FILM COATED ORAL at 18:00

## 2022-08-19 RX ADMIN — SENNA PLUS 2 TABLET(S): 8.6 TABLET ORAL at 21:13

## 2022-08-19 RX ADMIN — DIPHENHYDRAMINE HYDROCHLORIDE AND LIDOCAINE HYDROCHLORIDE AND ALUMINUM HYDROXIDE AND MAGNESIUM HYDRO 5 MILLILITER(S): KIT at 18:07

## 2022-08-19 RX ADMIN — Medication 30 GRAM(S): at 12:51

## 2022-08-19 RX ADMIN — Medication 100 MILLIGRAM(S): at 09:07

## 2022-08-19 RX ADMIN — NAFCILLIN 200 GRAM(S): 10 INJECTION, POWDER, FOR SOLUTION INTRAVENOUS at 21:12

## 2022-08-19 RX ADMIN — Medication 5 MILLIGRAM(S): at 21:13

## 2022-08-19 RX ADMIN — Medication 250 MILLIGRAM(S): at 05:34

## 2022-08-19 RX ADMIN — NAFCILLIN 200 GRAM(S): 10 INJECTION, POWDER, FOR SOLUTION INTRAVENOUS at 01:27

## 2022-08-19 RX ADMIN — NAFCILLIN 200 GRAM(S): 10 INJECTION, POWDER, FOR SOLUTION INTRAVENOUS at 18:00

## 2022-08-19 RX ADMIN — SODIUM CHLORIDE 2 GRAM(S): 9 INJECTION INTRAMUSCULAR; INTRAVENOUS; SUBCUTANEOUS at 15:59

## 2022-08-19 RX ADMIN — ENOXAPARIN SODIUM 40 MILLIGRAM(S): 100 INJECTION SUBCUTANEOUS at 18:01

## 2022-08-19 RX ADMIN — MEMANTINE HYDROCHLORIDE 5 MILLIGRAM(S): 10 TABLET ORAL at 18:11

## 2022-08-19 RX ADMIN — MEMANTINE HYDROCHLORIDE 5 MILLIGRAM(S): 10 TABLET ORAL at 05:33

## 2022-08-19 RX ADMIN — Medication 250 MILLIGRAM(S): at 18:17

## 2022-08-19 RX ADMIN — LEVETIRACETAM 500 MILLIGRAM(S): 250 TABLET, FILM COATED ORAL at 05:34

## 2022-08-19 RX ADMIN — DIPHENHYDRAMINE HYDROCHLORIDE AND LIDOCAINE HYDROCHLORIDE AND ALUMINUM HYDROXIDE AND MAGNESIUM HYDRO 5 MILLILITER(S): KIT at 12:49

## 2022-08-19 RX ADMIN — NAFCILLIN 200 GRAM(S): 10 INJECTION, POWDER, FOR SOLUTION INTRAVENOUS at 15:58

## 2022-08-19 RX ADMIN — NAFCILLIN 200 GRAM(S): 10 INJECTION, POWDER, FOR SOLUTION INTRAVENOUS at 09:07

## 2022-08-19 RX ADMIN — SODIUM CHLORIDE 2 GRAM(S): 9 INJECTION INTRAMUSCULAR; INTRAVENOUS; SUBCUTANEOUS at 21:13

## 2022-08-19 RX ADMIN — SODIUM CHLORIDE 2 GRAM(S): 9 INJECTION INTRAMUSCULAR; INTRAVENOUS; SUBCUTANEOUS at 05:34
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
## 2022-08-19 NOTE — PROGRESS NOTE ADULT - SUBJECTIVE AND OBJECTIVE BOX
Patient is a 42y old  Male who presents with a chief complaint of Trauma/ Subdural/ Intubated (19 Aug 2022 08:45)      no acute change. AAOX2. DENIES ANY PAIN,FEVER,CHILL  REVIEW OF SYSTEMS: All systems are reviewed and found to be negative except above    MEDICATIONS  (STANDING):  amantadine 100 milliGRAM(s) Oral <User Schedule>  chlorhexidine 2% Cloths 1 Application(s) Topical daily  enoxaparin Injectable 40 milliGRAM(s) SubCutaneous every 24 hours  FIRST- Mouthwash  BLM 5 milliLiter(s) Swish and Swallow four times a day  levETIRAcetam  Solution 500 milliGRAM(s) Oral two times a day  melatonin 5 milliGRAM(s) Oral at bedtime  memantine 5 milliGRAM(s) Oral two times a day  nafcillin  IVPB 2 Gram(s) IV Intermittent every 4 hours  senna 2 Tablet(s) Oral at bedtime  sodium chloride 2 Gram(s) Oral every 8 hours  urea Oral Powder 30 Gram(s) Oral daily  vancomycin  IVPB 1000 milliGRAM(s) IV Intermittent every 12 hours    MEDICATIONS  (PRN):  acetaminophen     Tablet .. 650 milliGRAM(s) Oral every 6 hours PRN Temp greater or equal to 38C (100.4F), Moderate Pain (4 - 6)  ondansetron Injectable 4 milliGRAM(s) IV Push every 4 hours PRN Nausea and/or Vomiting      CAPILLARY BLOOD GLUCOSE        I&O's Summary    18 Aug 2022 07:01  -  19 Aug 2022 07:00  --------------------------------------------------------  IN: 500 mL / OUT: 0 mL / NET: 500 mL        PHYSICAL EXAM:  Vital Signs Last 24 Hrs  T(C): 36.8 (19 Aug 2022 09:11), Max: 37.2 (18 Aug 2022 16:42)  T(F): 98.2 (19 Aug 2022 09:11), Max: 99 (18 Aug 2022 16:42)  HR: 61 (19 Aug 2022 09:11) (60 - 80)  BP: 126/82 (19 Aug 2022 09:11) (107/67 - 129/76)  BP(mean): 87 (18 Aug 2022 23:14) (80 - 87)  RR: 18 (19 Aug 2022 09:11) (18 - 18)  SpO2: 98% (19 Aug 2022 09:11) (96% - 98%)    Parameters below as of 19 Aug 2022 09:11  Patient On (Oxygen Delivery Method): room air        CONSTITUTIONAL: NAD,  EYES: PERRLA; conjunctiva and sclera clear  ENMT: Moist oral mucosa,   RESPIRATORY: Normal respiratory effort; lungs are clear to auscultation bilaterally  CARDIOVASCULAR: Regular rate and rhythm, normal S1 and S2, no murmur   EXTS: No lower extremity edema; Peripheral pulses are 2+ bilaterally  ABDOMEN: Nontender to palpation, normoactive bowel sounds, no rebound/guarding;   MUSCLOSKELETAL: ; no joint swelling or tenderness to palpation  PSYCH: COOP,CALM  NEUROLOGY: A+O to person, place, and NOT TO  time; no ACUTE MOTOR/ sensory change      LABS:                        11.4   2.33  )-----------( 248      ( 19 Aug 2022 06:20 )             33.9     08-19    133<L>  |  98  |  7.1<L>  ----------------------------<  127<H>  3.8   |  23.0  |  0.81    Ca    8.8      19 Aug 2022 06:20                  RADIOLOGY & ADDITIONAL TESTS:  Results Reviewed:

## 2022-08-19 NOTE — CHART NOTE - NSCHARTNOTEFT_GEN_A_CORE
Called by RN to assess pt for renewal of restraints.  As per RN, pt been pulling at peg tube/ peripheral IV catheter.  Pt is disoriented, unable to be redirected, environmental modification not successful.  At risk of trauma, bleed.  VSS, pt in no distress.  At this time, b/l secured mittens are warranted to optimize medical care. Patient was assessed for physical and psychological risk factors as well as special needs. There are no medical conditions or limitations that would place this patient at risk while in restraints. Dr. Bird made aware.  RN to monitor and reassess need for continuation, w/ pt's compliance, alert MD to discontinue.
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
## 2022-08-20 LAB
ANION GAP SERPL CALC-SCNC: 13 MMOL/L — SIGNIFICANT CHANGE UP (ref 5–17)
BUN SERPL-MCNC: 10.5 MG/DL — SIGNIFICANT CHANGE UP (ref 8–20)
CALCIUM SERPL-MCNC: 9.1 MG/DL — SIGNIFICANT CHANGE UP (ref 8.4–10.5)
CHLORIDE SERPL-SCNC: 99 MMOL/L — SIGNIFICANT CHANGE UP (ref 98–107)
CO2 SERPL-SCNC: 25 MMOL/L — SIGNIFICANT CHANGE UP (ref 22–29)
CREAT SERPL-MCNC: 0.77 MG/DL — SIGNIFICANT CHANGE UP (ref 0.5–1.3)
EGFR: 115 ML/MIN/1.73M2 — SIGNIFICANT CHANGE UP
GLUCOSE SERPL-MCNC: 88 MG/DL — SIGNIFICANT CHANGE UP (ref 70–99)
POTASSIUM SERPL-MCNC: 4 MMOL/L — SIGNIFICANT CHANGE UP (ref 3.5–5.3)
POTASSIUM SERPL-SCNC: 4 MMOL/L — SIGNIFICANT CHANGE UP (ref 3.5–5.3)
SODIUM SERPL-SCNC: 137 MMOL/L — SIGNIFICANT CHANGE UP (ref 135–145)

## 2022-08-20 PROCEDURE — 99233 SBSQ HOSP IP/OBS HIGH 50: CPT

## 2022-08-20 RX ADMIN — NAFCILLIN 200 GRAM(S): 10 INJECTION, POWDER, FOR SOLUTION INTRAVENOUS at 22:16

## 2022-08-20 RX ADMIN — Medication 30 GRAM(S): at 12:16

## 2022-08-20 RX ADMIN — NAFCILLIN 200 GRAM(S): 10 INJECTION, POWDER, FOR SOLUTION INTRAVENOUS at 10:14

## 2022-08-20 RX ADMIN — DIPHENHYDRAMINE HYDROCHLORIDE AND LIDOCAINE HYDROCHLORIDE AND ALUMINUM HYDROXIDE AND MAGNESIUM HYDRO 5 MILLILITER(S): KIT at 17:53

## 2022-08-20 RX ADMIN — Medication 100 MILLIGRAM(S): at 12:16

## 2022-08-20 RX ADMIN — Medication 250 MILLIGRAM(S): at 05:48

## 2022-08-20 RX ADMIN — SODIUM CHLORIDE 2 GRAM(S): 9 INJECTION INTRAMUSCULAR; INTRAVENOUS; SUBCUTANEOUS at 14:16

## 2022-08-20 RX ADMIN — SENNA PLUS 2 TABLET(S): 8.6 TABLET ORAL at 22:15

## 2022-08-20 RX ADMIN — DIPHENHYDRAMINE HYDROCHLORIDE AND LIDOCAINE HYDROCHLORIDE AND ALUMINUM HYDROXIDE AND MAGNESIUM HYDRO 5 MILLILITER(S): KIT at 05:48

## 2022-08-20 RX ADMIN — ENOXAPARIN SODIUM 40 MILLIGRAM(S): 100 INJECTION SUBCUTANEOUS at 18:36

## 2022-08-20 RX ADMIN — NAFCILLIN 200 GRAM(S): 10 INJECTION, POWDER, FOR SOLUTION INTRAVENOUS at 14:16

## 2022-08-20 RX ADMIN — CHLORHEXIDINE GLUCONATE 1 APPLICATION(S): 213 SOLUTION TOPICAL at 12:16

## 2022-08-20 RX ADMIN — MEMANTINE HYDROCHLORIDE 5 MILLIGRAM(S): 10 TABLET ORAL at 17:54

## 2022-08-20 RX ADMIN — LEVETIRACETAM 500 MILLIGRAM(S): 250 TABLET, FILM COATED ORAL at 05:47

## 2022-08-20 RX ADMIN — NAFCILLIN 200 GRAM(S): 10 INJECTION, POWDER, FOR SOLUTION INTRAVENOUS at 17:53

## 2022-08-20 RX ADMIN — DIPHENHYDRAMINE HYDROCHLORIDE AND LIDOCAINE HYDROCHLORIDE AND ALUMINUM HYDROXIDE AND MAGNESIUM HYDRO 5 MILLILITER(S): KIT at 00:43

## 2022-08-20 RX ADMIN — DIPHENHYDRAMINE HYDROCHLORIDE AND LIDOCAINE HYDROCHLORIDE AND ALUMINUM HYDROXIDE AND MAGNESIUM HYDRO 5 MILLILITER(S): KIT at 12:16

## 2022-08-20 RX ADMIN — Medication 100 MILLIGRAM(S): at 08:49

## 2022-08-20 RX ADMIN — MEMANTINE HYDROCHLORIDE 5 MILLIGRAM(S): 10 TABLET ORAL at 05:47

## 2022-08-20 RX ADMIN — NAFCILLIN 200 GRAM(S): 10 INJECTION, POWDER, FOR SOLUTION INTRAVENOUS at 05:47

## 2022-08-20 RX ADMIN — NAFCILLIN 200 GRAM(S): 10 INJECTION, POWDER, FOR SOLUTION INTRAVENOUS at 02:56

## 2022-08-20 RX ADMIN — Medication 250 MILLIGRAM(S): at 18:02

## 2022-08-20 RX ADMIN — SODIUM CHLORIDE 2 GRAM(S): 9 INJECTION INTRAMUSCULAR; INTRAVENOUS; SUBCUTANEOUS at 22:16

## 2022-08-20 RX ADMIN — Medication 5 MILLIGRAM(S): at 22:15

## 2022-08-20 RX ADMIN — LEVETIRACETAM 500 MILLIGRAM(S): 250 TABLET, FILM COATED ORAL at 17:54

## 2022-08-20 RX ADMIN — SODIUM CHLORIDE 2 GRAM(S): 9 INJECTION INTRAMUSCULAR; INTRAVENOUS; SUBCUTANEOUS at 05:47
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
## 2022-08-20 NOTE — PROGRESS NOTE ADULT - SUBJECTIVE AND OBJECTIVE BOX
[facility] Division of Hospital Medicine    Chief Complaint: Trauma/ Subdural/ Intubated     SUBJECTIVE / OVERNIGHT EVENTS: No acute events overnight. HD stable.     Patient denies chest pain, SOB, abd pain, N/V, fever, chills, dysuria or any other complaints. All remainder ROS negative.     MEDICATIONS  (STANDING):  amantadine 100 milliGRAM(s) Oral <User Schedule>  chlorhexidine 2% Cloths 1 Application(s) Topical daily  enoxaparin Injectable 40 milliGRAM(s) SubCutaneous every 24 hours  FIRST- Mouthwash  BLM 5 milliLiter(s) Swish and Swallow four times a day  levETIRAcetam  Solution 500 milliGRAM(s) Oral two times a day  melatonin 5 milliGRAM(s) Oral at bedtime  memantine 5 milliGRAM(s) Oral two times a day  nafcillin  IVPB 2 Gram(s) IV Intermittent every 4 hours  senna 2 Tablet(s) Oral at bedtime  sodium chloride 2 Gram(s) Oral every 8 hours  urea Oral Powder 30 Gram(s) Oral daily  vancomycin  IVPB 1000 milliGRAM(s) IV Intermittent every 12 hours    MEDICATIONS  (PRN):  acetaminophen     Tablet .. 650 milliGRAM(s) Oral every 6 hours PRN Temp greater or equal to 38C (100.4F), Moderate Pain (4 - 6)  ondansetron Injectable 4 milliGRAM(s) IV Push every 4 hours PRN Nausea and/or Vomiting        I&O's Summary    19 Aug 2022 07:01  -  20 Aug 2022 07:00  --------------------------------------------------------  IN: 0 mL / OUT: 1800 mL / NET: -1800 mL        PHYSICAL EXAM:  Vital Signs Last 24 Hrs  T(C): 36.4 (20 Aug 2022 10:00), Max: 36.8 (19 Aug 2022 16:18)  T(F): 97.5 (20 Aug 2022 10:00), Max: 98.3 (19 Aug 2022 16:18)  HR: 67 (20 Aug 2022 10:00) (61 - 67)  BP: 121/83 (20 Aug 2022 10:00) (121/83 - 133/82)  BP(mean): --  RR: 18 (20 Aug 2022 10:00) (18 - 19)  SpO2: 99% (20 Aug 2022 10:00) (96% - 99%)    Parameters below as of 20 Aug 2022 04:30  Patient On (Oxygen Delivery Method): room air          CONSTITUTIONAL: NAD,  RESPIRATORY: Normal respiratory effort; lungs are clear to auscultation bilaterally  CARDIOVASCULAR: Regular rate and rhythm, normal S1 and S2, no murmur   EXTS: No lower extremity edema; Peripheral pulses are 2+ bilaterally  ABDOMEN: Nontender to palpation, normoactive bowel sounds, no rebound/guarding;   PSYCH: COOP,CALM  NEUROLOGY: A+O to person, place, and NOT TO  time; no ACUTE MOTOR/ sensory change    LABS:                        11.4   2.33  )-----------( 248      ( 19 Aug 2022 06:20 )             33.9     08-20    137  |  99  |  10.5  ----------------------------<  88  4.0   |  25.0  |  0.77    Ca    9.1      20 Aug 2022 06:13                CAPILLARY BLOOD GLUCOSE            RADIOLOGY & ADDITIONAL TESTS:  Results Reviewed:   Imaging Personally Reviewed:  Electrocardiogram Personally Reviewed:                                           [facility] Division of Hospital Medicine    Chief Complaint: Trauma/ Subdural/ Intubated     SUBJECTIVE / OVERNIGHT EVENTS: Patient was pulling PEG tube and needed restraints renewed. HD stable.     Patient denies chest pain, SOB, abd pain, N/V, fever, chills, dysuria or any other complaints. All remainder ROS negative.     MEDICATIONS  (STANDING):  amantadine 100 milliGRAM(s) Oral <User Schedule>  chlorhexidine 2% Cloths 1 Application(s) Topical daily  enoxaparin Injectable 40 milliGRAM(s) SubCutaneous every 24 hours  FIRST- Mouthwash  BLM 5 milliLiter(s) Swish and Swallow four times a day  levETIRAcetam  Solution 500 milliGRAM(s) Oral two times a day  melatonin 5 milliGRAM(s) Oral at bedtime  memantine 5 milliGRAM(s) Oral two times a day  nafcillin  IVPB 2 Gram(s) IV Intermittent every 4 hours  senna 2 Tablet(s) Oral at bedtime  sodium chloride 2 Gram(s) Oral every 8 hours  urea Oral Powder 30 Gram(s) Oral daily  vancomycin  IVPB 1000 milliGRAM(s) IV Intermittent every 12 hours    MEDICATIONS  (PRN):  acetaminophen     Tablet .. 650 milliGRAM(s) Oral every 6 hours PRN Temp greater or equal to 38C (100.4F), Moderate Pain (4 - 6)  ondansetron Injectable 4 milliGRAM(s) IV Push every 4 hours PRN Nausea and/or Vomiting        I&O's Summary    19 Aug 2022 07:01  -  20 Aug 2022 07:00  --------------------------------------------------------  IN: 0 mL / OUT: 1800 mL / NET: -1800 mL        PHYSICAL EXAM:  Vital Signs Last 24 Hrs  T(C): 36.4 (20 Aug 2022 10:00), Max: 36.8 (19 Aug 2022 16:18)  T(F): 97.5 (20 Aug 2022 10:00), Max: 98.3 (19 Aug 2022 16:18)  HR: 67 (20 Aug 2022 10:00) (61 - 67)  BP: 121/83 (20 Aug 2022 10:00) (121/83 - 133/82)  BP(mean): --  RR: 18 (20 Aug 2022 10:00) (18 - 19)  SpO2: 99% (20 Aug 2022 10:00) (96% - 99%)    Parameters below as of 20 Aug 2022 04:30  Patient On (Oxygen Delivery Method): room air          CONSTITUTIONAL: NAD,  RESPIRATORY: Normal respiratory effort; lungs are clear to auscultation bilaterally  CARDIOVASCULAR: Regular rate and rhythm, normal S1 and S2, no murmur   EXTS: No lower extremity edema; Peripheral pulses are 2+ bilaterally  ABDOMEN: Nontender to palpation, normoactive bowel sounds, no rebound/guarding;   PSYCH: COOP,CALM  NEUROLOGY: A+O to person, place, and NOT TO  time; no ACUTE MOTOR/ sensory change    LABS:                        11.4   2.33  )-----------( 248      ( 19 Aug 2022 06:20 )             33.9     08-20    137  |  99  |  10.5  ----------------------------<  88  4.0   |  25.0  |  0.77    Ca    9.1      20 Aug 2022 06:13                CAPILLARY BLOOD GLUCOSE            RADIOLOGY & ADDITIONAL TESTS:  Results Reviewed:   Imaging Personally Reviewed:  Electrocardiogram Personally Reviewed:

## 2022-08-20 NOTE — CHART NOTE - NSCHARTNOTEFT_GEN_A_CORE
Pt seen at bedside for evaluation of restraints.   At this time pt resting comfortably in NAD.   Restraints d/c'd at this time, abdominal binder on pt to prevent pulling PEG  After restraints removed R wrist erythematous, edematous from restraints.   +Pulse palpable, FROM, normal color and  temperature.   Elevation and ice packs to extremity.

## 2022-08-21 LAB
ALBUMIN SERPL ELPH-MCNC: 3.6 G/DL — SIGNIFICANT CHANGE UP (ref 3.3–5.2)
ALP SERPL-CCNC: 77 U/L — SIGNIFICANT CHANGE UP (ref 40–120)
ALT FLD-CCNC: 8 U/L — SIGNIFICANT CHANGE UP
ANION GAP SERPL CALC-SCNC: 13 MMOL/L — SIGNIFICANT CHANGE UP (ref 5–17)
AST SERPL-CCNC: 8 U/L — SIGNIFICANT CHANGE UP
BILIRUB SERPL-MCNC: <0.2 MG/DL — LOW (ref 0.4–2)
BUN SERPL-MCNC: 16.2 MG/DL — SIGNIFICANT CHANGE UP (ref 8–20)
CALCIUM SERPL-MCNC: 9 MG/DL — SIGNIFICANT CHANGE UP (ref 8.4–10.5)
CHLORIDE SERPL-SCNC: 97 MMOL/L — LOW (ref 98–107)
CO2 SERPL-SCNC: 24 MMOL/L — SIGNIFICANT CHANGE UP (ref 22–29)
CREAT SERPL-MCNC: 0.78 MG/DL — SIGNIFICANT CHANGE UP (ref 0.5–1.3)
EGFR: 114 ML/MIN/1.73M2 — SIGNIFICANT CHANGE UP
GLUCOSE SERPL-MCNC: 148 MG/DL — HIGH (ref 70–99)
HCT VFR BLD CALC: 36.1 % — LOW (ref 39–50)
HGB BLD-MCNC: 12.4 G/DL — LOW (ref 13–17)
MCHC RBC-ENTMCNC: 28.1 PG — SIGNIFICANT CHANGE UP (ref 27–34)
MCHC RBC-ENTMCNC: 34.3 GM/DL — SIGNIFICANT CHANGE UP (ref 32–36)
MCV RBC AUTO: 81.7 FL — SIGNIFICANT CHANGE UP (ref 80–100)
PLATELET # BLD AUTO: 242 K/UL — SIGNIFICANT CHANGE UP (ref 150–400)
POTASSIUM SERPL-MCNC: 4 MMOL/L — SIGNIFICANT CHANGE UP (ref 3.5–5.3)
POTASSIUM SERPL-SCNC: 4 MMOL/L — SIGNIFICANT CHANGE UP (ref 3.5–5.3)
PROT SERPL-MCNC: 6.4 G/DL — LOW (ref 6.6–8.7)
RBC # BLD: 4.42 M/UL — SIGNIFICANT CHANGE UP (ref 4.2–5.8)
RBC # FLD: 13.1 % — SIGNIFICANT CHANGE UP (ref 10.3–14.5)
SODIUM SERPL-SCNC: 134 MMOL/L — LOW (ref 135–145)
WBC # BLD: 3.29 K/UL — LOW (ref 3.8–10.5)
WBC # FLD AUTO: 3.29 K/UL — LOW (ref 3.8–10.5)

## 2022-08-21 PROCEDURE — 99232 SBSQ HOSP IP/OBS MODERATE 35: CPT

## 2022-08-21 RX ADMIN — SODIUM CHLORIDE 2 GRAM(S): 9 INJECTION INTRAMUSCULAR; INTRAVENOUS; SUBCUTANEOUS at 13:08

## 2022-08-21 RX ADMIN — LEVETIRACETAM 500 MILLIGRAM(S): 250 TABLET, FILM COATED ORAL at 05:42

## 2022-08-21 RX ADMIN — Medication 30 GRAM(S): at 13:09

## 2022-08-21 RX ADMIN — Medication 250 MILLIGRAM(S): at 05:41

## 2022-08-21 RX ADMIN — ENOXAPARIN SODIUM 40 MILLIGRAM(S): 100 INJECTION SUBCUTANEOUS at 18:10

## 2022-08-21 RX ADMIN — DIPHENHYDRAMINE HYDROCHLORIDE AND LIDOCAINE HYDROCHLORIDE AND ALUMINUM HYDROXIDE AND MAGNESIUM HYDRO 5 MILLILITER(S): KIT at 05:43

## 2022-08-21 RX ADMIN — Medication 100 MILLIGRAM(S): at 13:08

## 2022-08-21 RX ADMIN — CHLORHEXIDINE GLUCONATE 1 APPLICATION(S): 213 SOLUTION TOPICAL at 13:10

## 2022-08-21 RX ADMIN — NAFCILLIN 200 GRAM(S): 10 INJECTION, POWDER, FOR SOLUTION INTRAVENOUS at 22:01

## 2022-08-21 RX ADMIN — NAFCILLIN 200 GRAM(S): 10 INJECTION, POWDER, FOR SOLUTION INTRAVENOUS at 05:41

## 2022-08-21 RX ADMIN — MEMANTINE HYDROCHLORIDE 5 MILLIGRAM(S): 10 TABLET ORAL at 05:42

## 2022-08-21 RX ADMIN — MEMANTINE HYDROCHLORIDE 5 MILLIGRAM(S): 10 TABLET ORAL at 18:11

## 2022-08-21 RX ADMIN — DIPHENHYDRAMINE HYDROCHLORIDE AND LIDOCAINE HYDROCHLORIDE AND ALUMINUM HYDROXIDE AND MAGNESIUM HYDRO 5 MILLILITER(S): KIT at 01:26

## 2022-08-21 RX ADMIN — LEVETIRACETAM 500 MILLIGRAM(S): 250 TABLET, FILM COATED ORAL at 18:10

## 2022-08-21 RX ADMIN — NAFCILLIN 200 GRAM(S): 10 INJECTION, POWDER, FOR SOLUTION INTRAVENOUS at 01:26

## 2022-08-21 RX ADMIN — DIPHENHYDRAMINE HYDROCHLORIDE AND LIDOCAINE HYDROCHLORIDE AND ALUMINUM HYDROXIDE AND MAGNESIUM HYDRO 5 MILLILITER(S): KIT at 13:08

## 2022-08-21 RX ADMIN — NAFCILLIN 200 GRAM(S): 10 INJECTION, POWDER, FOR SOLUTION INTRAVENOUS at 13:10

## 2022-08-21 RX ADMIN — SODIUM CHLORIDE 2 GRAM(S): 9 INJECTION INTRAMUSCULAR; INTRAVENOUS; SUBCUTANEOUS at 05:42

## 2022-08-21 RX ADMIN — Medication 100 MILLIGRAM(S): at 08:58

## 2022-08-21 RX ADMIN — Medication 5 MILLIGRAM(S): at 22:00

## 2022-08-21 RX ADMIN — NAFCILLIN 200 GRAM(S): 10 INJECTION, POWDER, FOR SOLUTION INTRAVENOUS at 18:11

## 2022-08-21 RX ADMIN — Medication 250 MILLIGRAM(S): at 18:09

## 2022-08-21 RX ADMIN — DIPHENHYDRAMINE HYDROCHLORIDE AND LIDOCAINE HYDROCHLORIDE AND ALUMINUM HYDROXIDE AND MAGNESIUM HYDRO 5 MILLILITER(S): KIT at 18:11

## 2022-08-21 RX ADMIN — SODIUM CHLORIDE 2 GRAM(S): 9 INJECTION INTRAMUSCULAR; INTRAVENOUS; SUBCUTANEOUS at 22:00

## 2022-08-21 RX ADMIN — NAFCILLIN 200 GRAM(S): 10 INJECTION, POWDER, FOR SOLUTION INTRAVENOUS at 09:47

## 2022-08-21 NOTE — PROGRESS NOTE ADULT - SUBJECTIVE AND OBJECTIVE BOX
[facility] Division of Hospital Medicine    Chief Complaint:  Trauma/ Subdural/ Intubated      SUBJECTIVE / OVERNIGHT EVENTS: [place] PI# [id number]. No acute events overnight. HD stable.     Patient denies chest pain, SOB, abd pain, N/V, fever, chills, dysuria or any other complaints. All remainder ROS negative.     MEDICATIONS  (STANDING):  amantadine 100 milliGRAM(s) Oral <User Schedule>  chlorhexidine 2% Cloths 1 Application(s) Topical daily  enoxaparin Injectable 40 milliGRAM(s) SubCutaneous every 24 hours  FIRST- Mouthwash  BLM 5 milliLiter(s) Swish and Swallow four times a day  levETIRAcetam  Solution 500 milliGRAM(s) Oral two times a day  melatonin 5 milliGRAM(s) Oral at bedtime  memantine 5 milliGRAM(s) Oral two times a day  nafcillin  IVPB 2 Gram(s) IV Intermittent every 4 hours  senna 2 Tablet(s) Oral at bedtime  sodium chloride 2 Gram(s) Oral every 8 hours  urea Oral Powder 30 Gram(s) Oral daily  vancomycin  IVPB 1000 milliGRAM(s) IV Intermittent every 12 hours    MEDICATIONS  (PRN):  acetaminophen     Tablet .. 650 milliGRAM(s) Oral every 6 hours PRN Temp greater or equal to 38C (100.4F), Moderate Pain (4 - 6)  ondansetron Injectable 4 milliGRAM(s) IV Push every 4 hours PRN Nausea and/or Vomiting        I&O's Summary    20 Aug 2022 07:01  -  21 Aug 2022 07:00  --------------------------------------------------------  IN: 200 mL / OUT: 450 mL / NET: -250 mL        PHYSICAL EXAM:  Vital Signs Last 24 Hrs  T(C): 36.7 (21 Aug 2022 10:14), Max: 36.8 (20 Aug 2022 20:00)  T(F): 98 (21 Aug 2022 10:14), Max: 98.3 (20 Aug 2022 20:00)  HR: 87 (21 Aug 2022 10:14) (62 - 87)  BP: 108/54 (21 Aug 2022 10:14) (101/67 - 117/76)  BP(mean): --  RR: 17 (21 Aug 2022 10:14) (16 - 18)  SpO2: 97% (21 Aug 2022 10:14) (96% - 97%)    Parameters below as of 21 Aug 2022 10:14  Patient On (Oxygen Delivery Method): room air    CONSTITUTIONAL: NAD, resting comformtably  RESPIRATORY: Normal respiratory effort; lungs are clear to auscultation bilaterally  CARDIOVASCULAR: Regular rate and rhythm, normal S1 and S2, no murmur   EXTS: No lower extremity edema; Peripheral pulses are 2+ bilaterally  ABDOMEN: Nontender to palpation, normoactive bowel sounds, no rebound/guarding;   PSYCH: COOP,CALM  NEUROLOGY: A+O to person, place, and NOT TO  time; no ACUTE MOTOR/ sensory change    LABS:                        12.4   3.29  )-----------( 242      ( 21 Aug 2022 07:35 )             36.1     08-21    134<L>  |  97<L>  |  16.2  ----------------------------<  148<H>  4.0   |  24.0  |  0.78    Ca    9.0      21 Aug 2022 07:35    TPro  6.4<L>  /  Alb  3.6  /  TBili  <0.2<L>  /  DBili  x   /  AST  8   /  ALT  8   /  AlkPhos  77  08-21              CAPILLARY BLOOD GLUCOSE            RADIOLOGY & ADDITIONAL TESTS:  Results Reviewed:   Imaging Personally Reviewed:  Electrocardiogram Personally Reviewed:

## 2022-08-22 LAB
ANION GAP SERPL CALC-SCNC: 14 MMOL/L — SIGNIFICANT CHANGE UP (ref 5–17)
BUN SERPL-MCNC: 17.5 MG/DL — SIGNIFICANT CHANGE UP (ref 8–20)
CALCIUM SERPL-MCNC: 9.6 MG/DL — SIGNIFICANT CHANGE UP (ref 8.4–10.5)
CHLORIDE SERPL-SCNC: 96 MMOL/L — LOW (ref 98–107)
CO2 SERPL-SCNC: 23 MMOL/L — SIGNIFICANT CHANGE UP (ref 22–29)
CREAT SERPL-MCNC: 0.76 MG/DL — SIGNIFICANT CHANGE UP (ref 0.5–1.3)
EGFR: 115 ML/MIN/1.73M2 — SIGNIFICANT CHANGE UP
GLUCOSE SERPL-MCNC: 87 MG/DL — SIGNIFICANT CHANGE UP (ref 70–99)
HCT VFR BLD CALC: 42.9 % — SIGNIFICANT CHANGE UP (ref 39–50)
HGB BLD-MCNC: 14.5 G/DL — SIGNIFICANT CHANGE UP (ref 13–17)
MCHC RBC-ENTMCNC: 27.8 PG — SIGNIFICANT CHANGE UP (ref 27–34)
MCHC RBC-ENTMCNC: 33.8 GM/DL — SIGNIFICANT CHANGE UP (ref 32–36)
MCV RBC AUTO: 82.3 FL — SIGNIFICANT CHANGE UP (ref 80–100)
PLATELET # BLD AUTO: 194 K/UL — SIGNIFICANT CHANGE UP (ref 150–400)
POTASSIUM SERPL-MCNC: 4.6 MMOL/L — SIGNIFICANT CHANGE UP (ref 3.5–5.3)
POTASSIUM SERPL-SCNC: 4.6 MMOL/L — SIGNIFICANT CHANGE UP (ref 3.5–5.3)
RBC # BLD: 5.21 M/UL — SIGNIFICANT CHANGE UP (ref 4.2–5.8)
RBC # FLD: 13.1 % — SIGNIFICANT CHANGE UP (ref 10.3–14.5)
SODIUM SERPL-SCNC: 133 MMOL/L — LOW (ref 135–145)
VANCOMYCIN TROUGH SERPL-MCNC: 15.9 UG/ML — SIGNIFICANT CHANGE UP (ref 10–20)
VANCOMYCIN TROUGH SERPL-MCNC: 31.8 UG/ML — CRITICAL HIGH (ref 10–20)
WBC # BLD: 3.78 K/UL — LOW (ref 3.8–10.5)
WBC # FLD AUTO: 3.78 K/UL — LOW (ref 3.8–10.5)

## 2022-08-22 PROCEDURE — 99233 SBSQ HOSP IP/OBS HIGH 50: CPT

## 2022-08-22 PROCEDURE — 99232 SBSQ HOSP IP/OBS MODERATE 35: CPT

## 2022-08-22 RX ADMIN — Medication 250 MILLIGRAM(S): at 05:29

## 2022-08-22 RX ADMIN — DIPHENHYDRAMINE HYDROCHLORIDE AND LIDOCAINE HYDROCHLORIDE AND ALUMINUM HYDROXIDE AND MAGNESIUM HYDRO 5 MILLILITER(S): KIT at 18:31

## 2022-08-22 RX ADMIN — Medication 30 GRAM(S): at 12:26

## 2022-08-22 RX ADMIN — MEMANTINE HYDROCHLORIDE 5 MILLIGRAM(S): 10 TABLET ORAL at 05:27

## 2022-08-22 RX ADMIN — CHLORHEXIDINE GLUCONATE 1 APPLICATION(S): 213 SOLUTION TOPICAL at 21:33

## 2022-08-22 RX ADMIN — DIPHENHYDRAMINE HYDROCHLORIDE AND LIDOCAINE HYDROCHLORIDE AND ALUMINUM HYDROXIDE AND MAGNESIUM HYDRO 5 MILLILITER(S): KIT at 12:27

## 2022-08-22 RX ADMIN — NAFCILLIN 200 GRAM(S): 10 INJECTION, POWDER, FOR SOLUTION INTRAVENOUS at 10:38

## 2022-08-22 RX ADMIN — LEVETIRACETAM 500 MILLIGRAM(S): 250 TABLET, FILM COATED ORAL at 05:29

## 2022-08-22 RX ADMIN — Medication 100 MILLIGRAM(S): at 09:45

## 2022-08-22 RX ADMIN — NAFCILLIN 200 GRAM(S): 10 INJECTION, POWDER, FOR SOLUTION INTRAVENOUS at 21:30

## 2022-08-22 RX ADMIN — SODIUM CHLORIDE 2 GRAM(S): 9 INJECTION INTRAMUSCULAR; INTRAVENOUS; SUBCUTANEOUS at 05:28

## 2022-08-22 RX ADMIN — Medication 250 MILLIGRAM(S): at 18:57

## 2022-08-22 RX ADMIN — NAFCILLIN 200 GRAM(S): 10 INJECTION, POWDER, FOR SOLUTION INTRAVENOUS at 18:26

## 2022-08-22 RX ADMIN — MEMANTINE HYDROCHLORIDE 5 MILLIGRAM(S): 10 TABLET ORAL at 18:25

## 2022-08-22 RX ADMIN — NAFCILLIN 200 GRAM(S): 10 INJECTION, POWDER, FOR SOLUTION INTRAVENOUS at 02:28

## 2022-08-22 RX ADMIN — NAFCILLIN 200 GRAM(S): 10 INJECTION, POWDER, FOR SOLUTION INTRAVENOUS at 05:30

## 2022-08-22 RX ADMIN — SENNA PLUS 2 TABLET(S): 8.6 TABLET ORAL at 21:31

## 2022-08-22 RX ADMIN — NAFCILLIN 200 GRAM(S): 10 INJECTION, POWDER, FOR SOLUTION INTRAVENOUS at 14:24

## 2022-08-22 RX ADMIN — SODIUM CHLORIDE 2 GRAM(S): 9 INJECTION INTRAMUSCULAR; INTRAVENOUS; SUBCUTANEOUS at 14:25

## 2022-08-22 RX ADMIN — Medication 100 MILLIGRAM(S): at 12:26

## 2022-08-22 RX ADMIN — ENOXAPARIN SODIUM 40 MILLIGRAM(S): 100 INJECTION SUBCUTANEOUS at 18:26

## 2022-08-22 RX ADMIN — LEVETIRACETAM 500 MILLIGRAM(S): 250 TABLET, FILM COATED ORAL at 18:25

## 2022-08-22 RX ADMIN — DIPHENHYDRAMINE HYDROCHLORIDE AND LIDOCAINE HYDROCHLORIDE AND ALUMINUM HYDROXIDE AND MAGNESIUM HYDRO 5 MILLILITER(S): KIT at 23:03

## 2022-08-22 RX ADMIN — Medication 5 MILLIGRAM(S): at 21:31

## 2022-08-22 RX ADMIN — SODIUM CHLORIDE 2 GRAM(S): 9 INJECTION INTRAMUSCULAR; INTRAVENOUS; SUBCUTANEOUS at 21:31
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
## 2022-08-22 NOTE — PROGRESS NOTE ADULT - SUBJECTIVE AND OBJECTIVE BOX
HEALTH ISSUES - PROBLEM Dx:    SDH, IPH, TEOFILO.   Patient underwent Right decompressive hemicraniectomy on 5/24, trach/peg 6/1,  R cranioplasty with complex closure 6/29/22.    repeated CTH  demonstrating right sided subdural collection mixed with air.  s/p rpt right craniectomy for evacuation on 7/17 after MR demonstrated large right ED collection  Cranioplasty healing well/ drain removed on 7/1.   Trach (decanulated)/ PEG    INTERVAL HPI/ OVERNIGHT EVENTS:    lying in bed  minimal conversation, chopped  comfortable  offers no complaints    REVIEW OF SYSTEMS:    as abvoe    Vital Signs Last 24 Hrs  T(C): 36.5 (22 Aug 2022 10:43), Max: 36.8 (21 Aug 2022 17:58)  T(F): 97.7 (22 Aug 2022 10:43), Max: 98.2 (21 Aug 2022 17:58)  HR: 69 (22 Aug 2022 10:43) (57 - 81)  BP: 125/83 (22 Aug 2022 10:43) (113/71 - 129/88)  BP(mean): --  RR: 17 (22 Aug 2022 10:43) (17 - 18)  SpO2: 98% (22 Aug 2022 10:43) (98% - 100%)    Parameters below as of 22 Aug 2022 10:43  Patient On (Oxygen Delivery Method): room air    PHYSICAL EXAM-  GENERAL: Comfortable, lying in bed, cooperative  HEAD:  right craniectomy hollow, soft, Surg staples intact, clean, healing well  EYES: EOMI, PERRLA, conjunctiva and sclera clear  ENT: Moist mucous membranes, No lesions  NECK: Supple, No JVD, Normal thyroid  NERVOUS SYSTEM:  Alert & Oriented X 1 and [place], Language- correct, choppy in single words, speech- fluent, face symmetric, Moving all extremities well, gen weakness  CHEST/LUNG: CTA bilaterally; No rales, rhonchi, wheezing, or rubs  HEART: Regular rate and rhythm; No murmurs, rubs, or gallops  ABDOMEN: Soft, Nontender, Nondistended; Bowel sounds present, PEG +  EXTREMITIES:  2+ Peripheral Pulses, No clubbing, cyanosis, or edema      MEDICATIONS  (STANDING):  chlorhexidine 2% Cloths 1 Application(s) Topical daily  enoxaparin Injectable 40 milliGRAM(s) SubCutaneous every 24 hours  FIRST- Mouthwash  BLM 5 milliLiter(s) Swish and Swallow four times a day  levETIRAcetam  Solution 500 milliGRAM(s) Oral two times a day  melatonin 5 milliGRAM(s) Oral at bedtime  memantine 5 milliGRAM(s) Oral two times a day  nafcillin  IVPB 2 Gram(s) IV Intermittent every 4 hours  senna 2 Tablet(s) Oral at bedtime  sodium chloride 2 Gram(s) Oral every 8 hours  urea Oral Powder 30 Gram(s) Oral daily  vancomycin  IVPB 1000 milliGRAM(s) IV Intermittent every 12 hours    MEDICATIONS  (PRN):  acetaminophen     Tablet .. 650 milliGRAM(s) Oral every 6 hours PRN Temp greater or equal to 38C (100.4F), Moderate Pain (4 - 6)  ondansetron Injectable 4 milliGRAM(s) IV Push every 4 hours PRN Nausea and/or Vomiting      LABS:                        14.5   3.78  )-----------( 194      ( 22 Aug 2022 07:30 )             42.9     08-22    133<L>  |  96<L>  |  17.5  ----------------------------<  87  4.6   |  23.0  |  0.76    Ca    9.6      22 Aug 2022 07:30    TPro  6.4<L>  /  Alb  3.6  /  TBili  <0.2<L>  /  DBili  x   /  AST  8   /  ALT  8   /  AlkPhos  77  08-21
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
## 2022-08-23 LAB — SARS-COV-2 RNA SPEC QL NAA+PROBE: SIGNIFICANT CHANGE UP

## 2022-08-23 PROCEDURE — 99233 SBSQ HOSP IP/OBS HIGH 50: CPT

## 2022-08-23 PROCEDURE — 99232 SBSQ HOSP IP/OBS MODERATE 35: CPT

## 2022-08-23 RX ORDER — MEMANTINE HYDROCHLORIDE 10 MG/1
10 TABLET ORAL
Refills: 0 | Status: DISCONTINUED | OUTPATIENT
Start: 2022-08-23 | End: 2022-08-24

## 2022-08-23 RX ADMIN — DIPHENHYDRAMINE HYDROCHLORIDE AND LIDOCAINE HYDROCHLORIDE AND ALUMINUM HYDROXIDE AND MAGNESIUM HYDRO 5 MILLILITER(S): KIT at 18:43

## 2022-08-23 RX ADMIN — Medication 5 MILLIGRAM(S): at 21:32

## 2022-08-23 RX ADMIN — ENOXAPARIN SODIUM 40 MILLIGRAM(S): 100 INJECTION SUBCUTANEOUS at 18:43

## 2022-08-23 RX ADMIN — NAFCILLIN 200 GRAM(S): 10 INJECTION, POWDER, FOR SOLUTION INTRAVENOUS at 12:43

## 2022-08-23 RX ADMIN — Medication 30 GRAM(S): at 12:44

## 2022-08-23 RX ADMIN — SODIUM CHLORIDE 2 GRAM(S): 9 INJECTION INTRAMUSCULAR; INTRAVENOUS; SUBCUTANEOUS at 18:42

## 2022-08-23 RX ADMIN — Medication 250 MILLIGRAM(S): at 05:45

## 2022-08-23 RX ADMIN — CHLORHEXIDINE GLUCONATE 1 APPLICATION(S): 213 SOLUTION TOPICAL at 12:44

## 2022-08-23 RX ADMIN — SENNA PLUS 2 TABLET(S): 8.6 TABLET ORAL at 21:32

## 2022-08-23 RX ADMIN — MEMANTINE HYDROCHLORIDE 5 MILLIGRAM(S): 10 TABLET ORAL at 05:06

## 2022-08-23 RX ADMIN — NAFCILLIN 200 GRAM(S): 10 INJECTION, POWDER, FOR SOLUTION INTRAVENOUS at 05:06

## 2022-08-23 RX ADMIN — NAFCILLIN 200 GRAM(S): 10 INJECTION, POWDER, FOR SOLUTION INTRAVENOUS at 16:55

## 2022-08-23 RX ADMIN — SODIUM CHLORIDE 2 GRAM(S): 9 INJECTION INTRAMUSCULAR; INTRAVENOUS; SUBCUTANEOUS at 05:05

## 2022-08-23 RX ADMIN — NAFCILLIN 200 GRAM(S): 10 INJECTION, POWDER, FOR SOLUTION INTRAVENOUS at 21:32

## 2022-08-23 RX ADMIN — NAFCILLIN 200 GRAM(S): 10 INJECTION, POWDER, FOR SOLUTION INTRAVENOUS at 02:57

## 2022-08-23 RX ADMIN — Medication 250 MILLIGRAM(S): at 18:45

## 2022-08-23 RX ADMIN — MEMANTINE HYDROCHLORIDE 10 MILLIGRAM(S): 10 TABLET ORAL at 18:42

## 2022-08-23 RX ADMIN — DIPHENHYDRAMINE HYDROCHLORIDE AND LIDOCAINE HYDROCHLORIDE AND ALUMINUM HYDROXIDE AND MAGNESIUM HYDRO 5 MILLILITER(S): KIT at 12:15

## 2022-08-23 RX ADMIN — SODIUM CHLORIDE 2 GRAM(S): 9 INJECTION INTRAMUSCULAR; INTRAVENOUS; SUBCUTANEOUS at 21:32

## 2022-08-23 RX ADMIN — LEVETIRACETAM 500 MILLIGRAM(S): 250 TABLET, FILM COATED ORAL at 05:05

## 2022-08-23 RX ADMIN — DIPHENHYDRAMINE HYDROCHLORIDE AND LIDOCAINE HYDROCHLORIDE AND ALUMINUM HYDROXIDE AND MAGNESIUM HYDRO 5 MILLILITER(S): KIT at 05:05

## 2022-08-23 RX ADMIN — LEVETIRACETAM 500 MILLIGRAM(S): 250 TABLET, FILM COATED ORAL at 18:42
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
## 2022-08-23 NOTE — PROGRESS NOTE ADULT - SUBJECTIVE AND OBJECTIVE BOX
HEALTH ISSUES - PROBLEM Dx:    SDH, IPH, TEOFILO.   Patient underwent Right decompressive hemicraniectomy on 5/24, trach/peg 6/1,  R cranioplasty with complex closure 6/29/22.    repeated CTH  demonstrating right sided subdural collection mixed with air.  s/p rpt right craniectomy for evacuation on 7/17 after MR demonstrated large right ED collection  Cranioplasty healing well/ drain removed on 7/1.   Trach (decanulated)/ PEG    INTERVAL HPI/ OVERNIGHT EVENTS:    lying in bed  minimal conversation, choppy  comfortable  offers no complaints    REVIEW OF SYSTEMS:    as above    Vital Signs Last 24 Hrs  T(C): 36.8 (23 Aug 2022 10:55), Max: 36.9 (22 Aug 2022 20:38)  T(F): 98.2 (23 Aug 2022 10:55), Max: 98.5 (22 Aug 2022 20:38)  HR: 78 (23 Aug 2022 10:55) (59 - 78)  BP: 113/68 (23 Aug 2022 10:55) (106/78 - 130/82)  BP(mean): --  RR: 18 (23 Aug 2022 10:55) (18 - 20)  SpO2: 97% (23 Aug 2022 10:55) (97% - 99%)    Parameters below as of 23 Aug 2022 10:55  Patient On (Oxygen Delivery Method): room air    PHYSICAL EXAM-  GENERAL: Comfortable, lying in bed, cooperative  HEAD:  right craniectomy hollow, soft, Surg staples intact, clean, healing well  EYES: EOMI, PERRLA, conjunctiva and sclera clear  ENT: Moist mucous membranes, No lesions  NECK: Supple, No JVD, Normal thyroid  NERVOUS SYSTEM:  Alert & Oriented X 1 and [facility] hospital, Language- correct, choppy in single words, speech- fluent, face symmetric, Moving all extremities well, gen weakness  CHEST/LUNG: CTA bilaterally; No rales, rhonchi, wheezing, or rubs  HEART: Regular rate and rhythm; No murmurs, rubs, or gallops  ABDOMEN: Soft, Nontender, Nondistended; Bowel sounds present, PEG +  EXTREMITIES:  2+ Peripheral Pulses, No clubbing, cyanosis, or edema      MEDICATIONS  (STANDING):  chlorhexidine 2% Cloths 1 Application(s) Topical daily  enoxaparin Injectable 40 milliGRAM(s) SubCutaneous every 24 hours  FIRST- Mouthwash  BLM 5 milliLiter(s) Swish and Swallow four times a day  levETIRAcetam  Solution 500 milliGRAM(s) Oral two times a day  melatonin 5 milliGRAM(s) Oral at bedtime  memantine 10 milliGRAM(s) Oral two times a day  nafcillin  IVPB 2 Gram(s) IV Intermittent every 4 hours  senna 2 Tablet(s) Oral at bedtime  sodium chloride 2 Gram(s) Oral every 8 hours  urea Oral Powder 30 Gram(s) Oral daily  vancomycin  IVPB 1000 milliGRAM(s) IV Intermittent every 12 hours    MEDICATIONS  (PRN):  acetaminophen     Tablet .. 650 milliGRAM(s) Oral every 6 hours PRN Temp greater or equal to 38C (100.4F), Moderate Pain (4 - 6)  ondansetron Injectable 4 milliGRAM(s) IV Push every 4 hours PRN Nausea and/or Vomiting      LABS:                        14.5   3.78  )-----------( 194      ( 22 Aug 2022 07:30 )             42.9     08-22    133<L>  |  96<L>  |  17.5  ----------------------------<  87  4.6   |  23.0  |  0.76    Ca    9.6      22 Aug 2022 07:30

## 2022-08-24 LAB
ANION GAP SERPL CALC-SCNC: 13 MMOL/L — SIGNIFICANT CHANGE UP (ref 5–17)
BUN SERPL-MCNC: 20.1 MG/DL — HIGH (ref 8–20)
CALCIUM SERPL-MCNC: 9 MG/DL — SIGNIFICANT CHANGE UP (ref 8.4–10.5)
CHLORIDE SERPL-SCNC: 98 MMOL/L — SIGNIFICANT CHANGE UP (ref 98–107)
CO2 SERPL-SCNC: 25 MMOL/L — SIGNIFICANT CHANGE UP (ref 22–29)
CREAT SERPL-MCNC: 0.86 MG/DL — SIGNIFICANT CHANGE UP (ref 0.5–1.3)
EGFR: 111 ML/MIN/1.73M2 — SIGNIFICANT CHANGE UP
GLUCOSE SERPL-MCNC: 125 MG/DL — HIGH (ref 70–99)
HCT VFR BLD CALC: 37.4 % — LOW (ref 39–50)
HGB BLD-MCNC: 12.7 G/DL — LOW (ref 13–17)
MCHC RBC-ENTMCNC: 28.2 PG — SIGNIFICANT CHANGE UP (ref 27–34)
MCHC RBC-ENTMCNC: 34 GM/DL — SIGNIFICANT CHANGE UP (ref 32–36)
MCV RBC AUTO: 82.9 FL — SIGNIFICANT CHANGE UP (ref 80–100)
PLATELET # BLD AUTO: 220 K/UL — SIGNIFICANT CHANGE UP (ref 150–400)
POTASSIUM SERPL-MCNC: 3.8 MMOL/L — SIGNIFICANT CHANGE UP (ref 3.5–5.3)
POTASSIUM SERPL-SCNC: 3.8 MMOL/L — SIGNIFICANT CHANGE UP (ref 3.5–5.3)
RBC # BLD: 4.51 M/UL — SIGNIFICANT CHANGE UP (ref 4.2–5.8)
RBC # FLD: 13.2 % — SIGNIFICANT CHANGE UP (ref 10.3–14.5)
SODIUM SERPL-SCNC: 136 MMOL/L — SIGNIFICANT CHANGE UP (ref 135–145)
WBC # BLD: 2.23 K/UL — LOW (ref 3.8–10.5)
WBC # FLD AUTO: 2.23 K/UL — LOW (ref 3.8–10.5)

## 2022-08-24 PROCEDURE — 99233 SBSQ HOSP IP/OBS HIGH 50: CPT

## 2022-08-24 RX ADMIN — DIPHENHYDRAMINE HYDROCHLORIDE AND LIDOCAINE HYDROCHLORIDE AND ALUMINUM HYDROXIDE AND MAGNESIUM HYDRO 5 MILLILITER(S): KIT at 17:53

## 2022-08-24 RX ADMIN — ENOXAPARIN SODIUM 40 MILLIGRAM(S): 100 INJECTION SUBCUTANEOUS at 19:05

## 2022-08-24 RX ADMIN — NAFCILLIN 200 GRAM(S): 10 INJECTION, POWDER, FOR SOLUTION INTRAVENOUS at 21:30

## 2022-08-24 RX ADMIN — NAFCILLIN 200 GRAM(S): 10 INJECTION, POWDER, FOR SOLUTION INTRAVENOUS at 08:15

## 2022-08-24 RX ADMIN — DIPHENHYDRAMINE HYDROCHLORIDE AND LIDOCAINE HYDROCHLORIDE AND ALUMINUM HYDROXIDE AND MAGNESIUM HYDRO 5 MILLILITER(S): KIT at 11:38

## 2022-08-24 RX ADMIN — NAFCILLIN 200 GRAM(S): 10 INJECTION, POWDER, FOR SOLUTION INTRAVENOUS at 00:48

## 2022-08-24 RX ADMIN — LEVETIRACETAM 500 MILLIGRAM(S): 250 TABLET, FILM COATED ORAL at 21:30

## 2022-08-24 RX ADMIN — NAFCILLIN 200 GRAM(S): 10 INJECTION, POWDER, FOR SOLUTION INTRAVENOUS at 11:37

## 2022-08-24 RX ADMIN — LEVETIRACETAM 500 MILLIGRAM(S): 250 TABLET, FILM COATED ORAL at 05:08

## 2022-08-24 RX ADMIN — DIPHENHYDRAMINE HYDROCHLORIDE AND LIDOCAINE HYDROCHLORIDE AND ALUMINUM HYDROXIDE AND MAGNESIUM HYDRO 5 MILLILITER(S): KIT at 00:48

## 2022-08-24 RX ADMIN — SODIUM CHLORIDE 2 GRAM(S): 9 INJECTION INTRAMUSCULAR; INTRAVENOUS; SUBCUTANEOUS at 21:33

## 2022-08-24 RX ADMIN — SODIUM CHLORIDE 2 GRAM(S): 9 INJECTION INTRAMUSCULAR; INTRAVENOUS; SUBCUTANEOUS at 15:04

## 2022-08-24 RX ADMIN — NAFCILLIN 200 GRAM(S): 10 INJECTION, POWDER, FOR SOLUTION INTRAVENOUS at 15:03

## 2022-08-24 RX ADMIN — Medication 30 GRAM(S): at 11:39

## 2022-08-24 RX ADMIN — Medication 250 MILLIGRAM(S): at 05:07

## 2022-08-24 RX ADMIN — Medication 250 MILLIGRAM(S): at 21:31

## 2022-08-24 RX ADMIN — NAFCILLIN 200 GRAM(S): 10 INJECTION, POWDER, FOR SOLUTION INTRAVENOUS at 05:07

## 2022-08-24 RX ADMIN — CHLORHEXIDINE GLUCONATE 1 APPLICATION(S): 213 SOLUTION TOPICAL at 11:38

## 2022-08-24 RX ADMIN — SENNA PLUS 2 TABLET(S): 8.6 TABLET ORAL at 21:33

## 2022-08-24 RX ADMIN — NAFCILLIN 200 GRAM(S): 10 INJECTION, POWDER, FOR SOLUTION INTRAVENOUS at 17:41

## 2022-08-24 RX ADMIN — Medication 5 MILLIGRAM(S): at 21:33

## 2022-08-24 RX ADMIN — DIPHENHYDRAMINE HYDROCHLORIDE AND LIDOCAINE HYDROCHLORIDE AND ALUMINUM HYDROXIDE AND MAGNESIUM HYDRO 5 MILLILITER(S): KIT at 05:09

## 2022-08-24 RX ADMIN — SODIUM CHLORIDE 2 GRAM(S): 9 INJECTION INTRAMUSCULAR; INTRAVENOUS; SUBCUTANEOUS at 05:08

## 2022-08-24 RX ADMIN — MEMANTINE HYDROCHLORIDE 10 MILLIGRAM(S): 10 TABLET ORAL at 05:12
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
## 2022-08-24 NOTE — CHART NOTE - NSCHARTNOTEFT_GEN_A_CORE
Called by RN, pt with increasing agitation.  Per RN, Pt's family was bedside, and pt was calm.  Since pt's family have left, pt has become increasing agitated, attempting to get out of bed, pulling at abdominal binding, took of gown.  I reviewed medication list to see if any PRN medications were given over the last 72 hours, and none were given  Discussed with Dr. Dyer, Memantine was recently started and can cause behavioral disturbance, which have now be dc'd.      I have evaluated this patient and have determined that restraints are warranted to optimize medical care. Patient was assessed for current physical and psychological risk factors as well as special needs. There are no medical conditions or limitations that would place this patient at risk while in restraints. Dr. Dyer made aware.  RN made aware if agitation persists, to notify PA as pt may need head CT to further evaluate change of status.

## 2022-08-24 NOTE — PROGRESS NOTE ADULT - SUBJECTIVE AND OBJECTIVE BOX
HEALTH ISSUES - PROBLEM Dx:    SDH, IPH, TEOFILO.   Patient underwent Right decompressive hemicraniectomy on 5/24, trach/peg 6/1,  R cranioplasty with complex closure 6/29/22.    repeated CTH  demonstrating right sided subdural collection mixed with air.  s/p rpt right craniectomy for evacuation on 7/17 after MR demonstrated large right ED collection  Cranioplasty healing well/ drain removed on 7/1.   Trach (decanulated)/ PEG    INTERVAL HPI/ OVERNIGHT EVENTS:    lying in bed  minimal conversation, choppy  comfortable  offers no complaints    REVIEW OF SYSTEMS:    as above    Vital Signs Last 24 Hrs  T(C): 36.7 (24 Aug 2022 16:19), Max: 36.8 (23 Aug 2022 20:03)  T(F): 98 (24 Aug 2022 16:19), Max: 98.3 (23 Aug 2022 20:03)  HR: 79 (24 Aug 2022 16:19) (63 - 79)  BP: 120/62 (24 Aug 2022 16:19) (109/72 - 120/62)  BP(mean): --  RR: 18 (24 Aug 2022 16:19) (18 - 18)  SpO2: 100% (24 Aug 2022 16:19) (97% - 100%)    Parameters below as of 24 Aug 2022 16:19  Patient On (Oxygen Delivery Method): room air    PHYSICAL EXAM-  GENERAL: Comfortable, lying in bed, cooperative  HEAD:  right craniectomy hollow, soft, Surg staples intact, clean, healing well  EYES: EOMI, PERRLA, conjunctiva and sclera clear  ENT: Moist mucous membranes, No lesions  NECK: Supple, No JVD, Normal thyroid  NERVOUS SYSTEM:  Alert & Oriented X 1 and [facility], Language- correct, choppy in single words, speech- fluent, face symmetric, Moving all extremities well, gen weakness  CHEST/LUNG: CTA bilaterally; No rales, rhonchi, wheezing, or rubs  HEART: Regular rate and rhythm; No murmurs, rubs, or gallops  ABDOMEN: Soft, Nontender, Nondistended; Bowel sounds present, PEG +, abdominal binder  EXTREMITIES:  2+ Peripheral Pulses, No clubbing, cyanosis, or edema    MEDICATIONS  (STANDING):  chlorhexidine 2% Cloths 1 Application(s) Topical daily  enoxaparin Injectable 40 milliGRAM(s) SubCutaneous every 24 hours  FIRST- Mouthwash  BLM 5 milliLiter(s) Swish and Swallow four times a day  levETIRAcetam  Solution 500 milliGRAM(s) Oral two times a day  melatonin 5 milliGRAM(s) Oral at bedtime  nafcillin  IVPB 2 Gram(s) IV Intermittent every 4 hours  senna 2 Tablet(s) Oral at bedtime  sodium chloride 2 Gram(s) Oral every 8 hours  urea Oral Powder 30 Gram(s) Oral daily  vancomycin  IVPB 1000 milliGRAM(s) IV Intermittent every 12 hours    MEDICATIONS  (PRN):  acetaminophen     Tablet .. 650 milliGRAM(s) Oral every 6 hours PRN Temp greater or equal to 38C (100.4F), Moderate Pain (4 - 6)  ondansetron Injectable 4 milliGRAM(s) IV Push every 4 hours PRN Nausea and/or Vomiting      LABS:                        12.7   2.23  )-----------( 220      ( 24 Aug 2022 06:09 )             37.4     08-24    136  |  98  |  20.1<H>  ----------------------------<  125<H>  3.8   |  25.0  |  0.86    Ca    9.0      24 Aug 2022 06:09

## 2022-08-24 NOTE — PROGRESS NOTE ADULT - NUTRITIONAL ASSESSMENT
This patient has been assessed with a concern for Malnutrition and has been determined to have a diagnosis/diagnoses of Severe protein-calorie malnutrition.    This patient is being managed with:   Diet Pureed-  Mildly Thick Liquids (MILDTHICKLIQS)  Free Water Flush  Bolus   Total Volume per Flush (mL): 250   Frequency: Every 24 Hours   Total Daily Volume of Flush (mL): 100  Entered: Aug 24 2022  9:41AM
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
## 2022-08-25 PROCEDURE — 99233 SBSQ HOSP IP/OBS HIGH 50: CPT | Mod: GC

## 2022-08-25 PROCEDURE — 99233 SBSQ HOSP IP/OBS HIGH 50: CPT

## 2022-08-25 RX ORDER — MEMANTINE HYDROCHLORIDE 10 MG/1
5 TABLET ORAL
Refills: 0 | Status: DISCONTINUED | OUTPATIENT
Start: 2022-08-26 | End: 2022-09-19

## 2022-08-25 RX ORDER — QUETIAPINE FUMARATE 200 MG/1
25 TABLET, FILM COATED ORAL
Refills: 0 | Status: DISCONTINUED | OUTPATIENT
Start: 2022-08-25 | End: 2022-08-26

## 2022-08-25 RX ADMIN — QUETIAPINE FUMARATE 25 MILLIGRAM(S): 200 TABLET, FILM COATED ORAL at 17:34

## 2022-08-25 RX ADMIN — DIPHENHYDRAMINE HYDROCHLORIDE AND LIDOCAINE HYDROCHLORIDE AND ALUMINUM HYDROXIDE AND MAGNESIUM HYDRO 5 MILLILITER(S): KIT at 00:04

## 2022-08-25 RX ADMIN — SENNA PLUS 2 TABLET(S): 8.6 TABLET ORAL at 21:02

## 2022-08-25 RX ADMIN — CHLORHEXIDINE GLUCONATE 1 APPLICATION(S): 213 SOLUTION TOPICAL at 14:37

## 2022-08-25 RX ADMIN — NAFCILLIN 200 GRAM(S): 10 INJECTION, POWDER, FOR SOLUTION INTRAVENOUS at 16:16

## 2022-08-25 RX ADMIN — Medication 250 MILLIGRAM(S): at 05:20

## 2022-08-25 RX ADMIN — DIPHENHYDRAMINE HYDROCHLORIDE AND LIDOCAINE HYDROCHLORIDE AND ALUMINUM HYDROXIDE AND MAGNESIUM HYDRO 5 MILLILITER(S): KIT at 05:19

## 2022-08-25 RX ADMIN — ENOXAPARIN SODIUM 40 MILLIGRAM(S): 100 INJECTION SUBCUTANEOUS at 21:02

## 2022-08-25 RX ADMIN — Medication 5 MILLIGRAM(S): at 21:02

## 2022-08-25 RX ADMIN — SODIUM CHLORIDE 2 GRAM(S): 9 INJECTION INTRAMUSCULAR; INTRAVENOUS; SUBCUTANEOUS at 05:20

## 2022-08-25 RX ADMIN — NAFCILLIN 200 GRAM(S): 10 INJECTION, POWDER, FOR SOLUTION INTRAVENOUS at 21:03

## 2022-08-25 RX ADMIN — Medication 30 GRAM(S): at 14:37

## 2022-08-25 RX ADMIN — DIPHENHYDRAMINE HYDROCHLORIDE AND LIDOCAINE HYDROCHLORIDE AND ALUMINUM HYDROXIDE AND MAGNESIUM HYDRO 5 MILLILITER(S): KIT at 17:34

## 2022-08-25 RX ADMIN — SODIUM CHLORIDE 2 GRAM(S): 9 INJECTION INTRAMUSCULAR; INTRAVENOUS; SUBCUTANEOUS at 21:02

## 2022-08-25 RX ADMIN — NAFCILLIN 200 GRAM(S): 10 INJECTION, POWDER, FOR SOLUTION INTRAVENOUS at 05:19

## 2022-08-25 RX ADMIN — QUETIAPINE FUMARATE 25 MILLIGRAM(S): 200 TABLET, FILM COATED ORAL at 21:03

## 2022-08-25 RX ADMIN — NAFCILLIN 200 GRAM(S): 10 INJECTION, POWDER, FOR SOLUTION INTRAVENOUS at 11:15

## 2022-08-25 RX ADMIN — LEVETIRACETAM 500 MILLIGRAM(S): 250 TABLET, FILM COATED ORAL at 05:21

## 2022-08-25 RX ADMIN — SODIUM CHLORIDE 2 GRAM(S): 9 INJECTION INTRAMUSCULAR; INTRAVENOUS; SUBCUTANEOUS at 14:36

## 2022-08-25 RX ADMIN — DIPHENHYDRAMINE HYDROCHLORIDE AND LIDOCAINE HYDROCHLORIDE AND ALUMINUM HYDROXIDE AND MAGNESIUM HYDRO 5 MILLILITER(S): KIT at 14:37

## 2022-08-25 RX ADMIN — LEVETIRACETAM 500 MILLIGRAM(S): 250 TABLET, FILM COATED ORAL at 17:34

## 2022-08-25 RX ADMIN — NAFCILLIN 200 GRAM(S): 10 INJECTION, POWDER, FOR SOLUTION INTRAVENOUS at 19:08

## 2022-08-25 RX ADMIN — Medication 250 MILLIGRAM(S): at 17:34

## 2022-08-25 RX ADMIN — NAFCILLIN 200 GRAM(S): 10 INJECTION, POWDER, FOR SOLUTION INTRAVENOUS at 03:41

## 2022-08-25 NOTE — PROGRESS NOTE ADULT - SUBJECTIVE AND OBJECTIVE BOX
HEALTH ISSUES - PROBLEM Dx:  SDH, IPH, TEOFILO.   Patient underwent Right decompressive hemicraniectomy on 5/24, trach/peg 6/1,  R cranioplasty with complex closure 6/29/22.    repeated CTH  demonstrating right sided subdural collection mixed with air.  s/p rpt right craniectomy for evacuation on 7/17 after MR demonstrated large right ED collection  Cranioplasty healing well/ drain removed on 7/1.   Trach (decanulated)/ PEG    INTERVAL HPI/ OVERNIGHT EVENTS:    lying in bed  minimal conversation, choppy  comfortable  offers no complaints    REVIEW OF SYSTEMS:    as above    Vital Signs Last 24 Hrs  T(C): 36.7 (25 Aug 2022 16:25), Max: 36.9 (24 Aug 2022 21:02)  T(F): 98.1 (25 Aug 2022 16:25), Max: 98.4 (24 Aug 2022 21:02)  HR: 65 (25 Aug 2022 16:25) (65 - 98)  BP: 118/80 (25 Aug 2022 16:25) (105/70 - 125/81)  BP(mean): --  RR: 18 (25 Aug 2022 16:25) (16 - 18)  SpO2: 96% (25 Aug 2022 16:25) (96% - 98%)    Parameters below as of 25 Aug 2022 16:25  Patient On (Oxygen Delivery Method): room air        PHYSICAL EXAM-  GENERAL: NAD, well-groomed, well-developed  HEAD:  Atraumatic, Normocephalic  EYES: EOMI, PERRLA, conjunctiva and sclera clear  ENMT: No tonsillar erythema, exudates, or enlargement; Moist mucous membranes, Good dentition, No lesions  NECK: Supple, No JVD, Normal thyroid  NERVOUS SYSTEM:  Alert & Oriented X3, Motor Strength 5/5 B/L upper and lower extremities; DTRs 2+ intact and symmetric  CHEST/LUNG: Clear to percussion bilaterally; No rales, rhonchi, wheezing, or rubs  HEART: Regular rate and rhythm; No murmurs, rubs, or gallops  ABDOMEN: Soft, Nontender, Nondistended; Bowel sounds present  EXTREMITIES:  2+ Peripheral Pulses, No clubbing, cyanosis, or edema  LYMPH: No lymphadenopathy noted  SKIN: No rashes or lesions    MEDICATIONS  (STANDING):  chlorhexidine 2% Cloths 1 Application(s) Topical daily  enoxaparin Injectable 40 milliGRAM(s) SubCutaneous every 24 hours  FIRST- Mouthwash  BLM 5 milliLiter(s) Swish and Swallow four times a day  levETIRAcetam  Solution 500 milliGRAM(s) Oral two times a day  melatonin 5 milliGRAM(s) Oral at bedtime  nafcillin  IVPB 2 Gram(s) IV Intermittent every 4 hours  senna 2 Tablet(s) Oral at bedtime  sodium chloride 2 Gram(s) Oral every 8 hours  urea Oral Powder 30 Gram(s) Oral daily  vancomycin  IVPB 1000 milliGRAM(s) IV Intermittent every 12 hours    MEDICATIONS  (PRN):  acetaminophen     Tablet .. 650 milliGRAM(s) Oral every 6 hours PRN Temp greater or equal to 38C (100.4F), Moderate Pain (4 - 6)  ondansetron Injectable 4 milliGRAM(s) IV Push every 4 hours PRN Nausea and/or Vomiting      LABS:                        12.7   2.23  )-----------( 220      ( 24 Aug 2022 06:09 )             37.4     08-24    136  |  98  |  20.1<H>  ----------------------------<  125<H>  3.8   |  25.0  |  0.86    Ca    9.0      24 Aug 2022 06:09              Assessment and Plan    Encephalopathy  Malnutrition  Morbid Obesity  Functional quadriplegia    DVT Prophylaxis    Discussed with: Patient, family, RN, CM, Consultants  Plan of care/ Discharge planning discussed.    Visit Time:  HEALTH ISSUES - PROBLEM Dx:  SDH, IPH, TEOFILO.   Patient underwent Right decompressive hemicraniectomy on 5/24, trach/peg 6/1,  R cranioplasty with complex closure 6/29/22.    repeated CTH  demonstrating right sided subdural collection mixed with air.  s/p rpt right craniectomy for evacuation on 7/17 after MR demonstrated large right ED collection  Cranioplasty healing well/ drain removed on 7/1.   Trach (decanulated)/ PEG    INTERVAL HPI/ OVERNIGHT EVENTS:    agitation last evening  restrained  now off restraints and he is comfortable and cooperative    REVIEW OF SYSTEMS:    as above    Vital Signs Last 24 Hrs  T(C): 36.7 (25 Aug 2022 16:25), Max: 36.9 (24 Aug 2022 21:02)  T(F): 98.1 (25 Aug 2022 16:25), Max: 98.4 (24 Aug 2022 21:02)  HR: 65 (25 Aug 2022 16:25) (65 - 98)  BP: 118/80 (25 Aug 2022 16:25) (105/70 - 125/81)  BP(mean): --  RR: 18 (25 Aug 2022 16:25) (16 - 18)  SpO2: 96% (25 Aug 2022 16:25) (96% - 98%)    Parameters below as of 25 Aug 2022 16:25  Patient On (Oxygen Delivery Method): room air    PHYSICAL EXAM-  GENERAL: Comfortable, lying in bed, cooperative, not agitated  HEAD:  right craniectomy hollow, soft, Surg staples intact, clean, healing well  EYES: EOMI, PERRLA, conjunctiva and sclera clear  ENT: Moist mucous membranes, No lesions  NECK: Supple, No JVD, Normal thyroid  NERVOUS SYSTEM:  Alert & Oriented X 1 and [facility], Language- correct, choppy in single words, speech- fluent, face symmetric, Moving all extremities well, gen weakness  CHEST/LUNG: CTA bilaterally; No rales, rhonchi, wheezing, or rubs  HEART: Regular rate and rhythm; No murmurs, rubs, or gallops  ABDOMEN: Soft, Nontender, Nondistended; Bowel sounds present, PEG +, abdominal binder  EXTREMITIES:  2+ Peripheral Pulses, No clubbing, cyanosis, or edema      MEDICATIONS  (STANDING):  chlorhexidine 2% Cloths 1 Application(s) Topical daily  enoxaparin Injectable 40 milliGRAM(s) SubCutaneous every 24 hours  FIRST- Mouthwash  BLM 5 milliLiter(s) Swish and Swallow four times a day  levETIRAcetam  Solution 500 milliGRAM(s) Oral two times a day  melatonin 5 milliGRAM(s) Oral at bedtime  nafcillin  IVPB 2 Gram(s) IV Intermittent every 4 hours  senna 2 Tablet(s) Oral at bedtime  sodium chloride 2 Gram(s) Oral every 8 hours  urea Oral Powder 30 Gram(s) Oral daily  vancomycin  IVPB 1000 milliGRAM(s) IV Intermittent every 12 hours    MEDICATIONS  (PRN):  acetaminophen     Tablet .. 650 milliGRAM(s) Oral every 6 hours PRN Temp greater or equal to 38C (100.4F), Moderate Pain (4 - 6)  ondansetron Injectable 4 milliGRAM(s) IV Push every 4 hours PRN Nausea and/or Vomiting      LABS:                        12.7   2.23  )-----------( 220      ( 24 Aug 2022 06:09 )             37.4     08-24    136  |  98  |  20.1<H>  ----------------------------<  125<H>  3.8   |  25.0  |  0.86    Ca    9.0      24 Aug 2022 06:09
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
## 2022-08-25 NOTE — CHART NOTE - NSCHARTNOTEFT_GEN_A_CORE
I have evaluated this patient and have determined that restraints are warranted to optimize medical care. Patient was assessed for current physical and psychological risk factors as well as special needs. There are no medical conditions or limitations that would place this patient at risk while in restraints. Dr. Walters made aware.
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
## 2022-08-26 LAB
ANION GAP SERPL CALC-SCNC: 13 MMOL/L — SIGNIFICANT CHANGE UP (ref 5–17)
BASOPHILS # BLD AUTO: 0.15 K/UL — SIGNIFICANT CHANGE UP (ref 0–0.2)
BASOPHILS NFR BLD AUTO: 6.2 % — HIGH (ref 0–2)
BUN SERPL-MCNC: 12.5 MG/DL — SIGNIFICANT CHANGE UP (ref 8–20)
BURR CELLS BLD QL SMEAR: PRESENT — SIGNIFICANT CHANGE UP
CALCIUM SERPL-MCNC: 9 MG/DL — SIGNIFICANT CHANGE UP (ref 8.4–10.5)
CHLORIDE SERPL-SCNC: 95 MMOL/L — LOW (ref 98–107)
CO2 SERPL-SCNC: 25 MMOL/L — SIGNIFICANT CHANGE UP (ref 22–29)
CREAT SERPL-MCNC: 0.8 MG/DL — SIGNIFICANT CHANGE UP (ref 0.5–1.3)
EGFR: 113 ML/MIN/1.73M2 — SIGNIFICANT CHANGE UP
EOSINOPHIL # BLD AUTO: 0.42 K/UL — SIGNIFICANT CHANGE UP (ref 0–0.5)
EOSINOPHIL NFR BLD AUTO: 17.7 % — HIGH (ref 0–6)
GIANT PLATELETS BLD QL SMEAR: PRESENT — SIGNIFICANT CHANGE UP
GLUCOSE SERPL-MCNC: 88 MG/DL — SIGNIFICANT CHANGE UP (ref 70–99)
HCT VFR BLD CALC: 38.5 % — LOW (ref 39–50)
HGB BLD-MCNC: 12.9 G/DL — LOW (ref 13–17)
LYMPHOCYTES # BLD AUTO: 0.86 K/UL — LOW (ref 1–3.3)
LYMPHOCYTES # BLD AUTO: 36.3 % — SIGNIFICANT CHANGE UP (ref 13–44)
MANUAL SMEAR VERIFICATION: SIGNIFICANT CHANGE UP
MCHC RBC-ENTMCNC: 27.4 PG — SIGNIFICANT CHANGE UP (ref 27–34)
MCHC RBC-ENTMCNC: 33.5 GM/DL — SIGNIFICANT CHANGE UP (ref 32–36)
MCV RBC AUTO: 81.9 FL — SIGNIFICANT CHANGE UP (ref 80–100)
MONOCYTES # BLD AUTO: 0.69 K/UL — SIGNIFICANT CHANGE UP (ref 0–0.9)
MONOCYTES NFR BLD AUTO: 29.2 % — HIGH (ref 2–14)
NEUTROPHILS # BLD AUTO: 0.19 K/UL — LOW (ref 1.8–7.4)
NEUTROPHILS NFR BLD AUTO: 8 % — LOW (ref 43–77)
OVALOCYTES BLD QL SMEAR: SLIGHT — SIGNIFICANT CHANGE UP
PLAT MORPH BLD: NORMAL — SIGNIFICANT CHANGE UP
PLATELET # BLD AUTO: 197 K/UL — SIGNIFICANT CHANGE UP (ref 150–400)
POIKILOCYTOSIS BLD QL AUTO: SLIGHT — SIGNIFICANT CHANGE UP
POLYCHROMASIA BLD QL SMEAR: SLIGHT — SIGNIFICANT CHANGE UP
POTASSIUM SERPL-MCNC: 3.9 MMOL/L — SIGNIFICANT CHANGE UP (ref 3.5–5.3)
POTASSIUM SERPL-SCNC: 3.9 MMOL/L — SIGNIFICANT CHANGE UP (ref 3.5–5.3)
RBC # BLD: 4.7 M/UL — SIGNIFICANT CHANGE UP (ref 4.2–5.8)
RBC # FLD: 13.1 % — SIGNIFICANT CHANGE UP (ref 10.3–14.5)
RBC BLD AUTO: ABNORMAL
SMUDGE CELLS # BLD: PRESENT — SIGNIFICANT CHANGE UP
SODIUM SERPL-SCNC: 133 MMOL/L — LOW (ref 135–145)
VANCOMYCIN TROUGH SERPL-MCNC: 16 UG/ML — SIGNIFICANT CHANGE UP (ref 10–20)
VARIANT LYMPHS # BLD: 2.6 % — SIGNIFICANT CHANGE UP (ref 0–6)
WBC # BLD: 2.38 K/UL — LOW (ref 3.8–10.5)
WBC # FLD AUTO: 2.38 K/UL — LOW (ref 3.8–10.5)

## 2022-08-26 PROCEDURE — 99233 SBSQ HOSP IP/OBS HIGH 50: CPT | Mod: GC

## 2022-08-26 PROCEDURE — 99232 SBSQ HOSP IP/OBS MODERATE 35: CPT

## 2022-08-26 RX ORDER — QUETIAPINE FUMARATE 200 MG/1
25 TABLET, FILM COATED ORAL
Refills: 0 | Status: DISCONTINUED | OUTPATIENT
Start: 2022-08-26 | End: 2022-08-29

## 2022-08-26 RX ADMIN — NAFCILLIN 200 GRAM(S): 10 INJECTION, POWDER, FOR SOLUTION INTRAVENOUS at 05:16

## 2022-08-26 RX ADMIN — SODIUM CHLORIDE 2 GRAM(S): 9 INJECTION INTRAMUSCULAR; INTRAVENOUS; SUBCUTANEOUS at 21:44

## 2022-08-26 RX ADMIN — Medication 250 MILLIGRAM(S): at 18:15

## 2022-08-26 RX ADMIN — MEMANTINE HYDROCHLORIDE 5 MILLIGRAM(S): 10 TABLET ORAL at 18:15

## 2022-08-26 RX ADMIN — NAFCILLIN 200 GRAM(S): 10 INJECTION, POWDER, FOR SOLUTION INTRAVENOUS at 18:14

## 2022-08-26 RX ADMIN — DIPHENHYDRAMINE HYDROCHLORIDE AND LIDOCAINE HYDROCHLORIDE AND ALUMINUM HYDROXIDE AND MAGNESIUM HYDRO 5 MILLILITER(S): KIT at 11:49

## 2022-08-26 RX ADMIN — SENNA PLUS 2 TABLET(S): 8.6 TABLET ORAL at 21:44

## 2022-08-26 RX ADMIN — NAFCILLIN 200 GRAM(S): 10 INJECTION, POWDER, FOR SOLUTION INTRAVENOUS at 22:49

## 2022-08-26 RX ADMIN — DIPHENHYDRAMINE HYDROCHLORIDE AND LIDOCAINE HYDROCHLORIDE AND ALUMINUM HYDROXIDE AND MAGNESIUM HYDRO 5 MILLILITER(S): KIT at 02:09

## 2022-08-26 RX ADMIN — SODIUM CHLORIDE 2 GRAM(S): 9 INJECTION INTRAMUSCULAR; INTRAVENOUS; SUBCUTANEOUS at 05:17

## 2022-08-26 RX ADMIN — LEVETIRACETAM 500 MILLIGRAM(S): 250 TABLET, FILM COATED ORAL at 05:18

## 2022-08-26 RX ADMIN — NAFCILLIN 200 GRAM(S): 10 INJECTION, POWDER, FOR SOLUTION INTRAVENOUS at 10:09

## 2022-08-26 RX ADMIN — NAFCILLIN 200 GRAM(S): 10 INJECTION, POWDER, FOR SOLUTION INTRAVENOUS at 02:09

## 2022-08-26 RX ADMIN — SODIUM CHLORIDE 2 GRAM(S): 9 INJECTION INTRAMUSCULAR; INTRAVENOUS; SUBCUTANEOUS at 13:35

## 2022-08-26 RX ADMIN — DIPHENHYDRAMINE HYDROCHLORIDE AND LIDOCAINE HYDROCHLORIDE AND ALUMINUM HYDROXIDE AND MAGNESIUM HYDRO 5 MILLILITER(S): KIT at 05:17

## 2022-08-26 RX ADMIN — Medication 250 MILLIGRAM(S): at 07:54

## 2022-08-26 RX ADMIN — MEMANTINE HYDROCHLORIDE 5 MILLIGRAM(S): 10 TABLET ORAL at 05:17

## 2022-08-26 RX ADMIN — LEVETIRACETAM 500 MILLIGRAM(S): 250 TABLET, FILM COATED ORAL at 18:15

## 2022-08-26 RX ADMIN — CHLORHEXIDINE GLUCONATE 1 APPLICATION(S): 213 SOLUTION TOPICAL at 11:53

## 2022-08-26 RX ADMIN — QUETIAPINE FUMARATE 25 MILLIGRAM(S): 200 TABLET, FILM COATED ORAL at 18:15

## 2022-08-26 RX ADMIN — NAFCILLIN 200 GRAM(S): 10 INJECTION, POWDER, FOR SOLUTION INTRAVENOUS at 13:35

## 2022-08-26 RX ADMIN — ENOXAPARIN SODIUM 40 MILLIGRAM(S): 100 INJECTION SUBCUTANEOUS at 18:14

## 2022-08-26 RX ADMIN — DIPHENHYDRAMINE HYDROCHLORIDE AND LIDOCAINE HYDROCHLORIDE AND ALUMINUM HYDROXIDE AND MAGNESIUM HYDRO 5 MILLILITER(S): KIT at 18:14

## 2022-08-26 RX ADMIN — Medication 5 MILLIGRAM(S): at 22:50

## 2022-08-26 RX ADMIN — Medication 30 GRAM(S): at 11:49
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
## 2022-08-26 NOTE — PROGRESS NOTE ADULT - SUBJECTIVE AND OBJECTIVE BOX
HEALTH ISSUES - PROBLEM Dx:    SDH, IPH, TEOFILO.   Patient underwent Right decompressive hemicraniectomy on 5/24, trach/peg 6/1,  R cranioplasty with complex closure 6/29/22.    repeated CTH  demonstrating right sided subdural collection mixed with air.  s/p rpt right craniectomy for evacuation on 7/17 after MR demonstrated large right ED collection  Cranioplasty healing well/ drain removed on 7/1.   Trach (decanulated)/ PEG    INTERVAL HPI/ OVERNIGHT EVENTS:    no agitation > 24 hrs  D/W RN not to use mittens unless agitated and to inform PA/ MD before initiating  family at bedside now    REVIEW OF SYSTEMS:    as above    Vital Signs Last 24 Hrs  T(C): 36.7 (26 Aug 2022 09:45), Max: 36.7 (26 Aug 2022 09:45)  T(F): 98 (26 Aug 2022 09:45), Max: 98 (26 Aug 2022 09:45)  HR: 73 (26 Aug 2022 09:45) (62 - 73)  BP: 113/75 (26 Aug 2022 09:45) (113/75 - 117/81)  BP(mean): --  RR: 18 (26 Aug 2022 09:45) (17 - 18)  SpO2: 100% (26 Aug 2022 09:45) (100% - 100%)    Parameters below as of 26 Aug 2022 08:18  Patient On (Oxygen Delivery Method): room air    PHYSICAL EXAM-  GENERAL: Comfortable, lying in bed, cooperative, not agitated  HEAD:  right craniectomy hollow, soft, Surg staples intact, clean, healing well  EYES: EOMI, PERRLA, conjunctiva and sclera clear  ENT: Moist mucous membranes, No lesions  NECK: Supple, No JVD, Normal thyroid  NERVOUS SYSTEM:  Alert & Oriented X 1 [facility], Language- correct, choppy in single words, speech- fluent, face symmetric, Moving all extremities well, gen weakness  CHEST/LUNG: CTA bilaterally; No rales, rhonchi, wheezing, or rubs  HEART: Regular rate and rhythm; No murmurs, rubs, or gallops  ABDOMEN: Soft, Nontender, Nondistended; Bowel sounds present, PEG +, abdominal binder  EXTREMITIES:  2+ Peripheral Pulses, No clubbing, cyanosis, or edema      MEDICATIONS  (STANDING):  chlorhexidine 2% Cloths 1 Application(s) Topical daily  enoxaparin Injectable 40 milliGRAM(s) SubCutaneous every 24 hours  FIRST- Mouthwash  BLM 5 milliLiter(s) Swish and Swallow four times a day  levETIRAcetam  Solution 500 milliGRAM(s) Oral two times a day  melatonin 5 milliGRAM(s) Oral at bedtime  memantine 5 milliGRAM(s) Oral two times a day  nafcillin  IVPB 2 Gram(s) IV Intermittent every 4 hours  QUEtiapine 25 milliGRAM(s) Oral <User Schedule>  senna 2 Tablet(s) Oral at bedtime  sodium chloride 2 Gram(s) Oral every 8 hours  urea Oral Powder 30 Gram(s) Oral daily  vancomycin  IVPB 1000 milliGRAM(s) IV Intermittent every 12 hours    MEDICATIONS  (PRN):  acetaminophen     Tablet .. 650 milliGRAM(s) Oral every 6 hours PRN Temp greater or equal to 38C (100.4F), Moderate Pain (4 - 6)  ondansetron Injectable 4 milliGRAM(s) IV Push every 4 hours PRN Nausea and/or Vomiting      LABS:                        12.9   2.38  )-----------( 197      ( 26 Aug 2022 06:45 )             38.5     08-26    133<L>  |  95<L>  |  12.5  ----------------------------<  88  3.9   |  25.0  |  0.80    Ca    9.0      26 Aug 2022 06:45

## 2022-08-26 NOTE — PROGRESS NOTE ADULT - ATTENDING SUPERVISION STATEMENT
Fellow
Fellow
Resident
Fellow
Fellow
Resident
Resident/Fellow
Fellow
Fellow
Resident
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
## 2022-08-27 LAB
ANION GAP SERPL CALC-SCNC: 15 MMOL/L — SIGNIFICANT CHANGE UP (ref 5–17)
BUN SERPL-MCNC: 16.2 MG/DL — SIGNIFICANT CHANGE UP (ref 8–20)
CALCIUM SERPL-MCNC: 9.1 MG/DL — SIGNIFICANT CHANGE UP (ref 8.4–10.5)
CHLORIDE SERPL-SCNC: 95 MMOL/L — LOW (ref 98–107)
CO2 SERPL-SCNC: 22 MMOL/L — SIGNIFICANT CHANGE UP (ref 22–29)
CREAT SERPL-MCNC: 0.75 MG/DL — SIGNIFICANT CHANGE UP (ref 0.5–1.3)
EGFR: 116 ML/MIN/1.73M2 — SIGNIFICANT CHANGE UP
GLUCOSE SERPL-MCNC: 103 MG/DL — HIGH (ref 70–99)
POTASSIUM SERPL-MCNC: 4.1 MMOL/L — SIGNIFICANT CHANGE UP (ref 3.5–5.3)
POTASSIUM SERPL-MCNC: 6.1 MMOL/L — CRITICAL HIGH (ref 3.5–5.3)
POTASSIUM SERPL-SCNC: 4.1 MMOL/L — SIGNIFICANT CHANGE UP (ref 3.5–5.3)
POTASSIUM SERPL-SCNC: 6.1 MMOL/L — CRITICAL HIGH (ref 3.5–5.3)
SODIUM SERPL-SCNC: 132 MMOL/L — LOW (ref 135–145)

## 2022-08-27 PROCEDURE — 99232 SBSQ HOSP IP/OBS MODERATE 35: CPT

## 2022-08-27 RX ADMIN — MEMANTINE HYDROCHLORIDE 5 MILLIGRAM(S): 10 TABLET ORAL at 17:45

## 2022-08-27 RX ADMIN — CHLORHEXIDINE GLUCONATE 1 APPLICATION(S): 213 SOLUTION TOPICAL at 11:40

## 2022-08-27 RX ADMIN — DIPHENHYDRAMINE HYDROCHLORIDE AND LIDOCAINE HYDROCHLORIDE AND ALUMINUM HYDROXIDE AND MAGNESIUM HYDRO 5 MILLILITER(S): KIT at 05:12

## 2022-08-27 RX ADMIN — NAFCILLIN 200 GRAM(S): 10 INJECTION, POWDER, FOR SOLUTION INTRAVENOUS at 01:41

## 2022-08-27 RX ADMIN — Medication 30 GRAM(S): at 11:42

## 2022-08-27 RX ADMIN — DIPHENHYDRAMINE HYDROCHLORIDE AND LIDOCAINE HYDROCHLORIDE AND ALUMINUM HYDROXIDE AND MAGNESIUM HYDRO 5 MILLILITER(S): KIT at 11:42

## 2022-08-27 RX ADMIN — NAFCILLIN 200 GRAM(S): 10 INJECTION, POWDER, FOR SOLUTION INTRAVENOUS at 17:44

## 2022-08-27 RX ADMIN — QUETIAPINE FUMARATE 25 MILLIGRAM(S): 200 TABLET, FILM COATED ORAL at 16:33

## 2022-08-27 RX ADMIN — LEVETIRACETAM 500 MILLIGRAM(S): 250 TABLET, FILM COATED ORAL at 05:11

## 2022-08-27 RX ADMIN — Medication 250 MILLIGRAM(S): at 17:44

## 2022-08-27 RX ADMIN — Medication 250 MILLIGRAM(S): at 05:10

## 2022-08-27 RX ADMIN — DIPHENHYDRAMINE HYDROCHLORIDE AND LIDOCAINE HYDROCHLORIDE AND ALUMINUM HYDROXIDE AND MAGNESIUM HYDRO 5 MILLILITER(S): KIT at 01:41

## 2022-08-27 RX ADMIN — DIPHENHYDRAMINE HYDROCHLORIDE AND LIDOCAINE HYDROCHLORIDE AND ALUMINUM HYDROXIDE AND MAGNESIUM HYDRO 5 MILLILITER(S): KIT at 17:19

## 2022-08-27 RX ADMIN — NAFCILLIN 200 GRAM(S): 10 INJECTION, POWDER, FOR SOLUTION INTRAVENOUS at 21:16

## 2022-08-27 RX ADMIN — ENOXAPARIN SODIUM 40 MILLIGRAM(S): 100 INJECTION SUBCUTANEOUS at 20:22

## 2022-08-27 RX ADMIN — NAFCILLIN 200 GRAM(S): 10 INJECTION, POWDER, FOR SOLUTION INTRAVENOUS at 09:36

## 2022-08-27 RX ADMIN — NAFCILLIN 200 GRAM(S): 10 INJECTION, POWDER, FOR SOLUTION INTRAVENOUS at 14:07

## 2022-08-27 RX ADMIN — NAFCILLIN 200 GRAM(S): 10 INJECTION, POWDER, FOR SOLUTION INTRAVENOUS at 05:10

## 2022-08-27 RX ADMIN — SODIUM CHLORIDE 2 GRAM(S): 9 INJECTION INTRAMUSCULAR; INTRAVENOUS; SUBCUTANEOUS at 11:46

## 2022-08-27 RX ADMIN — Medication 5 MILLIGRAM(S): at 21:16

## 2022-08-27 RX ADMIN — LEVETIRACETAM 500 MILLIGRAM(S): 250 TABLET, FILM COATED ORAL at 17:45

## 2022-08-27 RX ADMIN — SENNA PLUS 2 TABLET(S): 8.6 TABLET ORAL at 21:16

## 2022-08-27 RX ADMIN — SODIUM CHLORIDE 2 GRAM(S): 9 INJECTION INTRAMUSCULAR; INTRAVENOUS; SUBCUTANEOUS at 05:11

## 2022-08-27 RX ADMIN — SODIUM CHLORIDE 2 GRAM(S): 9 INJECTION INTRAMUSCULAR; INTRAVENOUS; SUBCUTANEOUS at 21:16

## 2022-08-27 RX ADMIN — MEMANTINE HYDROCHLORIDE 5 MILLIGRAM(S): 10 TABLET ORAL at 05:11

## 2022-08-27 NOTE — PROVIDER CONTACT NOTE (CRITICAL VALUE NOTIFICATION) - NAME OF MD/NP/PA/DO NOTIFIED:
Trauma PA Manisha Vasquez
sid MILES
Fer Howard PA-C
SANDRA Vasquez
BEVERLY MILES sicu team
Caitlin MILES
Nicci Molina
Nawaf MILES
gomez

## 2022-08-27 NOTE — PROVIDER CONTACT NOTE (CRITICAL VALUE NOTIFICATION) - ACTION/TREATMENT ORDERED:
Supplements adminstered
sicu team aware
1 unit PRBC ordered
Plan for transfusion
Will come to patient's beside to assess
Orders as per PA
no orders at this time. will reassess rest of labwork

## 2022-08-27 NOTE — CHART NOTE - NSCHARTNOTEFT_GEN_A_CORE
Called to evaluate patient for restraints. Pt receiving IV abx for CNS infection.   Time of Evaluation: 2230    Other interventions attempted: de-escalation, orientation check, environment modification      Vital Signs Last 24 Hrs  T(C): 36.1 (26 Aug 2022 21:40), Max: 36.9 (26 Aug 2022 16:59)  T(F): 97 (26 Aug 2022 21:40), Max: 98.4 (26 Aug 2022 16:59)  HR: 87 (26 Aug 2022 21:40) (62 - 87)  BP: 132/91 (26 Aug 2022 21:40) (104/69 - 132/91)  RR: 20 (26 Aug 2022 21:40) (17 - 20)  SpO2: 99% (26 Aug 2022 21:40) (98% - 100%)  Parameters below as of 26 Aug 2022 21:40  Patient On (Oxygen Delivery Method): room air        Physical Examination:  General: No acute distress.   HEENT: Pupils equal, round, reactive to light. Symmetric.  PULM: Clear to auscultation bilaterally  CVS: Regular rate and rhythm, +S1,S2   ABD: Soft, nondistended, nontender, +BS  EXT: No edema, nontender  SKIN: Warm and well perfused.      [X ] I have evaluated this patient and have determined that restraints are warranted to optimize medical care. Patient was assessed for current physical and psychological risk factors as well as special needs. There are no medical conditions or limitations that would place this patient at risk while in restraints.    Type of restraint: Bilateral soft wrist restraints    Behavioral criteria for discontinuation of restraint: [ X ] See order    Dr. Fatima Aware Date: 08/31/21





CT scan with evidence of rectus sheath hematoma; consistent with greater than 

expected drop in hemoglobin and hematocrit postoperatively.  Continue supportive

care.  Continue plan to transfuse 2 units of packed red blood cells.  

Coagulation studies are pending and were collected prior to transfusion. Called to evaluate patient for restraints at 2230  Patient has been on restraints for agitation, as patient continues to actively pull at his lines. Patient seen and examined at bedside. Agitated behavior noted, including pulling at IV site. Pt is unable to be redirected and chemical restraints have been unsuccessful thus far.  Level 1 restraint order and keep patient in restraints b/l in the interim to prevent further harm to self and to others around him.     Vital Signs Last 24 Hrs  T(C): 36.1 (26 Aug 2022 21:40), Max: 36.9 (26 Aug 2022 16:59)  T(F): 97 (26 Aug 2022 21:40), Max: 98.4 (26 Aug 2022 16:59)  HR: 87 (26 Aug 2022 21:40) (62 - 87)  BP: 132/91 (26 Aug 2022 21:40) (104/69 - 132/91)  RR: 20 (26 Aug 2022 21:40) (17 - 20)  SpO2: 99% (26 Aug 2022 21:40) (98% - 100%)  Parameters below as of 26 Aug 2022 21:40  Patient On (Oxygen Delivery Method): room air      Other interventions attempted: de-escalation, orientation check, environment modification, medication assessment    [X ] I have evaluated this patient and have determined that restraints are warranted to optimize medical care. Patient was assessed for current physical and psychological risk factors as well as special needs. There are no medical conditions or limitations that would place this patient at risk while in restraints.    Type of restraint: Bilateral soft wrist restraints    Behavioral criteria for discontinuation of restraint: [ X ] See order    Dr. Fatima aware

## 2022-08-27 NOTE — CHART NOTE - NSCHARTNOTEFT_GEN_A_CORE
Called to evaluate patient for restraint renewal.    Patient has been on restraints for agitation, as patient continues to actively pull at his lines. Patient seen and examined at bedside. Agitated behavior noted, IV site and condom catheter. Pt is unable to be redirected and chemical restraints have been unsuccessful thus far.  Will renew Level 1 restraint order.   Other interventions attempted: de-escalation, orientation check, environment modification, medication assessment.     Vital Signs Last 24 Hrs  T(C): 36.7 (27 Aug 2022 20:15), Max: 36.7 (27 Aug 2022 20:15)  T(F): 98 (27 Aug 2022 20:15), Max: 98 (27 Aug 2022 20:15)  HR: 75 (27 Aug 2022 20:15) (69 - 78)  BP: 118/81 (27 Aug 2022 20:15) (109/75 - 130/91)  RR: 17 (27 Aug 2022 20:15) (17 - 20)  SpO2: 100% (27 Aug 2022 20:15) (100% - 100%)  Patient On (Oxygen Delivery Method): room air    [X ] I have evaluated this patient and have determined that restraints are warranted to optimize medical care. Patient was assessed for current physical and psychological risk factors as well as special needs. There are no medical conditions or limitations that would place this patient at risk while in restraints.    Type of restraint: Bilateral soft wrist restraints    Behavioral criteria for discontinuation of restraint: [ X ] See order    Dr. Avalos aware.
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
## 2022-08-27 NOTE — PROVIDER CONTACT NOTE (CRITICAL VALUE NOTIFICATION) - PERSON GIVING RESULT:
Jennifer ARNDT/maritza
Alina Bowden, lab
Junie NEWTON
lab
lab
Anthony From Lab
Charis Mcelroy, lab
[facility], [place]
RN
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
## 2022-08-28 LAB
ANION GAP SERPL CALC-SCNC: 14 MMOL/L — SIGNIFICANT CHANGE UP (ref 5–17)
BUN SERPL-MCNC: 18.5 MG/DL — SIGNIFICANT CHANGE UP (ref 8–20)
CALCIUM SERPL-MCNC: 9.5 MG/DL — SIGNIFICANT CHANGE UP (ref 8.4–10.5)
CHLORIDE SERPL-SCNC: 93 MMOL/L — LOW (ref 98–107)
CO2 SERPL-SCNC: 24 MMOL/L — SIGNIFICANT CHANGE UP (ref 22–29)
CREAT SERPL-MCNC: 0.71 MG/DL — SIGNIFICANT CHANGE UP (ref 0.5–1.3)
EGFR: 117 ML/MIN/1.73M2 — SIGNIFICANT CHANGE UP
GLUCOSE SERPL-MCNC: 90 MG/DL — SIGNIFICANT CHANGE UP (ref 70–99)
HCT VFR BLD CALC: 39.8 % — SIGNIFICANT CHANGE UP (ref 39–50)
HGB BLD-MCNC: 13.8 G/DL — SIGNIFICANT CHANGE UP (ref 13–17)
MCHC RBC-ENTMCNC: 27.9 PG — SIGNIFICANT CHANGE UP (ref 27–34)
MCHC RBC-ENTMCNC: 34.7 GM/DL — SIGNIFICANT CHANGE UP (ref 32–36)
MCV RBC AUTO: 80.6 FL — SIGNIFICANT CHANGE UP (ref 80–100)
PLATELET # BLD AUTO: 186 K/UL — SIGNIFICANT CHANGE UP (ref 150–400)
POTASSIUM SERPL-MCNC: 4.4 MMOL/L — SIGNIFICANT CHANGE UP (ref 3.5–5.3)
POTASSIUM SERPL-SCNC: 4.4 MMOL/L — SIGNIFICANT CHANGE UP (ref 3.5–5.3)
RBC # BLD: 4.94 M/UL — SIGNIFICANT CHANGE UP (ref 4.2–5.8)
RBC # FLD: 13.2 % — SIGNIFICANT CHANGE UP (ref 10.3–14.5)
SODIUM SERPL-SCNC: 131 MMOL/L — LOW (ref 135–145)
WBC # BLD: 2.33 K/UL — LOW (ref 3.8–10.5)
WBC # FLD AUTO: 2.33 K/UL — LOW (ref 3.8–10.5)

## 2022-08-28 PROCEDURE — 99233 SBSQ HOSP IP/OBS HIGH 50: CPT

## 2022-08-28 RX ADMIN — NAFCILLIN 200 GRAM(S): 10 INJECTION, POWDER, FOR SOLUTION INTRAVENOUS at 21:25

## 2022-08-28 RX ADMIN — DIPHENHYDRAMINE HYDROCHLORIDE AND LIDOCAINE HYDROCHLORIDE AND ALUMINUM HYDROXIDE AND MAGNESIUM HYDRO 5 MILLILITER(S): KIT at 17:18

## 2022-08-28 RX ADMIN — ENOXAPARIN SODIUM 40 MILLIGRAM(S): 100 INJECTION SUBCUTANEOUS at 21:25

## 2022-08-28 RX ADMIN — LEVETIRACETAM 500 MILLIGRAM(S): 250 TABLET, FILM COATED ORAL at 06:18

## 2022-08-28 RX ADMIN — MEMANTINE HYDROCHLORIDE 5 MILLIGRAM(S): 10 TABLET ORAL at 17:20

## 2022-08-28 RX ADMIN — QUETIAPINE FUMARATE 25 MILLIGRAM(S): 200 TABLET, FILM COATED ORAL at 15:07

## 2022-08-28 RX ADMIN — Medication 250 MILLIGRAM(S): at 17:20

## 2022-08-28 RX ADMIN — NAFCILLIN 200 GRAM(S): 10 INJECTION, POWDER, FOR SOLUTION INTRAVENOUS at 17:21

## 2022-08-28 RX ADMIN — Medication 250 MILLIGRAM(S): at 06:15

## 2022-08-28 RX ADMIN — SODIUM CHLORIDE 2 GRAM(S): 9 INJECTION INTRAMUSCULAR; INTRAVENOUS; SUBCUTANEOUS at 11:55

## 2022-08-28 RX ADMIN — LEVETIRACETAM 500 MILLIGRAM(S): 250 TABLET, FILM COATED ORAL at 17:20

## 2022-08-28 RX ADMIN — DIPHENHYDRAMINE HYDROCHLORIDE AND LIDOCAINE HYDROCHLORIDE AND ALUMINUM HYDROXIDE AND MAGNESIUM HYDRO 5 MILLILITER(S): KIT at 23:20

## 2022-08-28 RX ADMIN — Medication 5 MILLIGRAM(S): at 21:26

## 2022-08-28 RX ADMIN — SODIUM CHLORIDE 2 GRAM(S): 9 INJECTION INTRAMUSCULAR; INTRAVENOUS; SUBCUTANEOUS at 06:17

## 2022-08-28 RX ADMIN — NAFCILLIN 200 GRAM(S): 10 INJECTION, POWDER, FOR SOLUTION INTRAVENOUS at 06:15

## 2022-08-28 RX ADMIN — DIPHENHYDRAMINE HYDROCHLORIDE AND LIDOCAINE HYDROCHLORIDE AND ALUMINUM HYDROXIDE AND MAGNESIUM HYDRO 5 MILLILITER(S): KIT at 01:12

## 2022-08-28 RX ADMIN — DIPHENHYDRAMINE HYDROCHLORIDE AND LIDOCAINE HYDROCHLORIDE AND ALUMINUM HYDROXIDE AND MAGNESIUM HYDRO 5 MILLILITER(S): KIT at 11:55

## 2022-08-28 RX ADMIN — SODIUM CHLORIDE 2 GRAM(S): 9 INJECTION INTRAMUSCULAR; INTRAVENOUS; SUBCUTANEOUS at 21:26

## 2022-08-28 RX ADMIN — MEMANTINE HYDROCHLORIDE 5 MILLIGRAM(S): 10 TABLET ORAL at 06:17

## 2022-08-28 RX ADMIN — NAFCILLIN 200 GRAM(S): 10 INJECTION, POWDER, FOR SOLUTION INTRAVENOUS at 14:41

## 2022-08-28 RX ADMIN — Medication 30 GRAM(S): at 11:56

## 2022-08-28 RX ADMIN — NAFCILLIN 200 GRAM(S): 10 INJECTION, POWDER, FOR SOLUTION INTRAVENOUS at 01:12

## 2022-08-28 RX ADMIN — NAFCILLIN 200 GRAM(S): 10 INJECTION, POWDER, FOR SOLUTION INTRAVENOUS at 10:16

## 2022-08-28 RX ADMIN — DIPHENHYDRAMINE HYDROCHLORIDE AND LIDOCAINE HYDROCHLORIDE AND ALUMINUM HYDROXIDE AND MAGNESIUM HYDRO 5 MILLILITER(S): KIT at 06:13

## 2022-08-28 RX ADMIN — CHLORHEXIDINE GLUCONATE 1 APPLICATION(S): 213 SOLUTION TOPICAL at 11:55

## 2022-08-28 RX ADMIN — SENNA PLUS 2 TABLET(S): 8.6 TABLET ORAL at 21:26
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
## 2022-08-29 LAB
ANION GAP SERPL CALC-SCNC: 13 MMOL/L — SIGNIFICANT CHANGE UP (ref 5–17)
BUN SERPL-MCNC: 15.4 MG/DL — SIGNIFICANT CHANGE UP (ref 8–20)
CALCIUM SERPL-MCNC: 9.4 MG/DL — SIGNIFICANT CHANGE UP (ref 8.4–10.5)
CHLORIDE SERPL-SCNC: 92 MMOL/L — LOW (ref 98–107)
CO2 SERPL-SCNC: 24 MMOL/L — SIGNIFICANT CHANGE UP (ref 22–29)
CREAT SERPL-MCNC: 0.68 MG/DL — SIGNIFICANT CHANGE UP (ref 0.5–1.3)
EGFR: 119 ML/MIN/1.73M2 — SIGNIFICANT CHANGE UP
GLUCOSE SERPL-MCNC: 90 MG/DL — SIGNIFICANT CHANGE UP (ref 70–99)
HCT VFR BLD CALC: 38.3 % — LOW (ref 39–50)
HGB BLD-MCNC: 13.4 G/DL — SIGNIFICANT CHANGE UP (ref 13–17)
MCHC RBC-ENTMCNC: 27.8 PG — SIGNIFICANT CHANGE UP (ref 27–34)
MCHC RBC-ENTMCNC: 35 GM/DL — SIGNIFICANT CHANGE UP (ref 32–36)
MCV RBC AUTO: 79.5 FL — LOW (ref 80–100)
PLATELET # BLD AUTO: 193 K/UL — SIGNIFICANT CHANGE UP (ref 150–400)
POTASSIUM SERPL-MCNC: 4.1 MMOL/L — SIGNIFICANT CHANGE UP (ref 3.5–5.3)
POTASSIUM SERPL-SCNC: 4.1 MMOL/L — SIGNIFICANT CHANGE UP (ref 3.5–5.3)
RBC # BLD: 4.82 M/UL — SIGNIFICANT CHANGE UP (ref 4.2–5.8)
RBC # FLD: 13.2 % — SIGNIFICANT CHANGE UP (ref 10.3–14.5)
SARS-COV-2 RNA SPEC QL NAA+PROBE: SIGNIFICANT CHANGE UP
SODIUM SERPL-SCNC: 129 MMOL/L — LOW (ref 135–145)
WBC # BLD: 2.82 K/UL — LOW (ref 3.8–10.5)
WBC # FLD AUTO: 2.82 K/UL — LOW (ref 3.8–10.5)

## 2022-08-29 PROCEDURE — 99233 SBSQ HOSP IP/OBS HIGH 50: CPT | Mod: GC

## 2022-08-29 PROCEDURE — 99233 SBSQ HOSP IP/OBS HIGH 50: CPT

## 2022-08-29 RX ORDER — QUETIAPINE FUMARATE 200 MG/1
50 TABLET, FILM COATED ORAL
Refills: 0 | Status: DISCONTINUED | OUTPATIENT
Start: 2022-08-29 | End: 2022-08-29

## 2022-08-29 RX ORDER — QUETIAPINE FUMARATE 200 MG/1
25 TABLET, FILM COATED ORAL DAILY
Refills: 0 | Status: DISCONTINUED | OUTPATIENT
Start: 2022-08-29 | End: 2022-08-31

## 2022-08-29 RX ADMIN — Medication 250 MILLIGRAM(S): at 06:10

## 2022-08-29 RX ADMIN — DIPHENHYDRAMINE HYDROCHLORIDE AND LIDOCAINE HYDROCHLORIDE AND ALUMINUM HYDROXIDE AND MAGNESIUM HYDRO 5 MILLILITER(S): KIT at 23:05

## 2022-08-29 RX ADMIN — SODIUM CHLORIDE 2 GRAM(S): 9 INJECTION INTRAMUSCULAR; INTRAVENOUS; SUBCUTANEOUS at 14:18

## 2022-08-29 RX ADMIN — DIPHENHYDRAMINE HYDROCHLORIDE AND LIDOCAINE HYDROCHLORIDE AND ALUMINUM HYDROXIDE AND MAGNESIUM HYDRO 5 MILLILITER(S): KIT at 17:28

## 2022-08-29 RX ADMIN — Medication 250 MILLIGRAM(S): at 17:06

## 2022-08-29 RX ADMIN — NAFCILLIN 200 GRAM(S): 10 INJECTION, POWDER, FOR SOLUTION INTRAVENOUS at 17:06

## 2022-08-29 RX ADMIN — NAFCILLIN 200 GRAM(S): 10 INJECTION, POWDER, FOR SOLUTION INTRAVENOUS at 10:48

## 2022-08-29 RX ADMIN — NAFCILLIN 200 GRAM(S): 10 INJECTION, POWDER, FOR SOLUTION INTRAVENOUS at 03:04

## 2022-08-29 RX ADMIN — SENNA PLUS 2 TABLET(S): 8.6 TABLET ORAL at 21:06

## 2022-08-29 RX ADMIN — ENOXAPARIN SODIUM 40 MILLIGRAM(S): 100 INJECTION SUBCUTANEOUS at 17:06

## 2022-08-29 RX ADMIN — LEVETIRACETAM 500 MILLIGRAM(S): 250 TABLET, FILM COATED ORAL at 06:11

## 2022-08-29 RX ADMIN — Medication 5 MILLIGRAM(S): at 21:06

## 2022-08-29 RX ADMIN — NAFCILLIN 200 GRAM(S): 10 INJECTION, POWDER, FOR SOLUTION INTRAVENOUS at 14:18

## 2022-08-29 RX ADMIN — DIPHENHYDRAMINE HYDROCHLORIDE AND LIDOCAINE HYDROCHLORIDE AND ALUMINUM HYDROXIDE AND MAGNESIUM HYDRO 5 MILLILITER(S): KIT at 06:11

## 2022-08-29 RX ADMIN — SODIUM CHLORIDE 2 GRAM(S): 9 INJECTION INTRAMUSCULAR; INTRAVENOUS; SUBCUTANEOUS at 21:06

## 2022-08-29 RX ADMIN — Medication 30 GRAM(S): at 14:18

## 2022-08-29 RX ADMIN — MEMANTINE HYDROCHLORIDE 5 MILLIGRAM(S): 10 TABLET ORAL at 06:11

## 2022-08-29 RX ADMIN — SODIUM CHLORIDE 2 GRAM(S): 9 INJECTION INTRAMUSCULAR; INTRAVENOUS; SUBCUTANEOUS at 06:11

## 2022-08-29 RX ADMIN — NAFCILLIN 200 GRAM(S): 10 INJECTION, POWDER, FOR SOLUTION INTRAVENOUS at 06:10

## 2022-08-29 RX ADMIN — CHLORHEXIDINE GLUCONATE 1 APPLICATION(S): 213 SOLUTION TOPICAL at 14:18

## 2022-08-29 RX ADMIN — NAFCILLIN 200 GRAM(S): 10 INJECTION, POWDER, FOR SOLUTION INTRAVENOUS at 21:07

## 2022-08-29 RX ADMIN — MEMANTINE HYDROCHLORIDE 5 MILLIGRAM(S): 10 TABLET ORAL at 17:05

## 2022-08-29 RX ADMIN — LEVETIRACETAM 500 MILLIGRAM(S): 250 TABLET, FILM COATED ORAL at 17:05
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
## 2022-08-29 NOTE — PROGRESS NOTE ADULT - SUBJECTIVE AND OBJECTIVE BOX
HEALTH ISSUES - PROBLEM Dx:    SDH, IPH, TEOFILO.   Patient underwent Right decompressive hemicraniectomy on 5/24, trach/peg 6/1,  R cranioplasty with complex closure 6/29/22.    repeated CTH  demonstrating right sided subdural collection mixed with air.  s/p right craniectomy for evacuation on 7/17 after MR demonstrated large right ED collection  Cranioplasty healing well/ drain removed on 7/1.   Trach (decanulated)/ PEG    INTERVAL HPI/ OVERNIGHT EVENTS:     overnight again required soft wrist restraints, per RN, patient pulling iv lines, is a hard stick.   mother at bedside - Lithuanian speaking     REVIEW OF SYSTEMS:  No fever   no cough/chest pain  No Nausea/vomiting.           PHYSICAL EXAM-  GENERAL: Comfortable, lying in bed, cooperative, not agitated  HEAD:  right craniectomy hollow, soft, Surg staples intact, clean, healing well  EYES: EOMI, PERRLA, conjunctiva and sclera clear  ENT: Moist mucous membranes, No lesions  NECK: Supple, No JVD  NERVOUS SYSTEM:  Alert & Oriented X 1 [facility], Language- correct, choppy in single words, speech- fluent, face symmetric, Moving all extremities well, gen weakness  CHEST/LUNG: CTA bilaterally; No rales, rhonchi  HEART: Regular rate and rhythm; No murmurs,  ABDOMEN: Soft, Nontender, Nondistended; Bowel sounds present, PEG +, abdominal binder  EXTREMITIES:   No clubbing, cyanosis, or edema        LABS;                                   13.4   2.82  )-----------( 193      ( 29 Aug 2022 05:55 )             38.3   08-29    129<L>  |  92<L>  |  15.4  ----------------------------<  90  4.1   |  24.0  |  0.68    Ca    9.4      29 Aug 2022 05:55        MEDICATIONS  (STANDING):  chlorhexidine 2% Cloths 1 Application(s) Topical daily  enoxaparin Injectable 40 milliGRAM(s) SubCutaneous every 24 hours  FIRST- Mouthwash  BLM 5 milliLiter(s) Swish and Swallow four times a day  levETIRAcetam  Solution 500 milliGRAM(s) Oral two times a day  melatonin 5 milliGRAM(s) Oral at bedtime  memantine 5 milliGRAM(s) Oral two times a day  nafcillin  IVPB 2 Gram(s) IV Intermittent every 4 hours  senna 2 Tablet(s) Oral at bedtime  sodium chloride 2 Gram(s) Oral every 8 hours  urea Oral Powder 30 Gram(s) Oral daily  vancomycin  IVPB 1000 milliGRAM(s) IV Intermittent every 12 hours    MEDICATIONS  (PRN):  acetaminophen     Tablet .. 650 milliGRAM(s) Oral every 6 hours PRN Temp greater or equal to 38C (100.4F), Moderate Pain (4 - 6)  ondansetron Injectable 4 milliGRAM(s) IV Push every 4 hours PRN Nausea and/or Vomiting  QUEtiapine 25 milliGRAM(s) Oral daily PRN agitation

## 2022-08-29 NOTE — CHART NOTE - NSCHARTNOTEFT_GEN_A_CORE
Called to evaluate patient for restraints.   Patient has been on unsecured mittens for agitation, as patient continues to actively pull at his lines. Patient seen and examined at bedside. Agitated behavior noted, including pulling IV site, has self removed 3 IVs overnight. Pt is unable to be redirected and chemical restraints have been unsuccessful thus far.  Will re-instate Level 1 restraint order and keep patient in unsecured mittens bilaterally in the interim to prevent further harm to self and to others around him.     Other interventions attempted: de-escalation, orientation check, environment modification, medication assessment    [X ] I have evaluated this patient and have determined that restraints are warranted to optimize medical care. Patient was assessed for current physical and psychological risk factors as well as special needs. There are no medical conditions or limitations that would place this patient at risk while in restraints.    Type of restraint: Bilateral soft wrist restraints    Behavioral criteria for discontinuation of restraint: [ X ] See order  Dr. Walters aware.

## 2022-08-30 LAB
ANION GAP SERPL CALC-SCNC: 13 MMOL/L — SIGNIFICANT CHANGE UP (ref 5–17)
BUN SERPL-MCNC: 15.1 MG/DL — SIGNIFICANT CHANGE UP (ref 8–20)
CALCIUM SERPL-MCNC: 9.4 MG/DL — SIGNIFICANT CHANGE UP (ref 8.4–10.5)
CHLORIDE SERPL-SCNC: 94 MMOL/L — LOW (ref 98–107)
CO2 SERPL-SCNC: 24 MMOL/L — SIGNIFICANT CHANGE UP (ref 22–29)
CREAT SERPL-MCNC: 0.67 MG/DL — SIGNIFICANT CHANGE UP (ref 0.5–1.3)
EGFR: 120 ML/MIN/1.73M2 — SIGNIFICANT CHANGE UP
GLUCOSE SERPL-MCNC: 103 MG/DL — HIGH (ref 70–99)
HCT VFR BLD CALC: 42.6 % — SIGNIFICANT CHANGE UP (ref 39–50)
HGB BLD-MCNC: 14.8 G/DL — SIGNIFICANT CHANGE UP (ref 13–17)
MCHC RBC-ENTMCNC: 27.7 PG — SIGNIFICANT CHANGE UP (ref 27–34)
MCHC RBC-ENTMCNC: 34.7 GM/DL — SIGNIFICANT CHANGE UP (ref 32–36)
MCV RBC AUTO: 79.8 FL — LOW (ref 80–100)
PLATELET # BLD AUTO: 169 K/UL — SIGNIFICANT CHANGE UP (ref 150–400)
POTASSIUM SERPL-MCNC: 4.5 MMOL/L — SIGNIFICANT CHANGE UP (ref 3.5–5.3)
POTASSIUM SERPL-SCNC: 4.5 MMOL/L — SIGNIFICANT CHANGE UP (ref 3.5–5.3)
RBC # BLD: 5.34 M/UL — SIGNIFICANT CHANGE UP (ref 4.2–5.8)
RBC # FLD: 13.2 % — SIGNIFICANT CHANGE UP (ref 10.3–14.5)
SODIUM SERPL-SCNC: 131 MMOL/L — LOW (ref 135–145)
WBC # BLD: 3.33 K/UL — LOW (ref 3.8–10.5)
WBC # FLD AUTO: 3.33 K/UL — LOW (ref 3.8–10.5)

## 2022-08-30 PROCEDURE — 70450 CT HEAD/BRAIN W/O DYE: CPT | Mod: 26

## 2022-08-30 PROCEDURE — 99233 SBSQ HOSP IP/OBS HIGH 50: CPT

## 2022-08-30 RX ADMIN — ENOXAPARIN SODIUM 40 MILLIGRAM(S): 100 INJECTION SUBCUTANEOUS at 17:25

## 2022-08-30 RX ADMIN — NAFCILLIN 200 GRAM(S): 10 INJECTION, POWDER, FOR SOLUTION INTRAVENOUS at 14:49

## 2022-08-30 RX ADMIN — MEMANTINE HYDROCHLORIDE 5 MILLIGRAM(S): 10 TABLET ORAL at 17:25

## 2022-08-30 RX ADMIN — Medication 5 MILLIGRAM(S): at 21:43

## 2022-08-30 RX ADMIN — LEVETIRACETAM 500 MILLIGRAM(S): 250 TABLET, FILM COATED ORAL at 17:26

## 2022-08-30 RX ADMIN — NAFCILLIN 200 GRAM(S): 10 INJECTION, POWDER, FOR SOLUTION INTRAVENOUS at 12:05

## 2022-08-30 RX ADMIN — LEVETIRACETAM 500 MILLIGRAM(S): 250 TABLET, FILM COATED ORAL at 05:55

## 2022-08-30 RX ADMIN — CHLORHEXIDINE GLUCONATE 1 APPLICATION(S): 213 SOLUTION TOPICAL at 12:04

## 2022-08-30 RX ADMIN — DIPHENHYDRAMINE HYDROCHLORIDE AND LIDOCAINE HYDROCHLORIDE AND ALUMINUM HYDROXIDE AND MAGNESIUM HYDRO 5 MILLILITER(S): KIT at 05:54

## 2022-08-30 RX ADMIN — DIPHENHYDRAMINE HYDROCHLORIDE AND LIDOCAINE HYDROCHLORIDE AND ALUMINUM HYDROXIDE AND MAGNESIUM HYDRO 5 MILLILITER(S): KIT at 17:25

## 2022-08-30 RX ADMIN — NAFCILLIN 200 GRAM(S): 10 INJECTION, POWDER, FOR SOLUTION INTRAVENOUS at 05:55

## 2022-08-30 RX ADMIN — SODIUM CHLORIDE 2 GRAM(S): 9 INJECTION INTRAMUSCULAR; INTRAVENOUS; SUBCUTANEOUS at 21:43

## 2022-08-30 RX ADMIN — Medication 250 MILLIGRAM(S): at 05:55

## 2022-08-30 RX ADMIN — NAFCILLIN 200 GRAM(S): 10 INJECTION, POWDER, FOR SOLUTION INTRAVENOUS at 21:43

## 2022-08-30 RX ADMIN — Medication 30 GRAM(S): at 12:05

## 2022-08-30 RX ADMIN — SODIUM CHLORIDE 2 GRAM(S): 9 INJECTION INTRAMUSCULAR; INTRAVENOUS; SUBCUTANEOUS at 05:56

## 2022-08-30 RX ADMIN — MEMANTINE HYDROCHLORIDE 5 MILLIGRAM(S): 10 TABLET ORAL at 05:55

## 2022-08-30 RX ADMIN — SENNA PLUS 2 TABLET(S): 8.6 TABLET ORAL at 21:43

## 2022-08-30 RX ADMIN — SODIUM CHLORIDE 2 GRAM(S): 9 INJECTION INTRAMUSCULAR; INTRAVENOUS; SUBCUTANEOUS at 16:33

## 2022-08-30 RX ADMIN — Medication 250 MILLIGRAM(S): at 17:25

## 2022-08-30 RX ADMIN — NAFCILLIN 200 GRAM(S): 10 INJECTION, POWDER, FOR SOLUTION INTRAVENOUS at 03:34

## 2022-08-30 RX ADMIN — DIPHENHYDRAMINE HYDROCHLORIDE AND LIDOCAINE HYDROCHLORIDE AND ALUMINUM HYDROXIDE AND MAGNESIUM HYDRO 5 MILLILITER(S): KIT at 12:05

## 2022-08-30 RX ADMIN — NAFCILLIN 200 GRAM(S): 10 INJECTION, POWDER, FOR SOLUTION INTRAVENOUS at 17:25
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
## 2022-08-30 NOTE — CHART NOTE - NSCHARTNOTEFT_GEN_A_CORE
I have evaluated this patient and have determined that restraints should be continued to optimize medical care. Patient was assessed for current physical and psychological risk factors as well as special needs. There are no medical conditions or limitations that would place this patient at risk while in restraints. Dr. Fatima made aware.
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
## 2022-08-31 PROCEDURE — 99232 SBSQ HOSP IP/OBS MODERATE 35: CPT

## 2022-08-31 PROCEDURE — 99233 SBSQ HOSP IP/OBS HIGH 50: CPT

## 2022-08-31 RX ORDER — QUETIAPINE FUMARATE 200 MG/1
12.5 TABLET, FILM COATED ORAL AT BEDTIME
Refills: 0 | Status: DISCONTINUED | OUTPATIENT
Start: 2022-08-31 | End: 2022-09-09

## 2022-08-31 RX ORDER — LEVETIRACETAM 250 MG/1
500 TABLET, FILM COATED ORAL ONCE
Refills: 0 | Status: COMPLETED | OUTPATIENT
Start: 2022-08-31 | End: 2022-08-31

## 2022-08-31 RX ADMIN — DIPHENHYDRAMINE HYDROCHLORIDE AND LIDOCAINE HYDROCHLORIDE AND ALUMINUM HYDROXIDE AND MAGNESIUM HYDRO 5 MILLILITER(S): KIT at 06:14

## 2022-08-31 RX ADMIN — NAFCILLIN 200 GRAM(S): 10 INJECTION, POWDER, FOR SOLUTION INTRAVENOUS at 09:02

## 2022-08-31 RX ADMIN — DIPHENHYDRAMINE HYDROCHLORIDE AND LIDOCAINE HYDROCHLORIDE AND ALUMINUM HYDROXIDE AND MAGNESIUM HYDRO 5 MILLILITER(S): KIT at 18:17

## 2022-08-31 RX ADMIN — MEMANTINE HYDROCHLORIDE 5 MILLIGRAM(S): 10 TABLET ORAL at 18:16

## 2022-08-31 RX ADMIN — DIPHENHYDRAMINE HYDROCHLORIDE AND LIDOCAINE HYDROCHLORIDE AND ALUMINUM HYDROXIDE AND MAGNESIUM HYDRO 5 MILLILITER(S): KIT at 01:14

## 2022-08-31 RX ADMIN — ENOXAPARIN SODIUM 40 MILLIGRAM(S): 100 INJECTION SUBCUTANEOUS at 18:17

## 2022-08-31 RX ADMIN — QUETIAPINE FUMARATE 12.5 MILLIGRAM(S): 200 TABLET, FILM COATED ORAL at 22:17

## 2022-08-31 RX ADMIN — NAFCILLIN 200 GRAM(S): 10 INJECTION, POWDER, FOR SOLUTION INTRAVENOUS at 23:23

## 2022-08-31 RX ADMIN — SODIUM CHLORIDE 2 GRAM(S): 9 INJECTION INTRAMUSCULAR; INTRAVENOUS; SUBCUTANEOUS at 22:17

## 2022-08-31 RX ADMIN — Medication 250 MILLIGRAM(S): at 06:48

## 2022-08-31 RX ADMIN — DIPHENHYDRAMINE HYDROCHLORIDE AND LIDOCAINE HYDROCHLORIDE AND ALUMINUM HYDROXIDE AND MAGNESIUM HYDRO 5 MILLILITER(S): KIT at 12:09

## 2022-08-31 RX ADMIN — CHLORHEXIDINE GLUCONATE 1 APPLICATION(S): 213 SOLUTION TOPICAL at 12:09

## 2022-08-31 RX ADMIN — LEVETIRACETAM 420 MILLIGRAM(S): 250 TABLET, FILM COATED ORAL at 09:53

## 2022-08-31 RX ADMIN — Medication 5 MILLIGRAM(S): at 22:17

## 2022-08-31 RX ADMIN — NAFCILLIN 200 GRAM(S): 10 INJECTION, POWDER, FOR SOLUTION INTRAVENOUS at 20:16

## 2022-08-31 RX ADMIN — Medication 250 MILLIGRAM(S): at 18:12

## 2022-08-31 RX ADMIN — SENNA PLUS 2 TABLET(S): 8.6 TABLET ORAL at 22:17

## 2022-08-31 RX ADMIN — DIPHENHYDRAMINE HYDROCHLORIDE AND LIDOCAINE HYDROCHLORIDE AND ALUMINUM HYDROXIDE AND MAGNESIUM HYDRO 5 MILLILITER(S): KIT at 23:23

## 2022-08-31 RX ADMIN — LEVETIRACETAM 500 MILLIGRAM(S): 250 TABLET, FILM COATED ORAL at 18:17

## 2022-08-31 RX ADMIN — SODIUM CHLORIDE 2 GRAM(S): 9 INJECTION INTRAMUSCULAR; INTRAVENOUS; SUBCUTANEOUS at 12:11

## 2022-08-31 RX ADMIN — Medication 30 GRAM(S): at 12:11

## 2022-08-31 RX ADMIN — NAFCILLIN 200 GRAM(S): 10 INJECTION, POWDER, FOR SOLUTION INTRAVENOUS at 04:23

## 2022-08-31 RX ADMIN — NAFCILLIN 200 GRAM(S): 10 INJECTION, POWDER, FOR SOLUTION INTRAVENOUS at 18:12

## 2022-08-31 RX ADMIN — NAFCILLIN 200 GRAM(S): 10 INJECTION, POWDER, FOR SOLUTION INTRAVENOUS at 12:11

## 2022-08-31 RX ADMIN — QUETIAPINE FUMARATE 25 MILLIGRAM(S): 200 TABLET, FILM COATED ORAL at 01:14

## 2022-08-31 NOTE — CHART NOTE - NSCHARTNOTEFT_GEN_A_CORE
I have evaluated this patient and have determined that restraints should be continued to optimize medical care. Patient was assessed for current physical and psychological risk factors as well as special needs. There are no medical conditions or limitations that would place this patient at risk while in restraints. Dr. Avalos made aware. Patient had been on secured mittens for agitation, recently discontinued. Patient continues to actively pull at his lines and has self removed multiple IVs overnight. I have evaluated this patient and have determined that restraints should be continued to optimize medical care. Patient was assessed for current physical and psychological risk factors as well as special needs. There are no medical conditions or limitations that would place this patient at risk while in restraints. Dr. Avalos made aware.
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
## 2022-08-31 NOTE — CHART NOTE - NSCHARTNOTEFT_GEN_A_CORE
Patient pulled out all IVs, multiple RNs unable to get IV access on patient   4 PAs tried with ultrasound and unsuccessful in attempts to gain access   Patient needs IV antibiotics, RN to reschedule AM doses of antibiotics to 9AM   Will place order for Midline for this AM Patient pulled out all IVs, multiple RNs unable to get IV access on patient   4 PAs tried with ultrasound and unsuccessful in attempts to gain access   Patient needs IV antibiotics, RN to reschedule AM doses of antibiotics to 9AM   Will place order for Midline for this AM  Nocturinst aware and in agreement with plan
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
## 2022-08-31 NOTE — CHART NOTE - NSCHARTNOTEFT_GEN_A_CORE
I have evaluated this patient and have determined that restraints should be continued to optimize medical care. Patient was assessed for current physical and psychological risk factors as well as special needs. There are no medical conditions or limitations that would place this patient at risk while in restraints. Dr. Jett prado. Called by RN this AM for pt lethargic appearing 2/2 seroquel 25mg PO PRN overnight  DC restraints and trial seroquel 12.5mg PO standing QHS  Will maintain off restraints as patient is seen resting comfortable in NAD   Plan for discharge tomorrow

## 2022-08-31 NOTE — PROGRESS NOTE ADULT - SUBJECTIVE AND OBJECTIVE BOX
HEALTH ISSUES - PROBLEM Dx:    SDH, IPH, TEOFILO.   Patient underwent Right decompressive hemicraniectomy on 5/24, trach/peg 6/1,  R cranioplasty with complex closure 6/29/22.    repeated CTH  demonstrating right sided subdural collection mixed with air.  s/p right craniectomy for evacuation on 7/17 after MR demonstrated large right ED collection  Cranioplasty healing well/ drain removed on 7/1.   Trach (decanulated)/ PEG    INTERVAL HPI/ OVERNIGHT EVENTS:   overnight again required soft mittens after pt pulled his iv line, midline was placed with protective dressing.   mother at bedside - Qat[place] speaking -  services used over phone.,   pt speaks and understands english, recognizes her however has been non-verbal for her, he tolerated breakfast yesterday, did not want to eat lunch, he is communicating somewhat with her.       REVIEW OF SYSTEMS:  No fever   no cough/chest pain  No Nausea/vomiting.           PHYSICAL EXAM-  GENERAL: Comfortable, lying in bed, not agitated  HEAD:  right craniectomy hollow, soft, Surg staples intact, clean, healing well  EYES: EOMI, PERRLA, conjunctiva and sclera clear  ENT: Moist mucous membranes, No lesions  NECK: Supple, No JVD  NERVOUS SYSTEM:  Alert & Oriented X 0  not opening eyes, grimaces to sternal rub   CHEST/LUNG: CTA bilaterally; No rales, rhonchi  HEART: Regular rate and rhythm; No murmurs,  ABDOMEN: Soft, Nontender, Nondistended; Bowel sounds present, PEG +, abdominal binder  EXTREMITIES:   No clubbing, cyanosis, or edema        LABS;                                              14.8   3.33  )-----------( 169      ( 30 Aug 2022 06:35 )             42.6   08-30    131<L>  |  94<L>  |  15.1  ----------------------------<  103<H>  4.5   |  24.0  |  0.67    Ca    9.4      30 Aug 2022 06:35                    MEDICATIONS  (STANDING):  chlorhexidine 2% Cloths 1 Application(s) Topical daily  enoxaparin Injectable 40 milliGRAM(s) SubCutaneous every 24 hours  FIRST- Mouthwash  BLM 5 milliLiter(s) Swish and Swallow four times a day  levETIRAcetam  Solution 500 milliGRAM(s) Oral two times a day  melatonin 5 milliGRAM(s) Oral at bedtime  memantine 5 milliGRAM(s) Oral two times a day  nafcillin  IVPB 2 Gram(s) IV Intermittent every 4 hours  QUEtiapine 12.5 milliGRAM(s) Oral at bedtime  senna 2 Tablet(s) Oral at bedtime  sodium chloride 2 Gram(s) Oral every 8 hours  urea Oral Powder 30 Gram(s) Oral daily  vancomycin  IVPB 1000 milliGRAM(s) IV Intermittent every 12 hours    MEDICATIONS  (PRN):  acetaminophen     Tablet .. 650 milliGRAM(s) Oral every 6 hours PRN Temp greater or equal to 38C (100.4F), Moderate Pain (4 - 6)  ondansetron Injectable 4 milliGRAM(s) IV Push every 4 hours PRN Nausea and/or Vomiting

## 2022-08-31 NOTE — CHART NOTE - NSCHARTNOTEFT_GEN_A_CORE
Neurosurgery Event Note    Patient is most recently s/p a right decompressive craniectomy 7/17, no acute events.  Incision well approximated, No erythema, edema, discharge or tenderness to palpation. Running nylon cleansed with chloro-prep. Running sutures removed in its entirety. Wound cleansed again after removal. Incision left open to air.

## 2022-09-01 LAB
ANION GAP SERPL CALC-SCNC: 13 MMOL/L — SIGNIFICANT CHANGE UP (ref 5–17)
BUN SERPL-MCNC: 8.3 MG/DL — SIGNIFICANT CHANGE UP (ref 8–20)
CALCIUM SERPL-MCNC: 9.1 MG/DL — SIGNIFICANT CHANGE UP (ref 8.4–10.5)
CHLORIDE SERPL-SCNC: 93 MMOL/L — LOW (ref 98–107)
CO2 SERPL-SCNC: 22 MMOL/L — SIGNIFICANT CHANGE UP (ref 22–29)
CREAT SERPL-MCNC: 0.58 MG/DL — SIGNIFICANT CHANGE UP (ref 0.5–1.3)
EGFR: 125 ML/MIN/1.73M2 — SIGNIFICANT CHANGE UP
GLUCOSE SERPL-MCNC: 87 MG/DL — SIGNIFICANT CHANGE UP (ref 70–99)
POTASSIUM SERPL-MCNC: 5 MMOL/L — SIGNIFICANT CHANGE UP (ref 3.5–5.3)
POTASSIUM SERPL-SCNC: 5 MMOL/L — SIGNIFICANT CHANGE UP (ref 3.5–5.3)
SODIUM SERPL-SCNC: 128 MMOL/L — LOW (ref 135–145)
SODIUM SERPL-SCNC: 129 MMOL/L — LOW (ref 135–145)

## 2022-09-01 PROCEDURE — 99233 SBSQ HOSP IP/OBS HIGH 50: CPT

## 2022-09-01 RX ORDER — LEVETIRACETAM 250 MG/1
500 TABLET, FILM COATED ORAL ONCE
Refills: 0 | Status: COMPLETED | OUTPATIENT
Start: 2022-09-01 | End: 2022-09-01

## 2022-09-01 RX ADMIN — QUETIAPINE FUMARATE 12.5 MILLIGRAM(S): 200 TABLET, FILM COATED ORAL at 21:23

## 2022-09-01 RX ADMIN — MEMANTINE HYDROCHLORIDE 5 MILLIGRAM(S): 10 TABLET ORAL at 16:08

## 2022-09-01 RX ADMIN — SODIUM CHLORIDE 2 GRAM(S): 9 INJECTION INTRAMUSCULAR; INTRAVENOUS; SUBCUTANEOUS at 21:23

## 2022-09-01 RX ADMIN — Medication 5 MILLIGRAM(S): at 21:23

## 2022-09-01 RX ADMIN — DIPHENHYDRAMINE HYDROCHLORIDE AND LIDOCAINE HYDROCHLORIDE AND ALUMINUM HYDROXIDE AND MAGNESIUM HYDRO 5 MILLILITER(S): KIT at 12:07

## 2022-09-01 RX ADMIN — NAFCILLIN 200 GRAM(S): 10 INJECTION, POWDER, FOR SOLUTION INTRAVENOUS at 12:10

## 2022-09-01 RX ADMIN — Medication 30 GRAM(S): at 12:07

## 2022-09-01 RX ADMIN — NAFCILLIN 200 GRAM(S): 10 INJECTION, POWDER, FOR SOLUTION INTRAVENOUS at 16:08

## 2022-09-01 RX ADMIN — Medication 250 MILLIGRAM(S): at 05:30

## 2022-09-01 RX ADMIN — CHLORHEXIDINE GLUCONATE 1 APPLICATION(S): 213 SOLUTION TOPICAL at 12:10

## 2022-09-01 RX ADMIN — DIPHENHYDRAMINE HYDROCHLORIDE AND LIDOCAINE HYDROCHLORIDE AND ALUMINUM HYDROXIDE AND MAGNESIUM HYDRO 5 MILLILITER(S): KIT at 16:08

## 2022-09-01 RX ADMIN — Medication 250 MILLIGRAM(S): at 16:07

## 2022-09-01 RX ADMIN — LEVETIRACETAM 500 MILLIGRAM(S): 250 TABLET, FILM COATED ORAL at 16:08

## 2022-09-01 RX ADMIN — SENNA PLUS 2 TABLET(S): 8.6 TABLET ORAL at 21:22

## 2022-09-01 RX ADMIN — NAFCILLIN 200 GRAM(S): 10 INJECTION, POWDER, FOR SOLUTION INTRAVENOUS at 21:22

## 2022-09-01 RX ADMIN — ENOXAPARIN SODIUM 40 MILLIGRAM(S): 100 INJECTION SUBCUTANEOUS at 16:10

## 2022-09-01 RX ADMIN — LEVETIRACETAM 420 MILLIGRAM(S): 250 TABLET, FILM COATED ORAL at 08:47

## 2022-09-01 RX ADMIN — NAFCILLIN 200 GRAM(S): 10 INJECTION, POWDER, FOR SOLUTION INTRAVENOUS at 04:22

## 2022-09-01 RX ADMIN — NAFCILLIN 200 GRAM(S): 10 INJECTION, POWDER, FOR SOLUTION INTRAVENOUS at 08:46

## 2022-09-01 RX ADMIN — SODIUM CHLORIDE 2 GRAM(S): 9 INJECTION INTRAMUSCULAR; INTRAVENOUS; SUBCUTANEOUS at 12:11
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
## 2022-09-01 NOTE — PHARMACOTHERAPY INTERVENTION NOTE - INTERVENTION CATEGORIES
ASP
Therapy
Therapy
ASP
Med Reconciliation
ASP
Monitoring
Therapy
Therapy
ASP

## 2022-09-01 NOTE — PROGRESS NOTE ADULT - SUBJECTIVE AND OBJECTIVE BOX
HEALTH ISSUES - PROBLEM Dx:    SDH, IPH, TEOFILO.   Patient underwent Right decompressive hemicraniectomy on 5/24, trach/peg 6/1,  R cranioplasty with complex closure 6/29/22.    repeated CTH  demonstrating right sided subdural collection mixed with air.  s/p right craniectomy for evacuation on 7/17 after MR demonstrated large right ED collection  Cranioplasty healing well/ drain removed on 7/1.   Trach (decanulated)/ PEG    INTERVAL HPI/ OVERNIGHT EVENTS:   Off mittens and restraints since yesterday   mother at bedside - [place]i speaking -  services used over phone.,   pt speaks and understands english, recognizes her however has been non-verbal for her, he is usually awake during the day, however mornings usually sleeps.       REVIEW OF SYSTEMS:  No fever   no cough/chest pain  No Nausea/vomiting.           PHYSICAL EXAM-  GENERAL: Comfortable, lying in bed, not agitated  HEAD:  right craniectomy hollow, soft, Surg staples intact, clean, healing well  EYES: EOMI, PERRLA, conjunctiva and sclera clear  ENT: Moist mucous membranes, No lesions  NECK: Supple, No JVD  NERVOUS SYSTEM:  Alert & Oriented X 0  not opening eyes, grimaces to sternal rub   CHEST/LUNG: CTA bilaterally; No rales, rhonchi  HEART: Regular rate and rhythm; No murmurs,  ABDOMEN: Soft, Nontender, Nondistended; Bowel sounds present, PEG +, abdominal binder  EXTREMITIES:   No clubbing, cyanosis, or edema        LABS;                                   \09-01    128<L>  |  93<L>  |  8.3  ----------------------------<  87  5.0   |  22.0  |  0.58    Ca    9.1      01 Sep 2022 08:49                        MEDICATIONS  (STANDING):  chlorhexidine 2% Cloths 1 Application(s) Topical daily  enoxaparin Injectable 40 milliGRAM(s) SubCutaneous every 24 hours  FIRST- Mouthwash  BLM 5 milliLiter(s) Swish and Swallow four times a day  levETIRAcetam  Solution 500 milliGRAM(s) Oral two times a day  melatonin 5 milliGRAM(s) Oral at bedtime  memantine 5 milliGRAM(s) Oral two times a day  nafcillin  IVPB 2 Gram(s) IV Intermittent every 4 hours  QUEtiapine 12.5 milliGRAM(s) Oral at bedtime  senna 2 Tablet(s) Oral at bedtime  sodium chloride 2 Gram(s) Oral every 8 hours  urea Oral Powder 30 Gram(s) Oral daily  vancomycin  IVPB 1000 milliGRAM(s) IV Intermittent every 12 hours    MEDICATIONS  (PRN):  acetaminophen     Tablet .. 650 milliGRAM(s) Oral every 6 hours PRN Temp greater or equal to 38C (100.4F), Moderate Pain (4 - 6)  ondansetron Injectable 4 milliGRAM(s) IV Push every 4 hours PRN Nausea and/or Vomiting

## 2022-09-01 NOTE — PHARMACOTHERAPY INTERVENTION NOTE - COMMENTS
Pharmacy-Directed Vancomycin Dosing   Pt on 750 mG q12h for intracranial infection. Steady state level on 8/13 @ 05:33 resulted 9.4. Dose increased to 1 Gm q12h with repeat trough ordered for 08/14 prior to AM dose
Updated stop dates, course completed. Discussed with attending.
Vancomycin level ordered 
Vancomycin level ordered 
ETOH - recommended thiamine  Urinary retention- recommended cardura
LFTs elevated, recommended to change tylenol to prn
Vancomycin level 26.8, holding vancomycin, level in AM prior to resuming adjusted dose of 1g IV q12h.
Vancomycin level ordered 
Vancomycin level ordered 
Vancomycin level 31.8, drawn after dose administered - more reflective of a peak level. Repeat trough level ordered prior to the next dose.
Vancomycin level ordered 
Vancomycin level ordered 
CMP
recommended triglyceride level
Trough level low at 6.9. Adjusted vancomycin dose per pharmacy policy, 1250 mg to 1500 mg Q12H. 
Vancomycin level 20.6, adjusted vancomycin to 750 mg IV q12h.
Vancomycin level ordered 
Vancomycin level ordered 
Vancomycin trough level entered per pharmacy policy, 7/24 at 9am, prior to 10am dose 
drain removed, ancef discontinued
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
## 2022-09-01 NOTE — PROGRESS NOTE ADULT - SUBJECTIVE AND OBJECTIVE BOX
Nephrology was re-consulted today for management of worsening of chronic hyponatremia. Patient seen/examined and was attempting to get out of bed, however re-directable.     Vital Signs Last 24 Hrs  T(C): 36.7 (01 Sep 2022 16:13), Max: 36.7 (01 Sep 2022 16:13)  T(F): 98 (01 Sep 2022 16:13), Max: 98 (01 Sep 2022 16:13)  HR: 74 (01 Sep 2022 16:13) (72 - 87)  BP: 135/90 (01 Sep 2022 16:13) (125/83 - 135/90)  BP(mean): --  RR: 18 (01 Sep 2022 04:00) (18 - 18)  SpO2: 98% (01 Sep 2022 16:13) (98% - 100%)    Parameters below as of 01 Sep 2022 16:13  Patient On (Oxygen Delivery Method): room air    I&O's Summary  01 Sep 2022 07:01  -  01 Sep 2022 17:16  --------------------------------------------------------  IN: 0 mL / OUT: 300 mL / NET: -300 mL    Physical Exam  General: WDWN male in NAD  Respiratory: CTAB  Abdomen: Soft, NT  Extremities: No appreciable edema  Neuro: Awake    09-01    128<L>  |  93<L>  |  8.3  ----------------------------<  87  5.0   |  22.0  |  0.58    Ca    9.1      01 Sep 2022 08:49    MEDICATIONS  (STANDING):  chlorhexidine 2% Cloths 1 Application(s) Topical daily  enoxaparin Injectable 40 milliGRAM(s) SubCutaneous every 24 hours  FIRST- Mouthwash  BLM 5 milliLiter(s) Swish and Swallow four times a day  levETIRAcetam  Solution 500 milliGRAM(s) Oral two times a day  melatonin 5 milliGRAM(s) Oral at bedtime  memantine 5 milliGRAM(s) Oral two times a day  nafcillin  IVPB 2 Gram(s) IV Intermittent every 4 hours  QUEtiapine 12.5 milliGRAM(s) Oral at bedtime  senna 2 Tablet(s) Oral at bedtime  sodium chloride 2 Gram(s) Oral every 8 hours  urea Oral Powder 30 Gram(s) Oral daily    MEDICATIONS  (PRN):  acetaminophen     Tablet .. 650 milliGRAM(s) Oral every 6 hours PRN Temp greater or equal to 38C (100.4F), Moderate Pain (4 - 6)  ondansetron Injectable 4 milliGRAM(s) IV Push every 4 hours PRN Nausea and/or Vomiting    Assessment and Plan: The patient is a 42 year old male with a prolong and complex hospital course for IPH/SDH/TEOFILO s/p R decompressive hemicraniectomy on 5/24, R cranioplasty on 6/29, complicated by right subdural collection mixed with air s/p evacuation on 7/17, currently awaiting possible cranioplasty. Nephrology was initially managing chronic hyponatremia which resolved with oral medications, now reconsulted for recurrent hypoNa    -Initially hypoNa studies consistent with SIADH (serum osm 271, urine osm 563, urine sodium 185)   -Transiently required tolvaptan - last dose was given on 08/03/2022   -Then well managed on sodium chloride tabs 2g TID and Ure-Na 30g daily    -Now with recurrent hypoNa - latest sodiums have been in the high 120s to low 130s  -Will repeat urine studies (uncertain if can collect though as patient has urinary incontinence and pulls on condom catheter)  -The patient is not getting tube feeds, tolerating oral diet, not on fluid restriction at this time  -Will place the patient on 1L fluid restriction   -If unresponsive to fluid restriction, then will add low dose lasix for free water clearance     Marivel Lucero, DO  Nephrology   Nephrology was re-consulted today for management of worsening of chronic hyponatremia. Patient seen/examined and was attempting to get out of bed, however re-directable.     Vital Signs Last 24 Hrs  T(C): 36.7 (01 Sep 2022 16:13), Max: 36.7 (01 Sep 2022 16:13)  T(F): 98 (01 Sep 2022 16:13), Max: 98 (01 Sep 2022 16:13)  HR: 74 (01 Sep 2022 16:13) (72 - 87)  BP: 135/90 (01 Sep 2022 16:13) (125/83 - 135/90)  BP(mean): --  RR: 18 (01 Sep 2022 04:00) (18 - 18)  SpO2: 98% (01 Sep 2022 16:13) (98% - 100%)    Parameters below as of 01 Sep 2022 16:13  Patient On (Oxygen Delivery Method): room air    I&O's Summary  01 Sep 2022 07:01  -  01 Sep 2022 17:16  --------------------------------------------------------  IN: 0 mL / OUT: 300 mL / NET: -300 mL    Physical Exam  General: WDWN male in NAD  Respiratory: CTAB  Abdomen: Soft, NT  Extremities: No appreciable edema  Neuro: Awake    09-01    128<L>  |  93<L>  |  8.3  ----------------------------<  87  5.0   |  22.0  |  0.58    Ca    9.1      01 Sep 2022 08:49    MEDICATIONS  (STANDING):  chlorhexidine 2% Cloths 1 Application(s) Topical daily  enoxaparin Injectable 40 milliGRAM(s) SubCutaneous every 24 hours  FIRST- Mouthwash  BLM 5 milliLiter(s) Swish and Swallow four times a day  levETIRAcetam  Solution 500 milliGRAM(s) Oral two times a day  melatonin 5 milliGRAM(s) Oral at bedtime  memantine 5 milliGRAM(s) Oral two times a day  nafcillin  IVPB 2 Gram(s) IV Intermittent every 4 hours  QUEtiapine 12.5 milliGRAM(s) Oral at bedtime  senna 2 Tablet(s) Oral at bedtime  sodium chloride 2 Gram(s) Oral every 8 hours  urea Oral Powder 30 Gram(s) Oral daily    MEDICATIONS  (PRN):  acetaminophen     Tablet .. 650 milliGRAM(s) Oral every 6 hours PRN Temp greater or equal to 38C (100.4F), Moderate Pain (4 - 6)  ondansetron Injectable 4 milliGRAM(s) IV Push every 4 hours PRN Nausea and/or Vomiting    Assessment and Plan: The patient is a 42 year old male with a prolong and complex hospital course for IPH/SDH/TEOFILO s/p R decompressive hemicraniectomy on 5/24, R cranioplasty on 6/29, complicated by right subdural collection mixed with air s/p evacuation on 7/17, currently awaiting possible cranioplasty. Nephrology was initially managing chronic hyponatremia which resolved with oral medications, now reconsulted for recurrent hypoNa    -Initially hypoNa studies consistent with SIADH (serum osm 271, urine osm 563, urine sodium 185)   -Transiently required tolvaptan - last dose was given on 08/03/2022   -Then well managed on sodium chloride tabs 2g TID and Ure-Na 30g daily    -Now with recurrent hypoNa - latest sodiums have been in the high 120s to low 130s  -Will repeat urine studies (uncertain if can collect though as patient has urinary incontinence and pulls on condom catheter)  -The patient is not getting tube feeds, tolerating oral diet (currently on mildly thickened liquid/puree diet), not on fluid restriction at this time  -Will place the patient on 1L fluid restriction   -Will also change free water flush with normal saline flush   -If unresponsive to fluid restriction, then will add low dose lasix for free water clearance     Marivel Lucero, DO  Nephrology

## 2022-09-01 NOTE — CHART NOTE - NSCHARTNOTEFT_GEN_A_CORE
Patient had been on secured mittens for agitation, recently discontinued. Patient continues to actively pull at his lines and has very difficult access. I have evaluated this patient and have determined that restraints are warranted to optimize medical care. Patient was assessed for current physical and psychological risk factors as well as special needs. There are no medical conditions or limitations that would place this patient at risk while in restraints. Dr. Fatima made aware.

## 2022-09-01 NOTE — PHARMACOTHERAPY INTERVENTION NOTE - OUTCOME
accepted

## 2022-09-01 NOTE — PROGRESS NOTE ADULT - SUBJECTIVE AND OBJECTIVE BOX
UBJECTIVE: Patient seen and examined at bedside. No acute overnight events reported. Unable to assess complete ROS due to patient's mental status.    Vital Signs Last 24 Hrs  T(C): 36.5 (09-01-22 @ 11:18), Max: 36.6 (08-31-22 @ 16:45)  T(F): 97.7 (09-01-22 @ 11:18), Max: 97.9 (08-31-22 @ 16:45)  HR: 87 (09-01-22 @ 11:18) (72 - 87)  BP: 127/89 (09-01-22 @ 11:18) (111/75 - 133/93)  RR: 18 (09-01-22 @ 04:00) (18 - 18)  SpO2: 99% (09-01-22 @ 11:18) (99% - 100%)      LABS:                        14.8   3.33  )-----------( 169      ( 30 Aug 2022 06:35 )             42.6     08-30    131<L>  |  94<L>  |  15.1  ----------------------------<  103<H>  4.5   |  24.0  |  0.67    Ca    9.4      30 Aug 2022 06:35      PHYSICAL EXAM:  GENERAL: NAD, well-groomed, well-developed  WOUND: Flap sunken, non tender, clean, no drainage.  MENTAL STATUS:  Awake,  eyes spontaneously open, maintains eye contact, minimally  conversant , hypophonic,  following simple commands  CRANIAL NERVES:  EOMI without nystagmus.  Face symmetric w/ normal eye closure and smile, tongue midline. Hearing grossly intact. Speech clear. Head turning and shoulder shrug intact.   MOTOR: LUNA x4 spontaneously   SENSATION: grossly intact to light touch all extremities      IMAGING:     CT Head No Cont (08.30.22 @ 16:18)  IMPRESSION: Stable exam.    --- End of Report ---    TONI DUQUE MD; Attending Radiologist  This document has been electronically signed. Aug 30 2022  4:22PM        CT Head No Cont (07.27.22 @ 18:07)   IMPRESSION:  *  PATCHY REGIONS OF DECREASED ATTENUATION RIGHT CEREBRAL HEMISPHERE   IMPROVED FROM PRIOR STUDY. NO INTRACRANIAL HEMORRHAGE.  *  LARGE RIGHT PTERIONAL CRANIECTOMY WITH INCREASING CONCAVITY AT THE   CRANIECTOMY SITE    CAPRINI SCORE [CLOT]:   SUBJECTIVE: Patient seen and examined at bedside. No acute overnight events reported. Unable to assess complete ROS due to patient's mental status. He does state that he does not have pain at the time.     Vital Signs Last 24 Hrs  T(C): 36.5 (09-01-22 @ 11:18), Max: 36.6 (08-31-22 @ 16:45)  T(F): 97.7 (09-01-22 @ 11:18), Max: 97.9 (08-31-22 @ 16:45)  HR: 87 (09-01-22 @ 11:18) (72 - 87)  BP: 127/89 (09-01-22 @ 11:18) (111/75 - 133/93)  RR: 18 (09-01-22 @ 04:00) (18 - 18)  SpO2: 99% (09-01-22 @ 11:18) (99% - 100%)      LABS:                        14.8   3.33  )-----------( 169      ( 30 Aug 2022 06:35 )             42.6     08-30    131<L>  |  94<L>  |  15.1  ----------------------------<  103<H>  4.5   |  24.0  |  0.67    Ca    9.4      30 Aug 2022 06:35      PHYSICAL EXAM:  GENERAL: NAD, well-groomed, well-developed  WOUND: Flap sunken, non tender, clean, no drainage.  MENTAL STATUS:  Awake, eyes spontaneously open, maintains eye contact, minimally  conversant but oriented to self and states it is 2020, hypophonic,  following simple commands  CRANIAL NERVES:  EOMI without nystagmus.  Face symmetric w/ normal eye closure and smile, tongue midline. Hearing grossly intact. Speech clear. Head turning and shoulder shrug intact.   MOTOR: LUNA x4 spontaneously and antigravity  SENSATION: grossly intact to light touch all extremities      IMAGING:     CT Head No Cont (08.30.22 @ 16:18)  IMPRESSION: Stable exam.    --- End of Report ---    TONI DUQUE MD; Attending Radiologist  This document has been electronically signed. Aug 30 2022  4:22PM        CT Head No Cont (07.27.22 @ 18:07)   IMPRESSION:  *  PATCHY REGIONS OF DECREASED ATTENUATION RIGHT CEREBRAL HEMISPHERE   IMPROVED FROM PRIOR STUDY. NO INTRACRANIAL HEMORRHAGE.  *  LARGE RIGHT PTERIONAL CRANIECTOMY WITH INCREASING CONCAVITY AT THE   CRANIECTOMY SITE    CAPRINI SCORE [CLOT]:

## 2022-09-01 NOTE — PHARMACOTHERAPY INTERVENTION NOTE - NSPHARMCOMMASP
ASP - Duration of therapy
ASP - Lab/ test recommended
ASP - Dose optimization/Non-Renal dose adjustment
ASP - Dose optimization/Non-Renal dose adjustment
ASP - Lab/ test recommended
ASP - Duration of therapy
ASP - Lab/ test recommended
ASP - Dose optimization/Non-Renal dose adjustment
ASP - Lab/ test recommended
ASP - Lab/ test recommended

## 2022-09-02 LAB
ANION GAP SERPL CALC-SCNC: 14 MMOL/L — SIGNIFICANT CHANGE UP (ref 5–17)
BUN SERPL-MCNC: 8.9 MG/DL — SIGNIFICANT CHANGE UP (ref 8–20)
CALCIUM SERPL-MCNC: 9.4 MG/DL — SIGNIFICANT CHANGE UP (ref 8.4–10.5)
CHLORIDE SERPL-SCNC: 94 MMOL/L — LOW (ref 98–107)
CO2 SERPL-SCNC: 23 MMOL/L — SIGNIFICANT CHANGE UP (ref 22–29)
CREAT SERPL-MCNC: 0.6 MG/DL — SIGNIFICANT CHANGE UP (ref 0.5–1.3)
EGFR: 124 ML/MIN/1.73M2 — SIGNIFICANT CHANGE UP
GLUCOSE SERPL-MCNC: 89 MG/DL — SIGNIFICANT CHANGE UP (ref 70–99)
HCT VFR BLD CALC: 36.7 % — LOW (ref 39–50)
HGB BLD-MCNC: 12.8 G/DL — LOW (ref 13–17)
MCHC RBC-ENTMCNC: 27.3 PG — SIGNIFICANT CHANGE UP (ref 27–34)
MCHC RBC-ENTMCNC: 34.9 GM/DL — SIGNIFICANT CHANGE UP (ref 32–36)
MCV RBC AUTO: 78.3 FL — LOW (ref 80–100)
PLATELET # BLD AUTO: 202 K/UL — SIGNIFICANT CHANGE UP (ref 150–400)
POTASSIUM SERPL-MCNC: 3.6 MMOL/L — SIGNIFICANT CHANGE UP (ref 3.5–5.3)
POTASSIUM SERPL-SCNC: 3.6 MMOL/L — SIGNIFICANT CHANGE UP (ref 3.5–5.3)
RBC # BLD: 4.69 M/UL — SIGNIFICANT CHANGE UP (ref 4.2–5.8)
RBC # FLD: 13.1 % — SIGNIFICANT CHANGE UP (ref 10.3–14.5)
SODIUM SERPL-SCNC: 131 MMOL/L — LOW (ref 135–145)
WBC # BLD: 2.84 K/UL — LOW (ref 3.8–10.5)
WBC # FLD AUTO: 2.84 K/UL — LOW (ref 3.8–10.5)

## 2022-09-02 PROCEDURE — 99232 SBSQ HOSP IP/OBS MODERATE 35: CPT

## 2022-09-02 PROCEDURE — 99233 SBSQ HOSP IP/OBS HIGH 50: CPT

## 2022-09-02 PROCEDURE — 70551 MRI BRAIN STEM W/O DYE: CPT | Mod: 26

## 2022-09-02 PROCEDURE — 70551 MRI BRAIN STEM W/O DYE: CPT | Mod: 26,77

## 2022-09-02 RX ADMIN — MEMANTINE HYDROCHLORIDE 5 MILLIGRAM(S): 10 TABLET ORAL at 17:40

## 2022-09-02 RX ADMIN — SODIUM CHLORIDE 2 GRAM(S): 9 INJECTION INTRAMUSCULAR; INTRAVENOUS; SUBCUTANEOUS at 13:32

## 2022-09-02 RX ADMIN — Medication 5 MILLIGRAM(S): at 22:38

## 2022-09-02 RX ADMIN — SENNA PLUS 2 TABLET(S): 8.6 TABLET ORAL at 22:39

## 2022-09-02 RX ADMIN — MEMANTINE HYDROCHLORIDE 5 MILLIGRAM(S): 10 TABLET ORAL at 06:22

## 2022-09-02 RX ADMIN — QUETIAPINE FUMARATE 12.5 MILLIGRAM(S): 200 TABLET, FILM COATED ORAL at 22:40

## 2022-09-02 RX ADMIN — LEVETIRACETAM 500 MILLIGRAM(S): 250 TABLET, FILM COATED ORAL at 17:40

## 2022-09-02 RX ADMIN — ENOXAPARIN SODIUM 40 MILLIGRAM(S): 100 INJECTION SUBCUTANEOUS at 17:40

## 2022-09-02 RX ADMIN — Medication 30 GRAM(S): at 13:32

## 2022-09-02 RX ADMIN — DIPHENHYDRAMINE HYDROCHLORIDE AND LIDOCAINE HYDROCHLORIDE AND ALUMINUM HYDROXIDE AND MAGNESIUM HYDRO 5 MILLILITER(S): KIT at 06:22

## 2022-09-02 RX ADMIN — DIPHENHYDRAMINE HYDROCHLORIDE AND LIDOCAINE HYDROCHLORIDE AND ALUMINUM HYDROXIDE AND MAGNESIUM HYDRO 5 MILLILITER(S): KIT at 17:39

## 2022-09-02 RX ADMIN — SODIUM CHLORIDE 2 GRAM(S): 9 INJECTION INTRAMUSCULAR; INTRAVENOUS; SUBCUTANEOUS at 06:23

## 2022-09-02 RX ADMIN — CHLORHEXIDINE GLUCONATE 1 APPLICATION(S): 213 SOLUTION TOPICAL at 17:35

## 2022-09-02 RX ADMIN — SODIUM CHLORIDE 2 GRAM(S): 9 INJECTION INTRAMUSCULAR; INTRAVENOUS; SUBCUTANEOUS at 22:37

## 2022-09-02 RX ADMIN — LEVETIRACETAM 500 MILLIGRAM(S): 250 TABLET, FILM COATED ORAL at 06:22

## 2022-09-02 RX ADMIN — DIPHENHYDRAMINE HYDROCHLORIDE AND LIDOCAINE HYDROCHLORIDE AND ALUMINUM HYDROXIDE AND MAGNESIUM HYDRO 5 MILLILITER(S): KIT at 13:32

## 2022-09-02 NOTE — PROVIDER CONTACT NOTE (OTHER) - ASSESSMENT
Patient brought to MRI with partial signing of consent. He was able to have MRI w/o contrast.
vss
Pt put out 3500 ml of urine since 7:00 AM and is now tachycardic 120-130.
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
## 2022-09-02 NOTE — PROGRESS NOTE ADULT - SUBJECTIVE AND OBJECTIVE BOX
Patient was somewhat participating with occupational therapy this afternoon. Sodium is improving with fluid restriction. Patient's mother was made aware of 1L fluid restriction.     Vital Signs Last 24 Hrs  T(C): 36.3 (02 Sep 2022 09:46), Max: 36.9 (01 Sep 2022 21:40)  T(F): 97.4 (02 Sep 2022 09:46), Max: 98.4 (01 Sep 2022 21:40)  HR: 89 (02 Sep 2022 09:46) (76 - 89)  BP: 120/80 (02 Sep 2022 09:46) (120/80 - 137/91)  BP(mean): --  RR: 17 (02 Sep 2022 09:46) (17 - 18)  SpO2: 95% (02 Sep 2022 09:46) (95% - 100%)    Parameters below as of 02 Sep 2022 09:46  Patient On (Oxygen Delivery Method): room air    I&O's Summary    01 Sep 2022 07:01  -  02 Sep 2022 07:00  --------------------------------------------------------  IN: 0 mL / OUT: 750 mL / NET: -750 mL    09-02    Physical Exam  General: WDWN male in NAD  Respiratory: CTAB  Abdomen: Soft, NT  Extremities: No appreciable edema  Neuro: Awake    131<L>  |  94<L>  |  8.9  ----------------------------<  89  3.6   |  23.0  |  0.60    Ca    9.4      02 Sep 2022 06:20                        12.8   2.84  )-----------( 202      ( 02 Sep 2022 06:20 )             36.7     MEDICATIONS  (STANDING):  chlorhexidine 2% Cloths 1 Application(s) Topical daily  enoxaparin Injectable 40 milliGRAM(s) SubCutaneous every 24 hours  FIRST- Mouthwash  BLM 5 milliLiter(s) Swish and Swallow four times a day  levETIRAcetam  Solution 500 milliGRAM(s) Oral two times a day  melatonin 5 milliGRAM(s) Oral at bedtime  memantine 5 milliGRAM(s) Oral two times a day  QUEtiapine 12.5 milliGRAM(s) Oral at bedtime  senna 2 Tablet(s) Oral at bedtime  sodium chloride 2 Gram(s) Oral every 8 hours  urea Oral Powder 30 Gram(s) Oral daily    MEDICATIONS  (PRN):  acetaminophen     Tablet .. 650 milliGRAM(s) Oral every 6 hours PRN Temp greater or equal to 38C (100.4F), Moderate Pain (4 - 6)  ondansetron Injectable 4 milliGRAM(s) IV Push every 4 hours PRN Nausea and/or Vomiting

## 2022-09-02 NOTE — PROGRESS NOTE ADULT - ASSESSMENT
Assessment and Plan: The patient is a 42 year old male with a prolong and complex hospital course for IPH/SDH/TEOFILO s/p R decompressive hemicraniectomy on 5/24, R cranioplasty on 6/29, complicated by right subdural collection mixed with air s/p evacuation on 7/17, currently awaiting cranioplasty. Nephrology was initially managing chronic hyponatremia which resolved with oral medications, now reconsulted for recurrent hypoNa.    -HypoNa studies consistent with SIADH    -Transiently required tolvaptan - last dose was given on 08/03/2022   -Then well managed on sodium chloride tabs 2g TID and Ure-Na 30g daily    -However developed recurrent hypoNa  -Now improving with addition of 1L fluid restriction in addition to avoidance of use of use of free water flushes   -Will monitor with above regimen     Marivel Lucero DO  Nephrology

## 2022-09-02 NOTE — PROGRESS NOTE ADULT - SUBJECTIVE AND OBJECTIVE BOX
[facility]  INFECTIOUS DISEASES at [place] and [place]  =======================================================                               Nestor Russo MD#  Mitesh Woodward MD*                                     Nyla Orantes MD*    Vera Bishop MD*            Diplomates American Board of Internal Medicine & Infectious Diseases                # [place] Office - Appt - Tel  [phone] Fax [phone]                * [place] Office - Appt - Tel [phone] Fax [phone]                                  Hospital Consult line:  [phone]  =======================================================    PASTOR KEMAR [id number]    Follow up: Epidural fluid collection     No fever       Allergies:  Allergy Status Unknown      REVIEW OF SYSTEMS:  Unable to obtain due to medical condition       Physical Exam:  GEN: NAD  HEENT: indent at craniectomy site.  Anicteric   NECK: Supple.   LUNGS: CTA B/L  HEART: Regular rate and rhythm   ABDOMEN: Soft, nontender, and nondistended.  Positive bowel sounds.    : Howard   EXTREMITIES: trace edema.  MSK: no joint swelling  NEUROLOGIC: Awake  PSYCHIATRIC: unable to assess  SKIN: No Rash      Vitals:  T(F): 97.4 (02 Sep 2022 09:46), Max: 98.4 (01 Sep 2022 21:40)  HR: 89 (02 Sep 2022 09:46)  BP: 120/80 (02 Sep 2022 09:46)  RR: 17 (02 Sep 2022 09:46)  SpO2: 95% (02 Sep 2022 09:46) (95% - 100%)  temp max in last 48H T(F): , Max: 98.4 (09-01-22 @ 21:40)      Current Antibiotics:    Other medications:  chlorhexidine 2% Cloths 1 Application(s) Topical daily  enoxaparin Injectable 40 milliGRAM(s) SubCutaneous every 24 hours  FIRST- Mouthwash  BLM 5 milliLiter(s) Swish and Swallow four times a day  levETIRAcetam  Solution 500 milliGRAM(s) Oral two times a day  melatonin 5 milliGRAM(s) Oral at bedtime  memantine 5 milliGRAM(s) Oral two times a day  QUEtiapine 12.5 milliGRAM(s) Oral at bedtime  senna 2 Tablet(s) Oral at bedtime  sodium chloride 2 Gram(s) Oral every 8 hours  urea Oral Powder 30 Gram(s) Oral daily                 12.8   2.84  )-----------( 202      ( 02 Sep 2022 06:20 )             36.7     09-02    131<L>  |  94<L>  |  8.9  ----------------------------<  89  3.6   |  23.0  |  0.60    Ca    9.4      02 Sep 2022 06:20        WBC Count: 2.84 K/uL (09-02-22 @ 06:20)  WBC Count: 3.33 K/uL (08-30-22 @ 06:35)  WBC Count: 2.82 K/uL (08-29-22 @ 05:55)    Creatinine, Serum: 0.60 mg/dL (09-02-22 @ 06:20)  Creatinine, Serum: 0.58 mg/dL (09-01-22 @ 08:49)  Creatinine, Serum: 0.67 mg/dL (08-30-22 @ 06:35)  Creatinine, Serum: 0.68 mg/dL (08-29-22 @ 05:55)    COVID-19 PCR: NotDetec (08-29-22 @ 04:00)  COVID-19 PCR: NotDetec (08-23-22 @ 06:30)  COVID-19 PCR: NotDetec (08-18-22 @ 07:36)  COVID-19 PCR: NotDetec (08-11-22 @ 06:00)  COVID-19 PCR: NotDetec (08-05-22 @ 05:47)

## 2022-09-02 NOTE — PROGRESS NOTE ADULT - SUBJECTIVE AND OBJECTIVE BOX
HEALTH ISSUES - PROBLEM Dx:    SDH, IPH, TEOFILO.   Patient underwent Right decompressive hemicraniectomy on 5/24, trach/peg 6/1,  R cranioplasty with complex closure 6/29/22.    repeated CTH  demonstrating right sided subdural collection mixed with air.  s/p right craniectomy for evacuation on 7/17 after MR demonstrated large right Epidural collection  Cranioplasty healing well/ drain removed on 7/1.   Trach (decanulated)/ PEG       INTERVAL HPI/ OVERNIGHT EVENTS:   required mittens again overnight, pulling iv lines  mother at bedside - Luxembourgish speaking -  services used over phone.,   pt speaks and understands english, recognizes her however has been non-verbal for her, he is usually awake during the day, however mornings usually sleeps.       REVIEW OF SYSTEMS:  No fever   no cough/chest pain  No Nausea/vomiting.           PHYSICAL EXAM-  GENERAL: Comfortable, lying in bed, not agitated  HEAD:  right craniectomy hollow, soft, Surg staples intact, clean, healing well  EYES: EOMI, PERRLA, conjunctiva and sclera clear  ENT: Moist mucous membranes, No lesions  NECK: Supple, No JVD  NERVOUS SYSTEM:  Alert & Oriented X 0  opened eyes to his name    CHEST/LUNG: CTA bilaterally; No rales, rhonchi  HEART: Regular rate and rhythm; No murmurs,  ABDOMEN: Soft, Nontender, Nondistended; Bowel sounds present, PEG +, abdominal binder  EXTREMITIES:   No clubbing, cyanosis, or edema        LABS;                                                12.8   2.84  )-----------( 202      ( 02 Sep 2022 06:20 )             36.7   09-02    131<L>  |  94<L>  |  8.9  ----------------------------<  89  3.6   |  23.0  |  0.60    Ca    9.4      02 Sep 2022 06:20        MEDICATIONS  (STANDING):  chlorhexidine 2% Cloths 1 Application(s) Topical daily  enoxaparin Injectable 40 milliGRAM(s) SubCutaneous every 24 hours  FIRST- Mouthwash  BLM 5 milliLiter(s) Swish and Swallow four times a day  levETIRAcetam  Solution 500 milliGRAM(s) Oral two times a day  melatonin 5 milliGRAM(s) Oral at bedtime  memantine 5 milliGRAM(s) Oral two times a day  QUEtiapine 12.5 milliGRAM(s) Oral at bedtime  senna 2 Tablet(s) Oral at bedtime  sodium chloride 2 Gram(s) Oral every 8 hours  urea Oral Powder 30 Gram(s) Oral daily    MEDICATIONS  (PRN):  acetaminophen     Tablet .. 650 milliGRAM(s) Oral every 6 hours PRN Temp greater or equal to 38C (100.4F), Moderate Pain (4 - 6)  ondansetron Injectable 4 milliGRAM(s) IV Push every 4 hours PRN Nausea and/or Vomiting                                     HEALTH ISSUES - PROBLEM Dx:    SDH, IPH, TEOFILO.   Patient underwent Right decompressive hemicraniectomy on 5/24, trach/peg 6/1,  R cranioplasty with complex closure 6/29/22.    repeated CTH  demonstrating right sided subdural collection mixed with air.  s/p right craniectomy for evacuation on 7/17 after MR demonstrated large right Epidural collection  Cranioplasty healing well/ drain removed on 7/1.   Trach (decanulated)/ PEG       INTERVAL HPI/ OVERNIGHT EVENTS:   required mittens again overnight, pulling iv lines  mother at bedside - Maltese speaking -  services used over phone.,   pt speaks and understands english, recognizes her however has been non-verbal for her, he is usually awake during the day, however mornings usually sleeps.     ROS: unable to obtain 2/2 pt non-verbal      PHYSICAL EXAM-  GENERAL: Comfortable, lying in bed, not agitated  HEAD:  right craniectomy hollow, soft, Surg staples intact, clean, healing well  EYES: EOMI, PERRLA, conjunctiva and sclera clear  ENT: Moist mucous membranes, No lesions  NECK: Supple, No JVD  NERVOUS SYSTEM:  Alert & Oriented X 0  opened eyes to his name    CHEST/LUNG: CTA bilaterally; No rales, rhonchi  HEART: Regular rate and rhythm; No murmurs,  ABDOMEN: Soft, Nontender, Nondistended; Bowel sounds present, PEG +, abdominal binder  EXTREMITIES:   No clubbing, cyanosis, or edema        LABS;                                                12.8   2.84  )-----------( 202      ( 02 Sep 2022 06:20 )             36.7   09-02    131<L>  |  94<L>  |  8.9  ----------------------------<  89  3.6   |  23.0  |  0.60    Ca    9.4      02 Sep 2022 06:20        MEDICATIONS  (STANDING):  chlorhexidine 2% Cloths 1 Application(s) Topical daily  enoxaparin Injectable 40 milliGRAM(s) SubCutaneous every 24 hours  FIRST- Mouthwash  BLM 5 milliLiter(s) Swish and Swallow four times a day  levETIRAcetam  Solution 500 milliGRAM(s) Oral two times a day  melatonin 5 milliGRAM(s) Oral at bedtime  memantine 5 milliGRAM(s) Oral two times a day  QUEtiapine 12.5 milliGRAM(s) Oral at bedtime  senna 2 Tablet(s) Oral at bedtime  sodium chloride 2 Gram(s) Oral every 8 hours  urea Oral Powder 30 Gram(s) Oral daily    MEDICATIONS  (PRN):  acetaminophen     Tablet .. 650 milliGRAM(s) Oral every 6 hours PRN Temp greater or equal to 38C (100.4F), Moderate Pain (4 - 6)  ondansetron Injectable 4 milliGRAM(s) IV Push every 4 hours PRN Nausea and/or Vomiting
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
## 2022-09-02 NOTE — CHART NOTE - NSCHARTNOTEFT_GEN_A_CORE
I have evaluated this patient and have determined that restraints should be continued to optimize medical care. Patient was assessed for current physical and psychological risk factors as well as special needs. There are no medical conditions or limitations that would place this patient at risk while in restraints. Dr. Walters made aware.
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
## 2022-09-02 NOTE — PROGRESS NOTE ADULT - SUBJECTIVE AND OBJECTIVE BOX
SUBJECTIVE: Patient seen and examined at bedside. No acute overnight events reported. Unable to assess complete ROS due to patient's mental status. When asked if he has any pain or discomfort, he says no.    Vital Signs Last 24 Hrs  T(C): 36.5 (09-02-22 @ 04:00), Max: 36.9 (09-01-22 @ 21:40)  T(F): 97.7 (09-02-22 @ 04:00), Max: 98.4 (09-01-22 @ 21:40)  HR: 80 (09-02-22 @ 04:00) (74 - 87)  BP: 122/84 (09-02-22 @ 04:00) (122/84 - 137/91)  RR: 18 (09-02-22 @ 04:00) (18 - 18)  SpO2: 100% (09-02-22 @ 04:00) (96% - 100%)      LABS:                          12.8   2.84  )-----------( 202      ( 02 Sep 2022 06:20 )             36.7   09-02    131<L>  |  94<L>  |  8.9  ----------------------------<  89  3.6   |  23.0  |  0.60    Ca    9.4      02 Sep 2022 06:20        PHYSICAL EXAM:  GENERAL: NAD, well-groomed, well-developed  WOUND: Flap sunken, non tender, clean, no drainage.  MENTAL STATUS:  Awake, eyes spontaneously open, maintains eye contact, minimally  conversant but oriented to self and states it is 2020, hypophonic,  following simple commands  CRANIAL NERVES:  EOMI without nystagmus.  Face symmetric w/ normal eye closure and smile, tongue midline. Hearing grossly intact. Speech clear. Head turning and shoulder shrug intact.   MOTOR: LUNA x4 spontaneously and anti-gravity  SENSATION: grossly intact to light touch all extremities      IMAGING:     CT Head No Cont (08.30.22 @ 16:18)  IMPRESSION: Stable exam.    --- End of Report ---    TONI DUQUE MD; Attending Radiologist  This document has been electronically signed. Aug 30 2022  4:22PM        CT Head No Cont (07.27.22 @ 18:07)   IMPRESSION:  *  PATCHY REGIONS OF DECREASED ATTENUATION RIGHT CEREBRAL HEMISPHERE   IMPROVED FROM PRIOR STUDY. NO INTRACRANIAL HEMORRHAGE.  *  LARGE RIGHT PTERIONAL CRANIECTOMY WITH INCREASING CONCAVITY AT THE   CRANIECTOMY SITE

## 2022-09-02 NOTE — PROGRESS NOTE ADULT - NUTRITIONAL ASSESSMENT
This patient has been assessed with a concern for Malnutrition and has been determined to have a diagnosis/diagnoses of Severe protein-calorie malnutrition.    This patient is being managed with:   Diet Pureed-  1000mL Fluid Restriction ([id number])  Mildly Thick Liquids (MILDTHICKLIQS)  Bolus   Total Volume per Flush (mL): 250   Frequency: Every 24 Hours   Total Daily Volume of Flush (mL): 100  Free Water Flush Instructions:  Do NOT use free water flushes; instead use normal saline to flush. Thank you.  Entered: Sep  1 2022  5:43PM

## 2022-09-03 LAB
ANION GAP SERPL CALC-SCNC: 12 MMOL/L — SIGNIFICANT CHANGE UP (ref 5–17)
BUN SERPL-MCNC: 7.1 MG/DL — LOW (ref 8–20)
CALCIUM SERPL-MCNC: 9.4 MG/DL — SIGNIFICANT CHANGE UP (ref 8.4–10.5)
CHLORIDE SERPL-SCNC: 97 MMOL/L — LOW (ref 98–107)
CO2 SERPL-SCNC: 24 MMOL/L — SIGNIFICANT CHANGE UP (ref 22–29)
CREAT SERPL-MCNC: 0.76 MG/DL — SIGNIFICANT CHANGE UP (ref 0.5–1.3)
EGFR: 115 ML/MIN/1.73M2 — SIGNIFICANT CHANGE UP
GLUCOSE SERPL-MCNC: 88 MG/DL — SIGNIFICANT CHANGE UP (ref 70–99)
HCT VFR BLD CALC: 35.5 % — LOW (ref 39–50)
HGB BLD-MCNC: 12.3 G/DL — LOW (ref 13–17)
MCHC RBC-ENTMCNC: 27.5 PG — SIGNIFICANT CHANGE UP (ref 27–34)
MCHC RBC-ENTMCNC: 34.6 GM/DL — SIGNIFICANT CHANGE UP (ref 32–36)
MCV RBC AUTO: 79.2 FL — LOW (ref 80–100)
PLATELET # BLD AUTO: 205 K/UL — SIGNIFICANT CHANGE UP (ref 150–400)
POTASSIUM SERPL-MCNC: 4.2 MMOL/L — SIGNIFICANT CHANGE UP (ref 3.5–5.3)
POTASSIUM SERPL-SCNC: 4.2 MMOL/L — SIGNIFICANT CHANGE UP (ref 3.5–5.3)
PROCALCITONIN SERPL-MCNC: 0.06 NG/ML — SIGNIFICANT CHANGE UP (ref 0.02–0.1)
RBC # BLD: 4.48 M/UL — SIGNIFICANT CHANGE UP (ref 4.2–5.8)
RBC # FLD: 13.1 % — SIGNIFICANT CHANGE UP (ref 10.3–14.5)
SODIUM SERPL-SCNC: 133 MMOL/L — LOW (ref 135–145)
WBC # BLD: 2.89 K/UL — LOW (ref 3.8–10.5)
WBC # FLD AUTO: 2.89 K/UL — LOW (ref 3.8–10.5)

## 2022-09-03 PROCEDURE — 99233 SBSQ HOSP IP/OBS HIGH 50: CPT

## 2022-09-03 PROCEDURE — 99232 SBSQ HOSP IP/OBS MODERATE 35: CPT

## 2022-09-03 PROCEDURE — 70552 MRI BRAIN STEM W/DYE: CPT | Mod: 26

## 2022-09-03 RX ADMIN — SODIUM CHLORIDE 2 GRAM(S): 9 INJECTION INTRAMUSCULAR; INTRAVENOUS; SUBCUTANEOUS at 05:47

## 2022-09-03 RX ADMIN — SODIUM CHLORIDE 2 GRAM(S): 9 INJECTION INTRAMUSCULAR; INTRAVENOUS; SUBCUTANEOUS at 15:22

## 2022-09-03 RX ADMIN — DIPHENHYDRAMINE HYDROCHLORIDE AND LIDOCAINE HYDROCHLORIDE AND ALUMINUM HYDROXIDE AND MAGNESIUM HYDRO 5 MILLILITER(S): KIT at 18:46

## 2022-09-03 RX ADMIN — CHLORHEXIDINE GLUCONATE 1 APPLICATION(S): 213 SOLUTION TOPICAL at 11:36

## 2022-09-03 RX ADMIN — QUETIAPINE FUMARATE 12.5 MILLIGRAM(S): 200 TABLET, FILM COATED ORAL at 21:23

## 2022-09-03 RX ADMIN — DIPHENHYDRAMINE HYDROCHLORIDE AND LIDOCAINE HYDROCHLORIDE AND ALUMINUM HYDROXIDE AND MAGNESIUM HYDRO 5 MILLILITER(S): KIT at 05:45

## 2022-09-03 RX ADMIN — LEVETIRACETAM 500 MILLIGRAM(S): 250 TABLET, FILM COATED ORAL at 18:47

## 2022-09-03 RX ADMIN — ENOXAPARIN SODIUM 40 MILLIGRAM(S): 100 INJECTION SUBCUTANEOUS at 18:47

## 2022-09-03 RX ADMIN — MEMANTINE HYDROCHLORIDE 5 MILLIGRAM(S): 10 TABLET ORAL at 05:47

## 2022-09-03 RX ADMIN — DIPHENHYDRAMINE HYDROCHLORIDE AND LIDOCAINE HYDROCHLORIDE AND ALUMINUM HYDROXIDE AND MAGNESIUM HYDRO 5 MILLILITER(S): KIT at 11:36

## 2022-09-03 RX ADMIN — LEVETIRACETAM 500 MILLIGRAM(S): 250 TABLET, FILM COATED ORAL at 05:46

## 2022-09-03 RX ADMIN — SODIUM CHLORIDE 2 GRAM(S): 9 INJECTION INTRAMUSCULAR; INTRAVENOUS; SUBCUTANEOUS at 21:24

## 2022-09-03 RX ADMIN — SENNA PLUS 2 TABLET(S): 8.6 TABLET ORAL at 21:22

## 2022-09-03 RX ADMIN — Medication 30 GRAM(S): at 11:36

## 2022-09-03 RX ADMIN — DIPHENHYDRAMINE HYDROCHLORIDE AND LIDOCAINE HYDROCHLORIDE AND ALUMINUM HYDROXIDE AND MAGNESIUM HYDRO 5 MILLILITER(S): KIT at 01:01

## 2022-09-03 RX ADMIN — MEMANTINE HYDROCHLORIDE 5 MILLIGRAM(S): 10 TABLET ORAL at 18:47

## 2022-09-03 RX ADMIN — Medication 5 MILLIGRAM(S): at 21:23
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
## 2022-09-03 NOTE — PROGRESS NOTE ADULT - SUBJECTIVE AND OBJECTIVE BOX
Sodium is improving - 133meQ/L today.    Vital Signs Last 24 Hrs  T(C): 36.6 (03 Sep 2022 11:27), Max: 37 (02 Sep 2022 22:30)  T(F): 97.8 (03 Sep 2022 11:27), Max: 98.6 (02 Sep 2022 22:30)  HR: 88 (03 Sep 2022 11:27) (85 - 88)  BP: 105/69 (03 Sep 2022 11:27) (105/69 - 120/68)  BP(mean): --  RR: 18 (03 Sep 2022 11:27) (18 - 18)  SpO2: 96% (03 Sep 2022 11:27) (95% - 96%)    Parameters below as of 03 Sep 2022 11:27  Patient On (Oxygen Delivery Method): room air    Physical Exam  General: WDWN male in NAD  Respiratory: CTAB  Abdomen: Soft, NT  Extremities: No appreciable edema, b/l mitts  Neuro: Sleeping     09-03    133<L>  |  97<L>  |  7.1<L>  ----------------------------<  88  4.2   |  24.0  |  0.76    Ca    9.4      03 Sep 2022 05:30                          12.3   2.89  )-----------( 205      ( 03 Sep 2022 05:30 )             35.5     MEDICATIONS  (STANDING):  chlorhexidine 2% Cloths 1 Application(s) Topical daily  enoxaparin Injectable 40 milliGRAM(s) SubCutaneous every 24 hours  FIRST- Mouthwash  BLM 5 milliLiter(s) Swish and Swallow four times a day  levETIRAcetam  Solution 500 milliGRAM(s) Oral two times a day  melatonin 5 milliGRAM(s) Oral at bedtime  memantine 5 milliGRAM(s) Oral two times a day  QUEtiapine 12.5 milliGRAM(s) Oral at bedtime  senna 2 Tablet(s) Oral at bedtime  sodium chloride 2 Gram(s) Oral every 8 hours  urea Oral Powder 30 Gram(s) Oral daily    MEDICATIONS  (PRN):  acetaminophen     Tablet .. 650 milliGRAM(s) Oral every 6 hours PRN Temp greater or equal to 38C (100.4F), Moderate Pain (4 - 6)  ondansetron Injectable 4 milliGRAM(s) IV Push every 4 hours PRN Nausea and/or Vomiting

## 2022-09-03 NOTE — PROGRESS NOTE ADULT - SUBJECTIVE AND OBJECTIVE BOX
[facility] Division of Hospital Medicine    Chief Complaint:  SDH    SUBJECTIVE / OVERNIGHT EVENTS:  Pt examined lying in bed  Asleep, unwilling to wake up or participate in exam. Per RN(overnight) at baseline     Patient underwent Right decompressive hemicraniectomy on 5/24, trach/peg 6/1,  R cranioplasty with complex closure 6/29/22.    repeated CTH  demonstrating right sided subdural collection mixed with air.  s/p right craniectomy for evacuation on 7/17 after MR demonstrated large right Epidural collection  Cranioplasty healing well/ drain removed on 7/1.   Trach (decanulated)/ PEG         MEDICATIONS  (STANDING):  chlorhexidine 2% Cloths 1 Application(s) Topical daily  enoxaparin Injectable 40 milliGRAM(s) SubCutaneous every 24 hours  FIRST- Mouthwash  BLM 5 milliLiter(s) Swish and Swallow four times a day  levETIRAcetam  Solution 500 milliGRAM(s) Oral two times a day  melatonin 5 milliGRAM(s) Oral at bedtime  memantine 5 milliGRAM(s) Oral two times a day  QUEtiapine 12.5 milliGRAM(s) Oral at bedtime  senna 2 Tablet(s) Oral at bedtime  sodium chloride 2 Gram(s) Oral every 8 hours  urea Oral Powder 30 Gram(s) Oral daily    MEDICATIONS  (PRN):  acetaminophen     Tablet .. 650 milliGRAM(s) Oral every 6 hours PRN Temp greater or equal to 38C (100.4F), Moderate Pain (4 - 6)  ondansetron Injectable 4 milliGRAM(s) IV Push every 4 hours PRN Nausea and/or Vomiting        I&O's Summary      PHYSICAL EXAM:  Vital Signs Last 24 Hrs  T(C): 36.6 (03 Sep 2022 11:27), Max: 37 (02 Sep 2022 22:30)  T(F): 97.8 (03 Sep 2022 11:27), Max: 98.6 (02 Sep 2022 22:30)  HR: 88 (03 Sep 2022 11:27) (85 - 88)  BP: 105/69 (03 Sep 2022 11:27) (105/69 - 120/68)  BP(mean): --  RR: 18 (03 Sep 2022 11:27) (18 - 18)  SpO2: 96% (03 Sep 2022 11:27) (95% - 96%)    Parameters below as of 03 Sep 2022 11:27  Patient On (Oxygen Delivery Method): room air      CONSTITUTIONAL: Non toxic appearing, lying in bed, asleep   ENMT: Moist oral mucosa, cranial incision healing well   RESPIRATORY: Normal respiratory effort; lungs are clear to auscultation bilaterally  CARDIOVASCULAR: Regular rate and rhythm, normal S1 and S2, no murmur/rub/gallop; No lower extremity edema; Peripheral pulses are 2+ bilaterally  ABDOMEN: normoactive bowel sounds, no rebound/guarding; No hepatosplenomegaly  MUSCLOSKELETAL:  no clubbing or cyanosis of digits; no joint swelling  PSYCH: Asleep, unwilling to participate   NEUROLOGY: Unable to assess  SKIN: No external lesions noted     LABS:                        12.3   2.89  )-----------( 205      ( 03 Sep 2022 05:30 )             35.5     09-03    133<L>  |  97<L>  |  7.1<L>  ----------------------------<  88  4.2   |  24.0  |  0.76    Ca    9.4      03 Sep 2022 05:30            RADIOLOGY & ADDITIONAL TESTS:  9/2/22 MRI Brain    IMPRESSION:  1. Limited evaluation for presence of an infectious process, as no contrast was administered at this time. Follow-up postcontrast assessment   may be considered, as clinically indicated.  2. No acute ischemic event.  3. Findings do demonstrate a small residual extra-axial collection on the   right, further decreased when compared with prior CT of 8/30/2022 and   prior MRI dated 7/17/2022. There is residual mass effect on the right   lateral ventricle with right-to-left midline shift, described in detail   above. Nonetheless, these findings are decreased compared with prior   imaging. However, there is prominence involving the temporal horn of the   left lateral ventricle with surrounding T2 increased signal-findings   which may represent "trapping "in association with the temporal horn of   the left lateral ventricle with associated transependymal flow of CSF.    4. T2 increased signal appreciated within the sulci on FLAIR acquisition   imaging, which may be related to residual subarachnoid hemorrhage. Cannot   exclude meningitis/encephalitis given the appropriate clinical scenario.   Once again, postcontrast assessment may be helpful for further   characterization.    5. Findings on gradient echo imaging related to magnetic susceptibility   artifact, as described in detail above, with most prominent finding   appreciated high over the cerebral convexities in a right frontal   location suggesting prior right frontal intraparenchymal hemorrhage. All   findings on gradient echo imaging are without significant interval change   compared with prior MRI dated 7/17/2022.

## 2022-09-03 NOTE — CHART NOTE - NSCHARTNOTEFT_GEN_A_CORE
Called to evaluate patient for restraints    Other interventions attempted: de-escalation, orientation check, environment modification, medication assessment    I have evaluated this patient and have determined that restraints are warranted to optimize medical care. Patient was assessed for current physical and psychological risk factors as well as special needs. There are no medical conditions or limitations that would place this patient at risk while in restraints.    Physical Examination:  GENERAL: NAD, lying comfortably  NERVOUS SYSTEM: A&Ox0  CHEST/LUNG: Clear to auscultation b/l; Unlabored respirations  HEART: Regular rate and rhythm  SKIN: Warm and dry    - Type of restraint: B/I secured mittens   - Attending Aware

## 2022-09-03 NOTE — PROGRESS NOTE ADULT - NUTRITIONAL ASSESSMENT
This patient has been assessed with a concern for Malnutrition and has been determined to have a diagnosis/diagnoses of Severe protein-calorie malnutrition.    This patient is being managed with:   Diet Pureed-  1000mL Fluid Restriction ([id number])  Mildly Thick Liquids (MILDTHICKLIQS)  Bolus   Total Volume per Flush (mL): 250   Frequency: Every 24 Hours   Total Daily Volume of Flush (mL): 100  Free Water Flush Instructions:  Do NOT use free water flushes; instead use normal saline to flush. Thank you.  Entered: Sep  1 2022  5:43PM

## 2022-09-03 NOTE — PROGRESS NOTE ADULT - ASSESSMENT
Assessment and Plan: The patient is a 42 year old male with a prolong and complex hospital course for IPH/SDH/TEOFILO s/p R decompressive hemicraniectomy on 5/24, R cranioplasty on 6/29, complicated by right subdural collection mixed with air s/p evacuation on 7/17, currently awaiting cranioplasty. Nephrology was initially managing chronic hyponatremia which resolved with oral medications, now reconsulted for recurrent hypoNa.    -HypoNa studies consistent with SIADH    -Transiently required tolvaptan - last dose was given on 08/03/2022   -Then well managed on sodium chloride tabs 2g TID and Ure-Na 30g daily    -However developed recurrent hypoNa  -Now improving with addition of 1L fluid restriction + avoidance of use of use of free water flushes (to use normal saline flushes instead)  -Will continue to monitor with above regimen     Marivel Lucero,   Nephrology

## 2022-09-04 LAB
ANION GAP SERPL CALC-SCNC: 11 MMOL/L — SIGNIFICANT CHANGE UP (ref 5–17)
BUN SERPL-MCNC: 19.3 MG/DL — SIGNIFICANT CHANGE UP (ref 8–20)
CALCIUM SERPL-MCNC: 9.1 MG/DL — SIGNIFICANT CHANGE UP (ref 8.4–10.5)
CHLORIDE SERPL-SCNC: 101 MMOL/L — SIGNIFICANT CHANGE UP (ref 98–107)
CO2 SERPL-SCNC: 24 MMOL/L — SIGNIFICANT CHANGE UP (ref 22–29)
CREAT SERPL-MCNC: 0.82 MG/DL — SIGNIFICANT CHANGE UP (ref 0.5–1.3)
EGFR: 112 ML/MIN/1.73M2 — SIGNIFICANT CHANGE UP
GLUCOSE SERPL-MCNC: 82 MG/DL — SIGNIFICANT CHANGE UP (ref 70–99)
HCT VFR BLD CALC: 35 % — LOW (ref 39–50)
HGB BLD-MCNC: 12.1 G/DL — LOW (ref 13–17)
MCHC RBC-ENTMCNC: 27.8 PG — SIGNIFICANT CHANGE UP (ref 27–34)
MCHC RBC-ENTMCNC: 34.6 GM/DL — SIGNIFICANT CHANGE UP (ref 32–36)
MCV RBC AUTO: 80.3 FL — SIGNIFICANT CHANGE UP (ref 80–100)
PLATELET # BLD AUTO: 209 K/UL — SIGNIFICANT CHANGE UP (ref 150–400)
POTASSIUM SERPL-MCNC: 4 MMOL/L — SIGNIFICANT CHANGE UP (ref 3.5–5.3)
POTASSIUM SERPL-SCNC: 4 MMOL/L — SIGNIFICANT CHANGE UP (ref 3.5–5.3)
RBC # BLD: 4.36 M/UL — SIGNIFICANT CHANGE UP (ref 4.2–5.8)
RBC # FLD: 13.2 % — SIGNIFICANT CHANGE UP (ref 10.3–14.5)
SARS-COV-2 RNA SPEC QL NAA+PROBE: SIGNIFICANT CHANGE UP
SODIUM SERPL-SCNC: 136 MMOL/L — SIGNIFICANT CHANGE UP (ref 135–145)
WBC # BLD: 3.58 K/UL — LOW (ref 3.8–10.5)
WBC # FLD AUTO: 3.58 K/UL — LOW (ref 3.8–10.5)

## 2022-09-04 PROCEDURE — 99233 SBSQ HOSP IP/OBS HIGH 50: CPT

## 2022-09-04 PROCEDURE — 99232 SBSQ HOSP IP/OBS MODERATE 35: CPT

## 2022-09-04 RX ADMIN — Medication 5 MILLIGRAM(S): at 21:26

## 2022-09-04 RX ADMIN — SODIUM CHLORIDE 2 GRAM(S): 9 INJECTION INTRAMUSCULAR; INTRAVENOUS; SUBCUTANEOUS at 16:49

## 2022-09-04 RX ADMIN — CHLORHEXIDINE GLUCONATE 1 APPLICATION(S): 213 SOLUTION TOPICAL at 12:52

## 2022-09-04 RX ADMIN — DIPHENHYDRAMINE HYDROCHLORIDE AND LIDOCAINE HYDROCHLORIDE AND ALUMINUM HYDROXIDE AND MAGNESIUM HYDRO 5 MILLILITER(S): KIT at 00:14

## 2022-09-04 RX ADMIN — LEVETIRACETAM 500 MILLIGRAM(S): 250 TABLET, FILM COATED ORAL at 18:25

## 2022-09-04 RX ADMIN — LEVETIRACETAM 500 MILLIGRAM(S): 250 TABLET, FILM COATED ORAL at 05:42

## 2022-09-04 RX ADMIN — Medication 30 GRAM(S): at 12:52

## 2022-09-04 RX ADMIN — MEMANTINE HYDROCHLORIDE 5 MILLIGRAM(S): 10 TABLET ORAL at 18:25

## 2022-09-04 RX ADMIN — SODIUM CHLORIDE 2 GRAM(S): 9 INJECTION INTRAMUSCULAR; INTRAVENOUS; SUBCUTANEOUS at 21:26

## 2022-09-04 RX ADMIN — DIPHENHYDRAMINE HYDROCHLORIDE AND LIDOCAINE HYDROCHLORIDE AND ALUMINUM HYDROXIDE AND MAGNESIUM HYDRO 5 MILLILITER(S): KIT at 05:41

## 2022-09-04 RX ADMIN — ENOXAPARIN SODIUM 40 MILLIGRAM(S): 100 INJECTION SUBCUTANEOUS at 18:25

## 2022-09-04 RX ADMIN — QUETIAPINE FUMARATE 12.5 MILLIGRAM(S): 200 TABLET, FILM COATED ORAL at 21:25

## 2022-09-04 RX ADMIN — MEMANTINE HYDROCHLORIDE 5 MILLIGRAM(S): 10 TABLET ORAL at 05:42

## 2022-09-04 RX ADMIN — DIPHENHYDRAMINE HYDROCHLORIDE AND LIDOCAINE HYDROCHLORIDE AND ALUMINUM HYDROXIDE AND MAGNESIUM HYDRO 5 MILLILITER(S): KIT at 12:52

## 2022-09-04 RX ADMIN — SODIUM CHLORIDE 2 GRAM(S): 9 INJECTION INTRAMUSCULAR; INTRAVENOUS; SUBCUTANEOUS at 05:42

## 2022-09-04 RX ADMIN — SENNA PLUS 2 TABLET(S): 8.6 TABLET ORAL at 21:25

## 2022-09-04 RX ADMIN — DIPHENHYDRAMINE HYDROCHLORIDE AND LIDOCAINE HYDROCHLORIDE AND ALUMINUM HYDROXIDE AND MAGNESIUM HYDRO 5 MILLILITER(S): KIT at 18:25

## 2022-09-04 NOTE — PROGRESS NOTE ADULT - ASSESSMENT
The patient is a 42 year old male with a prolong and complex hospital course notable for IPH/SDH/TEOFILO s/p R decompressive hemicraniectomy on 5/24, R cranioplasty on 6/29, complicated by right subdural collection mixed with air s/p evacuation on 7/17, currently awaiting cranioplasty. Nephrology was initially managing chronic hyponatremia which resolved with oral medications, now reconsulted for recurrent hypoNa.    -HypoNa studies consistent with SIADH    -Transiently required tolvaptan - last dose was given on 08/03/2022   -Then well managed on sodium chloride tabs 2g TID and Ure-Na 30g daily    -However developed recurrent hypoNa  -Sodium have now normalized with addition of 1L fluid restriction + avoidance of use of use of free water flushes (to use normal saline flushes instead)  -Continue above regimen     Marivel Lucero DO  Nephrology
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
## 2022-09-04 NOTE — PROGRESS NOTE ADULT - SUBJECTIVE AND OBJECTIVE BOX
[facility] Division of Hospital Medicine    Chief Complaint:  SDH    SUBJECTIVE / OVERNIGHT EVENTS:  Pt examined lying in bed  No acute overnight events     Brief HPI / hospital course   42y  Male initially admitted on 5/24/22 with a GCS of 3, found to have b/l intraparenchymal bleeds, b/l SDH. Has had a complex prolonged hospital course including but not limited to right decompressive craniectomy on 5/24. Trach / PEG., cranioplasty on 6/29 with cognitive decline after initial improvement prompting imaging which noted subacute collection with air underneath the cranioplasty sight. MRI with increase in fluid collection underneath the flap leading to a repeat right craniectomy with wound exploration & evacuation of fluid collection on 7/17 with OR cultures isolating MSSA. 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 Has had interval trach decannulation and dietary advancement to pureed. Now s/p completion of appropriate antimicrobial course with f/u imaging (9/3) revealing no abnormal enhancement       MEDICATIONS  (STANDING):  chlorhexidine 2% Cloths 1 Application(s) Topical daily  enoxaparin Injectable 40 milliGRAM(s) SubCutaneous every 24 hours  FIRST- Mouthwash  BLM 5 milliLiter(s) Swish and Swallow four times a day  levETIRAcetam  Solution 500 milliGRAM(s) Oral two times a day  melatonin 5 milliGRAM(s) Oral at bedtime  memantine 5 milliGRAM(s) Oral two times a day  QUEtiapine 12.5 milliGRAM(s) Oral at bedtime  senna 2 Tablet(s) Oral at bedtime  sodium chloride 2 Gram(s) Oral every 8 hours  urea Oral Powder 30 Gram(s) Oral daily    MEDICATIONS  (PRN):  acetaminophen     Tablet .. 650 milliGRAM(s) Oral every 6 hours PRN Temp greater or equal to 38C (100.4F), Moderate Pain (4 - 6)  ondansetron Injectable 4 milliGRAM(s) IV Push every 4 hours PRN Nausea and/or Vomiting      I&O's Summary      PHYSICAL EXAM:  Vital Signs Last 24 Hrs  T(C): 36.7 (04 Sep 2022 10:42), Max: 37 (03 Sep 2022 20:30)  T(F): 98 (04 Sep 2022 10:42), Max: 98.6 (03 Sep 2022 20:30)  HR: 77 (04 Sep 2022 10:42) (67 - 85)  BP: 108/71 (04 Sep 2022 10:42) (108/71 - 121/70)  BP(mean): --  RR: 16 (04 Sep 2022 10:42) (16 - 18)  SpO2: 95% (04 Sep 2022 04:58) (95% - 98%)    Parameters below as of 04 Sep 2022 10:42  Patient On (Oxygen Delivery Method): room air      CONSTITUTIONAL: Non toxic appearing, lying in bed, asleep   ENMT: Moist oral mucosa, cranial incision healing well   RESPIRATORY: Normal respiratory effort; lungs are clear to auscultation bilaterally  CARDIOVASCULAR: Regular rate and rhythm, normal S1 and S2, no murmur/rub/gallop; No lower extremity edema; Peripheral pulses are 2+ bilaterally  ABDOMEN: normoactive bowel sounds, no rebound/guarding; No hepatosplenomegaly  MUSCLOSKELETAL:  no clubbing or cyanosis of digits; no joint swelling  PSYCH: Asleep, unwilling to participate   NEUROLOGY: Unable to assess  SKIN: No external lesions noted       LABS:                                   12.1   3.58  )-----------( 209      ( 04 Sep 2022 05:50 )             35.0   09-04    136  |  101  |  19.3  ----------------------------<  82  4.0   |  24.0  |  0.82    Ca    9.1      04 Sep 2022 05:50            RADIOLOGY & ADDITIONAL TESTS:    5/26/22 MRI Brain  IMPRESSION:  MRI BRAIN: Right frontal craniectomy. Residual bilateral subdural hematomas and interhemispheric subdural hemorrhage. Right frontal, right   frontal temporal and left temporal parenchymal hemorrhagic contusions with an foci of diffusion restriction suggestive of shear injury and   diffuse axonal injury.  Cervical spine MRI: No acute fractures or dislocations.      9/3/22 MRI Brain  The brain demonstrates mild periventricular white matter ischemia with old lacunar infarction in the LEFT basal ganglia. RIGHT craniectomy again   noted is encephalomalacia and gliosis in the RIGHT frontal lobe. 6 mm shift to the LEFT is noted. Following gadolinium administration no   abnormal enhancement occurs.  No acute cerebral cortical infarct is found.   No intracranial hemorrhage is recognized.  No mass effect is   found in the brain.  The ventricles, sulci and basal cisterns appear unremarkable. The vertebral and internal carotid arteries demonstrate expected flow   voids indicating their patency. The orbits are unremarkable.  The paranasal sinuses are clear.  The nasal cavity appears intact.  The nasopharynx is symmetric.  The central skull   base and petrous temporal bones are intact.  IMPRESSION: No abnormal enhancement is seen. Mild periventricular white   matter ischemia with old lacunar infarction in the LEFT basal ganglia.   RIGHT craniectomy again noted is encephalomalacia and gliosis in the RIGHT frontal lobe. 6 mm shift to the LEFT is noted.

## 2022-09-04 NOTE — PROGRESS NOTE ADULT - SUBJECTIVE AND OBJECTIVE BOX
Sodium have normalized to 136meQ/L today. Sleeping at time of my assessment. Patient's mother was at bedside and stated that the patient was briefly awake earlier in the morning.    Vital Signs Last 24 Hrs  T(C): 36.7 (04 Sep 2022 10:42), Max: 37 (03 Sep 2022 20:30)  T(F): 98 (04 Sep 2022 10:42), Max: 98.6 (03 Sep 2022 20:30)  HR: 77 (04 Sep 2022 10:42) (67 - 87)  BP: 108/71 (04 Sep 2022 10:42) (108/71 - 121/70)  BP(mean): --  RR: 16 (04 Sep 2022 10:42) (16 - 18)  SpO2: 95% (04 Sep 2022 04:58) (95% - 98%)    Parameters below as of 04 Sep 2022 10:42  Patient On (Oxygen Delivery Method): room air    I&O's Summary    03 Sep 2022 07:01  -  04 Sep 2022 07:00  --------------------------------------------------------  IN: 60 mL / OUT: 0 mL / NET: 60 mL    Physical Exam  General: WDWN male in NAD; lying supine in bed  Respiratory: CTAB  Abdomen: Soft, NT  Extremities: No appreciable edema, b/l mitts  Neuro: Sleeping     09-04    136  |  101  |  19.3  ----------------------------<  82  4.0   |  24.0  |  0.82    Ca    9.1      04 Sep 2022 05:50                          12.1   3.58  )-----------( 209      ( 04 Sep 2022 05:50 )             35.0     MEDICATIONS  (STANDING):  chlorhexidine 2% Cloths 1 Application(s) Topical daily  enoxaparin Injectable 40 milliGRAM(s) SubCutaneous every 24 hours  FIRST- Mouthwash  BLM 5 milliLiter(s) Swish and Swallow four times a day  levETIRAcetam  Solution 500 milliGRAM(s) Oral two times a day  melatonin 5 milliGRAM(s) Oral at bedtime  memantine 5 milliGRAM(s) Oral two times a day  QUEtiapine 12.5 milliGRAM(s) Oral at bedtime  senna 2 Tablet(s) Oral at bedtime  sodium chloride 2 Gram(s) Oral every 8 hours  urea Oral Powder 30 Gram(s) Oral daily    MEDICATIONS  (PRN):  acetaminophen     Tablet .. 650 milliGRAM(s) Oral every 6 hours PRN Temp greater or equal to 38C (100.4F), Moderate Pain (4 - 6)  ondansetron Injectable 4 milliGRAM(s) IV Push every 4 hours PRN Nausea and/or Vomiting

## 2022-09-04 NOTE — PROGRESS NOTE ADULT - NUTRITIONAL ASSESSMENT
This patient has been assessed with a concern for Malnutrition and has been determined to have a diagnosis/diagnoses of Severe protein-calorie malnutrition.    This patient is being managed with:   Diet Pureed-  1000mL Fluid Restriction ([id number])  Mildly Thick Liquids (MILDTHICKLIQS)  Bolus   Total Volume per Flush (mL): 250   Frequency: Every 24 Hours   Total Daily Volume of Flush (mL): 100  Free Water Flush Instructions:  Do NOT use free water flushes; instead use normal saline to flush. Thank you.  Entered: Sep  1 2022  5:43PM

## 2022-09-05 LAB
ANION GAP SERPL CALC-SCNC: 11 MMOL/L — SIGNIFICANT CHANGE UP (ref 5–17)
BUN SERPL-MCNC: 17.5 MG/DL — SIGNIFICANT CHANGE UP (ref 8–20)
CALCIUM SERPL-MCNC: 9.6 MG/DL — SIGNIFICANT CHANGE UP (ref 8.4–10.5)
CHLORIDE SERPL-SCNC: 100 MMOL/L — SIGNIFICANT CHANGE UP (ref 98–107)
CO2 SERPL-SCNC: 27 MMOL/L — SIGNIFICANT CHANGE UP (ref 22–29)
CREAT SERPL-MCNC: 0.81 MG/DL — SIGNIFICANT CHANGE UP (ref 0.5–1.3)
EGFR: 113 ML/MIN/1.73M2 — SIGNIFICANT CHANGE UP
GLUCOSE SERPL-MCNC: 96 MG/DL — SIGNIFICANT CHANGE UP (ref 70–99)
HCT VFR BLD CALC: 35.7 % — LOW (ref 39–50)
HGB BLD-MCNC: 12.3 G/DL — LOW (ref 13–17)
MCHC RBC-ENTMCNC: 27.7 PG — SIGNIFICANT CHANGE UP (ref 27–34)
MCHC RBC-ENTMCNC: 34.5 GM/DL — SIGNIFICANT CHANGE UP (ref 32–36)
MCV RBC AUTO: 80.4 FL — SIGNIFICANT CHANGE UP (ref 80–100)
PLATELET # BLD AUTO: 214 K/UL — SIGNIFICANT CHANGE UP (ref 150–400)
POTASSIUM SERPL-MCNC: 3.9 MMOL/L — SIGNIFICANT CHANGE UP (ref 3.5–5.3)
POTASSIUM SERPL-SCNC: 3.9 MMOL/L — SIGNIFICANT CHANGE UP (ref 3.5–5.3)
RBC # BLD: 4.44 M/UL — SIGNIFICANT CHANGE UP (ref 4.2–5.8)
RBC # FLD: 13.3 % — SIGNIFICANT CHANGE UP (ref 10.3–14.5)
SODIUM SERPL-SCNC: 137 MMOL/L — SIGNIFICANT CHANGE UP (ref 135–145)
WBC # BLD: 3.23 K/UL — LOW (ref 3.8–10.5)
WBC # FLD AUTO: 3.23 K/UL — LOW (ref 3.8–10.5)

## 2022-09-05 PROCEDURE — 99232 SBSQ HOSP IP/OBS MODERATE 35: CPT

## 2022-09-05 PROCEDURE — 99233 SBSQ HOSP IP/OBS HIGH 50: CPT

## 2022-09-05 RX ADMIN — DIPHENHYDRAMINE HYDROCHLORIDE AND LIDOCAINE HYDROCHLORIDE AND ALUMINUM HYDROXIDE AND MAGNESIUM HYDRO 5 MILLILITER(S): KIT at 02:09

## 2022-09-05 RX ADMIN — SODIUM CHLORIDE 2 GRAM(S): 9 INJECTION INTRAMUSCULAR; INTRAVENOUS; SUBCUTANEOUS at 06:41

## 2022-09-05 RX ADMIN — Medication 30 GRAM(S): at 13:28

## 2022-09-05 RX ADMIN — MEMANTINE HYDROCHLORIDE 5 MILLIGRAM(S): 10 TABLET ORAL at 06:40

## 2022-09-05 RX ADMIN — ENOXAPARIN SODIUM 40 MILLIGRAM(S): 100 INJECTION SUBCUTANEOUS at 18:38

## 2022-09-05 RX ADMIN — SODIUM CHLORIDE 2 GRAM(S): 9 INJECTION INTRAMUSCULAR; INTRAVENOUS; SUBCUTANEOUS at 13:28

## 2022-09-05 RX ADMIN — MEMANTINE HYDROCHLORIDE 5 MILLIGRAM(S): 10 TABLET ORAL at 18:39

## 2022-09-05 RX ADMIN — DIPHENHYDRAMINE HYDROCHLORIDE AND LIDOCAINE HYDROCHLORIDE AND ALUMINUM HYDROXIDE AND MAGNESIUM HYDRO 5 MILLILITER(S): KIT at 18:39

## 2022-09-05 RX ADMIN — DIPHENHYDRAMINE HYDROCHLORIDE AND LIDOCAINE HYDROCHLORIDE AND ALUMINUM HYDROXIDE AND MAGNESIUM HYDRO 5 MILLILITER(S): KIT at 13:28

## 2022-09-05 RX ADMIN — Medication 5 MILLIGRAM(S): at 21:29

## 2022-09-05 RX ADMIN — CHLORHEXIDINE GLUCONATE 1 APPLICATION(S): 213 SOLUTION TOPICAL at 13:27

## 2022-09-05 RX ADMIN — DIPHENHYDRAMINE HYDROCHLORIDE AND LIDOCAINE HYDROCHLORIDE AND ALUMINUM HYDROXIDE AND MAGNESIUM HYDRO 5 MILLILITER(S): KIT at 06:38

## 2022-09-05 RX ADMIN — QUETIAPINE FUMARATE 12.5 MILLIGRAM(S): 200 TABLET, FILM COATED ORAL at 21:30

## 2022-09-05 RX ADMIN — LEVETIRACETAM 500 MILLIGRAM(S): 250 TABLET, FILM COATED ORAL at 06:39

## 2022-09-05 RX ADMIN — LEVETIRACETAM 500 MILLIGRAM(S): 250 TABLET, FILM COATED ORAL at 18:39

## 2022-09-05 RX ADMIN — SENNA PLUS 2 TABLET(S): 8.6 TABLET ORAL at 21:30

## 2022-09-05 RX ADMIN — SODIUM CHLORIDE 2 GRAM(S): 9 INJECTION INTRAMUSCULAR; INTRAVENOUS; SUBCUTANEOUS at 21:29
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
## 2022-09-05 NOTE — CHART NOTE - NSCHARTNOTEFT_GEN_A_CORE
pt seen in AM w NSx team & pt at baseline   MRIB w/ marlo reviewed - no enhancement to suspect infection,   ID: [id number] completed for MSSA/MRSE  will need ID and medical clearance for cranioplasty planning w Plastics assist, tentatively this week     < from: MR Head w/ IV Cont (09.03.22 @ 16:19) >    IMPRESSION: No abnormal enhancement is seen. Mild periventricular white matter ischemia with old lacunar infarction in the LEFT basal ganglia.   RIGHT craniectomy again noted is encephalomalacia and gliosis in the RIGHT frontal lobe. 6 mm shift to the LEFT is noted.  --- End of Report ---  < end of copied text >

## 2022-09-05 NOTE — PROGRESS NOTE ADULT - SUBJECTIVE AND OBJECTIVE BOX
[facility]  INFECTIOUS DISEASES at [place] and [place]  =======================================================                               Nestor Russo MD#  Mitesh Woodward MD*                                     Nyla Orantes MD*    Vera Bishop MD*            Diplomates American Board of Internal Medicine & Infectious Diseases                # [place] Office - Appt - Tel  [phone] Fax [phone]                * [place] Office - Appt - Tel [phone] Fax [phone]                                  Hospital Consult line:  [phone]  =======================================================    PASTOR KEMAR [id number]    Follow up: Epidural fluid collection     No fever       Allergies:  Allergy Status Unknown      REVIEW OF SYSTEMS:  Unable to obtain due to medical condition       Physical Exam:  GEN: NAD  HEENT: indent at craniectomy site.  Anicteric   NECK: Supple.   LUNGS: CTA B/L  HEART: Regular rate and rhythm   ABDOMEN: Soft, nontender, and nondistended.  Positive bowel sounds.    : [place]   EXTREMITIES: trace edema.  MSK: no joint swelling  NEUROLOGIC: Awake  PSYCHIATRIC: unable to assess  SKIN: No Rash      Vitals:  T(F): 97.4 (05 Sep 2022 11:08), Max: 98.6 (04 Sep 2022 20:48)  HR: 80 (05 Sep 2022 11:08)  BP: 129/88 (05 Sep 2022 11:08)  RR: 18 (05 Sep 2022 11:08)  SpO2: 99% (05 Sep 2022 11:08) (95% - 99%)  temp max in last 48H T(F): , Max: 98.6 (09-03-22 @ 20:30)      Current Antibiotics:    Other medications:  chlorhexidine 2% Cloths 1 Application(s) Topical daily  enoxaparin Injectable 40 milliGRAM(s) SubCutaneous every 24 hours  FIRST- Mouthwash  BLM 5 milliLiter(s) Swish and Swallow four times a day  levETIRAcetam  Solution 500 milliGRAM(s) Oral two times a day  melatonin 5 milliGRAM(s) Oral at bedtime  memantine 5 milliGRAM(s) Oral two times a day  QUEtiapine 12.5 milliGRAM(s) Oral at bedtime  senna 2 Tablet(s) Oral at bedtime  sodium chloride 2 Gram(s) Oral every 8 hours  urea Oral Powder 30 Gram(s) Oral daily                 12.3   3.23  )-----------( 214      ( 05 Sep 2022 08:00 )             35.7     09-05    137  |  100  |  17.5  ----------------------------<  96  3.9   |  27.0  |  0.81    Ca    9.6      05 Sep 2022 08:00      WBC Count: 3.23 K/uL (09-05-22 @ 08:00)  WBC Count: 3.58 K/uL (09-04-22 @ 05:50)  WBC Count: 2.89 K/uL (09-03-22 @ 05:30)  WBC Count: 2.84 K/uL (09-02-22 @ 06:20)    Creatinine, Serum: 0.81 mg/dL (09-05-22 @ 08:00)  Creatinine, Serum: 0.82 mg/dL (09-04-22 @ 05:50)  Creatinine, Serum: 0.76 mg/dL (09-03-22 @ 05:30)  Creatinine, Serum: 0.60 mg/dL (09-02-22 @ 06:20)  Creatinine, Serum: 0.58 mg/dL (09-01-22 @ 08:49)    Procalcitonin, Serum: 0.06 ng/mL (09-03-22 @ 05:30)     COVID-19 PCR: NotDetec (09-04-22 @ 08:37)  COVID-19 PCR: NotDetec (08-29-22 @ 04:00)  COVID-19 PCR: NotDetec (08-23-22 @ 06:30)  COVID-19 PCR: NotDetec (08-18-22 @ 07:36)  COVID-19 PCR: NotDetec (08-11-22 @ 06:00)        < from: MR Head w/ IV Cont (09.03.22 @ 16:19) >  ACC: [id number] EXAM:  MR BRAIN IC                        ACC: [id number] EXAM:  MR BRAIN                          PROCEDURE DATE:  09/02/2022          INTERPRETATION:  MR brain with and without gadolinium    CLINICAL INFORMATION: Headache    TECHNIQUE:   Sagittal and axial T1-weighted images, axial FLAIR images,   axial T2-weighted images, axial gradient echo images and axial diffusion   weighted images of the brain were obtained. Following 7 cc of Gadavist   administration, .5 cc discarded, isotropic volumetric and fast spin echo   T1-weighted images were obtained; this data was reformatted using image   post processing software in multiple imaging planes.    FINDINGS:   CT head dated 08/30/2022 available for review.    The brain demonstratesmild periventricular white matter ischemia with   old lacunar infarction in the LEFT basal ganglia. RIGHT craniectomy again   noted is encephalomalacia and gliosis in the RIGHT frontal lobe. 6 mm   shift to the LEFT is noted. Following gadolinium administration no   abnormal enhancement occurs.  No acute cerebral cortical infarct is   found.   No intracranial hemorrhage is recognized.  No mass effect is   found in the brain.    The ventricles, sulci and basal cisterns appear unremarkable.    The vertebral and internal carotid arteries demonstrate expected flow   voids indicating their patency.    The orbits are unremarkable.  The paranasal sinuses are clear.  The nasal   cavity appears intact.  The nasopharynx is symmetric.  The central skull  base and petrous temporal bones are intact.    IMPRESSION: No abnormal enhancement is seen. Mild periventricular white   matter ischemia with old lacunar infarction in the LEFT basal ganglia.   RIGHT craniectomy again noted is encephalomalacia and gliosis in the   RIGHT frontal lobe. 6 mm shift to the LEFT is noted.    --- End of Report ---    < end of copied text >

## 2022-09-05 NOTE — PROGRESS NOTE ADULT - NS ATTEND AMEND GEN_ALL_CORE FT
Neurosurgery Attending Attestation:    Patient seen and examined at bedside. Agree with plan and note as documented above.    agree

## 2022-09-05 NOTE — PROGRESS NOTE ADULT - NUTRITIONAL ASSESSMENT
This patient has been assessed with a concern for Malnutrition and has been determined to have a diagnosis/diagnoses of Severe protein-calorie malnutrition.    This patient is being managed with:   Diet Pureed-  1000mL Fluid Restriction ([id number])  Mildly Thick Liquids (MILDTHICKLIQS)  Bolus   Total Volume per Flush (mL): 250   Frequency: Every 24 Hours   Total Daily Volume of Flush (mL): 100  Free Water Flush Instructions:  Do NOT use free water flushes; instead use normal saline to flush. Thank you.  Entered: Sep  1 2022  5:43PM

## 2022-09-05 NOTE — PROGRESS NOTE ADULT - ASSESSMENT
The patient is a 42 year old male with a prolong and complex hospital course notable for IPH/SDH/TEOFILO s/p R decompressive hemicraniectomy on 5/24, R cranioplasty on 6/29, complicated by right subdural collection mixed with air s/p evacuation on 7/17, currently awaiting cranioplasty. Nephrology was initially managing chronic hyponatremia which resolved with oral medications, then reconsulted for recurrent hypoNa.    -HypoNa studies consistent with SIADH    -Transiently required tolvaptan - last dose was given on 08/03/2022   -Then well managed on sodium chloride tabs 2g TID and Ure-Na 30g daily    -However developed recurrent hypoNa  -Sodium have normalized with addition of 1L fluid restriction + avoidance of use of use of free water flushes (to use normal saline flushes instead)  -Continue above regimen     Given resolution of hypoNa with above regimen, Nephrology will sign off at this time. Call if questions. Thank you for the consultation.     Marivel Lucero,   Nephrology   The patient is a 42 year old male with a prolong and complex hospital course notable for IPH/SDH/TEOFILO s/p R decompressive hemicraniectomy on 5/24, R cranioplasty on 6/29, complicated by right subdural collection mixed with air s/p evacuation on 7/17, currently awaiting cranioplasty. Nephrology was initially managing chronic hyponatremia which resolved with oral medications, then reconsulted for recurrent hypoNa.    -HypoNa studies consistent with SIADH    -Transiently required tolvaptan - last dose was given on 08/03/2022   -Then well managed on sodium chloride tabs 2g TID and Ure-Na 30g daily    -However developed recurrent hypoNa  -Sodium have normalized with addition of 1L fluid restriction + avoidance of use of use of free water flushes (to use normal saline flushes instead)  -Continue above regimen   -NO need for lasix at this time     Given resolution of hypoNa with above regimen, Nephrology will sign off at this time. Call if questions. Thank you for the consultation.     Marivel Lucero, DO  Nephrology

## 2022-09-05 NOTE — PROGRESS NOTE ADULT - SUBJECTIVE AND OBJECTIVE BOX
Neuroscience-NSX    No events or complaints     InPt Meds:  acetaminophen     Tablet .. 650 milliGRAM(s) Oral every 6 hours PRN  chlorhexidine 2% Cloths 1 Application(s) Topical daily  enoxaparin Injectable 40 milliGRAM(s) SubCutaneous every 24 hours  FIRST- Mouthwash  BLM 5 milliLiter(s) Swish and Swallow four times a day  levETIRAcetam  Solution 500 milliGRAM(s) Oral two times a day  melatonin 5 milliGRAM(s) Oral at bedtime  memantine 5 milliGRAM(s) Oral two times a day  ondansetron Injectable 4 milliGRAM(s) IV Push every 4 hours PRN  QUEtiapine 12.5 milliGRAM(s) Oral at bedtime  senna 2 Tablet(s) Oral at bedtime  sodium chloride 2 Gram(s) Oral every 8 hours  urea Oral Powder 30 Gram(s) Oral daily    VS:  T(C): 36.3 (09-05-22 @ 11:08), Max: 37 (09-04-22 @ 20:48)  HR: 80 (09-05-22 @ 11:08) (61 - 83)  BP: 129/88 (09-05-22 @ 11:08) (121/80 - 130/78)  RR: 18 (09-05-22 @ 11:08) (17 - 18)  SpO2: 99% (09-05-22 @ 11:08) (95% - 99%)  Diet:    Exam:  A/O x 3, Hypophonic  LUNA x 4  + Crani, sunken flap  S1,2 RRR CTA  S/NT/ND  No edema    Labs:  WBC: 3.23  Na: 137  COVID 9/4: Neg    A/P: 42M EtOH Abuse / Cocaine P/W B/L SDH / IPH S/p Crani w/ Evac 5/24 w/ Cranioplasty 6/29 w/ Rt Hemicrani w/ Eval Epidural Fluid / Wound Explor + MSSA 7/17 Further C/w Resp Failure S/p Trach 6/1 / Peg, Decannulated, Hyponatremia 2nd to SIADH.    -PT/OOB to chair, Fall prev  -Supplemental O2 PRN. [place]  -Abx completed for MSSA  -Cranioplasty planning in future once cleared by ID  -Keppra q 12, Romana preca  -Mood stabilization Seroquel and Memantine for TBI  -PO diet, Aspir preca  -Pain Control PRN  -GI/DVT prox    2) Resp Failure S/p Trach 6/1 / Peg, Decannulated  -Supplemental O2 PRN. [place]    3) Hyponatremia 2nd to SIADH  -Trend Na lvl, Na Cl tabs / Urea    4) EtOH Abuse / Cocaine   -Substance Abuse, FA/Thiamine    5) Dispo  -D/c planning    Diss w/ attending

## 2022-09-05 NOTE — CHART NOTE - NSCHARTNOTEFT_GEN_A_CORE
I have evaluated this patient and have determined that restraints are warranted to optimize medical care. Patient was assessed for current physical and psychological risk factors as well as special needs. There are no medical conditions or limitations that would place this patient at risk while in restraints. Dr. Mendez made aware.

## 2022-09-05 NOTE — PROGRESS NOTE ADULT - SUBJECTIVE AND OBJECTIVE BOX
[facility] Division of Hospital Medicine    Chief Complaint:  SDH    SUBJECTIVE / OVERNIGHT EVENTS:  Pt examined lying in bed  Significantly more alert and interactive today   No acute overnight events     Brief HPI / hospital course   42y  Male initially admitted on 5/24/22 with a GCS of 3, found to have b/l intraparenchymal bleeds, b/l SDH. Has had a complex prolonged hospital course including but not limited to right decompressive craniectomy on 5/24. Trach / PEG., cranioplasty on 6/29 with cognitive decline after initial improvement prompting imaging which noted subacute collection with air underneath the cranioplasty sight. MRI with increase in fluid collection underneath the flap leading to a repeat right craniectomy with wound exploration & evacuation of fluid collection on 7/17 with OR cultures isolating MSSA. 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 Has had interval trach decannulation and dietary advancement to pureed. Now s/p completion of appropriate antimicrobial course with f/u imaging (9/3) revealing no abnormal enhancement         MEDICATIONS  (STANDING):  chlorhexidine 2% Cloths 1 Application(s) Topical daily  enoxaparin Injectable 40 milliGRAM(s) SubCutaneous every 24 hours  FIRST- Mouthwash  BLM 5 milliLiter(s) Swish and Swallow four times a day  levETIRAcetam  Solution 500 milliGRAM(s) Oral two times a day  melatonin 5 milliGRAM(s) Oral at bedtime  memantine 5 milliGRAM(s) Oral two times a day  QUEtiapine 12.5 milliGRAM(s) Oral at bedtime  senna 2 Tablet(s) Oral at bedtime  sodium chloride 2 Gram(s) Oral every 8 hours  urea Oral Powder 30 Gram(s) Oral daily    MEDICATIONS  (PRN):  acetaminophen     Tablet .. 650 milliGRAM(s) Oral every 6 hours PRN Temp greater or equal to 38C (100.4F), Moderate Pain (4 - 6)  ondansetron Injectable 4 milliGRAM(s) IV Push every 4 hours PRN Nausea and/or Vomiting    I&O's Summary      PHYSICAL EXAM:    CONSTITUTIONAL: Non toxic appearing, lying comfortably in bed (mittens)  ENMT: Moist oral mucosa, cranial incision healing well   RESPIRATORY: Normal respiratory effort; lungs are clear to auscultation bilaterally  CARDIOVASCULAR: Regular rate and rhythm, normal S1 and S2, no murmur/rub/gallop; No lower extremity edema; Peripheral pulses are 2+ bilaterally  ABDOMEN: normoactive bowel sounds, no rebound/guarding; No hepatosplenomegaly  MUSCLOSKELETAL:  no clubbing or cyanosis of digits; no joint swelling  PSYCH: Awake, follows commands, RUE/LUE 4/5, LLE/RLE 5/5   NEUROLOGY: Moves all ext to command,   SKIN: No external lesions noted       LABS:                                              12.1   3.58  )-----------( 209      ( 04 Sep 2022 05:50 )             35.0   09-04    136  |  101  |  19.3  ----------------------------<  82  4.0   |  24.0  |  0.82    Ca    9.1      04 Sep 2022 05:50        RADIOLOGY & ADDITIONAL TESTS:    5/26/22 MRI Brain  IMPRESSION:  MRI BRAIN: Right frontal craniectomy. Residual bilateral subdural hematomas and interhemispheric subdural hemorrhage. Right frontal, right   frontal temporal and left temporal parenchymal hemorrhagic contusions with an foci of diffusion restriction suggestive of shear injury and   diffuse axonal injury.  Cervical spine MRI: No acute fractures or dislocations.      9/3/22 MRI Brain  The brain demonstrates mild periventricular white matter ischemia with old lacunar infarction in the LEFT basal ganglia. RIGHT craniectomy again   noted is encephalomalacia and gliosis in the RIGHT frontal lobe. 6 mm shift to the LEFT is noted. Following gadolinium administration no   abnormal enhancement occurs.  No acute cerebral cortical infarct is found.   No intracranial hemorrhage is recognized.  No mass effect is   found in the brain.  The ventricles, sulci and basal cisterns appear unremarkable. The vertebral and internal carotid arteries demonstrate expected flow   voids indicating their patency. The orbits are unremarkable.  The paranasal sinuses are clear.  The nasal cavity appears intact.  The nasopharynx is symmetric.  The central skull   base and petrous temporal bones are intact.  IMPRESSION: No abnormal enhancement is seen. Mild periventricular white   matter ischemia with old lacunar infarction in the LEFT basal ganglia.   RIGHT craniectomy again noted is encephalomalacia and gliosis in the RIGHT frontal lobe. 6 mm shift to the LEFT is noted.

## 2022-09-05 NOTE — PROGRESS NOTE ADULT - ASSESSMENT
42y  Male initially admitted on 5/24/22 (BIBEMS after found unresponsive on street) with a GCS of 3, found to have b/l intraparenchymal bleeds, b/l SDH. Has had a complex prolonged hospital course including but not limited to right decompressive craniectomy on 5/24. Trach / PEG., cranioplasty on 6/29 with cognitive decline after initial improvement prompting imaging which noted subacute collection with air underneath the cranioplasty sight. MRI with increase in fluid collection underneath the flap leading to a repeat right craniectomy with wound exploration & evacuation of fluid collection on 7/17 with OR cultures isolating MSSA. 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 Has had interval trach decannulation and dietary advancement to pureed. Now s/p completion of appropriate antimicrobial course with f/u imaging (9/3) revealing no abnormal enhancement (6 mm midline shift to left)     Traumatic brain injury with complicated hospital course (as above) now with periodic agitation and lethargy  Neurologically significantly improved   Continue Seroquel 12.5 mg QHS   Continue Memantine 5 mg BID   Maintain safety - mittens if pt pulls on iv lines  Continue Keppra 500 mg BID   Medically optimized for cranioplasty this week, recommend 1 dose Lasix 20 mg IVP day prior       SDH/ IPH / TBI , CNS infection  Right decompressive hemicraniectomy on 5/24,  Completed Nafcillin /  Vanc, post completion with MRI (w/IV con) w/o abnormal enhancement  ID on board  Helmet OOB    Oropharyngeal Dysphagia  on TF / purée diet     Hyponatremia  Central SIADH   Improved, continue NaCl - 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

## 2022-09-05 NOTE — PROGRESS NOTE ADULT - SUBJECTIVE AND OBJECTIVE BOX
Remains normonatremic today.     Vital Signs Last 24 Hrs  T(C): 36.3 (05 Sep 2022 11:08), Max: 37 (04 Sep 2022 20:48)  T(F): 97.4 (05 Sep 2022 11:08), Max: 98.6 (04 Sep 2022 20:48)  HR: 80 (05 Sep 2022 11:08) (61 - 83)  BP: 129/88 (05 Sep 2022 11:08) (121/80 - 130/78)  BP(mean): --  RR: 18 (05 Sep 2022 11:08) (17 - 18)  SpO2: 99% (05 Sep 2022 11:08) (95% - 99%)    Parameters below as of 05 Sep 2022 11:08  Patient On (Oxygen Delivery Method): room air    I&O's Summary    04 Sep 2022 07:01  -  05 Sep 2022 07:00  --------------------------------------------------------  IN: 35 mL / OUT: 0 mL / NET: 35 mL    Physical Exam  General: WDWN male in NAD; lying supine in bed  Respiratory: CTAB  Abdomen: Soft, NT  Extremities: No appreciable edema  Neuro: Sleeping    09-05    137  |  100  |  17.5  ----------------------------<  96  3.9   |  27.0  |  0.81    Ca    9.6      05 Sep 2022 08:00                          12.3   3.23  )-----------( 214      ( 05 Sep 2022 08:00 )             35.7     MEDICATIONS  (STANDING):  chlorhexidine 2% Cloths 1 Application(s) Topical daily  enoxaparin Injectable 40 milliGRAM(s) SubCutaneous every 24 hours  FIRST- Mouthwash  BLM 5 milliLiter(s) Swish and Swallow four times a day  levETIRAcetam  Solution 500 milliGRAM(s) Oral two times a day  melatonin 5 milliGRAM(s) Oral at bedtime  memantine 5 milliGRAM(s) Oral two times a day  QUEtiapine 12.5 milliGRAM(s) Oral at bedtime  senna 2 Tablet(s) Oral at bedtime  sodium chloride 2 Gram(s) Oral every 8 hours  urea Oral Powder 30 Gram(s) Oral daily    MEDICATIONS  (PRN):  acetaminophen     Tablet .. 650 milliGRAM(s) Oral every 6 hours PRN Temp greater or equal to 38C (100.4F), Moderate Pain (4 - 6)  ondansetron Injectable 4 milliGRAM(s) IV Push every 4 hours PRN Nausea and/or Vomiting

## 2022-09-06 LAB
ANION GAP SERPL CALC-SCNC: 14 MMOL/L — SIGNIFICANT CHANGE UP (ref 5–17)
BUN SERPL-MCNC: 12.5 MG/DL — SIGNIFICANT CHANGE UP (ref 8–20)
CALCIUM SERPL-MCNC: 9.9 MG/DL — SIGNIFICANT CHANGE UP (ref 8.4–10.5)
CHLORIDE SERPL-SCNC: 102 MMOL/L — SIGNIFICANT CHANGE UP (ref 98–107)
CO2 SERPL-SCNC: 26 MMOL/L — SIGNIFICANT CHANGE UP (ref 22–29)
CREAT SERPL-MCNC: 0.79 MG/DL — SIGNIFICANT CHANGE UP (ref 0.5–1.3)
EGFR: 114 ML/MIN/1.73M2 — SIGNIFICANT CHANGE UP
GLUCOSE SERPL-MCNC: 94 MG/DL — SIGNIFICANT CHANGE UP (ref 70–99)
HCT VFR BLD CALC: 38 % — LOW (ref 39–50)
HGB BLD-MCNC: 13.4 G/DL — SIGNIFICANT CHANGE UP (ref 13–17)
INR BLD: 1.18 RATIO — HIGH (ref 0.88–1.16)
MCHC RBC-ENTMCNC: 28.4 PG — SIGNIFICANT CHANGE UP (ref 27–34)
MCHC RBC-ENTMCNC: 35.3 GM/DL — SIGNIFICANT CHANGE UP (ref 32–36)
MCV RBC AUTO: 80.5 FL — SIGNIFICANT CHANGE UP (ref 80–100)
PLATELET # BLD AUTO: 247 K/UL — SIGNIFICANT CHANGE UP (ref 150–400)
POTASSIUM SERPL-MCNC: 4 MMOL/L — SIGNIFICANT CHANGE UP (ref 3.5–5.3)
POTASSIUM SERPL-SCNC: 4 MMOL/L — SIGNIFICANT CHANGE UP (ref 3.5–5.3)
PROTHROM AB SERPL-ACNC: 13.7 SEC — HIGH (ref 10.5–13.4)
RBC # BLD: 4.72 M/UL — SIGNIFICANT CHANGE UP (ref 4.2–5.8)
RBC # FLD: 13.2 % — SIGNIFICANT CHANGE UP (ref 10.3–14.5)
SODIUM SERPL-SCNC: 141 MMOL/L — SIGNIFICANT CHANGE UP (ref 135–145)
WBC # BLD: 3.86 K/UL — SIGNIFICANT CHANGE UP (ref 3.8–10.5)
WBC # FLD AUTO: 3.86 K/UL — SIGNIFICANT CHANGE UP (ref 3.8–10.5)

## 2022-09-06 PROCEDURE — 99232 SBSQ HOSP IP/OBS MODERATE 35: CPT

## 2022-09-06 RX ADMIN — LEVETIRACETAM 500 MILLIGRAM(S): 250 TABLET, FILM COATED ORAL at 17:53

## 2022-09-06 RX ADMIN — QUETIAPINE FUMARATE 12.5 MILLIGRAM(S): 200 TABLET, FILM COATED ORAL at 21:39

## 2022-09-06 RX ADMIN — DIPHENHYDRAMINE HYDROCHLORIDE AND LIDOCAINE HYDROCHLORIDE AND ALUMINUM HYDROXIDE AND MAGNESIUM HYDRO 5 MILLILITER(S): KIT at 12:53

## 2022-09-06 RX ADMIN — DIPHENHYDRAMINE HYDROCHLORIDE AND LIDOCAINE HYDROCHLORIDE AND ALUMINUM HYDROXIDE AND MAGNESIUM HYDRO 5 MILLILITER(S): KIT at 17:54

## 2022-09-06 RX ADMIN — DIPHENHYDRAMINE HYDROCHLORIDE AND LIDOCAINE HYDROCHLORIDE AND ALUMINUM HYDROXIDE AND MAGNESIUM HYDRO 5 MILLILITER(S): KIT at 03:26

## 2022-09-06 RX ADMIN — MEMANTINE HYDROCHLORIDE 5 MILLIGRAM(S): 10 TABLET ORAL at 06:51

## 2022-09-06 RX ADMIN — SODIUM CHLORIDE 2 GRAM(S): 9 INJECTION INTRAMUSCULAR; INTRAVENOUS; SUBCUTANEOUS at 12:54

## 2022-09-06 RX ADMIN — ENOXAPARIN SODIUM 40 MILLIGRAM(S): 100 INJECTION SUBCUTANEOUS at 17:54

## 2022-09-06 RX ADMIN — DIPHENHYDRAMINE HYDROCHLORIDE AND LIDOCAINE HYDROCHLORIDE AND ALUMINUM HYDROXIDE AND MAGNESIUM HYDRO 5 MILLILITER(S): KIT at 06:50

## 2022-09-06 RX ADMIN — CHLORHEXIDINE GLUCONATE 1 APPLICATION(S): 213 SOLUTION TOPICAL at 12:54

## 2022-09-06 RX ADMIN — SENNA PLUS 2 TABLET(S): 8.6 TABLET ORAL at 21:39

## 2022-09-06 RX ADMIN — SODIUM CHLORIDE 2 GRAM(S): 9 INJECTION INTRAMUSCULAR; INTRAVENOUS; SUBCUTANEOUS at 21:39

## 2022-09-06 RX ADMIN — Medication 30 GRAM(S): at 12:55

## 2022-09-06 RX ADMIN — LEVETIRACETAM 500 MILLIGRAM(S): 250 TABLET, FILM COATED ORAL at 06:52

## 2022-09-06 RX ADMIN — Medication 5 MILLIGRAM(S): at 21:40

## 2022-09-06 RX ADMIN — SODIUM CHLORIDE 2 GRAM(S): 9 INJECTION INTRAMUSCULAR; INTRAVENOUS; SUBCUTANEOUS at 06:51

## 2022-09-06 RX ADMIN — MEMANTINE HYDROCHLORIDE 5 MILLIGRAM(S): 10 TABLET ORAL at 17:53

## 2022-09-06 NOTE — CHART NOTE - NSCHARTNOTEFT_GEN_A_CORE
PA NOTE-MEDICINE    Called by RN due to needing Renewal of B/L secured Mittens due to Pulling out IV's.    T(C): 37.1 (05 Sep 2022 21:27), Max: 37.1 (05 Sep 2022 21:27)  T(F): 98.7 (05 Sep 2022 21:27), Max: 98.7 (05 Sep 2022 21:27)  HR: 68 (05 Sep 2022 21:27) (61 - 80)  BP: 130/80 (05 Sep 2022 21:27) (123/90 - 130/80)  RR: 18 (05 Sep 2022 21:27) (18 - 18)  SpO2: 96% (05 Sep 2022 21:27) (95% - 100%)    Cardiac: S1S2 +  Lungs: CTA B/L   Abd: No Pain Rxs on Palp   Ext: No C/C/E x 4     A/P B/L Secured Mittend Renewed due to pulling out IV's/Tubing  Dr Walters Aware   Continue to monitor Pt

## 2022-09-06 NOTE — PROGRESS NOTE ADULT - NUTRITIONAL ASSESSMENT
This patient has been assessed with a concern for Malnutrition and has been determined to have a diagnosis/diagnoses of Severe protein-calorie malnutrition.    This patient is being managed with:   Diet Pureed-  1000mL Fluid Restriction ([id number])  Mildly Thick Liquids (MILDTHICKLIQS)  Bolus   Total Volume per Flush (mL): 250   Frequency: Every 24 Hours   Total Daily Volume of Flush (mL): 100  Free Water Flush Instructions:  Do NOT use free water flushes; instead use normal saline to flush. Thank you.  Entered: Sep  1 2022  5:43PM

## 2022-09-06 NOTE — PROGRESS NOTE ADULT - ASSESSMENT
42y  Male initially admitted on 5/24/22 (BIBEMS after found unresponsive on street) with a GCS of 3, found to have b/l intraparenchymal bleeds, b/l SDH. Has had a complex prolonged hospital course including but not limited to right decompressive craniectomy on 5/24. Trach / PEG., cranioplasty on 6/29 with cognitive decline after initial improvement prompting imaging which noted subacute collection with air underneath the cranioplasty sight. MRI with increase in fluid collection underneath the flap leading to a repeat right craniectomy with wound exploration & evacuation of fluid collection on 7/17 with OR cultures isolating MSSA. 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 Has had interval trach decannulation and dietary advancement to pureed. Now s/p completion of appropriate antimicrobial course with f/u imaging (9/3) revealing no abnormal enhancement (6 mm midline shift to left)     Traumatic brain injury with complicated hospital course (as above) now with periodic agitation and lethargy  Neurologically significantly improved   Continue Seroquel 12.5 mg QHS   Continue Memantine 5 mg BID   Maintain safety - mittens if pt pulls on iv lines  Continue Keppra 500 mg BID   Medically optimized for cranioplasty this week, recommend 1 dose Lasix 20 mg IVP day prior   ID clearance for procedure appreciated       SDH/ IPH / TBI , CNS infection  Right decompressive hemicraniectomy on 5/24,  Completed Nafcillin /  Vanc, post completion with MRI (w/IV con) w/o abnormal enhancement  ID on board  Helmet OOB    Oropharyngeal Dysphagia  on TF / purée diet     Hyponatremia  Central SIADH   Improved, continue NaCl - 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

## 2022-09-06 NOTE — PROGRESS NOTE ADULT - SUBJECTIVE AND OBJECTIVE BOX
[facility] Division of Hospital Medicine    Chief Complaint:  SDH    SUBJECTIVE / OVERNIGHT EVENTS:  Pt able to walk with walker with PT   Mother at bedside, updated   No acute overnight events     Brief HPI / hospital course   42y  Male initially admitted on 5/24/22 with a GCS of 3, found to have b/l intraparenchymal bleeds, b/l SDH. Has had a complex prolonged hospital course including but not limited to right decompressive craniectomy on 5/24. Trach / PEG., cranioplasty on 6/29 with cognitive decline after initial improvement prompting imaging which noted subacute collection with air underneath the cranioplasty sight. MRI with increase in fluid collection underneath the flap leading to a repeat right craniectomy with wound exploration & evacuation of fluid collection on 7/17 with OR cultures isolating MSSA. 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 Has had interval trach decannulation and dietary advancement to pureed. Now s/p completion of appropriate antimicrobial course with f/u imaging (9/3) revealing no abnormal enhancement         MEDICATIONS  (STANDING):  chlorhexidine 2% Cloths 1 Application(s) Topical daily  enoxaparin Injectable 40 milliGRAM(s) SubCutaneous every 24 hours  FIRST- Mouthwash  BLM 5 milliLiter(s) Swish and Swallow four times a day  levETIRAcetam  Solution 500 milliGRAM(s) Oral two times a day  melatonin 5 milliGRAM(s) Oral at bedtime  memantine 5 milliGRAM(s) Oral two times a day  QUEtiapine 12.5 milliGRAM(s) Oral at bedtime  senna 2 Tablet(s) Oral at bedtime  sodium chloride 2 Gram(s) Oral every 8 hours  urea Oral Powder 30 Gram(s) Oral daily    MEDICATIONS  (PRN):  acetaminophen     Tablet .. 650 milliGRAM(s) Oral every 6 hours PRN Temp greater or equal to 38C (100.4F), Moderate Pain (4 - 6)  ondansetron Injectable 4 milliGRAM(s) IV Push every 4 hours PRN Nausea and/or Vomiting    I&O's Summary      PHYSICAL EXAM:    CONSTITUTIONAL: Non toxic appearing, lying comfortably in bed (mittens)  ENMT: Moist oral mucosa, cranial incision healing well   RESPIRATORY: Normal respiratory effort; lungs are clear to auscultation bilaterally  CARDIOVASCULAR: Regular rate and rhythm, normal S1 and S2, no murmur/rub/gallop; No lower extremity edema; Peripheral pulses are 2+ bilaterally  ABDOMEN: normoactive bowel sounds, no rebound/guarding; No hepatosplenomegaly  MUSCLOSKELETAL:  no clubbing or cyanosis of digits; no joint swelling  PSYCH: Awake, follows commands, RUE/LUE 4/5, LLE/RLE 5/5   NEUROLOGY: Moves all ext to command,   SKIN: No external lesions noted       LABS:                                              13.4   3.86  )-----------( 247      ( 06 Sep 2022 07:37 )             38.0   09-06    141  |  102  |  12.5  ----------------------------<  94  4.0   |  26.0  |  0.79    Ca    9.9      06 Sep 2022 07:37          RADIOLOGY & ADDITIONAL TESTS:    5/26/22 MRI Brain  IMPRESSION:  MRI BRAIN: Right frontal craniectomy. Residual bilateral subdural hematomas and interhemispheric subdural hemorrhage. Right frontal, right   frontal temporal and left temporal parenchymal hemorrhagic contusions with an foci of diffusion restriction suggestive of shear injury and   diffuse axonal injury.  Cervical spine MRI: No acute fractures or dislocations.      9/3/22 MRI Brain  The brain demonstrates mild periventricular white matter ischemia with old lacunar infarction in the LEFT basal ganglia. RIGHT craniectomy again   noted is encephalomalacia and gliosis in the RIGHT frontal lobe. 6 mm shift to the LEFT is noted. Following gadolinium administration no   abnormal enhancement occurs.  No acute cerebral cortical infarct is found.   No intracranial hemorrhage is recognized.  No mass effect is   found in the brain.  The ventricles, sulci and basal cisterns appear unremarkable. The vertebral and internal carotid arteries demonstrate expected flow   voids indicating their patency. The orbits are unremarkable.  The paranasal sinuses are clear.  The nasal cavity appears intact.  The nasopharynx is symmetric.  The central skull   base and petrous temporal bones are intact.  IMPRESSION: No abnormal enhancement is seen. Mild periventricular white   matter ischemia with old lacunar infarction in the LEFT basal ganglia.   RIGHT craniectomy again noted is encephalomalacia and gliosis in the RIGHT frontal lobe. 6 mm shift to the LEFT is noted.

## 2022-09-07 LAB
ANION GAP SERPL CALC-SCNC: 11 MMOL/L — SIGNIFICANT CHANGE UP (ref 5–17)
BUN SERPL-MCNC: 21.3 MG/DL — HIGH (ref 8–20)
CALCIUM SERPL-MCNC: 9.6 MG/DL — SIGNIFICANT CHANGE UP (ref 8.4–10.5)
CHLORIDE SERPL-SCNC: 104 MMOL/L — SIGNIFICANT CHANGE UP (ref 98–107)
CO2 SERPL-SCNC: 27 MMOL/L — SIGNIFICANT CHANGE UP (ref 22–29)
CREAT SERPL-MCNC: 0.82 MG/DL — SIGNIFICANT CHANGE UP (ref 0.5–1.3)
CULTURE RESULTS: SIGNIFICANT CHANGE UP
EGFR: 112 ML/MIN/1.73M2 — SIGNIFICANT CHANGE UP
GLUCOSE SERPL-MCNC: 100 MG/DL — HIGH (ref 70–99)
POTASSIUM SERPL-MCNC: 4 MMOL/L — SIGNIFICANT CHANGE UP (ref 3.5–5.3)
POTASSIUM SERPL-SCNC: 4 MMOL/L — SIGNIFICANT CHANGE UP (ref 3.5–5.3)
SODIUM SERPL-SCNC: 142 MMOL/L — SIGNIFICANT CHANGE UP (ref 135–145)
SPECIMEN SOURCE: SIGNIFICANT CHANGE UP

## 2022-09-07 PROCEDURE — 99232 SBSQ HOSP IP/OBS MODERATE 35: CPT

## 2022-09-07 RX ADMIN — SODIUM CHLORIDE 2 GRAM(S): 9 INJECTION INTRAMUSCULAR; INTRAVENOUS; SUBCUTANEOUS at 13:54

## 2022-09-07 RX ADMIN — DIPHENHYDRAMINE HYDROCHLORIDE AND LIDOCAINE HYDROCHLORIDE AND ALUMINUM HYDROXIDE AND MAGNESIUM HYDRO 5 MILLILITER(S): KIT at 22:02

## 2022-09-07 RX ADMIN — MEMANTINE HYDROCHLORIDE 5 MILLIGRAM(S): 10 TABLET ORAL at 18:28

## 2022-09-07 RX ADMIN — DIPHENHYDRAMINE HYDROCHLORIDE AND LIDOCAINE HYDROCHLORIDE AND ALUMINUM HYDROXIDE AND MAGNESIUM HYDRO 5 MILLILITER(S): KIT at 00:19

## 2022-09-07 RX ADMIN — MEMANTINE HYDROCHLORIDE 5 MILLIGRAM(S): 10 TABLET ORAL at 05:21

## 2022-09-07 RX ADMIN — CHLORHEXIDINE GLUCONATE 1 APPLICATION(S): 213 SOLUTION TOPICAL at 12:52

## 2022-09-07 RX ADMIN — DIPHENHYDRAMINE HYDROCHLORIDE AND LIDOCAINE HYDROCHLORIDE AND ALUMINUM HYDROXIDE AND MAGNESIUM HYDRO 5 MILLILITER(S): KIT at 12:50

## 2022-09-07 RX ADMIN — QUETIAPINE FUMARATE 12.5 MILLIGRAM(S): 200 TABLET, FILM COATED ORAL at 22:01

## 2022-09-07 RX ADMIN — SODIUM CHLORIDE 2 GRAM(S): 9 INJECTION INTRAMUSCULAR; INTRAVENOUS; SUBCUTANEOUS at 22:01

## 2022-09-07 RX ADMIN — DIPHENHYDRAMINE HYDROCHLORIDE AND LIDOCAINE HYDROCHLORIDE AND ALUMINUM HYDROXIDE AND MAGNESIUM HYDRO 5 MILLILITER(S): KIT at 05:20

## 2022-09-07 RX ADMIN — DIPHENHYDRAMINE HYDROCHLORIDE AND LIDOCAINE HYDROCHLORIDE AND ALUMINUM HYDROXIDE AND MAGNESIUM HYDRO 5 MILLILITER(S): KIT at 18:28

## 2022-09-07 RX ADMIN — Medication 5 MILLIGRAM(S): at 22:01

## 2022-09-07 RX ADMIN — Medication 30 GRAM(S): at 13:52

## 2022-09-07 RX ADMIN — LEVETIRACETAM 500 MILLIGRAM(S): 250 TABLET, FILM COATED ORAL at 05:21

## 2022-09-07 RX ADMIN — SODIUM CHLORIDE 2 GRAM(S): 9 INJECTION INTRAMUSCULAR; INTRAVENOUS; SUBCUTANEOUS at 05:21

## 2022-09-07 RX ADMIN — SENNA PLUS 2 TABLET(S): 8.6 TABLET ORAL at 22:01

## 2022-09-07 RX ADMIN — LEVETIRACETAM 500 MILLIGRAM(S): 250 TABLET, FILM COATED ORAL at 18:28

## 2022-09-07 RX ADMIN — ENOXAPARIN SODIUM 40 MILLIGRAM(S): 100 INJECTION SUBCUTANEOUS at 18:29

## 2022-09-07 NOTE — PROGRESS NOTE ADULT - NUTRITIONAL ASSESSMENT
This patient has been assessed with a concern for Malnutrition and has been determined to have a diagnosis/diagnoses of Severe protein-calorie malnutrition.    This patient is being managed with:   Diet Pureed-  1000mL Fluid Restriction ([id number])  Mildly Thick Liquids (MILDTHICKLIQS)  Bolus   Total Volume per Flush (mL): 250   Frequency: Every 24 Hours   Total Daily Volume of Flush (mL): 100  Free Water Flush Instructions:  Do NOT use free water flushes; instead use normal saline to flush. Thank you.  Entered: Sep  1 2022  5:43PM

## 2022-09-07 NOTE — PROGRESS NOTE ADULT - SUBJECTIVE AND OBJECTIVE BOX
[facility] Division of Hospital Medicine    Chief Complaint:  SDH    SUBJECTIVE / OVERNIGHT EVENTS:  Pt able to walk with walker with PT   D/w NSx, plan for cranioplasty sometime early next week    No acute overnight events     Brief HPI / hospital course   42y  Male initially admitted on 5/24/22 with a GCS of 3, found to have b/l intraparenchymal bleeds, b/l SDH. Has had a complex prolonged hospital course including but not limited to right decompressive craniectomy on 5/24. Trach / PEG., cranioplasty on 6/29 with cognitive decline after initial improvement prompting imaging which noted subacute collection with air underneath the cranioplasty sight. MRI with increase in fluid collection underneath the flap leading to a repeat right craniectomy with wound exploration & evacuation of fluid collection on 7/17 with OR cultures isolating MSSA. 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 Has had interval trach decannulation and dietary advancement to pureed. Now s/p completion of appropriate antimicrobial course with f/u imaging (9/3) revealing no abnormal enhancement         MEDICATIONS  (STANDING):  chlorhexidine 2% Cloths 1 Application(s) Topical daily  enoxaparin Injectable 40 milliGRAM(s) SubCutaneous every 24 hours  FIRST- Mouthwash  BLM 5 milliLiter(s) Swish and Swallow four times a day  levETIRAcetam  Solution 500 milliGRAM(s) Oral two times a day  melatonin 5 milliGRAM(s) Oral at bedtime  memantine 5 milliGRAM(s) Oral two times a day  QUEtiapine 12.5 milliGRAM(s) Oral at bedtime  senna 2 Tablet(s) Oral at bedtime  sodium chloride 2 Gram(s) Oral every 8 hours  urea Oral Powder 30 Gram(s) Oral daily    MEDICATIONS  (PRN):  acetaminophen     Tablet .. 650 milliGRAM(s) Oral every 6 hours PRN Temp greater or equal to 38C (100.4F), Moderate Pain (4 - 6)  ondansetron Injectable 4 milliGRAM(s) IV Push every 4 hours PRN Nausea and/or Vomiting    I&O's Summary      PHYSICAL EXAM:    CONSTITUTIONAL: Non toxic appearing, lying comfortably in bed (mittens)  ENMT: Moist oral mucosa, cranial incision healing well   RESPIRATORY: Normal respiratory effort; lungs are clear to auscultation bilaterally  CARDIOVASCULAR: Regular rate and rhythm, normal S1 and S2, no murmur/rub/gallop; No lower extremity edema; Peripheral pulses are 2+ bilaterally  ABDOMEN: normoactive bowel sounds, no rebound/guarding; No hepatosplenomegaly  MUSCLOSKELETAL:  no clubbing or cyanosis of digits; no joint swelling  PSYCH: Awake, follows commands, RUE/LUE 4/5, LLE/RLE 5/5   NEUROLOGY: Moves all ext to command,   SKIN: No external lesions noted       LABS:             Vital Signs Last 24 Hrs  T(C): 36.7 (07 Sep 2022 10:44), Max: 36.9 (07 Sep 2022 04:42)  T(F): 98 (07 Sep 2022 10:44), Max: 98.4 (07 Sep 2022 04:42)  HR: 77 (07 Sep 2022 10:44) (72 - 87)  BP: 141/98 (07 Sep 2022 10:44) (124/92 - 141/98)  BP(mean): --  RR: 18 (07 Sep 2022 10:44) (18 - 18)  SpO2: 97% (07 Sep 2022 10:44) (97% - 100%)    Parameters below as of 07 Sep 2022 10:44  Patient On (Oxygen Delivery Method): room air            RADIOLOGY & ADDITIONAL TESTS:    5/26/22 MRI Brain  IMPRESSION:  MRI BRAIN: Right frontal craniectomy. Residual bilateral subdural hematomas and interhemispheric subdural hemorrhage. Right frontal, right   frontal temporal and left temporal parenchymal hemorrhagic contusions with an foci of diffusion restriction suggestive of shear injury and   diffuse axonal injury.  Cervical spine MRI: No acute fractures or dislocations.      9/3/22 MRI Brain  The brain demonstrates mild periventricular white matter ischemia with old lacunar infarction in the LEFT basal ganglia. RIGHT craniectomy again   noted is encephalomalacia and gliosis in the RIGHT frontal lobe. 6 mm shift to the LEFT is noted. Following gadolinium administration no   abnormal enhancement occurs.  No acute cerebral cortical infarct is found.   No intracranial hemorrhage is recognized.  No mass effect is   found in the brain.  The ventricles, sulci and basal cisterns appear unremarkable. The vertebral and internal carotid arteries demonstrate expected flow   voids indicating their patency. The orbits are unremarkable.  The paranasal sinuses are clear.  The nasal cavity appears intact.  The nasopharynx is symmetric.  The central skull   base and petrous temporal bones are intact.  IMPRESSION: No abnormal enhancement is seen. Mild periventricular white   matter ischemia with old lacunar infarction in the LEFT basal ganglia.   RIGHT craniectomy again noted is encephalomalacia and gliosis in the RIGHT frontal lobe. 6 mm shift to the LEFT is noted.

## 2022-09-07 NOTE — PROGRESS NOTE ADULT - NS ATTEND AMEND GEN_ALL_CORE FT
Neurosurgery Attending Attestation:    Patient seen and examined at bedside. Agree with plan and note as documented above.    agree    -Sidra Kelly MD

## 2022-09-07 NOTE — PROGRESS NOTE ADULT - SUBJECTIVE AND OBJECTIVE BOX
HPI: Patient is a 25y old M brought by EMS, found down in the street unresponsive.     Primary Survey:    A - airway compromised, patient intubated in ED, RSI  B - bilateral breath sound after intubation  C - initial BP: 169/93 (05-24-22 @ 00:00)*** , HR: 107 (05-24-22 @ 00:44)*** , palpable pulses in all extremities  D - GCS 3 on arrival    Exposure obtained, no evident injuries    CXR: No evident PTX or Hemothorax, ET tube in place.  (24 May 2022 01:09)      INTERVAL OVERNIGHT EVENTS: 9/7   No overnight events. Mom at bedside. Possible plan next wk for cranioplasty      Vital Signs Last 24 Hrs  T(C): 36.7 (07 Sep 2022 10:44), Max: 36.9 (07 Sep 2022 04:42)  T(F): 98 (07 Sep 2022 10:44), Max: 98.4 (07 Sep 2022 04:42)  HR: 77 (07 Sep 2022 10:44) (72 - 87)  BP: 141/98 (07 Sep 2022 10:44) (124/92 - 141/98)  BP(mean): --  RR: 18 (07 Sep 2022 10:44) (18 - 18)  SpO2: 97% (07 Sep 2022 10:44) (97% - 100%)    Parameters below as of 07 Sep 2022 10:44  Patient On (Oxygen Delivery Method): room air        PHYSICAL EXAM:  GENERAL: NAD,  well-developed male   WOUND: Well healed, intact, sunken flap  MENTAL STATUS:  Wakens to touch, opens eyes, following commands, minimal verbal this morning. Face symmetrical, LUNA x4 as bernardo, spontaneous      LABS:                        13.4   3.86  )-----------( 247      ( 06 Sep 2022 07:37 )             38.0     09-07    142  |  104  |  21.3<H>  ----------------------------<  100<H>  4.0   |  27.0  |  0.82    Ca    9.6      07 Sep 2022 07:01      PT/INR - ( 06 Sep 2022 07:37 )   PT: 13.7 sec;   INR: 1.18 ratio               09-06 @ 07:01  -  09-07 @ 07:00  --------------------------------------------------------  IN: 500 mL / OUT: 0 mL / NET: 500 mL        RADIOLOGY & ADDITIONAL TESTS:    MR Head w/ IV Cont (09.03.22 @ 16:19) >  IMPRESSION: No abnormal enhancement is seen. Mild periventricular white   matter ischemia with old lacunar infarction in the LEFT basal ganglia.   RIGHT craniectomy again noted is encephalomalacia and gliosis in the   RIGHT frontal lobe. 6 mm shift to the LEFT is noted.              CAPRINI SCORE [CLOT]:  Patient has an estimated Caprini score of greater than 5.  However, the patient's unique clinical situation will be addressed in an individual manner to determine appropriate anticoagulation treatment, if any.

## 2022-09-07 NOTE — PROGRESS NOTE ADULT - ASSESSMENT
41 y/o male s/p right cranioplasty sunken flap, scheduling for cranioplasty possibly next wk.     1. Continue Keppra 500 mg BID   2. Medically optimized for cranioplasty,  recommend 1 dose Lasix 20 mg IVP day prior   3. ID clearance for procedure   4. Will need pre op labs, pt, ptt, inr, covid, type & screen over the wkend.

## 2022-09-08 PROCEDURE — 99232 SBSQ HOSP IP/OBS MODERATE 35: CPT

## 2022-09-08 RX ORDER — SODIUM CHLORIDE 9 MG/ML
1 INJECTION INTRAMUSCULAR; INTRAVENOUS; SUBCUTANEOUS DAILY
Refills: 0 | Status: DISCONTINUED | OUTPATIENT
Start: 2022-09-08 | End: 2022-09-09

## 2022-09-08 RX ADMIN — Medication 5 MILLIGRAM(S): at 21:20

## 2022-09-08 RX ADMIN — SODIUM CHLORIDE 2 GRAM(S): 9 INJECTION INTRAMUSCULAR; INTRAVENOUS; SUBCUTANEOUS at 05:20

## 2022-09-08 RX ADMIN — DIPHENHYDRAMINE HYDROCHLORIDE AND LIDOCAINE HYDROCHLORIDE AND ALUMINUM HYDROXIDE AND MAGNESIUM HYDRO 5 MILLILITER(S): KIT at 05:20

## 2022-09-08 RX ADMIN — SODIUM CHLORIDE 2 GRAM(S): 9 INJECTION INTRAMUSCULAR; INTRAVENOUS; SUBCUTANEOUS at 15:58

## 2022-09-08 RX ADMIN — LEVETIRACETAM 500 MILLIGRAM(S): 250 TABLET, FILM COATED ORAL at 17:46

## 2022-09-08 RX ADMIN — MEMANTINE HYDROCHLORIDE 5 MILLIGRAM(S): 10 TABLET ORAL at 05:20

## 2022-09-08 RX ADMIN — CHLORHEXIDINE GLUCONATE 1 APPLICATION(S): 213 SOLUTION TOPICAL at 12:39

## 2022-09-08 RX ADMIN — DIPHENHYDRAMINE HYDROCHLORIDE AND LIDOCAINE HYDROCHLORIDE AND ALUMINUM HYDROXIDE AND MAGNESIUM HYDRO 5 MILLILITER(S): KIT at 17:46

## 2022-09-08 RX ADMIN — SENNA PLUS 2 TABLET(S): 8.6 TABLET ORAL at 21:19

## 2022-09-08 RX ADMIN — QUETIAPINE FUMARATE 12.5 MILLIGRAM(S): 200 TABLET, FILM COATED ORAL at 21:20

## 2022-09-08 RX ADMIN — MEMANTINE HYDROCHLORIDE 5 MILLIGRAM(S): 10 TABLET ORAL at 17:46

## 2022-09-08 RX ADMIN — LEVETIRACETAM 500 MILLIGRAM(S): 250 TABLET, FILM COATED ORAL at 05:20

## 2022-09-08 RX ADMIN — ENOXAPARIN SODIUM 40 MILLIGRAM(S): 100 INJECTION SUBCUTANEOUS at 17:46

## 2022-09-08 RX ADMIN — DIPHENHYDRAMINE HYDROCHLORIDE AND LIDOCAINE HYDROCHLORIDE AND ALUMINUM HYDROXIDE AND MAGNESIUM HYDRO 5 MILLILITER(S): KIT at 21:20

## 2022-09-08 RX ADMIN — DIPHENHYDRAMINE HYDROCHLORIDE AND LIDOCAINE HYDROCHLORIDE AND ALUMINUM HYDROXIDE AND MAGNESIUM HYDRO 5 MILLILITER(S): KIT at 12:39

## 2022-09-08 NOTE — CHART NOTE - NSCHARTNOTEFT_GEN_A_CORE
Source: Patient [ ]  Family [ ]   other [x ]EMR, rounds    Current Diet: Puree, mildly thick liquids,  1 L fluid restriction daily bolus flush to peg    PO intake:  < 50% [ ]   50-75%  [ x]   %  [ x]  other :    Source for PO intake [ ] Patient [ ] family [x ] chart [ ] staff [ ] other    Enteral /Parenteral Nutrition:     Current Weight:    Weight:  131.8# 7/18  (8/24)  125.6 lbs RD bedscale weight   (7/18)  131.8 lbs  (7/17)  114.8 lbs   (7/12)  117.2 lbs   (7/5)   141.7 lbs  (7/2)   147.2 lbs   (6/5)  160.7 lbs  (5/27)  188.4 lbs   (5/25)  160 lbs       % Weight Change     Pertinent Medications: MEDICATIONS  (STANDING):  chlorhexidine 2% Cloths 1 Application(s) Topical daily  enoxaparin Injectable 40 milliGRAM(s) SubCutaneous every 24 hours  FIRST- Mouthwash  BLM 5 milliLiter(s) Swish and Swallow four times a day  levETIRAcetam  Solution 500 milliGRAM(s) Oral two times a day  melatonin 5 milliGRAM(s) Oral at bedtime  memantine 5 milliGRAM(s) Oral two times a day  QUEtiapine 12.5 milliGRAM(s) Oral at bedtime  senna 2 Tablet(s) Oral at bedtime  sodium chloride 2 Gram(s) Oral every 8 hours  urea Oral Powder 30 Gram(s) Oral daily    MEDICATIONS  (PRN):  acetaminophen     Tablet .. 650 milliGRAM(s) Oral every 6 hours PRN Temp greater or equal to 38C (100.4F), Moderate Pain (4 - 6)  ondansetron Injectable 4 milliGRAM(s) IV Push every 4 hours PRN Nausea and/or Vomiting    Pertinent Labs: 09-07 Na142 mmol/L Glu 100 mg/dL<H> K+ 4.0 mmol/L Cr  0.82 mg/dL BUN 21.3 mg/dL<H> Phos n/a   Alb n/a   PAB n/a               Skin:     Nutrition focused physical exam conducted - found signs of malnutrition [ ]absent [x ]present    Subcutaneous fat loss: [ ] Orbital fat pads region, [ x]Buccal fat region, [ ]Triceps region,  [ ]Ribs region    Muscle wasting: [ x]Temples region, [ x]Clavicle region, [ ]Shoulder region, [ ]Scapula region, [ ]Interosseous region,  [ ]thigh region, [ ]Calf region    Estimated Needs:   [x ] no change since previous assessment  [ ] recalculated:     Current Nutrition Diagnosis:    Current Nutrition Diagnosis: Pt now meets severe acute malnutrition criteria related to inadequate energy intake in setting of Right SDH s/p craniectomy with Left SDH, b/l parenchymal hematomas, +TEOFILO, multiple facial fractures, ETOH abuse as evidenced by <75% intake last few weeks, 22.5# weight loss in last 6 weeks if EMR weights are accurate. Pt seen with family at bedside providing assistance with breakfast reports good PO intake, tolerating consistency. Sometimes refuses meals due to not feeling well. Bolus of water flushes still being provided. Plan is 9/12 OR for bone replacement. Fecal incontinence 9/7 noted as per flow sheets.     Recommendations:   1) Add Ensure Enlive BID, consistency per SLP   2) Rx MVI and vit C 500mg daily   3) Provide encouragement/assistance as needed during mealtimes to inc PO   4) Monitor weights daily for trend/accuracy   5) Encourage HBV protein sources         Monitoring and Evaluation:   [x ] PO intake [ x] Tolerance to diet prescription [X] Weights  [X] Follow up per protocol [X] Labs:

## 2022-09-08 NOTE — PROGRESS NOTE ADULT - NUTRITIONAL ASSESSMENT
This patient has been assessed with a concern for Malnutrition and has been determined to have a diagnosis/diagnoses of Severe protein-calorie malnutrition.    This patient is being managed with:   Diet Pureed-  1000mL Fluid Restriction ([id number])  Mildly Thick Liquids (MILDTHICKLIQS)  Bolus   Total Volume per Flush (mL): 250   Frequency: Every 24 Hours   Total Daily Volume of Flush (mL): 100  Free Water Flush Instructions:  Do NOT use free water flushes; instead use normal saline to flush. Thank you.  Entered: Sep  8 2022 10:25AM

## 2022-09-08 NOTE — PROGRESS NOTE ADULT - SUBJECTIVE AND OBJECTIVE BOX
[facility] Division of Hospital Medicine    Chief Complaint:  SDH    SUBJECTIVE / OVERNIGHT EVENTS:  Pt able to walk with walker with PT   D/w NSx, plan for cranioplasty sometime early next week    No acute overnight events     Brief HPI / hospital course   42y  Male initially admitted on 5/24/22 with a GCS of 3, found to have b/l intraparenchymal bleeds, b/l SDH. Has had a complex prolonged hospital course including but not limited to right decompressive craniectomy on 5/24. Trach / PEG., cranioplasty on 6/29 with cognitive decline after initial improvement prompting imaging which noted subacute collection with air underneath the cranioplasty sight. MRI with increase in fluid collection underneath the flap leading to a repeat right craniectomy with wound exploration & evacuation of fluid collection on 7/17 with OR cultures isolating MSSA. 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 Has had interval trach decannulation and dietary advancement to pureed. Now s/p completion of appropriate antimicrobial course with f/u imaging (9/3) revealing no abnormal enhancement         MEDICATIONS  (STANDING):  chlorhexidine 2% Cloths 1 Application(s) Topical daily  enoxaparin Injectable 40 milliGRAM(s) SubCutaneous every 24 hours  FIRST- Mouthwash  BLM 5 milliLiter(s) Swish and Swallow four times a day  levETIRAcetam  Solution 500 milliGRAM(s) Oral two times a day  melatonin 5 milliGRAM(s) Oral at bedtime  memantine 5 milliGRAM(s) Oral two times a day  QUEtiapine 12.5 milliGRAM(s) Oral at bedtime  senna 2 Tablet(s) Oral at bedtime  sodium chloride 2 Gram(s) Oral every 8 hours  urea Oral Powder 30 Gram(s) Oral daily    MEDICATIONS  (PRN):  acetaminophen     Tablet .. 650 milliGRAM(s) Oral every 6 hours PRN Temp greater or equal to 38C (100.4F), Moderate Pain (4 - 6)  ondansetron Injectable 4 milliGRAM(s) IV Push every 4 hours PRN Nausea and/or Vomiting    I&O's Summary      PHYSICAL EXAM:    CONSTITUTIONAL: Non toxic appearing, lying comfortably in bed (mittens)  ENMT: Moist oral mucosa, cranial incision healing well   RESPIRATORY: Normal respiratory effort; lungs are clear to auscultation bilaterally  CARDIOVASCULAR: Regular rate and rhythm, normal S1 and S2, no murmur/rub/gallop; No lower extremity edema; Peripheral pulses are 2+ bilaterally  ABDOMEN: normoactive bowel sounds, no rebound/guarding; No hepatosplenomegaly  MUSCLOSKELETAL:  no clubbing or cyanosis of digits; no joint swelling  PSYCH: Awake, follows commands, RUE/LUE 4/5, LLE/RLE 5/5   NEUROLOGY: Moves all ext to command,   SKIN: No external lesions noted       LABS:      09-07    142  |  104  |  21.3<H>  ----------------------------<  100<H>  4.0   |  27.0  |  0.82    Ca    9.6      07 Sep 2022 07:01          RADIOLOGY & ADDITIONAL TESTS:    5/26/22 MRI Brain  IMPRESSION:  MRI BRAIN: Right frontal craniectomy. Residual bilateral subdural hematomas and interhemispheric subdural hemorrhage. Right frontal, right   frontal temporal and left temporal parenchymal hemorrhagic contusions with an foci of diffusion restriction suggestive of shear injury and   diffuse axonal injury.  Cervical spine MRI: No acute fractures or dislocations.      9/3/22 MRI Brain  The brain demonstrates mild periventricular white matter ischemia with old lacunar infarction in the LEFT basal ganglia. RIGHT craniectomy again   noted is encephalomalacia and gliosis in the RIGHT frontal lobe. 6 mm shift to the LEFT is noted. Following gadolinium administration no   abnormal enhancement occurs.  No acute cerebral cortical infarct is found.   No intracranial hemorrhage is recognized.  No mass effect is   found in the brain.  The ventricles, sulci and basal cisterns appear unremarkable. The vertebral and internal carotid arteries demonstrate expected flow   voids indicating their patency. The orbits are unremarkable.  The paranasal sinuses are clear.  The nasal cavity appears intact.  The nasopharynx is symmetric.  The central skull   base and petrous temporal bones are intact.  IMPRESSION: No abnormal enhancement is seen. Mild periventricular white   matter ischemia with old lacunar infarction in the LEFT basal ganglia.   RIGHT craniectomy again noted is encephalomalacia and gliosis in the RIGHT frontal lobe. 6 mm shift to the LEFT is noted.

## 2022-09-08 NOTE — PROGRESS NOTE ADULT - ASSESSMENT
42y  Male initially admitted on 5/24/22 (BIBEMS after found unresponsive on street) with a GCS of 3, found to have b/l intraparenchymal bleeds, b/l SDH. Has had a complex prolonged hospital course including but not limited to right decompressive craniectomy on 5/24. Trach / PEG., cranioplasty on 6/29 with cognitive decline after initial improvement prompting imaging which noted subacute collection with air underneath the cranioplasty sight. MRI with increase in fluid collection underneath the flap leading to a repeat right craniectomy with wound exploration & evacuation of fluid collection on 7/17 with OR cultures isolating MSSA. 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 Has had interval trach decannulation and dietary advancement to pureed. Now s/p completion of appropriate antimicrobial course with f/u imaging (9/3) revealing no abnormal enhancement (6 mm midline shift to left)     Traumatic brain injury with complicated hospital course (as above) now with periodic agitation and lethargy  Neurologically significantly improved   Continue Seroquel 12.5 mg QHS   Continue Memantine 5 mg BID   Maintain safety - mittens if pt pulls on iv lines  Continue Keppra 500 mg BID   Medically optimized for cranioplasty this week, recommend 1 dose Lasix 20 mg IVP day prior   ID clearance for procedure appreciated       SDH/ IPH / TBI , CNS infection  Right decompressive hemicraniectomy on 5/24,  Completed Nafcillin /  Vanc, post completion with MRI (w/IV con) w/o abnormal enhancement  ID on board  Helmet OOB    Oropharyngeal Dysphagia  on TF / purée diet     Hyponatremia  Central SIADH   Improved, continue NaCl - SIADH from CNS event- resolved  Decrease NaCl to 2 gm daily for now  DC Urea 30 gm daily for now  Renal on board  Prior to Nsx intervention may need lasix 20 mg IVP x 1     Substance abuse   Cocaine pos on admission   Alcoholism  Was on MVI/ folic acid / Thiamine for presumed thiamine deficiency     Anemia   resolved    DVT prophylaxis  Lovenox    Dispo - Awaiting cranioplasty

## 2022-09-08 NOTE — CHART NOTE - NSCHARTNOTEFT_GEN_A_CORE
09-07    142  |  104  |  21.3<H>  ----------------------------<  100<H>  4.0   |  27.0  |  0.82    d/w Dr Brooks. Na improved, will d/c urea. Will repeat labs in the AM, if Na continues stable consider stopping Na tabs.  Continue NS boluses and flush through PEG.

## 2022-09-09 LAB
ANION GAP SERPL CALC-SCNC: 14 MMOL/L — SIGNIFICANT CHANGE UP (ref 5–17)
APPEARANCE UR: CLEAR — SIGNIFICANT CHANGE UP
BILIRUB UR-MCNC: NEGATIVE — SIGNIFICANT CHANGE UP
BUN SERPL-MCNC: 20.3 MG/DL — HIGH (ref 8–20)
CALCIUM SERPL-MCNC: 9.7 MG/DL — SIGNIFICANT CHANGE UP (ref 8.4–10.5)
CHLORIDE SERPL-SCNC: 110 MMOL/L — HIGH (ref 98–107)
CO2 SERPL-SCNC: 25 MMOL/L — SIGNIFICANT CHANGE UP (ref 22–29)
COLOR SPEC: YELLOW — SIGNIFICANT CHANGE UP
CREAT SERPL-MCNC: 1.02 MG/DL — SIGNIFICANT CHANGE UP (ref 0.5–1.3)
DIFF PNL FLD: ABNORMAL
EGFR: 94 ML/MIN/1.73M2 — SIGNIFICANT CHANGE UP
EPI CELLS # UR: SIGNIFICANT CHANGE UP
GLUCOSE BLDC GLUCOMTR-MCNC: 114 MG/DL — HIGH (ref 70–99)
GLUCOSE SERPL-MCNC: 96 MG/DL — SIGNIFICANT CHANGE UP (ref 70–99)
GLUCOSE UR QL: NEGATIVE MG/DL — SIGNIFICANT CHANGE UP
HCT VFR BLD CALC: 35.3 % — LOW (ref 39–50)
HGB BLD-MCNC: 12 G/DL — LOW (ref 13–17)
KETONES UR-MCNC: ABNORMAL
LACTATE BLDV-MCNC: 1 MMOL/L — SIGNIFICANT CHANGE UP (ref 0.5–2)
LEUKOCYTE ESTERASE UR-ACNC: NEGATIVE — SIGNIFICANT CHANGE UP
MAGNESIUM SERPL-MCNC: 2 MG/DL — SIGNIFICANT CHANGE UP (ref 1.6–2.6)
MCHC RBC-ENTMCNC: 27.6 PG — SIGNIFICANT CHANGE UP (ref 27–34)
MCHC RBC-ENTMCNC: 34 GM/DL — SIGNIFICANT CHANGE UP (ref 32–36)
MCV RBC AUTO: 81.3 FL — SIGNIFICANT CHANGE UP (ref 80–100)
NITRITE UR-MCNC: NEGATIVE — SIGNIFICANT CHANGE UP
PH UR: 5 — SIGNIFICANT CHANGE UP (ref 5–8)
PLATELET # BLD AUTO: 213 K/UL — SIGNIFICANT CHANGE UP (ref 150–400)
POTASSIUM SERPL-MCNC: 3.8 MMOL/L — SIGNIFICANT CHANGE UP (ref 3.5–5.3)
POTASSIUM SERPL-SCNC: 3.8 MMOL/L — SIGNIFICANT CHANGE UP (ref 3.5–5.3)
PROT UR-MCNC: 15
RBC # BLD: 4.34 M/UL — SIGNIFICANT CHANGE UP (ref 4.2–5.8)
RBC # FLD: 13.7 % — SIGNIFICANT CHANGE UP (ref 10.3–14.5)
RBC CASTS # UR COMP ASSIST: SIGNIFICANT CHANGE UP /HPF (ref 0–4)
SODIUM SERPL-SCNC: 148 MMOL/L — HIGH (ref 135–145)
SP GR SPEC: 1.02 — SIGNIFICANT CHANGE UP (ref 1.01–1.02)
UROBILINOGEN FLD QL: NEGATIVE MG/DL — SIGNIFICANT CHANGE UP
WBC # BLD: 3.12 K/UL — LOW (ref 3.8–10.5)
WBC # FLD AUTO: 3.12 K/UL — LOW (ref 3.8–10.5)
WBC UR QL: SIGNIFICANT CHANGE UP /HPF (ref 0–5)

## 2022-09-09 PROCEDURE — 71045 X-RAY EXAM CHEST 1 VIEW: CPT | Mod: 26

## 2022-09-09 PROCEDURE — 99233 SBSQ HOSP IP/OBS HIGH 50: CPT

## 2022-09-09 PROCEDURE — 93970 EXTREMITY STUDY: CPT | Mod: 26

## 2022-09-09 RX ORDER — SODIUM CHLORIDE 9 MG/ML
1 INJECTION INTRAMUSCULAR; INTRAVENOUS; SUBCUTANEOUS DAILY
Refills: 0 | Status: DISCONTINUED | OUTPATIENT
Start: 2022-09-09 | End: 2022-09-09

## 2022-09-09 RX ORDER — ACETAMINOPHEN 500 MG
1000 TABLET ORAL ONCE
Refills: 0 | Status: COMPLETED | OUTPATIENT
Start: 2022-09-09 | End: 2022-09-09

## 2022-09-09 RX ADMIN — LEVETIRACETAM 500 MILLIGRAM(S): 250 TABLET, FILM COATED ORAL at 05:14

## 2022-09-09 RX ADMIN — Medication 5 MILLIGRAM(S): at 21:17

## 2022-09-09 RX ADMIN — DIPHENHYDRAMINE HYDROCHLORIDE AND LIDOCAINE HYDROCHLORIDE AND ALUMINUM HYDROXIDE AND MAGNESIUM HYDRO 5 MILLILITER(S): KIT at 05:14

## 2022-09-09 RX ADMIN — MEMANTINE HYDROCHLORIDE 5 MILLIGRAM(S): 10 TABLET ORAL at 18:24

## 2022-09-09 RX ADMIN — SENNA PLUS 2 TABLET(S): 8.6 TABLET ORAL at 21:17

## 2022-09-09 RX ADMIN — SODIUM CHLORIDE 1 GRAM(S): 9 INJECTION INTRAMUSCULAR; INTRAVENOUS; SUBCUTANEOUS at 11:25

## 2022-09-09 RX ADMIN — Medication 400 MILLIGRAM(S): at 11:48

## 2022-09-09 RX ADMIN — MEMANTINE HYDROCHLORIDE 5 MILLIGRAM(S): 10 TABLET ORAL at 05:15

## 2022-09-09 RX ADMIN — LEVETIRACETAM 500 MILLIGRAM(S): 250 TABLET, FILM COATED ORAL at 18:24

## 2022-09-09 RX ADMIN — ENOXAPARIN SODIUM 40 MILLIGRAM(S): 100 INJECTION SUBCUTANEOUS at 18:24

## 2022-09-09 RX ADMIN — Medication 1000 MILLIGRAM(S): at 12:37

## 2022-09-09 RX ADMIN — CHLORHEXIDINE GLUCONATE 1 APPLICATION(S): 213 SOLUTION TOPICAL at 18:16

## 2022-09-09 RX ADMIN — DIPHENHYDRAMINE HYDROCHLORIDE AND LIDOCAINE HYDROCHLORIDE AND ALUMINUM HYDROXIDE AND MAGNESIUM HYDRO 5 MILLILITER(S): KIT at 11:25

## 2022-09-09 NOTE — PROGRESS NOTE ADULT - SUBJECTIVE AND OBJECTIVE BOX
[facility] Division of Hospital Medicine    Chief Complaint:  SDH    SUBJECTIVE / OVERNIGHT EVENTS:  Pt examined lying in bed  Clinically at recent baseline, however per family since Seroquel made standing medication pt more sleepy.   Febrile today, no localizing s/s of infection on exam  No acute overnight events     Brief HPI / hospital course   42y  Male initially admitted on 5/24/22 with a GCS of 3, found to have b/l intraparenchymal bleeds, b/l SDH. Has had a complex prolonged hospital course including but not limited to right decompressive craniectomy on 5/24. Trach / PEG., cranioplasty on 6/29 with cognitive decline after initial improvement prompting imaging which noted subacute collection with air underneath the cranioplasty sight. MRI with increase in fluid collection underneath the flap leading to a repeat right craniectomy with wound exploration & evacuation of fluid collection on 7/17 with OR cultures isolating MSSA. 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 Has had interval trach decannulation and dietary advancement to pureed. Now s/p completion of appropriate antimicrobial course with f/u imaging (9/3) revealing no abnormal enhancement. 9/9 acute febrile episode, infectious w/u in progress       MEDICATIONS  (STANDING):  chlorhexidine 2% Cloths 1 Application(s) Topical daily  enoxaparin Injectable 40 milliGRAM(s) SubCutaneous every 24 hours  FIRST- Mouthwash  BLM 5 milliLiter(s) Swish and Swallow four times a day  levETIRAcetam  Solution 500 milliGRAM(s) Oral two times a day  melatonin 5 milliGRAM(s) Oral at bedtime  memantine 5 milliGRAM(s) Oral two times a day  senna 2 Tablet(s) Oral at bedtime    MEDICATIONS  (PRN):  ondansetron Injectable 4 milliGRAM(s) IV Push every 4 hours PRN Nausea and/or Vomiting      PHYSICAL EXAM:    CONSTITUTIONAL: Non toxic appearing, lying comfortably in bed (mittens)  ENMT: Moist oral mucosa, cranial incision healing well   RESPIRATORY: Normal respiratory effort; lungs are clear to auscultation bilaterally  CARDIOVASCULAR: Regular rate and rhythm, normal S1 and S2, no murmur/rub/gallop; No lower extremity edema; Peripheral pulses are 2+ bilaterally  ABDOMEN: normoactive bowel sounds, no rebound/guarding; No hepatosplenomegaly  MUSCLOSKELETAL:  no clubbing or cyanosis of digits; no joint swelling  PSYCH: Awake, follows commands, RUE/LUE 4/5, LLE/RLE 5/5   NEUROLOGY: Moves all ext to command,   SKIN: No external lesions noted       LABS:                          12.0   3.12  )-----------( 213      ( 09 Sep 2022 07:17 )             35.3   09-09    148<H>  |  110<H>  |  20.3<H>  ----------------------------<  96  3.8   |  25.0  |  1.02    Ca    9.7      09 Sep 2022 07:17  Mg     2.0     09-09          RADIOLOGY & ADDITIONAL TESTS:    5/26/22 MRI Brain  IMPRESSION:  MRI BRAIN: Right frontal craniectomy. Residual bilateral subdural hematomas and interhemispheric subdural hemorrhage. Right frontal, right   frontal temporal and left temporal parenchymal hemorrhagic contusions with an foci of diffusion restriction suggestive of shear injury and   diffuse axonal injury.  Cervical spine MRI: No acute fractures or dislocations.      9/3/22 MRI Brain  The brain demonstrates mild periventricular white matter ischemia with old lacunar infarction in the LEFT basal ganglia. RIGHT craniectomy again   noted is encephalomalacia and gliosis in the RIGHT frontal lobe. 6 mm shift to the LEFT is noted. Following gadolinium administration no   abnormal enhancement occurs.  No acute cerebral cortical infarct is found.   No intracranial hemorrhage is recognized.  No mass effect is   found in the brain.  The ventricles, sulci and basal cisterns appear unremarkable. The vertebral and internal carotid arteries demonstrate expected flow   voids indicating their patency. The orbits are unremarkable.  The paranasal sinuses are clear.  The nasal cavity appears intact.  The nasopharynx is symmetric.  The central skull   base and petrous temporal bones are intact.  IMPRESSION: No abnormal enhancement is seen. Mild periventricular white   matter ischemia with old lacunar infarction in the LEFT basal ganglia.   RIGHT craniectomy again noted is encephalomalacia and gliosis in the RIGHT frontal lobe. 6 mm shift to the LEFT is noted.

## 2022-09-09 NOTE — CHART NOTE - NSCHARTNOTEFT_GEN_A_CORE
PASTOR KEMAR  42y  Male    Complaints: non verbal    Subjective: RN noted new rectal temp 101.8, P 111. Unable to obtain ROS    Vital Signs Last 24 Hrs  T(C): 38.8 (09 Sep 2022 10:56), Max: 38.8 (09 Sep 2022 10:56)  T(F): 101.9 (09 Sep 2022 10:56), Max: 101.9 (09 Sep 2022 10:56)  HR: 111 (09 Sep 2022 10:56) (80 - 111)  BP: 159/110 (09 Sep 2022 10:56) (133/89 - 163/89)  BP(mean): --  RR: 17 (09 Sep 2022 10:56) (16 - 18)  SpO2: 93% (09 Sep 2022 10:56) (91% - 97%) room air    PHYSICAL EXAM:  GENERAL: NAD, well-groomed, well-developed  HEENT - NC/AT, pupils equal and reactive to light,  ; Moist mucous membranes, Good dentition, No lesions  NECK: Supple, No JVD  CHEST/LUNG: Clear to auscultation bilaterally; No rales, rhonchi, wheezing  HEART: Regular rate and rhythm; No murmurs, rubs, or gallops  ABDOMEN: Soft, Nontender, Nondistended; Bowel sounds present  EXTREMITIES:  2+ Peripheral Pulses, No clubbing, cyanosis, or edema  NEURO:  No Focal deficits, sensory and motor intact  SKIN: No rashes or lesions      LABS:                        12.0   3.12  )-----------( 213      ( 09 Sep 2022 07:17 )             35.3   09-09    148<H>  |  110<H>  |  20.3<H>  ----------------------------<  96  3.8   |  25.0  |  1.02    RADIOLOGY & ADDITIONAL TESTS:    Imaging Personally Reviewed:  [ ] YES  [ ] NO      MedsMEDICATIONS  (STANDING):  chlorhexidine 2% Cloths 1 Application(s) Topical daily  enoxaparin Injectable 40 milliGRAM(s) SubCutaneous every 24 hours  FIRST- Mouthwash  BLM 5 milliLiter(s) Swish and Swallow four times a day  levETIRAcetam  Solution 500 milliGRAM(s) Oral two times a day  melatonin 5 milliGRAM(s) Oral at bedtime  memantine 5 milliGRAM(s) Oral two times a day  QUEtiapine 12.5 milliGRAM(s) Oral at bedtime  senna 2 Tablet(s) Oral at bedtime    MEDICATIONS  (PRN):  acetaminophen     Tablet .. 650 milliGRAM(s) Oral every 6 hours PRN Temp greater or equal to 38C (100.4F), Moderate Pain (4 - 6)  ondansetron Injectable 4 milliGRAM(s) IV Push every 4 hours PRN Nausea and/or Vomiting      HEALTH ISSUES - PROBLEM Dx:  TBI,       A/P  43yo admit with TBI 5/24/22 Subdural hematoma, s/p craniotomy,  central SIADH w fluctuating sodium. Epidural fluid collection cultures 7/18 reported Staphylococcus aureus (MSSA) and 1 culture with Staph Epi (MRSE). Completed Nafcillin and Vancomycin 9/1/22 (6 weeks). Reviewed with Dr Brooks who saw patient earlier this morning. Physical exam remains unchanged and patient in no acute distress although pt not swallowing pills now per nursing, which is a change.     - Lactate stat, eval for sepsis  - BCx x2 and UA/Ucx (no recent sylvester catheter)  - CXR  - IV tylenol PASTOR KEMAR  42y  Male    Complaints: non verbal at this time.    Subjective: RN noted new rectal temp 101.8, P 111. Unable to obtain ROS    Vital Signs Last 24 Hrs  T(C): 38.8 (09 Sep 2022 10:56), Max: 38.8 (09 Sep 2022 10:56)  T(F): 101.9 (09 Sep 2022 10:56), Max: 101.9 (09 Sep 2022 10:56)  HR: 111 (09 Sep 2022 10:56) (80 - 111)  BP: 159/110 (09 Sep 2022 10:56) (133/89 - 163/89)  BP(mean): --  RR: 17 (09 Sep 2022 10:56) (16 - 18)  SpO2: 93% (09 Sep 2022 10:56) (91% - 97%) room air    PHYSICAL EXAM:  GENERAL: appears ill  HEENT -  no cyanosis, would not open mouth for exam  NECK: Supple, No JVD,  no ant/post cervical adenopathy  CHEST/LUNG: diminished, shallow resp effort but clear to auscultation bilaterally  HEART: tachycardic S1,S2  ABDOMEN: Soft, Nontender, Nondistended; Bowel sounds present, PEG site clean dry and intact. Abd binder in place  EXTREMITIES:  2+ Peripheral Pulses, No clubbing, cyanosis, or edema  NEURO:  non verbal at present, not following directions to swallow meds which he normally can do. Moves spont RUE, RLE, LLE and follows some commands. Some resistance to gravity LUE but very weak.  SKIN: No rashes or lesions. Nursing reports no decubiti, wounds. diaphoretic, warm.       LABS:                        12.0   3.12  )-----------( 213      ( 09 Sep 2022 07:17 )             35.3   09-09    148<H>  |  110<H>  |  20.3<H>  ----------------------------<  96  3.8   |  25.0  |  1.02    RADIOLOGY & ADDITIONAL TESTS:    Imaging Personally Reviewed:  [ ] YES  [ ] NO      MEDICATIONS  (STANDING):  chlorhexidine 2% Cloths 1 Application(s) Topical daily  enoxaparin Injectable 40 milliGRAM(s) SubCutaneous every 24 hours  FIRST- Mouthwash  BLM 5 milliLiter(s) Swish and Swallow four times a day  levETIRAcetam  Solution 500 milliGRAM(s) Oral two times a day  melatonin 5 milliGRAM(s) Oral at bedtime  memantine 5 milliGRAM(s) Oral two times a day  QUEtiapine 12.5 milliGRAM(s) Oral at bedtime  senna 2 Tablet(s) Oral at bedtime    MEDICATIONS  (PRN):  acetaminophen     Tablet .. 650 milliGRAM(s) Oral every 6 hours PRN Temp greater or equal to 38C (100.4F), Moderate Pain (4 - 6)  ondansetron Injectable 4 milliGRAM(s) IV Push every 4 hours PRN Nausea and/or Vomiting      A/P  41yo admit with TBI 5/24/22 Subdural hematoma, s/p craniotomy,  central SIADH w fluctuating sodium. Epidural fluid collection cultures 7/18 reported Staphylococcus aureus (MSSA) and 1 culture with Staph Epi (MRSE). Completed Nafcillin and Vancomycin 9/1/22 (6 weeks). Reviewed with Dr Brooks who saw patient earlier this morning. Patient in no acute distress but does appear ill, Pt not swallowing meds now per nursing, which is a change. follows commands inconsistently.     - Lactate stat, eval for sepsis  - BCx x2 and UA/Ucx (no recent sylvester catheter)  - CXR - R/O PNA, atelectasis  - IV tylenol PASTOR KEMAR  42y  Male    Complaints: non verbal at this time.    Subjective: RN noted new rectal temp 101.8, P 111. Unable to obtain ROS    Vital Signs Last 24 Hrs  T(C): 38.8 (09 Sep 2022 10:56), Max: 38.8 (09 Sep 2022 10:56)  T(F): 101.9 (09 Sep 2022 10:56), Max: 101.9 (09 Sep 2022 10:56)  HR: 111 (09 Sep 2022 10:56) (80 - 111)  BP: 159/110 (09 Sep 2022 10:56) (133/89 - 163/89)  BP(mean): --  RR: 17 (09 Sep 2022 10:56) (16 - 18)  SpO2: 93% (09 Sep 2022 10:56) (91% - 97%) room air    PHYSICAL EXAM:  GENERAL: appears ill  HEENT -  no cyanosis, would not open mouth for exam  NECK: Supple, No JVD,  no ant/post cervical adenopathy  CHEST/LUNG: diminished, shallow resp effort but clear to auscultation bilaterally  HEART: tachycardic S1,S2  ABDOMEN: Soft, Nontender, Nondistended; Bowel sounds present, PEG site clean dry and intact. Abd binder in place  EXTREMITIES:  2+ Peripheral Pulses, No clubbing, cyanosis, or edema  NEURO:  non verbal at present, not following directions to swallow meds which he normally can do. Moves spont RUE, RLE, LLE and follows some commands. Some resistance to gravity LUE but very weak.  SKIN: No rashes or lesions. Nursing reports no decubiti, wounds. diaphoretic, warm.       LABS:                        12.0   3.12  )-----------( 213      ( 09 Sep 2022 07:17 )             35.3   09-09    148<H>  |  110<H>  |  20.3<H>  ----------------------------<  96  3.8   |  25.0  |  1.02    RADIOLOGY & ADDITIONAL TESTS:    Imaging Personally Reviewed:  [ ] YES  [ ] NO      MEDICATIONS  (STANDING):  chlorhexidine 2% Cloths 1 Application(s) Topical daily  enoxaparin Injectable 40 milliGRAM(s) SubCutaneous every 24 hours  FIRST- Mouthwash  BLM 5 milliLiter(s) Swish and Swallow four times a day  levETIRAcetam  Solution 500 milliGRAM(s) Oral two times a day  melatonin 5 milliGRAM(s) Oral at bedtime  memantine 5 milliGRAM(s) Oral two times a day  QUEtiapine 12.5 milliGRAM(s) Oral at bedtime  senna 2 Tablet(s) Oral at bedtime    MEDICATIONS  (PRN):  acetaminophen     Tablet .. 650 milliGRAM(s) Oral every 6 hours PRN Temp greater or equal to 38C (100.4F), Moderate Pain (4 - 6)  ondansetron Injectable 4 milliGRAM(s) IV Push every 4 hours PRN Nausea and/or Vomiting      A/P  41yo admit with TBI 5/24/22 Subdural hematoma, s/p craniotomy,  central SIADH w fluctuating sodium. Epidural fluid collection cultures 7/18 reported Staphylococcus aureus (MSSA) and 1 culture with Staph Epi (MRSE). Completed Nafcillin and Vancomycin 9/1/22 (6 weeks). Reviewed with Dr Brooks who saw patient earlier this morning. Patient in no acute distress but does appear ill, Pt not swallowing meds now per nursing, which is a change. follows commands inconsistently.     - Lactate stat, eval for sepsis  - BCx x2 and UA/Ucx (no recent sylvester catheter)  - CXR - R/O PNA, atelectasis  - IV tylenol  - sodium tabs d/c  - Na 148

## 2022-09-09 NOTE — PROGRESS NOTE ADULT - ASSESSMENT
42y with extensive history since admitted on 5/24/22 s/p right decompressive craniectomy on 5/24, cranioplasty on 6/29 and increase in fluid collection underneath the flap leading to a repeat right craniectomy with wound exploration & evacuation of fluid collection on 7/17 with OR cultures isolating MSSA s/p antimicrobial regimen x6 weeks now completed.     -Plan for cranioplasty sometime this coming week  -Medically stable and cleared but now with fever, will follow up sepsis work up and ID recommendations  -Will continue to follow

## 2022-09-09 NOTE — PROGRESS NOTE ADULT - ASSESSMENT
42y  Male initially admitted on 5/24/22 (BIBEMS after found unresponsive on street) with a GCS of 3, found to have b/l intraparenchymal bleeds, b/l SDH. Has had a complex prolonged hospital course including but not limited to right decompressive craniectomy on 5/24. Trach / PEG., cranioplasty on 6/29 with cognitive decline after initial improvement prompting imaging which noted subacute collection with air underneath the cranioplasty sight. MRI with increase in fluid collection underneath the flap leading to a repeat right craniectomy with wound exploration & evacuation of fluid collection on 7/17 with OR cultures isolating MSSA. 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 Has had interval trach decannulation and dietary advancement to pureed. Now s/p completion of appropriate antimicrobial course with f/u imaging (9/3) revealing no abnormal enhancement (6 mm midline shift to left)     Traumatic brain injury with complicated hospital course (as above) now with periodic agitation and lethargy  Neurologically significantly improved however per family recently more lethargic since standing seroquel initiation   DC Seroquel for now  Will order mittens PRN for pt safety at night if needed and if indicated, low threshold for 1:1 (nocturnal as family at bedside during day hours)  Continue Memantine 5 mg BID   Continue Keppra 500 mg BID   Cranioplasty tentative next week, however with febrile episode today will w/u to ensure reasonable lowest chance of recurrent CNS infection     Fever  No sepsis, no localizing signs  Prelim my read : CXR : normal   Lactate 1.0  Send blood cultures x 2, UA and Ucx NOW  Hold off Abx for now, start if any change in clinical status concerning for infection   Dopplers Upper and lower ext b/l to rule out thrombosis as cause for fever   d/w ID, will follow recs    SDH/ IPH / TBI , CNS infection  Right decompressive hemicraniectomy on 5/24,  Completed Nafcillin /  Vanc, post completion with MRI (w/IV con) w/o abnormal enhancement  ID on board  Helmet OOB    Oropharyngeal Dysphagia  on TF / purée diet , restart free water / TF given unreliable PO intake     Hyponatremia  Central SIADH , now overcorrected  DC urea / NS  Free water via PEG, repeat in am   Renal on board    Substance abuse   Cocaine pos on admission   Alcoholism  Was on MVI/ folic acid / Thiamine for presumed thiamine deficiency     Anemia   resolved    DVT prophylaxis  Lovenox    Dispo - Awaiting cranioplasty    42y  Male initially admitted on 5/24/22 (BIBEMS after found unresponsive on street) with a GCS of 3, found to have b/l intraparenchymal bleeds, b/l SDH. Has had a complex prolonged hospital course including but not limited to right decompressive craniectomy on 5/24. Trach / PEG., cranioplasty on 6/29 with cognitive decline after initial improvement prompting imaging which noted subacute collection with air underneath the cranioplasty sight. MRI with increase in fluid collection underneath the flap leading to a repeat right craniectomy with wound exploration & evacuation of fluid collection on 7/17 with OR cultures isolating MSSA. 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 Has had interval trach decannulation and dietary advancement to pureed. Now s/p completion of appropriate antimicrobial course with f/u imaging (9/3) revealing no abnormal enhancement (6 mm midline shift to left)     Traumatic brain injury with complicated hospital course (as above) now with periodic agitation and lethargy  Neurologically significantly improved however per family recently more lethargic since standing seroquel initiation   DC Seroquel for now  Will order mittens PRN for pt safety at night if needed and if indicated, low threshold for 1:1 (nocturnal as family at bedside during day hours)  Continue Memantine 5 mg BID   Continue Keppra 500 mg BID   Cranioplasty tentative next week, however with febrile episode today will w/u to ensure reasonable lowest chance of recurrent CNS infection     Fever  No sepsis, no localizing signs  Prelim my read : CXR : normal   Lactate 1.0  Send blood cultures x 2, UA and Ucx NOW  Hold off Abx for now, start if any change in clinical status concerning for infection   Dopplers Upper and lower ext b/l to rule out thrombosis as cause for fever   d/w ID, will follow recs    SDH/ IPH / TBI , CNS infection  Right decompressive hemicraniectomy on 5/24,  Completed Nafcillin /  Vanc, post completion with MRI (w/IV con) w/o abnormal enhancement  ID on board  Helmet OOB    Oropharyngeal Dysphagia  on TF / purée diet , restart free water / TF given unreliable PO intake     Hyponatremia  Central SIADH , now overcorrected  DC urea / NS  Free water via PEG, repeat in am   Renal on board    Substance abuse   Cocaine pos on admission   Alcoholism  Was on MVI/ folic acid / Thiamine for presumed thiamine deficiency     Anemia   resolved    DVT prophylaxis  Lovenox    Dispo - Awaiting cranioplasty       Care d/w sister (Ciarra wallis, sister is only family member who speaks fluent English. Relays infomation to [facility], cell : [phone]. Requests that interpretor is used when PT/nurisng wish to communicate with family at bedside)

## 2022-09-09 NOTE — PROGRESS NOTE ADULT - SUBJECTIVE AND OBJECTIVE BOX
HPI: 42y  Male initially admitted on 5/24/22 with a GCS of 3, found to have b/l intraparenchymal bleeds, b/l SDH. Has had a complex prolonged hospital course including but not limited to right decompressive craniectomy on 5/24. Trach / PEG., cranioplasty on 6/29 with cognitive decline after initial improvement prompting imaging which noted subacute collection with air underneath the cranioplasty sight. MRI with increase in fluid collection underneath the flap leading to a repeat right craniectomy with wound exploration & evacuation of fluid collection on 7/17 with OR cultures isolating MSSA. 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 Has had interval trach decannulation and dietary advancement to pureed. Now s/p completion of appropriate antimicrobial course with f/u imaging (9/3) revealing no abnormal enhancement. 9/9 acute febrile episode, infectious w/u in progress.     INTERVAL HPI/OVERNIGHT EVENTS:  42y Male seen lying comfortably in bed. Noted to have a temp of 101.9, treated with Tylenol and sepsis work up.     Vital Signs Last 24 Hrs  T(C): 38.2 (09 Sep 2022 12:36), Max: 38.8 (09 Sep 2022 10:56)  T(F): 100.8 (09 Sep 2022 12:36), Max: 101.9 (09 Sep 2022 10:56)  HR: 111 (09 Sep 2022 10:56) (83 - 111)  BP: 159/110 (09 Sep 2022 10:56) (133/89 - 159/110)  BP(mean): --  RR: 17 (09 Sep 2022 10:56) (16 - 18)  SpO2: 93% (09 Sep 2022 10:56) (92% - 97%)    Parameters below as of 09 Sep 2022 10:56  Patient On (Oxygen Delivery Method): room air    PHYSICAL EXAM:  GENERAL: NAD  WOUND: Well healed, intact, sunken flap  MENTAL STATUS:  Eye opens spontaneously, following commands, minimal verbal, very hypophonic. LUNA x4 spontaneous and mimics       LABS:                        12.0   3.12  )-----------( 213      ( 09 Sep 2022 07:17 )             35.3     09-09    148<H>  |  110<H>  |  20.3<H>  ----------------------------<  96  3.8   |  25.0  |  1.02    Ca    9.7      09 Sep 2022 07:17  Mg     2.0     09-09

## 2022-09-09 NOTE — PROGRESS NOTE ADULT - NUTRITIONAL ASSESSMENT
This patient has been assessed with a concern for Malnutrition and has been determined to have a diagnosis/diagnoses of Severe protein-calorie malnutrition.    This patient is being managed with:   Diet NPO with Tube Feed-  Tube Feeding Modality: Gastrostomy  Pivot 1.5 Micky (PIVOT1.5RTH)  Total Volume for 24 Hours (mL): 1200  Continuous  Starting Tube Feed Rate {mL per Hour}: 20  Increase Tube Feed Rate by (mL): 10     Every hour  Until Goal Tube Feed Rate (mL per Hour): 50  Tube Feed Duration (in Hours): 24  Tube Feed Start Time: 14:00  Free Water Flush  Bolus   Total Volume per Flush (mL): 200   Frequency: Every 4 Hours   Total Daily Volume of Flush (mL): 800    Start Time: 14:00  Free Water Flush Instructions:  100ml in 24h. total 24h free water then 1000ml  Entered: Sep  9 2022  1:17PM

## 2022-09-10 LAB
ALBUMIN SERPL ELPH-MCNC: 4.5 G/DL — SIGNIFICANT CHANGE UP (ref 3.3–5.2)
ALP SERPL-CCNC: 70 U/L — SIGNIFICANT CHANGE UP (ref 40–120)
ALT FLD-CCNC: 10 U/L — SIGNIFICANT CHANGE UP
ANION GAP SERPL CALC-SCNC: 13 MMOL/L — SIGNIFICANT CHANGE UP (ref 5–17)
AST SERPL-CCNC: 10 U/L — SIGNIFICANT CHANGE UP
BILIRUB SERPL-MCNC: 0.3 MG/DL — LOW (ref 0.4–2)
BUN SERPL-MCNC: 25.3 MG/DL — HIGH (ref 8–20)
CALCIUM SERPL-MCNC: 9.3 MG/DL — SIGNIFICANT CHANGE UP (ref 8.4–10.5)
CHLORIDE SERPL-SCNC: 107 MMOL/L — SIGNIFICANT CHANGE UP (ref 98–107)
CO2 SERPL-SCNC: 25 MMOL/L — SIGNIFICANT CHANGE UP (ref 22–29)
CREAT SERPL-MCNC: 0.96 MG/DL — SIGNIFICANT CHANGE UP (ref 0.5–1.3)
EGFR: 101 ML/MIN/1.73M2 — SIGNIFICANT CHANGE UP
GLUCOSE BLDC GLUCOMTR-MCNC: 115 MG/DL — HIGH (ref 70–99)
GLUCOSE BLDC GLUCOMTR-MCNC: 116 MG/DL — HIGH (ref 70–99)
GLUCOSE BLDC GLUCOMTR-MCNC: 120 MG/DL — HIGH (ref 70–99)
GLUCOSE BLDC GLUCOMTR-MCNC: 128 MG/DL — HIGH (ref 70–99)
GLUCOSE BLDC GLUCOMTR-MCNC: 156 MG/DL — HIGH (ref 70–99)
GLUCOSE SERPL-MCNC: 124 MG/DL — HIGH (ref 70–99)
HCT VFR BLD CALC: 36.2 % — LOW (ref 39–50)
HGB BLD-MCNC: 12.1 G/DL — LOW (ref 13–17)
MAGNESIUM SERPL-MCNC: 1.9 MG/DL — SIGNIFICANT CHANGE UP (ref 1.6–2.6)
MCHC RBC-ENTMCNC: 27.1 PG — SIGNIFICANT CHANGE UP (ref 27–34)
MCHC RBC-ENTMCNC: 33.4 GM/DL — SIGNIFICANT CHANGE UP (ref 32–36)
MCV RBC AUTO: 81.2 FL — SIGNIFICANT CHANGE UP (ref 80–100)
PLATELET # BLD AUTO: 211 K/UL — SIGNIFICANT CHANGE UP (ref 150–400)
POTASSIUM SERPL-MCNC: 3.9 MMOL/L — SIGNIFICANT CHANGE UP (ref 3.5–5.3)
POTASSIUM SERPL-SCNC: 3.9 MMOL/L — SIGNIFICANT CHANGE UP (ref 3.5–5.3)
PROT SERPL-MCNC: 7.1 G/DL — SIGNIFICANT CHANGE UP (ref 6.6–8.7)
RAPID RVP RESULT: SIGNIFICANT CHANGE UP
RBC # BLD: 4.46 M/UL — SIGNIFICANT CHANGE UP (ref 4.2–5.8)
RBC # FLD: 13.7 % — SIGNIFICANT CHANGE UP (ref 10.3–14.5)
SARS-COV-2 RNA SPEC QL NAA+PROBE: SIGNIFICANT CHANGE UP
SARS-COV-2 RNA SPEC QL NAA+PROBE: SIGNIFICANT CHANGE UP
SODIUM SERPL-SCNC: 145 MMOL/L — SIGNIFICANT CHANGE UP (ref 135–145)
WBC # BLD: 5.17 K/UL — SIGNIFICANT CHANGE UP (ref 3.8–10.5)
WBC # FLD AUTO: 5.17 K/UL — SIGNIFICANT CHANGE UP (ref 3.8–10.5)

## 2022-09-10 PROCEDURE — 99233 SBSQ HOSP IP/OBS HIGH 50: CPT

## 2022-09-10 PROCEDURE — 71250 CT THORAX DX C-: CPT | Mod: 26

## 2022-09-10 PROCEDURE — 70450 CT HEAD/BRAIN W/O DYE: CPT | Mod: 26

## 2022-09-10 PROCEDURE — 74176 CT ABD & PELVIS W/O CONTRAST: CPT | Mod: 26

## 2022-09-10 RX ORDER — SODIUM CHLORIDE 9 MG/ML
1000 INJECTION, SOLUTION INTRAVENOUS
Refills: 0 | Status: DISCONTINUED | OUTPATIENT
Start: 2022-09-10 | End: 2022-09-11

## 2022-09-10 RX ORDER — LEVETIRACETAM 250 MG/1
500 TABLET, FILM COATED ORAL EVERY 12 HOURS
Refills: 0 | Status: DISCONTINUED | OUTPATIENT
Start: 2022-09-10 | End: 2022-09-19

## 2022-09-10 RX ORDER — ACETAMINOPHEN 500 MG
1000 TABLET ORAL ONCE
Refills: 0 | Status: COMPLETED | OUTPATIENT
Start: 2022-09-10 | End: 2022-09-10

## 2022-09-10 RX ADMIN — Medication 400 MILLIGRAM(S): at 11:45

## 2022-09-10 RX ADMIN — SODIUM CHLORIDE 65 MILLILITER(S): 9 INJECTION, SOLUTION INTRAVENOUS at 11:37

## 2022-09-10 RX ADMIN — DIPHENHYDRAMINE HYDROCHLORIDE AND LIDOCAINE HYDROCHLORIDE AND ALUMINUM HYDROXIDE AND MAGNESIUM HYDRO 5 MILLILITER(S): KIT at 00:59

## 2022-09-10 RX ADMIN — LEVETIRACETAM 420 MILLIGRAM(S): 250 TABLET, FILM COATED ORAL at 18:25

## 2022-09-10 RX ADMIN — DIPHENHYDRAMINE HYDROCHLORIDE AND LIDOCAINE HYDROCHLORIDE AND ALUMINUM HYDROXIDE AND MAGNESIUM HYDRO 5 MILLILITER(S): KIT at 05:13

## 2022-09-10 RX ADMIN — LEVETIRACETAM 500 MILLIGRAM(S): 250 TABLET, FILM COATED ORAL at 05:14

## 2022-09-10 RX ADMIN — MEMANTINE HYDROCHLORIDE 5 MILLIGRAM(S): 10 TABLET ORAL at 05:14

## 2022-09-10 RX ADMIN — DIPHENHYDRAMINE HYDROCHLORIDE AND LIDOCAINE HYDROCHLORIDE AND ALUMINUM HYDROXIDE AND MAGNESIUM HYDRO 5 MILLILITER(S): KIT at 23:54

## 2022-09-10 RX ADMIN — CHLORHEXIDINE GLUCONATE 1 APPLICATION(S): 213 SOLUTION TOPICAL at 18:25

## 2022-09-10 RX ADMIN — Medication 1000 MILLIGRAM(S): at 13:02

## 2022-09-10 RX ADMIN — ENOXAPARIN SODIUM 40 MILLIGRAM(S): 100 INJECTION SUBCUTANEOUS at 18:25

## 2022-09-10 NOTE — PROGRESS NOTE ADULT - ASSESSMENT
42y  Male initially admitted on 5/24/22 (BIBEMS after found unresponsive on street) with a GCS of 3, found to have b/l intraparenchymal bleeds, b/l SDH. Has had a complex prolonged hospital course including but not limited to right decompressive craniectomy on 5/24. Trach / PEG., cranioplasty on 6/29 with cognitive decline after initial improvement prompting imaging which noted subacute collection with air underneath the cranioplasty sight. MRI with increase in fluid collection underneath the flap leading to a repeat right craniectomy with wound exploration & evacuation of fluid collection on 7/17 with OR cultures isolating MSSA. 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 Has had interval trach decannulation and dietary advancement to pureed. Now s/p completion of appropriate antimicrobial course with f/u imaging (9/3) revealing no abnormal enhancement (6 mm midline shift to left). After being optimzed and cleared for surgery had a fever (9/9/22). Infectious w/u in progress to evaluate etiology of fever     Traumatic brain injury with complicated hospital course (as above) now with periodic agitation and lethargy  Neurologically significantly improved however per family recently more lethargic since standing seroquel initiation   DC Seroquel for now  Will order mittens PRN for pt safety at night if needed and if indicated, low threshold for 1:1 (nocturnal as family at bedside during day hours)  Continue Memantine 5 mg BID   Continue Keppra 500 mg BID   Cranioplasty tentative next week, however with febrile episode 9/9   Currently infectious w/u in progress, Dopplers neg for DVT     Fever  No sepsis, no localizing signs  Prelim my read : CXR : normal   Lactate 1.0  Blood cultures in progress  UA neg  CT head / Chest/Abd pelvis to r/o any concern for infectious pathology   Hold off Abx for now, start if any change in clinical status concerning for infection   Dopplers Upper and lower ext b/l to rule out thrombosis as cause for fever   d/w ID, will follow recs  (prior tissue cultures 7/18 with MSSA, fluid culture with MSSA and MRSE)        SDH/ IPH / TBI , CNS infection  Right decompressive hemicraniectomy on 5/24,  Completed Nafcillin /  Vanc, post completion with MRI (w/IV con) w/o abnormal enhancement  ID on board  Helmet OOB    Oropharyngeal Dysphagia  on TF / purée diet , restart free water / TF given unreliable PO intake   Strict aspiration precautions     Hyponatremia  Central SIADH , persistently low and then overcorrected  Now improved  Monitor closely  (Urea and NaCL dc'd)  d/w Renal for any recs prior to cranioplasty IF Na low/high    Substance abuse   Cocaine pos on admission   Alcoholism  Was on MVI/ folic acid / Thiamine for presumed thiamine deficiency     Anemia   resolved    DVT prophylaxis  Lovenox    Dispo - Awaiting cranioplasty    42y  Male initially admitted on 5/24/22 (BIBEMS after found unresponsive on street) with a GCS of 3, found to have b/l intraparenchymal bleeds, b/l SDH. Has had a complex prolonged hospital course including but not limited to right decompressive craniectomy on 5/24. Trach / PEG., cranioplasty on 6/29 with cognitive decline after initial improvement prompting imaging which noted subacute collection with air underneath the cranioplasty sight. MRI with increase in fluid collection underneath the flap leading to a repeat right craniectomy with wound exploration & evacuation of fluid collection on 7/17 with OR cultures isolating MSSA. 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 Has had interval trach decannulation and dietary advancement to pureed. Now s/p completion of appropriate antimicrobial course with f/u imaging (9/3) revealing no abnormal enhancement (6 mm midline shift to left). After being optimzed and cleared for surgery had a fever (9/9/22). Infectious w/u in progress to evaluate etiology of fever     Traumatic brain injury with complicated hospital course (as above) now with periodic agitation and lethargy  Neurologically significantly improved however per family recently more lethargic since standing seroquel initiation   DC Seroquel for now  Will order mittens PRN for pt safety at night if needed and if indicated, low threshold for 1:1 (nocturnal as family at bedside during day hours)  Continue Memantine 5 mg BID   Continue Keppra 500 mg BID   Cranioplasty tentative next week, however with febrile episode 9/9   Currently infectious w/u in progress, Dopplers neg for DVT     Fever  No sepsis, no localizing signs  Prelim my read : CXR : normal   Lactate 1.0  Blood cultures in progress  UA neg  CT head / Chest/Abd pelvis to r/o any concern for infectious pathology   Hold off Abx for now, start if any change in clinical status concerning for infection   Dopplers Upper and lower ext b/l to rule out thrombosis as cause for fever   Covid PCR neg, resp viral panel ordered to e/o alt viruses   d/w ID, will follow recs  (prior tissue cultures 7/18 with MSSA, fluid culture with MSSA and MRSE)        SDH/ IPH / TBI , CNS infection  Right decompressive hemicraniectomy on 5/24,  Completed Nafcillin /  Vanc, post completion with MRI (w/IV con) w/o abnormal enhancement  ID on board  Helmet OOB    Oropharyngeal Dysphagia  on TF / purée diet , restart free water / TF given unreliable PO intake   Strict aspiration precautions     Hyponatremia  Central SIADH , persistently low and then overcorrected  Now improved  Monitor closely  (Urea and NaCL dc'd)  d/w Renal for any recs prior to cranioplasty IF Na low/high    Substance abuse   Cocaine pos on admission   Alcoholism  Was on MVI/ folic acid / Thiamine for presumed thiamine deficiency     Anemia   resolved    DVT prophylaxis  Lovenox    Dispo - Awaiting cranioplasty    42y  Male initially admitted on 5/24/22 (BIBEMS after found unresponsive on street) with a GCS of 3, found to have b/l intraparenchymal bleeds, b/l SDH. Has had a complex prolonged hospital course including but not limited to right decompressive craniectomy on 5/24. Trach / PEG., cranioplasty on 6/29 with cognitive decline after initial improvement prompting imaging which noted subacute collection with air underneath the cranioplasty sight. MRI with increase in fluid collection underneath the flap leading to a repeat right craniectomy with wound exploration & evacuation of fluid collection on 7/17 with OR cultures isolating MSSA. 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 Has had interval trach decannulation and dietary advancement to pureed. Now s/p completion of appropriate antimicrobial course with f/u imaging (9/3) revealing no abnormal enhancement (6 mm midline shift to left). After being optimzed and cleared for surgery had a fever (9/9/22). Infectious w/u in progress to evaluate etiology of fever     Traumatic brain injury with complicated hospital course (as above) now with periodic agitation and lethargy  Neurologically significantly improved however per family recently more lethargic since standing seroquel initiation   DC Seroquel for now  Will order mittens PRN for pt safety at night if needed and if indicated, low threshold for 1:1 (nocturnal as family at bedside during day hours)  Continue Memantine 5 mg BID   Continue Keppra 500 mg BID   Cranioplasty tentative next week, however with febrile episode 9/9   Currently infectious w/u in progress, Dopplers neg for DVT     Fever  No sepsis, no localizing signs  Prelim my read : CXR : normal   Lactate 1.0  Blood cultures in progress  UA neg  CT head / Chest/Abd pelvis to r/o any concern for infectious pathology   Hold off Abx for now, start if any change in clinical status concerning for infection   Dopplers Upper and lower ext b/l to rule out thrombosis as cause for fever   Covid PCR neg, resp viral panel ordered to e/o alt viruses   d/w ID, will follow recs  (prior tissue cultures 7/18 with MSSA, fluid culture with MSSA and MRSE)        SDH/ IPH / TBI , CNS infection  Right decompressive hemicraniectomy on 5/24,  Completed Nafcillin /  Vanc, post completion with MRI (w/IV con) w/o abnormal enhancement  ID on board  Helmet OOB    Oropharyngeal Dysphagia  on TF / purée diet , restart free water / TF given unreliable PO intake   Strict aspiration precautions     Hyponatremia  Central SIADH , persistently low and then overcorrected  Now improved  Monitor closely  (Urea and NaCL dc'd)  d/w Renal for any recs prior to cranioplasty IF Na low/high    Substance abuse   Cocaine pos on admission   Alcoholism  Was on MVI/ folic acid / Thiamine for presumed thiamine deficiency     Anemia   resolved    DVT prophylaxis  Lovenox    Dispo - Awaiting cranioplasty   Care d/w sister (Ciarra wallis, sister is only family member who speaks fluent English. Relays infomation to [facility], cell : [phone])   42y  Male initially admitted on 5/24/22 (BIBEMS after found unresponsive on street) with a GCS of 3, found to have b/l intraparenchymal bleeds, b/l SDH. Has had a complex prolonged hospital course including but not limited to right decompressive craniectomy on 5/24. Trach / PEG., cranioplasty on 6/29 with cognitive decline after initial improvement prompting imaging which noted subacute collection with air underneath the cranioplasty sight. MRI with increase in fluid collection underneath the flap leading to a repeat right craniectomy with wound exploration & evacuation of fluid collection on 7/17 with OR cultures isolating MSSA. 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 Has had interval trach decannulation and dietary advancement to pureed. Now s/p completion of appropriate antimicrobial course with f/u imaging (9/3) revealing no abnormal enhancement (6 mm midline shift to left). After being optimzed and cleared for surgery had a fever (9/9/22). Infectious w/u in progress to evaluate etiology of fever     Traumatic brain injury with complicated hospital course (as above) now with periodic agitation and lethargy  Neurologically significantly improved however per family recently more lethargic since standing seroquel initiation   DC Seroquel for now  Will order mittens PRN for pt safety at night if needed and if indicated, low threshold for 1:1 (nocturnal as family at bedside during day hours)  Continue Memantine 5 mg BID   Continue Keppra 500 mg BID   Cranioplasty tentative next week, however with febrile episode 9/9   Currently infectious w/u in progress, Dopplers neg for DVT     Fever  No sepsis, no localizing signs  Prelim my read : CXR : normal   Lactate 1.0  Blood cultures in progress  UA neg  CT head / Chest/Abd pelvis to r/o any concern for infectious pathology   Hold off Abx for now, start if any change in clinical status concerning for infection   Dopplers Upper and lower ext b/l to rule out thrombosis as cause for fever   Covid PCR neg, resp viral panel ordered to e/o alt viruses   d/w ID, will follow recs  (prior tissue cultures 7/18 with MSSA, fluid culture with MSSA and MRSE)        SDH/ IPH / TBI , CNS infection  Right decompressive hemicraniectomy on 5/24,  Completed Nafcillin /  Vanc, post completion with MRI (w/IV con) w/o abnormal enhancement  ID on board  Helmet OOB    Oropharyngeal Dysphagia  on TF / purée diet , restart free water / TF given unreliable PO intake   Strict aspiration precautions     Hyponatremia  Central SIADH , persistently low and then overcorrected  Now improved  Monitor closely  (Urea and NaCL dc'd)  d/w Renal for any recs prior to cranioplasty IF Na low/high    Substance abuse   Cocaine pos on admission   Alcoholism  Was on MVI/ folic acid / Thiamine for presumed thiamine deficiency     Anemia   resolved    DVT prophylaxis  Lovenox    Dispo - Awaiting cranioplasty   Care d/w sister (Ciarra wallis, sister is only family member who speaks fluent English. Relays infomation to [facility], cell : [phone]. Requests that interpretor is used when PT/nurisng wish to communicate with family at bedside)

## 2022-09-10 NOTE — PROGRESS NOTE ADULT - NUTRITIONAL ASSESSMENT
This patient has been assessed with a concern for Malnutrition and has been determined to have a diagnosis/diagnoses of Severe protein-calorie malnutrition.    This patient is being managed with:   Diet NPO-  Entered: Sep 10 2022 10:31AM

## 2022-09-10 NOTE — PROGRESS NOTE ADULT - SUBJECTIVE AND OBJECTIVE BOX
[facility] Division of Hospital Medicine    Chief Complaint:  SDH    SUBJECTIVE / OVERNIGHT EVENTS:  Pt examined lying in bed  Awake and alert in am, mumbling but not following commands   No acute overnight events     Brief HPI / hospital course   42y  Male initially admitted on 5/24/22 with a GCS of 3, found to have b/l intraparenchymal bleeds, b/l SDH. Has had a complex prolonged hospital course including but not limited to right decompressive craniectomy on 5/24. Trach / PEG., cranioplasty on 6/29 with cognitive decline after initial improvement prompting imaging which noted subacute collection with air underneath the cranioplasty sight. MRI with increase in fluid collection underneath the flap leading to a repeat right craniectomy with wound exploration & evacuation of fluid collection on 7/17 with OR cultures isolating MSSA. 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 Has had interval trach decannulation and dietary advancement to pureed. Now s/p completion of appropriate antimicrobial course with f/u imaging (9/3) revealing no abnormal enhancement. 9/9 acute febrile episode, infectious w/u in progress       MEDICATIONS  (STANDING):  chlorhexidine 2% Cloths 1 Application(s) Topical daily  enoxaparin Injectable 40 milliGRAM(s) SubCutaneous every 24 hours  FIRST- Mouthwash  BLM 5 milliLiter(s) Swish and Swallow four times a day  levETIRAcetam  Solution 500 milliGRAM(s) Oral two times a day  melatonin 5 milliGRAM(s) Oral at bedtime  memantine 5 milliGRAM(s) Oral two times a day  senna 2 Tablet(s) Oral at bedtime    MEDICATIONS  (PRN):  ondansetron Injectable 4 milliGRAM(s) IV Push every 4 hours PRN Nausea and/or Vomiting      PHYSICAL EXAM:    CONSTITUTIONAL: Non toxic appearing, lying comfortably in bed  ENMT: Moist oral mucosa, s/p hemicraniectomy scalp incision healing well (no drainage from incision site)  RESPIRATORY: Normal respiratory effort; lungs are clear to auscultation bilaterally  CARDIOVASCULAR: Regular rate and rhythm, normal S1 and S2, no murmur/rub/gallop; No lower extremity edema; Peripheral pulses are 2+ bilaterally  ABDOMEN: normoactive bowel sounds, no rebound/guarding; No hepatosplenomegaly, PEG site clean, dry (no excoriation or erythema)  MUSCLOSKELETAL:  no clubbing or cyanosis of digits; no joint swelling, IV access site c/d. No infiltration   PSYCH: Awake, not following commands   NEUROLOGY: Moves all ext to command,   SKIN: No external lesions noted       LABS:                                     12.1   5.17  )-----------( 211      ( 10 Sep 2022 06:57 )             36.2   09-10    145  |  107  |  25.3<H>  ----------------------------<  124<H>  3.9   |  25.0  |  0.96    Ca    9.3      10 Sep 2022 06:57  Mg     1.9     09-10    TPro  7.1  /  Alb  4.5  /  TBili  0.3<L>  /  DBili  x   /  AST  10  /  ALT  10  /  AlkPhos  70  09-10      7/18 Tissue culture : MSSA   7/18 Fluid culture : MSSA / MRSE        RADIOLOGY & ADDITIONAL TESTS:    5/26/22 MRI Brain  IMPRESSION:  MRI BRAIN: Right frontal craniectomy. Residual bilateral subdural hematomas and interhemispheric subdural hemorrhage. Right frontal, right   frontal temporal and left temporal parenchymal hemorrhagic contusions with an foci of diffusion restriction suggestive of shear injury and   diffuse axonal injury.  Cervical spine MRI: No acute fractures or dislocations.      9/3/22 MRI Brain  The brain demonstrates mild periventricular white matter ischemia with old lacunar infarction in the LEFT basal ganglia. RIGHT craniectomy again   noted is encephalomalacia and gliosis in the RIGHT frontal lobe. 6 mm shift to the LEFT is noted. Following gadolinium administration no   abnormal enhancement occurs.  No acute cerebral cortical infarct is found.   No intracranial hemorrhage is recognized.  No mass effect is   found in the brain.  The ventricles, sulci and basal cisterns appear unremarkable. The vertebral and internal carotid arteries demonstrate expected flow   voids indicating their patency. The orbits are unremarkable.  The paranasal sinuses are clear.  The nasal cavity appears intact.  The nasopharynx is symmetric.  The central skull   base and petrous temporal bones are intact.  IMPRESSION: No abnormal enhancement is seen. Mild periventricular white   matter ischemia with old lacunar infarction in the LEFT basal ganglia.   RIGHT craniectomy again noted is encephalomalacia and gliosis in the RIGHT frontal lobe. 6 mm shift to the LEFT is noted.    9/9/22 Dopplers upper and lower ext  IMPRESSION:  No evidence of deep venous thrombosis in either lower extremity.

## 2022-09-10 NOTE — PROGRESS NOTE ADULT - SUBJECTIVE AND OBJECTIVE BOX
[facility]  INFECTIOUS DISEASES at [place] and [place]  =======================================================                               Nestor Russo MD#  Mitesh Woodward MD*                                     Nyla Orantes MD*    Vera Bishop MD*            Diplomates American Board of Internal Medicine & Infectious Diseases                # [place] Office - Appt - Tel  [phone] Fax [phone]                * [place] Office - Appt - Tel [phone] Fax [phone]                                  Hospital Consult line:  [phone]  =======================================================    PASTOR KEMAR [id number]    Follow up: Epidural fluid collection    NOW WITH RECURRENT FEVERS AND CHANGE IN MENTAL STATUS   ASKED TO REEVALUATE TODAY  FROM ID STANDPOINT        Allergies:  Allergy Status Unknown      REVIEW OF SYSTEMS:  Unable to obtain due to medical condition       Physical Exam:  GEN: UNRESPONSIVE    HEENT: indent at craniectomy site.  Anicteric   NECK: Supple.   LUNGS: CTA B/L  HEART: Regular rate and rhythm   ABDOMEN: Soft, nontender, and nondistended.  Positive bowel sounds.    : [place]   EXTREMITIES: trace edema.  MSK: no joint swelling  NEUROLOGIC:  UNRESPONSIVE       Vitals:  T(F) Vital Signs Last 24 Hrs  T(C): 38.4 (10 Sep 2022 10:00), Max: 38.4 (10 Sep 2022 10:00)  T(F): 101.1 (10 Sep 2022 10:00), Max: 101.1 (10 Sep 2022 10:00)  HR: 104 (10 Sep 2022 09:00) (90 - 104)  BP: 117/84 (10 Sep 2022 09:00) (117/84 - 130/89)  BP(mean): --  RR: 16 (10 Sep 2022 09:00) (16 - 19)  SpO2: 96% (10 Sep 2022 09:00) (92% - 97%)    Parameters below as of 10 Sep 2022 09:00  Patient On (Oxygen Delivery Method): room air          Current Antibiotics:    Other medications:  chlorhexidine 2% Cloths 1 Application(s) Topical daily  enoxaparin Injectable 40 milliGRAM(s) SubCutaneous every 24 hours  FIRST- Mouthwash  BLM 5 milliLiter(s) Swish and Swallow four times a day  levETIRAcetam  Solution 500 milliGRAM(s) Oral two times a day  melatonin 5 milliGRAM(s) Oral at bedtime  memantine 5 milliGRAM(s) Oral two times a day  QUEtiapine 12.5 milliGRAM(s) Oral at bedtime  senna 2 Tablet(s) Oral at bedtime  sodium chloride 2 Gram(s) Oral every 8 hours  urea Oral Powder 30 Gram(s) Oral daily                     < from: MR Head w/ IV Cont (09.03.22 @ 16:19) >  ACC: [id number] EXAM:  MR BRAIN IC                        ACC: [id number] EXAM:  MR BRAIN                          PROCEDURE DATE:  09/02/2022          INTERPRETATION:  MR brain with and without gadolinium    CLINICAL INFORMATION: Headache    TECHNIQUE:   Sagittal and axial T1-weighted images, axial FLAIR images,   axial T2-weighted images, axial gradient echo images and axial diffusion   weighted images of the brain were obtained. Following 7 cc of Gadavist   administration, .5 cc discarded, isotropic volumetric and fast spin echo   T1-weighted images were obtained; this data was reformatted using image   post processing software in multiple imaging planes.    FINDINGS:   CT head dated 08/30/2022 available for review.    The brain demonstratesmild periventricular white matter ischemia with   old lacunar infarction in the LEFT basal ganglia. RIGHT craniectomy again   noted is encephalomalacia and gliosis in the RIGHT frontal lobe. 6 mm   shift to the LEFT is noted. Following gadolinium administration no   abnormal enhancement occurs.  No acute cerebral cortical infarct is   found.   No intracranial hemorrhage is recognized.  No mass effect is   found in the brain.    The ventricles, sulci and basal cisterns appear unremarkable.    The vertebral and internal carotid arteries demonstrate expected flow   voids indicating their patency.    The orbits are unremarkable.  The paranasal sinuses are clear.  The nasal   cavity appears intact.  The nasopharynx is symmetric.  The central skull  base and petrous temporal bones are intact.    IMPRESSION: No abnormal enhancement is seen. Mild periventricular white   matter ischemia with old lacunar infarction in the LEFT basal ganglia.   RIGHT craniectomy again noted is encephalomalacia and gliosis in the   RIGHT frontal lobe. 6 mm shift to the LEFT is noted.    --- End of Report ---    < end of copied text >

## 2022-09-10 NOTE — PROGRESS NOTE ADULT - ASSESSMENT
42y  Male initially admitted on 5/24/22 after being found down and was found to have at that time Bilateral IPH, Bilateral SDH. Prolonged hospital course: s/p craniotomy and evacuation of SDH 5/24, s/p trach 6/1, s/p cranioplasty and replacement of bone flap 6/29, s/p R hemicraniectomy, wound exploration, evacuation of epidural fluid collection 7/17.      Epidural fluid collection   s/p R hemicraniectomy, wound exploration, evacuation of epidural fluid collection 7/17  r/o infection       - Blood cultures 7/18 no growth   - fluid cultures 7/18 reporting Staphylococcus aureus (MSSA) and 1 culture with Staph Epi (MRSE)  - Completed Nafcillin and Vancomycin 9/1/22 (6 weeks)   - MRI brain with contrast 9/2 reporting no enhancement   - NOW WITH FEVERS  AND CHANGE  IN MENTAL STATUS   SUGGEST NEUROSURGERY EVAL   REPEAT CT HEAD   WOULD ALSO REPEAT CT CHEST ABD PELVIS TO LOOK FOR SOURCE  REPEAT BLOOD CX DONE  WILL FOLLOW UP   WOUDL REPEAT PROCALCITONIN  IF CONTINUE TO SPIKE WILL RECOMMEND EMPIRIC ABX  WILL FOLLOWUP WITH FURTHER RECOMMENDATIONS

## 2022-09-11 LAB
ALBUMIN SERPL ELPH-MCNC: 4.2 G/DL — SIGNIFICANT CHANGE UP (ref 3.3–5.2)
ALP SERPL-CCNC: 59 U/L — SIGNIFICANT CHANGE UP (ref 40–120)
ALT FLD-CCNC: 10 U/L — SIGNIFICANT CHANGE UP
ANION GAP SERPL CALC-SCNC: 12 MMOL/L — SIGNIFICANT CHANGE UP (ref 5–17)
AST SERPL-CCNC: 10 U/L — SIGNIFICANT CHANGE UP
BILIRUB SERPL-MCNC: 0.4 MG/DL — SIGNIFICANT CHANGE UP (ref 0.4–2)
BUN SERPL-MCNC: 19.9 MG/DL — SIGNIFICANT CHANGE UP (ref 8–20)
CALCIUM SERPL-MCNC: 9.5 MG/DL — SIGNIFICANT CHANGE UP (ref 8.4–10.5)
CHLORIDE SERPL-SCNC: 111 MMOL/L — HIGH (ref 98–107)
CO2 SERPL-SCNC: 26 MMOL/L — SIGNIFICANT CHANGE UP (ref 22–29)
CREAT SERPL-MCNC: 0.88 MG/DL — SIGNIFICANT CHANGE UP (ref 0.5–1.3)
EGFR: 110 ML/MIN/1.73M2 — SIGNIFICANT CHANGE UP
GLUCOSE BLDC GLUCOMTR-MCNC: 108 MG/DL — HIGH (ref 70–99)
GLUCOSE BLDC GLUCOMTR-MCNC: 108 MG/DL — HIGH (ref 70–99)
GLUCOSE BLDC GLUCOMTR-MCNC: 112 MG/DL — HIGH (ref 70–99)
GLUCOSE BLDC GLUCOMTR-MCNC: 118 MG/DL — HIGH (ref 70–99)
GLUCOSE SERPL-MCNC: 113 MG/DL — HIGH (ref 70–99)
HCT VFR BLD CALC: 34.2 % — LOW (ref 39–50)
HGB BLD-MCNC: 11.6 G/DL — LOW (ref 13–17)
MCHC RBC-ENTMCNC: 27.9 PG — SIGNIFICANT CHANGE UP (ref 27–34)
MCHC RBC-ENTMCNC: 33.9 GM/DL — SIGNIFICANT CHANGE UP (ref 32–36)
MCV RBC AUTO: 82.2 FL — SIGNIFICANT CHANGE UP (ref 80–100)
PLATELET # BLD AUTO: 195 K/UL — SIGNIFICANT CHANGE UP (ref 150–400)
POTASSIUM SERPL-MCNC: 3.6 MMOL/L — SIGNIFICANT CHANGE UP (ref 3.5–5.3)
POTASSIUM SERPL-SCNC: 3.6 MMOL/L — SIGNIFICANT CHANGE UP (ref 3.5–5.3)
PROCALCITONIN SERPL-MCNC: 0.08 NG/ML — SIGNIFICANT CHANGE UP (ref 0.02–0.1)
PROT SERPL-MCNC: 6.7 G/DL — SIGNIFICANT CHANGE UP (ref 6.6–8.7)
RBC # BLD: 4.16 M/UL — LOW (ref 4.2–5.8)
RBC # FLD: 13.8 % — SIGNIFICANT CHANGE UP (ref 10.3–14.5)
SODIUM SERPL-SCNC: 149 MMOL/L — HIGH (ref 135–145)
WBC # BLD: 3.31 K/UL — LOW (ref 3.8–10.5)
WBC # FLD AUTO: 3.31 K/UL — LOW (ref 3.8–10.5)

## 2022-09-11 PROCEDURE — 99233 SBSQ HOSP IP/OBS HIGH 50: CPT

## 2022-09-11 RX ADMIN — MEMANTINE HYDROCHLORIDE 5 MILLIGRAM(S): 10 TABLET ORAL at 21:21

## 2022-09-11 RX ADMIN — ENOXAPARIN SODIUM 40 MILLIGRAM(S): 100 INJECTION SUBCUTANEOUS at 21:20

## 2022-09-11 RX ADMIN — Medication 5 MILLIGRAM(S): at 21:21

## 2022-09-11 RX ADMIN — SENNA PLUS 2 TABLET(S): 8.6 TABLET ORAL at 21:21

## 2022-09-11 RX ADMIN — LEVETIRACETAM 420 MILLIGRAM(S): 250 TABLET, FILM COATED ORAL at 05:04

## 2022-09-11 RX ADMIN — DIPHENHYDRAMINE HYDROCHLORIDE AND LIDOCAINE HYDROCHLORIDE AND ALUMINUM HYDROXIDE AND MAGNESIUM HYDRO 5 MILLILITER(S): KIT at 11:39

## 2022-09-11 RX ADMIN — DIPHENHYDRAMINE HYDROCHLORIDE AND LIDOCAINE HYDROCHLORIDE AND ALUMINUM HYDROXIDE AND MAGNESIUM HYDRO 5 MILLILITER(S): KIT at 05:04

## 2022-09-11 RX ADMIN — LEVETIRACETAM 420 MILLIGRAM(S): 250 TABLET, FILM COATED ORAL at 21:20

## 2022-09-11 RX ADMIN — DIPHENHYDRAMINE HYDROCHLORIDE AND LIDOCAINE HYDROCHLORIDE AND ALUMINUM HYDROXIDE AND MAGNESIUM HYDRO 5 MILLILITER(S): KIT at 23:03

## 2022-09-11 RX ADMIN — CHLORHEXIDINE GLUCONATE 1 APPLICATION(S): 213 SOLUTION TOPICAL at 11:39

## 2022-09-11 RX ADMIN — DIPHENHYDRAMINE HYDROCHLORIDE AND LIDOCAINE HYDROCHLORIDE AND ALUMINUM HYDROXIDE AND MAGNESIUM HYDRO 5 MILLILITER(S): KIT at 21:20

## 2022-09-11 NOTE — CHART NOTE - NSCHARTNOTEFT_GEN_A_CORE
Attempted to call sister Ciarra to review results of CT scans from yesterday @ [phone] as per her request  Left a voicemail to return call at her earliest convenience

## 2022-09-11 NOTE — PROGRESS NOTE ADULT - NUTRITIONAL ASSESSMENT
This patient has been assessed with a concern for Malnutrition and has been determined to have a diagnosis/diagnoses of Severe protein-calorie malnutrition.    This patient is being managed with:   Diet NPO with Tube Feed-  Tube Feeding Modality: Gastrostomy  Pivot 1.5 Micky (PIVOT1.5RTH)  Total Volume for 24 Hours (mL): 1200  Continuous  Starting Tube Feed Rate {mL per Hour}: 20  Increase Tube Feed Rate by (mL): 10     Every hour  Until Goal Tube Feed Rate (mL per Hour): 50  Tube Feed Duration (in Hours): 24  Tube Feed Start Time: 14:00  Free Water Flush  Bolus   Total Volume per Flush (mL): 200   Frequency: Every 4 Hours   Total Daily Volume of Flush (mL): 800    Start Time: 14:00  Free Water Flush Instructions:  100ml in 24h. total 24h free water then 1000ml  Entered: Sep 11 2022  7:20AM

## 2022-09-11 NOTE — CONSULT NOTE ADULT - SUBJECTIVE AND OBJECTIVE BOX
Pt Name: PASTOR REINOSO    MRN: [id number]      Patient is a 42y male admitted 5/24/2022 for SDH.  Pt with a long hospital course, multiple surgeries including decompression craniectomy, PEG, Trach placement, repeat craniectomy.  Ortho consulted for incidental finding on imaging of right pubic rami fx.  Pt unable to give history or respond to questions.  Pt history from chart  .  Subdural hematoma        REVIEW OF SYSTEMS  Unable to obtain due to pt mental status    past Surg hx  multiple neurosurgeries      Medications: chlorhexidine 2% Cloths 1 Application(s) Topical daily  enoxaparin Injectable 40 milliGRAM(s) SubCutaneous every 24 hours  FIRST- Mouthwash  BLM 5 milliLiter(s) Swish and Swallow four times a day  levETIRAcetam  IVPB 500 milliGRAM(s) IV Intermittent every 12 hours  melatonin 5 milliGRAM(s) Oral at bedtime  memantine 5 milliGRAM(s) Oral two times a day  ondansetron Injectable 4 milliGRAM(s) IV Push every 4 hours PRN  senna 2 Tablet(s) Oral at bedtime      FAMILY HISTORY:  : non-contributory      Ambulation: Walking independently [ ] With Cane [ ] With Walker [ ]  Bedbound [x ]                           11.6   3.31  )-----------( 195      ( 11 Sep 2022 06:44 )             34.2     09-11    149<H>  |  111<H>  |  19.9  ----------------------------<  113<H>  3.6   |  26.0  |  0.88    Ca    9.5      11 Sep 2022 06:44  Mg     1.9     09-10    TPro  6.7  /  Alb  4.2  /  TBili  0.4  /  DBili  x   /  AST  10  /  ALT  10  /  AlkPhos  59  09-11      PHYSICAL EXAM:    Vital Signs Last 24 Hrs  T(C): 36.8 (11 Sep 2022 18:31), Max: 37.4 (11 Sep 2022 05:28)  T(F): 98.3 (11 Sep 2022 18:31), Max: 99.4 (11 Sep 2022 09:35)  HR: 73 (11 Sep 2022 18:31) (73 - 85)  BP: 125/73 (11 Sep 2022 18:31) (107/73 - 136/83)  BP(mean): --  RR: 18 (11 Sep 2022 18:31) (18 - 19)  SpO2: 98% (11 Sep 2022 18:31) (95% - 98%)    Parameters below as of 11 Sep 2022 18:31  Patient On (Oxygen Delivery Method): room air      Daily     Daily     Pt lying in bed seems comfortable  Nursing at bedside  Pt not responding to questions  Does not follow commands  pt spontaneously moving lower extremities b/l  LE warm, pulses palp, cap refill brisk      Imaging Studies:    IMPRESSION:  No evidence of pneumonia.    No evidence of bowel obstruction. Correctly positioned PEG tube.    No acute findings in the chest, abdomen or pelvis. Bilateral   nonobstructingnephroliths.    Subacute fractures in the right lateral pubic rami.      A/P:  Pt is a  42y Male with incidental finding right pubic rami fx subacute on CT scan  PLAN:   * WBAT  *pain control  f/u as needed

## 2022-09-11 NOTE — PROGRESS NOTE ADULT - SUBJECTIVE AND OBJECTIVE BOX
[facility] Division of Hospital Medicine  Orlin Yanes MD    Chief Complaint:  Patient is a 42y old  Male who presents with a chief complaint of Trauma/ Subdural/ Intubated (10 Sep 2022 15:13)      SUBJECTIVE / OVERNIGHT EVENTS:  Patient seen and examined at bedside. Afebrile overnight.    Unable to obtain ROS due to mental status.     MEDICATIONS  (STANDING):  chlorhexidine 2% Cloths 1 Application(s) Topical daily  enoxaparin Injectable 40 milliGRAM(s) SubCutaneous every 24 hours  FIRST- Mouthwash  BLM 5 milliLiter(s) Swish and Swallow four times a day  levETIRAcetam  IVPB 500 milliGRAM(s) IV Intermittent every 12 hours  melatonin 5 milliGRAM(s) Oral at bedtime  memantine 5 milliGRAM(s) Oral two times a day  senna 2 Tablet(s) Oral at bedtime    MEDICATIONS  (PRN):  ondansetron Injectable 4 milliGRAM(s) IV Push every 4 hours PRN Nausea and/or Vomiting        I&O's Summary    10 Sep 2022 07:01  -  11 Sep 2022 07:00  --------------------------------------------------------  IN: 585 mL / OUT: 0 mL / NET: 585 mL        PHYSICAL EXAM:  Vital Signs Last 24 Hrs  T(C): 37.4 (11 Sep 2022 09:35), Max: 37.4 (11 Sep 2022 05:28)  T(F): 99.4 (11 Sep 2022 09:35), Max: 99.4 (11 Sep 2022 09:35)  HR: 85 (11 Sep 2022 09:35) (74 - 87)  BP: 136/83 (11 Sep 2022 09:35) (107/73 - 136/83)  BP(mean): --  RR: 18 (11 Sep 2022 09:35) (18 - 19)  SpO2: 95% (11 Sep 2022 09:35) (93% - 98%)    Parameters below as of 11 Sep 2022 09:35  Patient On (Oxygen Delivery Method): room air          CONSTITUTIONAL: Non toxic appearing, lying comfortably in bed  ENMT: Moist oral mucosa, s/p hemicraniectomy scalp incision healing well (no drainage from incision site)  RESPIRATORY: Normal respiratory effort; lungs are clear to auscultation bilaterally  CARDIOVASCULAR: Regular rate and rhythm, normal S1 and S2, no murmur/rub/gallop; No lower extremity edema; Peripheral pulses are 2+ bilaterally  ABDOMEN: normoactive bowel sounds, no rebound/guarding; No hepatosplenomegaly, PEG site clean, dry (no excoriation or erythema)  MUSCULOSKELETAL: no clubbing or cyanosis of digits; no joint swelling, IV access site c/d. No infiltration   PSYCH: Awake, not following commands   NEUROLOGY: Moves all ext to command,   SKIN: No external lesions noted     LABS:                        11.6   3.31  )-----------( 195      ( 11 Sep 2022 06:44 )             34.2         149<H>  |  111<H>  |  19.9  ----------------------------<  113<H>  3.6   |  26.0  |  0.88    Ca    9.5      11 Sep 2022 06:44  Mg     1.9     09-10    TPro  6.7  /  Alb  4.2  /  TBili  0.4  /  DBili  x   /  AST  10  /  ALT  10  /  AlkPhos  59            Urinalysis Basic - ( 09 Sep 2022 21:35 )    Color: Yellow / Appearance: Clear / S.025 / pH: x  Gluc: x / Ketone: Moderate  / Bili: Negative / Urobili: Negative mg/dL   Blood: x / Protein: 15 / Nitrite: Negative   Leuk Esterase: Negative / RBC: 0-2 /HPF / WBC 0-2 /HPF   Sq Epi: x / Non Sq Epi: Occasional / Bacteria: x        Culture - Blood (collected 09 Sep 2022 11:50)  Source: .Blood Blood-Peripheral  Preliminary Report (10 Sep 2022 19:01):    No growth to date.    Culture - Blood (collected 09 Sep 2022 11:48)  Source: .Blood Blood-Peripheral  Preliminary Report (10 Sep 2022 19:01):    No growth to date.      CAPILLARY BLOOD GLUCOSE      POCT Blood Glucose.: 112 mg/dL (11 Sep 2022 08:27)  POCT Blood Glucose.: 108 mg/dL (11 Sep 2022 05:33)  POCT Blood Glucose.: 115 mg/dL (10 Sep 2022 23:56)  POCT Blood Glucose.: 120 mg/dL (10 Sep 2022 18:27)  POCT Blood Glucose.: 116 mg/dL (10 Sep 2022 12:47)        RADIOLOGY & ADDITIONAL TESTS:  Results Reviewed:   Imaging Personally Reviewed:  Electrocardiogram Personally Reviewed:

## 2022-09-11 NOTE — PROGRESS NOTE ADULT - ASSESSMENT
42y  Male initially admitted on 5/24/22 (BIBEMS after found unresponsive on street) with a GCS of 3, found to have b/l intraparenchymal bleeds, b/l SDH. Has had a complex prolonged hospital course including but not limited to right decompressive craniectomy on 5/24. Trach / PEG., cranioplasty on 6/29 with cognitive decline after initial improvement prompting imaging which noted subacute collection with air underneath the cranioplasty sight. MRI with increase in fluid collection underneath the flap leading to a repeat right craniectomy with wound exploration & evacuation of fluid collection on 7/17 with OR cultures isolating MSSA. 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 Has had interval trach decannulation and dietary advancement to pureed. Now s/p completion of appropriate antimicrobial course with f/u imaging (9/3) revealing no abnormal enhancement (6 mm midline shift to left). After being optimzed and cleared for surgery had a fever (9/9/22). Infectious w/u in progress to evaluate etiology of fever     Traumatic brain injury with complicated hospital course (as above) now with periodic agitation and lethargy  - Neurologically significantly improved however per family recently more lethargic since standing Seroquel initiation   - DC Seroquel for now  - Will order mittens PRN for pt safety at night if needed and if indicated, low threshold for 1:1 (nocturnal as family at bedside during day hours)  - Continue Memantine 5 mg BID   - Continue Keppra 500 mg BID   - Cranioplasty tentative next week, however with febrile episode 9/9   - Currently infectious w/u in progress, Dopplers neg for DVT     R pubic rami fx  - subacute  - incidentally found on CT  - Ortho consulted    Fever  - No sepsis, no localizing signs  - Prelim my read : CXR : normal   - Lactate 1.0  - Blood cultures in progress  - UA neg  - CT head / Chest/Abd pelvis reviewed  - Hold off Abx for now, start if any change in clinical status concerning for infection   - Dopplers Upper and lower ext b/l to rule out thrombosis as cause for fever   - Covid PCR neg, resp viral panel ordered to e/o alt viruses   - d/w ID, will follow recs. (prior tissue cultures 7/18 with MSSA, fluid culture with MSSA and MRSE)      SDH/ IPH / TBI , CNS infection  - Right decompressive hemicraniectomy on 5/24,  - Completed Nafcillin /  Vanc, post completion with MRI (w/IV con) w/o abnormal enhancement  - ID on board  - Helmet OOB    Oropharyngeal Dysphagia  - on TF / purée diet , restart free water / TF given unreliable PO intake   - Strict aspiration precautions     Hyponatremia  - Central SIADH , persistently low and then overcorrected  - Now improved  - Monitor closely  - (Urea and NaCL dc'd)  - d/w Renal for any recs prior to cranioplasty IF Na low/high    Substance abuse   - Cocaine pos on admission   - Alcoholism  - Was on MVI/ folic acid / Thiamine for presumed thiamine deficiency     Anemia   - resolved    DVT prophylaxis  Lovenox    Dispo - Awaiting cranioplasty

## 2022-09-11 NOTE — CONSULT NOTE ADULT - NS ATTEND AMEND GEN_ALL_CORE FT
incidental finding of pubic rami fx on CT, subacute. recommend ap pelvis. no acute intervention at this time

## 2022-09-12 LAB
ALBUMIN SERPL ELPH-MCNC: 4 G/DL — SIGNIFICANT CHANGE UP (ref 3.3–5.2)
ALP SERPL-CCNC: 59 U/L — SIGNIFICANT CHANGE UP (ref 40–120)
ALT FLD-CCNC: 12 U/L — SIGNIFICANT CHANGE UP
ANION GAP SERPL CALC-SCNC: 12 MMOL/L — SIGNIFICANT CHANGE UP (ref 5–17)
AST SERPL-CCNC: 15 U/L — SIGNIFICANT CHANGE UP
BILIRUB SERPL-MCNC: 0.3 MG/DL — LOW (ref 0.4–2)
BUN SERPL-MCNC: 21.3 MG/DL — HIGH (ref 8–20)
CALCIUM SERPL-MCNC: 9.1 MG/DL — SIGNIFICANT CHANGE UP (ref 8.4–10.5)
CHLORIDE SERPL-SCNC: 112 MMOL/L — HIGH (ref 98–107)
CO2 SERPL-SCNC: 25 MMOL/L — SIGNIFICANT CHANGE UP (ref 22–29)
CREAT SERPL-MCNC: 0.79 MG/DL — SIGNIFICANT CHANGE UP (ref 0.5–1.3)
EGFR: 114 ML/MIN/1.73M2 — SIGNIFICANT CHANGE UP
GLUCOSE BLDC GLUCOMTR-MCNC: 105 MG/DL — HIGH (ref 70–99)
GLUCOSE BLDC GLUCOMTR-MCNC: 111 MG/DL — HIGH (ref 70–99)
GLUCOSE BLDC GLUCOMTR-MCNC: 111 MG/DL — HIGH (ref 70–99)
GLUCOSE BLDC GLUCOMTR-MCNC: 128 MG/DL — HIGH (ref 70–99)
GLUCOSE BLDC GLUCOMTR-MCNC: 130 MG/DL — HIGH (ref 70–99)
GLUCOSE SERPL-MCNC: 120 MG/DL — HIGH (ref 70–99)
HCT VFR BLD CALC: 33.8 % — LOW (ref 39–50)
HGB BLD-MCNC: 11.5 G/DL — LOW (ref 13–17)
MCHC RBC-ENTMCNC: 28 PG — SIGNIFICANT CHANGE UP (ref 27–34)
MCHC RBC-ENTMCNC: 34 GM/DL — SIGNIFICANT CHANGE UP (ref 32–36)
MCV RBC AUTO: 82.4 FL — SIGNIFICANT CHANGE UP (ref 80–100)
PLATELET # BLD AUTO: 183 K/UL — SIGNIFICANT CHANGE UP (ref 150–400)
POTASSIUM SERPL-MCNC: 3.6 MMOL/L — SIGNIFICANT CHANGE UP (ref 3.5–5.3)
POTASSIUM SERPL-SCNC: 3.6 MMOL/L — SIGNIFICANT CHANGE UP (ref 3.5–5.3)
PROT SERPL-MCNC: 6.6 G/DL — SIGNIFICANT CHANGE UP (ref 6.6–8.7)
RBC # BLD: 4.1 M/UL — LOW (ref 4.2–5.8)
RBC # FLD: 13.7 % — SIGNIFICANT CHANGE UP (ref 10.3–14.5)
SODIUM SERPL-SCNC: 149 MMOL/L — HIGH (ref 135–145)
WBC # BLD: 3.49 K/UL — LOW (ref 3.8–10.5)
WBC # FLD AUTO: 3.49 K/UL — LOW (ref 3.8–10.5)

## 2022-09-12 PROCEDURE — 99233 SBSQ HOSP IP/OBS HIGH 50: CPT

## 2022-09-12 PROCEDURE — 72170 X-RAY EXAM OF PELVIS: CPT | Mod: 26

## 2022-09-12 RX ORDER — SODIUM CHLORIDE 9 MG/ML
1000 INJECTION, SOLUTION INTRAVENOUS
Refills: 0 | Status: DISCONTINUED | OUTPATIENT
Start: 2022-09-12 | End: 2022-09-13

## 2022-09-12 RX ADMIN — Medication 5 MILLIGRAM(S): at 22:02

## 2022-09-12 RX ADMIN — DIPHENHYDRAMINE HYDROCHLORIDE AND LIDOCAINE HYDROCHLORIDE AND ALUMINUM HYDROXIDE AND MAGNESIUM HYDRO 5 MILLILITER(S): KIT at 17:27

## 2022-09-12 RX ADMIN — MEMANTINE HYDROCHLORIDE 5 MILLIGRAM(S): 10 TABLET ORAL at 05:17

## 2022-09-12 RX ADMIN — SODIUM CHLORIDE 100 MILLILITER(S): 9 INJECTION, SOLUTION INTRAVENOUS at 14:14

## 2022-09-12 RX ADMIN — CHLORHEXIDINE GLUCONATE 1 APPLICATION(S): 213 SOLUTION TOPICAL at 11:51

## 2022-09-12 RX ADMIN — DIPHENHYDRAMINE HYDROCHLORIDE AND LIDOCAINE HYDROCHLORIDE AND ALUMINUM HYDROXIDE AND MAGNESIUM HYDRO 5 MILLILITER(S): KIT at 05:18

## 2022-09-12 RX ADMIN — LEVETIRACETAM 420 MILLIGRAM(S): 250 TABLET, FILM COATED ORAL at 05:18

## 2022-09-12 RX ADMIN — DIPHENHYDRAMINE HYDROCHLORIDE AND LIDOCAINE HYDROCHLORIDE AND ALUMINUM HYDROXIDE AND MAGNESIUM HYDRO 5 MILLILITER(S): KIT at 11:51

## 2022-09-12 RX ADMIN — MEMANTINE HYDROCHLORIDE 5 MILLIGRAM(S): 10 TABLET ORAL at 17:27

## 2022-09-12 RX ADMIN — SODIUM CHLORIDE 100 MILLILITER(S): 9 INJECTION, SOLUTION INTRAVENOUS at 22:02

## 2022-09-12 RX ADMIN — LEVETIRACETAM 420 MILLIGRAM(S): 250 TABLET, FILM COATED ORAL at 17:27

## 2022-09-12 RX ADMIN — SENNA PLUS 2 TABLET(S): 8.6 TABLET ORAL at 22:02

## 2022-09-12 RX ADMIN — ENOXAPARIN SODIUM 40 MILLIGRAM(S): 100 INJECTION SUBCUTANEOUS at 17:27

## 2022-09-12 NOTE — PROGRESS NOTE ADULT - SUBJECTIVE AND OBJECTIVE BOX
[facility]  INFECTIOUS DISEASES at [place] and [place]  =======================================================                               Nestor Russo MD#  Mitesh Woodward MD*                                     Nyla Orantes MD*    Vera Bishop MD*            Diplomates American Board of Internal Medicine & Infectious Diseases                # [place] Office - Appt - Tel  [phone] Fax [phone]                * [place] Office - Appt - Tel [phone] Fax [phone]                                  Hospital Consult line:  [phone]  =======================================================    PASTOR KEMAR [id number]    Follow up: Epidural fluid collection     No fever   Last fever 9/10      Allergies:  Allergy Status Unknown      REVIEW OF SYSTEMS:  Unable to obtain due to medical condition       Physical Exam:  GEN: Lethargic   HEENT: indent at craniectomy site.  Anicteric   NECK: Supple.   LUNGS: CTA B/L  HEART: Regular rate and rhythm   ABDOMEN: Soft, nontender, and nondistended.  Positive bowel sounds.    : [place]   EXTREMITIES: trace edema.  MSK: no joint swelling  NEUROLOGIC: unable to assess   PSYCHIATRIC: unable to assess  SKIN: No Rash      Vitals:    T(F): 98.2 (12 Sep 2022 04:57), Max: 98.3 (11 Sep 2022 18:31)  HR: 82 (12 Sep 2022 04:57)  BP: 107/71 (12 Sep 2022 04:57)  RR: 18 (12 Sep 2022 04:57)  SpO2: 97% (12 Sep 2022 04:57) (97% - 98%)  temp max in last 48H T(F): , Max: 99.4 (09-11-22 @ 09:35)    Current Antibiotics:    Other medications:  chlorhexidine 2% Cloths 1 Application(s) Topical daily  enoxaparin Injectable 40 milliGRAM(s) SubCutaneous every 24 hours  FIRST- Mouthwash  BLM 5 milliLiter(s) Swish and Swallow four times a day  levETIRAcetam  IVPB 500 milliGRAM(s) IV Intermittent every 12 hours  melatonin 5 milliGRAM(s) Oral at bedtime  memantine 5 milliGRAM(s) Oral two times a day  senna 2 Tablet(s) Oral at bedtime                            11.5   3.49  )-----------( 183      ( 12 Sep 2022 05:52 )             33.8     09-12    149<H>  |  112<H>  |  21.3<H>  ----------------------------<  120<H>  3.6   |  25.0  |  0.79    Ca    9.1      12 Sep 2022 05:52    TPro  6.6  /  Alb  4.0  /  TBili  0.3<L>  /  DBili  x   /  AST  15  /  ALT  12  /  AlkPhos  59  09-12    RECENT CULTURES:  09-10 @ 18:30    RUST    09-09 @ 11:50 .Blood Blood-Peripheral     No growth to date.    09-09 @ 11:48 .Blood Blood-Peripheral     No growth to date.      WBC Count: 3.49 K/uL (09-12-22 @ 05:52)  WBC Count: 3.31 K/uL (09-11-22 @ 06:44)  WBC Count: 5.17 K/uL (09-10-22 @ 06:57)  WBC Count: 3.12 K/uL (09-09-22 @ 07:17)    Creatinine, Serum: 0.79 mg/dL (09-12-22 @ 05:52)  Creatinine, Serum: 0.88 mg/dL (09-11-22 @ 06:44)  Creatinine, Serum: 0.96 mg/dL (09-10-22 @ 06:57)  Creatinine, Serum: 1.02 mg/dL (09-09-22 @ 07:17)    Procalcitonin, Serum: 0.08 ng/mL (09-11-22 @ 06:44)  Procalcitonin, Serum: 0.06 ng/mL (09-03-22 @ 05:30)     SARS-CoV-2: [facility] (09-10-22 @ 18:30)  COVID-19 PCR: NotDetec (09-10-22 @ 05:00)  COVID-19 PCR: NotDetec (09-04-22 @ 08:37)  COVID-19 PCR: NotDetec (08-29-22 @ 04:00)  COVID-19 PCR: NotDetec (08-23-22 @ 06:30)  COVID-19 PCR: NotDetec (08-18-22 @ 07:36)        < from: CT Abdomen and Pelvis No Cont (09.10.22 @ 14:26) >  ACC: [id number] EXAM:  CT ABDOMEN AND PELVIS                        ACC: [id number] EXAM:  CT CHEST                          PROCEDURE DATE:  09/10/2022    INTERPRETATION:  CLINICAL INFORMATION: 42-year-old male patient with   concern with aspiration pneumonia.    COMPARISON: CT chest abdomen pelvis 5/24/2022.    CONTRAST/COMPLICATIONS:  IV Contrast: None  Oral Contrast: None  Complications: None    PROCEDURE:  CT of the Chest, Abdomen and Pelvis was performed.  Sagittal and coronal reformatswere performed.    FINDINGS:  CHEST:  LUNGS AND LARGE AIRWAYS: Patent central airways. 4 mm right upper lobe   and 4 mm middle lobe calcified granuloma. Otherwise no pulmonary nodules.   No pulmonary consolidative opacity. No groundglass opacity.  PLEURA: No pleural effusion.  VESSELS: The ascending thoracic aorta measures 2.9 cm. Pulmonary trunk   measures 2.6 cm, both within normal limits.  HEART: Heart size is normal. No pericardial effusion.  MEDIASTINUM AND DEBBIE: No lymphadenopathy. Stable 0.5 x 1.4 cm lymph node   in the aortopulmonary window.  CHEST WALL AND LOWER NECK: Thyroid gland within normal limits.    ABDOMEN AND PELVIS:  LIVER: Within normal limits. No focal liver lesions.  BILE DUCTS: Normal caliber.  GALLBLADDER: Within normal limits.  SPLEEN: Within normal limits.  PANCREAS: Within normal limits.  ADRENALS: Within normal limits. No nodules or masses.  KIDNEYS/URETERS: 6 mm left kidney upper pole nonobstructing renal   calculus, additional punctate lower pole left renal calculi and right   upper pole 4 mm nonobstructing renal calculus.    BLADDER: Within normal limits.  REPRODUCTIVE ORGANS: Prostate within normal limits. Left pelvic   phleboliths.    BOWEL: Normal appearance of the esophagus. The stomach is not distended.   There is a PEG tube in correct positioning. Small bowel loops are not   abnormally distended. No bowel obstruction. Appendix is normal. Moderate   stool burden in the distal colon.  PERITONEUM: No ascites. No pneumoperitoneum.  VESSELS: Within normal limits.  RETROPERITONEUM/LYMPH NODES: No lymphadenopathy.  ABDOMINAL WALL: Within normal limits.  BONES: Right lateral superior pubic ramus and inferior pubic ramus non   displaced subacute fracture with initial callus formation. No aggressive   lytic or blastic bony lesions. No compression deformities.    IMPRESSION:  No evidence of pneumonia.    No evidence of bowel obstruction. Correctly positioned PEG tube.    No acute findings in the chest, abdomen or pelvis. Bilateral   nonobstructingnephroliths.    Subacute fractures in the right lateral pubic rami.    --- End of Report ---  < end of copied text >

## 2022-09-12 NOTE — PROGRESS NOTE ADULT - ASSESSMENT
42y  Male initially admitted on 5/24/22 after being found down and was found to have at that time Bilateral IPH, Bilateral SDH. Prolonged hospital course: s/p craniotomy and evacuation of SDH 5/24, s/p trach 6/1, s/p cranioplasty and replacement of bone flap 6/29, s/p R hemicraniectomy, wound exploration, evacuation of epidural fluid collection 7/17.      Epidural fluid collection   s/p R hemicraniectomy, wound exploration, evacuation of epidural fluid collection 7/17  Fever episode 9/10    - Blood cultures 9/9 no growth   - fluid cultures 7/18 reporting Staphylococcus aureus (MSSA) and 1 culture with Staph Epi (MRSE)  - Completed Nafcillin and Vancomycin 9/1/22 (6 weeks)   - CT A/P 9/10 with no acute findings   - MRI brain with contrast 9/2 reporting no enhancement   - Would repeat Brain MRI   - Trend Fever  - Trend WBC      Will Follow

## 2022-09-12 NOTE — PROGRESS NOTE ADULT - SUBJECTIVE AND OBJECTIVE BOX
[facility] Division of Hospital Medicine  Orlin Yanes MD    Chief Complaint:  Patient is a 42y old  Male who presents with a chief complaint of Trauma/ Subdural/ Intubated (11 Sep 2022 18:33)      SUBJECTIVE / OVERNIGHT EVENTS:  Patient seen and examined at bedside. Remains afebrile. Unable to obtain ROS.    MEDICATIONS  (STANDING):  chlorhexidine 2% Cloths 1 Application(s) Topical daily  enoxaparin Injectable 40 milliGRAM(s) SubCutaneous every 24 hours  FIRST- Mouthwash  BLM 5 milliLiter(s) Swish and Swallow four times a day  levETIRAcetam  IVPB 500 milliGRAM(s) IV Intermittent every 12 hours  melatonin 5 milliGRAM(s) Oral at bedtime  memantine 5 milliGRAM(s) Oral two times a day  senna 2 Tablet(s) Oral at bedtime  sodium chloride 0.45%. 1000 milliLiter(s) (100 mL/Hr) IV Continuous <Continuous>    MEDICATIONS  (PRN):  ondansetron Injectable 4 milliGRAM(s) IV Push every 4 hours PRN Nausea and/or Vomiting        I&O's Summary    11 Sep 2022 07:01  -  12 Sep 2022 07:00  --------------------------------------------------------  IN: 1050 mL / OUT: 0 mL / NET: 1050 mL        PHYSICAL EXAM:  Vital Signs Last 24 Hrs  T(C): 36.5 (12 Sep 2022 11:43), Max: 36.8 (11 Sep 2022 18:31)  T(F): 97.7 (12 Sep 2022 11:43), Max: 98.3 (11 Sep 2022 18:31)  HR: 69 (12 Sep 2022 11:43) (69 - 82)  BP: 107/71 (12 Sep 2022 11:43) (107/71 - 139/89)  BP(mean): --  RR: 18 (12 Sep 2022 11:43) (17 - 18)  SpO2: 97% (12 Sep 2022 11:43) (97% - 98%)    Parameters below as of 12 Sep 2022 11:43  Patient On (Oxygen Delivery Method): room air      CONSTITUTIONAL: Non toxic appearing, lying comfortably in bed  ENMT: Moist oral mucosa, s/p hemicraniectomy scalp incision healing well (no drainage from incision site)  RESPIRATORY: Normal respiratory effort; lungs are clear to auscultation bilaterally  CARDIOVASCULAR: Regular rate and rhythm, normal S1 and S2, no murmur/rub/gallop; No lower extremity edema; Peripheral pulses are 2+ bilaterally  ABDOMEN: normoactive bowel sounds, no rebound/guarding; No hepatosplenomegaly, PEG site clean, dry (no excoriation or erythema)  MUSCULOSKELETAL: no clubbing or cyanosis of digits; no joint swelling, IV access site c/d. No infiltration   PSYCH: Awake, not following commands   NEUROLOGY: Moves all ext to command,   SKIN: No external lesions noted       LABS:                        11.5   3.49  )-----------( 183      ( 12 Sep 2022 05:52 )             33.8     09-12    149<H>  |  112<H>  |  21.3<H>  ----------------------------<  120<H>  3.6   |  25.0  |  0.79    Ca    9.1      12 Sep 2022 05:52    TPro  6.6  /  Alb  4.0  /  TBili  0.3<L>  /  DBili  x   /  AST  15  /  ALT  12  /  AlkPhos  59  09-12              CAPILLARY BLOOD GLUCOSE      POCT Blood Glucose.: 130 mg/dL (12 Sep 2022 08:19)  POCT Blood Glucose.: 111 mg/dL (12 Sep 2022 05:25)  POCT Blood Glucose.: 111 mg/dL (12 Sep 2022 01:33)  POCT Blood Glucose.: 118 mg/dL (11 Sep 2022 18:07)  POCT Blood Glucose.: 108 mg/dL (11 Sep 2022 12:39)        RADIOLOGY & ADDITIONAL TESTS:  Results Reviewed:   Imaging Personally Reviewed:  Electrocardiogram Personally Reviewed:

## 2022-09-12 NOTE — PROGRESS NOTE ADULT - ASSESSMENT
42y  Male initially admitted on 5/24/22 (BIBEMS after found unresponsive on street) with a GCS of 3, found to have b/l intraparenchymal bleeds, b/l SDH. Has had a complex prolonged hospital course including but not limited to right decompressive craniectomy on 5/24. Trach / PEG., cranioplasty on 6/29 with cognitive decline after initial improvement prompting imaging which noted subacute collection with air underneath the cranioplasty sight. MRI with increase in fluid collection underneath the flap leading to a repeat right craniectomy with wound exploration & evacuation of fluid collection on 7/17 with OR cultures isolating MSSA. 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 Has had interval trach decannulation and dietary advancement to pureed. Now s/p completion of appropriate antimicrobial course with f/u imaging (9/3) revealing no abnormal enhancement (6 mm midline shift to left). After being optimzed and cleared for surgery had a fever (9/9/22). Infectious w/u in progress to evaluate etiology of fever     Traumatic brain injury with complicated hospital course (as above) now with periodic agitation and lethargy  - Neurologically significantly improved however per family recently more lethargic since standing Seroquel initiation   - DC Seroquel for now  - Will order mittens PRN for pt safety at night if needed and if indicated, low threshold for 1:1 (nocturnal as family at bedside during day hours)  - Continue Memantine 5 mg BID   - Continue Keppra 500 mg BID   - Cranioplasty tentative next week, however with febrile episode 9/9   - Currently infectious w/u in progress, Dopplers neg for DVT     R pubic rami fx  - subacute  - incidentally found on CT  - Ortho consult appreciated  - WBAT, Pain control    Fever  - No sepsis, no localizing signs  - Prelim my read : CXR : normal   - Lactate 1.0  - Blood cultures in progress  - UA neg  - CT head / Chest/Abd pelvis reviewed  - Hold off Abx for now, start if any change in clinical status concerning for infection   - Dopplers Upper and lower ext b/l to rule out thrombosis as cause for fever   - Covid PCR neg, resp viral panel ordered to e/o alt viruses   - ID following  - Discussed with ID on 9/12, will observe off antibiotics, has not had a fever since 9/10. Will hold off on MRI Brain for now.      SDH/ IPH / TBI , CNS infection  - Right decompressive hemicraniectomy on 5/24,  - Completed Nafcillin /  Vanc, post completion with MRI (w/IV con) w/o abnormal enhancement  - ID on board  - Helmet OOB    Oropharyngeal Dysphagia  - on TF / purée diet , restart free water / TF given unreliable PO intake   - Strict aspiration precautions     Hyponatremia  - Central SIADH , persistently low and then overcorrected  - Now improved  - Monitor closely  - (Urea and NaCL dc'd)  - d/w Renal for any recs prior to cranioplasty IF Na low/high    Substance abuse   - Cocaine pos on admission   - Alcoholism  - Was on MVI/ folic acid / Thiamine for presumed thiamine deficiency     Anemia   - resolved    DVT prophylaxis  Lovenox    Dispo - Awaiting cranioplasty

## 2022-09-13 LAB
ANION GAP SERPL CALC-SCNC: 10 MMOL/L — SIGNIFICANT CHANGE UP (ref 5–17)
BUN SERPL-MCNC: 17.3 MG/DL — SIGNIFICANT CHANGE UP (ref 8–20)
CALCIUM SERPL-MCNC: 8.6 MG/DL — SIGNIFICANT CHANGE UP (ref 8.4–10.5)
CHLORIDE SERPL-SCNC: 105 MMOL/L — SIGNIFICANT CHANGE UP (ref 98–107)
CO2 SERPL-SCNC: 26 MMOL/L — SIGNIFICANT CHANGE UP (ref 22–29)
CREAT SERPL-MCNC: 0.58 MG/DL — SIGNIFICANT CHANGE UP (ref 0.5–1.3)
EGFR: 125 ML/MIN/1.73M2 — SIGNIFICANT CHANGE UP
GLUCOSE BLDC GLUCOMTR-MCNC: 109 MG/DL — HIGH (ref 70–99)
GLUCOSE BLDC GLUCOMTR-MCNC: 118 MG/DL — HIGH (ref 70–99)
GLUCOSE BLDC GLUCOMTR-MCNC: 131 MG/DL — HIGH (ref 70–99)
GLUCOSE BLDC GLUCOMTR-MCNC: 95 MG/DL — SIGNIFICANT CHANGE UP (ref 70–99)
GLUCOSE SERPL-MCNC: 120 MG/DL — HIGH (ref 70–99)
HCT VFR BLD CALC: 33.1 % — LOW (ref 39–50)
HGB BLD-MCNC: 11 G/DL — LOW (ref 13–17)
MCHC RBC-ENTMCNC: 27.6 PG — SIGNIFICANT CHANGE UP (ref 27–34)
MCHC RBC-ENTMCNC: 33.2 GM/DL — SIGNIFICANT CHANGE UP (ref 32–36)
MCV RBC AUTO: 83.2 FL — SIGNIFICANT CHANGE UP (ref 80–100)
PLATELET # BLD AUTO: 139 K/UL — LOW (ref 150–400)
POTASSIUM SERPL-MCNC: 3.7 MMOL/L — SIGNIFICANT CHANGE UP (ref 3.5–5.3)
POTASSIUM SERPL-SCNC: 3.7 MMOL/L — SIGNIFICANT CHANGE UP (ref 3.5–5.3)
RBC # BLD: 3.98 M/UL — LOW (ref 4.2–5.8)
RBC # FLD: 13.6 % — SIGNIFICANT CHANGE UP (ref 10.3–14.5)
SODIUM SERPL-SCNC: 141 MMOL/L — SIGNIFICANT CHANGE UP (ref 135–145)
WBC # BLD: 3.75 K/UL — LOW (ref 3.8–10.5)
WBC # FLD AUTO: 3.75 K/UL — LOW (ref 3.8–10.5)

## 2022-09-13 PROCEDURE — 99233 SBSQ HOSP IP/OBS HIGH 50: CPT

## 2022-09-13 PROCEDURE — 99232 SBSQ HOSP IP/OBS MODERATE 35: CPT

## 2022-09-13 RX ADMIN — Medication 5 MILLIGRAM(S): at 21:56

## 2022-09-13 RX ADMIN — LEVETIRACETAM 420 MILLIGRAM(S): 250 TABLET, FILM COATED ORAL at 05:48

## 2022-09-13 RX ADMIN — DIPHENHYDRAMINE HYDROCHLORIDE AND LIDOCAINE HYDROCHLORIDE AND ALUMINUM HYDROXIDE AND MAGNESIUM HYDRO 5 MILLILITER(S): KIT at 05:47

## 2022-09-13 RX ADMIN — MEMANTINE HYDROCHLORIDE 5 MILLIGRAM(S): 10 TABLET ORAL at 17:22

## 2022-09-13 RX ADMIN — SENNA PLUS 2 TABLET(S): 8.6 TABLET ORAL at 21:56

## 2022-09-13 RX ADMIN — DIPHENHYDRAMINE HYDROCHLORIDE AND LIDOCAINE HYDROCHLORIDE AND ALUMINUM HYDROXIDE AND MAGNESIUM HYDRO 5 MILLILITER(S): KIT at 01:55

## 2022-09-13 RX ADMIN — MEMANTINE HYDROCHLORIDE 5 MILLIGRAM(S): 10 TABLET ORAL at 05:48

## 2022-09-13 RX ADMIN — LEVETIRACETAM 420 MILLIGRAM(S): 250 TABLET, FILM COATED ORAL at 17:22

## 2022-09-13 RX ADMIN — ENOXAPARIN SODIUM 40 MILLIGRAM(S): 100 INJECTION SUBCUTANEOUS at 17:22

## 2022-09-13 RX ADMIN — CHLORHEXIDINE GLUCONATE 1 APPLICATION(S): 213 SOLUTION TOPICAL at 11:24

## 2022-09-13 RX ADMIN — DIPHENHYDRAMINE HYDROCHLORIDE AND LIDOCAINE HYDROCHLORIDE AND ALUMINUM HYDROXIDE AND MAGNESIUM HYDRO 5 MILLILITER(S): KIT at 17:22

## 2022-09-13 RX ADMIN — DIPHENHYDRAMINE HYDROCHLORIDE AND LIDOCAINE HYDROCHLORIDE AND ALUMINUM HYDROXIDE AND MAGNESIUM HYDRO 5 MILLILITER(S): KIT at 11:22

## 2022-09-13 NOTE — PROGRESS NOTE ADULT - SUBJECTIVE AND OBJECTIVE BOX
[facility] Division of Hospital Medicine  Orlin Yanes MD    Chief Complaint:  Patient is a 42y old  Male who presents with a chief complaint of Trauma/ Subdural/ Intubated (12 Sep 2022 11:58)      SUBJECTIVE / OVERNIGHT EVENTS:  Patient seen and examined at bedside. Unable to obtain ROS due to mental status.    MEDICATIONS  (STANDING):  chlorhexidine 2% Cloths 1 Application(s) Topical daily  enoxaparin Injectable 40 milliGRAM(s) SubCutaneous every 24 hours  FIRST- Mouthwash  BLM 5 milliLiter(s) Swish and Swallow four times a day  levETIRAcetam  IVPB 500 milliGRAM(s) IV Intermittent every 12 hours  melatonin 5 milliGRAM(s) Oral at bedtime  memantine 5 milliGRAM(s) Oral two times a day  senna 2 Tablet(s) Oral at bedtime  sodium chloride 0.45%. 1000 milliLiter(s) (100 mL/Hr) IV Continuous <Continuous>    MEDICATIONS  (PRN):  ondansetron Injectable 4 milliGRAM(s) IV Push every 4 hours PRN Nausea and/or Vomiting        I&O's Summary      PHYSICAL EXAM:  Vital Signs Last 24 Hrs  T(C): 36.8 (13 Sep 2022 10:19), Max: 36.8 (12 Sep 2022 19:51)  T(F): 98.2 (13 Sep 2022 10:19), Max: 98.3 (12 Sep 2022 19:51)  HR: 68 (13 Sep 2022 10:19) (68 - 78)  BP: 125/76 (13 Sep 2022 10:19) (103/67 - 125/76)  BP(mean): --  RR: 18 (13 Sep 2022 10:19) (16 - 18)  SpO2: 96% (13 Sep 2022 10:19) (96% - 99%)    Parameters below as of 13 Sep 2022 10:19  Patient On (Oxygen Delivery Method): room air      CONSTITUTIONAL: Non toxic appearing, lying comfortably in bed  ENMT: Moist oral mucosa, s/p hemicraniectomy scalp incision healing well (no drainage from incision site)  RESPIRATORY: Normal respiratory effort; lungs are clear to auscultation bilaterally  CARDIOVASCULAR: Regular rate and rhythm, normal S1 and S2, no murmur/rub/gallop; No lower extremity edema; Peripheral pulses are 2+ bilaterally  ABDOMEN: normoactive bowel sounds, no rebound/guarding; No hepatosplenomegaly, PEG site clean, dry (no excoriation or erythema)  MUSCULOSKELETAL: no clubbing or cyanosis of digits; no joint swelling, IV access site c/d. No infiltration   PSYCH: Awake, not following commands   NEUROLOGY: Moves all ext to command,   SKIN: No external lesions noted     LABS:                        11.0   3.75  )-----------( x        ( 13 Sep 2022 11:08 )             33.1     09-12    149<H>  |  112<H>  |  21.3<H>  ----------------------------<  120<H>  3.6   |  25.0  |  0.79    Ca    9.1      12 Sep 2022 05:52    TPro  6.6  /  Alb  4.0  /  TBili  0.3<L>  /  DBili  x   /  AST  15  /  ALT  12  /  AlkPhos  59  09-12              CAPILLARY BLOOD GLUCOSE      POCT Blood Glucose.: 109 mg/dL (13 Sep 2022 05:47)  POCT Blood Glucose.: 131 mg/dL (13 Sep 2022 00:54)  POCT Blood Glucose.: 105 mg/dL (12 Sep 2022 18:19)  POCT Blood Glucose.: 128 mg/dL (12 Sep 2022 12:26)        RADIOLOGY & ADDITIONAL TESTS:  Results Reviewed:   Imaging Personally Reviewed:  Electrocardiogram Personally Reviewed:

## 2022-09-13 NOTE — PROGRESS NOTE ADULT - ASSESSMENT
42y  Male initially admitted on 5/24/22 (BIBEMS after found unresponsive on street) with a GCS of 3, found to have b/l intraparenchymal bleeds, b/l SDH. Has had a complex prolonged hospital course including but not limited to right decompressive craniectomy on 5/24. Trach / PEG., cranioplasty on 6/29 with cognitive decline after initial improvement prompting imaging which noted subacute collection with air underneath the cranioplasty sight. MRI with increase in fluid collection underneath the flap leading to a repeat right craniectomy with wound exploration & evacuation of fluid collection on 7/17 with OR cultures isolating MSSA. 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 Has had interval trach decannulation and dietary advancement to pureed. Now s/p completion of appropriate antimicrobial course with f/u imaging (9/3) revealing no abnormal enhancement (6 mm midline shift to left). After being optimzed and cleared for surgery had a fever (9/9/22). Infectious w/u in progress to evaluate etiology of fever     Traumatic brain injury with complicated hospital course (as above) now with periodic agitation and lethargy  - Neurologically significantly improved however per family recently more lethargic since standing Seroquel initiation   - DC Seroquel for now  - Will order mittens PRN for pt safety at night if needed and if indicated, low threshold for 1:1 (nocturnal as family at bedside during day hours)  - Continue Memantine 5 mg BID   - Continue Keppra 500 mg BID   - Cranioplasty tentatively planned for this week, however with febrile episode 9/9  - Now fever free since 9/10, will follow up with ID regarding clearance for surgery.    R pubic rami fx  - subacute  - incidentally found on CT  - Ortho consult appreciated  - WBAT, Pain control    Fever  - No sepsis, no localizing signs  - Prelim my read : CXR : normal   - Lactate 1.0  - Blood cultures in progress  - UA neg  - CT head / Chest/Abd pelvis reviewed  - Hold off Abx for now, start if any change in clinical status concerning for infection   - Dopplers Upper and lower ext b/l to rule out thrombosis as cause for fever   - Covid PCR neg, resp viral panel ordered to e/o alt viruses   - ID following  - Discussed with ID on 9/12, will observe off antibiotics, has not had a fever since 9/10. Will hold off on MRI Brain for now.      SDH/ IPH / TBI , CNS infection  - Right decompressive hemicraniectomy on 5/24,  - Completed Nafcillin /  Vanc, post completion with MRI (w/IV con) w/o abnormal enhancement  - ID on board  - Helmet OOB    Oropharyngeal Dysphagia  - on TF / purée diet , restart free water / TF given unreliable PO intake   - Strict aspiration precautions     Hyponatremia  - Central SIADH , persistently low and then overcorrected  - Now improved  - Monitor closely  - (Urea and NaCL dc'd)  - d/w Renal for any recs prior to cranioplasty IF Na low/high    Substance abuse   - Cocaine pos on admission   - Alcoholism  - Was on MVI/ folic acid / Thiamine for presumed thiamine deficiency     Anemia   - resolved    DVT prophylaxis  Lovenox    Dispo - Awaiting cranioplasty     Patient's mother and father (Rama and Manjeet) updated in length on 9/12 regarding plan of care with . Questions were answered to their satisfaction. Received call from patient's sister today that her parents stated that they had not been updated or met with the doctor. It appears that family has trouble discerning information despite the use of . Daughter was updated and satisfied with answers. She agrees to be the point of contact to clarify information to the family.

## 2022-09-13 NOTE — PROGRESS NOTE ADULT - SUBJECTIVE AND OBJECTIVE BOX
[facility]  INFECTIOUS DISEASES at [place] and [place]  =======================================================                               Nestor Russo MD#  Mitesh Woodward MD*                                     Nyla Orantes MD*    Vera Bishop MD*            Diplomates American Board of Internal Medicine & Infectious Diseases                # [place] Office - Appt - Tel  [phone] Fax [phone]                * [place] Office - Appt - Tel [phone] Fax [phone]                                  Hospital Consult line:  [phone]  =======================================================    PASTOR KEMAR [id number]    Follow up: Epidural fluid collection     No fever   Last fever 9/10      Allergies:  Allergy Status Unknown      REVIEW OF SYSTEMS:  Unable to obtain due to medical condition       Physical Exam:  GEN: Lethargic   HEENT: indent at craniectomy site.  Anicteric   NECK: Supple.   LUNGS: CTA B/L  HEART: Regular rate and rhythm   ABDOMEN: Soft, nontender, and nondistended.  Positive bowel sounds.    : Howard   EXTREMITIES: trace edema.  MSK: no joint swelling  NEUROLOGIC: unable to assess   PSYCHIATRIC: unable to assess  SKIN: No Rash      Vitals:    T(F): 98.2 (13 Sep 2022 10:19), Max: 98.3 (12 Sep 2022 19:51)  HR: 68 (13 Sep 2022 10:19)  BP: 125/76 (13 Sep 2022 10:19)  RR: 18 (13 Sep 2022 10:19)  SpO2: 96% (13 Sep 2022 10:19) (96% - 99%)  temp max in last 48H T(F): , Max: 98.3 (09-11-22 @ 18:31)    Current Antibiotics:    Other medications:  chlorhexidine 2% Cloths 1 Application(s) Topical daily  enoxaparin Injectable 40 milliGRAM(s) SubCutaneous every 24 hours  FIRST- Mouthwash  BLM 5 milliLiter(s) Swish and Swallow four times a day  levETIRAcetam  IVPB 500 milliGRAM(s) IV Intermittent every 12 hours  melatonin 5 milliGRAM(s) Oral at bedtime  memantine 5 milliGRAM(s) Oral two times a day  senna 2 Tablet(s) Oral at bedtime                            11.0   3.75  )-----------( 139      ( 13 Sep 2022 11:08 )             33.1     09-13    141  |  105  |  17.3  ----------------------------<  120<H>  3.7   |  26.0  |  0.58    Ca    8.6      13 Sep 2022 11:08    TPro  6.6  /  Alb  4.0  /  TBili  0.3<L>  /  DBili  x   /  AST  15  /  ALT  12  /  AlkPhos  59  09-12    RECENT CULTURES:  09[facility]10 @ 18:30    [facility]    09-09 @ 11:50 .Blood Blood-Peripheral     No growth to date.    09-09 @ 11:48 .Blood Blood-Peripheral     No growth to date.    WBC Count: 3.75 K/uL (09-13-22 @ 11:08)  WBC Count: 3.49 K/uL (09-12-22 @ 05:52)  WBC Count: 3.31 K/uL (09-11-22 @ 06:44)  WBC Count: 5.17 K/uL (09-10-22 @ 06:57)  WBC Count: 3.12 K/uL (09-09-22 @ 07:17)    Creatinine, Serum: 0.58 mg/dL (09-13-22 @ 11:08)  Creatinine, Serum: 0.79 mg/dL (09-12-22 @ 05:52)  Creatinine, Serum: 0.88 mg/dL (09-11-22 @ 06:44)  Creatinine, Serum: 0.96 mg/dL (09-10-22 @ 06:57)  Creatinine, Serum: 1.02 mg/dL (09-09-22 @ 07:17)    Procalcitonin, Serum: 0.08 ng/mL (09-11-22 @ 06:44)  Procalcitonin, Serum: 0.06 ng/mL (09-03-22 @ 05:30)     SARS-CoV-2: NotDetec (09-10-22 @ 18:30)  COVID-19 PCR: NotDetec (09-10-22 @ 05:00)  COVID-19 PCR: NotDetec (09-04-22 @ 08:37)  COVID-19 PCR: NotDetec (08-29-22 @ 04:00)  COVID-19 PCR: NotDetec (08-23-22 @ 06:30)  COVID-19 PCR: NotDetec (08-18-22 @ 07:36)      < from: CT Abdomen and Pelvis No Cont (09.10.22 @ 14:26) >  ACC: [id number] EXAM:  CT ABDOMEN AND PELVIS                        ACC: [id number] EXAM:  CT CHEST                          PROCEDURE DATE:  09/10/2022    INTERPRETATION:  CLINICAL INFORMATION: 42-year-old male patient with   concern with aspiration pneumonia.    COMPARISON: CT chest abdomen pelvis 5/24/2022.    CONTRAST/COMPLICATIONS:  IV Contrast: None  Oral Contrast: None  Complications: None    PROCEDURE:  CT of the Chest, Abdomen and Pelvis was performed.  Sagittal and coronal reformatswere performed.    FINDINGS:  CHEST:  LUNGS AND LARGE AIRWAYS: Patent central airways. 4 mm right upper lobe   and 4 mm middle lobe calcified granuloma. Otherwise no pulmonary nodules.   No pulmonary consolidative opacity. No groundglass opacity.  PLEURA: No pleural effusion.  VESSELS: The ascending thoracic aorta measures 2.9 cm. Pulmonary trunk   measures 2.6 cm, both within normal limits.  HEART: Heart size is normal. No pericardial effusion.  MEDIASTINUM AND DEBBIE: No lymphadenopathy. Stable 0.5 x 1.4 cm lymph node   in the aortopulmonary window.  CHEST WALL AND LOWER NECK: Thyroid gland within normal limits.    ABDOMEN AND PELVIS:  LIVER: Within normal limits. No focal liver lesions.  BILE DUCTS: Normal caliber.  GALLBLADDER: Within normal limits.  SPLEEN: Within normal limits.  PANCREAS: Within normal limits.  ADRENALS: Within normal limits. No nodules or masses.  KIDNEYS/URETERS: 6 mm left kidney upper pole nonobstructing renal   calculus, additional punctate lower pole left renal calculi and right   upper pole 4 mm nonobstructing renal calculus.    BLADDER: Within normal limits.  REPRODUCTIVE ORGANS: Prostate within normal limits. Left pelvic   phleboliths.    BOWEL: Normal appearance of the esophagus. The stomach is not distended.   There is a PEG tube in correct positioning. Small bowel loops are not   abnormally distended. No bowel obstruction. Appendix is normal. Moderate   stool burden in the distal colon.  PERITONEUM: No ascites. No pneumoperitoneum.  VESSELS: Within normal limits.  RETROPERITONEUM/LYMPH NODES: No lymphadenopathy.  ABDOMINAL WALL: Within normal limits.  BONES: Right lateral superior pubic ramus and inferior pubic ramus non   displaced subacute fracture with initial callus formation. No aggressive   lytic or blastic bony lesions. No compression deformities.    IMPRESSION:  No evidence of pneumonia.    No evidence of bowel obstruction. Correctly positioned PEG tube.    No acute findings in the chest, abdomen or pelvis. Bilateral   nonobstructingnephroliths.    Subacute fractures in the right lateral pubic rami.    --- End of Report ---  < end of copied text >

## 2022-09-13 NOTE — PROGRESS NOTE ADULT - ASSESSMENT
42y  Male initially admitted on 5/24/22 after being found down and was found to have at that time Bilateral IPH, Bilateral SDH. Prolonged hospital course: s/p craniotomy and evacuation of SDH 5/24, s/p trach 6/1, s/p cranioplasty and replacement of bone flap 6/29, s/p R hemicraniectomy, wound exploration, evacuation of epidural fluid collection 7/17.      Epidural fluid collection   s/p R hemicraniectomy, wound exploration, evacuation of epidural fluid collection 7/17  Fever episode 9/10    - Blood cultures 9/9 no growth   - fluid cultures 7/18 reporting Staphylococcus aureus (MSSA) and 1 culture with Staph Epi (MRSE)  - Completed Nafcillin and Vancomycin 9/1/22 (6 weeks)   - CT A/P 9/10 with no acute findings   - MRI brain with contrast 9/2 reporting no enhancement   - Would repeat Brain MRI if has another fever  - If no fever for 72 hours off antibiotics, no ID contraindication to neurosurgical procedure   - Trend Fever  - Trend WBC      Will Follow      d/w Dr Yanes

## 2022-09-14 LAB
CULTURE RESULTS: SIGNIFICANT CHANGE UP
CULTURE RESULTS: SIGNIFICANT CHANGE UP
GLUCOSE BLDC GLUCOMTR-MCNC: 101 MG/DL — HIGH (ref 70–99)
GLUCOSE BLDC GLUCOMTR-MCNC: 108 MG/DL — HIGH (ref 70–99)
GLUCOSE BLDC GLUCOMTR-MCNC: 73 MG/DL — SIGNIFICANT CHANGE UP (ref 70–99)
GLUCOSE BLDC GLUCOMTR-MCNC: 93 MG/DL — SIGNIFICANT CHANGE UP (ref 70–99)
SPECIMEN SOURCE: SIGNIFICANT CHANGE UP
SPECIMEN SOURCE: SIGNIFICANT CHANGE UP

## 2022-09-14 PROCEDURE — 99232 SBSQ HOSP IP/OBS MODERATE 35: CPT

## 2022-09-14 PROCEDURE — 93971 EXTREMITY STUDY: CPT | Mod: 26,RT

## 2022-09-14 PROCEDURE — 36000 PLACE NEEDLE IN VEIN: CPT | Mod: RT

## 2022-09-14 RX ADMIN — Medication 5 MILLIGRAM(S): at 22:15

## 2022-09-14 RX ADMIN — DIPHENHYDRAMINE HYDROCHLORIDE AND LIDOCAINE HYDROCHLORIDE AND ALUMINUM HYDROXIDE AND MAGNESIUM HYDRO 5 MILLILITER(S): KIT at 11:32

## 2022-09-14 RX ADMIN — DIPHENHYDRAMINE HYDROCHLORIDE AND LIDOCAINE HYDROCHLORIDE AND ALUMINUM HYDROXIDE AND MAGNESIUM HYDRO 5 MILLILITER(S): KIT at 05:30

## 2022-09-14 RX ADMIN — MEMANTINE HYDROCHLORIDE 5 MILLIGRAM(S): 10 TABLET ORAL at 17:37

## 2022-09-14 RX ADMIN — CHLORHEXIDINE GLUCONATE 1 APPLICATION(S): 213 SOLUTION TOPICAL at 11:32

## 2022-09-14 RX ADMIN — MEMANTINE HYDROCHLORIDE 5 MILLIGRAM(S): 10 TABLET ORAL at 05:29

## 2022-09-14 RX ADMIN — LEVETIRACETAM 420 MILLIGRAM(S): 250 TABLET, FILM COATED ORAL at 17:37

## 2022-09-14 RX ADMIN — DIPHENHYDRAMINE HYDROCHLORIDE AND LIDOCAINE HYDROCHLORIDE AND ALUMINUM HYDROXIDE AND MAGNESIUM HYDRO 5 MILLILITER(S): KIT at 00:55

## 2022-09-14 RX ADMIN — DIPHENHYDRAMINE HYDROCHLORIDE AND LIDOCAINE HYDROCHLORIDE AND ALUMINUM HYDROXIDE AND MAGNESIUM HYDRO 5 MILLILITER(S): KIT at 17:55

## 2022-09-14 RX ADMIN — ENOXAPARIN SODIUM 40 MILLIGRAM(S): 100 INJECTION SUBCUTANEOUS at 17:54

## 2022-09-14 RX ADMIN — SENNA PLUS 2 TABLET(S): 8.6 TABLET ORAL at 22:15

## 2022-09-14 RX ADMIN — LEVETIRACETAM 420 MILLIGRAM(S): 250 TABLET, FILM COATED ORAL at 05:29

## 2022-09-14 NOTE — PROGRESS NOTE ADULT - SUBJECTIVE AND OBJECTIVE BOX
[facility] Division of Hospital Medicine  Orlin Yanes MD    Chief Complaint:  Patient is a 42y old  Male who presents with a chief complaint of Trauma/ Subdural/ Intubated (13 Sep 2022 11:31)      SUBJECTIVE / OVERNIGHT EVENTS:  Patient seen and examined at bedside. No acute events reported overnight.    MEDICATIONS  (STANDING):  chlorhexidine 2% Cloths 1 Application(s) Topical daily  enoxaparin Injectable 40 milliGRAM(s) SubCutaneous every 24 hours  FIRST- Mouthwash  BLM 5 milliLiter(s) Swish and Swallow four times a day  levETIRAcetam  IVPB 500 milliGRAM(s) IV Intermittent every 12 hours  melatonin 5 milliGRAM(s) Oral at bedtime  memantine 5 milliGRAM(s) Oral two times a day  senna 2 Tablet(s) Oral at bedtime    MEDICATIONS  (PRN):  ondansetron Injectable 4 milliGRAM(s) IV Push every 4 hours PRN Nausea and/or Vomiting        I&O's Summary      PHYSICAL EXAM:  Vital Signs Last 24 Hrs  T(C): 36.7 (14 Sep 2022 04:44), Max: 37.1 (13 Sep 2022 20:13)  T(F): 98.1 (14 Sep 2022 04:44), Max: 98.7 (13 Sep 2022 20:13)  HR: 64 (14 Sep 2022 04:44) (64 - 76)  BP: 106/72 (14 Sep 2022 04:44) (104/74 - 113/75)  BP(mean): --  RR: 18 (14 Sep 2022 04:44) (18 - 18)  SpO2: 98% (14 Sep 2022 04:44) (96% - 98%)    Parameters below as of 14 Sep 2022 04:44  Patient On (Oxygen Delivery Method): room air          CONSTITUTIONAL: Non toxic appearing, lying comfortably in bed  ENMT: Moist oral mucosa, s/p hemicraniectomy scalp incision healing well (no drainage from incision site)  RESPIRATORY: Normal respiratory effort; lungs are clear to auscultation bilaterally  CARDIOVASCULAR: Regular rate and rhythm, normal S1 and S2, no murmur/rub/gallop; No lower extremity edema; Peripheral pulses are 2+ bilaterally  ABDOMEN: normoactive bowel sounds, no rebound/guarding; No hepatosplenomegaly, PEG site clean, dry (no excoriation or erythema)  MUSCULOSKELETAL: no clubbing or cyanosis of digits; no joint swelling, IV access site c/d. No infiltration   PSYCH: Awake, not following commands   NEUROLOGY: Moves all ext to command,   SKIN: No external lesions noted       LABS:                        11.0   3.75  )-----------( 139      ( 13 Sep 2022 11:08 )             33.1     09-13    141  |  105  |  17.3  ----------------------------<  120<H>  3.7   |  26.0  |  0.58    Ca    8.6      13 Sep 2022 11:08                CAPILLARY BLOOD GLUCOSE      POCT Blood Glucose.: 101 mg/dL (14 Sep 2022 05:28)  POCT Blood Glucose.: 108 mg/dL (14 Sep 2022 01:02)  POCT Blood Glucose.: 118 mg/dL (13 Sep 2022 18:50)  POCT Blood Glucose.: 95 mg/dL (13 Sep 2022 12:10)        RADIOLOGY & ADDITIONAL TESTS:  Results Reviewed:   Imaging Personally Reviewed:  Electrocardiogram Personally Reviewed:

## 2022-09-14 NOTE — PROVIDER CONTACT NOTE (OTHER) - ACTION/TREATMENT ORDERED:
MD stated no further intervention at this time
STAT EKG ordered.
Change IV site; US ordered
GINGER Wick and Dveen MILES notified. GINGER Wick tried to re-contact patients brother Patel for consent, no response at this time. Will be followed up in AM.

## 2022-09-14 NOTE — PROVIDER CONTACT NOTE (OTHER) - SITUATION
2 missed beets
Pt has R AC/upper arm IV wrapped. hand was found to be purple and pt had arm bent. bandange removed and arm straightened. Color improved and IV rewrapped loosely. Lump noted on FA.
Patient ordered for MRI with contrast. Consent was incomplete.
movement

## 2022-09-14 NOTE — PROGRESS NOTE ADULT - ASSESSMENT
42y  Male initially admitted on 5/24/22 (BIBEMS after found unresponsive on street) with a GCS of 3, found to have b/l intraparenchymal bleeds, b/l SDH. Has had a complex prolonged hospital course including but not limited to right decompressive craniectomy on 5/24. Trach / PEG., cranioplasty on 6/29 with cognitive decline after initial improvement prompting imaging which noted subacute collection with air underneath the cranioplasty sight. MRI with increase in fluid collection underneath the flap leading to a repeat right craniectomy with wound exploration & evacuation of fluid collection on 7/17 with OR cultures isolating MSSA. 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 Has had interval trach decannulation and dietary advancement to pureed. Now s/p completion of appropriate antimicrobial course with f/u imaging (9/3) revealing no abnormal enhancement (6 mm midline shift to left). After being optimzed and cleared for surgery had a fever (9/9/22). Infectious w/u in progress to evaluate etiology of fever     Traumatic brain injury with complicated hospital course (as above) now with periodic agitation and lethargy  - Neurologically significantly improved however per family recently more lethargic since standing Seroquel initiation   - DC Seroquel for now  - Will order mittens PRN for pt safety at night if needed and if indicated, low threshold for 1:1 (nocturnal as family at bedside during day hours)  - Continue Memantine 5 mg BID   - Continue Keppra 500 mg BID   - Cranioplasty tentatively planned for this week, however with febrile episode 9/9  - Now fever free since 9/10, ID clearance appreciated  - Discussed w/ NSG, cranioplasty planned for 9/26/22 at 1PM. Daughter informed.    R pubic rami fx  - subacute  - incidentally found on CT  - Ortho consult appreciated  - WBAT, Pain control    Fever  - No sepsis, no localizing signs  - Prelim my read : CXR : normal   - Lactate 1.0  - Blood cultures in progress  - UA neg  - CT head / Chest/Abd pelvis reviewed  - Hold off Abx for now, start if any change in clinical status concerning for infection   - Dopplers Upper and lower ext b/l to rule out thrombosis as cause for fever   - Covid PCR neg, resp viral panel ordered to e/o alt viruses   - ID following  - Discussed with ID on 9/12, will observe off antibiotics, has not had a fever since 9/10. Will hold off on MRI Brain for now.      SDH/ IPH / TBI , CNS infection  - Right decompressive hemicraniectomy on 5/24,  - Completed Nafcillin /  Vanc, post completion with MRI (w/IV con) w/o abnormal enhancement  - ID on board  - Helmet OOB    Oropharyngeal Dysphagia  - on TF / purée diet , restart free water / TF given unreliable PO intake   - Strict aspiration precautions     Hyponatremia  - Central SIADH , persistently low and then overcorrected  - Now improved  - Monitor closely  - (Urea and NaCL dc'd)  - d/w Renal for any recs prior to cranioplasty IF Na low/high    Substance abuse   - Cocaine pos on admission   - Alcoholism  - Was on MVI/ folic acid / Thiamine for presumed thiamine deficiency     Anemia   - resolved    DVT prophylaxis  Lovenox    Dispo - Awaiting cranioplasty      42y  Male initially admitted on 5/24/22 (BIBEMS after found unresponsive on street) with a GCS of 3, found to have b/l intraparenchymal bleeds, b/l SDH. Has had a complex prolonged hospital course including but not limited to right decompressive craniectomy on 5/24. Trach / PEG., cranioplasty on 6/29 with cognitive decline after initial improvement prompting imaging which noted subacute collection with air underneath the cranioplasty sight. MRI with increase in fluid collection underneath the flap leading to a repeat right craniectomy with wound exploration & evacuation of fluid collection on 7/17 with OR cultures isolating MSSA. 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 Has had interval trach decannulation and dietary advancement to pureed. Now s/p completion of appropriate antimicrobial course with f/u imaging (9/3) revealing no abnormal enhancement (6 mm midline shift to left). After being optimzed and cleared for surgery had a fever (9/9/22). Infectious w/u in progress to evaluate etiology of fever     Traumatic brain injury with complicated hospital course (as above) now with periodic agitation and lethargy  - Neurologically significantly improved however per family recently more lethargic since standing Seroquel initiation   - DC Seroquel for now  - Will order mittens PRN for pt safety at night if needed and if indicated, low threshold for 1:1 (nocturnal as family at bedside during day hours)  - Continue Memantine 5 mg BID   - Continue Keppra 500 mg BID   - Cranioplasty tentatively planned for this week, however with febrile episode 9/9  - Now fever free since 9/10, ID clearance appreciated  - Discussed w/ NSG, cranioplasty planned for 9/26/22 at 1PM. Daughter informed.    R pubic rami fx  - subacute  - incidentally found on CT  - Ortho consult appreciated  - WBAT, Pain control    Fever  - No sepsis, no localizing signs  - Prelim my read : CXR : normal   - Lactate 1.0  - Blood cultures in progress  - UA neg  - CT head / Chest/Abd pelvis reviewed  - Hold off Abx for now, start if any change in clinical status concerning for infection   - Dopplers Upper and lower ext b/l to rule out thrombosis as cause for fever   - Covid PCR neg, resp viral panel ordered to e/o alt viruses   - ID following  - Discussed with ID on 9/12, will observe off antibiotics, has not had a fever since 9/10. Will hold off on MRI Brain for now.      SDH/ IPH / TBI , CNS infection  - Right decompressive hemicraniectomy on 5/24,  - Completed Nafcillin /  Vanc, post completion with MRI (w/IV con) w/o abnormal enhancement  - ID on board  - Helmet OOB    Oropharyngeal Dysphagia  - on TF / purée diet , restart free water / TF given unreliable PO intake   - Strict aspiration precautions     Hyponatremia  - Central SIADH , persistently low and then overcorrected  - Now improved  - Monitor closely  - (Urea and NaCL dc'd)  - d/w Renal for any recs prior to cranioplasty IF Na low/high    Substance abuse   - Cocaine pos on admission   - Alcoholism  - Was on MVI/ folic acid / Thiamine for presumed thiamine deficiency     Anemia   - resolved    DVT prophylaxis  Lovenox    Dispo - Awaiting cranioplasty     Updated patient's sister Carissa ([phone])

## 2022-09-14 NOTE — PROGRESS NOTE ADULT - SUBJECTIVE AND OBJECTIVE BOX
[facility]  INFECTIOUS DISEASES at [place] and [place]  =======================================================                               Nestor Russo MD#  Mitesh Woodward MD*                                     Nyla Orantes MD*    Vera Bishop MD*            Diplomates American Board of Internal Medicine & Infectious Diseases                # [place] Office - Appt - Tel  [phone] Fax [phone]                * [place] Office - Appt - Tel [phone] Fax [phone]                                  Hospital Consult line:  [phone]  =======================================================    PASTOR KEMAR [id number]    Follow up: Epidural fluid collection     No fever   Last fever 9/10      Allergies:  Allergy Status Unknown      REVIEW OF SYSTEMS:  Unable to obtain due to medical condition       Physical Exam:  GEN: Lethargic   HEENT: indent at craniectomy site.  Anicteric   NECK: Supple.   LUNGS: CTA B/L  HEART: Regular rate and rhythm   ABDOMEN: Soft, nontender, and nondistended.  Positive bowel sounds.    : Howard   EXTREMITIES: trace edema.  MSK: no joint swelling  NEUROLOGIC: unable to assess   PSYCHIATRIC: unable to assess  SKIN: No Rash      Vitals:  T(F): 98.1 (14 Sep 2022 04:44), Max: 98.7 (13 Sep 2022 20:13)  HR: 64 (14 Sep 2022 04:44)  BP: 106/72 (14 Sep 2022 04:44)  RR: 18 (14 Sep 2022 04:44)  SpO2: 98% (14 Sep 2022 04:44) (96% - 98%)  temp max in last 48H T(F): , Max: 98.7 (09-13-22 @ 20:13)    Current Antibiotics:    Other medications:  chlorhexidine 2% Cloths 1 Application(s) Topical daily  enoxaparin Injectable 40 milliGRAM(s) SubCutaneous every 24 hours  FIRST- Mouthwash  BLM 5 milliLiter(s) Swish and Swallow four times a day  levETIRAcetam  IVPB 500 milliGRAM(s) IV Intermittent every 12 hours  melatonin 5 milliGRAM(s) Oral at bedtime  memantine 5 milliGRAM(s) Oral two times a day  senna 2 Tablet(s) Oral at bedtime                            11.0   3.75  )-----------( 139      ( 13 Sep 2022 11:08 )             33.1     09-13    141  |  105  |  17.3  ----------------------------<  120<H>  3.7   |  26.0  |  0.58    Ca    8.6      13 Sep 2022 11:08      RECENT CULTURES:  09-10 @ 18:30    RVP  NotDetec    09-09 @ 11:50 .Blood Blood-Peripheral     No growth to date.    09-09 @ 11:48 .Blood Blood-Peripheral     No growth to date.      WBC Count: 3.75 K/uL (09-13-22 @ 11:08)  WBC Count: 3.49 K/uL (09-12-22 @ 05:52)  WBC Count: 3.31 K/uL (09-11-22 @ 06:44)  WBC Count: 5.17 K/uL (09-10-22 @ 06:57)    Creatinine, Serum: 0.58 mg/dL (09-13-22 @ 11:08)  Creatinine, Serum: 0.79 mg/dL (09-12-22 @ 05:52)  Creatinine, Serum: 0.88 mg/dL (09-11-22 @ 06:44)  Creatinine, Serum: 0.96 mg/dL (09-10-22 @ 06:57)    Procalcitonin, Serum: 0.08 ng/mL (09-11-22 @ 06:44)  Procalcitonin, Serum: 0.06 ng/mL (09-03-22 @ 05:30)     SARS-CoV-2: NotDetec (09-10-22 @ 18:30)  COVID-19 PCR: NotDetec (09-10-22 @ 05:00)  COVID-19 PCR: NotDetec (09-04-22 @ 08:37)  COVID-19 PCR: NotDetec (08-29-22 @ 04:00)  COVID-19 PCR: NotDetec (08-23-22 @ 06:30)  COVID-19 PCR: NotDetec (08-18-22 @ 07:36)        < from: CT Abdomen and Pelvis No Cont (09.10.22 @ 14:26) >  ACC: [id number] EXAM:  CT ABDOMEN AND PELVIS                        ACC: [id number] EXAM:  CT CHEST                          PROCEDURE DATE:  09/10/2022    INTERPRETATION:  CLINICAL INFORMATION: 42-year-old male patient with   concern with aspiration pneumonia.    COMPARISON: CT chest abdomen pelvis 5/24/2022.    CONTRAST/COMPLICATIONS:  IV Contrast: None  Oral Contrast: None  Complications: None    PROCEDURE:  CT of the Chest, Abdomen and Pelvis was performed.  Sagittal and coronal reformatswere performed.    FINDINGS:  CHEST:  LUNGS AND LARGE AIRWAYS: Patent central airways. 4 mm right upper lobe   and 4 mm middle lobe calcified granuloma. Otherwise no pulmonary nodules.   No pulmonary consolidative opacity. No groundglass opacity.  PLEURA: No pleural effusion.  VESSELS: The ascending thoracic aorta measures 2.9 cm. Pulmonary trunk   measures 2.6 cm, both within normal limits.  HEART: Heart size is normal. No pericardial effusion.  MEDIASTINUM AND DEBBIE: No lymphadenopathy. Stable 0.5 x 1.4 cm lymph node   in the aortopulmonary window.  CHEST WALL AND LOWER NECK: Thyroid gland within normal limits.    ABDOMEN AND PELVIS:  LIVER: Within normal limits. No focal liver lesions.  BILE DUCTS: Normal caliber.  GALLBLADDER: Within normal limits.  SPLEEN: Within normal limits.  PANCREAS: Within normal limits.  ADRENALS: Within normal limits. No nodules or masses.  KIDNEYS/URETERS: 6 mm left kidney upper pole nonobstructing renal   calculus, additional punctate lower pole left renal calculi and right   upper pole 4 mm nonobstructing renal calculus.    BLADDER: Within normal limits.  REPRODUCTIVE ORGANS: Prostate within normal limits. Left pelvic   phleboliths.    BOWEL: Normal appearance of the esophagus. The stomach is not distended.   There is a PEG tube in correct positioning. Small bowel loops are not   abnormally distended. No bowel obstruction. Appendix is normal. Moderate   stool burden in the distal colon.  PERITONEUM: No ascites. No pneumoperitoneum.  VESSELS: Within normal limits.  RETROPERITONEUM/LYMPH NODES: No lymphadenopathy.  ABDOMINAL WALL: Within normal limits.  BONES: Right lateral superior pubic ramus and inferior pubic ramus non   displaced subacute fracture with initial callus formation. No aggressive   lytic or blastic bony lesions. No compression deformities.    IMPRESSION:  No evidence of pneumonia.    No evidence of bowel obstruction. Correctly positioned PEG tube.    No acute findings in the chest, abdomen or pelvis. Bilateral   nonobstructingnephroliths.    Subacute fractures in the right lateral pubic rami.    --- End of Report ---  < end of copied text >

## 2022-09-14 NOTE — PROVIDER CONTACT NOTE (OTHER) - REASON
MRI Consent
Pt tachycardic and had a high urine output
R FA swelling below IV site w/ hand discoloration
2 missed beets

## 2022-09-15 LAB
ANION GAP SERPL CALC-SCNC: 10 MMOL/L — SIGNIFICANT CHANGE UP (ref 5–17)
BUN SERPL-MCNC: 15.3 MG/DL — SIGNIFICANT CHANGE UP (ref 8–20)
CALCIUM SERPL-MCNC: 8.7 MG/DL — SIGNIFICANT CHANGE UP (ref 8.4–10.5)
CHLORIDE SERPL-SCNC: 98 MMOL/L — SIGNIFICANT CHANGE UP (ref 98–107)
CO2 SERPL-SCNC: 25 MMOL/L — SIGNIFICANT CHANGE UP (ref 22–29)
CREAT SERPL-MCNC: 0.53 MG/DL — SIGNIFICANT CHANGE UP (ref 0.5–1.3)
EGFR: 128 ML/MIN/1.73M2 — SIGNIFICANT CHANGE UP
GLUCOSE BLDC GLUCOMTR-MCNC: 101 MG/DL — HIGH (ref 70–99)
GLUCOSE BLDC GLUCOMTR-MCNC: 106 MG/DL — HIGH (ref 70–99)
GLUCOSE BLDC GLUCOMTR-MCNC: 120 MG/DL — HIGH (ref 70–99)
GLUCOSE BLDC GLUCOMTR-MCNC: 138 MG/DL — HIGH (ref 70–99)
GLUCOSE BLDC GLUCOMTR-MCNC: 99 MG/DL — SIGNIFICANT CHANGE UP (ref 70–99)
GLUCOSE SERPL-MCNC: 124 MG/DL — HIGH (ref 70–99)
HCT VFR BLD CALC: 30.7 % — LOW (ref 39–50)
HGB BLD-MCNC: 10.9 G/DL — LOW (ref 13–17)
MCHC RBC-ENTMCNC: 28.1 PG — SIGNIFICANT CHANGE UP (ref 27–34)
MCHC RBC-ENTMCNC: 35.5 GM/DL — SIGNIFICANT CHANGE UP (ref 32–36)
MCV RBC AUTO: 79.1 FL — LOW (ref 80–100)
PLATELET # BLD AUTO: 133 K/UL — LOW (ref 150–400)
POTASSIUM SERPL-MCNC: 3.5 MMOL/L — SIGNIFICANT CHANGE UP (ref 3.5–5.3)
POTASSIUM SERPL-SCNC: 3.5 MMOL/L — SIGNIFICANT CHANGE UP (ref 3.5–5.3)
RBC # BLD: 3.88 M/UL — LOW (ref 4.2–5.8)
RBC # FLD: 13.8 % — SIGNIFICANT CHANGE UP (ref 10.3–14.5)
SODIUM SERPL-SCNC: 133 MMOL/L — LOW (ref 135–145)
WBC # BLD: 2.72 K/UL — LOW (ref 3.8–10.5)
WBC # FLD AUTO: 2.72 K/UL — LOW (ref 3.8–10.5)

## 2022-09-15 PROCEDURE — 99232 SBSQ HOSP IP/OBS MODERATE 35: CPT

## 2022-09-15 PROCEDURE — 36000 PLACE NEEDLE IN VEIN: CPT | Mod: LT

## 2022-09-15 RX ORDER — SODIUM CHLORIDE 9 MG/ML
1 INJECTION INTRAMUSCULAR; INTRAVENOUS; SUBCUTANEOUS
Refills: 0 | Status: DISCONTINUED | OUTPATIENT
Start: 2022-09-15 | End: 2022-09-19

## 2022-09-15 RX ADMIN — Medication 5 MILLIGRAM(S): at 21:21

## 2022-09-15 RX ADMIN — CHLORHEXIDINE GLUCONATE 1 APPLICATION(S): 213 SOLUTION TOPICAL at 11:49

## 2022-09-15 RX ADMIN — MEMANTINE HYDROCHLORIDE 5 MILLIGRAM(S): 10 TABLET ORAL at 06:13

## 2022-09-15 RX ADMIN — DIPHENHYDRAMINE HYDROCHLORIDE AND LIDOCAINE HYDROCHLORIDE AND ALUMINUM HYDROXIDE AND MAGNESIUM HYDRO 5 MILLILITER(S): KIT at 21:23

## 2022-09-15 RX ADMIN — SODIUM CHLORIDE 1 GRAM(S): 9 INJECTION INTRAMUSCULAR; INTRAVENOUS; SUBCUTANEOUS at 11:49

## 2022-09-15 RX ADMIN — MEMANTINE HYDROCHLORIDE 5 MILLIGRAM(S): 10 TABLET ORAL at 18:12

## 2022-09-15 RX ADMIN — LEVETIRACETAM 420 MILLIGRAM(S): 250 TABLET, FILM COATED ORAL at 18:18

## 2022-09-15 RX ADMIN — SENNA PLUS 2 TABLET(S): 8.6 TABLET ORAL at 21:21

## 2022-09-15 RX ADMIN — SODIUM CHLORIDE 1 GRAM(S): 9 INJECTION INTRAMUSCULAR; INTRAVENOUS; SUBCUTANEOUS at 18:12

## 2022-09-15 RX ADMIN — ENOXAPARIN SODIUM 40 MILLIGRAM(S): 100 INJECTION SUBCUTANEOUS at 18:12

## 2022-09-15 RX ADMIN — LEVETIRACETAM 420 MILLIGRAM(S): 250 TABLET, FILM COATED ORAL at 06:13

## 2022-09-15 RX ADMIN — DIPHENHYDRAMINE HYDROCHLORIDE AND LIDOCAINE HYDROCHLORIDE AND ALUMINUM HYDROXIDE AND MAGNESIUM HYDRO 5 MILLILITER(S): KIT at 18:13

## 2022-09-15 RX ADMIN — DIPHENHYDRAMINE HYDROCHLORIDE AND LIDOCAINE HYDROCHLORIDE AND ALUMINUM HYDROXIDE AND MAGNESIUM HYDRO 5 MILLILITER(S): KIT at 11:49

## 2022-09-15 NOTE — PROCEDURE NOTE - NSANESTHESIA_GEN_A_CORE
no anesthesia administered
1% lidocaine
no anesthesia administered
1% lidocaine

## 2022-09-15 NOTE — PROGRESS NOTE ADULT - ASSESSMENT
42y  Male initially admitted on 5/24/22 after being found down and was found to have at that time Bilateral IPH, Bilateral SDH. Prolonged hospital course: s/p craniotomy and evacuation of SDH 5/24, s/p trach 6/1, s/p cranioplasty and replacement of bone flap 6/29, s/p R hemicraniectomy, wound exploration, evacuation of epidural fluid collection 7/17.      Epidural fluid collection   s/p R hemicraniectomy, wound exploration, evacuation of epidural fluid collection 7/17  Fever episode 9/10    - Blood cultures 9/9 no growth   - fluid cultures 7/18 reporting Staphylococcus aureus (MSSA) and 1 culture with Staph Epi (MRSE)  - Completed Nafcillin and Vancomycin 9/1/22 (6 weeks)   - CT A/P 9/10 with no acute findings   - MRI brain with contrast 9/2 reporting no enhancement   - Would repeat Brain MRI if has another fever  - If no fever for 72 hours off antibiotics, no ID contraindication to neurosurgical procedure   - Trend Fever  - Trend WBC      Will sign off. Please call PRN.       d/w Dr Yanes    pH

## 2022-09-15 NOTE — PROCEDURE NOTE - NSPERFORMEDBY_GEN_A_CORE
Myself
Myself
PA Student Chris Anguiaon/Other
Devi Moses/Resident
Myself/PA
Myself/PA
Fer Howard/PA
Myself

## 2022-09-15 NOTE — PROCEDURE NOTE - NSINDICATIONS_GEN_A_CORE
fluid administration/other O-L Flap Text: The defect edges were debeveled with a #15 scalpel blade.  Given the location of the defect, shape of the defect and the proximity to free margins an O-L flap was deemed most appropriate.  Using a sterile surgical marker, an appropriate advancement flap was drawn incorporating the defect and placing the expected incisions within the relaxed skin tension lines where possible.    The area thus outlined was incised deep to adipose tissue with a #15 scalpel blade.  The skin margins were undermined to an appropriate distance in all directions utilizing iris scissors.

## 2022-09-15 NOTE — PROCEDURE NOTE - ADDITIONAL PROCEDURE DETAILS
Bed lowered, tourniquet removed. +flash, +venous return, flushes easily, secured in place.   20g in L forearm as well as 20g R upper arm for additional access point given pt has removed multiple IV access points overnight.
US Guided 18g Long IV placed in left upper arm. +flash, good blood return, flushes easily. Patient tolerated procedure well with no immediate complications. Tourniquet removed, all sharps disposed of appropriately, bed rails raised and bed lowered. Secure mitten removed during placement and refastened once line secured.
4-0 prolene interrupted sutures x3
bed lowered, tourniquet removed. Pt tolerated well. +flash, + venous blood return, flushes easily, secured in place.
#18G 10CM 26CIRC BARD POWER GLIDE MIDLINE inserted with ultrasound guidance.   Good flash, ns flush left brachial vein.   Patient tolerated well.
18G 10CM 24CIRC [place] POWER GLIDE MIDLINE good heme return ns flush right basilic vein

## 2022-09-15 NOTE — PROCEDURE NOTE - PROCEDURE DATE TIME, MLM
26-Aug-2022 19:05
27-Jun-2022
14-Sep-2022 02:10
31-Aug-2022
15-Sep-2022 09:25
29-Aug-2022 02:43
24-May-2022 05:56

## 2022-09-15 NOTE — PROCEDURE NOTE - NSPROCDETAILS_GEN_ALL_CORE
location identified, draped/prepped, sterile technique used/blood seen on insertion/dressing applied/flushes easily/secured in place/sterile technique, catheter placed
location identified, draped/prepped, sterile technique used/sterile dressing applied/sterile technique, catheter placed/supine position/ultrasound guidance
location identified, draped/prepped, sterile technique used/blood seen on insertion/dressing applied/flushes easily/secured in place/sterile technique, catheter placed
location identified, draped/prepped, sterile technique used/sterile dressing applied/sterile technique, catheter placed/supine position/ultrasound guidance
guidewire recovered/lumen(s) aspirated and flushed/sterile dressing applied
location identified, draped/prepped, sterile technique used/blood seen on insertion/dressing applied/flushes easily/secured in place
location identified, draped/prepped, sterile technique used/blood seen on insertion/dressing applied/flushes easily/secured in place/sterile technique, catheter placed

## 2022-09-15 NOTE — PROCEDURE NOTE - NSINFORMCONSENT_GEN_A_CORE
This was an emergent procedure.
Benefits, risks, and possible complications of procedure explained to patient/caregiver who verbalized understanding and gave verbal consent.
This was an emergent procedure.
Benefits, risks, and possible complications of procedure explained to patient/caregiver who verbalized understanding and gave verbal consent.
Benefits, risks, and possible complications of procedure explained to patient/caregiver who verbalized understanding and gave verbal consent.
This was an emergent procedure.
This was an emergent procedure.
Benefits, risks, and possible complications of procedure explained to patient/caregiver who verbalized understanding and gave written consent.

## 2022-09-15 NOTE — PROGRESS NOTE ADULT - SUBJECTIVE AND OBJECTIVE BOX
HPI: Patient is a 25y old M brought by EMS, found down in the street unresponsive.     Primary Survey:    A - airway compromised, patient intubated in ED, RSI  B - bilateral breath sound after intubation  C - initial BP: 169/93 (05-24-22 @ 00:00)*** , HR: 107 (05-24-22 @ 00:44)*** , palpable pulses in all extremities  D - GCS 3 on arrival    Exposure obtained, no evident injuries    CXR: No evident PTX or Hemothorax, ET tube in place.  (24 May 2022 01:09)      INTERVAL HPI/OVERNIGHT EVENTS:  42y Male seen lying comfortably in bed. No acute over night events, remains afebrile with negative fever work up. Last fever 9/10.    Vital Signs Last 24 Hrs  T(C): 36.8 (15 Sep 2022 04:40), Max: 37.1 (14 Sep 2022 17:12)  T(F): 98.3 (15 Sep 2022 04:40), Max: 98.7 (14 Sep 2022 17:12)  HR: 68 (15 Sep 2022 04:40) (59 - 73)  BP: 106/72 (15 Sep 2022 04:40) (106/72 - 133/91)  BP(mean): --  RR: 18 (15 Sep 2022 04:40) (18 - 18)  SpO2: 97% (15 Sep 2022 04:40) (93% - 100%)    Parameters below as of 15 Sep 2022 04:40  Patient On (Oxygen Delivery Method): room air      PHYSICAL EXAM:  GENERAL: NAD, well-groomed  HEAD: s/p R craniectomy, flap sunken. Incision healed appropriately   MENTAL STATUS: AAO x3; Awake. Opens eyes spontaneously, intermittently follows commands.  CRANIAL NERVES: PERRL. EOMI without nystagmus. Face symmetric w/ normal eye closure and smile, tongue midline.   MOTOR: LUNA AG  SENSATION: grossly intact to light touch all extremities    LABS:                        10.9   2.72  )-----------( 133      ( 15 Sep 2022 05:41 )             30.7     09-15    133<L>  |  98  |  15.3  ----------------------------<  124<H>  3.5   |  25.0  |  0.53    Ca    8.7      15 Sep 2022 05:41            09-14 @ 07:01  -  09-15 @ 07:00  --------------------------------------------------------  IN: 1150 mL / OUT: 0 mL / NET: 1150 mL        RADIOLOGY & ADDITIONAL TESTS:  < from: CT Head No Cont (09.10.22 @ 14:23) >  IMPRESSION:  1.  No CT evidence of acute intracranial pathology.    2.  Right-sided craniectomy with concavity of the right frontoparietal   convexity and 1.1 cm of midline shift to the LEFT is grossly stablefrom   08/30/2022.    3.  Disproportionate enlargement of the left lateral ventricle compatible   with hydrocephalus, and surrounding transependymal CSF flow, are stable.      < end of copied text >          CAPRINI SCORE [CLOT]:  Patient has an estimated Caprini score of greater than 5.  However, the patient's unique clinical situation will be addressed in an individual manner to determine appropriate anticoagulation treatment, if any.

## 2022-09-15 NOTE — PROCEDURE NOTE - NSPOSTCAREGUIDE_GEN_A_CORE
Care for catheter as per unit/ICU protocols
Verbal/written post procedure instructions were given to patient/caregiver/Instructed patient/caregiver to follow-up with primary care physician/Instructed patient/caregiver regarding signs and symptoms of infection/Keep the cast/splint/dressing clean and dry/Care for catheter as per unit/ICU protocols
Verbal/written post procedure instructions were given to patient/caregiver/Instructed patient/caregiver regarding signs and symptoms of infection/Keep the cast/splint/dressing clean and dry/Care for catheter as per unit/ICU protocols
Verbal/written post procedure instructions were given to patient/caregiver
Verbal/written post procedure instructions were given to patient/caregiver/Instructed patient/caregiver to follow-up with primary care physician/Instructed patient/caregiver regarding signs and symptoms of infection/Keep the cast/splint/dressing clean and dry/Care for catheter as per unit/ICU protocols
Verbal/written post procedure instructions were given to patient/caregiver
Verbal/written post procedure instructions were given to patient/caregiver

## 2022-09-15 NOTE — CHART NOTE - NSCHARTNOTEFT_GEN_A_CORE
Source: Patient [ ]  Family [ ]   other [ x]EMR, rounds    Current Diet: NPO with g tube feeds      Enteral /Parenteral Nutrition: Pivot 50cchr with water flushes to provide 1000cc, 1500kcal, 93.8gr pro    Current Weight: 119.9#  Weight:  131.8# 7/18  (8/24)  125.6 lbs RD bedscale weight   (7/18)  131.8 lbs  (7/17)  114.8 lbs   (7/12)  117.2 lbs   (7/5)   141.7 lbs  (7/2)   147.2 lbs   (6/5)  160.7 lbs  (5/27)  188.4 lbs   (5/25)  160 lbs   no edema      % Weight Change     Pertinent Medications: MEDICATIONS  (STANDING):  chlorhexidine 2% Cloths 1 Application(s) Topical daily  enoxaparin Injectable 40 milliGRAM(s) SubCutaneous every 24 hours  FIRST- Mouthwash  BLM 5 milliLiter(s) Swish and Swallow four times a day  levETIRAcetam  IVPB 500 milliGRAM(s) IV Intermittent every 12 hours  melatonin 5 milliGRAM(s) Oral at bedtime  memantine 5 milliGRAM(s) Oral two times a day  senna 2 Tablet(s) Oral at bedtime  sodium chloride 1 Gram(s) Oral two times a day    MEDICATIONS  (PRN):  ondansetron Injectable 4 milliGRAM(s) IV Push every 4 hours PRN Nausea and/or Vomiting    Pertinent Labs: CBC Full  -  ( 15 Sep 2022 05:41 )  WBC Count : 2.72 K/uL  RBC Count : 3.88 M/uL  Hemoglobin : 10.9 g/dL  Hematocrit : 30.7 %  Platelet Count - Automated : 133 K/uL  Mean Cell Volume : 79.1 fl  Mean Cell Hemoglobin : 28.1 pg  Mean Cell Hemoglobin Concentration : 35.5 gm/dL  Auto Neutrophil # : x  Auto Lymphocyte # : x  Auto Monocyte # : x  Auto Eosinophil # : x  Auto Basophil # : x  Auto Neutrophil % : x  Auto Lymphocyte % : x  Auto Monocyte % : x  Auto Eosinophil % : x  Auto Basophil % : x    09-15 Na133 mmol/L<L> Glu 124 mg/dL<H> K+ 3.5 mmol/L Cr  0.53 mg/dL BUN 15.3 mg/dL Phos n/a   Alb n/a   PAB n/a             Skin:     Nutrition focused physical exam conducted - found signs of malnutrition [ ]absent [x ]present    Subcutaneous fat loss: [ ] Orbital fat pads region, [x ]Buccal fat region, [ ]Triceps region,  [ ]Ribs region    Muscle wasting: [x ]Temples region, [x ]Clavicle region, [ ]Shoulder region, [ ]Scapula region, [ ]Interosseous region,  [ ]thigh region, [ ]Calf region    Estimated Needs:   [ x] no change since previous assessment  [ ] recalculated:     Current Nutrition Diagnosis:  Diagnosis: Pt now meets severe acute malnutrition criteria related to inadequate energy intake in setting of Right SDH s/p craniectomy with Left SDH, b/l parenchymal hematomas, +TEOFILO, multiple facial fractures, ETOH abuse as evidenced by <75% intake last few weeks, 22.5# weight loss in last 6 weeks if EMR weights are accurate. Pt seen with family at bedside providing assistance with breakfast reports good PO intake, tolerating consistency. Sometimes refuses meals due to not feeling well. Bolus of water flushes still being provided. Plan is OR for cranioplasty on 9/26.  Pt became NPO with TF only on 9/11 due to not eating well, pulling at lines. Fecal incontinence 9/14 noted as per flow sheets.     Recommendations:   1) Continue Pivot at 50cchr to meet pts nutritional needs  2) If diet restarted rec Add Ensure Enlive BID, consistency per SLP   3) Rx MVI and vit C 500mg daily    4) Monitor weights daily for trend/accuracy       Monitoring and Evaluation:   [ ] PO intake [ x] Tolerance to diet prescription [X] Weights  [X] Follow up per protocol [X] Labs:

## 2022-09-15 NOTE — PROGRESS NOTE ADULT - ASSESSMENT
ASSESSMENT  42M unknown PMH presented with IPH/SDH s/p hemicraniectomy on 5/24, cranioplasty 6/29, ccb right crani for epidural collection on 7/17. Pending cranioplasty likely 9/19.    PLAN  - case d/w team  - STAT CTH if any acute change in mental status  - pain control as needed, avoid over sedation  - OR planned for 9/19 pending patient remains afebrile & is cleared  - ID following, patient finished course of abx for + MRSE/MSSA, monitoring off abx, reccs appreciated  - further medical care per primary team  - SCDs, Lovenox for dvt ppx  - PT/OT, OOB as tolerated with helmet  - will continue to follow

## 2022-09-15 NOTE — PROGRESS NOTE ADULT - ASSESSMENT
42y  Male initially admitted on 5/24/22 (BIBEMS after found unresponsive on street) with a GCS of 3, found to have b/l intraparenchymal bleeds, b/l SDH. Has had a complex prolonged hospital course including but not limited to right decompressive craniectomy on 5/24. Trach / PEG., cranioplasty on 6/29 with cognitive decline after initial improvement prompting imaging which noted subacute collection with air underneath the cranioplasty sight. MRI with increase in fluid collection underneath the flap leading to a repeat right craniectomy with wound exploration & evacuation of fluid collection on 7/17 with OR cultures isolating MSSA. 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 Has had interval trach decannulation and dietary advancement to pureed. Now s/p completion of appropriate antimicrobial course with f/u imaging (9/3) revealing no abnormal enhancement (6 mm midline shift to left). After being optimzed and cleared for surgery had a fever (9/9/22). Infectious w/u in progress to evaluate etiology of fever     Traumatic brain injury with complicated hospital course (as above) now with periodic agitation and lethargy  - Neurologically significantly improved however per family recently more lethargic since standing Seroquel initiation   - DC Seroquel for now  - Will order mittens PRN for pt safety at night if needed and if indicated, low threshold for 1:1 (nocturnal as family at bedside during day hours)  - Continue Memantine 5 mg BID   - Continue Keppra 500 mg BID   - Cranioplasty tentatively planned for this week, however with febrile episode 9/9  - Now fever free since 9/10, ID clearance appreciated  - Discussed w/ NSG, cranioplasty planned for 9/26/22 at 1PM. Daughter informed.  Hyponatremia  - Central SIADH , persistently low and then overcorrected  - Monitor closely  - (Urea and NaCL dc'd)  - Restart NaCL tablets  - Discuss with Renal for any recs prior to cranioplasty IF Na low/high    R pubic rami fx  - subacute  - incidentally found on CT  - Ortho consult appreciated  - WBAT, Pain control    Fever  - No sepsis, no localizing signs  - Prelim my read : CXR : normal   - Lactate 1.0  - Blood cultures in progress  - UA neg  - CT head / Chest/Abd pelvis reviewed  - Hold off Abx for now, start if any change in clinical status concerning for infection   - Dopplers Upper and lower ext b/l to rule out thrombosis as cause for fever   - Covid PCR neg, resp viral panel ordered to e/o alt viruses   - ID following  - Discussed with ID on 9/12, will observe off antibiotics, has not had a fever since 9/10. Will hold off on MRI Brain for now.      SDH/ IPH / TBI , CNS infection  - Right decompressive hemicraniectomy on 5/24,  - Completed Nafcillin /  Vanc, post completion with MRI (w/IV con) w/o abnormal enhancement  - ID on board  - Helmet OOB    Oropharyngeal Dysphagia  - on TF / purée diet , restart free water / TF given unreliable PO intake   - Strict aspiration precautions     Substance abuse   - Cocaine pos on admission   - Alcoholism  - Was on MVI/ folic acid / Thiamine for presumed thiamine deficiency     Anemia   - resolved    DVT prophylaxis  Lovenox    Dispo - Awaiting cranioplasty     Updated patient's sister Carissa ([phone])

## 2022-09-15 NOTE — PROGRESS NOTE ADULT - SUBJECTIVE AND OBJECTIVE BOX
[facility]  INFECTIOUS DISEASES at [place] and [place]  =======================================================                               Nestor Russo MD#  Mitesh Woodward MD*                                     Nyla Orantes MD*    Vera Bishop MD*            Diplomates American Board of Internal Medicine & Infectious Diseases                # [place] Office - Appt - Tel  [phone] Fax [phone]                * [place] Office - Appt - Tel [phone] Fax [phone]                                  Hospital Consult line:  [phone]  =======================================================    PASTOR KEMAR [id number]    Follow up: Epidural fluid collection     No fever   Last fever 9/10      Allergies:  Allergy Status Unknown      REVIEW OF SYSTEMS:  Unable to obtain due to medical condition       Physical Exam:  GEN: Lethargic   HEENT: indent at craniectomy site.  Anicteric   NECK: Supple.   LUNGS: CTA B/L  HEART: Regular rate and rhythm   ABDOMEN: Soft, nontender, and nondistended.  Positive bowel sounds.    : Howard   EXTREMITIES: trace edema.  MSK: no joint swelling  NEUROLOGIC: unable to assess   PSYCHIATRIC: unable to assess  SKIN: No Rash      Vitals:    T(F): 97.7 (15 Sep 2022 10:44), Max: 98.7 (14 Sep 2022 17:12)  HR: 71 (15 Sep 2022 10:44)  BP: 105/72 (15 Sep 2022 10:44)  RR: 18 (15 Sep 2022 10:44)  SpO2: 94% (15 Sep 2022 10:44) (93% - 99%)  temp max in last 48H T(F): , Max: 98.7 (09-13-22 @ 20:13)    Current Antibiotics:    Other medications:  chlorhexidine 2% Cloths 1 Application(s) Topical daily  enoxaparin Injectable 40 milliGRAM(s) SubCutaneous every 24 hours  FIRST- Mouthwash  BLM 5 milliLiter(s) Swish and Swallow four times a day  levETIRAcetam  IVPB 500 milliGRAM(s) IV Intermittent every 12 hours  melatonin 5 milliGRAM(s) Oral at bedtime  memantine 5 milliGRAM(s) Oral two times a day  senna 2 Tablet(s) Oral at bedtime  sodium chloride 1 Gram(s) Oral two times a day                            10.9   2.72  )-----------( 133      ( 15 Sep 2022 05:41 )             30.7     09-15    133<L>  |  98  |  15.3  ----------------------------<  124<H>  3.5   |  25.0  |  0.53    Ca    8.7      15 Sep 2022 05:41      RECENT CULTURES:  09-10 @ 18:30    RVP  NotDetec    09-09 @ 11:50 .Blood Blood-Peripheral     No Growth Final    09-09 @ 11:48 .Blood Blood-Peripheral     No Growth Final      WBC Count: 2.72 K/uL (09-15-22 @ 05:41)  WBC Count: 3.75 K/uL (09-13-22 @ 11:08)  WBC Count: 3.49 K/uL (09-12-22 @ 05:52)  WBC Count: 3.31 K/uL (09-11-22 @ 06:44)    Creatinine, Serum: 0.53 mg/dL (09-15-22 @ 05:41)  Creatinine, Serum: 0.58 mg/dL (09-13-22 @ 11:08)  Creatinine, Serum: 0.79 mg/dL (09-12-22 @ 05:52)  Creatinine, Serum: 0.88 mg/dL (09-11-22 @ 06:44)    Procalcitonin, Serum: 0.08 ng/mL (09-11-22 @ 06:44)  Procalcitonin, Serum: 0.06 ng/mL (09-03-22 @ 05:30)     SARS-CoV-2: NotDetec (09-10-22 @ 18:30)  COVID-19 PCR: NotDetec (09-10-22 @ 05:00)  COVID-19 PCR: NotDetec (09-04-22 @ 08:37)  COVID-19 PCR: NotDetec (08-29-22 @ 04:00)  COVID-19 PCR: NotDetec (08-23-22 @ 06:30)  COVID-19 PCR: NotDetec (08-18-22 @ 07:36)        < from: CT Abdomen and Pelvis No Cont (09.10.22 @ 14:26) >  ACC: [id number] EXAM:  CT ABDOMEN AND PELVIS                        ACC: [id number] EXAM:  CT CHEST                          PROCEDURE DATE:  09/10/2022    INTERPRETATION:  CLINICAL INFORMATION: 42-year-old male patient with   concern with aspiration pneumonia.    COMPARISON: CT chest abdomen pelvis 5/24/2022.    CONTRAST/COMPLICATIONS:  IV Contrast: None  Oral Contrast: None  Complications: None    PROCEDURE:  CT of the Chest, Abdomen and Pelvis was performed.  Sagittal and coronal reformatswere performed.    FINDINGS:  CHEST:  LUNGS AND LARGE AIRWAYS: Patent central airways. 4 mm right upper lobe   and 4 mm middle lobe calcified granuloma. Otherwise no pulmonary nodules.   No pulmonary consolidative opacity. No groundglass opacity.  PLEURA: No pleural effusion.  VESSELS: The ascending thoracic aorta measures 2.9 cm. Pulmonary trunk   measures 2.6 cm, both within normal limits.  HEART: Heart size is normal. No pericardial effusion.  MEDIASTINUM AND DEBBIE: No lymphadenopathy. Stable 0.5 x 1.4 cm lymph node   in the aortopulmonary window.  CHEST WALL AND LOWER NECK: Thyroid gland within normal limits.    ABDOMEN AND PELVIS:  LIVER: Within normal limits. No focal liver lesions.  BILE DUCTS: Normal caliber.  GALLBLADDER: Within normal limits.  SPLEEN: Within normal limits.  PANCREAS: Within normal limits.  ADRENALS: Within normal limits. No nodules or masses.  KIDNEYS/URETERS: 6 mm left kidney upper pole nonobstructing renal   calculus, additional punctate lower pole left renal calculi and right   upper pole 4 mm nonobstructing renal calculus.    BLADDER: Within normal limits.  REPRODUCTIVE ORGANS: Prostate within normal limits. Left pelvic   phleboliths.    BOWEL: Normal appearance of the esophagus. The stomach is not distended.   There is a PEG tube in correct positioning. Small bowel loops are not   abnormally distended. No bowel obstruction. Appendix is normal. Moderate   stool burden in the distal colon.  PERITONEUM: No ascites. No pneumoperitoneum.  VESSELS: Within normal limits.  RETROPERITONEUM/LYMPH NODES: No lymphadenopathy.  ABDOMINAL WALL: Within normal limits.  BONES: Right lateral superior pubic ramus and inferior pubic ramus non   displaced subacute fracture with initial callus formation. No aggressive   lytic or blastic bony lesions. No compression deformities.    IMPRESSION:  No evidence of pneumonia.    No evidence of bowel obstruction. Correctly positioned PEG tube.    No acute findings in the chest, abdomen or pelvis. Bilateral   nonobstructingnephroliths.    Subacute fractures in the right lateral pubic rami.    --- End of Report ---  < end of copied text >

## 2022-09-15 NOTE — PROGRESS NOTE ADULT - SUBJECTIVE AND OBJECTIVE BOX
[facility] Division of Hospital Medicine  Orlin Yanes MD    Chief Complaint:  Patient is a 42y old  Male who presents with a chief complaint of Trauma/ Subdural/ Intubated (14 Sep 2022 10:37)      SUBJECTIVE / OVERNIGHT EVENTS:  Patient seen and examined at bedside. Unable to obtain ROS due to mental status.     MEDICATIONS  (STANDING):  chlorhexidine 2% Cloths 1 Application(s) Topical daily  enoxaparin Injectable 40 milliGRAM(s) SubCutaneous every 24 hours  FIRST- Mouthwash  BLM 5 milliLiter(s) Swish and Swallow four times a day  levETIRAcetam  IVPB 500 milliGRAM(s) IV Intermittent every 12 hours  melatonin 5 milliGRAM(s) Oral at bedtime  memantine 5 milliGRAM(s) Oral two times a day  senna 2 Tablet(s) Oral at bedtime  sodium chloride 1 Gram(s) Oral two times a day    MEDICATIONS  (PRN):  ondansetron Injectable 4 milliGRAM(s) IV Push every 4 hours PRN Nausea and/or Vomiting        I&O's Summary    14 Sep 2022 07:01  -  15 Sep 2022 07:00  --------------------------------------------------------  IN: 1150 mL / OUT: 0 mL / NET: 1150 mL        PHYSICAL EXAM:  Vital Signs Last 24 Hrs  T(C): 36.8 (15 Sep 2022 04:40), Max: 37.1 (14 Sep 2022 17:12)  T(F): 98.3 (15 Sep 2022 04:40), Max: 98.7 (14 Sep 2022 17:12)  HR: 68 (15 Sep 2022 04:40) (59 - 73)  BP: 106/72 (15 Sep 2022 04:40) (106/72 - 133/91)  BP(mean): --  RR: 18 (15 Sep 2022 04:40) (18 - 18)  SpO2: 97% (15 Sep 2022 04:40) (93% - 100%)    Parameters below as of 15 Sep 2022 04:40  Patient On (Oxygen Delivery Method): room air    CONSTITUTIONAL: Non toxic appearing, lying comfortably in bed  ENMT: Moist oral mucosa, s/p hemicraniectomy scalp incision healing well (no drainage from incision site)  RESPIRATORY: Normal respiratory effort; lungs are clear to auscultation bilaterally  CARDIOVASCULAR: Regular rate and rhythm, normal S1 and S2, no murmur/rub/gallop; No lower extremity edema; Peripheral pulses are 2+ bilaterally  ABDOMEN: normoactive bowel sounds, no rebound/guarding; No hepatosplenomegaly, PEG site clean, dry (no excoriation or erythema)  MUSCULOSKELETAL: no clubbing or cyanosis of digits; no joint swelling, IV access site c/d. No infiltration   PSYCH: Awake, not following commands   NEUROLOGY: Moves all ext to command,   SKIN: No external lesions noted       LABS:                        10.9   2.72  )-----------( 133      ( 15 Sep 2022 05:41 )             30.7     09-15    133<L>  |  98  |  15.3  ----------------------------<  124<H>  3.5   |  25.0  |  0.53    Ca    8.7      15 Sep 2022 05:41                CAPILLARY BLOOD GLUCOSE      POCT Blood Glucose.: 120 mg/dL (15 Sep 2022 06:11)  POCT Blood Glucose.: 101 mg/dL (15 Sep 2022 00:31)  POCT Blood Glucose.: 73 mg/dL (14 Sep 2022 17:22)  POCT Blood Glucose.: 93 mg/dL (14 Sep 2022 11:59)        RADIOLOGY & ADDITIONAL TESTS:  Results Reviewed:   Imaging Personally Reviewed:  Electrocardiogram Personally Reviewed:

## 2022-09-15 NOTE — PROCEDURE NOTE - NSSITEPREP_SKIN_A_CORE
chlorhexidine
chlorhexidine/Adherence to aseptic technique: hand hygiene prior to donning barriers (gown, gloves), don cap and mask, sterile drape over patient
chlorhexidine/Adherence to aseptic technique: hand hygiene prior to donning barriers (gown, gloves), don cap and mask, sterile drape over patient
chlorhexidine

## 2022-09-15 NOTE — PROCEDURE NOTE - NSPROCNAME_GEN_A_CORE
Central Line Insertion
Laceration Repair
Peripheral Line Insertion
Midline Insertion
Midline Insertion

## 2022-09-16 LAB
ALBUMIN SERPL ELPH-MCNC: 3.9 G/DL — SIGNIFICANT CHANGE UP (ref 3.3–5.2)
ALP SERPL-CCNC: 55 U/L — SIGNIFICANT CHANGE UP (ref 40–120)
ALT FLD-CCNC: 16 U/L — SIGNIFICANT CHANGE UP
ANION GAP SERPL CALC-SCNC: 12 MMOL/L — SIGNIFICANT CHANGE UP (ref 5–17)
AST SERPL-CCNC: 15 U/L — SIGNIFICANT CHANGE UP
BILIRUB SERPL-MCNC: 0.5 MG/DL — SIGNIFICANT CHANGE UP (ref 0.4–2)
BUN SERPL-MCNC: 14.6 MG/DL — SIGNIFICANT CHANGE UP (ref 8–20)
CALCIUM SERPL-MCNC: 9.2 MG/DL — SIGNIFICANT CHANGE UP (ref 8.4–10.5)
CHLORIDE SERPL-SCNC: 100 MMOL/L — SIGNIFICANT CHANGE UP (ref 98–107)
CO2 SERPL-SCNC: 22 MMOL/L — SIGNIFICANT CHANGE UP (ref 22–29)
CREAT SERPL-MCNC: 0.6 MG/DL — SIGNIFICANT CHANGE UP (ref 0.5–1.3)
EGFR: 124 ML/MIN/1.73M2 — SIGNIFICANT CHANGE UP
GLUCOSE BLDC GLUCOMTR-MCNC: 105 MG/DL — HIGH (ref 70–99)
GLUCOSE BLDC GLUCOMTR-MCNC: 92 MG/DL — SIGNIFICANT CHANGE UP (ref 70–99)
GLUCOSE BLDC GLUCOMTR-MCNC: 99 MG/DL — SIGNIFICANT CHANGE UP (ref 70–99)
GLUCOSE SERPL-MCNC: 97 MG/DL — SIGNIFICANT CHANGE UP (ref 70–99)
HCT VFR BLD CALC: 33.4 % — LOW (ref 39–50)
HGB BLD-MCNC: 11.8 G/DL — LOW (ref 13–17)
MCHC RBC-ENTMCNC: 28.1 PG — SIGNIFICANT CHANGE UP (ref 27–34)
MCHC RBC-ENTMCNC: 35.3 GM/DL — SIGNIFICANT CHANGE UP (ref 32–36)
MCV RBC AUTO: 79.5 FL — LOW (ref 80–100)
PLATELET # BLD AUTO: 127 K/UL — LOW (ref 150–400)
POTASSIUM SERPL-MCNC: 3.8 MMOL/L — SIGNIFICANT CHANGE UP (ref 3.5–5.3)
POTASSIUM SERPL-SCNC: 3.8 MMOL/L — SIGNIFICANT CHANGE UP (ref 3.5–5.3)
PROT SERPL-MCNC: 6.6 G/DL — SIGNIFICANT CHANGE UP (ref 6.6–8.7)
RBC # BLD: 4.2 M/UL — SIGNIFICANT CHANGE UP (ref 4.2–5.8)
RBC # FLD: 14.1 % — SIGNIFICANT CHANGE UP (ref 10.3–14.5)
SARS-COV-2 RNA SPEC QL NAA+PROBE: SIGNIFICANT CHANGE UP
SODIUM SERPL-SCNC: 134 MMOL/L — LOW (ref 135–145)
WBC # BLD: 2.87 K/UL — LOW (ref 3.8–10.5)
WBC # FLD AUTO: 2.87 K/UL — LOW (ref 3.8–10.5)

## 2022-09-16 PROCEDURE — 99232 SBSQ HOSP IP/OBS MODERATE 35: CPT

## 2022-09-16 RX ADMIN — DIPHENHYDRAMINE HYDROCHLORIDE AND LIDOCAINE HYDROCHLORIDE AND ALUMINUM HYDROXIDE AND MAGNESIUM HYDRO 5 MILLILITER(S): KIT at 21:12

## 2022-09-16 RX ADMIN — ENOXAPARIN SODIUM 40 MILLIGRAM(S): 100 INJECTION SUBCUTANEOUS at 17:28

## 2022-09-16 RX ADMIN — CHLORHEXIDINE GLUCONATE 1 APPLICATION(S): 213 SOLUTION TOPICAL at 15:36

## 2022-09-16 RX ADMIN — DIPHENHYDRAMINE HYDROCHLORIDE AND LIDOCAINE HYDROCHLORIDE AND ALUMINUM HYDROXIDE AND MAGNESIUM HYDRO 5 MILLILITER(S): KIT at 13:28

## 2022-09-16 RX ADMIN — LEVETIRACETAM 400 MILLIGRAM(S): 250 TABLET, FILM COATED ORAL at 06:20

## 2022-09-16 RX ADMIN — SODIUM CHLORIDE 1 GRAM(S): 9 INJECTION INTRAMUSCULAR; INTRAVENOUS; SUBCUTANEOUS at 17:29

## 2022-09-16 RX ADMIN — SODIUM CHLORIDE 1 GRAM(S): 9 INJECTION INTRAMUSCULAR; INTRAVENOUS; SUBCUTANEOUS at 06:20

## 2022-09-16 RX ADMIN — Medication 5 MILLIGRAM(S): at 21:12

## 2022-09-16 RX ADMIN — MEMANTINE HYDROCHLORIDE 5 MILLIGRAM(S): 10 TABLET ORAL at 06:20

## 2022-09-16 RX ADMIN — DIPHENHYDRAMINE HYDROCHLORIDE AND LIDOCAINE HYDROCHLORIDE AND ALUMINUM HYDROXIDE AND MAGNESIUM HYDRO 5 MILLILITER(S): KIT at 17:29

## 2022-09-16 RX ADMIN — MEMANTINE HYDROCHLORIDE 5 MILLIGRAM(S): 10 TABLET ORAL at 17:28

## 2022-09-16 RX ADMIN — LEVETIRACETAM 400 MILLIGRAM(S): 250 TABLET, FILM COATED ORAL at 17:28

## 2022-09-16 RX ADMIN — DIPHENHYDRAMINE HYDROCHLORIDE AND LIDOCAINE HYDROCHLORIDE AND ALUMINUM HYDROXIDE AND MAGNESIUM HYDRO 5 MILLILITER(S): KIT at 06:20

## 2022-09-16 RX ADMIN — SENNA PLUS 2 TABLET(S): 8.6 TABLET ORAL at 21:12

## 2022-09-16 NOTE — PROGRESS NOTE ADULT - ASSESSMENT
42y  Male initially admitted on 5/24/22 (BIBEMS after found unresponsive on street) with a GCS of 3, found to have b/l intraparenchymal bleeds, b/l SDH. Has had a complex prolonged hospital course including but not limited to right decompressive craniectomy on 5/24. Trach / PEG., cranioplasty on 6/29 with cognitive decline after initial improvement prompting imaging which noted subacute collection with air underneath the cranioplasty sight. MRI with increase in fluid collection underneath the flap leading to a repeat right craniectomy with wound exploration & evacuation of fluid collection on 7/17 with OR cultures isolating MSSA. 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 Has had interval trach decannulation and dietary advancement to pureed. Now s/p completion of appropriate antimicrobial course with f/u imaging (9/3) revealing no abnormal enhancement (6 mm midline shift to left). After being optimzed and cleared for surgery had a fever (9/9/22). Infectious w/u in progress to evaluate etiology of fever     Traumatic brain injury with complicated hospital course (as above) now with periodic agitation and lethargy  - Neurologically significantly improved however per family recently more lethargic since standing Seroquel initiation   - DC Seroquel for now  - Will order mittens PRN for pt safety at night if needed and if indicated, low threshold for 1:1 (nocturnal as family at bedside during day hours)  - Continue Memantine 5 mg BID   - Continue Keppra 500 mg BID   - Cranioplasty tentatively planned for this week, however with febrile episode 9/9  - Now fever free since 9/10, ID clearance appreciated  - Discussed w/ NSG, cranioplasty planned for 9/19/22. Will medically optimize over weekend.   Hyponatremia  - Central SIADH , persistently low and then overcorrected  - Monitor closely  - (Urea and NaCL dc'd)  - Restarted NaCL tablets  - Discuss with Renal for any recs prior to cranioplasty IF Na low/high    R pubic rami fx  - subacute  - incidentally found on CT  - Ortho consult appreciated  - WBAT, Pain control    Fever  - No sepsis, no localizing signs  - Prelim my read : CXR : normal   - Lactate 1.0  - BCx; NGTD  - UA neg  - CT head / Chest/Abd pelvis reviewed  - Hold off Abx for now, start if any change in clinical status concerning for infection   - Dopplers Upper and lower ext b/l to rule out thrombosis as cause for fever   - Covid PCR neg, resp viral panel ordered to e/o alt viruses   - ID following, ID clearance appreciated    SDH/ IPH / TBI , CNS infection  - Right decompressive hemicraniectomy on 5/24,  - Completed Nafcillin /  Vanc, post completion with MRI (w/IV con) w/o abnormal enhancement  - ID on board  - Helmet OOB    Oropharyngeal Dysphagia  - on TF / purée diet , restart free water / TF given unreliable PO intake   - Strict aspiration precautions     Substance abuse   - Cocaine pos on admission   - Alcoholism  - Was on MVI/ folic acid / Thiamine for presumed thiamine deficiency     Anemia   - resolved    DVT prophylaxis  Lovenox    Dispo - Awaiting cranioplasty     Updated patient's sister Carissa ([phone])

## 2022-09-16 NOTE — PROGRESS NOTE ADULT - SUBJECTIVE AND OBJECTIVE BOX
[facility] Division of Hospital Medicine  Orlin Yanes MD    Chief Complaint:  Patient is a 42y old  Male who presents with a chief complaint of Trauma/ Subdural/ Intubated (15 Sep 2022 10:41)      SUBJECTIVE / OVERNIGHT EVENTS:  Patient seen and examined at bedside. No acute events overnight. Unable to obtain ROS due to mental status.     MEDICATIONS  (STANDING):  chlorhexidine 2% Cloths 1 Application(s) Topical daily  enoxaparin Injectable 40 milliGRAM(s) SubCutaneous every 24 hours  FIRST- Mouthwash  BLM 5 milliLiter(s) Swish and Swallow four times a day  levETIRAcetam  IVPB 500 milliGRAM(s) IV Intermittent every 12 hours  melatonin 5 milliGRAM(s) Oral at bedtime  memantine 5 milliGRAM(s) Oral two times a day  senna 2 Tablet(s) Oral at bedtime  sodium chloride 1 Gram(s) Oral two times a day    MEDICATIONS  (PRN):  ondansetron Injectable 4 milliGRAM(s) IV Push every 4 hours PRN Nausea and/or Vomiting        I&O's Summary    15 Sep 2022 07:01  -  16 Sep 2022 07:00  --------------------------------------------------------  IN: 1500 mL / OUT: 600 mL / NET: 900 mL        PHYSICAL EXAM:  Vital Signs Last 24 Hrs  T(C): 36.7 (16 Sep 2022 09:31), Max: 36.8 (15 Sep 2022 21:18)  T(F): 98 (16 Sep 2022 09:31), Max: 98.3 (15 Sep 2022 21:18)  HR: 84 (16 Sep 2022 09:31) (59 - 86)  BP: 121/80 (16 Sep 2022 09:31) (107/72 - 135/87)  BP(mean): --  RR: 17 (16 Sep 2022 09:31) (15 - 18)  SpO2: 98% (16 Sep 2022 09:31) (95% - 98%)    Parameters below as of 16 Sep 2022 09:31  Patient On (Oxygen Delivery Method): room air      CONSTITUTIONAL: Non toxic appearing, lying comfortably in bed  ENMT: Moist oral mucosa, s/p hemicraniectomy scalp incision healing well (no drainage from incision site)  RESPIRATORY: Normal respiratory effort; lungs are clear to auscultation bilaterally  CARDIOVASCULAR: Regular rate and rhythm, normal S1 and S2, no murmur/rub/gallop; No lower extremity edema; Peripheral pulses are 2+ bilaterally  ABDOMEN: normoactive bowel sounds, no rebound/guarding; No hepatosplenomegaly, PEG site clean, dry (no excoriation or erythema)  MUSCULOSKELETAL: no clubbing or cyanosis of digits; no joint swelling, IV access site c/d. No infiltration   PSYCH: Awake, not following commands   NEUROLOGY: Moves all ext to command,   SKIN: No external lesions noted     LABS:                        11.8   2.87  )-----------( 127      ( 16 Sep 2022 07:50 )             33.4     09-16    134<L>  |  100  |  14.6  ----------------------------<  97  3.8   |  22.0  |  0.60    Ca    9.2      16 Sep 2022 07:50    TPro  6.6  /  Alb  3.9  /  TBili  0.5  /  DBili  x   /  AST  15  /  ALT  16  /  AlkPhos  55  09-16              CAPILLARY BLOOD GLUCOSE      POCT Blood Glucose.: 105 mg/dL (16 Sep 2022 06:28)  POCT Blood Glucose.: 106 mg/dL (15 Sep 2022 23:51)  POCT Blood Glucose.: 138 mg/dL (15 Sep 2022 18:11)        RADIOLOGY & ADDITIONAL TESTS:  Results Reviewed:   Imaging Personally Reviewed:  Electrocardiogram Personally Reviewed:

## 2022-09-16 NOTE — PROGRESS NOTE ADULT - NUTRITIONAL ASSESSMENT
This patient has been assessed with a concern for Malnutrition and has been determined to have a diagnosis/diagnoses of Severe protein-calorie malnutrition.    This patient is being managed with:   Diet Pureed-  1000mL Fluid Restriction ([id number])  Mildly Thick Liquids (MILDTHICKLIQS)  Free Water Flush  Bolus   Total Volume per Flush (mL): 150   Frequency: Every 4 Hours   Total Daily Volume of Flush (mL): 900  Free Water Flush Instructions:  Total free water 900ml  Supplement Feeding Modality:  Oral  Ensure Enlive Cans or Servings Per Day:  1       Frequency:  Two Times a day  Entered: Sep 16 2022  7:39AM

## 2022-09-16 NOTE — CHART NOTE - NSCHARTNOTEFT_GEN_A_CORE
I have evaluated this patient and have determined that restraints are warranted to optimize medical care. Patient was assessed for current physical and psychological risk factors as well as special needs. There are no medical conditions or limitations that would place this patient at risk while in restraints. Dr. ZULMA Yanes made aware.

## 2022-09-17 LAB
ALBUMIN SERPL ELPH-MCNC: 4 G/DL — SIGNIFICANT CHANGE UP (ref 3.3–5.2)
ALP SERPL-CCNC: 54 U/L — SIGNIFICANT CHANGE UP (ref 40–120)
ALT FLD-CCNC: 16 U/L — SIGNIFICANT CHANGE UP
ANION GAP SERPL CALC-SCNC: 13 MMOL/L — SIGNIFICANT CHANGE UP (ref 5–17)
AST SERPL-CCNC: 13 U/L — SIGNIFICANT CHANGE UP
BILIRUB SERPL-MCNC: 0.5 MG/DL — SIGNIFICANT CHANGE UP (ref 0.4–2)
BUN SERPL-MCNC: 12.9 MG/DL — SIGNIFICANT CHANGE UP (ref 8–20)
CALCIUM SERPL-MCNC: 9 MG/DL — SIGNIFICANT CHANGE UP (ref 8.4–10.5)
CHLORIDE SERPL-SCNC: 98 MMOL/L — SIGNIFICANT CHANGE UP (ref 98–107)
CO2 SERPL-SCNC: 23 MMOL/L — SIGNIFICANT CHANGE UP (ref 22–29)
CREAT SERPL-MCNC: 0.69 MG/DL — SIGNIFICANT CHANGE UP (ref 0.5–1.3)
EGFR: 118 ML/MIN/1.73M2 — SIGNIFICANT CHANGE UP
GLUCOSE BLDC GLUCOMTR-MCNC: 112 MG/DL — HIGH (ref 70–99)
GLUCOSE BLDC GLUCOMTR-MCNC: 74 MG/DL — SIGNIFICANT CHANGE UP (ref 70–99)
GLUCOSE BLDC GLUCOMTR-MCNC: 96 MG/DL — SIGNIFICANT CHANGE UP (ref 70–99)
GLUCOSE SERPL-MCNC: 88 MG/DL — SIGNIFICANT CHANGE UP (ref 70–99)
HCT VFR BLD CALC: 29.5 % — LOW (ref 39–50)
HGB BLD-MCNC: 10.2 G/DL — LOW (ref 13–17)
MCHC RBC-ENTMCNC: 27.8 PG — SIGNIFICANT CHANGE UP (ref 27–34)
MCHC RBC-ENTMCNC: 34.6 GM/DL — SIGNIFICANT CHANGE UP (ref 32–36)
MCV RBC AUTO: 80.4 FL — SIGNIFICANT CHANGE UP (ref 80–100)
PLATELET # BLD AUTO: 127 K/UL — LOW (ref 150–400)
POTASSIUM SERPL-MCNC: 3.8 MMOL/L — SIGNIFICANT CHANGE UP (ref 3.5–5.3)
POTASSIUM SERPL-SCNC: 3.8 MMOL/L — SIGNIFICANT CHANGE UP (ref 3.5–5.3)
PROT SERPL-MCNC: 6.4 G/DL — LOW (ref 6.6–8.7)
RBC # BLD: 3.67 M/UL — LOW (ref 4.2–5.8)
RBC # FLD: 14.3 % — SIGNIFICANT CHANGE UP (ref 10.3–14.5)
SODIUM SERPL-SCNC: 134 MMOL/L — LOW (ref 135–145)
WBC # BLD: 3.93 K/UL — SIGNIFICANT CHANGE UP (ref 3.8–10.5)
WBC # FLD AUTO: 3.93 K/UL — SIGNIFICANT CHANGE UP (ref 3.8–10.5)

## 2022-09-17 PROCEDURE — 99232 SBSQ HOSP IP/OBS MODERATE 35: CPT

## 2022-09-17 RX ORDER — ASCORBIC ACID 60 MG
500 TABLET,CHEWABLE ORAL DAILY
Refills: 0 | Status: DISCONTINUED | OUTPATIENT
Start: 2022-09-17 | End: 2022-09-19

## 2022-09-17 RX ADMIN — SENNA PLUS 2 TABLET(S): 8.6 TABLET ORAL at 21:37

## 2022-09-17 RX ADMIN — Medication 5 MILLIGRAM(S): at 21:37

## 2022-09-17 RX ADMIN — LEVETIRACETAM 400 MILLIGRAM(S): 250 TABLET, FILM COATED ORAL at 05:37

## 2022-09-17 RX ADMIN — MEMANTINE HYDROCHLORIDE 5 MILLIGRAM(S): 10 TABLET ORAL at 17:20

## 2022-09-17 RX ADMIN — ENOXAPARIN SODIUM 40 MILLIGRAM(S): 100 INJECTION SUBCUTANEOUS at 17:21

## 2022-09-17 RX ADMIN — MEMANTINE HYDROCHLORIDE 5 MILLIGRAM(S): 10 TABLET ORAL at 05:37

## 2022-09-17 RX ADMIN — CHLORHEXIDINE GLUCONATE 1 APPLICATION(S): 213 SOLUTION TOPICAL at 12:06

## 2022-09-17 RX ADMIN — SODIUM CHLORIDE 1 GRAM(S): 9 INJECTION INTRAMUSCULAR; INTRAVENOUS; SUBCUTANEOUS at 05:37

## 2022-09-17 RX ADMIN — DIPHENHYDRAMINE HYDROCHLORIDE AND LIDOCAINE HYDROCHLORIDE AND ALUMINUM HYDROXIDE AND MAGNESIUM HYDRO 5 MILLILITER(S): KIT at 05:37

## 2022-09-17 RX ADMIN — LEVETIRACETAM 400 MILLIGRAM(S): 250 TABLET, FILM COATED ORAL at 17:19

## 2022-09-17 RX ADMIN — SODIUM CHLORIDE 1 GRAM(S): 9 INJECTION INTRAMUSCULAR; INTRAVENOUS; SUBCUTANEOUS at 17:20

## 2022-09-17 RX ADMIN — DIPHENHYDRAMINE HYDROCHLORIDE AND LIDOCAINE HYDROCHLORIDE AND ALUMINUM HYDROXIDE AND MAGNESIUM HYDRO 5 MILLILITER(S): KIT at 12:18

## 2022-09-17 RX ADMIN — DIPHENHYDRAMINE HYDROCHLORIDE AND LIDOCAINE HYDROCHLORIDE AND ALUMINUM HYDROXIDE AND MAGNESIUM HYDRO 5 MILLILITER(S): KIT at 17:19

## 2022-09-17 NOTE — PROGRESS NOTE ADULT - ASSESSMENT
42M unknown PMH presented with IPH/SDH s/p hemicraniectomy on 5/24, cranioplasty 6/29, ccb right crani for epidural collection on 7/17. Pending cranioplasty planned for 9/19.     Plan:  -All imaging reviewed   -Serial neuro checks   -STAT CTH for any decline in neuro exam   -Pain control PRN, avoid oversedation   -Plan for OR 9/19 with Dr. Millan and Dr. Peace for cranioplasty   -SCDs b/l, lovenox for dvt ppx   -PT/OT, OOB as tolerated with helmet  -Further medical management/supportive care per primary team

## 2022-09-17 NOTE — PROGRESS NOTE ADULT - SUBJECTIVE AND OBJECTIVE BOX
Altered mental status    HPI:  HPI: Patient was brought by EMS, found down in the street unresponsive.   Primary Survey:    A - airway compromised, patient intubated in ED, RSI  B - bilateral breath sound after intubation  C - initial BP: 169/93 (05-24-22 @ 00:00)*** , HR: 107 (05-24-22 @ 00:44)*** , palpable pulses in all extremities  D - GCS 3 on arrival  Exposure obtained, no evident injuries  CXR: No evident PTX or Hemothorax, ET tube in place.  (24 May 2022 01:09)    Interval History:  Patient was seen and examined at bedside around 10:15 am.  Awake. Follows commands.   No active complaints. No overnight issues.     ROS:  As per interval history otherwise unremarkable.    PHYSICAL EXAM:  Vital Signs   T(C): 36.8 (17 Sep 2022 12:00), Max: 37.1 (16 Sep 2022 16:38)  T(F): 98.2 (17 Sep 2022 12:00), Max: 98.8 (16 Sep 2022 16:38)  HR: 74 (17 Sep 2022 12:00) (72 - 80)  BP: 116/76 (17 Sep 2022 12:00) (104/70 - 119/79)  RR: 18 (17 Sep 2022 12:00) (18 - 18)  SpO2: 97% (17 Sep 2022 12:00) (97% - 98%)  Parameters below as of 17 Sep 2022 12:00  Patient On (Oxygen Delivery Method): room air  General: Young male sitting in bed comfortably. No acute distress  HEENT: EOMI. Clear conjunctivae. Moist mucus membrane. Craniectomy on right side.   Neck: Supple.   Chest: CTA bilaterally. No wheezing, rales or rhonchi. No chest wall tenderness.  Heart: Normal S1 & S2. RRR.   Abdomen: Non distended. Soft. Non-tender. + BS. PEG in place.   Ext: No pedal edema. No calf tenderness   Neuro: Awake. Follows simple commands. Moves all four extremities.   Skin: Warm and Dry  Psychiatry: Calm at the time of exam    I&O's Summary    16 Sep 2022 07:01  -  17 Sep 2022 07:00  --------------------------------------------------------  IN: 680 mL / OUT: 600 mL / NET: 80 mL    17 Sep 2022 07:01  -  17 Sep 2022 15:00  --------------------------------------------------------  IN: 150 mL / OUT: 0 mL / NET: 150 mL    LABS:  CAPILLARY BLOOD GLUCOSE  POCT Blood Glucose.: 96 mg/dL (17 Sep 2022 08:42)  POCT Blood Glucose.: 112 mg/dL (17 Sep 2022 05:39)  POCT Blood Glucose.: 99 mg/dL (16 Sep 2022 21:08)                      10.2   3.93  )-----------( 127      ( 17 Sep 2022 06:26 )             29.5     09-17    134<L>  |  98  |  12.9  ----------------------------<  88  3.8   |  23.0  |  0.69    Ca    9.0      17 Sep 2022 06:26    TPro  6.4<L>  /  Alb  4.0  /  TBili  0.5  /  DBili  x   /  AST  13  /  ALT  16  /  AlkPhos  54  09-17    RADIOLOGY & ADDITIONAL STUDIES:  Reviewed     MEDICATIONS  (STANDING):  chlorhexidine 2% Cloths 1 Application(s) Topical daily  enoxaparin Injectable 40 milliGRAM(s) SubCutaneous every 24 hours  FIRST- Mouthwash  BLM 5 milliLiter(s) Swish and Swallow four times a day  levETIRAcetam  IVPB 500 milliGRAM(s) IV Intermittent every 12 hours  melatonin 5 milliGRAM(s) Oral at bedtime  memantine 5 milliGRAM(s) Oral two times a day  senna 2 Tablet(s) Oral at bedtime  sodium chloride 1 Gram(s) Oral two times a day    MEDICATIONS  (PRN):  ondansetron Injectable 4 milliGRAM(s) IV Push every 4 hours PRN Nausea and/or Vomiting

## 2022-09-17 NOTE — PROGRESS NOTE ADULT - ASSESSMENT
42y Male initially admitted on 5/24/22 (BIBEMS after found unresponsive on street) with a GCS of 3, found to have b/l intraparenchymal bleeds, b/l SDH. Has had a complex prolonged hospital course including but not limited to right decompressive craniectomy on 5/24. Trach / PEG., cranioplasty on 6/29 with cognitive decline after initial improvement prompting imaging which noted subacute collection with air underneath the cranioplasty sight. MRI with increase in fluid collection underneath the flap leading to a repeat right craniectomy with wound exploration & evacuation of fluid collection on 7/17 with OR cultures isolating MSSA. Antimicrobial regimen adjusted (ID), (suspected) central SIADH now improved (renal signed off). Has had interval trach decannulation and dietary advancement to pureed. Now s/p completion of appropriate antimicrobial course with f/u imaging (9/3) revealing no abnormal enhancement (6 mm midline shift to left). After being optimized and cleared for surgery had a fever (9/9/22). Infectious w/u negative. Now has been afebrile.      1) Traumatic brain injury with complicated hospital course (as above) now with periodic agitation and lethargy  - Neurologically significantly improved however per family recently more lethargic since standing Seroquel initiation   - Seroquel discontinued for now  - Mittens PRN for pt safety, low threshold for 1:1 (nocturnal as family at bedside during day hours)  - Continue Memantine 5 mg BID   - Continue Keppra 500 mg BID   - Cranioplasty planned on 9/19/22  - Now fever free since 9/10, ID clearance appreciated  - Will medically optimize over weekend.     2) Hyponatremia  - Central SIADH   - s/p Tolvaptan and Urea   - Restarted NaCL tablets  - Monitor closely    3) R pubic rami fx  - subacute  - incidentally found on CT  - Ortho consult appreciated  - WBAT, Pain control    4) Fever  - No sepsis, no localizing signs  - CXR : normal   - Lactate 1.0  - BCx; NGTD  - UA neg  - RVP and COVID negative   - Upper extremity doppler with superficial thrombophlebitis   - CT head / Chest/Abd pelvis reviewed  - Hold off Abx for now, start if any change in clinical status concerning for infection   - ID follow up noted, ID clearance appreciated    5) SDH/ IPH / TBI, CNS infection  - Right decompressive hemicraniectomy on 5/24,  - Completed Nafcillin /  Vanc, post completion with MRI (w/IV con) w/o abnormal enhancement  - ID follow up noted     6) Oropharyngeal Dysphagia  - on purée diet with mildly thick liquids   - Strict aspiration precautions     7) Substance abuse (Cocaine pos on admission) / Alcoholism  - SW following     8) Anemia   - H&H stable  - Monitor     DVT Prophylaxis -- Lovenox SQ    Dispo - Likely TANIKA once stable.

## 2022-09-17 NOTE — CHART NOTE - NSCHARTNOTEFT_GEN_A_CORE
I have evaluated this patient and have determined that restraints are warranted to optimize medical care. Patient was assessed for current physical and psychological risk factors as well as special needs. There are no medical conditions or limitations that would place this patient at risk while in restraints. Dr. Gordon made aware.

## 2022-09-17 NOTE — PROGRESS NOTE ADULT - SUBJECTIVE AND OBJECTIVE BOX
HPI:  HPI:  HPI: Patient is a 25y old M brought by EMS, found down in the street unresponsive.     Primary Survey:    A - airway compromised, patient intubated in ED, RSI  B - bilateral breath sound after intubation  C - initial BP: 169/93 (05-24-22 @ 00:00)*** , HR: 107 (05-24-22 @ 00:44)*** , palpable pulses in all extremities  D - GCS 3 on arrival    Exposure obtained, no evident injuries    CXR: No evident PTX or Hemothorax, ET tube in place.  (24 May 2022 01:09)      INTERVAL HPI/OVERNIGHT EVENTS:  42 year old male seen and examined by neurosurgery team, sitting comfortably in bed. Patient remains afebrile. No acute overnight events reported.     Vital Signs Last 24 Hrs  T(C): 36.8 (17 Sep 2022 12:00), Max: 37.1 (16 Sep 2022 16:38)  T(F): 98.2 (17 Sep 2022 12:00), Max: 98.8 (16 Sep 2022 16:38)  HR: 74 (17 Sep 2022 12:00) (72 - 80)  BP: 116/76 (17 Sep 2022 12:00) (104/70 - 119/79)  BP(mean): --  RR: 18 (17 Sep 2022 12:00) (18 - 18)  SpO2: 97% (17 Sep 2022 12:00) (97% - 98%)    Parameters below as of 17 Sep 2022 12:00  Patient On (Oxygen Delivery Method): room air        PHYSICAL EXAM:  GENERAL: NAD, calm  HEAD: s/p R craniectomy, flap sunken. Incision healed appropriately   MENTAL STATUS: AAO x3; Awake. Opens eyes spontaneously, intermittently follows commands with repetition, hypophonic, minimally verbal  CRANIAL NERVES: PERRL. EOMI without nystagmus. Face symmetric w/ normal eye closure and smile, tongue midline.   MOTOR: LUNA AG spontaneously   SENSATION: grossly intact to light touch all extremities    LABS:                        10.2   3.93  )-----------( 127      ( 17 Sep 2022 06:26 )             29.5     09-17    134<L>  |  98  |  12.9  ----------------------------<  88  3.8   |  23.0  |  0.69    Ca    9.0      17 Sep 2022 06:26    TPro  6.4<L>  /  Alb  4.0  /  TBili  0.5  /  DBili  x   /  AST  13  /  ALT  16  /  AlkPhos  54  09-17 09-16 @ 07:01  -  09-17 @ 07:00  --------------------------------------------------------  IN: 680 mL / OUT: 600 mL / NET: 80 mL    09-17 @ 07:01  -  09-17 @ 15:21  --------------------------------------------------------  IN: 300 mL / OUT: 0 mL / NET: 300 mL        RADIOLOGY & ADDITIONAL TESTS:  US Duplex Venous Upper Ext Ltd, Right (09.14.22 @ 16:10):  IMPRESSION:  No evidence of right upper extremity deep venous thrombosis.    Superficial thrombophlebitis involving the left vein in the upper arm and   cephalic vein in the antecubital fossa.    CT Head No Cont (09.10.22 @ 14:23):  IMPRESSION:  1.  No CT evidence of acute intracranial pathology.    2.  Right-sided craniectomy with concavity of the right frontoparietal   convexity and 1.1 cm of midline shift to the LEFT is grossly stablefrom   08/30/2022.    3.  Disproportionate enlargement of the left lateral ventricle compatible   with hydrocephalus, and surrounding transependymal CSF flow, are stable.    MR Head w/ IV Cont (09.03.22 @ 16:19):  IMPRESSION:   No abnormal enhancement is seen. Mild periventricular white   matter ischemia with old lacunar infarction in the LEFT basal ganglia.   RIGHT craniectomy again noted is encephalomalacia and gliosis in the   RIGHT frontal lobe. 6 mm shift to the LEFT is noted.

## 2022-09-18 ENCOUNTER — TRANSCRIPTION ENCOUNTER (OUTPATIENT)
Age: 42
End: 2022-09-18

## 2022-09-18 LAB
ANION GAP SERPL CALC-SCNC: 12 MMOL/L — SIGNIFICANT CHANGE UP (ref 5–17)
BLD GP AB SCN SERPL QL: SIGNIFICANT CHANGE UP
BUN SERPL-MCNC: 10.2 MG/DL — SIGNIFICANT CHANGE UP (ref 8–20)
CALCIUM SERPL-MCNC: 9.1 MG/DL — SIGNIFICANT CHANGE UP (ref 8.4–10.5)
CHLORIDE SERPL-SCNC: 97 MMOL/L — LOW (ref 98–107)
CO2 SERPL-SCNC: 26 MMOL/L — SIGNIFICANT CHANGE UP (ref 22–29)
CREAT SERPL-MCNC: 0.76 MG/DL — SIGNIFICANT CHANGE UP (ref 0.5–1.3)
EGFR: 115 ML/MIN/1.73M2 — SIGNIFICANT CHANGE UP
GLUCOSE BLDC GLUCOMTR-MCNC: 101 MG/DL — HIGH (ref 70–99)
GLUCOSE BLDC GLUCOMTR-MCNC: 105 MG/DL — HIGH (ref 70–99)
GLUCOSE BLDC GLUCOMTR-MCNC: 89 MG/DL — SIGNIFICANT CHANGE UP (ref 70–99)
GLUCOSE SERPL-MCNC: 90 MG/DL — SIGNIFICANT CHANGE UP (ref 70–99)
HCT VFR BLD CALC: 29.7 % — LOW (ref 39–50)
HGB BLD-MCNC: 10.6 G/DL — LOW (ref 13–17)
INR BLD: 1.18 RATIO — HIGH (ref 0.88–1.16)
MAGNESIUM SERPL-MCNC: 1.7 MG/DL — LOW (ref 1.8–2.6)
MCHC RBC-ENTMCNC: 28.3 PG — SIGNIFICANT CHANGE UP (ref 27–34)
MCHC RBC-ENTMCNC: 35.7 GM/DL — SIGNIFICANT CHANGE UP (ref 32–36)
MCV RBC AUTO: 79.2 FL — LOW (ref 80–100)
MRSA PCR RESULT.: SIGNIFICANT CHANGE UP
PLATELET # BLD AUTO: 144 K/UL — LOW (ref 150–400)
POTASSIUM SERPL-MCNC: 3.7 MMOL/L — SIGNIFICANT CHANGE UP (ref 3.5–5.3)
POTASSIUM SERPL-SCNC: 3.7 MMOL/L — SIGNIFICANT CHANGE UP (ref 3.5–5.3)
PROTHROM AB SERPL-ACNC: 13.7 SEC — HIGH (ref 10.5–13.4)
RBC # BLD: 3.75 M/UL — LOW (ref 4.2–5.8)
RBC # FLD: 14.4 % — SIGNIFICANT CHANGE UP (ref 10.3–14.5)
S AUREUS DNA NOSE QL NAA+PROBE: SIGNIFICANT CHANGE UP
SARS-COV-2 RNA SPEC QL NAA+PROBE: SIGNIFICANT CHANGE UP
SODIUM SERPL-SCNC: 135 MMOL/L — SIGNIFICANT CHANGE UP (ref 135–145)
WBC # BLD: 3.51 K/UL — LOW (ref 3.8–10.5)
WBC # FLD AUTO: 3.51 K/UL — LOW (ref 3.8–10.5)

## 2022-09-18 PROCEDURE — 99232 SBSQ HOSP IP/OBS MODERATE 35: CPT

## 2022-09-18 RX ORDER — CHLORHEXIDINE GLUCONATE 213 G/1000ML
1 SOLUTION TOPICAL AT BEDTIME
Refills: 0 | Status: DISCONTINUED | OUTPATIENT
Start: 2022-09-18 | End: 2022-09-19

## 2022-09-18 RX ADMIN — LEVETIRACETAM 400 MILLIGRAM(S): 250 TABLET, FILM COATED ORAL at 05:44

## 2022-09-18 RX ADMIN — MEMANTINE HYDROCHLORIDE 5 MILLIGRAM(S): 10 TABLET ORAL at 05:44

## 2022-09-18 RX ADMIN — LEVETIRACETAM 400 MILLIGRAM(S): 250 TABLET, FILM COATED ORAL at 16:50

## 2022-09-18 RX ADMIN — DIPHENHYDRAMINE HYDROCHLORIDE AND LIDOCAINE HYDROCHLORIDE AND ALUMINUM HYDROXIDE AND MAGNESIUM HYDRO 5 MILLILITER(S): KIT at 16:50

## 2022-09-18 RX ADMIN — Medication 5 MILLIGRAM(S): at 21:07

## 2022-09-18 RX ADMIN — DIPHENHYDRAMINE HYDROCHLORIDE AND LIDOCAINE HYDROCHLORIDE AND ALUMINUM HYDROXIDE AND MAGNESIUM HYDRO 5 MILLILITER(S): KIT at 14:48

## 2022-09-18 RX ADMIN — CHLORHEXIDINE GLUCONATE 1 APPLICATION(S): 213 SOLUTION TOPICAL at 21:07

## 2022-09-18 RX ADMIN — DIPHENHYDRAMINE HYDROCHLORIDE AND LIDOCAINE HYDROCHLORIDE AND ALUMINUM HYDROXIDE AND MAGNESIUM HYDRO 5 MILLILITER(S): KIT at 05:44

## 2022-09-18 RX ADMIN — SENNA PLUS 2 TABLET(S): 8.6 TABLET ORAL at 21:07

## 2022-09-18 RX ADMIN — MEMANTINE HYDROCHLORIDE 5 MILLIGRAM(S): 10 TABLET ORAL at 16:50

## 2022-09-18 RX ADMIN — SODIUM CHLORIDE 1 GRAM(S): 9 INJECTION INTRAMUSCULAR; INTRAVENOUS; SUBCUTANEOUS at 16:51

## 2022-09-18 RX ADMIN — CHLORHEXIDINE GLUCONATE 1 APPLICATION(S): 213 SOLUTION TOPICAL at 11:37

## 2022-09-18 RX ADMIN — Medication 500 MILLIGRAM(S): at 16:51

## 2022-09-18 RX ADMIN — Medication 1 TABLET(S): at 16:52

## 2022-09-18 RX ADMIN — SODIUM CHLORIDE 1 GRAM(S): 9 INJECTION INTRAMUSCULAR; INTRAVENOUS; SUBCUTANEOUS at 05:44

## 2022-09-18 NOTE — PROGRESS NOTE ADULT - SUBJECTIVE AND OBJECTIVE BOX
Altered mental status    HPI:  HPI: Patient was brought by EMS, found down in the street unresponsive.   Primary Survey:    A - airway compromised, patient intubated in ED, RSI  B - bilateral breath sound after intubation  C - initial BP: 169/93 (05-24-22 @ 00:00)*** , HR: 107 (05-24-22 @ 00:44)*** , palpable pulses in all extremities  D - GCS 3 on arrival  Exposure obtained, no evident injuries  CXR: No evident PTX or Hemothorax, ET tube in place.  (24 May 2022 01:09)    Interval History:  Patient was seen and examined at bedside around 11 am.  No overnight issues.   Awake. Follows commands.     ROS:  As per interval history otherwise unremarkable.    PHYSICAL EXAM:  Vital Signs   T(C): 37.3 (18 Sep 2022 10:02), Max: 37.3 (18 Sep 2022 10:02)  T(F): 99.1 (18 Sep 2022 10:02), Max: 99.1 (18 Sep 2022 10:02)  HR: 71 (18 Sep 2022 10:02) (60 - 82)  BP: 110/71 (18 Sep 2022 10:02) (106/64 - 128/86)  RR: 17 (18 Sep 2022 10:02) (17 - 18)  SpO2: 96% (18 Sep 2022 10:02) (95% - 98%)  Parameters below as of 18 Sep 2022 10:46  Patient On (Oxygen Delivery Method): room air  General: Young male sitting in bed comfortably. No acute distress  HEENT: EOMI. Clear conjunctivae. Moist mucus membrane. Craniectomy on right side.   Neck: Supple. Healed tracheostomy scar.   Chest: CTA bilaterally. No wheezing, rales or rhonchi. No chest wall tenderness.  Heart: Normal S1 & S2. RRR.   Abdomen: Non distended. Soft. Non-tender. + BS. PEG in place.   Ext: No pedal edema. No calf tenderness   Neuro: Awake. Follows simple commands. Moves all four extremities.   Skin: Warm and Dry  Psychiatry: Calm at the time of exam    I&O's Summary    17 Sep 2022 07:01  -  18 Sep 2022 07:00  --------------------------------------------------------  IN: 600 mL / OUT: 1350 mL / NET: -750 mL    LABS:  CAPILLARY BLOOD GLUCOSE  POCT Blood Glucose.: 101 mg/dL (18 Sep 2022 06:13)  POCT Blood Glucose.: 105 mg/dL (18 Sep 2022 02:26)  POCT Blood Glucose.: 89 mg/dL (18 Sep 2022 00:06)  POCT Blood Glucose.: 74 mg/dL (17 Sep 2022 23:52)                      10.6   3.51  )-----------( 144      ( 18 Sep 2022 07:01 )             29.7     09-18    135  |  97<L>  |  10.2  ----------------------------<  90  3.7   |  26.0  |  0.76    Ca    9.1      18 Sep 2022 07:01  Mg     1.7     09-18    TPro  6.4<L>  /  Alb  4.0  /  TBili  0.5  /  DBili  x   /  AST  13  /  ALT  16  /  AlkPhos  54  09-17    PT/INR - ( 18 Sep 2022 07:01 )   PT: 13.7 sec;   INR: 1.18 ratio      RADIOLOGY & ADDITIONAL STUDIES:  Reviewed     MEDICATIONS  (STANDING):  ascorbic acid 500 milliGRAM(s) Oral daily  chlorhexidine 2% Cloths 1 Application(s) Topical daily  chlorhexidine 4% Liquid 1 Application(s) Topical at bedtime  FIRST- Mouthwash  BLM 5 milliLiter(s) Swish and Swallow four times a day  levETIRAcetam  IVPB 500 milliGRAM(s) IV Intermittent every 12 hours  melatonin 5 milliGRAM(s) Oral at bedtime  memantine 5 milliGRAM(s) Oral two times a day  multivitamin 1 Tablet(s) Oral daily  senna 2 Tablet(s) Oral at bedtime  sodium chloride 1 Gram(s) Oral two times a day    MEDICATIONS  (PRN):  ondansetron Injectable 4 milliGRAM(s) IV Push every 4 hours PRN Nausea and/or Vomiting

## 2022-09-18 NOTE — PROGRESS NOTE ADULT - ASSESSMENT
42y Male initially admitted on 5/24/22 (BIBEMS after found unresponsive on street) with a GCS of 3, found to have b/l intraparenchymal bleeds, b/l SDH. Has had a complex prolonged hospital course including but not limited to right decompressive craniectomy on 5/24. Trach / PEG., cranioplasty on 6/29 with cognitive decline after initial improvement prompting imaging which noted subacute collection with air underneath the cranioplasty sight. MRI with increase in fluid collection underneath the flap leading to a repeat right craniectomy with wound exploration & evacuation of fluid collection on 7/17 with OR cultures isolating MSSA. Antimicrobial regimen adjusted (ID), (suspected) central SIADH now improved (renal signed off). Has had interval trach decannulation and dietary advancement to pureed. Now s/p completion of appropriate antimicrobial course with f/u imaging (9/3) revealing no abnormal enhancement (6 mm midline shift to left). After being optimized and cleared for surgery had a fever (9/9/22). Infectious w/u negative. Now has been afebrile.      1) Traumatic brain injury with complicated hospital course (as above) now with periodic agitation and lethargy  - Neurologically significantly improved however per family recently more lethargic since standing Seroquel initiation   - Seroquel discontinued for now  - Mittens PRN for pt safety, low threshold for 1:1 (nocturnal as family at bedside during day hours)  - Continue Memantine 5 mg BID   - Continue Keppra 500 mg BID   - Cranioplasty planned on 9/19/22. Medically optimized for proposed surgical procedure. No absolute contraindications.     2) Hyponatremia  - Central SIADH   - s/p Tolvaptan and Urea   - Continue NaCL tablets  - Monitor closely    3) R pubic rami fx  - subacute  - incidentally found on CT  - Ortho consult appreciated  - WBAT, Pain control    4) Fever  - No sepsis, no localizing signs  - CXR : normal   - Lactate 1.0  - BCx; NGTD  - UA neg  - RVP and COVID negative   - Upper extremity doppler with superficial thrombophlebitis   - CT head / Chest/Abd pelvis reviewed  - Hold off Abx for now, start if any change in clinical status concerning for infection   - Now fever free since 9/10, ID clearance appreciated  - ID follow up noted    5) SDH/ IPH / TBI, CNS infection  - Right decompressive hemicraniectomy on 5/24,  - Completed Nafcillin /  Vanc, post completion with MRI (w/IV con) w/o abnormal enhancement  - ID follow up noted     6) Oropharyngeal Dysphagia  - on purée diet with mildly thick liquids   - Strict aspiration precautions     7) Substance abuse (Cocaine pos on admission) / Alcoholism  - SW following     8) Anemia   - H&H stable  - Monitor     DVT Prophylaxis -- Venodyne. Holding Lovenox for OR in am.     Dispo - Likely TANIKA once stable.

## 2022-09-18 NOTE — PROGRESS NOTE ADULT - NUTRITIONAL ASSESSMENT
This patient has been assessed with a concern for Malnutrition and has been determined to have a diagnosis/diagnoses of Severe protein-calorie malnutrition.    This patient is being managed with:   Diet NPO after Midnight-     NPO Start Date: 18-Sep-2022   NPO Start Time: 23:59  Except Medications  Entered: Sep 18 2022  9:02AM    Diet Pureed-  1000mL Fluid Restriction ([id number])  Mildly Thick Liquids (MILDTHICKLIQS)  Free Water Flush  Bolus   Total Volume per Flush (mL): 150   Frequency: Every 6 Hours   Total Daily Volume of Flush (mL): 600  Supplement Feeding Modality:  Oral  Ensure Enlive Cans or Servings Per Day:  1       Frequency:  Two Times a day  Entered: Sep 17 2022  3:26PM

## 2022-09-18 NOTE — PROGRESS NOTE ADULT - SUBJECTIVE AND OBJECTIVE BOX
HPI: Patient is a 25y old M brought by EMS, found down in the street unresponsive.     Primary Survey:    A - airway compromised, patient intubated in ED, RSI  B - bilateral breath sound after intubation  C - initial BP: 169/93 (05-24-22 @ 00:00)*** , HR: 107 (05-24-22 @ 00:44)*** , palpable pulses in all extremities  D - GCS 3 on arrival    Exposure obtained, no evident injuries    CXR: No evident PTX or Hemothorax, ET tube in place.  (24 May 2022 01:09)      INTERVAL HPI/OVERNIGHT EVENTS:  42 year old male seen and examined by neurosurgery team, sitting comfortably in bed. Patient remains afebrile. No acute overnight events reported. Plan for OR tomorrow 9/19 for cranioplasty.    Vital Signs Last 24 Hrs  T(C): 37.3 (18 Sep 2022 10:02), Max: 37.3 (18 Sep 2022 10:02)  T(F): 99.1 (18 Sep 2022 10:02), Max: 99.1 (18 Sep 2022 10:02)  HR: 71 (18 Sep 2022 10:02) (60 - 82)  BP: 110/71 (18 Sep 2022 10:02) (106/64 - 128/86)  BP(mean): --  RR: 17 (18 Sep 2022 10:02) (17 - 18)  SpO2: 96% (18 Sep 2022 10:02) (95% - 98%)    Parameters below as of 18 Sep 2022 10:02  Patient On (Oxygen Delivery Method): room air      PHYSICAL EXAM:  GENERAL: NAD, calm  HEAD: s/p R craniectomy, flap sunken. Incision healed appropriately   MENTAL STATUS: AAO x3; Awake. Opens eyes spontaneously, intermittently follows commands with repetition, hypophonic, minimally verbal  CRANIAL NERVES: PERRL. EOMI without nystagmus. Face symmetric w/ normal eye closure and smile, tongue midline.   MOTOR: LUNA AG spontaneously   SENSATION: grossly intact to light touch all extremities    LABS:                        10.6   3.51  )-----------( 144      ( 18 Sep 2022 07:01 )             29.7     09-18    135  |  97<L>  |  10.2  ----------------------------<  90  3.7   |  26.0  |  0.76    Ca    9.1      18 Sep 2022 07:01  Mg     1.7     09-18    TPro  6.4<L>  /  Alb  4.0  /  TBili  0.5  /  DBili  x   /  AST  13  /  ALT  16  /  AlkPhos  54  09-17    PT/INR - ( 18 Sep 2022 07:01 )   PT: 13.7 sec;   INR: 1.18 ratio               09-17 @ 07:01  -  09-18 @ 07:00  --------------------------------------------------------  IN: 600 mL / OUT: 1350 mL / NET: -750 mL        RADIOLOGY & ADDITIONAL TESTS:  US Duplex Venous Upper Ext Ltd, Right (09.14.22 @ 16:10):  IMPRESSION:  No evidence of right upper extremity deep venous thrombosis.    Superficial thrombophlebitis involving the left vein in the upper arm and   cephalic vein in the antecubital fossa.    CT Head No Cont (09.10.22 @ 14:23):  IMPRESSION:  1.  No CT evidence of acute intracranial pathology.    2.  Right-sided craniectomy with concavity of the right frontoparietal   convexity and 1.1 cm of midline shift to the LEFT is grossly stablefrom   08/30/2022.    3.  Disproportionate enlargement of the left lateral ventricle compatible   with hydrocephalus, and surrounding transependymal CSF flow, are stable.    MR Head w/ IV Cont (09.03.22 @ 16:19):  IMPRESSION:   No abnormal enhancement is seen. Mild periventricular white   matter ischemia with old lacunar infarction in the LEFT basal ganglia.   RIGHT craniectomy again noted is encephalomalacia and gliosis in the   RIGHT frontal lobe. 6 mm shift to the LEFT is noted.

## 2022-09-18 NOTE — CHART NOTE - NSCHARTNOTEFT_GEN_A_CORE
Called to evaluate patient for restraints    Other interventions attempted: de-escalation, orientation check, environment modification, medication assessment    I have evaluated this patient and have determined that restraints are warranted to optimize medical care. Patient was assessed for current physical and psychological risk factors as well as special needs. There are no medical conditions or limitations that would place this patient at risk while in restraints    - Type of restraint: CHARLEY/l christian  - Attending MD Aware

## 2022-09-18 NOTE — PROGRESS NOTE ADULT - ASSESSMENT
42M unknown PMH presented with IPH/SDH s/p hemicraniectomy on 5/24, cranioplasty 6/29, ccb right crani for epidural collection on 7/17. Pending cranioplasty planned for tomorrow 9/19.     Plan:  -D/w Dr. Millan  -All imaging reviewed   -Serial neuro checks   -STAT CTH for any decline in neuro exam   -Pain control PRN, avoid oversedation   -Plan for OR tomorrow 9/19 with Dr. Millan and Dr. Peace for cranioplasty; pre-op labs pending, 2u pRBCs on hold, COVID swab, Lovenox held for this evening, NPO after MN  -SCDs b/l for dvt ppx   -PT/OT, OOB as tolerated with helmet  -Further medical management/supportive care per primary team

## 2022-09-19 ENCOUNTER — APPOINTMENT (OUTPATIENT)
Dept: NEUROSURGERY | Facility: HOSPITAL | Age: 42
End: 2022-09-19

## 2022-09-19 LAB
ANION GAP SERPL CALC-SCNC: 14 MMOL/L — SIGNIFICANT CHANGE UP (ref 5–17)
APTT BLD: 31.9 SEC — SIGNIFICANT CHANGE UP (ref 27.5–35.5)
BUN SERPL-MCNC: 9.4 MG/DL — SIGNIFICANT CHANGE UP (ref 8–20)
CALCIUM SERPL-MCNC: 9 MG/DL — SIGNIFICANT CHANGE UP (ref 8.4–10.5)
CHLORIDE SERPL-SCNC: 96 MMOL/L — LOW (ref 98–107)
CO2 SERPL-SCNC: 23 MMOL/L — SIGNIFICANT CHANGE UP (ref 22–29)
CREAT SERPL-MCNC: 0.78 MG/DL — SIGNIFICANT CHANGE UP (ref 0.5–1.3)
EGFR: 114 ML/MIN/1.73M2 — SIGNIFICANT CHANGE UP
GLUCOSE BLDC GLUCOMTR-MCNC: 102 MG/DL — HIGH (ref 70–99)
GLUCOSE BLDC GLUCOMTR-MCNC: 105 MG/DL — HIGH (ref 70–99)
GLUCOSE BLDC GLUCOMTR-MCNC: 106 MG/DL — HIGH (ref 70–99)
GLUCOSE BLDC GLUCOMTR-MCNC: 98 MG/DL — SIGNIFICANT CHANGE UP (ref 70–99)
GLUCOSE SERPL-MCNC: 113 MG/DL — HIGH (ref 70–99)
INR BLD: 1.2 RATIO — HIGH (ref 0.88–1.16)
POTASSIUM SERPL-MCNC: 3.9 MMOL/L — SIGNIFICANT CHANGE UP (ref 3.5–5.3)
POTASSIUM SERPL-SCNC: 3.9 MMOL/L — SIGNIFICANT CHANGE UP (ref 3.5–5.3)
PROTHROM AB SERPL-ACNC: 14 SEC — HIGH (ref 10.5–13.4)
SODIUM SERPL-SCNC: 133 MMOL/L — LOW (ref 135–145)

## 2022-09-19 PROCEDURE — 70450 CT HEAD/BRAIN W/O DYE: CPT | Mod: 26

## 2022-09-19 PROCEDURE — 62141 CRNOP SKULL DEFECT>5 CM DIAM: CPT | Mod: 58

## 2022-09-19 PROCEDURE — 99232 SBSQ HOSP IP/OBS MODERATE 35: CPT

## 2022-09-19 RX ORDER — SODIUM CHLORIDE 9 MG/ML
1000 INJECTION INTRAMUSCULAR; INTRAVENOUS; SUBCUTANEOUS
Refills: 0 | Status: DISCONTINUED | OUTPATIENT
Start: 2022-09-19 | End: 2022-09-23

## 2022-09-19 RX ORDER — LANOLIN ALCOHOL/MO/W.PET/CERES
5 CREAM (GRAM) TOPICAL AT BEDTIME
Refills: 0 | Status: DISCONTINUED | OUTPATIENT
Start: 2022-09-19 | End: 2022-09-21

## 2022-09-19 RX ORDER — ASCORBIC ACID 60 MG
500 TABLET,CHEWABLE ORAL DAILY
Refills: 0 | Status: DISCONTINUED | OUTPATIENT
Start: 2022-09-19 | End: 2022-09-21

## 2022-09-19 RX ORDER — HYDRALAZINE HCL 50 MG
10 TABLET ORAL
Refills: 0 | Status: DISCONTINUED | OUTPATIENT
Start: 2022-09-19 | End: 2022-09-22

## 2022-09-19 RX ORDER — POLYETHYLENE GLYCOL 3350 17 G/17G
17 POWDER, FOR SOLUTION ORAL DAILY
Refills: 0 | Status: DISCONTINUED | OUTPATIENT
Start: 2022-09-19 | End: 2022-09-21

## 2022-09-19 RX ORDER — CEFAZOLIN SODIUM 1 G
1000 VIAL (EA) INJECTION EVERY 8 HOURS
Refills: 0 | Status: DISCONTINUED | OUTPATIENT
Start: 2022-09-19 | End: 2022-09-22

## 2022-09-19 RX ORDER — SODIUM CHLORIDE 9 MG/ML
1 INJECTION INTRAMUSCULAR; INTRAVENOUS; SUBCUTANEOUS EVERY 12 HOURS
Refills: 0 | Status: DISCONTINUED | OUTPATIENT
Start: 2022-09-19 | End: 2022-09-21

## 2022-09-19 RX ORDER — CEFAZOLIN SODIUM 1 G
1000 VIAL (EA) INJECTION EVERY 8 HOURS
Refills: 0 | Status: DISCONTINUED | OUTPATIENT
Start: 2022-09-19 | End: 2022-09-19

## 2022-09-19 RX ORDER — CHLORHEXIDINE GLUCONATE 213 G/1000ML
1 SOLUTION TOPICAL DAILY
Refills: 0 | Status: DISCONTINUED | OUTPATIENT
Start: 2022-09-19 | End: 2022-09-30

## 2022-09-19 RX ORDER — ACETAMINOPHEN 500 MG
1000 TABLET ORAL ONCE
Refills: 0 | Status: DISCONTINUED | OUTPATIENT
Start: 2022-09-19 | End: 2022-09-22

## 2022-09-19 RX ORDER — MEMANTINE HYDROCHLORIDE 10 MG/1
5 TABLET ORAL EVERY 12 HOURS
Refills: 0 | Status: DISCONTINUED | OUTPATIENT
Start: 2022-09-19 | End: 2022-09-21

## 2022-09-19 RX ORDER — CEFAZOLIN SODIUM 1 G
VIAL (EA) INJECTION
Refills: 0 | Status: DISCONTINUED | OUTPATIENT
Start: 2022-09-19 | End: 2022-09-19

## 2022-09-19 RX ORDER — SODIUM CHLORIDE 9 MG/ML
1000 INJECTION, SOLUTION INTRAVENOUS
Refills: 0 | Status: DISCONTINUED | OUTPATIENT
Start: 2022-09-19 | End: 2022-09-19

## 2022-09-19 RX ORDER — LABETALOL HCL 100 MG
10 TABLET ORAL
Refills: 0 | Status: DISCONTINUED | OUTPATIENT
Start: 2022-09-19 | End: 2022-09-22

## 2022-09-19 RX ORDER — LEVETIRACETAM 250 MG/1
500 TABLET, FILM COATED ORAL EVERY 12 HOURS
Refills: 0 | Status: DISCONTINUED | OUTPATIENT
Start: 2022-09-19 | End: 2022-09-21

## 2022-09-19 RX ORDER — SENNA PLUS 8.6 MG/1
1 TABLET ORAL DAILY
Refills: 0 | Status: DISCONTINUED | OUTPATIENT
Start: 2022-09-19 | End: 2022-09-21

## 2022-09-19 RX ADMIN — SODIUM CHLORIDE 75 MILLILITER(S): 9 INJECTION, SOLUTION INTRAVENOUS at 12:02

## 2022-09-19 RX ADMIN — Medication 10 MILLIGRAM(S): at 17:59

## 2022-09-19 RX ADMIN — MEMANTINE HYDROCHLORIDE 5 MILLIGRAM(S): 10 TABLET ORAL at 05:13

## 2022-09-19 RX ADMIN — SODIUM CHLORIDE 1 GRAM(S): 9 INJECTION INTRAMUSCULAR; INTRAVENOUS; SUBCUTANEOUS at 05:13

## 2022-09-19 RX ADMIN — SODIUM CHLORIDE 75 MILLILITER(S): 9 INJECTION INTRAMUSCULAR; INTRAVENOUS; SUBCUTANEOUS at 17:22

## 2022-09-19 RX ADMIN — DIPHENHYDRAMINE HYDROCHLORIDE AND LIDOCAINE HYDROCHLORIDE AND ALUMINUM HYDROXIDE AND MAGNESIUM HYDRO 5 MILLILITER(S): KIT at 05:12

## 2022-09-19 RX ADMIN — Medication 10 MILLIGRAM(S): at 17:21

## 2022-09-19 RX ADMIN — LEVETIRACETAM 400 MILLIGRAM(S): 250 TABLET, FILM COATED ORAL at 17:21

## 2022-09-19 RX ADMIN — LEVETIRACETAM 400 MILLIGRAM(S): 250 TABLET, FILM COATED ORAL at 05:13

## 2022-09-19 NOTE — BRIEF OPERATIVE NOTE - NSICDXBRIEFPREOP_GEN_ALL_CORE_FT
PRE-OP DIAGNOSIS:  Status post craniectomy 29-Jun-2022 15:59:23  Nikki Johnson  Epidural hematoma 17-Jul-2022 21:26:45  Zachery Millan  
PRE-OP DIAGNOSIS:  SDH (subdural hematoma) 24-May-2022 03:41:42  Zachery Millan  
PRE-OP DIAGNOSIS:  Status post craniectomy 29-Jun-2022 15:59:23  Nikki Johnson  Epidural hematoma 17-Jul-2022 21:26:45  Zachery Millan  
PRE-OP DIAGNOSIS:  SDH (subdural hematoma) 24-May-2022 03:41:42  Zachery Millan  
PRE-OP DIAGNOSIS:  SDH (subdural hematoma) 24-May-2022 03:41:42  Zachery Millan  
PRE-OP DIAGNOSIS:  Status post craniectomy 29-Jun-2022 15:59:23  Nikki Johnson

## 2022-09-19 NOTE — BRIEF OPERATIVE NOTE - NSICDXBRIEFPROCEDURE_GEN_ALL_CORE_FT
PROCEDURES:  Bronchoscopy, with percutaneous tracheostomy creation 01-Jun-2022 16:31:01  Raleigh Woodward  EGD, with PEG 01-Jun-2022 16:31:18  Raleigh Woodward  
PROCEDURES:  Right cranioplasty with replacement bone flap 29-Jun-2022 15:59:05  Nikki Johnson  
PROCEDURES:  Complex repair of scalp, 5.1cm to 7.5cm 29-Jun-2022 16:53:55  Bradley Peace  
PROCEDURES:  Complex repair of scalp, 5.1cm to 7.5cm 29-Jun-2022 16:53:55  Bradley Peace  
PROCEDURES:  Right cranioplasty with replacement bone flap 29-Jun-2022 15:59:05  Nikki Johnson  Emergent craniectomy or craniotomy 17-Jul-2022 21:26:32  Zachery Millan  
PROCEDURES:  Supratentorial craniectomy for evacuation of subdural hematoma 24-May-2022 03:41:34  Zachery Millan  
PROCEDURES:  Supratentorial craniectomy for evacuation of subdural hematoma 24-May-2022 03:41:34  Zachery Millan  
PROCEDURES:  Right cranioplasty with replacement bone flap 29-Jun-2022 15:59:05  Nikki Johnson  
PROCEDURES:  Craniectomy or craniotomy, emergent 17-Jul-2022 22:14:19  Chris Vazquez  
PROCEDURES:  Right cranioplasty with replacement bone flap 29-Jun-2022 15:59:05  Nikki Johnson  Emergent craniectomy or craniotomy 17-Jul-2022 21:26:32  Zachery Millan  Cranioplasty with replacement bone flap 19-Sep-2022 16:13:57  Zachery Millan

## 2022-09-19 NOTE — PROGRESS NOTE ADULT - ASSESSMENT
42y Male initially admitted on 5/24/22 (BIBEMS after found unresponsive on street) with a GCS of 3, found to have b/l intraparenchymal bleeds, b/l SDH. Has had a complex prolonged hospital course including but not limited to right decompressive craniectomy on 5/24. Trach / PEG., cranioplasty on 6/29 with cognitive decline after initial improvement prompting imaging which noted subacute collection with air underneath the cranioplasty sight. MRI with increase in fluid collection underneath the flap leading to a repeat right craniectomy with wound exploration & evacuation of fluid collection on 7/17 with OR cultures isolating MSSA. Antimicrobial regimen adjusted (ID), (suspected) central SIADH now improved (renal signed off). Has had interval trach decannulation and dietary advancement to pureed. Now s/p completion of appropriate antimicrobial course with f/u imaging (9/3) revealing no abnormal enhancement (6 mm midline shift to left). After being optimized and cleared for surgery had a fever (9/9/22). Infectious w/u negative. Now has been afebrile.      1) Traumatic brain injury with complicated hospital course (as above) now with periodic agitation and lethargy  - Neurologically significantly improved however per family recently more lethargic since standing Seroquel initiation   - Seroquel discontinued for now  - Mittens PRN for pt safety, (nocturnal as family at bedside during day hours)  - Continue Memantine 5 mg BID   - Continue Keppra 500 mg BID   - Cranioplasty planned today. Medically optimized for proposed surgical procedure. No absolute contraindications.     2) Hyponatremia  - Central SIADH   - s/p Tolvaptan and Urea   - Continue NaCL tablets  - Monitor closely    3) R pubic rami fx  - subacute  - incidentally found on CT  - Ortho consult appreciated  - WBAT, Pain control    4) Fever  - No sepsis, no localizing signs  - CXR : normal   - Lactate 1.0  - BCx; NGTD  - UA neg  - RVP and COVID negative   - Upper extremity doppler with superficial thrombophlebitis   - CT head / Chest/Abd pelvis reviewed  - Hold off Abx for now, start if any change in clinical status concerning for infection   - Now fever free since 9/10, ID clearance appreciated  - ID follow up noted    5) SDH/ IPH / TBI, CNS infection  - s/p right decompressive hemicraniectomy on 5/24  - Completed Nafcillin /  Vanc, post completion with MRI (w/IV con) w/o abnormal enhancement  - ID follow up noted     6) Oropharyngeal Dysphagia  - on purée diet with mildly thick liquids   - Strict aspiration precautions     7) Substance abuse (Cocaine pos on admission) / Alcoholism  - SW following     8) Anemia   - H&H stable  - Monitor     DVT Prophylaxis -- Venodyne. Holding Lovenox for OR.      Dispo - Likely TANIKA once stable.      Updated patient's family at bedside.

## 2022-09-19 NOTE — BRIEF OPERATIVE NOTE - BRIEF OP NOTE DRAINS
One Subgaleal 7 Vietnamese Flat MARCEL
epidural and subgaleal JPs
subgaleal MARCEL
epidural/subgaleal MARCEL
epidural/subgaleal MARCEL
7 Fr MARCEL
Subgaleal drain to full suction

## 2022-09-19 NOTE — PROGRESS NOTE ADULT - SUBJECTIVE AND OBJECTIVE BOX
NSGY Attg:    Patient seen and examined.  No acute overnight events.    Exam:  eyes open  nonverbal  protrudes tongue to command  moves spontaneously    I explained the risks, benefits, and alternatives of surgical intervention to the patient's mother using a licensed  as below:    benefit: hopeful improved cosmetic appearance/protection of brain, hopeful prevention of progression of deficit   alternative: no surgical intervention; continued surveillance  risks: bleeding, infection, CSF leak, failure of procedure, need for re-operation, seizure, stroke, coma, death, DVT, PE, MI, PNA, UTI, difficulty/failure to intubate or extubate, new or worsening numbness, tingling, weakness, paralysis, sensory changes, difficulty/inability to ambulate, difficulty with expressive or receptive speech, altered personality or judgment, sexual dysfunction, incontinence    The patient's mother verbalizes her understanding of the above.  I have answered all her questions.  Shes wishes to proceed with operative intervention.    A/P:  - to OR for right cranioplasty with complex wound closure by plastics

## 2022-09-19 NOTE — BRIEF OPERATIVE NOTE - PRIMARY SURGEON
Zachery Millan MD
[place]
Dr. Aquilino Martinez
Dr. Monty MD
Dr. Zachery Millan
Zachery Millan MD
darrylky
Zachery Millan MD
Dr Millan
Zachery Millan MD

## 2022-09-19 NOTE — PROGRESS NOTE ADULT - NUTRITIONAL ASSESSMENT
This patient has been assessed with a concern for Malnutrition and has been determined to have a diagnosis/diagnoses of Severe protein-calorie malnutrition.    This patient is being managed with:   Diet NPO after Midnight-     NPO Start Date: 18-Sep-2022   NPO Start Time: 23:59  Except Medications  Entered: Sep 18 2022  9:02AM    Diet Pureed-  1000mL Fluid Restriction ([id number])  Mildly Thick Liquids (MILDTHICKLIQS)  Free Water Flush  Bolus   Total Volume per Flush (mL): 150   Frequency: Every 6 Hours   Total Daily Volume of Flush (mL): 600  Supplement Feeding Modality:  Oral  Ensure Enlive Cans or Servings Per Day:  1       Frequency:  Two Times a day  Entered: Sep 17 2022  3:26PM

## 2022-09-19 NOTE — BRIEF OPERATIVE NOTE - NSICDXBRIEFOPLAUNCH_GEN_ALL_CORE
<--- Click to Launch ICDx for PreOp, PostOp and Procedure

## 2022-09-19 NOTE — BRIEF OPERATIVE NOTE - NSICDXBRIEFPOSTOP_GEN_ALL_CORE_FT
POST-OP DIAGNOSIS:  SDH (subdural hematoma) 24-May-2022 03:41:51  Zachery Millan  
POST-OP DIAGNOSIS:  Status post craniectomy 29-Jun-2022 17:20:29  Zachery Millan  Epidural hematoma 17-Jul-2022 21:27:09  Zachery Millan  
POST-OP DIAGNOSIS:  SDH (subdural hematoma) 24-May-2022 03:41:51  Zachery Millan  
POST-OP DIAGNOSIS:  Status post craniectomy 29-Jun-2022 17:20:29  Zachery Millan  Epidural hematoma 17-Jul-2022 21:27:09  Zachery Millan  
POST-OP DIAGNOSIS:  Status post craniectomy 29-Jun-2022 17:20:29  Zachery Millan

## 2022-09-19 NOTE — BRIEF OPERATIVE NOTE - OPERATION/FINDINGS
right hemisphere relaxed below bone edges
Complex repair scalp incision 28cm
appropriately full, original bone placed.
Large Subdural Hemorrhage
Complex closure scalp wound 30 cm
epidural fluid collection, purulent granulation tissue
Bedside Percutaneous tracheostomy and PEG placement.     Pt preoxygenated, sedated with propofol and given rocuronium. Percutaneous tracheostomy performed under vision with bronchoscopy via Seldinger technique 8.5mm cuffed tracheostomy performed, pt remained HD nl saturating >94% throughout procedure. Post procedure CXR performed without signs of hemo/pneumothoraces.    EGD performed with percutaneous endoscopic gastrostomy tube placed via pull seldinger technique under vision. One to one and transillumination established. Post placement EGD showed bumper sitting snuggly on anterior stomach wall able to rotate freely, 2cm from skin.
epidural fluid collection consistent with straw-colored CSF under pressure; epidural seroma with macerated dural substitute and questionable pus
large acute right subdural hematoma with sub-pial hemorrhage and significant brain edema
epidural space with sunken brain

## 2022-09-19 NOTE — PROGRESS NOTE ADULT - SUBJECTIVE AND OBJECTIVE BOX
Pt with prior hx of subdural hematoma and craniectomy with cranioplasty in June 2022 c/b infection requiring bone removal.    Pt clinically improving.  RIght scalp approproately sunken in.    No infection.    Plan for revision cranioplasty today.   Discussed the R/B/A with the patient's mother including bleeding, infection, scarring,surgical failure, need for flap advancement, drain placement and need for additional surgery.  She understands and consent obtained with .

## 2022-09-19 NOTE — BRIEF OPERATIVE NOTE - ASSISTANT(S)
GINGER Macedo
plastic surgery resident
Jordan SIMS
GINGER Madsen
GINGER Madsen
Dave Benson MD
Chris Vazquez, PA
GINGER Vazquez
GINGER Nicholas
Dr. Raleigh Woodward, Fermin Lara PA-C, Arley Tobias PA-C, Dr. Grecia Macias

## 2022-09-19 NOTE — BRIEF OPERATIVE NOTE - SPECIMENS
none
fluid & tissue cultures + bone flap
none
None
bone flap, epidural swab x 2, and explanted macerated duragen with questionable pus to be assess with gram stain and culture
N/A
none
none
bone flap to be frozen per [facility] protocol
none

## 2022-09-19 NOTE — BRIEF OPERATIVE NOTE - ANTIBIOTIC PROTOCOL
Followed protocol
antibiotics given after cultures sent/Exception to protocol
Followed protocol
Exception to protocol
Followed protocol
ancef/Followed protocol
Followed protocol
Followed protocol
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
## 2022-09-20 LAB
ANION GAP SERPL CALC-SCNC: 15 MMOL/L — SIGNIFICANT CHANGE UP (ref 5–17)
BUN SERPL-MCNC: 8.3 MG/DL — SIGNIFICANT CHANGE UP (ref 8–20)
CALCIUM SERPL-MCNC: 9.2 MG/DL — SIGNIFICANT CHANGE UP (ref 8.4–10.5)
CHLORIDE SERPL-SCNC: 96 MMOL/L — LOW (ref 98–107)
CO2 SERPL-SCNC: 21 MMOL/L — LOW (ref 22–29)
CREAT SERPL-MCNC: 0.68 MG/DL — SIGNIFICANT CHANGE UP (ref 0.5–1.3)
EGFR: 119 ML/MIN/1.73M2 — SIGNIFICANT CHANGE UP
GLUCOSE BLDC GLUCOMTR-MCNC: 94 MG/DL — SIGNIFICANT CHANGE UP (ref 70–99)
GLUCOSE SERPL-MCNC: 114 MG/DL — HIGH (ref 70–99)
HCT VFR BLD CALC: 29.1 % — LOW (ref 39–50)
HGB BLD-MCNC: 10.4 G/DL — LOW (ref 13–17)
MAGNESIUM SERPL-MCNC: 1.5 MG/DL — LOW (ref 1.6–2.6)
MCHC RBC-ENTMCNC: 28.2 PG — SIGNIFICANT CHANGE UP (ref 27–34)
MCHC RBC-ENTMCNC: 35.7 GM/DL — SIGNIFICANT CHANGE UP (ref 32–36)
MCV RBC AUTO: 78.9 FL — LOW (ref 80–100)
MRSA PCR RESULT.: SIGNIFICANT CHANGE UP
PHOSPHATE SERPL-MCNC: 4.1 MG/DL — SIGNIFICANT CHANGE UP (ref 2.4–4.7)
PLATELET # BLD AUTO: 174 K/UL — SIGNIFICANT CHANGE UP (ref 150–400)
POTASSIUM SERPL-MCNC: 4.1 MMOL/L — SIGNIFICANT CHANGE UP (ref 3.5–5.3)
POTASSIUM SERPL-SCNC: 4.1 MMOL/L — SIGNIFICANT CHANGE UP (ref 3.5–5.3)
RBC # BLD: 3.69 M/UL — LOW (ref 4.2–5.8)
RBC # FLD: 15.2 % — HIGH (ref 10.3–14.5)
S AUREUS DNA NOSE QL NAA+PROBE: SIGNIFICANT CHANGE UP
SODIUM SERPL-SCNC: 132 MMOL/L — LOW (ref 135–145)
WBC # BLD: 5.23 K/UL — SIGNIFICANT CHANGE UP (ref 3.8–10.5)
WBC # FLD AUTO: 5.23 K/UL — SIGNIFICANT CHANGE UP (ref 3.8–10.5)

## 2022-09-20 PROCEDURE — 70450 CT HEAD/BRAIN W/O DYE: CPT | Mod: 26

## 2022-09-20 PROCEDURE — 99024 POSTOP FOLLOW-UP VISIT: CPT

## 2022-09-20 RX ORDER — MAGNESIUM SULFATE 500 MG/ML
1 VIAL (ML) INJECTION ONCE
Refills: 0 | Status: COMPLETED | OUTPATIENT
Start: 2022-09-20 | End: 2022-09-20

## 2022-09-20 RX ADMIN — Medication 1000 MILLIGRAM(S): at 21:12

## 2022-09-20 RX ADMIN — Medication 10 MILLIGRAM(S): at 22:12

## 2022-09-20 RX ADMIN — Medication 1 TABLET(S): at 14:24

## 2022-09-20 RX ADMIN — Medication 1000 MILLIGRAM(S): at 14:24

## 2022-09-20 RX ADMIN — CHLORHEXIDINE GLUCONATE 1 APPLICATION(S): 213 SOLUTION TOPICAL at 14:29

## 2022-09-20 RX ADMIN — POLYETHYLENE GLYCOL 3350 17 GRAM(S): 17 POWDER, FOR SOLUTION ORAL at 14:24

## 2022-09-20 RX ADMIN — SENNA PLUS 1 TABLET(S): 8.6 TABLET ORAL at 14:24

## 2022-09-20 RX ADMIN — SODIUM CHLORIDE 1 GRAM(S): 9 INJECTION INTRAMUSCULAR; INTRAVENOUS; SUBCUTANEOUS at 18:02

## 2022-09-20 RX ADMIN — Medication 100 GRAM(S): at 07:58

## 2022-09-20 RX ADMIN — Medication 500 MILLIGRAM(S): at 14:24

## 2022-09-20 RX ADMIN — LEVETIRACETAM 400 MILLIGRAM(S): 250 TABLET, FILM COATED ORAL at 05:21

## 2022-09-20 RX ADMIN — Medication 1000 MILLIGRAM(S): at 05:20

## 2022-09-20 RX ADMIN — SODIUM CHLORIDE 75 MILLILITER(S): 9 INJECTION INTRAMUSCULAR; INTRAVENOUS; SUBCUTANEOUS at 05:20

## 2022-09-20 RX ADMIN — MEMANTINE HYDROCHLORIDE 5 MILLIGRAM(S): 10 TABLET ORAL at 18:16

## 2022-09-20 RX ADMIN — Medication 10 MILLIGRAM(S): at 20:40

## 2022-09-20 RX ADMIN — LEVETIRACETAM 400 MILLIGRAM(S): 250 TABLET, FILM COATED ORAL at 18:01

## 2022-09-20 RX ADMIN — SODIUM CHLORIDE 75 MILLILITER(S): 9 INJECTION INTRAMUSCULAR; INTRAVENOUS; SUBCUTANEOUS at 18:01

## 2022-09-20 NOTE — PHYSICAL THERAPY INITIAL EVALUATION ADULT - LEVEL OF INDEPENDENCE: SUPINE/SIT, REHAB EVAL
unable to perform
moderate assist (50% patients effort)
unable to perform
moderate assist (50% patients effort)

## 2022-09-20 NOTE — PHYSICAL THERAPY INITIAL EVALUATION ADULT - PASSIVE RANGE OF MOTION EXAMINATION, REHAB EVAL
RUE shoulder active ROM to 90 degrees/Left LE Passive ROM was WFL (within functional limits)
bilateral upper extremity Passive ROM was WFL (within functional limits)/bilateral lower extremity Passive ROM was WFL (within functional limits)
bilateral upper extremity Passive ROM was WFL (within functional limits)/bilateral lower extremity Passive ROM was WFL (within functional limits)
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
## 2022-09-20 NOTE — PHYSICAL THERAPY INITIAL EVALUATION ADULT - ORIENTATION, REHAB EVAL
not oriented to person, place, time or situation
unable to assess
unable to assess
not oriented to person, place, time or situation

## 2022-09-20 NOTE — PHYSICAL THERAPY INITIAL EVALUATION ADULT - CRITERIA FOR SKILLED THERAPEUTIC INTERVENTIONS
TANIKA/impairments found/rehab potential/anticipated discharge recommendation
TANIKA/impairments found/rehab potential/anticipated equipment needs at discharge/anticipated discharge recommendation
impairments found
impairments found/functional limitations in following categories/risk reduction/prevention/rehab potential/therapy frequency/predicted duration of therapy intervention/anticipated equipment needs at discharge/anticipated discharge recommendation
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
## 2022-09-20 NOTE — PHYSICAL THERAPY INITIAL EVALUATION ADULT - GENERAL OBSERVATIONS, REHAB EVAL
Pt received supine in bed + telemetry//BP + IV + Venous Compression Boots + MARCEL drains x2 + Bilateral mittens, non verbal, no indication of pain.

## 2022-09-20 NOTE — PHYSICAL THERAPY INITIAL EVALUATION ADULT - LEVEL OF INDEPENDENCE: SIT/STAND, REHAB EVAL
moderate assist (50% patients effort)
unable to perform
unable to perform
moderate assist (50% patients effort)

## 2022-09-20 NOTE — PHYSICAL THERAPY INITIAL EVALUATION ADULT - REHAB POTENTIAL, PT EVAL
fair, will monitor progress closely

## 2022-09-20 NOTE — PHYSICAL THERAPY INITIAL EVALUATION ADULT - DIAGNOSIS, PT EVAL
impaired balance, strength, coordination, motor planning/cognition s/p neuro event
functional debility 2*2 decreased cognition, decreased strength, activity tolerance, abnormal tone in all extremities s/p neuro event.
Decreased functional status and mobility due to decrease strength, transfers, balance, endurance and ambulation/stair tolerance
impaired strength, impaired coordination, impaired gait stability, impaired cognition s/p neuro event.

## 2022-09-20 NOTE — PHYSICAL THERAPY INITIAL EVALUATION ADULT - PLANNED THERAPY INTERVENTIONS, PT EVAL
COMA STIM/neuromuscular re-education/ROM/strengthening/stretching
balance training/bed mobility training/gait training/postural re-education/strengthening/transfer training
balance training/bed mobility training/gait training/neuromuscular re-education/postural re-education/strengthening/transfer training
balance training/bed mobility training/gait training/neuromuscular re-education/postural re-education/strengthening/transfer training

## 2022-09-20 NOTE — PROGRESS NOTE ADULT - ASSESSMENT
42y old  Male initially admitted on 5/24/22 with TBI - Bilateral IPH, Bilateral SDH. Prolonged hospital course: s/p craniotomy and evacuation of SDH 5/24, s/p trach 6/1, s/p cranioplasty and replacement of bone flap 6/29, s/p R hemicraniectomy, wound exploration, evacuation of epidural fluid collection 7/17.  Now s/p cranioplasty 9/19.    Plan:  neurochecks  keppra x 7 days postop  submesh and subgaleal drains per NSGy  pain mgt: tylenol and oxy 5 prn  Activity: [x] mobilize as tolerated [] Bedrest [x] PT [x] OT  Rehab involvement appreciated    PULM: room air   SpO2>92%    CV:  SBP goal 100-140  prn hydralazine/ labetalol    RENAL: monitor I/O  replete lytes prn  salt tabs  Fluids: IVF while NPO    GI:  Diet:as tolerated  thiamine/folate  Bowel regimen     ENDO:   Goal euglycemia (-180)    HEME/ONC:  VTE prophylaxis: [] SCDs [x] chemoprophylaxis held post-op

## 2022-09-20 NOTE — PHYSICAL THERAPY INITIAL EVALUATION ADULT - IMPAIRMENTS FOUND, PT EVAL
arousal, attention, and cognition/cognitive impairment/gait, locomotion, and balance/gross motor/muscle strength/neuromotor development and sensory integration/posture/ROM/tone
arousal, attention, and cognition/gait, locomotion, and balance/muscle strength
arousal, attention, and cognition/cognitive impairment/gait, locomotion, and balance/muscle strength/posture
arousal, attention, and cognition/gait, locomotion, and balance/muscle strength/poor safety awareness

## 2022-09-20 NOTE — PHYSICAL THERAPY INITIAL EVALUATION ADULT - REFERRAL TO ANOTHER SERVICE NEEDED, PT EVAL
PM&R/occupational therapy/social work/speech language pathology
occupational therapy/social work
occupational therapy
occupational therapy/social work

## 2022-09-20 NOTE — PROGRESS NOTE ADULT - NUTRITIONAL ASSESSMENT
This patient has been assessed with a concern for Malnutrition and has been determined to have a diagnosis/diagnoses of Severe protein-calorie malnutrition.    This patient is being managed with:   Diet Regular-  Pureed (PUREED)  Mildly Thick Liquids (MILDTHICKLIQS)  Entered: Sep 20 2022  7:04AM

## 2022-09-20 NOTE — PHYSICAL THERAPY INITIAL EVALUATION ADULT - MANUAL MUSCLE TESTING RESULTS, REHAB EVAL
unable to assess, pt not following commands
full ROM in BLEs against gravity, RUE full ROM against gravity, LLE shoulder active ROM to 90 degrees, not following commands for resistive manual muscle testing
BLEs grossly 1+/5 throughout, BUEs grossly 2+/5 throughout

## 2022-09-20 NOTE — PHYSICAL THERAPY INITIAL EVALUATION ADULT - DID THE PATIENT HAVE SURGERY?
yes
s/p supratentorial craniectomy for SDH evacuation on 5/24 and percutaneous tracheostomy creation on 6/1/yes
yes
yes

## 2022-09-20 NOTE — CHART NOTE - NSCHARTNOTEFT_GEN_A_CORE
Source: Patient [x ]  Family [ ]   other [ x]    Current Diet: Diet, Regular:   Pureed (PUREED)  Mildly Thick Liquids (MILDTHICKLIQS) (09-20-22 @ 07:04)      Patient reports [ ] nausea  [ ] vomiting [ ] diarrhea [ ] constipation  [ ]chewing problems [ ] swallowing issues  [ ] other:     PO intake:  < 50% [x ]   50-75%  [ ]   %  [ ]  other :    Current Weight:   (9/20) 118.3 lbs  (9/13) 119.9 lbs  (7/18) 131.8 lbs  (7/17) 114.8 lbs  (7/16) 126.7 lbs  (7/12)  117.2 lbs   (7/5)   141.7 lbs  (7/2)   147.2 lbs   (6/5)  160.7 lbs  (5/27)  188.4 lbs   (5/25)  160 lbs   Pt likely with significant weight loss due to prolonged hospitalization and complex medical course  Noted with 1+ generalized edema, continue to trend and maintain strict Is&Os       Pertinent Medications: MEDICATIONS  (STANDING):  ascorbic acid 500 milliGRAM(s) Oral daily  ceFAZolin  Injectable. 1000 milliGRAM(s) IV Push every 8 hours  chlorhexidine 2% Cloths 1 Application(s) Topical daily  levETIRAcetam  IVPB 500 milliGRAM(s) IV Intermittent every 12 hours  melatonin 5 milliGRAM(s) Oral at bedtime  memantine 5 milliGRAM(s) Oral every 12 hours  multivitamin 1 Tablet(s) Oral daily  polyethylene glycol 3350 17 Gram(s) Oral daily  senna 1 Tablet(s) Oral daily  sodium chloride 1 Gram(s) Oral every 12 hours  sodium chloride 0.9%. 1000 milliLiter(s) (75 mL/Hr) IV Continuous <Continuous>    MEDICATIONS  (PRN):  acetaminophen   IVPB .. 1000 milliGRAM(s) IV Intermittent once PRN Mild Pain (1 - 3)  hydrALAZINE Injectable 10 milliGRAM(s) IV Push every 2 hours PRN SBP > 140  labetalol Injectable 10 milliGRAM(s) IV Push every 2 hours PRN SBP > 140    Pertinent Labs: CBC Full  -  ( 20 Sep 2022 03:20 )  WBC Count : 5.23 K/uL  RBC Count : 3.69 M/uL  Hemoglobin : 10.4 g/dL  Hematocrit : 29.1 %  Platelet Count - Automated : 174 K/uL  Mean Cell Volume : 78.9 fl  Mean Cell Hemoglobin : 28.2 pg  Mean Cell Hemoglobin Concentration : 35.7 gm/dL  Auto Neutrophil # : x  Auto Lymphocyte # : x  Auto Monocyte # : x  Auto Eosinophil # : x  Auto Basophil # : x  Auto Neutrophil % : x  Auto Lymphocyte % : x  Auto Monocyte % : x  Auto Eosinophil % : x  Auto Basophil % : x    09-20 Na132 mmol/L<L> Glu 114 mg/dL<H> K+ 4.1 mmol/L Cr  0.68 mg/dL BUN 8.3 mg/dL Phos 4.1 mg/dL Alb n/a   PAB n/a       Skin: Surgical incision - right temporal region and right parietal region, IAD per documentation  Jalen: 13      Nutrition focused physical exam conducted - found signs of malnutrition [ ]absent [x ]present    Subcutaneous fat loss: [ x] Orbital fat pads region, [x ]Buccal fat region, [ ]Triceps region,  [ ]Ribs region    Muscle wasting: [ ]Temples region, [x ]Clavicle region, [x ]Shoulder region, [ ]Scapula region, [ ]Interosseous region,  [ ]thigh region, [ ]Calf region    Estimated Needs:   [ x] no change since previous assessment  [ ] recalculated:     Current Nutrition Diagnosis: Pt remains at high nutrition risk secondary to malnutrition (severe, acute) related to inability to meet sufficient protein-energy in setting of b/l intraparenchymal bleeds, b/l SDH, right decompressive craniectomy, trach/PEG, cranioplasty on 6/29 with cognitive decline after initial improvement prompting imaging which noted subacute collection with air underneath the cranioplasty sight, increase in fluid collection underneath the flap leading to a repeat right craniectomy with wound exploration & evacuation of fluid collection on 7/17 with OR cultures isolating MSSA, now s/p OR for right cranioplasty with complex wound closure as evidenced by meeting <75% nutrient needs >7 days, severe muscle loss of clavicles and shoulders, severe fat loss of orbitals and buccal pads, significant weight loss since admission, and 1+ generalized edema. Pt s/p OR yesterday for right cranioplasty. Remains in a pureed diet with mildly thick liquids- SLP continues to follow. Pt with poor po intake. RD to follow up.     Recommendations:   1) Continue diet as tolerated/per SLP recommendations.  2) Add Ensure Enlive TID (mildly thick) to optimize po intake and provide an additional 350 kcal, 20g protein per serving.   3) If pt remains with suboptimal po intake, would consider supplemental feeding via PEG to ensure estimated protein/calorie needs are being met.   3) Continue MVI and vitamin C supplementation.  4) Encourage po intake, monitor diet tolerance, and provide assistance at meals as needed.  5) Obtain daily weights to monitor trends.    Monitoring and Evaluation:   [ x] PO intake [ x] Tolerance to diet prescription [X] Weights  [X] Follow up per protocol [X] Labs Source: Patient [x ]  Family [ ]   other [ x]    Current Diet: Diet, Regular:   Pureed (PUREED)  Mildly Thick Liquids (MILDTHICKLIQS) (09-20-22 @ 07:04)      Patient reports [ ] nausea  [ ] vomiting [ ] diarrhea [ ] constipation  [ ]chewing problems [ ] swallowing issues  [ ] other:     PO intake:  < 50% [x ]   50-75%  [ ]   %  [ ]  other :    Current Weight:   (9/20) 118.3 lbs  (9/13) 119.9 lbs  (7/18) 131.8 lbs  (7/17) 114.8 lbs  (7/16) 126.7 lbs  (7/12)  117.2 lbs   (7/5)   141.7 lbs  (7/2)   147.2 lbs   (6/5)  160.7 lbs  (5/27)  188.4 lbs   (5/25)  160 lbs   Pt likely with significant weight loss due to prolonged hospitalization and complex medical course  Noted with 1+ generalized edema, continue to trend and maintain strict Is&Os       Pertinent Medications: MEDICATIONS  (STANDING):  ascorbic acid 500 milliGRAM(s) Oral daily  ceFAZolin  Injectable. 1000 milliGRAM(s) IV Push every 8 hours  chlorhexidine 2% Cloths 1 Application(s) Topical daily  levETIRAcetam  IVPB 500 milliGRAM(s) IV Intermittent every 12 hours  melatonin 5 milliGRAM(s) Oral at bedtime  memantine 5 milliGRAM(s) Oral every 12 hours  multivitamin 1 Tablet(s) Oral daily  polyethylene glycol 3350 17 Gram(s) Oral daily  senna 1 Tablet(s) Oral daily  sodium chloride 1 Gram(s) Oral every 12 hours  sodium chloride 0.9%. 1000 milliLiter(s) (75 mL/Hr) IV Continuous <Continuous>    MEDICATIONS  (PRN):  acetaminophen   IVPB .. 1000 milliGRAM(s) IV Intermittent once PRN Mild Pain (1 - 3)  hydrALAZINE Injectable 10 milliGRAM(s) IV Push every 2 hours PRN SBP > 140  labetalol Injectable 10 milliGRAM(s) IV Push every 2 hours PRN SBP > 140    Pertinent Labs: CBC Full  -  ( 20 Sep 2022 03:20 )  WBC Count : 5.23 K/uL  RBC Count : 3.69 M/uL  Hemoglobin : 10.4 g/dL  Hematocrit : 29.1 %  Platelet Count - Automated : 174 K/uL  Mean Cell Volume : 78.9 fl  Mean Cell Hemoglobin : 28.2 pg  Mean Cell Hemoglobin Concentration : 35.7 gm/dL  Auto Neutrophil # : x  Auto Lymphocyte # : x  Auto Monocyte # : x  Auto Eosinophil # : x  Auto Basophil # : x  Auto Neutrophil % : x  Auto Lymphocyte % : x  Auto Monocyte % : x  Auto Eosinophil % : x  Auto Basophil % : x    09-20 Na132 mmol/L<L> Glu 114 mg/dL<H> K+ 4.1 mmol/L Cr  0.68 mg/dL BUN 8.3 mg/dL Phos 4.1 mg/dL Alb n/a   PAB n/a       Skin: Surgical incision - right temporal region and right parietal region, IAD per documentation  Jalen: 13      Nutrition focused physical exam conducted - found signs of malnutrition [ ]absent [x ]present    Subcutaneous fat loss: [ x] Orbital fat pads region, [x ]Buccal fat region, [ ]Triceps region,  [ ]Ribs region    Muscle wasting: [ ]Temples region, [x ]Clavicle region, [x ]Shoulder region, [ ]Scapula region, [ ]Interosseous region,  [ ]thigh region, [ ]Calf region    Estimated Needs:   [ x] no change since previous assessment  [ ] recalculated:     Current Nutrition Diagnosis: Pt remains at high nutrition risk secondary to malnutrition (severe, acute) related to inability to meet sufficient protein-energy in setting of b/l intraparenchymal bleeds, b/l SDH, right decompressive craniectomy, trach/PEG, cranioplasty on 6/29 with cognitive decline after initial improvement prompting imaging which noted subacute collection with air underneath the cranioplasty sight, increase in fluid collection underneath the flap leading to a repeat right craniectomy with wound exploration & evacuation of fluid collection on 7/17 with OR cultures isolating MSSA, now s/p OR for right cranioplasty with complex wound closure as evidenced by meeting <75% nutrient needs >7 days, severe muscle loss of clavicles and shoulders, severe fat loss of orbitals and buccal pads, significant weight loss since admission, and 1+ generalized edema. Pt s/p OR yesterday for right cranioplasty. Remains on a pureed diet with mildly thick liquids- SLP continues to follow. Pt with poor po intake. RD to follow up.     Recommendations:   1) Continue diet as tolerated/per SLP recommendations.  2) Add Ensure Enlive TID (mildly thick) to optimize po intake and provide an additional 350 kcal, 20g protein per serving.   3) If pt remains with suboptimal po intake, would consider supplemental feeding via PEG to ensure estimated protein/calorie needs are being met.   3) Continue MVI and vitamin C supplementation.  4) Encourage po intake, monitor diet tolerance, and provide assistance at meals as needed.  5) Obtain daily weights to monitor trends.    Monitoring and Evaluation:   [ x] PO intake [ x] Tolerance to diet prescription [X] Weights  [X] Follow up per protocol [X] Labs

## 2022-09-20 NOTE — PHYSICAL THERAPY INITIAL EVALUATION ADULT - LEVEL OF INDEPENDENCE: GAIT, REHAB EVAL
unable to perform
unable to perform
bed to chair see above
unable to weight shift sufficiently with assist to clear either LE from floor./unable to perform

## 2022-09-20 NOTE — PHYSICAL THERAPY INITIAL EVALUATION ADULT - PRECAUTIONS/LIMITATIONS, REHAB EVAL
fall precautions/surgical precautions
fall precautions/surgical precautions
fall precautions
fall precautions

## 2022-09-20 NOTE — PHYSICAL THERAPY INITIAL EVALUATION ADULT - LEVEL OF INDEPENDENCE, REHAB EVAL
increased tone and pt not actively participating with transfer, unsafe to continue functional assessment at this time./unable to perform

## 2022-09-20 NOTE — PHYSICAL THERAPY INITIAL EVALUATION ADULT - MD ORDER
PT evaluation and treat
PT evaluate and treat
PT evaluate and treat/COMA STIM (please see POC for further information)
PT evaluate and treat

## 2022-09-20 NOTE — PHYSICAL THERAPY INITIAL EVALUATION ADULT - MUSCLE TONE ASSESSMENT, REHAB EVAL
bilateral upper extremities/bilateral lower extremities/moderately increased tone
bilateral UE's moderate-severely increased tone. right LE moderately increased tone and Left LE severely increased tone
normal

## 2022-09-20 NOTE — PHYSICAL THERAPY INITIAL EVALUATION ADULT - FOLLOWS COMMANDS/ANSWERS QUESTIONS, REHAB EVAL
In Sinhala/25% of the time
In Icelandic/50% of the time
unable to follow commands/unable to follow multi-step instructions/unable to answer questions

## 2022-09-20 NOTE — PROGRESS NOTE ADULT - ASSESSMENT
42M POD 1 cranioplasty     Plan  - q2 neuro checks   - Keppra 500 BID   - -160   - Okay for diet   - SCDs only for DVT ppx   - submesh [place] to thumbprint suction and subgaleal drain to full suction   - PT . OT . OOB   - Further management per icu team

## 2022-09-20 NOTE — PHYSICAL THERAPY INITIAL EVALUATION ADULT - ADDITIONAL COMMENTS
Pt non verbal, unable to give PLOF or hx. Hx taken from Previous evaluations: pt rents a room in a house, but home setup is unknown; as per mother who is visiting from [place], pt has siblings that lives closeby that may be able to assist; pt has two small children
Unable to obtain information at this time as pt is currently trach collar/vented and nonverbal.
Pt non verbal, unable to give PLOF or hx. Hx taken from Previous evaluations: pt rents a room in a house, but home setup is unknown; as per mother who is visiting from [place], pt has siblings that lives closeby that may be able to assist; pt has two small children
Pt non verbal, Hx taken from OT evaluation on 6/29: pt rents a room in a house, but home setup is unknown; as per mother who is visiting from [place], pt has siblings that lives closeby that may be able to assist; pt has two small children

## 2022-09-20 NOTE — PHYSICAL THERAPY INITIAL EVALUATION ADULT - LEVEL OF INDEPENDENCE: STAND/SIT, REHAB EVAL
unable to perform
unable to perform
moderate assist (50% patients effort)
moderate assist (50% patients effort)

## 2022-09-21 LAB
ANION GAP SERPL CALC-SCNC: 15 MMOL/L — SIGNIFICANT CHANGE UP (ref 5–17)
BUN SERPL-MCNC: 5.3 MG/DL — LOW (ref 8–20)
CALCIUM SERPL-MCNC: 9 MG/DL — SIGNIFICANT CHANGE UP (ref 8.4–10.5)
CHLORIDE SERPL-SCNC: 101 MMOL/L — SIGNIFICANT CHANGE UP (ref 98–107)
CO2 SERPL-SCNC: 21 MMOL/L — LOW (ref 22–29)
CREAT SERPL-MCNC: 0.56 MG/DL — SIGNIFICANT CHANGE UP (ref 0.5–1.3)
EGFR: 126 ML/MIN/1.73M2 — SIGNIFICANT CHANGE UP
GLUCOSE SERPL-MCNC: 92 MG/DL — SIGNIFICANT CHANGE UP (ref 70–99)
HCT VFR BLD CALC: 28.8 % — LOW (ref 39–50)
HGB BLD-MCNC: 10.4 G/DL — LOW (ref 13–17)
MAGNESIUM SERPL-MCNC: 1.6 MG/DL — LOW (ref 1.8–2.6)
MCHC RBC-ENTMCNC: 28.8 PG — SIGNIFICANT CHANGE UP (ref 27–34)
MCHC RBC-ENTMCNC: 36.1 GM/DL — HIGH (ref 32–36)
MCV RBC AUTO: 79.8 FL — LOW (ref 80–100)
PHOSPHATE SERPL-MCNC: 3.6 MG/DL — SIGNIFICANT CHANGE UP (ref 2.4–4.7)
PLATELET # BLD AUTO: 192 K/UL — SIGNIFICANT CHANGE UP (ref 150–400)
POTASSIUM SERPL-MCNC: 3.7 MMOL/L — SIGNIFICANT CHANGE UP (ref 3.5–5.3)
POTASSIUM SERPL-SCNC: 3.7 MMOL/L — SIGNIFICANT CHANGE UP (ref 3.5–5.3)
RBC # BLD: 3.61 M/UL — LOW (ref 4.2–5.8)
RBC # FLD: 15.2 % — HIGH (ref 10.3–14.5)
SODIUM SERPL-SCNC: 137 MMOL/L — SIGNIFICANT CHANGE UP (ref 135–145)
WBC # BLD: 3.52 K/UL — LOW (ref 3.8–10.5)
WBC # FLD AUTO: 3.52 K/UL — LOW (ref 3.8–10.5)

## 2022-09-21 PROCEDURE — 99024 POSTOP FOLLOW-UP VISIT: CPT

## 2022-09-21 PROCEDURE — 95720 EEG PHY/QHP EA INCR W/VEEG: CPT

## 2022-09-21 RX ORDER — MAGNESIUM SULFATE 500 MG/ML
1 VIAL (ML) INJECTION ONCE
Refills: 0 | Status: COMPLETED | OUTPATIENT
Start: 2022-09-21 | End: 2022-09-21

## 2022-09-21 RX ORDER — LEVETIRACETAM 250 MG/1
2000 TABLET, FILM COATED ORAL ONCE
Refills: 0 | Status: COMPLETED | OUTPATIENT
Start: 2022-09-21 | End: 2022-09-21

## 2022-09-21 RX ORDER — SODIUM CHLORIDE 9 MG/ML
1 INJECTION INTRAMUSCULAR; INTRAVENOUS; SUBCUTANEOUS EVERY 12 HOURS
Refills: 0 | Status: DISCONTINUED | OUTPATIENT
Start: 2022-09-21 | End: 2022-09-30

## 2022-09-21 RX ORDER — POLYETHYLENE GLYCOL 3350 17 G/17G
17 POWDER, FOR SOLUTION ORAL DAILY
Refills: 0 | Status: DISCONTINUED | OUTPATIENT
Start: 2022-09-21 | End: 2022-09-22

## 2022-09-21 RX ORDER — LANOLIN ALCOHOL/MO/W.PET/CERES
5 CREAM (GRAM) TOPICAL AT BEDTIME
Refills: 0 | Status: DISCONTINUED | OUTPATIENT
Start: 2022-09-21 | End: 2022-09-30

## 2022-09-21 RX ORDER — ASCORBIC ACID 60 MG
500 TABLET,CHEWABLE ORAL DAILY
Refills: 0 | Status: DISCONTINUED | OUTPATIENT
Start: 2022-09-21 | End: 2022-09-30

## 2022-09-21 RX ORDER — MEMANTINE HYDROCHLORIDE 10 MG/1
5 TABLET ORAL EVERY 12 HOURS
Refills: 0 | Status: DISCONTINUED | OUTPATIENT
Start: 2022-09-21 | End: 2022-09-30

## 2022-09-21 RX ORDER — SENNA PLUS 8.6 MG/1
1 TABLET ORAL DAILY
Refills: 0 | Status: DISCONTINUED | OUTPATIENT
Start: 2022-09-21 | End: 2022-09-30

## 2022-09-21 RX ORDER — LEVETIRACETAM 250 MG/1
750 TABLET, FILM COATED ORAL
Refills: 0 | Status: DISCONTINUED | OUTPATIENT
Start: 2022-09-21 | End: 2022-09-23

## 2022-09-21 RX ADMIN — Medication 1000 MILLIGRAM(S): at 14:55

## 2022-09-21 RX ADMIN — Medication 1000 MILLIGRAM(S): at 21:35

## 2022-09-21 RX ADMIN — Medication 100 GRAM(S): at 04:15

## 2022-09-21 RX ADMIN — LEVETIRACETAM 600 MILLIGRAM(S): 250 TABLET, FILM COATED ORAL at 20:04

## 2022-09-21 RX ADMIN — LEVETIRACETAM 400 MILLIGRAM(S): 250 TABLET, FILM COATED ORAL at 05:17

## 2022-09-21 RX ADMIN — Medication 1000 MILLIGRAM(S): at 05:21

## 2022-09-21 RX ADMIN — Medication 100 GRAM(S): at 13:17

## 2022-09-21 RX ADMIN — CHLORHEXIDINE GLUCONATE 1 APPLICATION(S): 213 SOLUTION TOPICAL at 11:49

## 2022-09-21 NOTE — PROGRESS NOTE ADULT - ASSESSMENT
42y old  Male initially admitted on 5/24/22 with TBI - Bilateral IPH, Bilateral SDH. Prolonged hospital course: s/p craniotomy and evacuation of SDH 5/24, s/p trach 6/1, s/p cranioplasty and replacement of bone flap 6/29, s/p R hemicraniectomy, wound exploration, evacuation of epidural fluid collection 7/17.  Now s/p cranioplasty 9/19.    Plan:  neurochecks  keppra x 7 days postop  submesh and subgaleal drains per NSGy, possible removal of the former   pain mgt: tylenol and oxy 5 prn  Activity: [x] mobilize as tolerated [] Bedrest [x] PT [x] OT  Rehab involvement appreciated    PULM: room air   SpO2>92%    CV:  SBP goal 100-140  prn hydralazine/ labetalol    RENAL: monitor I/O  replete lytes prn  salt tabs, cont  Fluids: IVF while NPO    GI:  Diet: TF  thiamine/folate  Bowel regimen     ENDO:   Goal euglycemia (-180)    HEME/ONC:  VTE prophylaxis: [x] SCDs [x] chemoprophylaxis held post-op

## 2022-09-21 NOTE — PROGRESS NOTE ADULT - SUBJECTIVE AND OBJECTIVE BOX
42y  Male initially admitted on 5/24/22 ([facility] after found unresponsive on street) with a GCS of 3, found to have b/l intraparenchymal bleeds, b/l SDH. Has had a complex prolonged hospital course including but not limited to right decompressive craniectomy on 5/24. Trach / PEG., cranioplasty on 6/29 with cognitive decline after initial improvement prompting imaging which noted subacute collection with air underneath the cranioplasty sight. MRI with increase in fluid collection underneath the flap leading to a repeat right craniectomy with wound exploration & evacuation of fluid collection on 7/17 with OR cultures isolating MSSA. 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 Has had interval trach decannulation and dietary advancement to pureed. Now s/p completion of appropriate antimicrobial course with f/u imaging (9/3) revealing no abnormal enhancement (6 mm midline shift to left). After being optimzed and cleared for surgery had a fever (9/9/22). Infectious w/u in progress to evaluate etiology of fever     (24 May 2022 01:09)  Underwent cranioplasty 9/19.    OVERNIGHT EVENTS:   No acute events overnight.    VITALS:  T(C): , Max: 37.1 (09-20-22 @ 00:01)  HR:  (58 - 135)         BP:  (110/83 - 168/113)  RR:  (10 - 18)  SpO2:  (98% - 100%)      09-19-22 @ 07:01  -  09-20-22 @ 07:00  --------------------------------------------------------  IN: 1325 mL / OUT: 1230 mL / NET: 95 mL    09-20-22 @ 07:01  -  09-20-22 @ 15:42  --------------------------------------------------------  IN: 800 mL / OUT: 105 mL / NET: 695 mL      LABS:  Na: 132 (09-20 @ 03:20), 133 (09-19 @ 17:41), 135 (09-18 @ 07:01)  K: 4.1 (09-20 @ 03:20), 3.9 (09-19 @ 17:41), 3.7 (09-18 @ 07:01)  Cl: 96 (09-20 @ 03:20), 96 (09-19 @ 17:41), 97 (09-18 @ 07:01)  CO2: 21.0 (09-20 @ 03:20), 23.0 (09-19 @ 17:41), 26.0 (09-18 @ 07:01)  BUN: 8.3 (09-20 @ 03:20), 9.4 (09-19 @ 17:41), 10.2 (09-18 @ 07:01)  Cr: 0.68 (09-20 @ 03:20), 0.78 (09-19 @ 17:41), 0.76 (09-18 @ 07:01)  Glu: 114(09-20 @ 03:20), 113(09-19 @ 17:41), 90(09-18 @ 07:01)    Hgb: 10.4 (09-20 @ 03:20), 10.6 (09-18 @ 07:01)  Hct: 29.1 (09-20 @ 03:20), 29.7 (09-18 @ 07:01)  WBC: 5.23 (09-20 @ 03:20), 3.51 (09-18 @ 07:01)  Plt: 174 (09-20 @ 03:20), 144 (09-18 @ 07:01)    INR: 1.20 09-19-22 @ 05:58, 1.18 09-18-22 @ 07:01  PTT: 31.9 09-19-22 @ 05:58    IMAGING:   Recent imaging studies were reviewed.    MEDICATIONS:  acetaminophen   IVPB .. 1000 milliGRAM(s) IV Intermittent once PRN  ascorbic acid 500 milliGRAM(s) Oral daily  ceFAZolin  Injectable. 1000 milliGRAM(s) IV Push every 8 hours  chlorhexidine 2% Cloths 1 Application(s) Topical daily  hydrALAZINE Injectable 10 milliGRAM(s) IV Push every 2 hours PRN  labetalol Injectable 10 milliGRAM(s) IV Push every 2 hours PRN  levETIRAcetam  IVPB 500 milliGRAM(s) IV Intermittent every 12 hours  melatonin 5 milliGRAM(s) Oral at bedtime  memantine 5 milliGRAM(s) Oral every 12 hours  multivitamin 1 Tablet(s) Oral daily  polyethylene glycol 3350 17 Gram(s) Oral daily  senna 1 Tablet(s) Oral daily  sodium chloride 1 Gram(s) Oral every 12 hours  sodium chloride 0.9%. 1000 milliLiter(s) IV Continuous <Continuous>    EXAMINATION:  General:  calm, cooperative.  Neuro:  EO, follows simple commands - moves feet, bends knees, showed two fingers; moves all extremities spont R>L. .  Cards:  CTAB.  Respiratory:  no respiratory distress, protects his airways.  Abdomen:  soft, non-tender.  Extremities:  no edema.  Skin:  warm/dry.

## 2022-09-21 NOTE — CHART NOTE - NSCHARTNOTEFT_GEN_A_CORE
Patient was laid flat. Submesh MARCEL 5cc overnight with 40 cc in 24 hours- decision w/ Dr. Millan to removed drain. Drain was taken off suction. Incision clean, dry, intact without drainage or dehiscence noted. Site prepped and cleaned. Anchoring drain stitch removed, submesh MARCEL drain removed slowly with minimal drainage from site. 1 staple placed, patient tolerated procedure well. Discussed w/ neurosurgery team and neuro ICU RN.

## 2022-09-22 LAB
ANION GAP SERPL CALC-SCNC: 15 MMOL/L — SIGNIFICANT CHANGE UP (ref 5–17)
BUN SERPL-MCNC: 5.3 MG/DL — LOW (ref 8–20)
CA-I BLD-SCNC: 1.2 MMOL/L — SIGNIFICANT CHANGE UP (ref 1.15–1.33)
CALCIUM SERPL-MCNC: 8.8 MG/DL — SIGNIFICANT CHANGE UP (ref 8.4–10.5)
CHLORIDE SERPL-SCNC: 97 MMOL/L — LOW (ref 98–107)
CO2 SERPL-SCNC: 22 MMOL/L — SIGNIFICANT CHANGE UP (ref 22–29)
CREAT SERPL-MCNC: 0.66 MG/DL — SIGNIFICANT CHANGE UP (ref 0.5–1.3)
EGFR: 120 ML/MIN/1.73M2 — SIGNIFICANT CHANGE UP
GLUCOSE SERPL-MCNC: 84 MG/DL — SIGNIFICANT CHANGE UP (ref 70–99)
HCT VFR BLD CALC: 29.3 % — LOW (ref 39–50)
HGB BLD-MCNC: 10.2 G/DL — LOW (ref 13–17)
MAGNESIUM SERPL-MCNC: 1.6 MG/DL — SIGNIFICANT CHANGE UP (ref 1.6–2.6)
MCHC RBC-ENTMCNC: 28 PG — SIGNIFICANT CHANGE UP (ref 27–34)
MCHC RBC-ENTMCNC: 34.8 GM/DL — SIGNIFICANT CHANGE UP (ref 32–36)
MCV RBC AUTO: 80.5 FL — SIGNIFICANT CHANGE UP (ref 80–100)
PHOSPHATE SERPL-MCNC: 3.5 MG/DL — SIGNIFICANT CHANGE UP (ref 2.4–4.7)
PLATELET # BLD AUTO: 199 K/UL — SIGNIFICANT CHANGE UP (ref 150–400)
POTASSIUM SERPL-MCNC: 3.7 MMOL/L — SIGNIFICANT CHANGE UP (ref 3.5–5.3)
POTASSIUM SERPL-SCNC: 3.7 MMOL/L — SIGNIFICANT CHANGE UP (ref 3.5–5.3)
RBC # BLD: 3.64 M/UL — LOW (ref 4.2–5.8)
RBC # FLD: 15.6 % — HIGH (ref 10.3–14.5)
SODIUM SERPL-SCNC: 134 MMOL/L — LOW (ref 135–145)
WBC # BLD: 3.75 K/UL — LOW (ref 3.8–10.5)
WBC # FLD AUTO: 3.75 K/UL — LOW (ref 3.8–10.5)

## 2022-09-22 PROCEDURE — 99254 IP/OBS CNSLTJ NEW/EST MOD 60: CPT

## 2022-09-22 PROCEDURE — 95720 EEG PHY/QHP EA INCR W/VEEG: CPT

## 2022-09-22 PROCEDURE — 70450 CT HEAD/BRAIN W/O DYE: CPT | Mod: 26

## 2022-09-22 PROCEDURE — 99232 SBSQ HOSP IP/OBS MODERATE 35: CPT

## 2022-09-22 PROCEDURE — 99233 SBSQ HOSP IP/OBS HIGH 50: CPT

## 2022-09-22 RX ORDER — HYDRALAZINE HCL 50 MG
10 TABLET ORAL
Refills: 0 | Status: DISCONTINUED | OUTPATIENT
Start: 2022-09-22 | End: 2022-09-22

## 2022-09-22 RX ORDER — MAGNESIUM SULFATE 500 MG/ML
2 VIAL (ML) INJECTION ONCE
Refills: 0 | Status: COMPLETED | OUTPATIENT
Start: 2022-09-22 | End: 2022-09-22

## 2022-09-22 RX ORDER — HYDRALAZINE HCL 50 MG
10 TABLET ORAL EVERY 4 HOURS
Refills: 0 | Status: DISCONTINUED | OUTPATIENT
Start: 2022-09-22 | End: 2022-09-30

## 2022-09-22 RX ORDER — LABETALOL HCL 100 MG
10 TABLET ORAL
Refills: 0 | Status: DISCONTINUED | OUTPATIENT
Start: 2022-09-22 | End: 2022-09-22

## 2022-09-22 RX ORDER — POLYETHYLENE GLYCOL 3350 17 G/17G
17 POWDER, FOR SOLUTION ORAL
Refills: 0 | Status: DISCONTINUED | OUTPATIENT
Start: 2022-09-22 | End: 2022-09-30

## 2022-09-22 RX ORDER — POTASSIUM CHLORIDE 20 MEQ
20 PACKET (EA) ORAL ONCE
Refills: 0 | Status: COMPLETED | OUTPATIENT
Start: 2022-09-22 | End: 2022-09-22

## 2022-09-22 RX ADMIN — LEVETIRACETAM 400 MILLIGRAM(S): 250 TABLET, FILM COATED ORAL at 00:47

## 2022-09-22 RX ADMIN — Medication 1 TABLET(S): at 12:14

## 2022-09-22 RX ADMIN — SODIUM CHLORIDE 75 MILLILITER(S): 9 INJECTION INTRAMUSCULAR; INTRAVENOUS; SUBCUTANEOUS at 23:56

## 2022-09-22 RX ADMIN — CHLORHEXIDINE GLUCONATE 1 APPLICATION(S): 213 SOLUTION TOPICAL at 12:15

## 2022-09-22 RX ADMIN — Medication 500 MILLIGRAM(S): at 12:14

## 2022-09-22 RX ADMIN — Medication 1000 MILLIGRAM(S): at 13:13

## 2022-09-22 RX ADMIN — MEMANTINE HYDROCHLORIDE 5 MILLIGRAM(S): 10 TABLET ORAL at 05:09

## 2022-09-22 RX ADMIN — SODIUM CHLORIDE 1 GRAM(S): 9 INJECTION INTRAMUSCULAR; INTRAVENOUS; SUBCUTANEOUS at 05:09

## 2022-09-22 RX ADMIN — LEVETIRACETAM 400 MILLIGRAM(S): 250 TABLET, FILM COATED ORAL at 13:07

## 2022-09-22 RX ADMIN — LEVETIRACETAM 400 MILLIGRAM(S): 250 TABLET, FILM COATED ORAL at 06:05

## 2022-09-22 RX ADMIN — Medication 20 MILLIEQUIVALENT(S): at 06:05

## 2022-09-22 RX ADMIN — POLYETHYLENE GLYCOL 3350 17 GRAM(S): 17 POWDER, FOR SOLUTION ORAL at 17:07

## 2022-09-22 RX ADMIN — Medication 1000 MILLIGRAM(S): at 05:08

## 2022-09-22 RX ADMIN — Medication 25 GRAM(S): at 05:09

## 2022-09-22 RX ADMIN — Medication 5 MILLIGRAM(S): at 22:41

## 2022-09-22 RX ADMIN — SODIUM CHLORIDE 1 GRAM(S): 9 INJECTION INTRAMUSCULAR; INTRAVENOUS; SUBCUTANEOUS at 17:20

## 2022-09-22 RX ADMIN — SENNA PLUS 1 TABLET(S): 8.6 TABLET ORAL at 12:14

## 2022-09-22 RX ADMIN — MEMANTINE HYDROCHLORIDE 5 MILLIGRAM(S): 10 TABLET ORAL at 17:07

## 2022-09-22 RX ADMIN — SODIUM CHLORIDE 75 MILLILITER(S): 9 INJECTION INTRAMUSCULAR; INTRAVENOUS; SUBCUTANEOUS at 17:06

## 2022-09-22 RX ADMIN — LEVETIRACETAM 400 MILLIGRAM(S): 250 TABLET, FILM COATED ORAL at 17:08

## 2022-09-22 NOTE — SWALLOW BEDSIDE ASSESSMENT ADULT - SWALLOW EVAL: DIAGNOSIS
Severe oral dysphagia, impacted by cognition for single administered trial of puree. Pharyngeal stage of swallow therefore, unable to be assessed.

## 2022-09-22 NOTE — CHART NOTE - NSCHARTNOTEFT_GEN_A_CORE
Stevan Segundo is a 42 year old male admitted since 5/24/22 after being found down and was found to have B/L IPH and B/L SDH. He has had a prolonged hospitalization notable s/p craniotomy and evacuation of SDH on 5/24, s/p trach 6/1 now decannulated, s/p cranioplasty and replacement of bone flap 6/29, and s/p Rt hemicraniectomy w/ wound exploration and evacuation of epidural fluid collection 7/17. OR cultures were noted to be positive for MSSA on 7/17 and he completed an antibiotic course as per ID. He remained in hospital completing his antibiotics. 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 On 9/19 he returned to the OR for mesh cranioplasty with plastics closure after medical optimization. He tolerated the procedure well and was monitored in the NSICU postoperatively where he was noted to have a new trace Lt facial, a CTH was unremarkable and he was placed on EEG. EEG noted epileptic activity however no seizures and he was loaded with Keppra as per epilepsy.    His submesh drain removed on 9/21 and his subgaleal drain was removed on 9/22.      PROCEDURE:  Rt mesh cranioplasty with plastics closure  POD# 3    Vital Signs Last 24 Hrs  T(C): 36.7 (09-22-22 @ 03:16), Max: 36.9 (09-21-22 @ 21:00)  T(F): 98.1 (09-22-22 @ 03:16), Max: 98.5 (09-21-22 @ 21:00)  HR: 60 (09-22-22 @ 09:00) (60 - 112)  BP: 118/75 (09-22-22 @ 09:00) (112/82 - 147/103)  BP(mean): 90 (09-22-22 @ 09:00) (90 - 116)  RR: 12 (09-22-22 @ 09:00) (9 - 18)  SpO2: 99% (09-22-22 @ 09:00) (99% - 100%)    Constitutional: NAD, lying in bed  Neuro  * Mental Status: EO spontaneously, following simple commands (show fingers, wiggles toes), no verbal output   * Cranial Nerves: Cnii-Cnxii grossly intact. PERRL, EOMI, tongue midline, no gaze deviation  * Motor: moves all extremities in plane of bed  * Sensory: Sensation grossly intact to noxious stimuli   * Reflexes: not assessed   Cardiovascular: regular rate and rhythm  Respiratory: nonlabored breathing     LABS:                        10.2   3.75  )-----------( 199      ( 22 Sep 2022 03:25 )             29.3    09-22    134<L>  |  97<L>  |  5.3<L>  ----------------------------<  84  3.7   |  22.0  |  0.66    Ca    8.8      22 Sep 2022 03:25  Phos  3.5     09-22  Mg     1.6     09-22    MEDICATIONS:  Antibiotics:  ceFAZolin  Injectable. 1000 milliGRAM(s) IV Push every 8 hours    Neuro:  levETIRAcetam  IVPB 750 milliGRAM(s) IV Intermittent <User Schedule>  melatonin 5 milliGRAM(s) Oral at bedtime  memantine 5 milliGRAM(s) Oral every 12 hours    Cardiac:  hydrALAZINE Injectable 10 milliGRAM(s) IV Push every 2 hours PRN SBP > 150  labetalol Injectable 10 milliGRAM(s) IV Push every 2 hours PRN SBP > 150    Pulm:    GI/:  polyethylene glycol 3350 17 Gram(s) Oral two times a day  senna 1 Tablet(s) Oral daily    Other:   ascorbic acid 500 milliGRAM(s) Oral daily  chlorhexidine 2% Cloths 1 Application(s) Topical daily  multivitamin 1 Tablet(s) Oral daily  sodium chloride 1 Gram(s) Oral every 12 hours  sodium chloride 0.9%. 1000 milliLiter(s) IV Continuous <Continuous>    -------------------------------------------------------------------------------------------------------------  42M with multicompartmental heme requiring rt hemicraniectomy course complicated by prolonged hospitalization, infections now s/p mesh cranioplasty on 9/19 with neurosurgery and plastics now POD 3.      PLAN:  Neuro:   - q4 neuro   - Continue Keppra 750 q 6, adjustment of AEDs per Epilepsy  - EEG in place  - Continue Melatonin and Memantine   - Appreciate NSurg recs  - Pain mmgt prn, avoid oversedation  - STAT CTH for neurologic changes     Respiratory:   - Room Air    CV:  - -150  - PRN: Hydralazine     Renal:   - NS @ 75  - Voiding   - Continue NaCl 1g BID  - Replete electrolytes as needed    GI:    - Pureed diet with mildly thick liquids  - Vit C, MVI   - Bowel Regimen: Miralax BID, Senna    Endocrine:   - DENISE    Heme/Onc:               - DVT ppx: venodynes on, f/u with NSurg re: SQL restart date    ID:   - Afebrile      PT/OT: OOB as tolerated, cont PT/OT  Dispo: DG to medicine floor with neurosurgery and epilepsy following.     INCOMPLETE BELOW     pt signed out to _____________  Case discussed with Dr. 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 He has had a prolonged hospitalization notable s/p craniotomy and evacuation of SDH on 5/24, s/p trach 6/1 now decannulated, s/p cranioplasty and replacement of bone flap 6/29, and s/p Rt hemicraniectomy w/ wound exploration and evacuation of epidural fluid collection 7/17. OR cultures were noted to be positive for MSSA on 7/17 and he completed an antibiotic course as per ID. He remained in hospital completing his antibiotics. 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 On 9/19 he returned to the OR for mesh cranioplasty with plastics closure after medical optimization. He tolerated the procedure well and was monitored in the NSICU postoperatively where he was noted to have a new trace Lt facial, a CTH was unremarkable and he was placed on EEG. EEG noted epileptic activity however no seizures and he was loaded with Keppra as per epilepsy.    His submesh drain removed on 9/21 and his subgaleal drain was removed on 9/22.      PROCEDURE:  Rt mesh cranioplasty with plastics closure  POD# 3    Vital Signs Last 24 Hrs  T(C): 36.7 (09-22-22 @ 03:16), Max: 36.9 (09-21-22 @ 21:00)  T(F): 98.1 (09-22-22 @ 03:16), Max: 98.5 (09-21-22 @ 21:00)  HR: 60 (09-22-22 @ 09:00) (60 - 112)  BP: 118/75 (09-22-22 @ 09:00) (112/82 - 147/103)  BP(mean): 90 (09-22-22 @ 09:00) (90 - 116)  RR: 12 (09-22-22 @ 09:00) (9 - 18)  SpO2: 99% (09-22-22 @ 09:00) (99% - 100%)    Constitutional: NAD, lying in bed  Neuro  * Mental Status: EO spontaneously, following simple commands (show fingers, wiggles toes), no verbal output   * Cranial Nerves: Cnii-Cnxii grossly intact. PERRL, EOMI, tongue midline, no gaze deviation  * Motor: moves all extremities in plane of bed  * Sensory: Sensation grossly intact to noxious stimuli   * Reflexes: not assessed   Cardiovascular: regular rate and rhythm  Respiratory: nonlabored breathing     LABS:                        10.2   3.75  )-----------( 199      ( 22 Sep 2022 03:25 )             29.3    09-22    134<L>  |  97<L>  |  5.3<L>  ----------------------------<  84  3.7   |  22.0  |  0.66    Ca    8.8      22 Sep 2022 03:25  Phos  3.5     09-22  Mg     1.6     09-22    MEDICATIONS:  Antibiotics:  ceFAZolin  Injectable. 1000 milliGRAM(s) IV Push every 8 hours    Neuro:  levETIRAcetam  IVPB 750 milliGRAM(s) IV Intermittent <User Schedule>  melatonin 5 milliGRAM(s) Oral at bedtime  memantine 5 milliGRAM(s) Oral every 12 hours    Cardiac:  hydrALAZINE Injectable 10 milliGRAM(s) IV Push every 2 hours PRN SBP > 150  labetalol Injectable 10 milliGRAM(s) IV Push every 2 hours PRN SBP > 150    Pulm:    GI/:  polyethylene glycol 3350 17 Gram(s) Oral two times a day  senna 1 Tablet(s) Oral daily    Other:   ascorbic acid 500 milliGRAM(s) Oral daily  chlorhexidine 2% Cloths 1 Application(s) Topical daily  multivitamin 1 Tablet(s) Oral daily  sodium chloride 1 Gram(s) Oral every 12 hours  sodium chloride 0.9%. 1000 milliLiter(s) IV Continuous <Continuous>    -------------------------------------------------------------------------------------------------------------  42M with multicompartmental heme requiring rt hemicraniectomy course complicated by prolonged hospitalization, infections now s/p mesh cranioplasty on 9/19 with neurosurgery and plastics now POD 3.    PLAN:  Neuro:   - q4 neuro   - Continue Keppra 750 q 6, adjustment of AEDs per Epilepsy  - EEG in place  - Continue Melatonin and Memantine   - Appreciate NSurg recs  - Pain mmgt prn, avoid oversedation  - STAT CTH for neurologic changes     Respiratory:   - Room Air    CV:  - -150  - PRN: Hydralazine     Renal:   - NS @ 75  - Voiding   - Continue NaCl 1g BID  - Replete electrolytes as needed    GI:    - Low PO intake postop, resumed on TFs. pending repeat SLP assessment.   - Vit C, MVI   - Bowel Regimen: Miralax BID, Senna    Endocrine:   - DENISE    Heme/Onc:               - DVT ppx: venodynes on, f/u with NSurg re: SQL restart date    ID:   - Afebrile      PT/OT: OOB as tolerated, cont PT/OT  Dispo: DG to medicine floor with neurosurgery and epilepsy following.     INCOMPLETE BELOW     pt signed out to _____________  Case discussed with Dr. 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 He has had a prolonged hospitalization notable s/p craniotomy and evacuation of SDH on 5/24, s/p trach 6/1 now decannulated, s/p cranioplasty and replacement of bone flap 6/29, and s/p Rt hemicraniectomy w/ wound exploration and evacuation of epidural fluid collection 7/17. OR cultures were noted to be positive for MSSA on 7/17 and he completed an antibiotic course as per ID. He remained in hospital completing his antibiotics. 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 On 9/19 he returned to the OR for mesh cranioplasty with plastics closure after medical optimization. He tolerated the procedure well and was monitored in the NSICU postoperatively where he was noted to have a new trace Lt facial, a CTH was unremarkable and he was placed on EEG. EEG noted potential epileptic focus however no seizures and he was loaded with Keppra as per epilepsy.    His submesh drain removed on 9/21 and his subgaleal drain was removed on 9/22.      PROCEDURE:  Rt mesh cranioplasty with plastics closure  POD# 3    Vital Signs Last 24 Hrs  T(C): 36.7 (09-22-22 @ 03:16), Max: 36.9 (09-21-22 @ 21:00)  T(F): 98.1 (09-22-22 @ 03:16), Max: 98.5 (09-21-22 @ 21:00)  HR: 60 (09-22-22 @ 09:00) (60 - 112)  BP: 118/75 (09-22-22 @ 09:00) (112/82 - 147/103)  BP(mean): 90 (09-22-22 @ 09:00) (90 - 116)  RR: 12 (09-22-22 @ 09:00) (9 - 18)  SpO2: 99% (09-22-22 @ 09:00) (99% - 100%)    Constitutional: NAD, lying in bed  Neuro  * Mental Status: EO spontaneously, following simple commands (show fingers, wiggles toes), no verbal output   * Cranial Nerves: Cnii-Cnxii grossly intact. PERRL, EOMI, tongue midline, no gaze deviation  * Motor: Moving all extremities R>L  * Sensory: Sensation grossly intact to noxious stimuli   * Reflexes: not assessed   Cardiovascular: regular rate and rhythm  Respiratory: nonlabored breathing     LABS:                        10.2   3.75  )-----------( 199      ( 22 Sep 2022 03:25 )             29.3    09-22    134<L>  |  97<L>  |  5.3<L>  ----------------------------<  84  3.7   |  22.0  |  0.66    Ca    8.8      22 Sep 2022 03:25  Phos  3.5     09-22  Mg     1.6     09-22    MEDICATIONS:  Antibiotics:  ceFAZolin  Injectable. 1000 milliGRAM(s) IV Push every 8 hours    Neuro:  levETIRAcetam  IVPB 750 milliGRAM(s) IV Intermittent <User Schedule>  melatonin 5 milliGRAM(s) Oral at bedtime  memantine 5 milliGRAM(s) Oral every 12 hours    Cardiac:  hydrALAZINE Injectable 10 milliGRAM(s) IV Push every 2 hours PRN SBP > 150  labetalol Injectable 10 milliGRAM(s) IV Push every 2 hours PRN SBP > 150    Pulm:    GI/:  polyethylene glycol 3350 17 Gram(s) Oral two times a day  senna 1 Tablet(s) Oral daily    Other:   ascorbic acid 500 milliGRAM(s) Oral daily  chlorhexidine 2% Cloths 1 Application(s) Topical daily  multivitamin 1 Tablet(s) Oral daily  sodium chloride 1 Gram(s) Oral every 12 hours  sodium chloride 0.9%. 1000 milliLiter(s) IV Continuous <Continuous>    -------------------------------------------------------------------------------------------------------------  42M with multicompartmental heme requiring rt hemicraniectomy course complicated by prolonged hospitalization, infections now s/p mesh cranioplasty on 9/19 with neurosurgery and plastics now POD 3.    PLAN:  Neuro:   - q4 neuro   - Continue Keppra 750 q 6, adjustment of AEDs per Epilepsy  - EEG in place  - Continue Melatonin and Memantine   - Appreciate NSurg recs  - Pain mmgt prn, avoid oversedation  - STAT CTH for neurologic changes     Respiratory:   - Room Air    CV:  - -150  - PRN: Hydralazine     Renal:   - NS @ 75  - Voiding   - Continue NaCl 1g BID  - Replete electrolytes as needed    GI:    - Low PO intake postop, resumed on TFs. pending repeat SLP assessment.   - Vit C, MVI   - Bowel Regimen: Miralax BID, Senna    Endocrine:   - DENISE    Heme/Onc:               - DVT ppx: venodynes on, f/u with NSurg re: SQL restart date    ID:   - Afebrile      PT/OT: OOB as tolerated, cont PT/OT  Dispo: DG to medicine floor with neurosurgery and epilepsy following.     INCOMPLETE BELOW     pt signed out to _____________  Case discussed with Dr. 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 He has had a prolonged hospitalization notable s/p craniotomy and evacuation of SDH on 5/24, s/p trach 6/1 now decannulated, s/p cranioplasty and replacement of bone flap 6/29, and s/p Rt hemicraniectomy w/ wound exploration and evacuation of epidural fluid collection 7/17. OR cultures were noted to be positive for MSSA on 7/17 and he completed an antibiotic course as per ID. He remained in hospital completing his antibiotics. 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 On 9/19 he returned to the OR for mesh cranioplasty with plastics closure after medical optimization. He tolerated the procedure well and was monitored in the NSICU postoperatively where he was noted to have a new Lt facial, a CTH was unremarkable and he was placed on EEG. EEG noted potential epileptic focus however no seizures and he was loaded with Keppra as per epilepsy.    His submesh drain removed on 9/21 and his subgaleal drain was removed on 9/22.      PROCEDURE:  Rt mesh cranioplasty with plastics closure  POD# 3    Vital Signs Last 24 Hrs  T(C): 36.7 (09-22-22 @ 03:16), Max: 36.9 (09-21-22 @ 21:00)  T(F): 98.1 (09-22-22 @ 03:16), Max: 98.5 (09-21-22 @ 21:00)  HR: 60 (09-22-22 @ 09:00) (60 - 112)  BP: 118/75 (09-22-22 @ 09:00) (112/82 - 147/103)  BP(mean): 90 (09-22-22 @ 09:00) (90 - 116)  RR: 12 (09-22-22 @ 09:00) (9 - 18)  SpO2: 99% (09-22-22 @ 09:00) (99% - 100%)    Constitutional: NAD, lying in bed  Neuro  * Mental Status: EO spontaneously, following simple commands (show fingers, wiggles toes), no verbal output   * Cranial Nerves: Cnii-Cnxii grossly intact. PERRL, EOMI, tongue midline, no gaze deviation  * Motor: Moving all extremities R>L  * Sensory: Sensation grossly intact to noxious stimuli   * Reflexes: not assessed   Cardiovascular: regular rate and rhythm  Respiratory: nonlabored breathing     LABS:                        10.2   3.75  )-----------( 199      ( 22 Sep 2022 03:25 )             29.3    09-22    134<L>  |  97<L>  |  5.3<L>  ----------------------------<  84  3.7   |  22.0  |  0.66    Ca    8.8      22 Sep 2022 03:25  Phos  3.5     09-22  Mg     1.6     09-22    MEDICATIONS:  Antibiotics:  ceFAZolin  Injectable. 1000 milliGRAM(s) IV Push every 8 hours    Neuro:  levETIRAcetam  IVPB 750 milliGRAM(s) IV Intermittent <User Schedule>  melatonin 5 milliGRAM(s) Oral at bedtime  memantine 5 milliGRAM(s) Oral every 12 hours    Cardiac:  hydrALAZINE Injectable 10 milliGRAM(s) IV Push every 2 hours PRN SBP > 150  labetalol Injectable 10 milliGRAM(s) IV Push every 2 hours PRN SBP > 150    Pulm:    GI/:  polyethylene glycol 3350 17 Gram(s) Oral two times a day  senna 1 Tablet(s) Oral daily    Other:   ascorbic acid 500 milliGRAM(s) Oral daily  chlorhexidine 2% Cloths 1 Application(s) Topical daily  multivitamin 1 Tablet(s) Oral daily  sodium chloride 1 Gram(s) Oral every 12 hours  sodium chloride 0.9%. 1000 milliLiter(s) IV Continuous <Continuous>    -------------------------------------------------------------------------------------------------------------  42M with multicompartmental heme requiring rt hemicraniectomy course complicated by prolonged hospitalization, infections now s/p mesh cranioplasty on 9/19 with neurosurgery and plastics now POD 3.    PLAN:  Neuro:   - q4 neuro   - Continue Keppra 750 q 6, adjustment of AEDs per Epilepsy  - EEG in place  - Continue Melatonin and Memantine   - Appreciate NSurg recs  - Pain mmgt prn, avoid oversedation  - STAT CTH for neurologic changes     Respiratory:   - Room Air    CV:  - -150  - PRN: Hydralazine     Renal:   - NS @ 75  - Voiding   - Continue NaCl 1g BID  - Replete electrolytes as needed    GI:    - Low PO intake postop, resumed on TFs. pending repeat SLP assessment.   - Vit C, MVI   - Bowel Regimen: Miralax BID, Senna    Endocrine:   - DENISE    Heme/Onc:               - DVT ppx: venodynes on, f/u with NSurg re: SQL restart date    ID:   - Afebrile      PT/OT: OOB as tolerated, cont PT/OT.  Dispo: DG to medicine floor with neurosurgery and epilepsy following.   pt signed out to _____________  Case discussed with Dr. 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 He has had a prolonged hospitalization notable s/p craniotomy and evacuation of SDH on 5/24, s/p trach 6/1 now decannulated, s/p cranioplasty and replacement of bone flap 6/29, and s/p Rt hemicraniectomy w/ wound exploration and evacuation of epidural fluid collection 7/17. OR cultures were noted to be positive for MSSA on 7/17 and he completed an antibiotic course as per ID. He remained in hospital completing his antibiotics. 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 On 9/19 he returned to the OR for mesh cranioplasty with plastics closure after medical optimization. He tolerated the procedure well and was monitored in the NSICU postoperatively where he was noted to have a new Lt facial, a CTH was unremarkable and he was placed on EEG. EEG noted potential epileptic focus however no seizures and he was loaded with Keppra as per epilepsy.    His submesh drain removed on 9/21 and his subgaleal drain was removed on 9/22.      PROCEDURE:  Rt mesh cranioplasty with plastics closure  POD# 3    Vital Signs Last 24 Hrs  T(C): 36.7 (09-22-22 @ 03:16), Max: 36.9 (09-21-22 @ 21:00)  T(F): 98.1 (09-22-22 @ 03:16), Max: 98.5 (09-21-22 @ 21:00)  HR: 60 (09-22-22 @ 09:00) (60 - 112)  BP: 118/75 (09-22-22 @ 09:00) (112/82 - 147/103)  BP(mean): 90 (09-22-22 @ 09:00) (90 - 116)  RR: 12 (09-22-22 @ 09:00) (9 - 18)  SpO2: 99% (09-22-22 @ 09:00) (99% - 100%)    Constitutional: NAD, lying in bed  Neuro  * Mental Status: EO spontaneously, following simple commands (show fingers, wiggles toes), no verbal output   * Cranial Nerves: Cnii-Cnxii grossly intact. PERRL, EOMI, tongue midline, no gaze deviation  * Motor: Moving all extremities R>L  * Sensory: Sensation grossly intact to noxious stimuli   * Reflexes: not assessed   Cardiovascular: regular rate and rhythm  Respiratory: nonlabored breathing     LABS:                        10.2   3.75  )-----------( 199      ( 22 Sep 2022 03:25 )             29.3    09-22    134<L>  |  97<L>  |  5.3<L>  ----------------------------<  84  3.7   |  22.0  |  0.66    Ca    8.8      22 Sep 2022 03:25  Phos  3.5     09-22  Mg     1.6     09-22    MEDICATIONS:  Antibiotics:  ceFAZolin  Injectable. 1000 milliGRAM(s) IV Push every 8 hours    Neuro:  levETIRAcetam  IVPB 750 milliGRAM(s) IV Intermittent <User Schedule>  melatonin 5 milliGRAM(s) Oral at bedtime  memantine 5 milliGRAM(s) Oral every 12 hours    Cardiac:  hydrALAZINE Injectable 10 milliGRAM(s) IV Push every 2 hours PRN SBP > 150  labetalol Injectable 10 milliGRAM(s) IV Push every 2 hours PRN SBP > 150    Pulm:    GI/:  polyethylene glycol 3350 17 Gram(s) Oral two times a day  senna 1 Tablet(s) Oral daily    Other:   ascorbic acid 500 milliGRAM(s) Oral daily  chlorhexidine 2% Cloths 1 Application(s) Topical daily  multivitamin 1 Tablet(s) Oral daily  sodium chloride 1 Gram(s) Oral every 12 hours  sodium chloride 0.9%. 1000 milliLiter(s) IV Continuous <Continuous>    -------------------------------------------------------------------------------------------------------------  42M with multicompartmental heme requiring rt hemicraniectomy course complicated by prolonged hospitalization, infections now s/p mesh cranioplasty on 9/19 with neurosurgery and plastics now POD 3.    PLAN:  Neuro:   - q4 neuro   - Continue Keppra 750 q 6, adjustment of AEDs per Epilepsy  - EEG in place  - Continue Melatonin and Memantine   - Appreciate NSurg recs  - Pain mmgt prn, avoid oversedation  - STAT CTH for neurologic changes     Respiratory:   - Room Air    CV:  - -150  - PRN: Hydralazine     Renal:   - NS @ 75  - Voiding   - Continue NaCl 1g BID  - Replete electrolytes as needed    GI:    - Low PO intake postop, resumed on TFs however continue to offer pt pureed diet   - Vit C, MVI   - Bowel Regimen: Miralax BID, Senna    Endocrine:   - DENISE    Heme/Onc:               - DVT ppx: venodynes on, f/u with NSurg re: SQL restart date    ID:   - Afebrile      PT/OT: OOB as tolerated, cont PT/OT.  Dispo: DG to medicine floor with neurosurgery and epilepsy following.   pt signed out to Dr. Mccurdy, neurosurgery with epilepsy consulted.   Case discussed with Dr. 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 He has had a prolonged hospitalization notable s/p craniotomy and evacuation of SDH on 5/24, s/p trach 6/1 now decannulated, s/p cranioplasty and replacement of bone flap 6/29, and s/p Rt hemicraniectomy w/ wound exploration and evacuation of epidural fluid collection 7/17. OR cultures were noted to be positive for MSSA on 7/17 and he completed an antibiotic course as per ID. He remained in hospital completing his antibiotics. 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 On 9/19 he returned to the OR for mesh cranioplasty with plastics closure after medical optimization. He tolerated the procedure well and was monitored in the NSICU postoperatively where he was noted to have a new Lt facial, a CTH was unremarkable and he was placed on EEG. EEG noted potential epileptic focus however no seizures and he was loaded with Keppra as per epilepsy.    His submesh drain removed on 9/21 and his subgaleal drain was removed on 9/22.      PROCEDURE:  Rt mesh cranioplasty with plastics closure  POD# 3    Vital Signs Last 24 Hrs  T(C): 36.7 (09-22-22 @ 03:16), Max: 36.9 (09-21-22 @ 21:00)  T(F): 98.1 (09-22-22 @ 03:16), Max: 98.5 (09-21-22 @ 21:00)  HR: 60 (09-22-22 @ 09:00) (60 - 112)  BP: 118/75 (09-22-22 @ 09:00) (112/82 - 147/103)  BP(mean): 90 (09-22-22 @ 09:00) (90 - 116)  RR: 12 (09-22-22 @ 09:00) (9 - 18)  SpO2: 99% (09-22-22 @ 09:00) (99% - 100%)    Constitutional: NAD, lying in bed  Neuro  * Mental Status: EO spontaneously, following simple commands (show fingers, wiggles toes), no verbal output   * Cranial Nerves: Cnii-Cnxii grossly intact. PERRL, EOMI, tongue midline, no gaze deviation  * Motor: Moving all extremities R>L  * Sensory: Sensation grossly intact to noxious stimuli   * Reflexes: not assessed   Cardiovascular: regular rate and rhythm  Respiratory: nonlabored breathing     LABS:                        10.2   3.75  )-----------( 199      ( 22 Sep 2022 03:25 )             29.3    09-22    134<L>  |  97<L>  |  5.3<L>  ----------------------------<  84  3.7   |  22.0  |  0.66    Ca    8.8      22 Sep 2022 03:25  Phos  3.5     09-22  Mg     1.6     09-22    MEDICATIONS:  Antibiotics:  ceFAZolin  Injectable. 1000 milliGRAM(s) IV Push every 8 hours    Neuro:  levETIRAcetam  IVPB 750 milliGRAM(s) IV Intermittent <User Schedule>  melatonin 5 milliGRAM(s) Oral at bedtime  memantine 5 milliGRAM(s) Oral every 12 hours    Cardiac:  hydrALAZINE Injectable 10 milliGRAM(s) IV Push every 2 hours PRN SBP > 150  labetalol Injectable 10 milliGRAM(s) IV Push every 2 hours PRN SBP > 150    Pulm:    GI/:  polyethylene glycol 3350 17 Gram(s) Oral two times a day  senna 1 Tablet(s) Oral daily    Other:   ascorbic acid 500 milliGRAM(s) Oral daily  chlorhexidine 2% Cloths 1 Application(s) Topical daily  multivitamin 1 Tablet(s) Oral daily  sodium chloride 1 Gram(s) Oral every 12 hours  sodium chloride 0.9%. 1000 milliLiter(s) IV Continuous <Continuous>    -------------------------------------------------------------------------------------------------------------  42M with multicompartmental heme requiring rt hemicraniectomy course complicated by prolonged hospitalization, infections now s/p mesh cranioplasty on 9/19 with neurosurgery and plastics now POD 3.    PLAN:  Neuro:   - q4 neuro   - Continue Keppra 750 q 6, adjustment of AEDs per Epilepsy on 9/21. Epilepsy reconsulted 9/22 as per request, remain awaiting call back for further AED guidance   - EEG in place  - Continue Melatonin and Memantine   - Appreciate NSurg recs  - Pain mmgt prn, avoid oversedation  - STAT CTH for neurologic changes     Respiratory:   - Room Air    CV:  - -150  - PRN: Hydralazine     Renal:   - NS @ 75  - Voiding   - Continue NaCl 1g BID  - Replete electrolytes as needed    GI:    - Low PO intake postop, resumed on TFs however continue to offer pt pureed diet   - Vit C, MVI   - Bowel Regimen: Miralax BID, Senna    Endocrine:   - DENISE    Heme/Onc:               - DVT ppx: venodynes on, f/u with NSurg re: SQL restart date    ID:   - Afebrile      PT/OT: OOB as tolerated, cont PT/OT.  Dispo: DG to medicine floor with neurosurgery and epilepsy following.   pt signed out to Dr. Mccurdy, neurosurgery with epilepsy re-consulted.   Case discussed with Dr. Iniguez

## 2022-09-22 NOTE — PROGRESS NOTE ADULT - SUBJECTIVE AND OBJECTIVE BOX
HOSPITALIST ACCEPTANCE NOTE HOSPITALIST ACCEPTANCE NOTE    CHIEF COMPLAINT/INTERVAL HISTORY:    Patient is a 42y old  Male who presents with a chief complaint of Trauma/ Subdural/ Intubated (22 Sep 2022 20:32)    HPI:  HPI: Patient is a 42 year old old male brought by EMS, found down in the street unresponsive on 5/24/22. Patient was intubated in the ED for airway protection. CT head revealed B/L IPH and B/L SDH. He has had a prolonged hospital course requiring a craniotomy and evacuation of SDH on 5/24, s/p trach 6/1 now decannulated, s/p cranioplasty and replacement of bone flap 6/29, and s/p right hemicraniectomy w/ wound exploration and evacuation of epidural fluid collection 7/17. OR cultures were noted to be positive for MSSA on 7/17 and he completed an antibiotic course as per ID. He remained in hospital completing his antibiotics. 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 On 9/19 he returned to the OR for mesh cranioplasty with plastics closure after medical optimization. He tolerated the procedure well and was monitored in the NSICU postoperatively where he was noted to have a new left facial droop, left sided weakness and right hand twitching. A CTH was unremarkable and he was placed on VEEG. EEG noted potential epileptic focus however no seizures. He was loaded with Keppra and Epilepsy was consulted. Patient is now medically stable for downgrade to the hospitalist service. PT consulted.     PROCEDURE:  Rt mesh cranioplasty with plastics closure  POD# 3    SUBJECTIVE & OBJECTIVE: Pt seen and examined at bedside. No overnight events. Currently, on VEEG. Unable to follow commands; nonverbal. Submesh drain removed on 9/21 and subgaleal drain was removed today.    ROS: Unobtainable    ICU Vital Signs Last 24 Hrs  T(C): 36.6 (23 Sep 2022 05:07), Max: 37.2 (22 Sep 2022 10:58)  T(F): 97.9 (23 Sep 2022 05:07), Max: 99 (22 Sep 2022 15:48)  HR: 71 (23 Sep 2022 05:07) (60 - 89)  BP: 155/66 (23 Sep 2022 05:07) (112/81 - 155/66)  BP(mean): 109 (22 Sep 2022 22:00) (90 - 118)  ABP: --  ABP(mean): --  RR: 18 (23 Sep 2022 05:07) (11 - 18)  SpO2: 96% (23 Sep 2022 05:07) (96% - 100%)    O2 Parameters below as of 23 Sep 2022 05:07  Patient On (Oxygen Delivery Method): room air              MEDICATIONS  (STANDING):  ascorbic acid 500 milliGRAM(s) Oral daily  chlorhexidine 2% Cloths 1 Application(s) Topical daily  influenza   Vaccine 0.5 milliLiter(s) IntraMuscular once  levETIRAcetam  IVPB 750 milliGRAM(s) IV Intermittent <User Schedule>  melatonin 5 milliGRAM(s) Oral at bedtime  memantine 5 milliGRAM(s) Oral every 12 hours  multivitamin 1 Tablet(s) Oral daily  polyethylene glycol 3350 17 Gram(s) Oral two times a day  senna 1 Tablet(s) Oral daily  sodium chloride 1 Gram(s) Oral every 12 hours  sodium chloride 0.9%. 1000 milliLiter(s) (75 mL/Hr) IV Continuous <Continuous>    MEDICATIONS  (PRN):  hydrALAZINE Injectable 10 milliGRAM(s) IV Push every 4 hours PRN SBP > 150      LABS:                        10.2   3.75  )-----------( 199      ( 22 Sep 2022 03:25 )             29.3     09-22    134<L>  |  97<L>  |  5.3<L>  ----------------------------<  84  3.7   |  22.0  |  0.66    Ca    8.8      22 Sep 2022 03:25  Phos  3.5     09-22  Mg     1.6     09-22            CAPILLARY BLOOD GLUCOSE      PHYSICAL EXAM:    GENERAL: young male, laying in bed, opens eyes (Can not track), can not follow commands, nonverbal  HEAD:  surgical incision  EYES: conjunctiva and sclera clear  ENMT: Moist mucous membranes  NECK: Supple  NERVOUS SYSTEM:  Awake, non purposeful eye movements (can not track), nonverbal, unable to follow commands  CHEST/LUNG: Clear to auscultation bilaterally   HEART: Regular rate and rhythm; + S1/S2  ABDOMEN: Soft, Nontender, Nondistended; +PEG  EXTREMITIES:  no pedal edema

## 2022-09-22 NOTE — PROGRESS NOTE ADULT - SUBJECTIVE AND OBJECTIVE BOX
42y  Male initially admitted on 5/24/22 (BIBEMS after found unresponsive on street) with a GCS of 3, found to have b/l intraparenchymal bleeds, b/l SDH. Has had a complex prolonged hospital course including but not limited to right decompressive craniectomy on 5/24. Trach / PEG., cranioplasty on 6/29 with cognitive decline after initial improvement prompting imaging which noted subacute collection with air underneath the cranioplasty sight. MRI with increase in fluid collection underneath the flap leading to a repeat right craniectomy with wound exploration & evacuation of fluid collection on 7/17 with OR cultures isolating MSSA. 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 Has had interval trach decannulation and dietary advancement to pureed. Now s/p completion of appropriate antimicrobial course with f/u imaging (9/3) revealing no abnormal enhancement (6 mm midline shift to left). After being optimzed and cleared for surgery had a fever (9/9/22). Infectious w/u in progress to evaluate etiology of fever     (24 May 2022 01:09)  Underwent cranioplasty 9/19.    OVERNIGHT EVENTS:   R arm tremor noted last night - resolved spont, no sz correlation but interictal discharges on EEG - AEDs increased.    VITALS:  T(C): , Max: 37.1 (09-20-22 @ 00:01)  HR:  (58 - 135)         BP:  (110/83 - 168/113)  RR:  (10 - 18)  SpO2:  (98% - 100%)      09-19-22 @ 07:01  -  09-20-22 @ 07:00  --------------------------------------------------------  IN: 1325 mL / OUT: 1230 mL / NET: 95 mL    09-20-22 @ 07:01  -  09-20-22 @ 15:42  --------------------------------------------------------  IN: 800 mL / OUT: 105 mL / NET: 695 mL      LABS:  Na: 132 (09-20 @ 03:20), 133 (09-19 @ 17:41), 135 (09-18 @ 07:01)  K: 4.1 (09-20 @ 03:20), 3.9 (09-19 @ 17:41), 3.7 (09-18 @ 07:01)  Cl: 96 (09-20 @ 03:20), 96 (09-19 @ 17:41), 97 (09-18 @ 07:01)  CO2: 21.0 (09-20 @ 03:20), 23.0 (09-19 @ 17:41), 26.0 (09-18 @ 07:01)  BUN: 8.3 (09-20 @ 03:20), 9.4 (09-19 @ 17:41), 10.2 (09-18 @ 07:01)  Cr: 0.68 (09-20 @ 03:20), 0.78 (09-19 @ 17:41), 0.76 (09-18 @ 07:01)  Glu: 114(09-20 @ 03:20), 113(09-19 @ 17:41), 90(09-18 @ 07:01)    Hgb: 10.4 (09-20 @ 03:20), 10.6 (09-18 @ 07:01)  Hct: 29.1 (09-20 @ 03:20), 29.7 (09-18 @ 07:01)  WBC: 5.23 (09-20 @ 03:20), 3.51 (09-18 @ 07:01)  Plt: 174 (09-20 @ 03:20), 144 (09-18 @ 07:01)    INR: 1.20 09-19-22 @ 05:58, 1.18 09-18-22 @ 07:01  PTT: 31.9 09-19-22 @ 05:58    IMAGING:   Recent imaging studies were reviewed.    MEDICATIONS:  acetaminophen   IVPB .. 1000 milliGRAM(s) IV Intermittent once PRN  ascorbic acid 500 milliGRAM(s) Oral daily  ceFAZolin  Injectable. 1000 milliGRAM(s) IV Push every 8 hours  chlorhexidine 2% Cloths 1 Application(s) Topical daily  hydrALAZINE Injectable 10 milliGRAM(s) IV Push every 2 hours PRN  labetalol Injectable 10 milliGRAM(s) IV Push every 2 hours PRN  levETIRAcetam  IVPB 500 milliGRAM(s) IV Intermittent every 12 hours  melatonin 5 milliGRAM(s) Oral at bedtime  memantine 5 milliGRAM(s) Oral every 12 hours  multivitamin 1 Tablet(s) Oral daily  polyethylene glycol 3350 17 Gram(s) Oral daily  senna 1 Tablet(s) Oral daily  sodium chloride 1 Gram(s) Oral every 12 hours  sodium chloride 0.9%. 1000 milliLiter(s) IV Continuous <Continuous>    EXAMINATION:  General:  calm, cooperative.  Neuro:  EO, follows simple commands - moves feet, bends knees, showed two fingers; moves all extremities spont R>L. .  Cards:  CTAB.  Respiratory:  no respiratory distress, protects his airways.  Abdomen:  soft, non-tender.  Extremities:  no edema.  Skin:  warm/dry.

## 2022-09-22 NOTE — EEG REPORT - NS EEG TEXT BOX
[facility]   COMPREHENSIVE EPILEPSY CENTER   REPORT OF LONG-TERM VIDEO EEG     [facility]: [address], [place] [address], Ph#: [phone]  [facility]: [id number] [place] [address], Ph#: [phone]  [facility]: [address] [place] [address], Ph#: [phone]    Patient Name: PASTOR REINOSO  Age and : 42y (80)  MRN #: [id number]  Location: [place] [address]  Referring Physician: Zachery Millan    Start Time/Date: 16:50 on 22  End Time/Date: 08:00 on 22  Duration: 14h 33m    _____________________________________________________________  STUDY INFORMATION    EEG Recording Technique:  The patient underwent continuous Video-EEG monitoring, using Telemetry System hardware on the XLTek Digital System. EEG and video data were stored on a computer hard drive with important events saved in digital archive files. The material was reviewed by a physician (electroencephalographer / epileptologist) on a daily basis. Nigel and seizure detection algorithms were utilized and reviewed. An EEG Technician attended to the patient, and was available throughout daytime work hours.  The epilepsy center neurologist was available in person or on call 24-hours per day.    EEG Placement and Labeling of Electrodes:  The EEG was performed utilizing 20 channel referential EEG connections (coronal over temporal over parasagittal montage) using all standard 10-20 electrode placements with EKG, with additional electrodes placed in the inferior temporal region using the modified 10-10 montage electrode placements for elective admissions, or if deemed necessary. Recording was at a sampling rate of 256 samples per second per channel. Time synchronized digital video recording was done simultaneously with EEG recording. A low light infrared camera was used for low light recording.     _____________________________________________________________  HISTORY    Patient is a 42y old  Male who presents with a chief complaint of Trauma/ Subdural/ Intubated (22 Sep 2022 03:24)      PERTINENT MEDICATION:  levETIRAcetam  IVPB 750 milliGRAM(s) IV Intermittent <User Schedule>    _____________________________________________________________  STUDY INTERPRETATION    Findings: The background was continuous and reactive. During wakefulness, the posterior dominant rhythm consisted of poorly-modulated 7-8 Hz activity, with amplitude to 30 uV, that was only seen in the left hemisphere.    Background Slowing:  Background predominantly consisted of theta, delta and faster activities.    Focal Slowing:   Continuous theta/delta slowing in the right hemisphere.     Sleep Background:  Drowsiness was characterized by fragmentation, attenuation, and slowing of the background activity.    Stage II sleep transients were not recorded.    Other Non-Epileptiform Findings:  Breach effect in the right hemisphere characterized by higher amplitude, sharply contoured waveforms and fast activities.    Interictal Epileptiform Activity:   During wakefulness, .con fluctuating 0.5-2 Hz right frontocentral (max F4/C4) lateralized periodic discharge with rhythmic activity (LPD+R).     Events:  No event or seizure recorded.    Activation Procedures:   Hyperventilation was not performed.    Photic stimulation was not performed.     Artifacts:  Intermittent myogenic and movement artifacts were noted.    ECG:  The heart rate on single channel ECG was predominantly between 70-80 BPM.    _____________________________________________________________  EEG SUMMARY/CLASSIFICATION    Abnormal EEG in an altered patient.  - During wakefulness, .con fluctuating 0.5-2 Hz right frontocentral (max F4/C4) lateralized periodic discharge with rhythmic activity (LPD+R).   - Continuous theta/delta slowing in the right hemisphere.   - Mild to moderate generalized slowing.  - Breach effect in the right hemisphere characterized by higher amplitude, sharply contoured waveforms and fast activities.    _____________________________________________________________  EEG IMPRESSION/CLINICAL CORRELATE    Abnormal EEG study.  1. Potential epileptogenic focus in the right frontocentral region.   2. Structural abnormality and skull defect in the right hemisphere.  3. Mild to moderate nonspecific diffuse or multifocal cerebral dysfunction.   4. No seizure seen.    _____________________________________________________________    Shoshana Youssef MD  Director, Epilepsy/EMU - [facility]

## 2022-09-22 NOTE — SWALLOW BEDSIDE ASSESSMENT ADULT - SLP GENERAL OBSERVATIONS
Pt received & seen seated upright in bed, awake/alert EEG in progress, mother present, nonverbal with no command following, language line ID: [id number] used for Guatemalan, 0/10 nonverbal pain scale pre/post
Pt recd asleep, awoken to verbal/tactile cues, nonverbal, lethargic, tolerating RA O2 SATs @ 100% for evaluation, language line # [id number] utilized
Pt received & seen seated upright in bed, awake/alert, nonverbal t/o with exception of repeating sounds upon request, reduced cognition, b/l hand mitts, mother present, language line ID: [id number] used for Korean, 0/10 pain pre/post

## 2022-09-22 NOTE — PROGRESS NOTE ADULT - ASSESSMENT
42y old  Male initially admitted on 5/24/22 with TBI - Bilateral IPH, Bilateral SDH. Prolonged hospital course: s/p craniotomy and evacuation of SDH 5/24, s/p trach 6/1, s/p cranioplasty and replacement of bone flap 6/29, s/p R hemicraniectomy, wound exploration, evacuation of epidural fluid collection 7/17.  Now s/p cranioplasty 9/19.  Encephalopathy due to cerebral injury, fluctuating exam; aphasia, L sided weakness.  Right frontocentral LPD+R.     Plan:  neurochecks  keppra  - cont; no clinical changes with AEDs adjustment; Epilepsy team involvement would be appreciated   cont CEEG  pain mgt  Activity: [x] mobilize as tolerated [] Bedrest [x] PT [x] OT    PULM: room air   SpO2>92%    CV:  SBP goal 100-140  prn hydralazine/ labetalol    RENAL: monitor I/O  replete lytes prn  salt tabs, cont  Fluids: IVF while NPO    GI:  S/S eval  Diet: TF  thiamine/folate  Bowel regimen     ENDO:   Goal euglycemia (-180)    HEME/ONC:  VTE prophylaxis: [x] SCDs [x] chemoprophylaxis held post-op    stable to be d/c'ed to the floor; Medicine involvement would be appreciated

## 2022-09-22 NOTE — PROGRESS NOTE ADULT - ASSESSMENT
Assessment:  42M with PMH R SDH s/p hemicraniectomy, course complicated by prolonged hospitalization, infections. Underwent mesh cranioplasty on 9/19 with neurosurgery and plastics.   - POD3  - EEG shows possible seizures      Plan:  - Q2 hour neuro checks  - SBP goal 100-150  - EEG per NSICU / epilepsy team  - AED: keppra 750 q6 hours per epilepsy team  - DVT prophylaxis: SCDs only  - SG drain remains in place, monitor output, possible d/c today  - Ancef while drains in, will d/c when drain removed  - Last BM 9/20? On bowel regimen   - Stat CTH if change in mental status / neuro exam  - PT/OT/OOB to chair  - Chest PT, incentive spirometry  - Further care per NSICU team  - Final plan pending morning rounds with attending

## 2022-09-22 NOTE — CONSULT NOTE ADULT - CONSULT REQUESTED DATE/TIME
06-Jun-2022 12:36
19-Jun-2022 16:52
22-Jul-2022 21:04
10-Jul-2022 14:48
11-Sep-2022 17:33
18-Jul-2022 13:27
24-May-2022 00:45
24-May-2022 09:26
27-May-2022 10:42
22-Sep-2022 20:38
24-May-2022 13:02
24-May-2022 11:39

## 2022-09-22 NOTE — PROGRESS NOTE ADULT - ASSESSMENT
Patient is a 42 year old male initially admitted on 5/24/22 (BIBEMS after found unresponsive on street) with a GCS of 3, found to have b/l intraparenchymal bleeds, b/l SDH. Has had a complex prolonged hospital course including but not limited to right decompressive craniectomy on 5/24. Trach / PEG., cranioplasty on 6/29 with cognitive decline after initial improvement prompting imaging which noted subacute collection with air underneath the cranioplasty sight. MRI with increase in fluid collection underneath the flap leading to a repeat right craniectomy with wound exploration & evacuation of fluid collection on 7/17 with OR cultures isolating MSSA. Antimicrobial regimen adjusted (ID), (suspected) central SIADH now improved (renal signed off). Has had interval trach decannulation and dietary advancement to pureed. Now s/p completion of appropriate antimicrobial course with f/u imaging (9/3) revealing no abnormal enhancement (6 mm midline shift to left). After being optimized and cleared for surgery had a fever (9/9/22). Infectious w/u negative. Now has been afebrile. Patient then underwent a cranioplasty with replacement bone flap on 9/19. Post-op course complicated by left facial droop, left sided weakness and right hand twitching. Patient was placed on VEEG and loaded with keppra. Epilepsy was consulted and patient is now medically stable for downgrade to the hospitalist team. Given changes in neurologic status, patient was placed back on a tube feed diet.    1) Traumatic brain injury with complicated hospital course (as above) now with possible encephalopathy   - patient had significantly improved neurologically prior to most recent cranioplasty on 9/19 (eating pureed and able to ambulate with PT)  - patient is now unable to follow commands and is lethargic; placed back on a TF diet   - most recent CT head post-op negative for any acute pathology  - continue VEEG  - Continue Memantine 5 mg BID   - Continue Melatonin at bedtime  - Continue Keppra  mg 750 q 6 hours   - Continue neurochecks  - Neurosurgery on board  - Epilepsy consult appreciated  - Will consider repeat MRI given acute changes in patient's neurologic exam (left facial droop, left sided weakness) after VEEG is completed  - Seizure/Aspiration/Fall Precautions  - Prognosis guarded at best    2) Central SIADH - resolved  - sodium WNL  - s/p Tolvaptan and Urea   - Continue sodium chloride tablets  - Monitor labs closely    3) Right Pubic Rami Fracture  - subacute  - incidentally found on CT  - Ortho consult appreciated  - WBAT (patient was ambulating with PT prior to cranioplasty on 9/19)  - PT re-evaluation once patient is able to follow commands    4)  CNS infection  - s/p right decompressive hemicraniectomy on 5/24  - blood cultures negative  - Completed Nafcillin and Vancomycin  - MRI after completion of abx without abnormal enhancement  - ID follow up noted, monitor off abx    5) Oropharyngeal Dysphagia  -prior to recent cranioplasty on 9/19, patient was on a pureed diet  -given acute changes in neurologic condition, patient is not following commands and will not open his mouth for PO trials  -changed to tube feeds  -maintain aspiration precautions  -SLP reconsult if patient able to follow commands    6) History of Substance abuse  -UDS positive for cocaine on admission  -also history of ETOH abuse       7) Anemia   - Hb stable  - Check iron studies  - Monitor closely    DVT Prophylaxis - SCDs     Dispo - Continue VEEG; adjust AEDs. Eventual plan for TANIKA although patient is undocumented.

## 2022-09-22 NOTE — SWALLOW BEDSIDE ASSESSMENT ADULT - COMMENTS
As per MD note: "42 year old male admitted since 5/24/22 after being found down and was found to have B/L IPH and B/L SDH. He has had a prolonged hospitalization notable s/p craniotomy and evacuation of SDH on 5/24, s/p trach 6/1 now decannulated, s/p cranioplasty and replacement of bone flap 6/29, and s/p Rt hemicraniectomy w/ wound exploration and evacuation of epidural fluid collection 7/17. OR cultures were noted to be positive for MSSA on 7/17 and he completed an antibiotic course as per ID. He remained in hospital completing his antibiotics. 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 On 9/19 he returned to the OR for mesh cranioplasty with plastics closure after medical optimization. He tolerated the procedure well and was monitored in the NSICU postoperatively where he was noted to have a new trace Lt facial, a CTH was unremarkable and he was placed on EEG. EEG noted epileptic activity however no seizures and he was loaded with Keppra as per epilepsy.  His submesh drain removed on 9/21 and his subgaleal drain was removed on 9/22."
Language line # [id number] utilized for evaluation
As per MD note: "41 y/o M brought by EMS, found down in the street unresponsive.  Imaging showed SDH, IPH, TEOFILO, Patient underwent Right decompressive hemicraniectomy on 5/24, trach/peg 6/1,  R cranioplasty with complex closure 6/29/22.    Cranioplasty  drain removed, s/p rpt right craniectomy for evacuation on 7/17 after MR demonstrated large right ED collection. On Abx as per ID . 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 Renal is following.   Trach (decannulated)/ PEG + , on pureed diet"

## 2022-09-22 NOTE — CONSULT NOTE ADULT - PROVIDER SPECIALTY LIST ADULT
Hospitalist
Hospitalist
Orthopedics
Nephrology
Orthopedics
Brain Injury Medicine
NSICU
Plastic Surgery
Infectious Disease
Neurosurgery
Palliative Care
Epilepsy

## 2022-09-22 NOTE — SWALLOW BEDSIDE ASSESSMENT ADULT - ORAL PREPARATORY PHASE
Puree placed on labial surface with no awareness/attempt to open mouth & accept, in spite of max multimodality cues & increased time. Bolus subsequently removed by this ST/Reduced oral grading/Anterior loss of bolus

## 2022-09-22 NOTE — CONSULT NOTE ADULT - CONSULT REQUESTED BY NAME
Dr Millan
GINGER Vasquez
SICU
Dr Millan - NS
Dr. Alanis
Dr. Mccurdy
SICU
Dr Millan
Trauma surgery team
medicine
trauma
NSGY

## 2022-09-22 NOTE — PROGRESS NOTE ADULT - NUTRITIONAL ASSESSMENT
This patient has been assessed with a concern for Malnutrition and has been determined to have a diagnosis/diagnoses of Severe protein-calorie malnutrition.    This patient is being managed with:   Diet Pureed-  Tube Feeding Modality: Gastrostomy  Pivot 1.5 Micky (PIVOT1.5RTH)  Total Volume for 24 Hours (mL): 1200  Continuous  Starting Tube Feed Rate {mL per Hour}: 20  Increase Tube Feed Rate by (mL): 10     Every 4 hours  Until Goal Tube Feed Rate (mL per Hour): 50  Tube Feed Duration (in Hours): 24  Tube Feed Start Time: 10:00  Entered: Sep 22 2022  4:16PM
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
## 2022-09-22 NOTE — CONSULT NOTE ADULT - REASON FOR ADMISSION
Trauma/ Subdural/ Intubated
SDH with MLS
Trauma/ Subdural
Trauma/ Subdural/ Intubated

## 2022-09-22 NOTE — CONSULT NOTE ADULT - TIME BILLING
D/W ALLA WHITEHEAD, Manisha ERVIN, Dr. Holder
Assessing presenting problems of acute illness, as well as medical decision making including initiating plan of care, obtaining history, examining the patient, reviewing data, reviewing radiologic exams, coordinating care with multidisciplinary team and writing this note.

## 2022-09-22 NOTE — CONSULT NOTE ADULT - SUBJECTIVE AND OBJECTIVE BOX
[facility]                                                                     Shoshana Youssef MD                                              Epilepsy Consult #: [phone] ([phone])                                        Office: [address] [place] [address], 2nd floor, [place], [place], [address]                                                 Phone: [phone]; Fax: [phone]                            ==============================================    EPILEPSY INITIAL CONSULT NOTE      ADMITTING DIAGNOSIS: Altered mental status        HPI:  Transfer note on 9/22/22: Stevan Segundo is a 42 year old male admitted since 5/24/22 after being found down and was found to have B/L IPH and B/L SDH. He has had a prolonged hospitalization notable s/p craniotomy and evacuation of SDH on 5/24, s/p trach 6/1 now decannulated, s/p cranioplasty and replacement of bone flap 6/29, and s/p Rt hemicraniectomy w/ wound exploration and evacuation of epidural fluid collection 7/17. OR cultures were noted to be positive for MSSA on 7/17 and he completed an antibiotic course as per ID. He remained in hospital completing his antibiotics. 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 On 9/19 he returned to the OR for mesh cranioplasty with plastics closure after medical optimization. He tolerated the procedure well and was monitored in the NSICU postoperatively where he was noted to have a new Lt facial, a CTH was unremarkable and he was placed on EEG. EEG noted potential epileptic focus however no seizures and he was loaded with Keppra as per epilepsy. His submesh drain removed on 9/21 and his subgaleal drain was removed on 9/22.        Patient developed twitching of the right hand on 9/21 while taking levetiracetam 500mg BID that was started on 9/19 for seizure prophylaxis post cranioplasty. Connected to cvEEG, which recorded continuous 0.5-2 Hz right frontocentral LPD in an area of breach, but no seizure. Loaded with levetiracetam 2000mg and maintenance increased to 750mg q6h. Slight improvement in EEG. Patient fully awake and at his baseline despite continuous LPD on EEG. No further hand twitching noted. Patient today is being downgraded from NSCU to floor.      PMH/PSH:  unknown    MEDICATION:  ascorbic acid 500 milliGRAM(s) Oral daily  chlorhexidine 2% Cloths 1 Application(s) Topical daily  hydrALAZINE Injectable 10 milliGRAM(s) IV Push every 4 hours PRN SBP > 150  levETIRAcetam  IVPB 750 milliGRAM(s) IV Intermittent <User Schedule>  melatonin 5 milliGRAM(s) Oral at bedtime  memantine 5 milliGRAM(s) Oral every 12 hours  multivitamin 1 Tablet(s) Oral daily  polyethylene glycol 3350 17 Gram(s) Oral two times a day  senna 1 Tablet(s) Oral daily  sodium chloride 1 Gram(s) Oral every 12 hours  sodium chloride 0.9%. 1000 milliLiter(s) (75 mL/Hr) IV Continuous <Continuous>    ALLERGIES:  Allergy Status Unknown    FH:  unknown    SH:  Unknown    ROS:  Unable to obtain as patient is nonverbal.    VITALS:  T(C): 37.2 (09-22-22 @ 20:00), Max: 37.2 (09-22-22 @ 10:58)  HR: 64 (09-22-22 @ 19:00) (60 - 95)  BP: 112/81 (09-22-22 @ 19:00) (112/81 - 147/103)  ABP: --  RR: 12 (09-22-22 @ 19:00) (9 - 18)  SpO2: 100% (09-22-22 @ 19:00) (99% - 100%)  CVP(cm H2O): --      GENERAL PHYSICAL EXAM:  GEN: no distress  HEENT:  NCAT, OP clear  EYES: no nystagmus  NECK: supple  CV: RRR, no murmur  PULM: no wheezing  ABD: +BS, NT, ND  SKIN: warm, dry    NEUROLOGICAL EXAM:   Awake, no speech  PERRL, no gross facial asymmetry  Moves right extremities > left extremities   Plantar response upward bilaterally  Coordination deferred  Gait deferred       LABS:                          10.2   3.75  )-----------( 199      ( 22 Sep 2022 03:25 )             29.3     09-22    134<L>  |  97<L>  |  5.3<L>  ----------------------------<  84  3.7   |  22.0  |  0.66    Ca    8.8      22 Sep 2022 03:25  Phos  3.5     09-22  Mg     1.6     09-22      CAPILLARY BLOOD GLUCOSE      OTHER IMAGING AND STUDIES:    cvEEG 9/21 - 9/22/22:  EEG SUMMARY/CLASSIFICATION    Abnormal EEG in an altered patient.  - During wakefulness, .con fluctuating 0.5-2 Hz right frontocentral (max F4/C4) lateralized periodic discharge with rhythmic activity (LPD+R).   - Continuous theta/delta slowing in the right hemisphere.   - Mild to moderate generalized slowing.  - Breach effect in the right hemisphere characterized by higher amplitude, sharply contoured waveforms and fast activities.    _____________________________________________________________  EEG IMPRESSION/CLINICAL CORRELATE    Abnormal EEG study.  1. Potential epileptogenic focus in the right frontocentral region.   2. Structural abnormality and skull defect in the right hemisphere.  3. Mild to moderate nonspecific diffuse or multifocal cerebral dysfunction.   4. No seizure seen.
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
## 2022-09-22 NOTE — SWALLOW BEDSIDE ASSESSMENT ADULT - ASR SWALLOW ASPIRATION MONITOR
change of breathing pattern/oral hygiene/position upright (90Y)/cough/gurgly voice/fever/pneumonia/throat clearing/upper respiratory infection
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
## 2022-09-22 NOTE — CONSULT NOTE ADULT - CONSULT REASON
Right SDH with MLS
cervical epidural hemorrhage
facial fxs
Hyponatremia
TEOFILO
support/goc in the setting of traumatic brain injuries
ASM management
Seizures
pubic rami fx
Concern for infection
Medical Management
Preop medical clearance requested

## 2022-09-23 LAB
ANION GAP SERPL CALC-SCNC: 12 MMOL/L — SIGNIFICANT CHANGE UP (ref 5–17)
BASOPHILS # BLD AUTO: 0.02 K/UL — SIGNIFICANT CHANGE UP (ref 0–0.2)
BASOPHILS NFR BLD AUTO: 0.8 % — SIGNIFICANT CHANGE UP (ref 0–2)
BUN SERPL-MCNC: 9.1 MG/DL — SIGNIFICANT CHANGE UP (ref 8–20)
CALCIUM SERPL-MCNC: 8.6 MG/DL — SIGNIFICANT CHANGE UP (ref 8.4–10.5)
CHLORIDE SERPL-SCNC: 100 MMOL/L — SIGNIFICANT CHANGE UP (ref 98–107)
CO2 SERPL-SCNC: 24 MMOL/L — SIGNIFICANT CHANGE UP (ref 22–29)
CREAT SERPL-MCNC: 0.57 MG/DL — SIGNIFICANT CHANGE UP (ref 0.5–1.3)
EGFR: 126 ML/MIN/1.73M2 — SIGNIFICANT CHANGE UP
EOSINOPHIL # BLD AUTO: 0.45 K/UL — SIGNIFICANT CHANGE UP (ref 0–0.5)
EOSINOPHIL NFR BLD AUTO: 15.4 % — HIGH (ref 0–6)
FERRITIN SERPL-MCNC: 227 NG/ML — SIGNIFICANT CHANGE UP (ref 30–400)
GIANT PLATELETS BLD QL SMEAR: PRESENT — SIGNIFICANT CHANGE UP
GLUCOSE SERPL-MCNC: 122 MG/DL — HIGH (ref 70–99)
HCT VFR BLD CALC: 29.4 % — LOW (ref 39–50)
HGB BLD-MCNC: 10.6 G/DL — LOW (ref 13–17)
IRON SATN MFR SERPL: 14 % — LOW (ref 16–55)
IRON SATN MFR SERPL: 31 UG/DL — LOW (ref 59–158)
LYMPHOCYTES # BLD AUTO: 0.98 K/UL — LOW (ref 1–3.3)
LYMPHOCYTES # BLD AUTO: 33.3 % — SIGNIFICANT CHANGE UP (ref 13–44)
MAGNESIUM SERPL-MCNC: 1.5 MG/DL — LOW (ref 1.8–2.6)
MANUAL SMEAR VERIFICATION: SIGNIFICANT CHANGE UP
MCHC RBC-ENTMCNC: 29 PG — SIGNIFICANT CHANGE UP (ref 27–34)
MCHC RBC-ENTMCNC: 36.1 GM/DL — HIGH (ref 32–36)
MCV RBC AUTO: 80.3 FL — SIGNIFICANT CHANGE UP (ref 80–100)
MONOCYTES # BLD AUTO: 0.23 K/UL — SIGNIFICANT CHANGE UP (ref 0–0.9)
MONOCYTES NFR BLD AUTO: 7.7 % — SIGNIFICANT CHANGE UP (ref 2–14)
MYELOCYTES NFR BLD: 0.9 % — HIGH (ref 0–0)
NEUTROPHILS # BLD AUTO: 1.2 K/UL — LOW (ref 1.8–7.4)
NEUTROPHILS NFR BLD AUTO: 41 % — LOW (ref 43–77)
PHOSPHATE SERPL-MCNC: 3.3 MG/DL — SIGNIFICANT CHANGE UP (ref 2.4–4.7)
PLAT MORPH BLD: NORMAL — SIGNIFICANT CHANGE UP
PLATELET # BLD AUTO: 237 K/UL — SIGNIFICANT CHANGE UP (ref 150–400)
POLYCHROMASIA BLD QL SMEAR: SLIGHT — SIGNIFICANT CHANGE UP
POTASSIUM SERPL-MCNC: 3.9 MMOL/L — SIGNIFICANT CHANGE UP (ref 3.5–5.3)
POTASSIUM SERPL-SCNC: 3.9 MMOL/L — SIGNIFICANT CHANGE UP (ref 3.5–5.3)
RBC # BLD: 3.66 M/UL — LOW (ref 4.2–5.8)
RBC # FLD: 15.3 % — HIGH (ref 10.3–14.5)
RBC BLD AUTO: ABNORMAL
SMUDGE CELLS # BLD: PRESENT — SIGNIFICANT CHANGE UP
SODIUM SERPL-SCNC: 135 MMOL/L — SIGNIFICANT CHANGE UP (ref 135–145)
TIBC SERPL-MCNC: 219 UG/DL — LOW (ref 220–430)
TRANSFERRIN SERPL-MCNC: 153 MG/DL — LOW (ref 180–329)
VARIANT LYMPHS # BLD: 0.9 % — SIGNIFICANT CHANGE UP (ref 0–6)
WBC # BLD: 2.93 K/UL — LOW (ref 3.8–10.5)
WBC # FLD AUTO: 2.93 K/UL — LOW (ref 3.8–10.5)

## 2022-09-23 PROCEDURE — 95718 EEG PHYS/QHP 2-12 HR W/VEEG: CPT

## 2022-09-23 PROCEDURE — 99497 ADVNCD CARE PLAN 30 MIN: CPT

## 2022-09-23 PROCEDURE — 99233 SBSQ HOSP IP/OBS HIGH 50: CPT

## 2022-09-23 RX ORDER — INFLUENZA VIRUS VACCINE 15; 15; 15; 15 UG/.5ML; UG/.5ML; UG/.5ML; UG/.5ML
0.5 SUSPENSION INTRAMUSCULAR ONCE
Refills: 0 | Status: DISCONTINUED | OUTPATIENT
Start: 2022-09-23 | End: 2022-09-30

## 2022-09-23 RX ORDER — LEVETIRACETAM 250 MG/1
1000 TABLET, FILM COATED ORAL
Refills: 0 | Status: DISCONTINUED | OUTPATIENT
Start: 2022-09-24 | End: 2022-09-30

## 2022-09-23 RX ORDER — FERROUS SULFATE 325(65) MG
300 TABLET ORAL DAILY
Refills: 0 | Status: DISCONTINUED | OUTPATIENT
Start: 2022-09-23 | End: 2022-09-30

## 2022-09-23 RX ORDER — ENOXAPARIN SODIUM 100 MG/ML
40 INJECTION SUBCUTANEOUS EVERY 24 HOURS
Refills: 0 | Status: DISCONTINUED | OUTPATIENT
Start: 2022-09-23 | End: 2022-09-30

## 2022-09-23 RX ADMIN — MEMANTINE HYDROCHLORIDE 5 MILLIGRAM(S): 10 TABLET ORAL at 18:16

## 2022-09-23 RX ADMIN — LEVETIRACETAM 400 MILLIGRAM(S): 250 TABLET, FILM COATED ORAL at 14:58

## 2022-09-23 RX ADMIN — CHLORHEXIDINE GLUCONATE 1 APPLICATION(S): 213 SOLUTION TOPICAL at 13:28

## 2022-09-23 RX ADMIN — POLYETHYLENE GLYCOL 3350 17 GRAM(S): 17 POWDER, FOR SOLUTION ORAL at 05:48

## 2022-09-23 RX ADMIN — ENOXAPARIN SODIUM 40 MILLIGRAM(S): 100 INJECTION SUBCUTANEOUS at 21:22

## 2022-09-23 RX ADMIN — Medication 5 MILLIGRAM(S): at 21:22

## 2022-09-23 RX ADMIN — Medication 500 MILLIGRAM(S): at 13:29

## 2022-09-23 RX ADMIN — SENNA PLUS 1 TABLET(S): 8.6 TABLET ORAL at 13:29

## 2022-09-23 RX ADMIN — SODIUM CHLORIDE 1 GRAM(S): 9 INJECTION INTRAMUSCULAR; INTRAVENOUS; SUBCUTANEOUS at 05:47

## 2022-09-23 RX ADMIN — MEMANTINE HYDROCHLORIDE 5 MILLIGRAM(S): 10 TABLET ORAL at 05:47

## 2022-09-23 RX ADMIN — LEVETIRACETAM 400 MILLIGRAM(S): 250 TABLET, FILM COATED ORAL at 19:27

## 2022-09-23 RX ADMIN — POLYETHYLENE GLYCOL 3350 17 GRAM(S): 17 POWDER, FOR SOLUTION ORAL at 18:16

## 2022-09-23 RX ADMIN — LEVETIRACETAM 400 MILLIGRAM(S): 250 TABLET, FILM COATED ORAL at 00:53

## 2022-09-23 RX ADMIN — Medication 1 TABLET(S): at 13:29

## 2022-09-23 RX ADMIN — LEVETIRACETAM 400 MILLIGRAM(S): 250 TABLET, FILM COATED ORAL at 06:12

## 2022-09-23 RX ADMIN — SODIUM CHLORIDE 1 GRAM(S): 9 INJECTION INTRAMUSCULAR; INTRAVENOUS; SUBCUTANEOUS at 18:16

## 2022-09-23 NOTE — PROGRESS NOTE ADULT - SUBJECTIVE AND OBJECTIVE BOX
[facility]                                                                     Shoshana Youssef MD                                              Epilepsy Consult #: [phone] ([phone])                                        Office: [address] [place] [address], 2nd floor, [place], [place], [address]                                                 Phone: [phone]; Fax: [phone]                            ==============================================    EPILEPSY FOLLOW-UP NOTE      INTERVAL HISTORY:  No event overnight.     MEDICATIONS:   ascorbic acid 500 milliGRAM(s) Oral daily  chlorhexidine 2% Cloths 1 Application(s) Topical daily  enoxaparin Injectable 40 milliGRAM(s) SubCutaneous every 24 hours  ferrous    sulfate Liquid 300 milliGRAM(s) Enteral Tube daily  hydrALAZINE Injectable 10 milliGRAM(s) IV Push every 4 hours PRN SBP > 150  influenza   Vaccine 0.5 milliLiter(s) IntraMuscular once  melatonin 5 milliGRAM(s) Oral at bedtime  memantine 5 milliGRAM(s) Oral every 12 hours  multivitamin 1 Tablet(s) Oral daily  polyethylene glycol 3350 17 Gram(s) Oral two times a day  senna 1 Tablet(s) Oral daily  sodium chloride 1 Gram(s) Oral every 12 hours    LAST 24-HR VITALS:  T(C): 36.9 (09-23-22 @ 17:32), Max: 37.2 (09-22-22 @ 20:00)  HR: 83 (09-23-22 @ 17:32) (59 - 89)  BP: 113/75 (09-23-22 @ 17:32) (104/72 - 155/66)  ABP: --  RR: 18 (09-23-22 @ 17:32) (13 - 18)  SpO2: 98% (09-23-22 @ 17:32) (96% - 100%)  CVP(cm H2O): --    GENERAL PHYSICAL EXAM:  GEN: no distress  HEENT:  NCAT, OP clear  EYES: no nystagmus  NECK: supple  CV: RRR, no murmur  PULM: no wheezing  ABD: +BS, NT, ND  SKIN: warm, dry    NEUROLOGICAL EXAM:   Awake, no speech  PERRL, no gross facial asymmetry  Moves right extremities > left extremities   Plantar response upward bilaterally  Coordination deferred  Gait deferred       LABS:                          10.6   2.93  )-----------( 237      ( 23 Sep 2022 06:10 )             29.4     09-23    135  |  100  |  9.1  ----------------------------<  122<H>  3.9   |  24.0  |  0.57    Ca    8.6      23 Sep 2022 06:10  Phos  3.3     09-23  Mg     1.5     09-23      CAPILLARY BLOOD GLUCOSE        OTHER IMAGING AND STUDIES:    EE 9/22 - 9/23/22:  EEG SUMMARY/CLASSIFICATION    Abnormal EEG in an altered patient.  - During wakefulness, continuous, fluctuating 0.5-2 Hz right frontocentral (max F4/C4) lateralized periodic discharge with rhythmic activity (LPD+R).   - Continuous theta/delta slowing in the right hemisphere.   - Mild to moderate generalized slowing.  - Breach effect in the right hemisphere characterized by higher amplitude, sharply contoured waveforms and fast activities.    _____________________________________________________________  EEG IMPRESSION/CLINICAL CORRELATE    Abnormal EEG study.  1. Potential epileptogenic focus in the right frontocentral region.   2. Structural abnormality and skull defect in the right hemisphere.  3. Mild to moderate nonspecific diffuse or multifocal cerebral dysfunction.   4. No seizure seen.      cvEE 9/21 - 9/22/22:  EEG SUMMARY/CLASSIFICATION    Abnormal EEG in an altered patient.  - During wakefulness, .con fluctuating 0.5-2 Hz right frontocentral (max F4/C4) lateralized periodic discharge with rhythmic activity (LPD+R).   - Continuous theta/delta slowing in the right hemisphere.   - Mild to moderate generalized slowing.  - Breach effect in the right hemisphere characterized by higher amplitude, sharply contoured waveforms and fast activities.    _____________________________________________________________  EEG IMPRESSION/CLINICAL CORRELATE    Abnormal EEG study.  1. Potential epileptogenic focus in the right frontocentral region.   2. Structural abnormality and skull defect in the right hemisphere.  3. Mild to moderate nonspecific diffuse or multifocal cerebral dysfunction.   4. No seizure seen.

## 2022-09-23 NOTE — DIETITIAN NUTRITION RISK NOTIFICATION - ADDITIONAL COMMENTS/DIETITIAN RECOMMENDATIONS
Continue BOlus as needed if pt eats <50% meals  provide Ensure pudding TID  Diet as per SLP
Rec increase TF to 60cchr to provide 1200cc, 1800kcal, 112gr pro  Continue MVI, Vit C

## 2022-09-23 NOTE — PROGRESS NOTE ADULT - SUBJECTIVE AND OBJECTIVE BOX
CHIEF COMPLAINT/INTERVAL HISTORY:    Patient is a 42y old  Male who presents with a chief complaint of Trauma/ Subdural/ Intubated (23 Sep 2022 11:41)    SUBJECTIVE & OBJECTIVE: Pt seen and examined at bedside.     ICU Vital Signs Last 24 Hrs  T(C): 36.4 (23 Sep 2022 10:48), Max: 37.2 (22 Sep 2022 15:48)  T(F): 97.5 (23 Sep 2022 10:48), Max: 99 (22 Sep 2022 15:48)  HR: 59 (23 Sep 2022 10:48) (59 - 89)  BP: 104/72 (23 Sep 2022 10:48) (104/72 - 155/66)  BP(mean): 109 (22 Sep 2022 22:00) (92 - 109)  RR: 14 (23 Sep 2022 10:48) (12 - 18)  SpO2: 97% (23 Sep 2022 10:48) (96% - 100%)    O2 Parameters below as of 23 Sep 2022 05:07  Patient On (Oxygen Delivery Method): room air      MEDICATIONS  (STANDING):  ascorbic acid 500 milliGRAM(s) Oral daily  chlorhexidine 2% Cloths 1 Application(s) Topical daily  influenza   Vaccine 0.5 milliLiter(s) IntraMuscular once  levETIRAcetam  IVPB 750 milliGRAM(s) IV Intermittent <User Schedule>  melatonin 5 milliGRAM(s) Oral at bedtime  memantine 5 milliGRAM(s) Oral every 12 hours  multivitamin 1 Tablet(s) Oral daily  polyethylene glycol 3350 17 Gram(s) Oral two times a day  senna 1 Tablet(s) Oral daily  sodium chloride 1 Gram(s) Oral every 12 hours    MEDICATIONS  (PRN):  hydrALAZINE Injectable 10 milliGRAM(s) IV Push every 4 hours PRN SBP > 150      LABS:                        10.6   2.93  )-----------( 237      ( 23 Sep 2022 06:10 )             29.4     09-23    135  |  100  |  9.1  ----------------------------<  122<H>  3.9   |  24.0  |  0.57    Ca    8.6      23 Sep 2022 06:10  Phos  3.3     09-23  Mg     1.5     09-23      PHYSICAL EXAM:    GENERAL: NAD, well-groomed, well-developed  HEAD:  Atraumatic, Normocephalic  EYES: EOMI, PERRLA, conjunctiva and sclera clear  ENMT: Moist mucous membranes  NECK: Supple, No JVD  NERVOUS SYSTEM:  Alert & Oriented X3, Motor Strength 5/5 B/L upper and lower extremities; DTRs 2+ intact and symmetric  CHEST/LUNG: Clear to auscultation bilaterally; No rales, rhonchi, wheezing, or rubs  HEART: Regular rate and rhythm; No murmurs, rubs, or gallops  ABDOMEN: Soft, Nontender, Nondistended; Bowel sounds present  EXTREMITIES:  2+ Peripheral Pulses, No clubbing, cyanosis, or edema         CHIEF COMPLAINT/INTERVAL HISTORY:    Patient is a 42y old  Male who presents with a chief complaint of Trauma/ Subdural/ Intubated (23 Sep 2022 11:41)    SUBJECTIVE & OBJECTIVE: Pt seen and examined at bedside. No overnight events. VEEG without seizure activity. Keppra adjusted. MRI brain ordered. Patient opens eyes and able to left right arm today.    ROS: Unobtainable    ICU Vital Signs Last 24 Hrs  T(C): 36.4 (23 Sep 2022 10:48), Max: 37.2 (22 Sep 2022 15:48)  T(F): 97.5 (23 Sep 2022 10:48), Max: 99 (22 Sep 2022 15:48)  HR: 59 (23 Sep 2022 10:48) (59 - 89)  BP: 104/72 (23 Sep 2022 10:48) (104/72 - 155/66)  BP(mean): 109 (22 Sep 2022 22:00) (92 - 109)  RR: 14 (23 Sep 2022 10:48) (12 - 18)  SpO2: 97% (23 Sep 2022 10:48) (96% - 100%)    O2 Parameters below as of 23 Sep 2022 05:07  Patient On (Oxygen Delivery Method): room air      MEDICATIONS  (STANDING):  ascorbic acid 500 milliGRAM(s) Oral daily  chlorhexidine 2% Cloths 1 Application(s) Topical daily  influenza   Vaccine 0.5 milliLiter(s) IntraMuscular once  levETIRAcetam  IVPB 750 milliGRAM(s) IV Intermittent <User Schedule>  melatonin 5 milliGRAM(s) Oral at bedtime  memantine 5 milliGRAM(s) Oral every 12 hours  multivitamin 1 Tablet(s) Oral daily  polyethylene glycol 3350 17 Gram(s) Oral two times a day  senna 1 Tablet(s) Oral daily  sodium chloride 1 Gram(s) Oral every 12 hours    MEDICATIONS  (PRN):  hydrALAZINE Injectable 10 milliGRAM(s) IV Push every 4 hours PRN SBP > 150      LABS:                        10.6   2.93  )-----------( 237      ( 23 Sep 2022 06:10 )             29.4     09-23    135  |  100  |  9.1  ----------------------------<  122<H>  3.9   |  24.0  |  0.57    Ca    8.6      23 Sep 2022 06:10  Phos  3.3     09-23  Mg     1.5     09-23      PHYSICAL EXAM:    GENERAL: young male, laying in bed, opens eye spontaneously, unable to track  HEAD:  s/p right bone falp  EYES: conjunctiva and sclera clear  ENMT: Moist mucous membranes  NECK: Supple   NERVOUS SYSTEM:  awake, nonverbal, able to raise right arm, unable to track movements  CHEST/LUNG: Clear to auscultation bilaterally; No rales, rhonchi, wheezing, or rubs  HEART: Regular rate and rhythm; + S1/S2  ABDOMEN: Soft, Nontender, Nondistended; + PEG  EXTREMITIES:  no pedal edema

## 2022-09-23 NOTE — PROGRESS NOTE ADULT - ASSESSMENT
This is a 42 year old male admitted since 5/24/22 after being found down and was found to have B/L IPH and B/L SDH. He has had a prolonged hospitalization s/p craniotomy and evacuation of SDH 5/24, s/p trach 6/1, s/p cranioplasty and replacement of bone flap 6/29, s/p R hemicraniectomy, wound exploration, evacuation of epidural fluid collection 7/17, s/p cranioplasty 9/19. Epilepsy consulted for ASM management. Personally reviewed all imagines, labs, EEG and other studies.    Impression:  1. Twitching of right hand on 9/19: Continuous right frontocentral LPD on EEG, which does not explain the right hand twitching. However, patient is at risk for seizures and the hand twitching could have been surface-negative focal motor seizure. Recommend to continue ASM.  2. Encephalopathy: Post-anoxic?  3. Bilateral IPH and SDH s/p multiple surgeries    Recommendation:  - discontinue cvEEG  - decrease levetiracetam 750mg q6h to 1000mg BID (ordered)   - seizure precaution  - medical management and support care per primary team       No acute intervention from Epilepsy at this time.  Please reconsult if needed.   ______________________  Shoshana Youssef MD   Director, Epilepsy/EMU - [facility]   Epilepsy Consult #: [phone] ([phone])

## 2022-09-23 NOTE — CHART NOTE - NSCHARTNOTEFT_GEN_A_CORE
Source: Patient [ ]  Family [ ]   other [ x]EMR, rounds, consult noted    Current Diet: NPO with tube feeds      Enteral /Parenteral Nutrition: Pivot at 50cchr via G tube provides 1000cc, 1500kcal, 94gr pro    Current Weight: (9/20) 118.3 lbs, 123.4# 9/22  (9/13) 119.9 lbs  (7/18) 131.8 lbs  (7/17) 114.8 lbs  (7/16) 126.7 lbs  (7/12)  117.2 lbs   (7/5)   141.7 lbs  (7/2)   147.2 lbs   (6/5)  160.7 lbs  (5/27)  188.4 lbs   (5/25)  160 lbs   Pt likely with significant weight loss due to prolonged hospitalization and complex medical course  Noted with no edema as per flow sheets continue to trend and maintain strict Is&Os         % Weight Change     Pertinent Medications: MEDICATIONS  (STANDING):  ascorbic acid 500 milliGRAM(s) Oral daily  chlorhexidine 2% Cloths 1 Application(s) Topical daily  influenza   Vaccine 0.5 milliLiter(s) IntraMuscular once  levETIRAcetam  IVPB 750 milliGRAM(s) IV Intermittent <User Schedule>  melatonin 5 milliGRAM(s) Oral at bedtime  memantine 5 milliGRAM(s) Oral every 12 hours  multivitamin 1 Tablet(s) Oral daily  polyethylene glycol 3350 17 Gram(s) Oral two times a day  senna 1 Tablet(s) Oral daily  sodium chloride 1 Gram(s) Oral every 12 hours    MEDICATIONS  (PRN):  hydrALAZINE Injectable 10 milliGRAM(s) IV Push every 4 hours PRN SBP > 150    Pertinent Labs: CBC Full  -  ( 23 Sep 2022 06:10 )  WBC Count : 2.93 K/uL  RBC Count : 3.66 M/uL  Hemoglobin : 10.6 g/dL  Hematocrit : 29.4 %  Platelet Count - Automated : 237 K/uL  Mean Cell Volume : 80.3 fl  Mean Cell Hemoglobin : 29.0 pg  Mean Cell Hemoglobin Concentration : 36.1 gm/dL  Auto Neutrophil # : 1.20 K/uL  Auto Lymphocyte # : 0.98 K/uL  Auto Monocyte # : 0.23 K/uL  Auto Eosinophil # : 0.45 K/uL  Auto Basophil # : 0.02 K/uL  Auto Neutrophil % : 41.0 %  Auto Lymphocyte % : 33.3 %  Auto Monocyte % : 7.7 %  Auto Eosinophil % : 15.4 %  Auto Basophil % : 0.8 %    09-23 Na135 mmol/L Glu 122 mg/dL<H> K+ 3.9 mmol/L Cr  0.57 mg/dL BUN 9.1 mg/dL Phos 3.3 mg/dL Alb n/a   PAB n/a             Skin: craniotomy surgeries    Nutrition focused physical exam conducted - found signs of malnutrition [ ]absent [x ]present    Subcutaneous fat loss: [x ] Orbital fat pads region, [x ]Buccal fat region, [ ]Triceps region,  [ ]Ribs region    Muscle wasting: [ ]Temples region, [x ]Clavicle region, [x ]Shoulder region, [ ]Scapula region, [ ]Interosseous region,  [ ]thigh region, [ ]Calf region    Estimated Needs:   [ ] no change since previous assessment  x ] recalculated: 1680-1960kcal, 90-100gr pro    Current Nutrition Diagnosis:  Pt remains at high nutrition risk secondary to malnutrition (severe, chronic) related to inability to meet sufficient protein-energy in setting of b/l intraparenchymal bleeds, b/l SDH, right decompressive craniectomy, trach/PEG, cranioplasty on 6/29 with cognitive decline after initial improvement prompting imaging which noted subacute collection with air underneath the cranioplasty sight, increase in fluid collection underneath the flap leading to a repeat right craniectomy with wound exploration & evacuation of fluid collection on 7/17 with OR cultures isolating MSSA, now s/p OR for right cranioplasty with complex wound closure as evidenced by meeting <75% nutrient needs > 1 mo, severe muscle loss of clavicles and shoulders, severe fat loss of orbitals and buccal pads, significant weight loss around 31# since admission x 4 mo.  Pt s/p OR x 4 times, crani 9/19. Now on TF on continuous feedings.  RD to follow up.     Recommendations:   1)Rec increase Pivot to 60cchr x 20 hrs to provide 1200cc, 1800kcal, 112gr pro  2) Continue MVI and vitamin C supplementation.  3) Obtain daily weights to monitor trends.        Monitoring and Evaluation:   [ ] PO intake [x ] Tolerance to diet prescription [X] Weights  [X] Follow up per protocol [X] Labs:

## 2022-09-23 NOTE — GOALS OF CARE CONVERSATION - ADVANCED CARE PLANNING - WHAT MATTERS MOST
That he get the care and time he needs,  to help Neuro team  identify his strengths and weaknesses, and get to [facility]

## 2022-09-23 NOTE — PROGRESS NOTE ADULT - ASSESSMENT
42M with multicompartmental heme requiring rt hemicraniectomy course complicated by prolonged hospitalization, infections now s/p R cranioplasty with mesh & plastics closure POD#4      Plan  - Q4 neuro   - Continue Keppra 750 q 6, adjustment of AEDs per Epilepsy on 9/21. Epilepsy reconsulted 9/22.  - Continue Melatonin and Memantine   - Pain control PRN, avoid oversedation  - CT for Monday 9/26  - -150  - PRN: Hydralazine   - Voiding   - Continue NaCl 1g BID  - Low PO intake postop, resumed on TFs however continue to offer pt pureed diet   - Vit C, MVI   - Bowel Regimen: Miralax BID, Senna         - b/l SCDs, Lovenox 40 started today  - Medical management/supportive care per primary team  - D/w Dr. Millan 42M with multicompartmental heme requiring rt hemicraniectomy course complicated by prolonged hospitalization, infections now s/p R cranioplasty with mesh & plastics closure POD#4      Plan  - Q4 neuro checks   - Continue Keppra 750 q 6, adjustment of AEDs per Epilepsy on 9/21. Epilepsy reconsulted 9/22.  - Continue Melatonin and Memantine   - Pain control PRN, avoid oversedation  - CTH for Monday 9/26  - -150  - PRN: Hydralazine   - Continue NaCl 1g BID  - Low PO intake postop, resumed on TFs however continue to offer pt pureed diet   - Vit C, MVI   - Bowel Regimen: Miralax BID, Senna         - b/l SCDs, Lovenox 40 started today  - Medical management/supportive care per primary team  - D/w Dr. Millan

## 2022-09-23 NOTE — PROGRESS NOTE ADULT - ASSESSMENT
Patient is a 42 year old male initially admitted on 5/24/22 (BIBEMS after found unresponsive on street) with a GCS of 3, found to have b/l intraparenchymal bleeds, b/l SDH. Has had a complex prolonged hospital course including but not limited to right decompressive craniectomy on 5/24. Trach / PEG., cranioplasty on 6/29 with cognitive decline after initial improvement prompting imaging which noted subacute collection with air underneath the cranioplasty sight. MRI with increase in fluid collection underneath the flap leading to a repeat right craniectomy with wound exploration & evacuation of fluid collection on 7/17 with OR cultures isolating MSSA. Antimicrobial regimen adjusted (ID), (suspected) central SIADH now improved (renal signed off). Has had interval trach decannulation and dietary advancement to pureed. Now s/p completion of appropriate antimicrobial course with f/u imaging (9/3) revealing no abnormal enhancement (6 mm midline shift to left). After being optimized and cleared for surgery had a fever (9/9/22). Infectious w/u negative. Now has been afebrile. Patient then underwent a cranioplasty with replacement bone flap on 9/19. Post-op course complicated by left facial droop, left sided weakness and right hand twitching. Patient was placed on VEEG and loaded with keppra. Epilepsy was consulted and patient is now medically stable for downgrade to the hospitalist team. Given changes in neurologic status, patient was placed back on a tube feed diet. VEEG was negative for seizure activity and MRI brain was ordered.    1) Traumatic brain injury with complicated hospital course (as above) now with possible encephalopathy  -CT head with bilateral IPH and bilateral SDH s/p decompressive craniotomy on 5/24  -s/p cranioplasty on 6/29, repeat craniotomy on 7/17 and final cranioplasty with replacement bone flap on 9/19   - patient had significantly improved neurologically prior to most recent cranioplasty on 9/19 (eating pureed and able to ambulate with PT)  - patient is now unable to follow commands and is lethargic; placed back on a TF diet   - most recent CT head post-op negative for any acute pathology  - completed VEEG without seizure activity  - Continue Memantine 5 mg BID   - Continue Melatonin at bedtime  - Continue Keppra but adjust to 1000 mg BID  - Continue neurochecks  - Neurosurgery on board; f/u further recs  - Epilepsy consult appreciated  - Will repeat MRI given acute changes in patient's neurologic exam (left facial droop, left sided weakness)  - Seizure/Aspiration/Fall Precautions  - Prognosis guarded at best; palliative care consulted    2) Central SIADH - resolved  - sodium WNL  - s/p Tolvaptan and Urea   - Continue sodium chloride tablets  - Monitor labs closely    3) Right Pubic Rami Fracture  - subacute  - incidentally found on CT  - Ortho consult appreciated  - WBAT (patient was ambulating with PT prior to cranioplasty on 9/19)  - PT re-evaluation once patient is able to follow commands    4)  CNS infection  - s/p right decompressive hemicraniectomy on 5/24  - blood cultures negative  - Completed Nafcillin and Vancomycin  - MRI after completion of abx without abnormal enhancement  - ID follow up noted, monitor off abx    5) Oropharyngeal Dysphagia  -prior to recent cranioplasty on 9/19, patient was on a pureed diet  -given acute changes in neurologic condition, patient is not following commands and will not open his mouth for PO trials  -changed to tube feeds; goal increased to 60cc/hr  -maintain aspiration precautions  -SLP reconsult if patient able to follow commands    6) History of Substance abuse  -UDS positive for cocaine on admission  -also history of ETOH abuse which was confirmed by patient's daughter    7) Anemia   - Hb stable  - iron studies reviewed  - start ferrous sulfate  - Monitor closely    DVT Prophylaxis - SCDs     Dispo - VEEG discontinued. MRI brain ordered. Eventual plans for [facility] once medically stable.    Plan discussed with patient's sister and mother at bedside, Dr. Mikael Lara, RN

## 2022-09-23 NOTE — DIETITIAN NUTRITION RISK NOTIFICATION - MALNUTRITION EVALUATION AS DEMONSTRATED BY (ADULTS > 20 YEARS OF AGE)
Weight loss.../Inadequate energy intake...
Weight loss.../Inadequate energy intake.../Loss of subcutaneous fat.../Loss of muscle...

## 2022-09-23 NOTE — EEG REPORT - NS EEG TEXT BOX
[facility]   COMPREHENSIVE EPILEPSY CENTER   REPORT OF LONG-TERM VIDEO EEG     [facility]: [address], [place] [address], Ph#: [phone]  [facility]: [phone] [place] [address], Ph#: [phone]  [facility]: [address] [place] [address], Ph#: [phone]    Patient Name: PASTOR REINOSO  Age and : 42y (80)  MRN #: [id number]  Location: [place] [address]  Referring Physician: Zachery Millan    Start Time/Date: 08:00 on 22  End Time/Date: 08:00 on 22  Duration: 15h 44m    _____________________________________________________________  STUDY INFORMATION    EEG Recording Technique:  The patient underwent continuous Video-EEG monitoring, using Telemetry System hardware on the XLTek Digital System. EEG and video data were stored on a computer hard drive with important events saved in digital archive files. The material was reviewed by a physician (electroencephalographer / epileptologist) on a daily basis. Nigel and seizure detection algorithms were utilized and reviewed. An EEG Technician attended to the patient, and was available throughout daytime work hours.  The epilepsy center neurologist was available in person or on call 24-hours per day.    EEG Placement and Labeling of Electrodes:  The EEG was performed utilizing 20 channel referential EEG connections (coronal over temporal over parasagittal montage) using all standard 10-20 electrode placements with EKG, with additional electrodes placed in the inferior temporal region using the modified 10-10 montage electrode placements for elective admissions, or if deemed necessary. Recording was at a sampling rate of 256 samples per second per channel. Time synchronized digital video recording was done simultaneously with EEG recording. A low light infrared camera was used for low light recording.     _____________________________________________________________  HISTORY    Patient is a 42y old  Male who presents with a chief complaint of Trauma/ Subdural/ Intubated (22 Sep 2022 03:24)      PERTINENT MEDICATION:  levETIRAcetam  IVPB 750 milliGRAM(s) IV Intermittent <User Schedule>    _____________________________________________________________  STUDY INTERPRETATION    Findings: The background was continuous and reactive. During wakefulness, the posterior dominant rhythm consisted of poorly-modulated 7-8 Hz activity, with amplitude to 30 uV, that was only seen in the left hemisphere.    Background Slowing:  Background predominantly consisted of theta, delta and faster activities.    Focal Slowing:   Continuous theta/delta slowing in the right hemisphere.     Sleep Background:  Drowsiness was characterized by fragmentation, attenuation, and slowing of the background activity.    Stage II sleep transients were not recorded.    Other Non-Epileptiform Findings:  Breach effect in the right hemisphere characterized by higher amplitude, sharply contoured waveforms and fast activities.    Interictal Epileptiform Activity:   During wakefulness, continuous, fluctuating 0.5-2 Hz right frontocentral (max F4/C4) lateralized periodic discharge with rhythmic activity (LPD+R).     Events:  No event or seizure recorded.    Activation Procedures:   Hyperventilation was not performed.    Photic stimulation was not performed.     Artifacts:  Intermittent myogenic and movement artifacts were noted.    ECG:  The heart rate on single channel ECG was predominantly between 70-80 BPM.    _____________________________________________________________  EEG SUMMARY/CLASSIFICATION    Abnormal EEG in an altered patient.  - During wakefulness, continuous, fluctuating 0.5-2 Hz right frontocentral (max F4/C4) lateralized periodic discharge with rhythmic activity (LPD+R).   - Continuous theta/delta slowing in the right hemisphere.   - Mild to moderate generalized slowing.  - Breach effect in the right hemisphere characterized by higher amplitude, sharply contoured waveforms and fast activities.    _____________________________________________________________  EEG IMPRESSION/CLINICAL CORRELATE    Abnormal EEG study.  1. Potential epileptogenic focus in the right frontocentral region.   2. Structural abnormality and skull defect in the right hemisphere.  3. Mild to moderate nonspecific diffuse or multifocal cerebral dysfunction.   4. No seizure seen.    _____________________________________________________________    Shoshana Youssef MD  Director, Epilepsy/EMU - [facility]  [facility]   COMPREHENSIVE EPILEPSY CENTER   REPORT OF LONG-TERM VIDEO EEG     [facility]: [address], [address], [place] [address], Ph#: [phone]  [facility]: [phone] [place] [address], Ph#: [phone]  [facility]: [address] [place] [address], Ph#: [phone]    Patient Name: PASTOR REINOSO  Age and : 42y (80)  MRN #: [id number]  Location: [place] [address]  Referring Physician: Zachery Millan    Start Time/Date: 08:00 on 22  End Time/Date: 08:00 on 22  Duration: 18h 45m    _____________________________________________________________  STUDY INFORMATION    EEG Recording Technique:  The patient underwent continuous Video-EEG monitoring, using Telemetry System hardware on the XLTek Digital System. EEG and video data were stored on a computer hard drive with important events saved in digital archive files. The material was reviewed by a physician (electroencephalographer / epileptologist) on a daily basis. Nigel and seizure detection algorithms were utilized and reviewed. An EEG Technician attended to the patient, and was available throughout daytime work hours.  The epilepsy center neurologist was available in person or on call 24-hours per day.    EEG Placement and Labeling of Electrodes:  The EEG was performed utilizing 20 channel referential EEG connections (coronal over temporal over parasagittal montage) using all standard 10-20 electrode placements with EKG, with additional electrodes placed in the inferior temporal region using the modified 10-10 montage electrode placements for elective admissions, or if deemed necessary. Recording was at a sampling rate of 256 samples per second per channel. Time synchronized digital video recording was done simultaneously with EEG recording. A low light infrared camera was used for low light recording.     _____________________________________________________________  HISTORY    Patient is a 42y old  Male who presents with a chief complaint of Trauma/ Subdural/ Intubated (22 Sep 2022 03:24)      PERTINENT MEDICATION:  levETIRAcetam  IVPB 750 milliGRAM(s) IV Intermittent <User Schedule>    _____________________________________________________________  STUDY INTERPRETATION    Findings: The background was continuous and reactive. During wakefulness, the posterior dominant rhythm consisted of poorly-modulated 7-8 Hz activity, with amplitude to 30 uV, that was only seen in the left hemisphere.    Background Slowing:  Background predominantly consisted of theta, delta and faster activities.    Focal Slowing:   Continuous theta/delta slowing in the right hemisphere.     Sleep Background:  Drowsiness was characterized by fragmentation, attenuation, and slowing of the background activity.    Stage II sleep transients were not recorded.    Other Non-Epileptiform Findings:  Breach effect in the right hemisphere characterized by higher amplitude, sharply contoured waveforms and fast activities.    Interictal Epileptiform Activity:   During wakefulness, continuous, fluctuating 0.5-2 Hz right frontocentral (max F4/C4) lateralized periodic discharge with rhythmic activity (LPD+R).     Events:  No event or seizure recorded.    Activation Procedures:   Hyperventilation was not performed.    Photic stimulation was not performed.     Artifacts:  Intermittent myogenic and movement artifacts were noted.    ECG:  The heart rate on single channel ECG was predominantly between 70-80 BPM.    _____________________________________________________________  EEG SUMMARY/CLASSIFICATION    Abnormal EEG in an altered patient.  - During wakefulness, continuous, fluctuating 0.5-2 Hz right frontocentral (max F4/C4) lateralized periodic discharge with rhythmic activity (LPD+R).   - Continuous theta/delta slowing in the right hemisphere.   - Mild to moderate generalized slowing.  - Breach effect in the right hemisphere characterized by higher amplitude, sharply contoured waveforms and fast activities.    _____________________________________________________________  EEG IMPRESSION/CLINICAL CORRELATE    Abnormal EEG study.  1. Potential epileptogenic focus in the right frontocentral region.   2. Structural abnormality and skull defect in the right hemisphere.  3. Mild to moderate nonspecific diffuse or multifocal cerebral dysfunction.   4. No seizure seen.    _____________________________________________________________    Shoshana Youssef MD  Director, Epilepsy/EMU - [facility]

## 2022-09-23 NOTE — PROGRESS NOTE ADULT - SUBJECTIVE AND OBJECTIVE BOX
This is a 41 yo M initially admitted on 5/24 with AMS, TBI, B/L IPH, B/L SDH. He also sustained facial bone fractures.  Prolonged hospital course: S/P Craniotomy and Evacuation of SDH, Tracheostomy on 6/1. S/P Cranioplasty, replacement of bone flap on 629. S/P R Hemicraniectomy wound exploration , evacuation of fluid collection on 7/17. Most recent OR S/P Cranioplasty on 9/19.  Palliative Team reconsulted to support family; post-op deficits are not improving; this after a prolonged and complicated hospitalization.    OVERNIGHT EVENTS:  Pt seen and examined at bedside.   No overnight events.    Unable to follow commands and  nonverbal.   Patient baseline is nonverbal  Video EEG no seizures detected.  He is Post-op Cranioplasty POD 4  Back on tube feeding  Lethargic, L facial droop and twitching R Hand hand subsided with > Keppra dosing  No fever or chills-not able to participate with ROS    Present Symptoms:     Dyspnea: none breathing well on room air  Nausea/Vomiting:  No  Anxiety:  Yes No  Depression: Yes No  Fatigue: Yes   Loss of appetite: Yes   Constipation:     Pain:             Character-            Duration-            Effect-            Factors-            Frequency-            Location-            Severity-    Pain AD Score:  [email or web address] (press ctrl + left click to view)    Review of Systems: Reviewed                               Unable to obtain due to poor mentation       MEDICATIONS  (STANDING):  ascorbic acid 500 milliGRAM(s) Oral daily  chlorhexidine 2% Cloths 1 Application(s) Topical daily  influenza   Vaccine 0.5 milliLiter(s) IntraMuscular once  levETIRAcetam  IVPB 750 milliGRAM(s) IV Intermittent <User Schedule>  melatonin 5 milliGRAM(s) Oral at bedtime  memantine 5 milliGRAM(s) Oral every 12 hours  multivitamin 1 Tablet(s) Oral daily  polyethylene glycol 3350 17 Gram(s) Oral two times a day  senna 1 Tablet(s) Oral daily  sodium chloride 1 Gram(s) Oral every 12 hours    MEDICATIONS  (PRN):  hydrALAZINE Injectable 10 milliGRAM(s) IV Push every 4 hours PRN SBP > 150      PHYSICAL EXAM:    Vital Signs Last 24 Hrs  T(C): 36.4 (23 Sep 2022 10:48), Max: 37.2 (22 Sep 2022 15:48)  T(F): 97.5 (23 Sep 2022 10:48), Max: 99 (22 Sep 2022 15:48)  HR: 59 (23 Sep 2022 10:48) (59 - 89)  BP: 104/72 (23 Sep 2022 10:48) (104/72 - 155/66)  BP(mean): 109 (22 Sep 2022 22:00) (92 - 109)  RR: 14 (23 Sep 2022 10:48) (12 - 18)  SpO2: 97% (23 Sep 2022 10:48) (96% - 100%)    Parameters below as of 23 Sep 2022 05:07  Patient On (Oxygen Delivery Method): room air      General:   lethargic calm sleeping                    nonverbal    Karnofsky:  %    HEENT:   dry mouth  Tracheostomy decannulated    Lungs: comfortable   CV: normal Bradycardia    GI: normal  distended                 PEG tube  constipation  last BM:     :   higinio    MSK:   weakness                bedbound/wheelchair bound    Skin: normal    no rash    LABS:                          10.6   2.93  )-----------( 237      ( 23 Sep 2022 06:10 )             29.4     09-23    135  |  100  |  9.1  ----------------------------<  122<H>  3.9   |  24.0  |  0.57    Ca    8.6      23 Sep 2022 06:10  Phos  3.3     09-23  Mg     1.5     09-23    I&O's Summary    22 Sep 2022 07:01  -  23 Sep 2022 07:00  --------------------------------------------------------  IN: 2470 mL / OUT: 1950 mL / NET: 520 mL    RADIOLOGY & ADDITIONAL STUDIES:    ADVANCE DIRECTIVES/TREATMENT PREFERENCES:  DNR NO  Completed on:                     MOLST   NO   Completed on:  Living Will   NO   Completed on: This is a 41 yo M initially admitted on 5/24 with AMS, TBI, B/L IPH, B/L SDH. He also sustained facial bone fractures.  Prolonged hospital course: S/P Craniotomy and Evacuation of SDH, Tracheostomy on 6/1. S/P Cranioplasty, replacement of bone flap on 629. S/P R Hemicraniectomy wound exploration , evacuation of fluid collection on 7/17. Most recent OR S/P Cranioplasty on 9/19.  Palliative Team reconsulted to support family; post-op deficits are not improving; this after a prolonged and complicated hospitalization.    OVERNIGHT EVENTS:  Pt seen and examined at bedside.   No overnight events.    Unable to follow commands and  nonverbal.   Patient baseline is nonverbal  Video EEG no seizures detected.  He is Post-op Cranioplasty POD 4  Back on tube feeding  Lethargic, L facial droop and twitching R Hand hand subsided with > Keppra dosing  No fever or chills-not able to participate with ROS    Present Symptoms:     Dyspnea: none breathing well on room air  Nausea/Vomiting:  No  Anxiety:  Yes No  Depression: Yes No  Fatigue: Yes   Loss of appetite: Yes   Constipation:     Pain:             Character-            Duration-            Effect-            Factors-            Frequency-            Location-            Severity-    Pain AD Score:  [email or web address] (press ctrl + left click to view)    Review of Systems: Reviewed                               Unable to obtain due to poor mentation       MEDICATIONS  (STANDING):  ascorbic acid 500 milliGRAM(s) Oral daily  chlorhexidine 2% Cloths 1 Application(s) Topical daily  influenza   Vaccine 0.5 milliLiter(s) IntraMuscular once  levETIRAcetam  IVPB 750 milliGRAM(s) IV Intermittent <User Schedule>  melatonin 5 milliGRAM(s) Oral at bedtime  memantine 5 milliGRAM(s) Oral every 12 hours  multivitamin 1 Tablet(s) Oral daily  polyethylene glycol 3350 17 Gram(s) Oral two times a day  senna 1 Tablet(s) Oral daily  sodium chloride 1 Gram(s) Oral every 12 hours    MEDICATIONS  (PRN):  hydrALAZINE Injectable 10 milliGRAM(s) IV Push every 4 hours PRN SBP > 150      PHYSICAL EXAM:    Vital Signs Last 24 Hrs  T(C): 36.4 (23 Sep 2022 10:48), Max: 37.2 (22 Sep 2022 15:48)  T(F): 97.5 (23 Sep 2022 10:48), Max: 99 (22 Sep 2022 15:48)  HR: 59 (23 Sep 2022 10:48) (59 - 89)  BP: 104/72 (23 Sep 2022 10:48) (104/72 - 155/66)  BP(mean): 109 (22 Sep 2022 22:00) (92 - 109)  RR: 14 (23 Sep 2022 10:48) (12 - 18)  SpO2: 97% (23 Sep 2022 10:48) (96% - 100%)    Parameters below as of 23 Sep 2022 05:07  Patient On (Oxygen Delivery Method): room air      General:   lethargic calm sleeping                    nonverbal    Karnofsky:  10%    HEENT:   dry mouth  Tracheostomy decannulated    Lungs: comfortable   CV: normal Bradycardia    GI: normal  distended                 PEG tube  constipation  last BM:     :   sylvester    MSK:   weakness                bedbound/wheelchair bound    Skin: normal    no rash    LABS:                          10.6   2.93  )-----------( 237      ( 23 Sep 2022 06:10 )             29.4     09-23    135  |  100  |  9.1  ----------------------------<  122<H>  3.9   |  24.0  |  0.57    Ca    8.6      23 Sep 2022 06:10  Phos  3.3     09-23  Mg     1.5     09-23    I&O's Summary    22 Sep 2022 07:01  -  23 Sep 2022 07:00  --------------------------------------------------------  IN: 2470 mL / OUT: 1950 mL / NET: 520 mL    RADIOLOGY & ADDITIONAL STUDIES:  EEG SUMMARY/CLASSIFICATION    Abnormal EEG in an altered patient.  - During wakefulness, continuous, fluctuating 0.5-2 Hz right frontocentral (max F4/C4) lateralized periodic discharge with rhythmic activity (LPD+R).   - Continuous theta/delta slowing in the right hemisphere.   - Mild to moderate generalized slowing.  - Breach effect in the right hemisphere characterized by higher amplitude, sharply contoured waveforms and fast activities.    _____________________________________________________________  EEG IMPRESSION/CLINICAL CORRELATE    Abnormal EEG study.  1. Potential epileptogenic focus in the right frontocentral region.   2. Structural abnormality and skull defect in the right hemisphere.  3. Mild to moderate nonspecific diffuse or multifocal cerebral dysfunction.   4. No seizure seen.    ADVANCE DIRECTIVES/TREATMENT PREFERENCES:  DNR NO  Completed on:                     MOLST   NO   Completed on:  Living Will   NO   Completed on:

## 2022-09-23 NOTE — PROGRESS NOTE ADULT - NUTRITIONAL ASSESSMENT
This patient has been assessed with a concern for Malnutrition and has been determined to have a diagnosis/diagnoses of Severe protein-calorie malnutrition.    This patient is being managed with:   Diet NPO with Tube Feed-  Tube Feeding Modality: Gastrostomy  Pivot 1.5 Micky (PIVOT1.5RTH)  Total Volume for 24 Hours (mL): 1200  Continuous  Starting Tube Feed Rate {mL per Hour}: 20  Increase Tube Feed Rate by (mL): 10     Every 4 hours  Until Goal Tube Feed Rate (mL per Hour): 50  Tube Feed Duration (in Hours): 24  Tube Feed Start Time: 10:00  Entered: Sep 22 2022  4:20PM

## 2022-09-23 NOTE — PROGRESS NOTE ADULT - ASSESSMENT
Post-op course complicated by left facial droop, left sided weakness and right hand twitching. Patient was placed on VEEG and loaded with keppra.   Epilepsy was consulted and patient is now medically stable for downgrade to the hospitalist team.     Traumatic brain injury    Post Cranioplasty POD #4 possible encephalopathy    Prior to most recent cranioplasty on 9/19 (eating pureed and able to ambulate with PT)   Now unable to follow commands and is lethargic; placed back on a TF diet   CONTINUE:  VEEG, neurochecks   Memantine 5 mg BID    Melatonin at bedtime   Continue Keppra  mg 750 q 6 hours    Neurosurgery and  Epilepsy involved   Seizure/Aspiration/Fall Precautions    Right Pubic Rami Fracture   subacute  incidentally found on CT  Ortho consulted   WBAT (patient was ambulating with PT prior to cranioplasty on 9/19)   PT re-evaluation once patient is able to follow commands    CNS infection  s/p right decompressive Hemicraniectomy on 5/24  BC's (-)  Completed Nafcillin and Vancomycin  MRI done after ABX are is without abnormal enhancement    Oropharyngeal Dysphagia  Before cranioplasty on 9/19, patient had advanced to  a pureed diet, today he is   not opening his mouth for PO trials  Acute changes in neurologic condition, including not able to follow commands  Reverted back to  tube feeds  Maintain aspiration precautions  SLP reconsult if patient able to follow commands    History of Substance abuse  UDS positive for cocaine on admission  PMH also includes ETOH abuse       Plan-[facility]  FULL Code  Continue VEEG;  Medical team will consider repeat MRI given acute changes in patient's neurologic exam (left facial droop, left sided weakness) after VEEG is completed  adjust AEDs.  Prognosis guarded   Eventual plan for TANIKA although patient is undocumented.   Palliative Support requested, spoke to Patient's Brother Patel, waiting to speak with Mother Rama when she returns to his bedside                      Submesh drain removed on 9/21 and subgaleal drain was removed yesterday     Patient admitted here with TBI, S/P multiple Craniotomy's, Cranioplasty's the last being 9/19.  Post-op course complicated by left facial droop, left sided weakness and right hand twitching. Patient was placed on VEEG and loaded with Keppra.  VEEG was removed today. Palliative Team consulted to support Patient and his family.     Traumatic brain injury    Post Cranioplasty POD #4 possible encephalopathy    Submesh drain removed on 9/21 and subgaleal drain was removed yesterday   Prior to most recent cranioplasty on 9/19 (eating pureed and able to ambulate with PT)   Now unable to follow commands and is lethargic; placed back on a TF diet    VEEG-completed:  Neurochecks   Continue:  Memantine 5 mg BID                    Melatonin at bedtime                   Keppra  mg 750 q 6 hours    Neurosurgery and  Epilepsy involved   Seizure/Aspiration/Fall Precautions    AMS  Patient is still nonverbal  He is not following commands  Cognitively impaired-not able to eat orally    Right Pubic Rami Fracture   Subacute  incidentally found on CT  Ortho consulted   WBAT (patient was ambulating with PT prior to cranioplasty on 9/19)   PT re-evaluation once patient is able to follow commands    CNS infection  s/p right decompressive Hemicraniectomy on 5/24  BC's (-)  Completed Nafcillin and Vancomycin  MRI done after ABX are without abnormal enhancement    Oropharyngeal Dysphagia  Before cranioplasty on 9/19, patient had advanced to  a pureed diet, today he is   not opening his mouth for PO trials  Acute changes in neurologic condition, including not able to follow commands  Reverted back to  tube feeds  Maintain aspiration precautions  SLP reconsult if patient able to follow commands    History of Substance abuse  UDS positive for cocaine on admission  PMH also includes ETOH abuse       Plan-[facility]  FULL Code  Medical team has ordered repeat MRI given acute changes in patient's neurologic exam.  Adjust AEDs.  Prognosis guarded   Eventual plan for TANIKA although patient is undocumented.   Palliative Support requested, spoke to Patient's Brother Patel, waiting to speak with Mother Rama when she returns to his bedside

## 2022-09-23 NOTE — EEG REPORT - NS EEG TEXT BOX
[facility]   COMPREHENSIVE EPILEPSY CENTER   REPORT OF LONG-TERM VIDEO EEG     [facility]: [address], [place] [address], Ph#: [phone]  [facility]: [id number] [place] [address], Ph#: [phone]  [facility]: [address] [place] [address], Ph#: [phone]    Patient Name: PASTOR REINOSO  Age and : 42y (80)  MRN #: [id number]  Location: [place] [address]  Referring Physician: Zachery Millan    Start Time/Date: 08:00 on 22  End Time/Date: 12:19 on 22  Duration: 4h 17m     _____________________________________________________________  STUDY INFORMATION    EEG Recording Technique:  The patient underwent continuous Video-EEG monitoring, using Telemetry System hardware on the XLTek Digital System. EEG and video data were stored on a computer hard drive with important events saved in digital archive files. The material was reviewed by a physician (electroencephalographer / epileptologist) on a daily basis. Nigel and seizure detection algorithms were utilized and reviewed. An EEG Technician attended to the patient, and was available throughout daytime work hours.  The epilepsy center neurologist was available in person or on call 24-hours per day.    EEG Placement and Labeling of Electrodes:  The EEG was performed utilizing 20 channel referential EEG connections (coronal over temporal over parasagittal montage) using all standard 10-20 electrode placements with EKG, with additional electrodes placed in the inferior temporal region using the modified 10-10 montage electrode placements for elective admissions, or if deemed necessary. Recording was at a sampling rate of 256 samples per second per channel. Time synchronized digital video recording was done simultaneously with EEG recording. A low light infrared camera was used for low light recording.     _____________________________________________________________  HISTORY    Patient is a 42y old  Male who presents with a chief complaint of Trauma/ Subdural/ Intubated (22 Sep 2022 03:24)      PERTINENT MEDICATION:  levETIRAcetam  IVPB 750 milliGRAM(s) IV Intermittent <User Schedule>    _____________________________________________________________  STUDY INTERPRETATION    Findings: The background was continuous and reactive. During wakefulness, the posterior dominant rhythm consisted of poorly-modulated 7-8 Hz activity, with amplitude to 30 uV, that was only seen in the left hemisphere.    Background Slowing:  Background predominantly consisted of theta, delta and faster activities.    Focal Slowing:   Continuous theta/delta slowing in the right hemisphere.     Sleep Background:  Drowsiness was characterized by fragmentation, attenuation, and slowing of the background activity.    Stage II sleep transients were not recorded.    Other Non-Epileptiform Findings:  Breach effect in the right hemisphere characterized by higher amplitude, sharply contoured waveforms and fast activities.    Interictal Epileptiform Activity:   During wakefulness, continuous, fluctuating 0.5-2 Hz right frontocentral (max F4/C4) lateralized periodic discharge with rhythmic activity (LPD+R).     Events:  No event or seizure recorded.    Activation Procedures:   Hyperventilation was not performed.    Photic stimulation was not performed.     Artifacts:  Intermittent myogenic and movement artifacts were noted.    ECG:  The heart rate on single channel ECG was predominantly between 70-80 BPM.    _____________________________________________________________   EEG SUMMARY/CLASSIFICATION    Abnormal EEG in an altered patient.  - During wakefulness, continuous, fluctuating 0.5-2 Hz right frontocentral (max F4/C4) lateralized periodic discharge with rhythmic activity (LPD+R).   - Continuous theta/delta slowing in the right hemisphere.   - Mild to moderate generalized slowing.  - Breach effect in the right hemisphere characterized by higher amplitude, sharply contoured waveforms and fast activities.    _____________________________________________________________  EEG IMPRESSION/CLINICAL CORRELATE    Abnormal EEG study.  1. Potential epileptogenic focus in the right frontocentral region.   2. Structural abnormality and skull defect in the right hemisphere.  3. Mild to moderate nonspecific diffuse or multifocal cerebral dysfunction.   4. No seizure seen.    _____________________________________________________________    Shoshana Youssef MD  Director, Epilepsy/EMU - [facility]

## 2022-09-23 NOTE — PROGRESS NOTE ADULT - SUBJECTIVE AND OBJECTIVE BOX
HPI:  42 year old male admitted since 5/24/22 after being found down and was found to have B/L IPH and B/L SDH. He has had a prolonged hospitalization notable s/p craniotomy and evacuation of SDH on 5/24, s/p trach 6/1 now decannulated, s/p cranioplasty and replacement of bone flap 6/29, and s/p Rt hemicraniectomy w/ wound exploration and evacuation of epidural fluid collection 7/17. OR cultures were noted to be positive for MSSA on 7/17 and he completed an antibiotic course as per ID. He remained in hospital completing his antibiotics. 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 On 9/19 he returned to the OR for mesh cranioplasty with plastics closure after medical optimization. He tolerated the procedure well and was monitored in the NSICU postoperatively where he was noted to have a new Lt facial, a CTH was unremarkable and he was placed on EEG. EEG noted potential epileptic focus however no seizures and he was loaded with Keppra as per epilepsy.  His submesh drain removed on 9/21 and his subgaleal drain was removed on 9/22.    s/p R cranioplasty with mesh & plastics closure POD#4  Patient downgraded to medicine team.     Vital Signs Last 24 Hrs  T(C): 36.9 (23 Sep 2022 17:32), Max: 37.2 (22 Sep 2022 20:00)  T(F): 98.4 (23 Sep 2022 17:32), Max: 99 (22 Sep 2022 20:00)  HR: 83 (23 Sep 2022 17:32) (59 - 89)  BP: 113/75 (23 Sep 2022 17:32) (104/72 - 155/66)  BP(mean): 109 (22 Sep 2022 22:00) (92 - 109)  RR: 18 (23 Sep 2022 17:32) (12 - 18)  SpO2: 98% (23 Sep 2022 17:32) (96% - 100%)  Parameters below as of 23 Sep 2022 17:32  Patient On (Oxygen Delivery Method): room air    PHYSICAL EXAM:  GENERAL: NAD, well-groomed, well-developed  HEAD:  Atraumatic, normocephalic  DRAINS:   WOUND: Dressing clean dry intact  GEORGES COMA SCORE: E- V- M- =       E: 4= opens eyes spontaneously 3= to voice 2= to noxious 1= no opening       V: 5= oriented 4= confused 3= inappropriate words 2= incomprehensible sounds 1= nonverbal 1T= intubated       M: 6= follows commands 5= localizes 4= withdraws 3= flexor posturing 2= extensor posturing 1= no movement  MENTAL STATUS: AAO x3; Awake/Comatose; Opens eyes spontaneously/to voice/to light touch/to noxious stimuli; Appropriately conversant without aphasia/Nonverbal; following simple commands/mimicking/not following commands  CRANIAL NERVES: Visual acuity normal for age, visual fields full to confrontation, PERRL. EOMI without nystagmus. Facial sensation intact V1-3 distribution b/l. Face symmetric w/ normal eye closure and smile, tongue midline. Hearing grossly intact. Speech clear. Head turning and shoulder shrug intact.   REFLEXES: PERRL. Corneals intact b/l. Gag intact. Cough intact. Oculocephalic reflex intact (Doll's eye). Negative Kendall's b/l. Negative clonus b/l  MOTOR: strength 5/5 b/l upper and lower extremities  Uppers     Delt (C5/6)     Bicep (C5/6)     Wrist Extend (C6)     Tricep (C7)     HG (C8/T1)  R                     5/5                 5/5                         5/5                           5/5                   5/5  L                      5/5                 5/5                         5/5                           5/5                   5/5  Lowers      HF(L1/L2)     KE (L3)     DF (L4)     EHL (L5)     PF (S1)      R                     5/5              5/5           5/5           5/5            5/5  L                     5/5               5/5          5/5            5/5            5/5  SENSATION: grossly intact to light touch all extremities  COORDINATION: Gait intact; rapid alternating movements intact; heel to shin intact; no upper extremity dysmetria  CHEST/LUNG: Clear to auscultation bilaterally; no rales, rhonchi, wheezing, or rubs  HEART: +S1/+S2; Regular rate and rhythm; no murmurs, rubs, or gallops  ABDOMEN: Soft, nontender, nondistended; bowel sounds present all four quadrants  EXTREMITIES:  2+ peripheral pulses, no clubbing, cyanosis, or edema  SKIN: Warm, dry; no rashes or lesions    LABS:                        10.6   2.93  )-----------( 237      ( 23 Sep 2022 06:10 )             29.4     09-23    135  |  100  |  9.1  ----------------------------<  122<H>  3.9   |  24.0  |  0.57    Ca    8.6      23 Sep 2022 06:10  Phos  3.3     09-23  Mg     1.5     09-23 09-22 @ 07:01  -  09-23 @ 07:00  --------------------------------------------------------  IN: 2470 mL / OUT: 1950 mL / NET: 520 mL        RADIOLOGY & ADDITIONAL TESTS:          CAPRINI SCORE [CLOT]:  Patient has an estimated Caprini score of greater than 5.  However, the patient's unique clinical situation will be addressed in an individual manner to determine appropriate anticoagulation treatment, if any. HPI:  42 year old male admitted since 5/24/22 after being found down and was found to have B/L IPH and B/L SDH. He has had a prolonged hospitalization notable s/p craniotomy and evacuation of SDH on 5/24, s/p trach 6/1 now decannulated, s/p cranioplasty and replacement of bone flap 6/29, and s/p Rt hemicraniectomy w/ wound exploration and evacuation of epidural fluid collection 7/17. OR cultures were noted to be positive for MSSA on 7/17 and he completed an antibiotic course as per ID. He remained in hospital completing his antibiotics. 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 On 9/19 he returned to the OR for mesh cranioplasty with plastics closure after medical optimization. He tolerated the procedure well and was monitored in the NSICU postoperatively where he was noted to have a new Lt facial, a CTH was unremarkable and he was placed on EEG. EEG noted potential epileptic focus however no seizures and he was loaded with Keppra as per epilepsy.  His submesh drain removed on 9/21 and his subgaleal drain was removed on 9/22.    s/p R cranioplasty with mesh & plastics closure POD#4  Patient downgraded to medicine team. Mood dependent exam. Not cooperating the best.     Vital Signs Last 24 Hrs  T(C): 36.9 (23 Sep 2022 17:32), Max: 37.2 (22 Sep 2022 20:00)  T(F): 98.4 (23 Sep 2022 17:32), Max: 99 (22 Sep 2022 20:00)  HR: 83 (23 Sep 2022 17:32) (59 - 89)  BP: 113/75 (23 Sep 2022 17:32) (104/72 - 155/66)  BP(mean): 109 (22 Sep 2022 22:00) (92 - 109)  RR: 18 (23 Sep 2022 17:32) (12 - 18)  SpO2: 98% (23 Sep 2022 17:32) (96% - 100%)  Parameters below as of 23 Sep 2022 17:32  Patient On (Oxygen Delivery Method): room air    PHYSICAL EXAM:  GENERAL: NAD, calm, not following, mood dependent.   HEAD: Dressing intact and clean, drains removed.   MENTAL STATUS: Opens eyes spontaneously. follows some commands. Not cooperating mostly.   CRANIAL NERVES: Tracking examiner. Face grossly symmetric w/ normal eye closure.   MOTOR: RUE moving spontaneously LUE HG in tact to command. RLE wiggling toes. not following with LLE.   RESP: Nonlabored breaths    LABS:             10.6   2.93  )-----------( 237      ( 23 Sep 2022 06:10 )             29.4     09-23    135  |  100  |  9.1  ----------------------------<  122<H>  3.9   |  24.0  |  0.57    Ca    8.6      23 Sep 2022 06:10  Phos  3.3     09-23  Mg     1.5     09-23 09-22 @ 07:01  -  09-23 @ 07:00  --------------------------------------------------------  IN: 2470 mL / OUT: 1950 mL / NET: 520 mL        RADIOLOGY & ADDITIONAL TESTS:  CT Head No Cont (09.22.22 @ 11:38)   IMPRESSION:   Slight decrease in size of temporal horns when compared with   the prior exam. The rest of the study appears stable.

## 2022-09-23 NOTE — DIETITIAN NUTRITION RISK NOTIFICATION - TREATMENT: THE FOLLOWING DIET HAS BEEN RECOMMENDED
Diet, NPO with Tube Feed:   Tube Feeding Modality: Gastrostomy  Pivot 1.5 Micky (PIVOT1.5RTH)  Total Volume for 24 Hours (mL): 1200  Continuous  Starting Tube Feed Rate {mL per Hour}: 20  Increase Tube Feed Rate by (mL): 10     Every 4 hours  Until Goal Tube Feed Rate (mL per Hour): 50  Tube Feed Duration (in Hours): 24  Tube Feed Start Time: 10:00 (09-22-22 @ 18:40) [Active]      
Diet, Pureed:   Moderately Thick Liquids (MODTHICKLIQS)  Tube Feeding Modality: Gastrostomy  Pivot 1.5 Micky (PIVOT1.5RTH)  Total Volume for 24 Hours (mL): 720  Bolus  Total Volume of Bolus (mL):  240  Total # of Feeds: 3  Tube Feed Frequency: Every 8 hours   Tube Feed Start Time: 12:00  Bolus Feed Rate (mL per Hour): 0   Bolus Feed Duration (in Hours): 0  Bolus Feed Instructions:  Bolus feeds to be given if the following occurs:  patient eats less than 50% of Breakfast, lunch or dinner  Free Water Flush  Bolus   Total Volume per Flush (mL): 250   Frequency: Every 6 Hours  Supplement Feeding Modality:  Oral  Ensure Pudding Cans or Servings Per Day:  1       Frequency:  Three Times a day (08-10-22 @ 14:38) [Active]

## 2022-09-23 NOTE — PROGRESS NOTE ADULT - NUTRITIONAL ASSESSMENT
Total Time Spent_45___ minutes  This includes chart review, patient assessment, discussion and collaboration with interdisciplinary team members, ACP planning    COUNSELING:  Face to face meeting to discuss Advanced Care Planning - Time Spent ______Minutes.      Thank you for the opportunity to assist with the care of this patient.   [facility] Palliative Medicine Consult Service [phone]. Total Time Spent_45___ minutes  This includes chart review, patient assessment, discussion and collaboration with interdisciplinary team members, ACP planning    COUNSELING:  Face to face meeting to discuss Advanced Care Planning - Time Spent 15_____Minutes.      Thank you for the opportunity to assist with the care of this patient.   [facility] Palliative Medicine Consult Service [phone].

## 2022-09-23 NOTE — GOALS OF CARE CONVERSATION - ADVANCED CARE PLANNING - CONVERSATION DETAILS
Dr. Mccurdy and myself joined his Mother and his sister  Eugenia via speaker phone at  's bedside, to discuss if he was beginning to improve since his Cranioplasty 4 days ago. Dr. Mccurdy was able to compare her Neuro exam today to how   is presenting today.  She reviewed his progress since his first surgery here, whereby he was waving (L arm dominant) able to eat orally and ambulate with  assistance-then began to decline emerging from surgery nonverbal, unable to make eye contact, follow commands or cognitively perform a safe swallow eval.    Today we saw some improvement, opens his eyes to his name-not making eye contact. Raising his R arm when asked to  Still nonverbal...VEEG completed-no seizure activity detected.  We answered most of her questions, main barrier to predicting how he will do, as he recovers-give it time-a few days  . Eugenia did mention that her brother drank everyday, and did take drugs +Cocaine toxicology report on admission. She was interested in what are his chances to walk again, and  improve in TA[place]A when he is finally discharged.  Not sure if she was wondering if he has the strength and motivation to fight his way back to a functional life    Patient is Full code, and will remain so, until we can get a better perspective on  how much residual neurologic deficits,  he will need to overcome.   .    Palliative

## 2022-09-24 LAB
ACANTHOCYTES BLD QL SMEAR: SIGNIFICANT CHANGE UP
AGGLUTINATION: PRESENT — SIGNIFICANT CHANGE UP
ANION GAP SERPL CALC-SCNC: 12 MMOL/L — SIGNIFICANT CHANGE UP (ref 5–17)
ANISOCYTOSIS BLD QL: SLIGHT — SIGNIFICANT CHANGE UP
BASOPHILS # BLD AUTO: 0.02 K/UL — SIGNIFICANT CHANGE UP (ref 0–0.2)
BASOPHILS NFR BLD AUTO: 0.9 % — SIGNIFICANT CHANGE UP (ref 0–2)
BUN SERPL-MCNC: 15 MG/DL — SIGNIFICANT CHANGE UP (ref 8–20)
CALCIUM SERPL-MCNC: 8.9 MG/DL — SIGNIFICANT CHANGE UP (ref 8.4–10.5)
CHLORIDE SERPL-SCNC: 99 MMOL/L — SIGNIFICANT CHANGE UP (ref 98–107)
CO2 SERPL-SCNC: 24 MMOL/L — SIGNIFICANT CHANGE UP (ref 22–29)
CREAT SERPL-MCNC: 0.52 MG/DL — SIGNIFICANT CHANGE UP (ref 0.5–1.3)
EGFR: 129 ML/MIN/1.73M2 — SIGNIFICANT CHANGE UP
EOSINOPHIL # BLD AUTO: 0.32 K/UL — SIGNIFICANT CHANGE UP (ref 0–0.5)
EOSINOPHIL NFR BLD AUTO: 12.4 % — HIGH (ref 0–6)
GIANT PLATELETS BLD QL SMEAR: PRESENT — SIGNIFICANT CHANGE UP
GLUCOSE BLDC GLUCOMTR-MCNC: 124 MG/DL — HIGH (ref 70–99)
GLUCOSE SERPL-MCNC: 116 MG/DL — HIGH (ref 70–99)
HCT VFR BLD CALC: 31.3 % — LOW (ref 39–50)
HGB BLD-MCNC: 10.6 G/DL — LOW (ref 13–17)
LYMPHOCYTES # BLD AUTO: 0.96 K/UL — LOW (ref 1–3.3)
LYMPHOCYTES # BLD AUTO: 37.1 % — SIGNIFICANT CHANGE UP (ref 13–44)
MAGNESIUM SERPL-MCNC: 1.6 MG/DL — SIGNIFICANT CHANGE UP (ref 1.6–2.6)
MANUAL SMEAR VERIFICATION: SIGNIFICANT CHANGE UP
MCHC RBC-ENTMCNC: 28.6 PG — SIGNIFICANT CHANGE UP (ref 27–34)
MCHC RBC-ENTMCNC: 33.9 GM/DL — SIGNIFICANT CHANGE UP (ref 32–36)
MCV RBC AUTO: 84.4 FL — SIGNIFICANT CHANGE UP (ref 80–100)
METAMYELOCYTES # FLD: 1.8 % — HIGH (ref 0–0)
MICROCYTES BLD QL: SLIGHT — SIGNIFICANT CHANGE UP
MONOCYTES # BLD AUTO: 0.16 K/UL — SIGNIFICANT CHANGE UP (ref 0–0.9)
MONOCYTES NFR BLD AUTO: 6.2 % — SIGNIFICANT CHANGE UP (ref 2–14)
NEUTROPHILS # BLD AUTO: 1.03 K/UL — LOW (ref 1.8–7.4)
NEUTROPHILS NFR BLD AUTO: 39.8 % — LOW (ref 43–77)
PHOSPHATE SERPL-MCNC: 4.1 MG/DL — SIGNIFICANT CHANGE UP (ref 2.4–4.7)
PLAT MORPH BLD: NORMAL — SIGNIFICANT CHANGE UP
PLATELET # BLD AUTO: 196 K/UL — SIGNIFICANT CHANGE UP (ref 150–400)
POIKILOCYTOSIS BLD QL AUTO: SIGNIFICANT CHANGE UP
POTASSIUM SERPL-MCNC: 4.3 MMOL/L — SIGNIFICANT CHANGE UP (ref 3.5–5.3)
POTASSIUM SERPL-SCNC: 4.3 MMOL/L — SIGNIFICANT CHANGE UP (ref 3.5–5.3)
RBC # BLD: 3.71 M/UL — LOW (ref 4.2–5.8)
RBC # FLD: 15.9 % — HIGH (ref 10.3–14.5)
RBC BLD AUTO: ABNORMAL
SODIUM SERPL-SCNC: 135 MMOL/L — SIGNIFICANT CHANGE UP (ref 135–145)
VARIANT LYMPHS # BLD: 1.8 % — SIGNIFICANT CHANGE UP (ref 0–6)
WBC # BLD: 2.59 K/UL — LOW (ref 3.8–10.5)
WBC # FLD AUTO: 2.59 K/UL — LOW (ref 3.8–10.5)

## 2022-09-24 PROCEDURE — 99233 SBSQ HOSP IP/OBS HIGH 50: CPT

## 2022-09-24 PROCEDURE — 70551 MRI BRAIN STEM W/O DYE: CPT | Mod: 26

## 2022-09-24 RX ORDER — LACTULOSE 10 G/15ML
15 SOLUTION ORAL ONCE
Refills: 0 | Status: COMPLETED | OUTPATIENT
Start: 2022-09-24 | End: 2022-09-24

## 2022-09-24 RX ADMIN — Medication 1 TABLET(S): at 18:05

## 2022-09-24 RX ADMIN — POLYETHYLENE GLYCOL 3350 17 GRAM(S): 17 POWDER, FOR SOLUTION ORAL at 05:15

## 2022-09-24 RX ADMIN — MEMANTINE HYDROCHLORIDE 5 MILLIGRAM(S): 10 TABLET ORAL at 05:16

## 2022-09-24 RX ADMIN — SODIUM CHLORIDE 1 GRAM(S): 9 INJECTION INTRAMUSCULAR; INTRAVENOUS; SUBCUTANEOUS at 18:05

## 2022-09-24 RX ADMIN — Medication 5 MILLIGRAM(S): at 21:21

## 2022-09-24 RX ADMIN — Medication 500 MILLIGRAM(S): at 18:06

## 2022-09-24 RX ADMIN — POLYETHYLENE GLYCOL 3350 17 GRAM(S): 17 POWDER, FOR SOLUTION ORAL at 18:06

## 2022-09-24 RX ADMIN — SODIUM CHLORIDE 1 GRAM(S): 9 INJECTION INTRAMUSCULAR; INTRAVENOUS; SUBCUTANEOUS at 05:15

## 2022-09-24 RX ADMIN — ENOXAPARIN SODIUM 40 MILLIGRAM(S): 100 INJECTION SUBCUTANEOUS at 21:21

## 2022-09-24 RX ADMIN — LEVETIRACETAM 1000 MILLIGRAM(S): 250 TABLET, FILM COATED ORAL at 18:04

## 2022-09-24 RX ADMIN — LEVETIRACETAM 1000 MILLIGRAM(S): 250 TABLET, FILM COATED ORAL at 05:22

## 2022-09-24 RX ADMIN — MEMANTINE HYDROCHLORIDE 5 MILLIGRAM(S): 10 TABLET ORAL at 18:03

## 2022-09-24 RX ADMIN — LACTULOSE 15 GRAM(S): 10 SOLUTION ORAL at 21:20

## 2022-09-24 RX ADMIN — SENNA PLUS 1 TABLET(S): 8.6 TABLET ORAL at 18:03

## 2022-09-24 RX ADMIN — CHLORHEXIDINE GLUCONATE 1 APPLICATION(S): 213 SOLUTION TOPICAL at 18:06

## 2022-09-24 RX ADMIN — Medication 300 MILLIGRAM(S): at 18:04

## 2022-09-24 NOTE — PROGRESS NOTE ADULT - NUTRITIONAL ASSESSMENT
This patient has been assessed with a concern for Malnutrition and has been determined to have a diagnosis/diagnoses of Severe protein-calorie malnutrition.    This patient is being managed with:   Diet NPO with Tube Feed-  Tube Feeding Modality: Gastrostomy  Pivot 1.5 Micky (PIVOT1.5RTH)  Total Volume for 24 Hours (mL): 1200  Continuous  Starting Tube Feed Rate {mL per Hour}: 20  Increase Tube Feed Rate by (mL): 10     Every 4 hours  Until Goal Tube Feed Rate (mL per Hour): 60  Tube Feed Duration (in Hours): 20  Tube Feed Start Time: 16:00  Free Water Flush  Bolus   Total Volume per Flush (mL): 200   Frequency: Every 6 Hours   Total Daily Volume of Flush (mL): 800  Entered: Sep 23 2022  4:08PM

## 2022-09-24 NOTE — PROGRESS NOTE ADULT - ASSESSMENT
Patient is a 42 year old male initially admitted on 5/24/22 (BIBEMS after found unresponsive on street) with a GCS of 3, found to have b/l intraparenchymal bleeds, b/l SDH. Has had a complex prolonged hospital course including but not limited to right decompressive craniectomy on 5/24. Trach / PEG., cranioplasty on 6/29 with cognitive decline after initial improvement prompting imaging which noted subacute collection with air underneath the cranioplasty sight. MRI with increase in fluid collection underneath the flap leading to a repeat right craniectomy with wound exploration & evacuation of fluid collection on 7/17 with OR cultures isolating MSSA. Antimicrobial regimen adjusted (ID), (suspected) central SIADH now improved (renal signed off). Has had interval trach decannulation and dietary advancement to pureed. Now s/p completion of appropriate antimicrobial course with f/u imaging (9/3) revealing no abnormal enhancement (6 mm midline shift to left). After being optimized and cleared for surgery had a fever (9/9/22). Infectious w/u negative. Now has been afebrile. Patient then underwent a cranioplasty with replacement bone flap on 9/19. Post-op course complicated by left facial droop, left sided weakness and right hand twitching. Patient was placed on VEEG and loaded with keppra. Epilepsy was consulted and patient is now medically stable for downgrade to the hospitalist team. Given changes in neurologic status, patient was placed back on a tube feed diet. VEEG was negative for seizure activity and MRI brain was ordered.    Assessment/Plan:     1) Traumatic brain injury with complicated hospital course (as above) now with possible encephalopathy  -CT head with bilateral IPH and bilateral SDH s/p decompressive craniotomy on 5/24  -s/p cranioplasty on 6/29, repeat craniotomy on 7/17 and final cranioplasty with replacement bone flap on 9/19   - patient had significantly improved neurologically prior to most recent cranioplasty on 9/19 (eating pureed and able to ambulate with PT)  - patient is now unable to follow commands and is lethargic; placed back on a TF diet   - most recent CT head post-op negative for any acute pathology  - completed VEEG without seizure activity  - Continue Memantine 5 mg BID   - Continue Melatonin at bedtime  - Continue Keppra but adjust to 1000 mg BID  - Continue neurochecks  - Neurosurgery on board; f/u further recs  - Epilepsy consult appreciated  - Will repeat MRI given acute changes in patient's neurologic exam (left facial droop, left sided weakness)  - Seizure/Aspiration/Fall Precautions  - Prognosis guarded at best; palliative care consulted    2) Central SIADH - resolved  - sodium WNL  - s/p Tolvaptan and Urea   - Continue sodium chloride tablets  - Monitor labs closely    3) Right Pubic Rami Fracture  - subacute  - incidentally found on CT  - Ortho consult appreciated  - WBAT (patient was ambulating with PT prior to cranioplasty on 9/19)  - PT re-evaluation once patient is able to follow commands    4)  CNS infection  - s/p right decompressive hemicraniectomy on 5/24  - blood cultures negative  - Completed Nafcillin and Vancomycin  - MRI after completion of abx without abnormal enhancement  - ID follow up noted, monitor off abx    5) Oropharyngeal Dysphagia  -prior to recent cranioplasty on 9/19, patient was on a pureed diet  -given acute changes in neurologic condition, patient is not following commands and will not open his mouth for PO trials  -changed to tube feeds; goal increased to 60cc/hr  -maintain aspiration precautions  -SLP reconsult if patient able to follow commands    6) History of Substance abuse  -UDS positive for cocaine on admission  -also history of ETOH abuse which was confirmed by patient's daughter    7) Anemia   - Hb stable  - iron studies reviewed  - start ferrous sulfate  - Monitor closely    DVT Prophylaxis - SCDs     Dispo - VEEG discontinued. MRI brain ordered. Eventual plans for TANIKA once medically stable.

## 2022-09-24 NOTE — PROGRESS NOTE ADULT - SUBJECTIVE AND OBJECTIVE BOX
CC: Follow up     INTERVAL HPI/OVERNIGHT EVENTS:  Patient seen and examined, mother at bedside. Awake following basic commands       Vital Signs Last 24 Hrs  T(C): 36.3 (24 Sep 2022 10:07), Max: 36.9 (23 Sep 2022 17:32)  T(F): 97.4 (24 Sep 2022 10:07), Max: 98.4 (23 Sep 2022 17:32)  HR: 83 (24 Sep 2022 10:07) (80 - 83)  BP: 101/61 (24 Sep 2022 10:19) (99/61 - 118/80)  BP(mean): --  RR: 18 (24 Sep 2022 10:07) (18 - 18)  SpO2: 96% (24 Sep 2022 10:07) (96% - 98%)    Parameters below as of 24 Sep 2022 10:07  Patient On (Oxygen Delivery Method): room air        PHYSICAL EXAM:    GENERAL: NAD  HEAD:  Status post crani  EYES: conjunctiva and sclera clear  ENMT: Moist mucous membranes  NECK: Supple  NERVOUS SYSTEM:  Alert & Oriented X0, able to lift right UE   CHEST/LUNG: Clear to auscultation bilaterally; No rales, rhonchi, wheezing, or rubs  HEART: Regular rate and rhythm; No murmurs, rubs, or gallops  ABDOMEN: Soft, Nontender, Nondistended; Bowel sounds present  +PEG  EXTREMITIES:  2+ Peripheral Pulses, No clubbing, cyanosis, or edema        MEDICATIONS  (STANDING):  ascorbic acid 500 milliGRAM(s) Oral daily  chlorhexidine 2% Cloths 1 Application(s) Topical daily  enoxaparin Injectable 40 milliGRAM(s) SubCutaneous every 24 hours  ferrous    sulfate Liquid 300 milliGRAM(s) Enteral Tube daily  influenza   Vaccine 0.5 milliLiter(s) IntraMuscular once  levETIRAcetam  Solution 1000 milliGRAM(s) Enteral Tube two times a day  melatonin 5 milliGRAM(s) Oral at bedtime  memantine 5 milliGRAM(s) Oral every 12 hours  multivitamin 1 Tablet(s) Oral daily  polyethylene glycol 3350 17 Gram(s) Oral two times a day  senna 1 Tablet(s) Oral daily  sodium chloride 1 Gram(s) Oral every 12 hours    MEDICATIONS  (PRN):  hydrALAZINE Injectable 10 milliGRAM(s) IV Push every 4 hours PRN SBP > 150      Allergies    Allergy Status Unknown    Intolerances          LABS:                          10.6   2.59  )-----------( 196      ( 24 Sep 2022 07:30 )             31.3     09-24    135  |  99  |  15.0  ----------------------------<  116<H>  4.3   |  24.0  |  0.52    Ca    8.9      24 Sep 2022 07:30  Phos  4.1     09-24  Mg     1.6     09-24            RADIOLOGY & ADDITIONAL TESTS:

## 2022-09-25 LAB — GLUCOSE BLDC GLUCOMTR-MCNC: 117 MG/DL — HIGH (ref 70–99)

## 2022-09-25 PROCEDURE — 99233 SBSQ HOSP IP/OBS HIGH 50: CPT

## 2022-09-25 RX ADMIN — POLYETHYLENE GLYCOL 3350 17 GRAM(S): 17 POWDER, FOR SOLUTION ORAL at 05:41

## 2022-09-25 RX ADMIN — SODIUM CHLORIDE 1 GRAM(S): 9 INJECTION INTRAMUSCULAR; INTRAVENOUS; SUBCUTANEOUS at 17:09

## 2022-09-25 RX ADMIN — SODIUM CHLORIDE 1 GRAM(S): 9 INJECTION INTRAMUSCULAR; INTRAVENOUS; SUBCUTANEOUS at 05:41

## 2022-09-25 RX ADMIN — Medication 500 MILLIGRAM(S): at 17:05

## 2022-09-25 RX ADMIN — MEMANTINE HYDROCHLORIDE 5 MILLIGRAM(S): 10 TABLET ORAL at 05:41

## 2022-09-25 RX ADMIN — Medication 1 TABLET(S): at 17:09

## 2022-09-25 RX ADMIN — Medication 300 MILLIGRAM(S): at 17:07

## 2022-09-25 RX ADMIN — LEVETIRACETAM 1000 MILLIGRAM(S): 250 TABLET, FILM COATED ORAL at 05:41

## 2022-09-25 RX ADMIN — POLYETHYLENE GLYCOL 3350 17 GRAM(S): 17 POWDER, FOR SOLUTION ORAL at 17:08

## 2022-09-25 RX ADMIN — CHLORHEXIDINE GLUCONATE 1 APPLICATION(S): 213 SOLUTION TOPICAL at 17:18

## 2022-09-25 RX ADMIN — LEVETIRACETAM 1000 MILLIGRAM(S): 250 TABLET, FILM COATED ORAL at 17:08

## 2022-09-25 RX ADMIN — ENOXAPARIN SODIUM 40 MILLIGRAM(S): 100 INJECTION SUBCUTANEOUS at 21:32

## 2022-09-25 RX ADMIN — SENNA PLUS 1 TABLET(S): 8.6 TABLET ORAL at 17:09

## 2022-09-25 RX ADMIN — MEMANTINE HYDROCHLORIDE 5 MILLIGRAM(S): 10 TABLET ORAL at 17:05

## 2022-09-25 RX ADMIN — Medication 5 MILLIGRAM(S): at 21:32

## 2022-09-25 NOTE — PROGRESS NOTE ADULT - SUBJECTIVE AND OBJECTIVE BOX
CC: Follow up     INTERVAL HPI/OVERNIGHT EVENTS: Patient seen and examined, no acute events overnight. Mother at bedside; seen with  ipad.   Awake alert spontaneously moving RUE and RLE       Vital Signs Last 24 Hrs  T(C): 36.6 (25 Sep 2022 10:21), Max: 37.1 (25 Sep 2022 04:53)  T(F): 97.8 (25 Sep 2022 10:21), Max: 98.7 (25 Sep 2022 04:53)  HR: 72 (25 Sep 2022 10:21) (72 - 88)  BP: 103/61 (25 Sep 2022 10:21) (103/61 - 130/85)  BP(mean): --  RR: 18 (25 Sep 2022 10:21) (18 - 18)  SpO2: 100% (25 Sep 2022 10:21) (96% - 100%)    Parameters below as of 25 Sep 2022 10:21  Patient On (Oxygen Delivery Method): room air        PHYSICAL EXAM:    GENERAL: NAD  HEAD:  Atraumatic, Normocephalic  EYES:  conjunctiva and sclera clear  ENMT: Moist mucous membranes  NECK: Supple  NERVOUS SYSTEM:  Alert & Oriented, spontaneously moving RUE and RLE not following commands; non verbal   CHEST/LUNG: Clear to auscultation bilaterally; No rales, rhonchi, wheezing, or rubs  HEART: Regular rate and rhythm; No murmurs, rubs, or gallops  ABDOMEN: Soft, Nontender, Nondistended; Bowel sounds present  +PEG  EXTREMITIES:  2+ Peripheral Pulses, No clubbing, cyanosis, or edema        MEDICATIONS  (STANDING):  ascorbic acid 500 milliGRAM(s) Oral daily  chlorhexidine 2% Cloths 1 Application(s) Topical daily  enoxaparin Injectable 40 milliGRAM(s) SubCutaneous every 24 hours  ferrous    sulfate Liquid 300 milliGRAM(s) Enteral Tube daily  influenza   Vaccine 0.5 milliLiter(s) IntraMuscular once  levETIRAcetam  Solution 1000 milliGRAM(s) Enteral Tube two times a day  melatonin 5 milliGRAM(s) Oral at bedtime  memantine 5 milliGRAM(s) Oral every 12 hours  multivitamin 1 Tablet(s) Oral daily  polyethylene glycol 3350 17 Gram(s) Oral two times a day  senna 1 Tablet(s) Oral daily  sodium chloride 1 Gram(s) Oral every 12 hours    MEDICATIONS  (PRN):  hydrALAZINE Injectable 10 milliGRAM(s) IV Push every 4 hours PRN SBP > 150      Allergies    Allergy Status Unknown    Intolerances          LABS:                          10.6   2.59  )-----------( 196      ( 24 Sep 2022 07:30 )             31.3     09-24    135  |  99  |  15.0  ----------------------------<  116<H>  4.3   |  24.0  |  0.52    Ca    8.9      24 Sep 2022 07:30  Phos  4.1     09-24  Mg     1.6     09-24            RADIOLOGY & ADDITIONAL TESTS:

## 2022-09-25 NOTE — PROGRESS NOTE ADULT - ASSESSMENT
Patient is a 42 year old male initially admitted on 5/24/22 (BIBEMS after found unresponsive on street) with a GCS of 3, found to have b/l intraparenchymal bleeds, b/l SDH. Has had a complex prolonged hospital course including but not limited to right decompressive craniectomy on 5/24. Trach / PEG., cranioplasty on 6/29 with cognitive decline after initial improvement prompting imaging which noted subacute collection with air underneath the cranioplasty sight. MRI with increase in fluid collection underneath the flap leading to a repeat right craniectomy with wound exploration & evacuation of fluid collection on 7/17 with OR cultures isolating MSSA. Antimicrobial regimen adjusted (ID), (suspected) central SIADH now improved (renal signed off). Has had interval trach decannulation and dietary advancement to pureed. Now s/p completion of appropriate antimicrobial course with f/u imaging (9/3) revealing no abnormal enhancement (6 mm midline shift to left). After being optimized and cleared for surgery had a fever (9/9/22). Infectious w/u negative. Now has been afebrile. Patient then underwent a cranioplasty with replacement bone flap on 9/19. Post-op course complicated by left facial droop, left sided weakness and right hand twitching. Patient was placed on VEEG and loaded with keppra. Epilepsy was consulted and patient is now medically stable for downgrade to the hospitalist team. Given changes in neurologic status, patient was placed back on a tube feed diet. VEEG was negative for seizure activity and MRI brain was ordered.    Assessment/Plan:     1) Traumatic brain injury with complicated hospital course (as above) now with possible encephalopathy  -CT head with bilateral IPH and bilateral SDH s/p decompressive craniotomy on 5/24  -s/p cranioplasty on 6/29, repeat craniotomy on 7/17 and final cranioplasty with replacement bone flap on 9/19   - patient had significantly improved neurologically prior to most recent cranioplasty on 9/19 (eating pureed and able to ambulate with PT)  - patient is now unable to follow commands with left hemiparesis; placed back on a TF diet   - completed VEEG without seizure activity  **MRI brain repeated status post cranioplasty, encephalomalacia; Small subdural hygroma in the right parafalcine convexity; mild white matter ischemia with old lacunar infarcts in the bilateral basal ganglia    - Requested neurosurgery follow up   - Continue Memantine 5 mg BID   - Continue Melatonin at bedtime  - Continue Keppra but adjust to 1000 mg BID  - Continue neurochecks  - Epilepsy consult appreciated  - Seizure/Aspiration/Fall Precautions  - Prognosis guarded at best; palliative care consulted    2) Central SIADH - resolved  - sodium WNL  - s/p Tolvaptan and Urea   - Continue sodium chloride tablets  - Monitor labs closely    3) Right Pubic Rami Fracture  - subacute  - incidentally found on CT  - Ortho consult appreciated  - WBAT (patient was ambulating with PT prior to cranioplasty on 9/19)  - PT re-evaluation once patient is able to follow commands    4)  CNS infection  - s/p right decompressive hemicraniectomy on 5/24  - blood cultures negative  - Completed Nafcillin and Vancomycin  - MRI after completion of abx without abnormal enhancement  - ID follow up noted, monitor off abx    5) Oropharyngeal Dysphagia  -prior to recent cranioplasty on 9/19, patient was on a pureed diet  -given acute changes in neurologic condition, patient is not following commands and will not open his mouth for PO trials  -changed to tube feeds; goal increased to 60cc/hr  -maintain aspiration precautions  -SLP reconsult if patient able to follow commands    6) History of Substance abuse  -UDS positive for cocaine on admission  -also history of ETOH abuse which was confirmed by patient's daughter    7) Anemia   - Hb stable  - iron studies reviewed  - start ferrous sulfate  - Monitor closely    DVT Prophylaxis - SCDs     Discussed with patient's mother at bedside

## 2022-09-26 LAB
ANION GAP SERPL CALC-SCNC: 11 MMOL/L — SIGNIFICANT CHANGE UP (ref 5–17)
BUN SERPL-MCNC: 18.8 MG/DL — SIGNIFICANT CHANGE UP (ref 8–20)
CALCIUM SERPL-MCNC: 9.3 MG/DL — SIGNIFICANT CHANGE UP (ref 8.4–10.5)
CHLORIDE SERPL-SCNC: 100 MMOL/L — SIGNIFICANT CHANGE UP (ref 98–107)
CO2 SERPL-SCNC: 27 MMOL/L — SIGNIFICANT CHANGE UP (ref 22–29)
CREAT SERPL-MCNC: 0.61 MG/DL — SIGNIFICANT CHANGE UP (ref 0.5–1.3)
EGFR: 123 ML/MIN/1.73M2 — SIGNIFICANT CHANGE UP
GLUCOSE SERPL-MCNC: 118 MG/DL — HIGH (ref 70–99)
HCT VFR BLD CALC: 31.8 % — LOW (ref 39–50)
HGB BLD-MCNC: 11.2 G/DL — LOW (ref 13–17)
MCHC RBC-ENTMCNC: 28.7 PG — SIGNIFICANT CHANGE UP (ref 27–34)
MCHC RBC-ENTMCNC: 35.2 GM/DL — SIGNIFICANT CHANGE UP (ref 32–36)
MCV RBC AUTO: 81.5 FL — SIGNIFICANT CHANGE UP (ref 80–100)
PLATELET # BLD AUTO: 236 K/UL — SIGNIFICANT CHANGE UP (ref 150–400)
POTASSIUM SERPL-MCNC: 4.2 MMOL/L — SIGNIFICANT CHANGE UP (ref 3.5–5.3)
POTASSIUM SERPL-SCNC: 4.2 MMOL/L — SIGNIFICANT CHANGE UP (ref 3.5–5.3)
RBC # BLD: 3.9 M/UL — LOW (ref 4.2–5.8)
RBC # FLD: 15.5 % — HIGH (ref 10.3–14.5)
SODIUM SERPL-SCNC: 138 MMOL/L — SIGNIFICANT CHANGE UP (ref 135–145)
WBC # BLD: 3.28 K/UL — LOW (ref 3.8–10.5)
WBC # FLD AUTO: 3.28 K/UL — LOW (ref 3.8–10.5)

## 2022-09-26 PROCEDURE — 99233 SBSQ HOSP IP/OBS HIGH 50: CPT

## 2022-09-26 PROCEDURE — 99232 SBSQ HOSP IP/OBS MODERATE 35: CPT

## 2022-09-26 RX ADMIN — CHLORHEXIDINE GLUCONATE 1 APPLICATION(S): 213 SOLUTION TOPICAL at 12:03

## 2022-09-26 RX ADMIN — Medication 500 MILLIGRAM(S): at 12:03

## 2022-09-26 RX ADMIN — LEVETIRACETAM 1000 MILLIGRAM(S): 250 TABLET, FILM COATED ORAL at 17:54

## 2022-09-26 RX ADMIN — MEMANTINE HYDROCHLORIDE 5 MILLIGRAM(S): 10 TABLET ORAL at 17:55

## 2022-09-26 RX ADMIN — Medication 1 TABLET(S): at 12:03

## 2022-09-26 RX ADMIN — SODIUM CHLORIDE 1 GRAM(S): 9 INJECTION INTRAMUSCULAR; INTRAVENOUS; SUBCUTANEOUS at 05:02

## 2022-09-26 RX ADMIN — LEVETIRACETAM 1000 MILLIGRAM(S): 250 TABLET, FILM COATED ORAL at 05:02

## 2022-09-26 RX ADMIN — Medication 5 MILLIGRAM(S): at 21:29

## 2022-09-26 RX ADMIN — POLYETHYLENE GLYCOL 3350 17 GRAM(S): 17 POWDER, FOR SOLUTION ORAL at 05:02

## 2022-09-26 RX ADMIN — MEMANTINE HYDROCHLORIDE 5 MILLIGRAM(S): 10 TABLET ORAL at 05:01

## 2022-09-26 RX ADMIN — Medication 300 MILLIGRAM(S): at 12:04

## 2022-09-26 RX ADMIN — SENNA PLUS 1 TABLET(S): 8.6 TABLET ORAL at 12:03

## 2022-09-26 RX ADMIN — POLYETHYLENE GLYCOL 3350 17 GRAM(S): 17 POWDER, FOR SOLUTION ORAL at 17:54

## 2022-09-26 RX ADMIN — Medication 10 MILLIGRAM(S): at 13:14

## 2022-09-26 RX ADMIN — ENOXAPARIN SODIUM 40 MILLIGRAM(S): 100 INJECTION SUBCUTANEOUS at 21:29

## 2022-09-26 RX ADMIN — SODIUM CHLORIDE 1 GRAM(S): 9 INJECTION INTRAMUSCULAR; INTRAVENOUS; SUBCUTANEOUS at 17:55

## 2022-09-26 NOTE — PROGRESS NOTE ADULT - SUBJECTIVE AND OBJECTIVE BOX
OVERNIGHT EVENTS:  F/U Note  No new events overnight  Patient still lethargic, sleeping most of morning  NPO not eating, peg tube feedings resumed and tolerated  Moving R upper and lower extremities  Last BM days    Present Symptoms:   Dyspnea: Unlabored room air  Nausea/Vomiting:  No  Anxiety:   No  Depression:  No  Fatigue: Yes   Loss of appetite: Yes   Constipation: yes    Pain: No pain behaviors observed            Character-            Duration-            Effect-            Factors-            Frequency-            Location-            Severity-    Review of Systems: Reviewed                      Unable to obtain due to poor mentation     MEDICATIONS  (STANDING):  ascorbic acid 500 milliGRAM(s) Oral daily  chlorhexidine 2% Cloths 1 Application(s) Topical daily  enoxaparin Injectable 40 milliGRAM(s) SubCutaneous every 24 hours  ferrous    sulfate Liquid 300 milliGRAM(s) Enteral Tube daily  influenza   Vaccine 0.5 milliLiter(s) IntraMuscular once  levETIRAcetam  Solution 1000 milliGRAM(s) Enteral Tube two times a day  melatonin 5 milliGRAM(s) Oral at bedtime  memantine 5 milliGRAM(s) Oral every 12 hours  multivitamin 1 Tablet(s) Oral daily  polyethylene glycol 3350 17 Gram(s) Oral two times a day  senna 1 Tablet(s) Oral daily  sodium chloride 1 Gram(s) Oral every 12 hours    MEDICATIONS  (PRN):  hydrALAZINE Injectable 10 milliGRAM(s) IV Push every 4 hours PRN SBP > 150    PHYSICAL EXAM:    Vital Signs Last 24 Hrs  T(C): 36.8 (26 Sep 2022 15:00), Max: 37.2 (25 Sep 2022 19:45)  T(F): 98.3 (26 Sep 2022 15:00), Max: 98.9 (25 Sep 2022 19:45)  HR: 65 (26 Sep 2022 15:00) (65 - 85)  BP: 97/61 (26 Sep 2022 15:00) (90/57 - 124/81)  BP(mean): --  RR: 18 (26 Sep 2022 15:00) (18 - 18)  SpO2: 96% (26 Sep 2022 15:00) (94% - 98%)    Parameters below as of 26 Sep 2022 15:00  Patient On (Oxygen Delivery Method): room air    General: x ____ lethargic sleepy                   nonverbal      Karnofsky: 10%    HEENT:  dry mouth      Lungs: comfortable     CV: normal      GI: distended                PEG/ tube  constipation  last BM:     :  higinio    MSK:  weakness  L sided paralysis           bedbound    Skin  no rash    LABS:                          11.2   3.28  )-----------( 236      ( 26 Sep 2022 04:45 )             31.8     09-26    138  |  100  |  18.8  ----------------------------<  118<H>  4.2   |  27.0  |  0.61    Ca    9.3      26 Sep 2022 04:45    I&O's Summary    RADIOLOGY & ADDITIONAL STUDIES:  < from: MR Head No Cont (09.24.22 @ 16:33) >  INTERPRETATION:  MR brain  without gadolinium    CLINICAL INFORMATION: LEFT-sided weakness, facial droop    TECHNIQUE:   Sagittal and axial T1-weighted images, axial FLAIR images,   axial gradient echo and T2-weighted images and axial diffusion weighted   images of the brain were obtained.    FINDINGS:   MRI dated 9/2/22 available for review.      IMPRESSION:   Interval RIGHT frontoparietal cranioplasty.   Encephalomalacia and gliosis is seen in the anterior medial RIGHT frontal   lobe. Small subdural hygroma is now noted in the RIGHT parafalcine   convexity. Mild periventricular white matter ischemia with old lacunar   infarctions in the BILATERAL basal ganglia.    < end of copied text >      ADVANCE DIRECTIVES/TREATMENT PREFERENCES:  DNR NO  Completed on:                     MOLST   NO   Completed on:  Living Will   NO   Completed on:

## 2022-09-26 NOTE — PROGRESS NOTE ADULT - SUBJECTIVE AND OBJECTIVE BOX
CC: Follow up     INTERVAL HPI/OVERNIGHT EVENTS: Patient seen and examined, no acute events overnight. Awake following basic commands RUE and RLE extremity, non verbal      Vital Signs Last 24 Hrs  T(C): 36.3 (26 Sep 2022 04:00), Max: 37.2 (25 Sep 2022 19:45)  T(F): 97.4 (26 Sep 2022 04:00), Max: 98.9 (25 Sep 2022 19:45)  HR: 70 (26 Sep 2022 04:00) (70 - 85)  BP: 116/72 (26 Sep 2022 04:00) (103/61 - 124/81)  BP(mean): --  RR: 18 (26 Sep 2022 04:00) (18 - 18)  SpO2: 98% (26 Sep 2022 04:00) (98% - 100%)    Parameters below as of 26 Sep 2022 04:00  Patient On (Oxygen Delivery Method): room air        PHYSICAL EXAM:    GENERAL: NAD, Awake alert   HEAD:  Atraumatic, Normocephalic  EYES: , conjunctiva and sclera clear  ENMT: Moist mucous membranes  NECK: Supple  NERVOUS SYSTEM:  Alert; Grasps Right Hand, spontaneously moves RUE and RLE  CHEST/LUNG: Clear to auscultation bilaterally; No rales, rhonchi, wheezing, or rubs  HEART: Regular rate and rhythm; No murmurs, rubs, or gallops  ABDOMEN: Soft, Nontender, Nondistended; Bowel sounds present  +PEG tube  EXTREMITIES:  2+ Peripheral Pulses, No clubbing, cyanosis, or edema        MEDICATIONS  (STANDING):  ascorbic acid 500 milliGRAM(s) Oral daily  chlorhexidine 2% Cloths 1 Application(s) Topical daily  enoxaparin Injectable 40 milliGRAM(s) SubCutaneous every 24 hours  ferrous    sulfate Liquid 300 milliGRAM(s) Enteral Tube daily  influenza   Vaccine 0.5 milliLiter(s) IntraMuscular once  levETIRAcetam  Solution 1000 milliGRAM(s) Enteral Tube two times a day  melatonin 5 milliGRAM(s) Oral at bedtime  memantine 5 milliGRAM(s) Oral every 12 hours  multivitamin 1 Tablet(s) Oral daily  polyethylene glycol 3350 17 Gram(s) Oral two times a day  senna 1 Tablet(s) Oral daily  sodium chloride 1 Gram(s) Oral every 12 hours    MEDICATIONS  (PRN):  hydrALAZINE Injectable 10 milliGRAM(s) IV Push every 4 hours PRN SBP > 150      Allergies    Allergy Status Unknown    Intolerances          LABS:                          11.2   3.28  )-----------( 236      ( 26 Sep 2022 04:45 )             31.8     09-26    138  |  100  |  18.8  ----------------------------<  118<H>  4.2   |  27.0  |  0.61    Ca    9.3      26 Sep 2022 04:45            RADIOLOGY & ADDITIONAL TESTS:

## 2022-09-26 NOTE — PROGRESS NOTE ADULT - SUPERVISING ATTENDING
Zachery Millan MD
Dr. Mccurdy-Hospitalist
Zachery Millan MD

## 2022-09-26 NOTE — PROGRESS NOTE ADULT - SUBJECTIVE AND OBJECTIVE BOX
HPI: Patient is a 25y old M brought by EMS, found down in the street unresponsive.   CXR: No evident PTX or Hemothorax, ET tube in place.  (24 May 2022 01:09)        INTERVAL OVERNIGHT EVENTS: 9/26   42y Male seen lying comfortably in bed.     Vital Signs Last 24 Hrs  T(C): 36.6 (26 Sep 2022 10:10), Max: 37.2 (25 Sep 2022 19:45)  T(F): 97.8 (26 Sep 2022 10:10), Max: 98.9 (25 Sep 2022 19:45)  HR: 85 (26 Sep 2022 10:10) (66 - 85)  BP: 105/70 (26 Sep 2022 10:10) (90/57 - 124/81)  BP(mean): --  RR: 18 (26 Sep 2022 10:10) (18 - 18)  SpO2: 98% (26 Sep 2022 10:10) (94% - 98%)    Parameters below as of 26 Sep 2022 10:10  Patient On (Oxygen Delivery Method): room air        PHYSICAL EXAM:  GENERAL: NAD,  well-developed male  WOUND: Incision intact, no drainage, non tender, no erythema.   MENTAL STATUS:  Awake, non-verbal,  following simple commands   MOTOR: ? left arm paresis, no hand grasp, I lifted his arm off the bed, he was able to hold his arm anti-gravity,  was able to resist me pulling activating his bicep muscle, not much tricep, mild spontaneous movement on his own, lifts both lower extremities off the bed, wiggles toes bilaterally   SENSATION:  appears grossly intact to light touch   EXTREMITIES:  no clubbing, cyanosis, or edema  SKIN: Warm, dry; no rashes or lesions    LABS:                        11.2   3.28  )-----------( 236      ( 26 Sep 2022 04:45 )             31.8     09-26    138  |  100  |  18.8  ----------------------------<  118<H>  4.2   |  27.0  |  0.61    Ca    9.3      26 Sep 2022 04:45              RADIOLOGY & ADDITIONAL TESTS:          CAPRINI SCORE [CLOT]:  Patient has an estimated Caprini score of greater than 5.  However, the patient's unique clinical situation will be addressed in an individual manner to determine appropriate anticoagulation treatment, if any.

## 2022-09-26 NOTE — PROGRESS NOTE ADULT - ASSESSMENT
41 y/o male s/p cranioplasty on 9/19/22, now POD #7.     1. CTB today  2. Dr Youssef following: . Twitching of right hand on 9/19: Continuous right frontocentral LPD on EEG, which does not explain the right hand twitching. However, patient is at risk for seizures and the hand twitching could have been surface-negative focal motor seizure. Recommend to continue ASM. Seizure precautions.    3. Cont chemical DVT prophylaxis  4. PT/OT services  5. Cont medical care as per primary team         Pt discussed on morning rounds & agreed on plan above

## 2022-09-26 NOTE — PROGRESS NOTE ADULT - ASSESSMENT
Patient admitted here with TBI, S/P multiple Craniotomy's, Cranioplasty's the last being 9/19.  Post-op course complicated by left facial droop, left sided weakness and right hand twitching. Given changes in neurologic status; patient was placed on VEEG and loaded with Keppra.  VEEG completed, abnormal but no seizures detected.   Patient NPO status;  now  needs nutrition via  Peg tube        Traumatic brain injury    Post Cranioplasty POD #4 possible encephalopathy    Submesh drain removed on 9/21 and subgaleal drain was removed yesterday   Prior to most recent cranioplasty on 9/19 (eating pureed and able to ambulate with PT)   Now unable to follow commands and is lethargic; placed back on a TF diet    VEEG-completed:  Neurochecks   Continue:  Memantine 5 mg BID                    Melatonin at bedtime                   Keppra  DOSE ADJUSTED                    Melatonin at bedtime   Epilepsy consult appreciated   seizure/Aspiration/Fall Precautions    CNS infection  s/p right decompressive Hemicraniectomy on 5/24  BC's (-)  Completed Nafcillin and Vancomycin  MRI done after ABX are without abnormal enhancement    Oropharyngeal Dysphagia  Prior to recent cranioplasty on 9/19, patient was on a pureed diet  Today he does  not open his mouth for PO trials  Acute changes in neurologic condition, including not able to follow commands  Reverted back to  tube feeds tolerating 60ml/hr  Maintain aspiration precautions  SLP reconsult if patient able to follow commands    History of Substance abuse  UDS positive for cocaine on admission  also PMH of ETOH abuse which was confirmed by patient's sister  Patient's ability to cope with Neuro deficits and projected lengthy rehab questionable.  Will need ultimate support, biggest challenge ahead  Opioid Tolerant-if medications for severe  pain needed-would order long and short acting -multimodal analgesic plan    PLAN  Prognosis guarded   Eventual plan for TANIKA although patient is undocumented.   Palliative Support requested, spoke to Patient's Mother Rama this morning using Student Nurse as   She is concerned about her son's mental status deterioration, prognosis what to expect?? I told her we are giving him more time,  to allow post-op swelling in his brain to resolve, hoping this will lead to mental status improvement                                                   Patient admitted here with TBI, S/P multiple Craniotomy's, Cranioplasty's the last being 9/19.  Post-op course complicated by left facial droop, left sided weakness and right hand twitching. Given changes in neurologic status; patient was placed on VEEG and loaded with Keppra.  VEEG completed, abnormal but no seizures detected.   Patient NPO status;  now  needs nutrition via  Peg tube        Traumatic brain injury    Post Cranioplasty POD #4 possible encephalopathy    Submesh drain removed on 9/21 and subgaleal drain was removed yesterday   Prior to most recent cranioplasty on 9/19 (eating pureed and able to ambulate with PT)   Now unable to follow commands and is lethargic; placed back on a TF diet    VEEG-completed:  Neurochecks   Continue:  Memantine 5 mg BID                    Melatonin at bedtime                   Keppra  DOSE ADJUSTED                    Melatonin at bedtime   Epilepsy consult appreciated   seizure/Aspiration/Fall Precautions    CNS infection  s/p right decompressive Hemicraniectomy on 5/24  BC's (-) WBC 3.28  H/H stable  11.2/31.8  Completed Nafcillin and Vancomycin  MRI done after ABX are without abnormal enhancement    Oropharyngeal Dysphagia  Prior to recent cranioplasty on 9/19, patient was on a pureed diet  Today he does  not open his mouth for PO trials  Acute changes in neurologic condition, including not able to follow commands  Reverted back to  tube feeds tolerating 60ml/hr  Maintain aspiration precautions  SLP reconsult if patient able to follow commands    History of Substance abuse  UDS positive for cocaine on admission  also PMH of ETOH abuse which was confirmed by patient's sister  Patient's ability to cope with Neuro deficits and projected lengthy rehab questionable.  Will need ultimate support, biggest challenge ahead  Opioid Tolerant-if medications for severe  pain needed-would order long and short acting -multimodal analgesic plan    PLAN  Prognosis guarded   Eventual plan for TANIKA although patient is undocumented.   Palliative Support requested, spoke to Patient's Mother Rama this morning using Student Nurse as   She is concerned about her son's mental status deterioration, prognosis what to expect?? I told her we are giving him more time,  to allow post-op swelling in his brain to resolve, hoping this will lead to mental status improvement  Mother Rama at bedside daily for hours, needs our support, and is agreeing to  visit for prayers and to further support her son's recovery.

## 2022-09-26 NOTE — PROGRESS NOTE ADULT - NS MD NEURO CONDITIONS_ENCEPHAL
Neurological

## 2022-09-27 LAB — SARS-COV-2 RNA SPEC QL NAA+PROBE: SIGNIFICANT CHANGE UP

## 2022-09-27 PROCEDURE — 99233 SBSQ HOSP IP/OBS HIGH 50: CPT

## 2022-09-27 PROCEDURE — 70450 CT HEAD/BRAIN W/O DYE: CPT | Mod: 26

## 2022-09-27 PROCEDURE — 99232 SBSQ HOSP IP/OBS MODERATE 35: CPT

## 2022-09-27 RX ORDER — ACETAMINOPHEN 500 MG
650 TABLET ORAL EVERY 6 HOURS
Refills: 0 | Status: DISCONTINUED | OUTPATIENT
Start: 2022-09-27 | End: 2022-09-30

## 2022-09-27 RX ADMIN — Medication 1 TABLET(S): at 17:16

## 2022-09-27 RX ADMIN — MEMANTINE HYDROCHLORIDE 5 MILLIGRAM(S): 10 TABLET ORAL at 17:15

## 2022-09-27 RX ADMIN — SODIUM CHLORIDE 1 GRAM(S): 9 INJECTION INTRAMUSCULAR; INTRAVENOUS; SUBCUTANEOUS at 06:17

## 2022-09-27 RX ADMIN — LEVETIRACETAM 1000 MILLIGRAM(S): 250 TABLET, FILM COATED ORAL at 17:16

## 2022-09-27 RX ADMIN — ENOXAPARIN SODIUM 40 MILLIGRAM(S): 100 INJECTION SUBCUTANEOUS at 21:38

## 2022-09-27 RX ADMIN — POLYETHYLENE GLYCOL 3350 17 GRAM(S): 17 POWDER, FOR SOLUTION ORAL at 06:17

## 2022-09-27 RX ADMIN — Medication 650 MILLIGRAM(S): at 14:09

## 2022-09-27 RX ADMIN — Medication 500 MILLIGRAM(S): at 17:15

## 2022-09-27 RX ADMIN — SODIUM CHLORIDE 1 GRAM(S): 9 INJECTION INTRAMUSCULAR; INTRAVENOUS; SUBCUTANEOUS at 17:16

## 2022-09-27 RX ADMIN — SENNA PLUS 1 TABLET(S): 8.6 TABLET ORAL at 17:16

## 2022-09-27 RX ADMIN — POLYETHYLENE GLYCOL 3350 17 GRAM(S): 17 POWDER, FOR SOLUTION ORAL at 17:16

## 2022-09-27 RX ADMIN — CHLORHEXIDINE GLUCONATE 1 APPLICATION(S): 213 SOLUTION TOPICAL at 17:17

## 2022-09-27 RX ADMIN — MEMANTINE HYDROCHLORIDE 5 MILLIGRAM(S): 10 TABLET ORAL at 06:17

## 2022-09-27 RX ADMIN — LEVETIRACETAM 1000 MILLIGRAM(S): 250 TABLET, FILM COATED ORAL at 06:17

## 2022-09-27 RX ADMIN — Medication 5 MILLIGRAM(S): at 21:38

## 2022-09-27 RX ADMIN — Medication 300 MILLIGRAM(S): at 17:17

## 2022-09-27 RX ADMIN — Medication 650 MILLIGRAM(S): at 13:02

## 2022-09-27 NOTE — CHART NOTE - NSCHARTNOTEFT_GEN_A_CORE
Source: Patient [ ]  Family [ ]   other [ x]EMR    Current Diet: NPO with tube feeds      Enteral /Parenteral Nutrition:   Pivot to 60cchr x 20 hrs  provides 1200cc, 1800kcal, 112gr pro    Current Weight:   Weight: (9/20) 118.3 lbs, 123.4# 9/22  (9/13) 119.9 lbs  (7/18) 131.8 lbs  (7/17) 114.8 lbs  (7/16) 126.7 lbs  (7/12)  117.2 lbs   (7/5)   141.7 lbs  (7/2)   147.2 lbs   (6/5)  160.7 lbs  (5/27)  188.4 lbs   (5/25)  160 lbs   Pt likely with significant weight loss due to prolonged hospitalization and complex medical course  Noted with no edema as per flow sheets continue to trend and maintain strict Is&Os       % Weight Change     Pertinent Medications: MEDICATIONS  (STANDING):  ascorbic acid 500 milliGRAM(s) Oral daily  chlorhexidine 2% Cloths 1 Application(s) Topical daily  enoxaparin Injectable 40 milliGRAM(s) SubCutaneous every 24 hours  ferrous    sulfate Liquid 300 milliGRAM(s) Enteral Tube daily  influenza   Vaccine 0.5 milliLiter(s) IntraMuscular once  levETIRAcetam  Solution 1000 milliGRAM(s) Enteral Tube two times a day  melatonin 5 milliGRAM(s) Oral at bedtime  memantine 5 milliGRAM(s) Oral every 12 hours  multivitamin 1 Tablet(s) Oral daily  polyethylene glycol 3350 17 Gram(s) Oral two times a day  senna 1 Tablet(s) Oral daily  sodium chloride 1 Gram(s) Oral every 12 hours    MEDICATIONS  (PRN):  hydrALAZINE Injectable 10 milliGRAM(s) IV Push every 4 hours PRN SBP > 150    Pertinent Labs: CBC Full  -  ( 26 Sep 2022 04:45 )  WBC Count : 3.28 K/uL  RBC Count : 3.90 M/uL  Hemoglobin : 11.2 g/dL  Hematocrit : 31.8 %  Platelet Count - Automated : 236 K/uL  Mean Cell Volume : 81.5 fl  Mean Cell Hemoglobin : 28.7 pg  Mean Cell Hemoglobin Concentration : 35.2 gm/dL  Auto Neutrophil # : x  Auto Lymphocyte # : x  Auto Monocyte # : x  Auto Eosinophil # : x  Auto Basophil # : x  Auto Neutrophil % : x  Auto Lymphocyte % : x  Auto Monocyte % : x  Auto Eosinophil % : x  Auto Basophil % : x      09-26 Na138 mmol/L Glu 118 mg/dL<H> K+ 4.2 mmol/L Cr  0.61 mg/dL BUN 18.8 mg/dL Phos n/a   Alb n/a   PAB n/a           Skin: craniotomy surgery    Nutrition focused physical exam conducted - found signs of malnutrition [ ]absent [ x]present    Subcutaneous fat loss: [ x] Orbital fat pads region, [x ]Buccal fat region, [ x]Triceps region,  [ ]Ribs region    Muscle wasting: [x ]Temples region, [x ]Clavicle region, [x ]Shoulder region, [ ]Scapula region, [ ]Interosseous region,  [ ]thigh region, [ ]Calf region    Estimated Needs:   [x ] no change since previous assessment  [ ] recalculated:     Current Nutrition Diagnosis:  Diagnosis:  Pt remains at high nutrition risk secondary to malnutrition (severe, chronic) related to inability to meet sufficient protein-energy in setting of b/l intraparenchymal bleeds, b/l SDH, right decompressive craniectomy, trach/PEG, cranioplasty on 6/29 with cognitive decline after initial improvement prompting imaging which noted subacute collection with air underneath the cranioplasty sight, increase in fluid collection underneath the flap leading to a repeat right craniectomy with wound exploration & evacuation of fluid collection on 7/17 with OR cultures isolating MSSA, now s/p OR for right cranioplasty with complex wound closure as evidenced by meeting <75% nutrient needs > 1 mo, severe muscle loss of clavicles and shoulders, severe fat loss of orbitals and buccal pads, significant weight loss around 31# since admission x 4 mo.  Pt s/p OR x 4 times, crani 9/19. Now on TF on continuous feeding at target goal rate tolerating well.  RD to follow up.     Recommendations:   1) Continue Pivot to 60cchr target goal rate  2) Continue MVI and vitamin C supplementation, [id number]  3) Obtain daily weights to monitor trends.      Monitoring and Evaluation:   [ ] PO intake [x ] Tolerance to diet prescription [X] Weights  [X] Follow up per protocol [X] Labs:

## 2022-09-27 NOTE — PROGRESS NOTE ADULT - NS ATTEND BILL GEN_ALL_CORE
Attending to bill
PA/NP to bill
Attending to bill
PA/NP to bill
Attending to bill
PA/NP to bill
Attending to bill
PA/NP to bill

## 2022-09-27 NOTE — PROGRESS NOTE ADULT - SUBJECTIVE AND OBJECTIVE BOX
OVERNIGHT EVENTS:  F/U visit  No new events overnight  Patient wide awake this morning  Following simple commands  When asked  to squeeze my hand if he has pain+hand squeeze  ?Head or headache pain+Squeeze  Tylenol 650mg to be crushed and administered through pegtube  OOB and stood at bedside with PT this morning  No fever or chills    Present Symptoms:     Dyspnea: none  Nausea/Vomiting:  No  Anxiety:   No  Depression:  No  Fatigue: Yes more awake today  Loss of appetite: NPO peg tubefeedings  Constipation:     Pain: +headache/headpain            Character-            Duration-            Effect-            Factors-            Frequency-            Location-            Severity-        Review of Systems: Reviewed                   Unable to obtain due to poor mentation       MEDICATIONS  (STANDING):  ascorbic acid 500 milliGRAM(s) Oral daily  chlorhexidine 2% Cloths 1 Application(s) Topical daily  enoxaparin Injectable 40 milliGRAM(s) SubCutaneous every 24 hours  ferrous    sulfate Liquid 300 milliGRAM(s) Enteral Tube daily  influenza   Vaccine 0.5 milliLiter(s) IntraMuscular once  levETIRAcetam  Solution 1000 milliGRAM(s) Enteral Tube two times a day  melatonin 5 milliGRAM(s) Oral at bedtime  memantine 5 milliGRAM(s) Oral every 12 hours  multivitamin 1 Tablet(s) Oral daily  polyethylene glycol 3350 17 Gram(s) Oral two times a day  senna 1 Tablet(s) Oral daily  sodium chloride 1 Gram(s) Oral every 12 hours    MEDICATIONS  (PRN):  acetaminophen     Tablet .. 650 milliGRAM(s) Oral every 6 hours PRN Mild Pain (1 - 3)  hydrALAZINE Injectable 10 milliGRAM(s) IV Push every 4 hours PRN SBP > 150    PHYSICAL EXAM:    Vital Signs Last 24 Hrs  T(C): 36.9 (27 Sep 2022 09:46), Max: 36.9 (27 Sep 2022 09:46)  T(F): 98.4 (27 Sep 2022 09:46), Max: 98.4 (27 Sep 2022 09:46)  HR: 73 (27 Sep 2022 09:46) (65 - 90)  BP: 99/66 (27 Sep 2022 09:46) (96/65 - 100/65)  BP(mean): --  RR: 18 (27 Sep 2022 09:46) (17 - 18)  SpO2: 96% (27 Sep 2022 09:46) (96% - 98%)    Parameters below as of 27 Sep 2022 09:46  Patient On (Oxygen Delivery Method): room air    General: alert  oriented x _1___ awake             nonverbal      Karnofsky:  20%    HEENT: normal  dry mouth     Lungs: comfortable     CV: normal      GI: normal  distended                 PEG/ tube  constipation  last BM:     : normal  incontinent  texas catheter    MSK:   weakness Lsided               bedbound/wheelchair bound    Skin:   pressure ulcers-   no rash    LABS:                          11.2   3.28  )-----------( 236      ( 26 Sep 2022 04:45 )             31.8     09-26    138  |  100  |  18.8  ----------------------------<  118<H>  4.2   |  27.0  |  0.61    Ca    9.3      26 Sep 2022 04:45    I&O's Summary    26 Sep 2022 07:01  -  27 Sep 2022 07:00  --------------------------------------------------------  IN: 0 mL / OUT: 300 mL / NET: -300 mL    RADIOLOGY & ADDITIONAL STUDIES:    ADVANCE DIRECTIVES/TREATMENT PREFERENCES:  DNR  NO  Completed on:                     FATEMEH   NO   Completed on:  Living Will   NO   Completed on: OVERNIGHT EVENTS:  F/U visit  No new events overnight  Patient wide awake this morning  Following simple commands  When asked  to squeeze my hand if he has pain+hand squeeze  ?Head or headache pain+Squeeze  Tylenol 650mg to be crushed and administered through pegtube  OOB and stood at bedside with PT this morning  No fever or chills    Present Symptoms:     Dyspnea: none  Nausea/Vomiting:  No  Anxiety:   No  Depression:  No  Fatigue: Yes more awake today  Loss of appetite: NPO peg tubefeedings  Constipation:     Pain: +headache/headpain            Character-            Duration-            Effect-            Factors-            Frequency-            Location-            Severity-        Review of Systems: Reviewed                   Unable to obtain due to poor mentation       MEDICATIONS  (STANDING):  ascorbic acid 500 milliGRAM(s) Oral daily  chlorhexidine 2% Cloths 1 Application(s) Topical daily  enoxaparin Injectable 40 milliGRAM(s) SubCutaneous every 24 hours  ferrous    sulfate Liquid 300 milliGRAM(s) Enteral Tube daily  influenza   Vaccine 0.5 milliLiter(s) IntraMuscular once  levETIRAcetam  Solution 1000 milliGRAM(s) Enteral Tube two times a day  melatonin 5 milliGRAM(s) Oral at bedtime  memantine 5 milliGRAM(s) Oral every 12 hours  multivitamin 1 Tablet(s) Oral daily  polyethylene glycol 3350 17 Gram(s) Oral two times a day  senna 1 Tablet(s) Oral daily  sodium chloride 1 Gram(s) Oral every 12 hours    MEDICATIONS  (PRN):  acetaminophen     Tablet .. 650 milliGRAM(s) Oral every 6 hours PRN Mild Pain (1 - 3)  hydrALAZINE Injectable 10 milliGRAM(s) IV Push every 4 hours PRN SBP > 150    PHYSICAL EXAM:    Vital Signs Last 24 Hrs  T(C): 36.9 (27 Sep 2022 09:46), Max: 36.9 (27 Sep 2022 09:46)  T(F): 98.4 (27 Sep 2022 09:46), Max: 98.4 (27 Sep 2022 09:46)  HR: 73 (27 Sep 2022 09:46) (65 - 90)  BP: 99/66 (27 Sep 2022 09:46) (96/65 - 100/65)  BP(mean): --  RR: 18 (27 Sep 2022 09:46) (17 - 18)  SpO2: 96% (27 Sep 2022 09:46) (96% - 98%)    Parameters below as of 27 Sep 2022 09:46  Patient On (Oxygen Delivery Method): room air    General: alert  oriented x _1___ awake             nonverbal      Karnofsky:  20%    HEENT: normal  dry mouth     Lungs: comfortable     CV: normal      GI: normal  distended                 PEG/ tube  constipation  last BM:     : normal  incontinent  texas catheter    MSK:   weakness Lsided motor weakness-not able to lift arm or use hand              bedbound/wheelchair bound    Skin:   pressure ulcers-   no rash    LABS:                          11.2   3.28  )-----------( 236      ( 26 Sep 2022 04:45 )             31.8     09-26    138  |  100  |  18.8  ----------------------------<  118<H>  4.2   |  27.0  |  0.61    Ca    9.3      26 Sep 2022 04:45    I&O's Summary    26 Sep 2022 07:01  -  27 Sep 2022 07:00  --------------------------------------------------------  IN: 0 mL / OUT: 300 mL / NET: -300 mL    RADIOLOGY & ADDITIONAL STUDIES:    ADVANCE DIRECTIVES/TREATMENT PREFERENCES:  DNR  NO  Completed on:                     MOLST   NO   Completed on:  Living Will   NO   Completed on:

## 2022-09-27 NOTE — PROGRESS NOTE ADULT - SUBJECTIVE AND OBJECTIVE BOX
INTERVAL HPI/OVERNIGHT EVENTS:  No acute issues overnight     Vital Signs Last 24 Hrs  T(C): 36.9 (27 Sep 2022 09:46), Max: 36.9 (27 Sep 2022 09:46)  T(F): 98.4 (27 Sep 2022 09:46), Max: 98.4 (27 Sep 2022 09:46)  HR: 73 (27 Sep 2022 09:46) (65 - 90)  BP: 99/66 (27 Sep 2022 09:46) (96/65 - 100/65)  BP(mean): --  RR: 18 (27 Sep 2022 09:46) (17 - 18)  SpO2: 96% (27 Sep 2022 09:46) (96% - 98%)    Parameters below as of 27 Sep 2022 09:46  Patient On (Oxygen Delivery Method): room air        PHYSICAL EXAM:  GENERAL: NAD  HEAD: Incision C/D/I   Lethargic; no eye opening to verbal; briefly opens to noxious  Left plegia; right UE spontaneous; not following commands; RLE w/d   EXTREMITIES:  no clubbing, cyanosis, or edema  SKIN: Warm, dry      LABS:                        11.2   3.28  )-----------( 236      ( 26 Sep 2022 04:45 )             31.8     09-26    138  |  100  |  18.8  ----------------------------<  118<H>  4.2   |  27.0  |  0.61    Ca    9.3      26 Sep 2022 04:45            09-26 @ 07:01  -  09-27 @ 07:00  --------------------------------------------------------  IN: 0 mL / OUT: 300 mL / NET: -300 mL            CAPRINI SCORE [CLOT]:  Patient has an estimated Caprini score of greater than 5.  However, the patient's unique clinical situation will be addressed in an individual manner to determine appropriate anticoagulation treatment, if any.

## 2022-09-27 NOTE — PROGRESS NOTE ADULT - ASSESSMENT
Assessment/Plan  Patient admitted here with TBI, S/P multiple Craniotomy's, Cranioplasty's the last being 9/19.  Post-op course complicated by left facial droop, left sided weakness and right hand twitching.   Patient much more awake today;  ,following simple commands using R hand, arm hand squeeze to communicate NPO status nutrtion via  Peg tube continued at present       Traumatic brain injury    Post Cranioplasty POD #4 possible encephalopathy    Submesh drain removed on 9/21 and subgaleal drain was removed yesterday   Prior to most recent cranioplasty on 9/19 (eating pureed and able to ambulate with PT)   Now unable to follow commands and is lethargic; placed back on a TF diet    VEEG-completed:  Neurochecks   Continue:  Memantine 5 mg BID                    Melatonin at bedtime                   Keppra  DOSE ADJUSTED                    Melatonin at bedtime   Epilepsy consult appreciated   seizure/Aspiration/Fall Precautions    CNS infection  s/p right decompressive Hemicraniectomy on 5/24  BC's (-) WBC 3.28  H/H stable  11.2/31.8  Completed Nafcillin and Vancomycin  MRI done after ABX are without abnormal enhancement    Oropharyngeal Dysphagia  Prior to recent cranioplasty on 9/19, patient was on a pureed diet  Today he does  not open his mouth for PO trials  Acute changes in neurologic condition, including ability  to follow commands  Reverted back to  tube feeds tolerating 60ml/hr  Maintain aspiration precautions  SLP reconsult if patient able to follow commands    History of Substance abuse  UDS positive for cocaine on admission  also PMH of ETOH abuse which was confirmed by patient's sister  Patient's ability to cope with Neuro deficits and projected lengthy rehab questionable.  Will need ultimate support, biggest challenge ahead  Opioid Tolerant-if medications for severe  pain needed-would order long and short acting -multimodal analgesic plan    Debility  Neurodeficits including motor strength B/L  extremities strength R>L  Prolonged hospitalization  Lsided weakness-unable to move independently  Needs acute rehab to  mobilize and regain strength balance    PLAN  Mother is at his bedside, given support and  reassurance  Mental status improving  Arrangements being made to discharge to Acute rehab, sometime today or tomorrow  Participated with PT this morning  Family remains hopeful   Assessment/Plan  Patient admitted here with TBI, S/P multiple Craniotomy's, Cranioplasty's the last being 9/19.  Post-op course complicated by left facial droop, left sided weakness and right hand twitching.   Patient much more awake today; following simple commands using R hand, arm hand squeeze to communicate. NPO status nutrition via  Peg tube continued at present    Traumatic brain injury    Post Cranioplasty POD #4 possible encephalopathy    Submesh drain removed on 9/21 and subgaleal drain was removed yesterday   Prior to most recent cranioplasty on 9/19 (eating pureed and able to ambulate with PT)   Now unable to follow commands and is lethargic; placed back on a TF diet    VEEG-completed:  Neurochecks   Continue:  Memantine 5 mg BID                    Melatonin at bedtime                   Keppra  1000mg/Q12h                   Melatonin at bedtime   Epilepsy consult appreciated   seizure/Aspiration/Fall Precautions    CNS infection  s/p right decompressive Hemicraniectomy on 5/24  BC's (-) WBC 3.28  H/H stable  11.2/31.8 9/26  Completed Nafcillin and Vancomycin  MRI done after ABX are without abnormal enhancement    Oropharyngeal Dysphagia  Prior to recent cranioplasty on 9/19, patient was on a pureed diet  Today he does not open his mouth for PO trials  Acute changes in neurologic condition, lead to inability  to follow commands  Reverted back to  tube feeds tolerating 60ml/hr  Maintain aspiration precautions  SLP reconsult if patient able to follow commands    History of Substance abuse  UDS positive for cocaine on admission   PMH of ETOH abuse which was confirmed by patient's sister  Patient's ability to cope with Neuro deficits and projected lengthy rehab questionable.  Will need ultimate support, biggest challenge ahead  Opioid Tolerant-if medications for severe  pain needed-would order long and short acting -multimodal analgesic plan    Debility  Neurodeficits including motor strength B/L  extremities strength R>L  Prolonged hospitalization  Lsided weakness-unable to move independently  Needs acute rehab to  mobilize and regain strength balance    PLAN  Mother is at his bedside, given support and  reassurance  Mental status improving  Arrangements being made to discharge to Acute rehab, sometime today or tomorrow  Participated with PT this morning  Family remains hopeful, will continue aggressive PT and efforts to fully rehabilitate

## 2022-09-27 NOTE — PROGRESS NOTE ADULT - NS ATTEND OPT1 GEN_ALL_CORE
I independently performed the documented:
I attest my time as attending is greater than 50% of the total combined time spent on qualifying patient care activities by the PA/NP and attending.
I attest my time as attending is greater than 50% of the total combined time spent on qualifying patient care activities by the PA/NP and attending.
I independently performed the documented:
I attest my time as attending is greater than 50% of the total combined time spent on qualifying patient care activities by the PA/NP and attending.
I independently performed the documented:
I attest my time as attending is greater than 50% of the total combined time spent on qualifying patient care activities by the PA/NP and attending.
I independently performed the documented:
I attest my time as attending is greater than 50% of the total combined time spent on qualifying patient care activities by the PA/NP and attending.
I independently performed the documented:
I attest my time as attending is greater than 50% of the total combined time spent on qualifying patient care activities by the PA/NP and attending.
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
## 2022-09-27 NOTE — PROGRESS NOTE ADULT - SUBJECTIVE AND OBJECTIVE BOX
CC: Follow up     INTERVAL HPI/OVERNIGHT EVENTS: Patient seen and examined, no acute events overnight. Awake and alert. Mother at bedside, seen with .       Vital Signs Last 24 Hrs  T(C): 36.9 (27 Sep 2022 09:46), Max: 36.9 (27 Sep 2022 09:46)  T(F): 98.4 (27 Sep 2022 09:46), Max: 98.4 (27 Sep 2022 09:46)  HR: 73 (27 Sep 2022 09:46) (65 - 90)  BP: 99/66 (27 Sep 2022 09:46) (96/65 - 100/65)  BP(mean): --  RR: 18 (27 Sep 2022 09:46) (17 - 18)  SpO2: 96% (27 Sep 2022 09:46) (96% - 98%)    Parameters below as of 27 Sep 2022 09:46  Patient On (Oxygen Delivery Method): room air        PHYSICAL EXAM:    GENERAL: NAD, Awake alert   HEAD:  Atraumatic, Normocephalic  EYES: EOMI, PERRLA, conjunctiva and sclera clear  ENMT: Moist mucous membranes  NECK: Supple, No JVD  NERVOUS SYSTEM:  Alert, grasp right hand; moves RUE and RLE extremity spontaneously   CHEST/LUNG: Clear to auscultation bilaterally; No rales, rhonchi, wheezing, or rubs  HEART: Regular rate and rhythm; No murmurs, rubs, or gallops  ABDOMEN: Soft, Nontender, Nondistended; Bowel sounds present  +PEG  EXTREMITIES:  2+ Peripheral Pulses, No clubbing, cyanosis, or edema        MEDICATIONS  (STANDING):  ascorbic acid 500 milliGRAM(s) Oral daily  chlorhexidine 2% Cloths 1 Application(s) Topical daily  enoxaparin Injectable 40 milliGRAM(s) SubCutaneous every 24 hours  ferrous    sulfate Liquid 300 milliGRAM(s) Enteral Tube daily  influenza   Vaccine 0.5 milliLiter(s) IntraMuscular once  levETIRAcetam  Solution 1000 milliGRAM(s) Enteral Tube two times a day  melatonin 5 milliGRAM(s) Oral at bedtime  memantine 5 milliGRAM(s) Oral every 12 hours  multivitamin 1 Tablet(s) Oral daily  polyethylene glycol 3350 17 Gram(s) Oral two times a day  senna 1 Tablet(s) Oral daily  sodium chloride 1 Gram(s) Oral every 12 hours    MEDICATIONS  (PRN):  acetaminophen     Tablet .. 650 milliGRAM(s) Oral every 6 hours PRN Mild Pain (1 - 3)  hydrALAZINE Injectable 10 milliGRAM(s) IV Push every 4 hours PRN SBP > 150      Allergies    Allergy Status Unknown    Intolerances          LABS:                          11.2   3.28  )-----------( 236      ( 26 Sep 2022 04:45 )             31.8     09-26    138  |  100  |  18.8  ----------------------------<  118<H>  4.2   |  27.0  |  0.61    Ca    9.3      26 Sep 2022 04:45            RADIOLOGY & ADDITIONAL TESTS:
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
## 2022-09-27 NOTE — PROGRESS NOTE ADULT - ASSESSMENT
43 y/o male s/p cranioplasty POD#8   - Please obtain CTH today  - continue seizure medication per epilepsy  - medical management per primary team

## 2022-09-27 NOTE — PROGRESS NOTE ADULT - NS_MD_PANP_GEN_ALL_CORE

## 2022-09-28 LAB
GLUCOSE BLDC GLUCOMTR-MCNC: 110 MG/DL — HIGH (ref 70–99)
SARS-COV-2 RNA SPEC QL NAA+PROBE: SIGNIFICANT CHANGE UP

## 2022-09-28 PROCEDURE — 99232 SBSQ HOSP IP/OBS MODERATE 35: CPT

## 2022-09-28 RX ADMIN — Medication 1 TABLET(S): at 11:42

## 2022-09-28 RX ADMIN — Medication 500 MILLIGRAM(S): at 11:43

## 2022-09-28 RX ADMIN — CHLORHEXIDINE GLUCONATE 1 APPLICATION(S): 213 SOLUTION TOPICAL at 11:43

## 2022-09-28 RX ADMIN — POLYETHYLENE GLYCOL 3350 17 GRAM(S): 17 POWDER, FOR SOLUTION ORAL at 17:46

## 2022-09-28 RX ADMIN — LEVETIRACETAM 1000 MILLIGRAM(S): 250 TABLET, FILM COATED ORAL at 05:25

## 2022-09-28 RX ADMIN — SODIUM CHLORIDE 1 GRAM(S): 9 INJECTION INTRAMUSCULAR; INTRAVENOUS; SUBCUTANEOUS at 05:25

## 2022-09-28 RX ADMIN — ENOXAPARIN SODIUM 40 MILLIGRAM(S): 100 INJECTION SUBCUTANEOUS at 22:14

## 2022-09-28 RX ADMIN — SODIUM CHLORIDE 1 GRAM(S): 9 INJECTION INTRAMUSCULAR; INTRAVENOUS; SUBCUTANEOUS at 17:46

## 2022-09-28 RX ADMIN — Medication 5 MILLIGRAM(S): at 22:14

## 2022-09-28 RX ADMIN — MEMANTINE HYDROCHLORIDE 5 MILLIGRAM(S): 10 TABLET ORAL at 17:46

## 2022-09-28 RX ADMIN — LEVETIRACETAM 1000 MILLIGRAM(S): 250 TABLET, FILM COATED ORAL at 17:45

## 2022-09-28 RX ADMIN — SENNA PLUS 1 TABLET(S): 8.6 TABLET ORAL at 11:42

## 2022-09-28 RX ADMIN — MEMANTINE HYDROCHLORIDE 5 MILLIGRAM(S): 10 TABLET ORAL at 05:25

## 2022-09-28 RX ADMIN — POLYETHYLENE GLYCOL 3350 17 GRAM(S): 17 POWDER, FOR SOLUTION ORAL at 05:26

## 2022-09-28 RX ADMIN — Medication 300 MILLIGRAM(S): at 11:42

## 2022-09-28 NOTE — OCCUPATIONAL THERAPY INITIAL EVALUATION ADULT - LEVEL OF INDEPENDENCE: BED TO CHAIR, REHAB EVAL
maximum assist (25% patients effort)
assess; attempted to sidestep edge of bed but pt unable to shift weight without total assist.
unable to perform
moderate assist (50% patients effort)

## 2022-09-28 NOTE — OCCUPATIONAL THERAPY INITIAL EVALUATION ADULT - PATIENT/FAMILY/SIGNIFICANT OTHER GOALS STATEMENT, OT EVAL
Pt unable to state goals; however, family wishes for pt to be more responsive
Pt did not state goal at time of eval.
Pt non verbal, did not state a goal
unable to participate in goal setting

## 2022-09-28 NOTE — OCCUPATIONAL THERAPY INITIAL EVALUATION ADULT - PRECAUTIONS/LIMITATIONS, REHAB EVAL
aspiration precautions/fall precautions
cardiac precautions/fall precautions/seizure precautions
helmet OOB/aspiration precautions/fall precautions
helmet/fall precautions/seizure precautions

## 2022-09-28 NOTE — OCCUPATIONAL THERAPY INITIAL EVALUATION ADULT - LEVEL OF INDEPENDENCE, OT EVAL
dependent (less than 25% patients effort)
maximum assist (25% patients effort)
dependent (less than 25% patients effort)
maximum assist (25% patients effort)

## 2022-09-28 NOTE — OCCUPATIONAL THERAPY INITIAL EVALUATION ADULT - LEVEL OF INDEPENDENCE:TOILET, OT EVAL
dependent (less than 25% patients effort)
+texas catheter/dependent (less than 25% patients effort)
dependent (less than 25% patients effort)
unclear if pt is aware of need to use toilet/maximum assist (25% patients effort)

## 2022-09-28 NOTE — OCCUPATIONAL THERAPY INITIAL EVALUATION ADULT - IMPAIRED TRANSFERS: BED/CHAIR, REHAB EVAL
decreased strength
impaired balance/cognition/impaired coordination/impaired postural control/impaired sensory feedback/decreased strength

## 2022-09-28 NOTE — OCCUPATIONAL THERAPY INITIAL EVALUATION ADULT - TRANSFER SAFETY CONCERNS NOTED: SIT/STAND, REHAB EVAL
decreased weight-shifting ability
losing balance/decreased proprioception
decreased safety awareness/decreased sequencing ability/decreased weight-shifting ability
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
## 2022-09-28 NOTE — OCCUPATIONAL THERAPY INITIAL EVALUATION ADULT - LEVEL OF INDEPENDENCE: SUPINE/SIT, REHAB EVAL
moderate assist (50% patients effort)
unable to perform
moderate assist (50% patients effort)
moderate assist (50% patients effort)

## 2022-09-28 NOTE — CHART NOTE - NSCHARTNOTEFT_GEN_A_CORE
Goals established. patient and family want to continue all measures at this time. plans for rehab setting. Will sign off, please re-consult should anything change.

## 2022-09-28 NOTE — PROGRESS NOTE ADULT - SUBJECTIVE AND OBJECTIVE BOX
CC: Follow up     INTERVAL HPI/OVERNIGHT EVENTS: Patient seen and examined, no acute events overnight.       Vital Signs Last 24 Hrs  T(C): 37.4 (28 Sep 2022 04:00), Max: 37.4 (28 Sep 2022 04:00)  T(F): 99.3 (28 Sep 2022 04:00), Max: 99.3 (28 Sep 2022 04:00)  HR: 77 (28 Sep 2022 04:00) (61 - 77)  BP: 106/74 (28 Sep 2022 04:00) (98/61 - 126/69)  BP(mean): --  RR: 18 (28 Sep 2022 04:00) (17 - 18)  SpO2: 97% (28 Sep 2022 04:00) (96% - 97%)    Parameters below as of 28 Sep 2022 04:00  Patient On (Oxygen Delivery Method): room air        PHYSICAL EXAM:    GENERAL: NAD  HEAD:  Atraumatic, Normocephalic  EYES: EOMI, PERRLA, conjunctiva and sclera clear  ENMT: Moist mucous membranes  NECK:  No JVD    NERVOUS SYSTEM:  Alert, grasp right hand; moves RUE and RLE extremity spontaneously   CHEST/LUNG: Clear to auscultation bilaterally; No rales, rhonchi, wheezing, or rubs  HEART: Regular rate and rhythm; No murmurs, rubs, or gallops  ABDOMEN: Soft, Nontender, Nondistended; Bowel sounds present  + PEG  EXTREMITIES:  2+ Peripheral Pulses, No clubbing, cyanosis, or edema        MEDICATIONS  (STANDING):  ascorbic acid 500 milliGRAM(s) Oral daily  chlorhexidine 2% Cloths 1 Application(s) Topical daily  enoxaparin Injectable 40 milliGRAM(s) SubCutaneous every 24 hours  ferrous    sulfate Liquid 300 milliGRAM(s) Enteral Tube daily  influenza   Vaccine 0.5 milliLiter(s) IntraMuscular once  levETIRAcetam  Solution 1000 milliGRAM(s) Enteral Tube two times a day  melatonin 5 milliGRAM(s) Oral at bedtime  memantine 5 milliGRAM(s) Oral every 12 hours  multivitamin 1 Tablet(s) Oral daily  polyethylene glycol 3350 17 Gram(s) Oral two times a day  senna 1 Tablet(s) Oral daily  sodium chloride 1 Gram(s) Oral every 12 hours    MEDICATIONS  (PRN):  acetaminophen     Tablet .. 650 milliGRAM(s) Oral every 6 hours PRN Mild Pain (1 - 3)  hydrALAZINE Injectable 10 milliGRAM(s) IV Push every 4 hours PRN SBP > 150      Allergies    Allergy Status Unknown    Intolerances          LABS:                  RADIOLOGY & ADDITIONAL TESTS:

## 2022-09-28 NOTE — OCCUPATIONAL THERAPY INITIAL EVALUATION ADULT - LIVES WITH, PROFILE
as per family, pt rents a room in a house, but home setup is unknown; as per mother who is visiting from [place], pt has siblings that lives closeby that may be able to assist; pt has two small children/alone
Unable to provide social history secondary to cognitive deficits. to follow up with CC
alone; as per family, pt rents a room in a house, but home setup is unknown; as per mother who is visiting from [place], pt has siblings that lives closeby that may be able to assist; pt has two small children/alone
alone

## 2022-09-28 NOTE — OCCUPATIONAL THERAPY INITIAL EVALUATION ADULT - MUSCLE TONE ASSESSMENT, REHAB EVAL
+ spasticity/Left UE/moderately increased tone
bilateral upper extremities/normal
bilateral upper extremities/mildly increased tone
bilateral upper extremities/normal

## 2022-09-28 NOTE — OCCUPATIONAL THERAPY INITIAL EVALUATION ADULT - LEVEL OF INDEPENDENCE: DRESS UPPER BODY, OT EVAL
dependent (less than 25% patients effort)
dependent (less than 25% patients effort)
maximum assist (25% patients effort)
maximum assist (25% patients effort)

## 2022-09-28 NOTE — OCCUPATIONAL THERAPY INITIAL EVALUATION ADULT - NAME OF CLINICIAN
NICK Newberry consulted, pt ok to be seen for OT eval.
NICK Barroso
RN, Adeline Orellana
NICK Grajeda consulted, pt ok to be seen for OT eval. Handoff to RN.

## 2022-09-28 NOTE — OCCUPATIONAL THERAPY INITIAL EVALUATION ADULT - PLANNED THERAPY INTERVENTIONS, OT EVAL
ADL retraining/balance training/bed mobility training/cognitive, visual perceptual/strengthening/transfer training
ADL retraining/balance training/bed mobility training/cognitive, visual perceptual/fine motor coordination training/motor coordination training/ROM/strengthening/transfer training
ADL retraining/cognitive, visual perceptual/motor coordination training/neuromuscular re-education/ROM/transfer training
ADL retraining/balance training/neuromuscular re-education/transfer training

## 2022-09-28 NOTE — OCCUPATIONAL THERAPY INITIAL EVALUATION ADULT - MANUAL MUSCLE TESTING RESULTS, REHAB EVAL
difficulty following commands of assessment.  At least 3/5 bilateral UEs throughout/not tested due to
pt unable to follow commands/not tested due to
left UE 1/5, right UE 2/5
unable to assess - pt not following commands of assessment

## 2022-09-28 NOTE — OCCUPATIONAL THERAPY INITIAL EVALUATION ADULT - ADDITIONAL COMMENTS
Pt is unable to provide social history; information obtained from family, which was limited due to mother living out of state and unaware of home set up
Pt is unable to provide social history; information obtained from family, which was limited due to mother living out of state and unaware of home set up
Pt unable to provide any information.  As per chart, family provided following information: pt rents a room in a house, but home setup is unknown; as per mother who is visiting from [place], pt has siblings that lives closeby that may be able to assist; pt has two small children.   information obtained from family is limited due to mother living out of state and unaware of home set up
PLOF listed prior to initial hospitalization.

## 2022-09-28 NOTE — OCCUPATIONAL THERAPY INITIAL EVALUATION ADULT - IMPAIRED TRANSFERS: SIT/STAND, REHAB EVAL
decreased strength
impaired balance/cognition/impaired postural control/decreased strength
impaired balance/cognition/impaired coordination/impaired postural control/impaired sensory feedback/decreased strength

## 2022-09-28 NOTE — OCCUPATIONAL THERAPY INITIAL EVALUATION ADULT - LEVEL OF CONSCIOUSNESS, OT EVAL
lethargic/somnolent
non verbal at time of eval./alert
pt completed K Coma Recovery Scale with results as follows: Auditory function: 0, Visual functional: 0, does not respond to stimuli, Communication scale: 0, Motor function: 2, flexion withdrawal to 3 extremities, Arousal scale: 0, does not open eyes, oromotor function: 0, no reflexive movement noted/obtunded
alert

## 2022-09-28 NOTE — OCCUPATIONAL THERAPY INITIAL EVALUATION ADULT - FINE MOTOR COORDINATION, LEFT HAND THUMB/FINGER OPPOSITION SKILLS, OT EVAL
unable to assess - pt not following commands of assessment
severe impairment
with increased time and effort/mild impairment

## 2022-09-28 NOTE — OCCUPATIONAL THERAPY INITIAL EVALUATION ADULT - RANGE OF MOTION EXAMINATION, UPPER EXTREMITY
left UE with minimal AROM throughout, however not functional. PROM limited due to tone at bicep. right UE PROM WFL/Right UE Passive ROM was WNL (within normal limits)
bilateral UE Active ROM was WNL (within normal limits)
Pt not following commands of ROM assessment at time of eval, but allowed this writer to passively range him WFL throughout./bilateral UE Passive ROM was WFL  (within functional limits)

## 2022-09-28 NOTE — OCCUPATIONAL THERAPY INITIAL EVALUATION ADULT - NS ASR FOLLOW COMMAND OT EVAL
with max verbal cues and increased time/able to follow single-step instructions
50% of the time
unable to follow commands
Not following verbal or visual commands.

## 2022-09-28 NOTE — OCCUPATIONAL THERAPY INITIAL EVALUATION ADULT - LEVEL OF INDEPENDENCE: GROOMING, OT EVAL
dependent (less than 25% patients effort)
maximum assist (25% patients effort)
moderate assist (50% patients effort)
dependent (less than 25% patients effort)

## 2022-09-28 NOTE — OCCUPATIONAL THERAPY INITIAL EVALUATION ADULT - LEVEL OF INDEPENDENCE: DRESS LOWER BODY, OT EVAL
dependent (less than 25% patients effort)
maximum assist (25% patients effort)

## 2022-09-28 NOTE — OCCUPATIONAL THERAPY INITIAL EVALUATION ADULT - GENERAL OBSERVATIONS, REHAB EVAL
+IV, +vent, +ET tube, +cardiac monitor, +VCD boots
+ [place] catheter, + PEG, + IV
Pt received supine in bed, +bilateral CAIR boots, +IV, +[place] sylvester, +cardiac monitor with , +bilateral wrist restraints, +subgaleal drain, agreeable to OT eval.
Pt received supine in bed, +bilateral VCDs, +IV, +, +abdominal binder, agreeable to OT eval.

## 2022-09-28 NOTE — OCCUPATIONAL THERAPY INITIAL EVALUATION ADULT - ORIENTATION, REHAB EVAL
not oriented to person, place, time or situation
person/place
non verbal, not answering open or closed ended questions at time of eval./unable to assess
not oriented to person, place, time or situation

## 2022-09-28 NOTE — OCCUPATIONAL THERAPY INITIAL EVALUATION ADULT - LIGHT TOUCH SENSATION, RUE, REHAB EVAL
not appropriate for testing
unable to assess - pt not following commands of assessment
unable to assess - pt not following commands of assessment
not appropriate for testing
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
## 2022-09-28 NOTE — OCCUPATIONAL THERAPY INITIAL EVALUATION ADULT - LEVEL OF INDEPENDENCE: CHAIR TO BED, REHAB EVAL
maximum assist (25% patients effort)
assess
unable to perform
not observed.  Pt left sitting in recliner chair

## 2022-09-28 NOTE — OCCUPATIONAL THERAPY INITIAL EVALUATION ADULT - FINE MOTOR COORDINATION, LEFT HAND, MANIPULATION OF OBJECTS, OT EVAL
unable to perform
severe impairment
unable to assess - pt not following commands of assessment
mild impairment

## 2022-09-28 NOTE — OCCUPATIONAL THERAPY INITIAL EVALUATION ADULT - TRANSFER SAFETY CONCERNS NOTED: BED/CHAIR, REHAB EVAL
decreased safety awareness/decreased sequencing ability/stand pivot/decreased step length/decreased weight-shifting ability
losing balance/decreased proprioception

## 2022-09-28 NOTE — PROGRESS NOTE ADULT - ASSESSMENT
Patient is a 42 year old male initially admitted on 5/24/22 (BIBEMS after found unresponsive on street) with a GCS of 3, found to have b/l intraparenchymal bleeds, b/l SDH. Has had a complex prolonged hospital course including but not limited to right decompressive craniectomy on 5/24. Trach / PEG., cranioplasty on 6/29 with cognitive decline after initial improvement prompting imaging which noted subacute collection with air underneath the cranioplasty sight. MRI with increase in fluid collection underneath the flap leading to a repeat right craniectomy with wound exploration & evacuation of fluid collection on 7/17 with OR cultures isolating MSSA. Antimicrobial regimen adjusted (ID), (suspected) central SIADH now improved (renal signed off). Has had interval trach decannulation and dietary advancement to pureed. Now s/p completion of appropriate antimicrobial course with f/u imaging (9/3) revealing no abnormal enhancement (6 mm midline shift to left). After being optimized and cleared for surgery had a fever (9/9/22). Infectious w/u negative. Now has been afebrile. Patient then underwent a cranioplasty with replacement bone flap on 9/19. Post-op course complicated by left facial droop, left sided weakness and right hand twitching. Patient was placed on VEEG and loaded with keppra. Epilepsy was consulted and patient is now medically stable for downgrade to the hospitalist team. Given changes in neurologic status, patient was placed back on a tube feed diet. VEEG was negative for seizure activity and MRI brain was ordered was negative for acute changes. Patient to continue PT/OT/ST. Discharge disposition to [facility].     Assessment/Plan:     1) Traumatic brain injury with complicated hospital course (as above) now with possible encephalopathy  -CT head with bilateral IPH and bilateral SDH s/p decompressive craniotomy on 5/24  -s/p cranioplasty on 6/29, repeat craniotomy on 7/17 and final cranioplasty with replacement bone flap on 9/19   - patient had significantly improved neurologically prior to most recent cranioplasty on 9/19 (eating pureed and able to ambulate with PT)  - patient is now unable to follow commands with left hemiparesis; placed back on a TF diet   - completed VEEG without seizure activity  MRI brain repeated status post cranioplasty, encephalomalacia; Small subdural hygroma in the right parafalcine convexity; mild white matter ischemia with old lacunar infarcts in the bilateral basal ganglia    - Repeat CT head on 09/27 negative   - Continue Memantine 5 mg BID   - Continue Melatonin at bedtime  - Keppra dose was adjusted   - Seizure/Aspiration/Fall Precautions    2) Central SIADH - resolved  - sodium WNL  - s/p Tolvaptan and Urea   - Continue sodium chloride tablets  - Monitor labs closely    3) Right Pubic Rami Fracture  - subacute  - incidentally found on CT  - Ortho consult appreciated  - WBAT (patient was ambulating with PT prior to cranioplasty on 9/19)    4)  CNS infection  - s/p right decompressive hemicraniectomy on 5/24  - blood cultures negative  - Completed Nafcillin and Vancomycin  - MRI after completion of abx without abnormal enhancement  - ID follow up noted, monitor off abx    5) Oropharyngeal Dysphagia  -prior to recent cranioplasty on 9/19, patient was on a pureed diet  -given acute changes in neurologic condition, patient is not following commands and will not open his mouth for PO trials  -changed to tube feeds; goal increased to 60cc/hr  -maintain aspiration precautions  -SLP reconsult if patient able to follow commands    6) History of Substance abuse  -UDS positive for cocaine on admission  -also history of ETOH abuse which was confirmed by patient's daughter    7) Anemia   - Hb stable  - iron studies reviewed  - start ferrous sulfate  - Monitor closely    DVT Prophylaxis - SCDs     Discharge planning to [facility]

## 2022-09-29 LAB
GLUCOSE BLDC GLUCOMTR-MCNC: 113 MG/DL — HIGH (ref 70–99)
GLUCOSE BLDC GLUCOMTR-MCNC: 115 MG/DL — HIGH (ref 70–99)
GLUCOSE BLDC GLUCOMTR-MCNC: 116 MG/DL — HIGH (ref 70–99)

## 2022-09-29 PROCEDURE — 99232 SBSQ HOSP IP/OBS MODERATE 35: CPT

## 2022-09-29 RX ORDER — SENNA PLUS 8.6 MG/1
1 TABLET ORAL
Qty: 0 | Refills: 0 | DISCHARGE
Start: 2022-09-29

## 2022-09-29 RX ORDER — ACETAMINOPHEN 500 MG
2 TABLET ORAL
Qty: 0 | Refills: 0 | DISCHARGE
Start: 2022-09-29

## 2022-09-29 RX ORDER — LANOLIN ALCOHOL/MO/W.PET/CERES
1 CREAM (GRAM) TOPICAL
Qty: 0 | Refills: 0 | DISCHARGE
Start: 2022-09-29

## 2022-09-29 RX ORDER — ASCORBIC ACID 60 MG
1 TABLET,CHEWABLE ORAL
Qty: 0 | Refills: 0 | DISCHARGE
Start: 2022-09-29

## 2022-09-29 RX ORDER — SODIUM CHLORIDE 9 MG/ML
1 INJECTION INTRAMUSCULAR; INTRAVENOUS; SUBCUTANEOUS
Qty: 0 | Refills: 0 | DISCHARGE
Start: 2022-09-29

## 2022-09-29 RX ORDER — ENOXAPARIN SODIUM 100 MG/ML
40 INJECTION SUBCUTANEOUS
Qty: 0 | Refills: 0 | DISCHARGE
Start: 2022-09-29

## 2022-09-29 RX ORDER — LEVETIRACETAM 250 MG/1
10 TABLET, FILM COATED ORAL
Qty: 0 | Refills: 0 | DISCHARGE
Start: 2022-09-29

## 2022-09-29 RX ORDER — MEMANTINE HYDROCHLORIDE 10 MG/1
1 TABLET ORAL
Qty: 0 | Refills: 0 | DISCHARGE
Start: 2022-09-29

## 2022-09-29 RX ORDER — FERROUS SULFATE 325(65) MG
5 TABLET ORAL
Qty: 0 | Refills: 0 | DISCHARGE
Start: 2022-09-29

## 2022-09-29 RX ORDER — POLYETHYLENE GLYCOL 3350 17 G/17G
17 POWDER, FOR SOLUTION ORAL
Qty: 0 | Refills: 0 | DISCHARGE
Start: 2022-09-29

## 2022-09-29 RX ADMIN — MEMANTINE HYDROCHLORIDE 5 MILLIGRAM(S): 10 TABLET ORAL at 18:15

## 2022-09-29 RX ADMIN — Medication 1 TABLET(S): at 12:21

## 2022-09-29 RX ADMIN — LEVETIRACETAM 1000 MILLIGRAM(S): 250 TABLET, FILM COATED ORAL at 18:16

## 2022-09-29 RX ADMIN — SODIUM CHLORIDE 1 GRAM(S): 9 INJECTION INTRAMUSCULAR; INTRAVENOUS; SUBCUTANEOUS at 18:15

## 2022-09-29 RX ADMIN — CHLORHEXIDINE GLUCONATE 1 APPLICATION(S): 213 SOLUTION TOPICAL at 12:19

## 2022-09-29 RX ADMIN — SODIUM CHLORIDE 1 GRAM(S): 9 INJECTION INTRAMUSCULAR; INTRAVENOUS; SUBCUTANEOUS at 06:07

## 2022-09-29 RX ADMIN — MEMANTINE HYDROCHLORIDE 5 MILLIGRAM(S): 10 TABLET ORAL at 06:07

## 2022-09-29 RX ADMIN — Medication 500 MILLIGRAM(S): at 12:21

## 2022-09-29 RX ADMIN — Medication 300 MILLIGRAM(S): at 12:21

## 2022-09-29 RX ADMIN — LEVETIRACETAM 1000 MILLIGRAM(S): 250 TABLET, FILM COATED ORAL at 06:07

## 2022-09-29 RX ADMIN — POLYETHYLENE GLYCOL 3350 17 GRAM(S): 17 POWDER, FOR SOLUTION ORAL at 18:15

## 2022-09-29 RX ADMIN — SENNA PLUS 1 TABLET(S): 8.6 TABLET ORAL at 12:21

## 2022-09-29 RX ADMIN — ENOXAPARIN SODIUM 40 MILLIGRAM(S): 100 INJECTION SUBCUTANEOUS at 23:10

## 2022-09-29 NOTE — PROGRESS NOTE ADULT - SUBJECTIVE AND OBJECTIVE BOX
CC: Follow up     INTERVAL HPI/OVERNIGHT EVENTS: Patient seen and examined, with . Mother at bedside. Diarrhea overnight, tube feed rate was lowered       Vital Signs Last 24 Hrs  T(C): 36.9 (29 Sep 2022 05:01), Max: 37.3 (28 Sep 2022 20:39)  T(F): 98.5 (29 Sep 2022 05:01), Max: 99.1 (28 Sep 2022 20:39)  HR: 70 (29 Sep 2022 06:02) (69 - 86)  BP: 101/67 (29 Sep 2022 06:02) (96/63 - 126/78)  BP(mean): --  RR: 18 (29 Sep 2022 05:01) (18 - 18)  SpO2: 98% (29 Sep 2022 05:01) (97% - 98%)    Parameters below as of 29 Sep 2022 05:01  Patient On (Oxygen Delivery Method): room air        PHYSICAL EXAM:    GENERAL: NAD  HEAD:  Atraumatic, Normocephalic  EYES: EOMI, PERRLA, conjunctiva and sclera clear  ENMT: Moist mucous membranes  NECK: Supple, No JVD  NERVOUS SYSTEM:  Awake alert, Spontenously moving RUE and RLE extremity non verbal   CHEST/LUNG: Clear to auscultation bilaterally; No rales, rhonchi, wheezing, or rubs  HEART: Regular rate and rhythm; No murmurs, rubs, or gallops  ABDOMEN: Soft, Nontender, Nondistended; Bowel sounds present  + PEG  EXTREMITIES:  2+ Peripheral Pulses, No clubbing, cyanosis, or edema        MEDICATIONS  (STANDING):  ascorbic acid 500 milliGRAM(s) Oral daily  chlorhexidine 2% Cloths 1 Application(s) Topical daily  enoxaparin Injectable 40 milliGRAM(s) SubCutaneous every 24 hours  ferrous    sulfate Liquid 300 milliGRAM(s) Enteral Tube daily  influenza   Vaccine 0.5 milliLiter(s) IntraMuscular once  levETIRAcetam  Solution 1000 milliGRAM(s) Enteral Tube two times a day  melatonin 5 milliGRAM(s) Oral at bedtime  memantine 5 milliGRAM(s) Oral every 12 hours  multivitamin 1 Tablet(s) Oral daily  polyethylene glycol 3350 17 Gram(s) Oral two times a day  senna 1 Tablet(s) Oral daily  sodium chloride 1 Gram(s) Oral every 12 hours    MEDICATIONS  (PRN):  acetaminophen     Tablet .. 650 milliGRAM(s) Oral every 6 hours PRN Mild Pain (1 - 3)  hydrALAZINE Injectable 10 milliGRAM(s) IV Push every 4 hours PRN SBP > 150      Allergies    Allergy Status Unknown    Intolerances          LABS:                  RADIOLOGY & ADDITIONAL TESTS:

## 2022-09-29 NOTE — CHART NOTE - NSCHARTNOTESELECT_GEN_ALL_CORE
AM rounds
Bronchoscopy/Event Note
CT Contrast/Event Note
Dislodged Trach/Event Note
Event Note
Nutrition Services
Off Service Note
TLC removal/Event Note
neurosurgery/Event Note
ETHICS/Event Note
Event Note
NSx PA f/u/Event Note
Neurosurgery/Event Note
Nutrition Services
Palliative care/Event Note
Post Op Note/Event Note
Transfer Note
submesh MARCEL removal/Event Note

## 2022-09-29 NOTE — CHART NOTE - NSCHARTNOTEFT_GEN_A_CORE
Nutrition Note:     Notified on daily rounds pt with diarrhea. Currently on Pivot tube feeds at 60cchr    Rec change formula to Vital 1.5cal at 60cchr for better toleration.  This will provide 1200cc, 1800kcal, 81gr pro

## 2022-09-29 NOTE — PROGRESS NOTE ADULT - ASSESSMENT
Patient is a 42 year old male initially admitted on 5/24/22 (BIBEMS after found unresponsive on street) with a GCS of 3, found to have b/l intraparenchymal bleeds, b/l SDH. Has had a complex prolonged hospital course including but not limited to right decompressive craniectomy on 5/24. Trach / PEG., cranioplasty on 6/29 with cognitive decline after initial improvement prompting imaging which noted subacute collection with air underneath the cranioplasty sight. MRI with increase in fluid collection underneath the flap leading to a repeat right craniectomy with wound exploration & evacuation of fluid collection on 7/17 with OR cultures isolating MSSA. Antimicrobial regimen adjusted (ID), (suspected) central SIADH now improved (renal signed off). Has had interval trach decannulation and dietary advancement to pureed. Now s/p completion of appropriate antimicrobial course with f/u imaging (9/3) revealing no abnormal enhancement (6 mm midline shift to left). After being optimized and cleared for surgery had a fever (9/9/22). Infectious w/u negative. Now has been afebrile. Patient then underwent a cranioplasty with replacement bone flap on 9/19. Post-op course complicated by left facial droop, left sided weakness and right hand twitching. Patient was placed on VEEG and loaded with keppra. Epilepsy was consulted and patient is now medically stable for downgrade to the hospitalist team. Given changes in neurologic status, patient was placed back on a tube feed diet. VEEG was negative for seizure activity and MRI brain was ordered was negative for acute changes. Patient to continue PT/OT/ST. Discharge disposition to [facility].     Assessment/Plan:     1) Traumatic brain injury with complicated hospital course (as above) now with possible encephalopathy  -CT head with bilateral IPH and bilateral SDH s/p decompressive craniotomy on 5/24  -s/p cranioplasty on 6/29, repeat craniotomy on 7/17 and final cranioplasty with replacement bone flap on 9/19   - patient had significantly improved neurologically prior to most recent cranioplasty on 9/19 (eating pureed and able to ambulate with PT)  - patient is now unable to follow commands with left hemiparesis; placed back on a TF diet   - completed VEEG without seizure activity  MRI brain repeated status post cranioplasty, encephalomalacia; Small subdural hygroma in the right parafalcine convexity; mild white matter ischemia with old lacunar infarcts in the bilateral basal ganglia    - Repeat CT head on 09/27 negative   - Continue Memantine 5 mg BID   - Continue Melatonin at bedtime  - Keppra dose was adjusted   - Seizure/Aspiration/Fall Precautions    2) Central SIADH - resolved  - sodium WNL  - s/p Tolvaptan and Urea   - Continue sodium chloride tablets  - Monitor labs closely    3) Right Pubic Rami Fracture  - subacute  - incidentally found on CT  - Ortho consult appreciated  - WBAT (patient was ambulating with PT prior to cranioplasty on 9/19)    4)  CNS infection  - s/p right decompressive hemicraniectomy on 5/24  - blood cultures negative  - Completed Nafcillin and Vancomycin  - MRI after completion of abx without abnormal enhancement  - ID follow up noted, monitor off abx    5) Oropharyngeal Dysphagia  -prior to recent cranioplasty on 9/19, patient was on a pureed diet  -given acute changes in neurologic condition, patient is not following commands and will not open his mouth for PO trials  - Tube feeds changed to VIital for diarrhea goal 60cc/hr  -maintain aspiration precautions  -SLP reconsult if patient able to follow commands    6) History of Substance abuse  -UDS positive for cocaine on admission  -also history of ETOH abuse which was confirmed by patient's daughter    7) Anemia   - Hb stable  - iron studies reviewed  - start ferrous sulfate  - Monitor closely    DVT Prophylaxis - SCDs     Medically stable for discharge to [facility]. Met with patient's mother at bedside with social work Rena. Explained patient is medically stable for discharge with 4 accepting facilities in [place]. She requested  to reach out to her son Patel to discuss

## 2022-09-29 NOTE — PROGRESS NOTE ADULT - NUTRITIONAL ASSESSMENT
This patient has been assessed with a concern for Malnutrition and has been determined to have a diagnosis/diagnoses of Severe protein-calorie malnutrition.    This patient is being managed with:   Diet NPO with Tube Feed-  Tube Feeding Modality: Gastrostomy  Vital 1.5 Micky (VITAL1.5)  Total Volume for 24 Hours (mL): 1440  Continuous  Starting Tube Feed Rate {mL per Hour}: 60  Until Goal Tube Feed Rate (mL per Hour): 60  Tube Feed Duration (in Hours): 24  Tube Feed Start Time: 11:00  Free Water Flush  Bolus   Total Volume per Flush (mL): 200   Frequency: Every 6 Hours   Total Daily Volume of Flush (mL): 800    Start Time: 11:00  Entered: Sep 29 2022 10:36AM

## 2022-09-29 NOTE — PROGRESS NOTE ADULT - SUBJECTIVE AND OBJECTIVE BOX
NEUROSURGERY PROGRESS NOTE:      pt seen and is at baseline w/o acute overnight events, denied any new or worsening sensorimotor changes. mother at bedside   has remaining one staple and one suture after drain removal/ closure per plastics w reabsorbing suture       MEDICATIONS  (STANDING):  ascorbic acid 500 milliGRAM(s) Oral daily  chlorhexidine 2% Cloths 1 Application(s) Topical daily  enoxaparin Injectable 40 milliGRAM(s) SubCutaneous every 24 hours  ferrous    sulfate Liquid 300 milliGRAM(s) Enteral Tube daily  influenza   Vaccine 0.5 milliLiter(s) IntraMuscular once  levETIRAcetam  Solution 1000 milliGRAM(s) Enteral Tube two times a day  melatonin 5 milliGRAM(s) Oral at bedtime  memantine 5 milliGRAM(s) Oral every 12 hours  multivitamin 1 Tablet(s) Oral daily  polyethylene glycol 3350 17 Gram(s) Oral two times a day  senna 1 Tablet(s) Oral daily  sodium chloride 1 Gram(s) Oral every 12 hours    MEDICATIONS  (PRN):  acetaminophen     Tablet .. 650 milliGRAM(s) Oral every 6 hours PRN Mild Pain (1 - 3)  hydrALAZINE Injectable 10 milliGRAM(s) IV Push every 4 hours PRN SBP > 150    Allergies    Allergy Status Unknown    Intolerances      Vital Signs Last 24 Hrs  T(C): 36.9 (29 Sep 2022 16:44), Max: 37.3 (28 Sep 2022 20:39)  T(F): 98.5 (29 Sep 2022 16:44), Max: 99.1 (28 Sep 2022 20:39)  HR: 86 (29 Sep 2022 11:50) (69 - 86)  BP: 116/77 (29 Sep 2022 16:44) (96/63 - 126/86)  BP(mean): --  RR: 18 (29 Sep 2022 16:44) (18 - 18)  SpO2: 97% (29 Sep 2022 16:44) (94% - 98%)    Parameters below as of 29 Sep 2022 16:44  Patient On (Oxygen Delivery Method): room air          PHYSICAL EXAM: communicate w pt and mother w [employer]/   GENERAL: NAD  HEAD: Incision C/D/I w/o redness or signs of dehiscence oe SG fluid collection; well healing w remaining scabbing at the incision   awake in bed and tracking provider, FC in Sami w all extremities, Left plegia; right UE spontaneous; not following commands; RLE w/d   EXTREMITIES:  no clubbing, cyanosis, or edema  SKIN: Warm, dry          LABS:  no new labs to review         RADIOLOGY & ADDITIONAL TESTS:  no new NSx images available for review       < from: CT Head No Cont (09.27.22 @ 14:40) >  IMPRESSION:  No significant interval change.  No acute intracranial hemorrhage or brain edema.  --- End of Report ---  AMANDA CLAROS MD; Attending Radiologist  This document has been electronically signed. Sep 27 2022  2:50PM  < end of copied text >        I spent a total time of 45 mins with the patient at bedside of which more than 50% of time was spent on counseling/coordination of care NEUROSURGERY PROGRESS NOTE:      pt seen and is at baseline w/o acute overnight events, denied any new or worsening sensorimotor changes. mother at bedside   has remaining one staple and one suture after drain removal/ closure per plastics w reabsorbing suture       MEDICATIONS  (STANDING):  ascorbic acid 500 milliGRAM(s) Oral daily  chlorhexidine 2% Cloths 1 Application(s) Topical daily  enoxaparin Injectable 40 milliGRAM(s) SubCutaneous every 24 hours  ferrous    sulfate Liquid 300 milliGRAM(s) Enteral Tube daily  influenza   Vaccine 0.5 milliLiter(s) IntraMuscular once  levETIRAcetam  Solution 1000 milliGRAM(s) Enteral Tube two times a day  melatonin 5 milliGRAM(s) Oral at bedtime  memantine 5 milliGRAM(s) Oral every 12 hours  multivitamin 1 Tablet(s) Oral daily  polyethylene glycol 3350 17 Gram(s) Oral two times a day  senna 1 Tablet(s) Oral daily  sodium chloride 1 Gram(s) Oral every 12 hours    MEDICATIONS  (PRN):  acetaminophen     Tablet .. 650 milliGRAM(s) Oral every 6 hours PRN Mild Pain (1 - 3)  hydrALAZINE Injectable 10 milliGRAM(s) IV Push every 4 hours PRN SBP > 150    Allergies    Allergy Status Unknown    Intolerances      Vital Signs Last 24 Hrs  T(C): 36.9 (29 Sep 2022 16:44), Max: 37.3 (28 Sep 2022 20:39)  T(F): 98.5 (29 Sep 2022 16:44), Max: 99.1 (28 Sep 2022 20:39)  HR: 86 (29 Sep 2022 11:50) (69 - 86)  BP: 116/77 (29 Sep 2022 16:44) (96/63 - 126/86)  BP(mean): --  RR: 18 (29 Sep 2022 16:44) (18 - 18)  SpO2: 97% (29 Sep 2022 16:44) (94% - 98%)    Parameters below as of 29 Sep 2022 16:44  Patient On (Oxygen Delivery Method): room air          PHYSICAL EXAM: communicate w pt and mother w Confucianism/   GENERAL: NAD  HEAD: Incision C/D/I w/o redness or signs of dehiscence oe SG fluid collection; well healing w remaining scabbing at the incision   awake in bed and tracking provider, FC in Chinese w all extremities, Left plegia; right UE spontaneous; not following commands; RLE w/d   EXTREMITIES:  no clubbing, cyanosis, or edema b/l  SKIN: Warm, dry, healing well          LABS:  no new labs to review         RADIOLOGY & ADDITIONAL TESTS:  no new NSx images available for review       < from: CT Head No Cont (09.27.22 @ 14:40) >  IMPRESSION:  No significant interval change.  No acute intracranial hemorrhage or brain edema.  --- End of Report ---  AMANDA CLAROS MD; Attending Radiologist  This document has been electronically signed. Sep 27 2022  2:50PM  < end of copied text >        I spent a total time of 45 mins with the patient at bedside of which more than 50% of time was spent on counseling/coordination of care

## 2022-09-29 NOTE — PROGRESS NOTE ADULT - ASSESSMENT
43 y/o male s/p cranioplasty POD#10  - continue seizure medication per epilepsy  - medical management per primary team    - pt stable from Nsx for d/c and d/c instruction charted in d/c note & verbalized w CM/ mother at bedside

## 2022-09-30 ENCOUNTER — TRANSCRIPTION ENCOUNTER (OUTPATIENT)
Age: 42
End: 2022-09-30

## 2022-09-30 VITALS
DIASTOLIC BLOOD PRESSURE: 55 MMHG | RESPIRATION RATE: 18 BRPM | HEART RATE: 70 BPM | OXYGEN SATURATION: 93 % | SYSTOLIC BLOOD PRESSURE: 112 MMHG | TEMPERATURE: 98 F

## 2022-09-30 PROCEDURE — 99239 HOSP IP/OBS DSCHRG MGMT >30: CPT

## 2022-09-30 RX ADMIN — SODIUM CHLORIDE 1 GRAM(S): 9 INJECTION INTRAMUSCULAR; INTRAVENOUS; SUBCUTANEOUS at 06:44

## 2022-09-30 RX ADMIN — POLYETHYLENE GLYCOL 3350 17 GRAM(S): 17 POWDER, FOR SOLUTION ORAL at 06:45

## 2022-09-30 RX ADMIN — LEVETIRACETAM 1000 MILLIGRAM(S): 250 TABLET, FILM COATED ORAL at 06:45

## 2022-09-30 RX ADMIN — MEMANTINE HYDROCHLORIDE 5 MILLIGRAM(S): 10 TABLET ORAL at 06:45

## 2022-09-30 NOTE — DISCHARGE NOTE NURSING/CASE MANAGEMENT/SOCIAL WORK - NSDCPEFALRISK_GEN_ALL_CORE
For information on Fall & Injury Prevention, visit: [email or web address] OR  [email or web address] OR  https://www.cdc.gov/steadi/patient.html

## 2022-09-30 NOTE — PROGRESS NOTE ADULT - ASSESSMENT
Patient is a 42 year old male initially admitted on 5/24/22 (BIBEMS after found unresponsive on street) with a GCS of 3, found to have b/l intraparenchymal bleeds, b/l SDH. Has had a complex prolonged hospital course including but not limited to right decompressive craniectomy on 5/24. Trach / PEG., cranioplasty on 6/29 with cognitive decline after initial improvement prompting imaging which noted subacute collection with air underneath the cranioplasty sight. MRI with increase in fluid collection underneath the flap leading to a repeat right craniectomy with wound exploration & evacuation of fluid collection on 7/17 with OR cultures isolating MSSA. Antimicrobial regimen adjusted (ID), (suspected) central SIADH now improved (renal signed off). Has had interval trach decannulation and dietary advancement to pureed. Now s/p completion of appropriate antimicrobial course with f/u imaging (9/3) revealing no abnormal enhancement (6 mm midline shift to left). After being optimized and cleared for surgery had a fever (9/9/22). Infectious w/u negative. Now has been afebrile. Patient then underwent a cranioplasty with replacement bone flap on 9/19. Post-op course complicated by left facial droop, left sided weakness and right hand twitching. Patient was placed on VEEG and loaded with keppra. Epilepsy was consulted and patient is now medically stable for downgrade to the hospitalist team. Given changes in neurologic status, patient was placed back on a tube feed diet. VEEG was negative for seizure activity and MRI brain was ordered was negative for acute changes. Patient to continue PT/OT/ST. Discharge disposition to [facility].     Assessment/Plan:     1) Traumatic brain injury with complicated hospital course (as above) now with possible encephalopathy  -CT head with bilateral IPH and bilateral SDH s/p decompressive craniotomy on 5/24  -s/p cranioplasty on 6/29, repeat craniotomy on 7/17 and final cranioplasty with replacement bone flap on 9/19   - patient had significantly improved neurologically prior to most recent cranioplasty on 9/19 (eating pureed and able to ambulate with PT)  - patient is now unable to follow commands with left hemiparesis; placed back on a TF diet   - completed VEEG without seizure activity  MRI brain repeated status post cranioplasty, encephalomalacia; Small subdural hygroma in the right parafalcine convexity; mild white matter ischemia with old lacunar infarcts in the bilateral basal ganglia    - Repeat CT head on 09/27 negative   - Continue Memantine 5 mg BID   - Continue Melatonin at bedtime  - Keppra dose was adjusted   - Seizure/Aspiration/Fall Precautions    2) Central SIADH - resolved  - sodium WNL  - s/p Tolvaptan and Urea   - Continue sodium chloride tablets  - Monitor labs closely    3) Right Pubic Rami Fracture  - subacute  - incidentally found on CT  - Ortho consult appreciated  - WBAT (patient was ambulating with PT prior to cranioplasty on 9/19)    4)  CNS infection  - s/p right decompressive hemicraniectomy on 5/24  - blood cultures negative  - Completed Nafcillin and Vancomycin  - MRI after completion of abx without abnormal enhancement  - ID follow up noted, monitor off abx    5) Oropharyngeal Dysphagia  -prior to recent cranioplasty on 9/19, patient was on a pureed diet  -given acute changes in neurologic condition, patient is not following commands and will not open his mouth for PO trials  - Tube feeds changed to VIital for diarrhea goal 60cc/hr  -maintain aspiration precautions  -SLP reconsult if patient able to follow commands    6) History of Substance abuse  -UDS positive for cocaine on admission  -also history of ETOH abuse which was confirmed by patient's daughter    7) Anemia   - Hb stable  - iron studies reviewed  - start ferrous sulfate  - Monitor closely    DVT Prophylaxis - SCDs     Medically stable for discharge to [facility]. Discussed with patient's mother at bedside with . All questions and concerns were addressed

## 2022-09-30 NOTE — DISCHARGE NOTE NURSING/CASE MANAGEMENT/SOCIAL WORK - NSDCFUADDAPPT_GEN_ALL_CORE_FT
Please follow up with Dr Lyons regarding cervical spine as outpatient     pls f/u w Dr Millan in 4 weeks if possible for post-op assessment or once out of rehab

## 2022-09-30 NOTE — DISCHARGE NOTE NURSING/CASE MANAGEMENT/SOCIAL WORK - PATIENT PORTAL LINK FT
You can access the FollowMyHealth Patient Portal offered by [facility] by registering at the following website: [email or web address]. By joining [employer]’s FollowMyHealth portal, you will also be able to view your health information using other applications (apps) compatible with our system.

## 2022-09-30 NOTE — PROGRESS NOTE ADULT - SUBJECTIVE AND OBJECTIVE BOX
CC: Follow up     INTERVAL HPI/OVERNIGHT EVENTS: Patient seen and examined, no acute events overnight. Awake alert. Mother at bedside      Vital Signs Last 24 Hrs  T(C): 36.8 (30 Sep 2022 04:42), Max: 36.9 (29 Sep 2022 11:50)  T(F): 98.3 (30 Sep 2022 04:42), Max: 98.5 (29 Sep 2022 16:44)  HR: 92 (30 Sep 2022 04:42) (86 - 92)  BP: 118/84 (30 Sep 2022 04:42) (116/77 - 126/86)  BP(mean): --  RR: 18 (30 Sep 2022 04:42) (18 - 18)  SpO2: 96% (30 Sep 2022 04:42) (94% - 97%)    Parameters below as of 30 Sep 2022 04:42  Patient On (Oxygen Delivery Method): room air        PHYSICAL EXAM:    GENERAL: NAD  HEAD:  Atraumatic, Normocephalic  EYES: conjunctiva and sclera clear  ENMT: Moist mucous membranes  NECK: Supple, No JVD  NERVOUS SYSTEM: Alert, non verbal. Spontaneously moving RUE and RLE extremity. 0.5 LUE   CHEST/LUNG: Clear to auscultation bilaterally; No rales, rhonchi, wheezing, or rubs  HEART: Regular rate and rhythm; No murmurs, rubs, or gallops  ABDOMEN: Soft, Nontender, Nondistended; Bowel sounds present  + PEG  EXTREMITIES:  2+ Peripheral Pulses, No clubbing, cyanosis, or edema        MEDICATIONS  (STANDING):  ascorbic acid 500 milliGRAM(s) Oral daily  chlorhexidine 2% Cloths 1 Application(s) Topical daily  enoxaparin Injectable 40 milliGRAM(s) SubCutaneous every 24 hours  ferrous    sulfate Liquid 300 milliGRAM(s) Enteral Tube daily  influenza   Vaccine 0.5 milliLiter(s) IntraMuscular once  levETIRAcetam  Solution 1000 milliGRAM(s) Enteral Tube two times a day  melatonin 5 milliGRAM(s) Oral at bedtime  memantine 5 milliGRAM(s) Oral every 12 hours  multivitamin 1 Tablet(s) Oral daily  polyethylene glycol 3350 17 Gram(s) Oral two times a day  senna 1 Tablet(s) Oral daily  sodium chloride 1 Gram(s) Oral every 12 hours    MEDICATIONS  (PRN):  acetaminophen     Tablet .. 650 milliGRAM(s) Oral every 6 hours PRN Mild Pain (1 - 3)  hydrALAZINE Injectable 10 milliGRAM(s) IV Push every 4 hours PRN SBP > 150      Allergies    Allergy Status Unknown    Intolerances          LABS:                  RADIOLOGY & ADDITIONAL TESTS:

## 2022-10-20 ENCOUNTER — APPOINTMENT (OUTPATIENT)
Dept: NEUROSURGERY | Facility: CLINIC | Age: 42
End: 2022-10-20

## 2022-10-20 VITALS
DIASTOLIC BLOOD PRESSURE: 83 MMHG | SYSTOLIC BLOOD PRESSURE: 129 MMHG | TEMPERATURE: 98.2 F | HEART RATE: 71 BPM | OXYGEN SATURATION: 98 %

## 2022-10-20 PROCEDURE — 99024 POSTOP FOLLOW-UP VISIT: CPT

## 2022-10-20 RX ORDER — ENOXAPARIN SODIUM 40 MG/.4ML
40 INJECTION, SOLUTION SUBCUTANEOUS
Qty: 3 | Refills: 0 | Status: ACTIVE | COMMUNITY
Start: 2022-10-17
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
## 2022-10-20 NOTE — PHYSICAL EXAM
[Person] : oriented to person [Cranial Nerves Oculomotor (III)] : extraocular motion intact [Cranial Nerves Trigeminal (V)] : facial sensation intact symmetrically [Involuntary Movements] : no involuntary movements were seen [No Muscle Atrophy] : normal bulk in all four extremities [Hemiplegia Of Left Side] : hemiplegia is present on the left [Hemiparesis Of Left Side] : hemiparesis is present on the left [Sensation Tactile Decrease] : light touch was intact

## 2022-10-20 NOTE — ASSESSMENT
[FreeTextEntry1] : Mr. Bundy presents with above history.  Patient is alert, responds to commands, aphasic, with left-sided hemiplegia.  His incision is well-healed.  I have spoken to the charge nurse\sumit To Biju at the [facility].  Patient is currently has a G-tube.  And has started a regular diet today.  They are working with him with physical and Occupational Therapy as well as speech.  I will repeat a CAT scan of the head to assess the interval and no improvement of his wound and if there is any evidence of ICH.  Patient may follow-up telehealth to review the results.  I discussed this plan with sister Rama who was present during the entire visit.\par Patient has been given an opportunity to ask and have their questions answered to their satisfaction.\par Patient knows to call the office if there are any new or worsening symptoms.\par I have provided these instructions in a written format to the nursing facility and they were provided to the sister as well for formal review.
[email or web address]

## 2022-10-26 NOTE — CHART NOTE - NSCHARTNOTEFT_GEN_A_CORE
Palliative care social work note.  Clinical reviewed and case discussed with palliative care team. Palliative care physician Dr Serrano and SW met with patient who was not responsive during interaction. Palliative care team to follow with family. SW aware of brother and mother form CASEY Lunsford who is involved. no concerns

## 2022-12-16 ENCOUNTER — APPOINTMENT (OUTPATIENT)
Dept: NEUROSURGERY | Facility: CLINIC | Age: 42
End: 2022-12-16
Payer: MEDICAID

## 2022-12-16 VITALS
HEART RATE: 61 BPM | TEMPERATURE: 98.2 F | DIASTOLIC BLOOD PRESSURE: 70 MMHG | OXYGEN SATURATION: 98 % | SYSTOLIC BLOOD PRESSURE: 109 MMHG

## 2022-12-16 PROCEDURE — 99213 OFFICE O/P EST LOW 20 MIN: CPT

## 2022-12-16 NOTE — ASSESSMENT
[FreeTextEntry1] : Mr. Bundy presents with above history.  Patient is alert, responds to commands, aphasic, with left-sided hemiplegia.  His incision is well-healed.  I have attempted to reach out to the [facility] facility at the #[phone] to speak to nursing staff regarding his interval progress.\par \par \par To Biju at the [facility].  Patient is currently has a G-tube.  And has started a regular diet today.  They are working with him with physical and Occupational Therapy as well as speech.\par \par Patient has been given an opportunity to ask and have their questions answered to their satisfaction.\par Patient knows to call the office if there are any new or worsening symptoms.\par I have provided these instructions in a written format to the nursing facility and they were provided to the sister as well for formal review.

## 2022-12-16 NOTE — REASON FOR VISIT
[de-identified] : ischarge Date	30-Sep-2022 \par \par Admission Date	24-May-2022 00:26 \par Reason for Admission	Trauma/ Subdural/ Intubated \par Hospital Course	 \par Patient is a 42 year old male initially admitted on 5/24/22 (BIBEMS after found \par unresponsive on street) with a GCS of 3, found to have b/l intraparenchymal \par bleeds, b/l SDH. Has had a complex prolonged hospital course including but not \par limited to right decompressive craniectomy on 5/24. Trach / PEG., cranioplasty \par on 6/29 with cognitive decline after initial improvement prompting imaging \par which noted subacute collection with air underneath the cranioplasty sight. MRI \par with increase in fluid collection underneath the flap leading to a repeat right \par craniectomy with wound exploration & evacuation of fluid collection on 7/17 \par with OR cultures isolating MSSA. 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 Has had interval \par trach decannulation and dietary advancement to pureed. Now s/p completion of \par appropriate antimicrobial course with f/u imaging (9/3) revealing no abnormal \par enhancement (6 mm midline shift to left). After being optimized and cleared for \par surgery had a fever (9/9/22). Infectious w/u negative. Now has been afebrile. \par Patient then underwent a cranioplasty with replacement bone flap on 9/19. \par Post-op course complicated by left facial droop, left sided weakness and right \par hand twitching. Patient was placed on VEEG and loaded with keppra. Epilepsy was \par consulted and patient is now medically stable for downgrade to the hospitalist \par team. Given changes in neurologic status, patient was placed back on a tube \par feed diet. VEEG was negative for seizure activity and MRI brain was ordered was \par negative for acute changes. Patient to continue PT/OT/ST. Discharge disposition \par to [facility]. \par Mr. Bundy presents for appointment.  He is nearly residing at the [facility].  He presents accompanied by his mom Rama and dad Patel. .  Today he presents he is alert.  Responds to some commands.  He is aphasic.  He has left-sided hemiplegia that remained stable at this point he is total clear care at the facility.  His incision is well-healed.  Well wound edges are well approximated.  Denies any fever or chills per parents.   He is currently on Keppra for seizure Prophylaxis.  \par He presents with a repeat CT head w/o contrast with no evidence of a ICH.  He also presents with a CAT scan of the maxillofacial structures reveals that there is a questionable dislocation of the right mandibular.  Per the family is unknown if he sustained a fall.\par  [Family Member] : family member

## 2023-07-11 ENCOUNTER — APPOINTMENT (OUTPATIENT)
Dept: NEUROSURGERY | Facility: CLINIC | Age: 43
End: 2023-07-11
Payer: MEDICAID

## 2023-07-11 VITALS
HEART RATE: 71 BPM | DIASTOLIC BLOOD PRESSURE: 76 MMHG | SYSTOLIC BLOOD PRESSURE: 116 MMHG | TEMPERATURE: 98.7 F | OXYGEN SATURATION: 95 %

## 2023-07-11 DIAGNOSIS — I62.9 NONTRAUMATIC INTRACRANIAL HEMORRHAGE, UNSPECIFIED: ICD-10-CM

## 2023-07-11 PROCEDURE — 99214 OFFICE O/P EST MOD 30 MIN: CPT

## 2023-07-12 PROBLEM — I62.9 INTRACRANIAL HEMORRHAGE: Status: ACTIVE | Noted: 2022-10-20

## 2023-07-12 NOTE — ASSESSMENT
[FreeTextEntry1] : Mr. Bundy presents with above history.  Patient is alert, responds to commands, aphasic, with left-sided hemiplegia.  His incision is well-healed.  I have attempted to reach out to the [facility] facility at the #[phone] to speak to nursing staff regarding his interval progress.\par \par \par To Biju at the [facility].  Patient is currently has a G-tube.  And has started a regular diet today.  They are working with him with physical and Occupational Therapy as well as speech.\par MRI brain w/wo contrast to assess for any interval changes. \par Patient has been given an opportunity to ask and have their questions answered to their satisfaction.\par Patient knows to call the office if there are any new or worsening symptoms.\par I have provided these instructions in a written format to the nursing facility and they were provided to the sister as well for formal review.

## 2023-07-12 NOTE — REASON FOR VISIT
[Follow-Up: _____] : a [unfilled] follow-up visit [Family Member] : family member [de-identified] : ischarge Date	30-Sep-2022 \par \par Admission Date	24-May-2022 00:26 \par Reason for Admission	Trauma/ Subdural/ Intubated \par Hospital Course	 \par Patient is a 42 year old male initially admitted on 5/24/22 (BIBEMS after found \par unresponsive on street) with a GCS of 3, found to have b/l intraparenchymal \par bleeds, b/l SDH. Has had a complex prolonged hospital course including but not \par limited to right decompressive craniectomy on 5/24. Trach / PEG., cranioplasty \par on 6/29 with cognitive decline after initial improvement prompting imaging \par which noted subacute collection with air underneath the cranioplasty sight. MRI \par with increase in fluid collection underneath the flap leading to a repeat right \par craniectomy with wound exploration & evacuation of fluid collection on 7/17 \par with OR cultures isolating MSSA. 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 Has had interval \par trach decannulation and dietary advancement to pureed. Now s/p completion of \par appropriate antimicrobial course with f/u imaging (9/3) revealing no abnormal \par enhancement (6 mm midline shift to left). After being optimized and cleared for \par surgery had a fever (9/9/22). Infectious w/u negative. Now has been afebrile. \par Patient then underwent a cranioplasty with replacement bone flap on 9/19. \par Post-op course complicated by left facial droop, left sided weakness and right \par hand twitching. Patient was placed on VEEG and loaded with keppra. Epilepsy was \par consulted and patient is now medically stable for downgrade to the hospitalist \par team. Given changes in neurologic status, patient was placed back on a tube \par feed diet. VEEG was negative for seizure activity and MRI brain was ordered was \par negative for acute changes. Patient to continue PT/OT/ST. Discharge disposition \par to [facility]. \par Mr. Bundy presents for appointment.  He is nearly residing at the [facility].  He presents accompanied by his mom Rama and dad Patel. .  Today he presents he is alert.  Responds to some commands.  He is aphasic.  He has left-sided hemiplegia that remained stable at this point he is total clear care at the facility.  His incision is well-healed.  Well wound edges are well approximated.  Denies any fever or chills per parents.   He is currently on Keppra for seizure Prophylaxis.  \par He presents with a repeat CT head w/o contrast with no evidence of a ICH.  He also presents with a CAT scan of the maxillofacial structures reveals that there is a questionable dislocation of the right mandibular.  Per the family is unknown if he sustained a fall.\par
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
## 2023-09-05 NOTE — ED PROCEDURE NOTE - ATTENDING WITH...
Rasheed Arevalo is a 72 y.o. male who is here today for catheter removal. Patient of Dr. Pollard. 10cc syringe used to deflate the balloon and the catheter was removed without difficulty.  The patient tolerated this well and will return in 4 weeks to see Dr. Pollard in the office for follow up. Jatinder ZAZUETA was in the office at the time of procedure.     Patient was advised to drink clear fluids for the next couple hours and urinate. The patient was also advised he may experience some blood in the urine and burning with urination for the next couple days. If the patient is unable to urinate or develops fever, chills, N&V or suprapubic pain he will call to return for an appt at clinic or seek medical treatment at [facility] ER, PCP or Urgent Care after hours. Patient verbalized understanding and all questions were answered. KYMBERLY Garcia RN     I have reviewed and agree with medical assistance documentation above   
Resident
Resident

## 2024-08-11 NOTE — ED PROVIDER NOTE - NSFOLLOWUPCLINICS_GEN_ALL_ED_FT
[id number] [place] [address]  Phone: [phone]  Fax:   Follow Up Time: Urgent

## 2024-10-10 NOTE — PROGRESS NOTE ADULT - REASON FOR ADMISSION
Add 80465 Cpt? (Important Note: In 2017 The Use Of 17100 Is Being Tracked By Cms To Determine Future Global Period Reimbursement For Global Periods): yes Detail Level: Detailed Trauma/ Subdural/ Intubated

## 2024-10-30 ENCOUNTER — APPOINTMENT (OUTPATIENT)
Dept: NEUROSURGERY | Facility: CLINIC | Age: 44
End: 2024-10-30

## 2025-02-08 NOTE — PHYSICAL THERAPY INITIAL EVALUATION ADULT - AMBULATION SKILLS, REHAB EVAL
Group Topic: BH CBT    Date: 2/7/2025  Start Time: 1300  End Time: 1345  Facilitators: Jackson Wallace LCSW    Focus:  Rational emotive therapy   Number in attendance: 7    Method: Group  Attendance:  invited, did not attend.    unable to obtain

## 2025-06-10 NOTE — PROGRESS NOTE ADULT - ASSESSMENT
Patient is a 42 year old male initially admitted on 5/24/22 (BIBEMS after found unresponsive on street) with a GCS of 3, found to have b/l intraparenchymal bleeds, b/l SDH. Has had a complex prolonged hospital course including but not limited to right decompressive craniectomy on 5/24. Trach / PEG., cranioplasty on 6/29 with cognitive decline after initial improvement prompting imaging which noted subacute collection with air underneath the cranioplasty sight. MRI with increase in fluid collection underneath the flap leading to a repeat right craniectomy with wound exploration & evacuation of fluid collection on 7/17 with OR cultures isolating MSSA. Antimicrobial regimen adjusted (ID), (suspected) central SIADH now improved (renal signed off). Has had interval trach decannulation and dietary advancement to pureed. Now s/p completion of appropriate antimicrobial course with f/u imaging (9/3) revealing no abnormal enhancement (6 mm midline shift to left). After being optimized and cleared for surgery had a fever (9/9/22). Infectious w/u negative. Now has been afebrile. Patient then underwent a cranioplasty with replacement bone flap on 9/19. Post-op course complicated by left facial droop, left sided weakness and right hand twitching. Patient was placed on VEEG and loaded with keppra. Epilepsy was consulted and patient is now medically stable for downgrade to the hospitalist team. Given changes in neurologic status, patient was placed back on a tube feed diet. VEEG was negative for seizure activity and MRI brain was ordered.    Assessment/Plan:     1) Traumatic brain injury with complicated hospital course (as above) now with possible encephalopathy  -CT head with bilateral IPH and bilateral SDH s/p decompressive craniotomy on 5/24  -s/p cranioplasty on 6/29, repeat craniotomy on 7/17 and final cranioplasty with replacement bone flap on 9/19   - patient had significantly improved neurologically prior to most recent cranioplasty on 9/19 (eating pureed and able to ambulate with PT)  - patient is now unable to follow commands with left hemiparesis; placed back on a TF diet   - completed VEEG without seizure activity  **MRI brain repeated status post cranioplasty, encephalomalacia; Small subdural hygroma in the right parafalcine convexity; mild white matter ischemia with old lacunar infarcts in the bilateral basal ganglia    - Requested neurosurgery follow up   - Continue Memantine 5 mg BID   - Continue Melatonin at bedtime  - Keppra dose was adjusted   - Continue neurochecks  - Epilepsy consult appreciated  - Seizure/Aspiration/Fall Precautions  - Palliative care following     2) Central SIADH - resolved  - sodium WNL  - s/p Tolvaptan and Urea   - Continue sodium chloride tablets  - Monitor labs closely    3) Right Pubic Rami Fracture  - subacute  - incidentally found on CT  - Ortho consult appreciated  - WBAT (patient was ambulating with PT prior to cranioplasty on 9/19)  - PT re-evaluation    4)  CNS infection  - s/p right decompressive hemicraniectomy on 5/24  - blood cultures negative  - Completed Nafcillin and Vancomycin  - MRI after completion of abx without abnormal enhancement  - ID follow up noted, monitor off abx    5) Oropharyngeal Dysphagia  -prior to recent cranioplasty on 9/19, patient was on a pureed diet  -given acute changes in neurologic condition, patient is not following commands and will not open his mouth for PO trials  -changed to tube feeds; goal increased to 60cc/hr  -maintain aspiration precautions  -SLP reconsult if patient able to follow commands    6) History of Substance abuse  -UDS positive for cocaine on admission  -also history of ETOH abuse which was confirmed by patient's daughter    7) Anemia   - Hb stable  - iron studies reviewed  - start ferrous sulfate  - Monitor closely    DVT Prophylaxis - SCDs  [de-identified] : Ms. ERIC MC is a 50 year old female with hx of HTN, insomnia, presenting for a follow up on her BP.   Amlodipine was increased to 5mg on last visit..  Reports compliance with taking it daily. Says her BP has been good at home since she started the higher dose Pt states she is feeling well. Offers no complaints. Denies any SOB, CP, abdominal pain, N/V/D, headache, dizziness, or leg swelling.

## 2025-07-30 NOTE — PROCEDURE NOTE - NSLACNUMBEROFLAC_SKIN_ALL_CORE
Pt arrives with cc of constipation x7days. Pt was given 3 suppositories with minimal results and stool was loose.  Pt has had 2 bowel obstructions in the past. Pt has dementia and is accompanied with wife. Pt was seen by his dr yesterday and she said to keep giving the suppository and miralax.    1

## (undated) DEVICE — BUR PERFORATOR HUDSON END DISP

## (undated) DEVICE — DRAPE INSTRUMENT POUCH

## (undated) DEVICE — DRAIN JACKSON PRATT 7MM FLAT FULL W 15 FR TROCAR

## (undated) DEVICE — Device

## (undated) DEVICE — DRAPE CRANIOTOMY W POUCH 100X104"

## (undated) DEVICE — WRAP COMPRESSION CALF MED

## (undated) DEVICE — SUT SILK 2-0 30" TIES

## (undated) DEVICE — DISSECTING TOOL MIDAS REX 9CM 9MM ACORN

## (undated) DEVICE — GLV 7 PROTEXIS

## (undated) DEVICE — GLV 7.5 PROTEXIS

## (undated) DEVICE — STOPCOCK 3 WAY W SWIVEL MALE LUER LOCK

## (undated) DEVICE — SUT ETHILON 4-0 18" PS-2

## (undated) DEVICE — DISSECTING TOOL MIDAS REX 9CM 6MM ACORN

## (undated) DEVICE — PACK CRANIOTOMY CUSTOM

## (undated) DEVICE — TOOL CA DISSECTING

## (undated) DEVICE — TUBING CONNECTING 6MM 20FT

## (undated) DEVICE — SUT NUROLON 4-0 8-18" TF

## (undated) DEVICE — CLIP HEMOSTATIC SCALP RANEY DISP

## (undated) DEVICE — SUT ETHILON 3-0 18" PS-1

## (undated) DEVICE — BLANKET WARMER LOWER ADULT

## (undated) DEVICE — DRSG XEROFORM 5"

## (undated) DEVICE — DRSG TELFA 4 X 8

## (undated) DEVICE — SUT VICRYL 2-0 18" CP-2 UNDYED

## (undated) DEVICE — DISSECTING TOOL MIDAS REX 8CM 2.3MM

## (undated) DEVICE — DRSG 4X4

## (undated) DEVICE — SYR CATH TIP 2 OZ

## (undated) DEVICE — PREP SCRUB IODO DURAPREP 26CC

## (undated) DEVICE — PACK NEURO SO SIDE

## (undated) DEVICE — DRSG KERLIX ROLL 4.5"

## (undated) DEVICE — STAPLER SKIN PROXIMATE